# Patient Record
Sex: FEMALE | Race: WHITE | NOT HISPANIC OR LATINO | Employment: UNEMPLOYED | URBAN - NONMETROPOLITAN AREA
[De-identification: names, ages, dates, MRNs, and addresses within clinical notes are randomized per-mention and may not be internally consistent; named-entity substitution may affect disease eponyms.]

---

## 2023-04-21 ENCOUNTER — HOSPITAL ENCOUNTER (EMERGENCY)
Facility: HOSPITAL | Age: 53
Discharge: HOME | End: 2023-04-21
Attending: EMERGENCY MEDICINE
Payer: MEDICAID

## 2023-04-21 VITALS
RESPIRATION RATE: 18 BRPM | WEIGHT: 281.6 LBS | BODY MASS INDEX: 42.68 KG/M2 | OXYGEN SATURATION: 97 % | SYSTOLIC BLOOD PRESSURE: 145 MMHG | TEMPERATURE: 97.5 F | HEART RATE: 79 BPM | HEIGHT: 68 IN | DIASTOLIC BLOOD PRESSURE: 96 MMHG

## 2023-04-21 DIAGNOSIS — L02.91 ABSCESS: ICD-10-CM

## 2023-04-21 DIAGNOSIS — L03.811 ABSCESS OR CELLULITIS OF SCALP: Primary | ICD-10-CM

## 2023-04-21 LAB
ALBUMIN SERPL-MCNC: 4.2 G/DL (ref 3.5–5.2)
ALBUMIN/GLOB SERPL: 1.4 G/DL
ALP SERPL-CCNC: 145 U/L (ref 39–117)
ALT SERPL W P-5'-P-CCNC: 27 U/L (ref 1–33)
ANION GAP SERPL CALCULATED.3IONS-SCNC: 7.9 MMOL/L (ref 5–15)
AST SERPL-CCNC: 25 U/L (ref 1–32)
BASOPHILS # BLD AUTO: 0.07 10*3/MM3 (ref 0–0.2)
BASOPHILS NFR BLD AUTO: 0.8 % (ref 0–1.5)
BILIRUB SERPL-MCNC: 0.3 MG/DL (ref 0–1.2)
BUN SERPL-MCNC: 18 MG/DL (ref 6–20)
BUN/CREAT SERPL: 28.6 (ref 7–25)
CALCIUM SPEC-SCNC: 9.7 MG/DL (ref 8.6–10.5)
CHLORIDE SERPL-SCNC: 106 MMOL/L (ref 98–107)
CO2 SERPL-SCNC: 29.1 MMOL/L (ref 22–29)
CREAT SERPL-MCNC: 0.63 MG/DL (ref 0.57–1)
CRP SERPL-MCNC: 0.56 MG/DL (ref 0–0.5)
D-LACTATE SERPL-SCNC: 1 MMOL/L (ref 0.5–2)
DEPRECATED RDW RBC AUTO: 42.5 FL (ref 37–54)
EGFRCR SERPLBLD CKD-EPI 2021: 106.9 ML/MIN/1.73
EOSINOPHIL # BLD AUTO: 0.19 10*3/MM3 (ref 0–0.4)
EOSINOPHIL NFR BLD AUTO: 2.1 % (ref 0.3–6.2)
ERYTHROCYTE [DISTWIDTH] IN BLOOD BY AUTOMATED COUNT: 12.3 % (ref 12.3–15.4)
GLOBULIN UR ELPH-MCNC: 3.1 GM/DL
GLUCOSE SERPL-MCNC: 122 MG/DL (ref 65–99)
HCT VFR BLD AUTO: 46.5 % (ref 34–46.6)
HGB BLD-MCNC: 15 G/DL (ref 12–15.9)
IMM GRANULOCYTES # BLD AUTO: 0.02 10*3/MM3 (ref 0–0.05)
IMM GRANULOCYTES NFR BLD AUTO: 0.2 % (ref 0–0.5)
LYMPHOCYTES # BLD AUTO: 2.53 10*3/MM3 (ref 0.7–3.1)
LYMPHOCYTES NFR BLD AUTO: 27.5 % (ref 19.6–45.3)
MCH RBC QN AUTO: 30.2 PG (ref 26.6–33)
MCHC RBC AUTO-ENTMCNC: 32.3 G/DL (ref 31.5–35.7)
MCV RBC AUTO: 93.6 FL (ref 79–97)
MONOCYTES # BLD AUTO: 0.55 10*3/MM3 (ref 0.1–0.9)
MONOCYTES NFR BLD AUTO: 6 % (ref 5–12)
NEUTROPHILS NFR BLD AUTO: 5.85 10*3/MM3 (ref 1.7–7)
NEUTROPHILS NFR BLD AUTO: 63.4 % (ref 42.7–76)
NRBC BLD AUTO-RTO: 0 /100 WBC (ref 0–0.2)
PLATELET # BLD AUTO: 254 10*3/MM3 (ref 140–450)
PMV BLD AUTO: 10 FL (ref 6–12)
POTASSIUM SERPL-SCNC: 4.1 MMOL/L (ref 3.5–5.2)
PROT SERPL-MCNC: 7.3 G/DL (ref 6–8.5)
RBC # BLD AUTO: 4.97 10*6/MM3 (ref 3.77–5.28)
SODIUM SERPL-SCNC: 143 MMOL/L (ref 136–145)
WBC NRBC COR # BLD: 9.21 10*3/MM3 (ref 3.4–10.8)

## 2023-04-21 PROCEDURE — 83605 ASSAY OF LACTIC ACID: CPT | Performed by: PHYSICIAN ASSISTANT

## 2023-04-21 PROCEDURE — 86140 C-REACTIVE PROTEIN: CPT | Performed by: PHYSICIAN ASSISTANT

## 2023-04-21 PROCEDURE — 63710000001 ONDANSETRON ODT 4 MG TABLET DISPERSIBLE: Performed by: EMERGENCY MEDICINE

## 2023-04-21 PROCEDURE — 85025 COMPLETE CBC W/AUTO DIFF WBC: CPT | Performed by: PHYSICIAN ASSISTANT

## 2023-04-21 PROCEDURE — 99283 EMERGENCY DEPT VISIT LOW MDM: CPT

## 2023-04-21 PROCEDURE — 80053 COMPREHEN METABOLIC PANEL: CPT | Performed by: PHYSICIAN ASSISTANT

## 2023-04-21 RX ORDER — HYDROCODONE BITARTRATE AND ACETAMINOPHEN 5; 325 MG/1; MG/1
1 TABLET ORAL EVERY 6 HOURS PRN
Qty: 10 TABLET | Refills: 0 | Status: SHIPPED | OUTPATIENT
Start: 2023-04-21

## 2023-04-21 RX ORDER — SULFAMETHOXAZOLE AND TRIMETHOPRIM 800; 160 MG/1; MG/1
2 TABLET ORAL ONCE
Status: COMPLETED | OUTPATIENT
Start: 2023-04-21 | End: 2023-04-21

## 2023-04-21 RX ORDER — SULFAMETHOXAZOLE AND TRIMETHOPRIM 800; 160 MG/1; MG/1
1 TABLET ORAL 2 TIMES DAILY
Qty: 28 TABLET | Refills: 0 | Status: SHIPPED | OUTPATIENT
Start: 2023-04-21 | End: 2023-05-05

## 2023-04-21 RX ORDER — HYDROCODONE BITARTRATE AND ACETAMINOPHEN 5; 325 MG/1; MG/1
1 TABLET ORAL ONCE
Status: COMPLETED | OUTPATIENT
Start: 2023-04-21 | End: 2023-04-21

## 2023-04-21 RX ORDER — ONDANSETRON 4 MG/1
4 TABLET, ORALLY DISINTEGRATING ORAL ONCE
Status: COMPLETED | OUTPATIENT
Start: 2023-04-21 | End: 2023-04-21

## 2023-04-21 RX ADMIN — HYDROCODONE BITARTRATE AND ACETAMINOPHEN 1 TABLET: 5; 325 TABLET ORAL at 21:05

## 2023-04-21 RX ADMIN — SULFAMETHOXAZOLE AND TRIMETHOPRIM 2 TABLET: 800; 160 TABLET ORAL at 21:05

## 2023-04-21 RX ADMIN — ONDANSETRON 4 MG: 4 TABLET, ORALLY DISINTEGRATING ORAL at 21:05

## 2023-04-22 NOTE — ED NOTES
MEDICAL SCREENING:    Reason for Visit: Pt has painful lesions in her scalp and one on her back. States she has had fever and chills.     Patient initially seen in triage.  The patient was advised further evaluation and diagnostic testing will be needed, some of the treatment and testing will be initiated in the lobby in order to begin the process.  The patient will be returned to the waiting area for the time being and possibly be re-assessed by a subsequent ED provider.  The patient will be brought back to the treatment area in as timely manner as possible.       Sanam Jones PA  04/21/23 2018

## 2023-04-26 NOTE — ED PROVIDER NOTES
Subjective     History provided by:  Patient   used: No    Illness  Location:  Patient in the emergency department reporting that she has both small abscesses on her scalp, face, back, and abdomen.  Quality:  Reports that they cause her pain 2/10 on the pain severity scale that is a dull ache in nature and constant at all times.  Severity:  Mild  Onset quality:  Gradual  Timing:  Constant  Progression:  Worsening  Chronicity:  Chronic  Context:  In the emergency department for multiple abscesses.  Relieved by:  Nothing.  Worsened by:  Scratching them.  Ineffective treatments:  None tried at this time.  Associated symptoms: no abdominal pain, no chest pain, no congestion, no cough, no diarrhea, no ear pain, no fatigue, no fever, no headaches, no loss of consciousness, no myalgias, no nausea, no rash, no rhinorrhea, no shortness of breath, no sore throat, no vomiting and no wheezing        Review of Systems   Constitutional: Negative for activity change, appetite change, chills, diaphoresis, fatigue and fever.   HENT: Negative for congestion, ear pain, rhinorrhea and sore throat.    Eyes: Negative for redness.   Respiratory: Negative for cough, chest tightness, shortness of breath and wheezing.    Cardiovascular: Negative for chest pain, palpitations and leg swelling.   Gastrointestinal: Negative for abdominal pain, diarrhea, nausea and vomiting.   Genitourinary: Negative for dysuria and urgency.   Musculoskeletal: Negative for arthralgias, back pain, myalgias and neck pain.   Skin: Negative for pallor, rash and wound.   Neurological: Negative for dizziness, loss of consciousness, speech difficulty, weakness and headaches.   Psychiatric/Behavioral: Negative for agitation, behavioral problems, confusion and decreased concentration.   All other systems reviewed and are negative.      No past medical history on file.    Allergies   Allergen Reactions   • Erythromycin Anaphylaxis   • Toradol [Ketorolac  Tromethamine] Rash       No past surgical history on file.    No family history on file.    Social History     Socioeconomic History   • Marital status: Single           Objective   Physical Exam  Vitals and nursing note reviewed.   Constitutional:       General: She is not in acute distress.     Appearance: Normal appearance. She is well-developed. She is not toxic-appearing or diaphoretic.   HENT:      Head: Normocephalic and atraumatic.      Right Ear: External ear normal.      Left Ear: External ear normal.      Nose: Nose normal.      Mouth/Throat:      Pharynx: No oropharyngeal exudate.      Tonsils: No tonsillar exudate.   Eyes:      General: Lids are normal.      Conjunctiva/sclera: Conjunctivae normal.      Pupils: Pupils are equal, round, and reactive to light.   Neck:      Thyroid: No thyromegaly.   Cardiovascular:      Rate and Rhythm: Normal rate and regular rhythm.      Pulses: Normal pulses.      Heart sounds: Normal heart sounds, S1 normal and S2 normal.   Pulmonary:      Effort: Pulmonary effort is normal. No tachypnea or respiratory distress.      Breath sounds: Normal breath sounds. No decreased breath sounds, wheezing or rales.   Chest:      Chest wall: No tenderness.   Abdominal:      General: Bowel sounds are normal. There is no distension.      Palpations: Abdomen is soft.      Tenderness: There is no abdominal tenderness. There is no guarding or rebound.   Musculoskeletal:         General: No tenderness or deformity. Normal range of motion.      Cervical back: Full passive range of motion without pain, normal range of motion and neck supple.   Lymphadenopathy:      Cervical: No cervical adenopathy.   Skin:     General: Skin is warm and dry.      Coloration: Skin is not pale.      Findings: Lesion present. No erythema or rash.      Comments: Patient has multiple less than half centimeter lesions noted to the scalp, bilateral cheeks, on the upper back, and lower right abdominal wall.    Neurological:      Mental Status: She is alert and oriented to person, place, and time.      GCS: GCS eye subscore is 4. GCS verbal subscore is 5. GCS motor subscore is 6.      Cranial Nerves: No cranial nerve deficit.      Sensory: No sensory deficit.   Psychiatric:         Speech: Speech normal.         Behavior: Behavior normal.         Thought Content: Thought content normal.         Judgment: Judgment normal.         Procedures           ED Course                                           Medical Decision Making    History provided by:  Patient   used: No    Illness  Location:  Patient in the emergency department reporting that she has both small abscesses on her scalp, face, back, and abdomen.  Quality:  Reports that they cause her pain 2/10 on the pain severity scale that is a dull ache in nature and constant at all times.  Severity:  Mild  Onset quality:  Gradual  Timing:  Constant  Progression:  Worsening  Chronicity:  Chronic  Context:  In the emergency department for multiple abscesses.  Relieved by:  Nothing.  Worsened by:  Scratching them.  Ineffective treatments:  None tried at this time.  Associated symptoms: no abdominal pain, no chest pain, no congestion, no cough, no diarrhea, no ear pain, no fatigue, no fever, no headaches, no loss of consciousness, no myalgias, no nausea, no rash, no rhinorrhea, no shortness of breath, no sore throat, no vomiting and no wheezing        Abscess or cellulitis of scalp: acute illness or injury  Abscess: acute illness or injury  Amount and/or Complexity of Data Reviewed  External Data Reviewed: labs and notes.  Labs: ordered. Decision-making details documented in ED Course.      Risk  Prescription drug management.          Final diagnoses:   Abscess   Abscess or cellulitis of scalp       ED Disposition  ED Disposition     ED Disposition   Discharge    Condition   Stable    Comment   --             PATIENT CONNECTION - [facility]  See Provider  List  [place] [address]  [phone]  Schedule an appointment as soon as possible for a visit in 1 day  REEVALUATE         Medication List      New Prescriptions    HYDROcodone-acetaminophen 5-325 MG per tablet  Commonly known as: NORCO  Take 1 tablet by mouth Every 6 (Six) Hours As Needed for Severe Pain.     mupirocin 2 % ointment  Commonly known as: BACTROBAN  Apply 1 application topically to the appropriate area as directed 3 (Three) Times a Day for 10 days.     sulfamethoxazole-trimethoprim 800-160 MG per tablet  Commonly known as: BACTRIM DS,SEPTRA DS  Take 1 tablet by mouth 2 (Two) Times a Day for 14 days.           Where to Get Your Medications      These medications were sent to [id number] - [facility] [address] [address] AT SEC OF [facility] [address] - [phone]  - [phone]   [address] [address]    Phone: [phone]   · HYDROcodone-acetaminophen 5-325 MG per tablet  · mupirocin 2 % ointment  · sulfamethoxazole-trimethoprim 800-160 MG per tablet          Doroteo Pollock MD  04/26/23 5040

## 2024-04-08 ENCOUNTER — APPOINTMENT (OUTPATIENT)
Dept: CT IMAGING | Facility: HOSPITAL | Age: 54
End: 2024-04-08
Payer: MEDICAID

## 2024-04-08 ENCOUNTER — HOSPITAL ENCOUNTER (EMERGENCY)
Facility: HOSPITAL | Age: 54
Discharge: HOME | End: 2024-04-08
Attending: STUDENT IN AN ORGANIZED HEALTH CARE EDUCATION/TRAINING PROGRAM | Admitting: STUDENT IN AN ORGANIZED HEALTH CARE EDUCATION/TRAINING PROGRAM
Payer: MEDICAID

## 2024-04-08 VITALS
HEIGHT: 68 IN | BODY MASS INDEX: 42.77 KG/M2 | SYSTOLIC BLOOD PRESSURE: 199 MMHG | WEIGHT: 282.19 LBS | TEMPERATURE: 98.6 F | OXYGEN SATURATION: 99 % | RESPIRATION RATE: 18 BRPM | DIASTOLIC BLOOD PRESSURE: 132 MMHG | HEART RATE: 53 BPM

## 2024-04-08 DIAGNOSIS — S30.0XXA LUMBAR CONTUSION, INITIAL ENCOUNTER: Primary | ICD-10-CM

## 2024-04-08 DIAGNOSIS — L03.811 ABSCESS OR CELLULITIS OF SCALP: ICD-10-CM

## 2024-04-08 PROCEDURE — 72131 CT LUMBAR SPINE W/O DYE: CPT | Performed by: RADIOLOGY

## 2024-04-08 PROCEDURE — 99284 EMERGENCY DEPT VISIT MOD MDM: CPT

## 2024-04-08 PROCEDURE — 72131 CT LUMBAR SPINE W/O DYE: CPT

## 2024-04-08 RX ORDER — HYDROCODONE BITARTRATE AND ACETAMINOPHEN 5; 325 MG/1; MG/1
1 TABLET ORAL ONCE
Status: COMPLETED | OUTPATIENT
Start: 2024-04-08 | End: 2024-04-08

## 2024-04-08 RX ORDER — LISINOPRIL 20 MG/1
20 TABLET ORAL DAILY
COMMUNITY

## 2024-04-08 RX ORDER — HYDROCODONE BITARTRATE AND ACETAMINOPHEN 5; 325 MG/1; MG/1
1 TABLET ORAL EVERY 6 HOURS PRN
Qty: 10 TABLET | Refills: 0 | Status: SHIPPED | OUTPATIENT
Start: 2024-04-08

## 2024-04-08 RX ORDER — HYDROCODONE BITARTRATE AND ACETAMINOPHEN 5; 325 MG/1; MG/1
1 TABLET ORAL EVERY 6 HOURS PRN
Qty: 10 TABLET | Refills: 0 | Status: CANCELLED | OUTPATIENT
Start: 2024-04-08

## 2024-04-08 RX ADMIN — HYDROCODONE BITARTRATE AND ACETAMINOPHEN 1 TABLET: 5; 325 TABLET ORAL at 13:08

## 2024-04-08 NOTE — ED PROVIDER NOTES
Subjective   History of Present Illness  53-year-old female presents secondary to low back pain.  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  She states that she was assisting  A near 400 pound patient to stand.  Patient subsequently started pushing backwards.  Patient reports that she and the patient did not fall to the ground however she had hard contact with a table across her lower back.  Patient did not take her antihypertensive medications today.  She declined having them here.  She states that she would take these at home.  She was a past medical history of hypertension.  She presents by private vehicle.  No numbness tingling pain into her legs.      Review of Systems   Constitutional: Negative.  Negative for fever.   HENT: Negative.     Respiratory: Negative.     Cardiovascular: Negative.  Negative for chest pain.   Gastrointestinal: Negative.  Negative for abdominal pain.   Endocrine: Negative.    Genitourinary: Negative.  Negative for dysuria.   Musculoskeletal:  Positive for back pain.   Skin: Negative.    Neurological: Negative.    Psychiatric/Behavioral: Negative.     All other systems reviewed and are negative.      No past medical history on file.    Allergies   Allergen Reactions    Erythromycin Anaphylaxis    Toradol [Ketorolac Tromethamine] Rash       No past surgical history on file.    No family history on file.    Social History     Socioeconomic History    Marital status: Single           Objective   Physical Exam  Vitals and nursing note reviewed.   Constitutional:       General: She is not in acute distress.     Appearance: She is well-developed. She is not diaphoretic.   HENT:      Head: Normocephalic and atraumatic.      Right Ear: External ear normal.      Left Ear: External ear normal.      Nose: Nose normal.   Eyes:      Conjunctiva/sclera: Conjunctivae normal.      Pupils: Pupils are equal, round, and reactive to light.   Neck:      Vascular: No JVD.      Trachea: No tracheal deviation.    Cardiovascular:      Rate and Rhythm: Normal rate and regular rhythm.      Heart sounds: Normal heart sounds. No murmur heard.  Pulmonary:      Effort: Pulmonary effort is normal. No respiratory distress.      Breath sounds: Normal breath sounds. No wheezing.   Abdominal:      General: Bowel sounds are normal.      Palpations: Abdomen is soft.      Tenderness: There is no abdominal tenderness.   Musculoskeletal:         General: No deformity. Normal range of motion.      Cervical back: Normal range of motion and neck supple.   Skin:     General: Skin is warm and dry.      Coloration: Skin is not pale.      Findings: No erythema or rash.   Neurological:      Mental Status: She is alert and oriented to person, place, and time.      Cranial Nerves: No cranial nerve deficit.   Psychiatric:         Behavior: Behavior normal.         Thought Content: Thought content normal.         Procedures           ED Course                                             Medical Decision Making  53-year-old female presents secondary to low back pain.  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  She states that she was assisting  A near 400 pound patient to stand.  Patient subsequently started pushing backwards.  Patient reports that she and the patient did not fall to the ground however she had hard contact with a table across her lower back.  Patient did not take her antihypertensive medications today.  She declined having them here.  She states that she would take these at home.  She was a past medical history of hypertension.  She presents by private vehicle.  No numbness tingling pain into her legs.    Problems Addressed:  Lumbar contusion, initial encounter: complicated acute illness or injury    Amount and/or Complexity of Data Reviewed  Radiology: ordered. Decision-making details documented in ED Course.    Risk  Prescription drug management.        Final diagnoses:   Lumbar contusion, initial encounter       ED Disposition  ED  Disposition       ED Disposition   Discharge    Condition   Stable    Comment   --               [facility]  [address]  [facility] [address] [phone]  Schedule an appointment as soon as possible for a visit            Where to Get Your Medications        These medications were sent to [facility] #[id number] - [place] [address] [address] - [phone]  - [phone]   [address] [address]      Phone: [phone]   HYDROcodone-acetaminophen 5-325 MG per tablet          Medication List      No changes were made to your prescriptions during this visit.            Juan Lima, PA  04/08/24 9374

## 2024-04-08 NOTE — ED NOTES
Pt states that she takes HCTZ and Lisinopril and has them with her at this time. Provider aware and is ok with pt going ahead and taking medications. Provider taking medication at this time.

## 2024-04-08 NOTE — ED NOTES
Pt states that she will sign a wavier and does not want to be stuck for hCG. Provider made aware. CT made aware

## 2024-08-21 ENCOUNTER — HOSPITAL ENCOUNTER (INPATIENT)
Facility: HOSPITAL | Age: 54
LOS: 7 days | Discharge: NURSING, REHAB OR LONG-TERM CARE | DRG: 023 | End: 2024-08-28
Attending: EMERGENCY MEDICINE | Admitting: INTERNAL MEDICINE
Payer: MEDICAID

## 2024-08-21 ENCOUNTER — ANESTHESIA EVENT (OUTPATIENT)
Dept: CARDIOLOGY | Facility: HOSPITAL | Age: 54
End: 2024-08-21
Payer: MEDICAID

## 2024-08-21 ENCOUNTER — ANESTHESIA (OUTPATIENT)
Dept: CARDIOLOGY | Facility: HOSPITAL | Age: 54
End: 2024-08-21
Payer: MEDICAID

## 2024-08-21 ENCOUNTER — APPOINTMENT (OUTPATIENT)
Dept: GENERAL RADIOLOGY | Facility: HOSPITAL | Age: 54
DRG: 023 | End: 2024-08-21
Payer: MEDICAID

## 2024-08-21 ENCOUNTER — APPOINTMENT (OUTPATIENT)
Dept: CT IMAGING | Facility: HOSPITAL | Age: 54
DRG: 023 | End: 2024-08-21
Payer: MEDICAID

## 2024-08-21 DIAGNOSIS — I63.9 ACUTE CVA (CEREBROVASCULAR ACCIDENT): Primary | ICD-10-CM

## 2024-08-21 DIAGNOSIS — R47.1 DYSARTHRIA: ICD-10-CM

## 2024-08-21 DIAGNOSIS — R13.12 OROPHARYNGEAL DYSPHAGIA: ICD-10-CM

## 2024-08-21 DIAGNOSIS — R41.4 LEFT-SIDED NEGLECT: ICD-10-CM

## 2024-08-21 PROBLEM — E78.5 DYSLIPIDEMIA: Chronic | Status: ACTIVE | Noted: 2024-08-21

## 2024-08-21 PROBLEM — Z78.9 ALCOHOL USE: Chronic | Status: ACTIVE | Noted: 2024-08-21

## 2024-08-21 PROBLEM — E66.9 OBESITY (BMI 30-39.9): Chronic | Status: ACTIVE | Noted: 2024-08-21

## 2024-08-21 PROBLEM — I73.9 PAD (PERIPHERAL ARTERY DISEASE): Chronic | Status: ACTIVE | Noted: 2024-08-21

## 2024-08-21 PROBLEM — I10 HTN (HYPERTENSION): Status: ACTIVE | Noted: 2024-08-21

## 2024-08-21 PROBLEM — F10.90 ALCOHOL USE: Chronic | Status: ACTIVE | Noted: 2024-08-21

## 2024-08-21 PROBLEM — Z72.0 TOBACCO USE: Chronic | Status: ACTIVE | Noted: 2024-08-21

## 2024-08-21 PROBLEM — Z87.898 HISTORY OF SEIZURES: Status: ACTIVE | Noted: 2024-08-21

## 2024-08-21 LAB
ALBUMIN SERPL-MCNC: 4 G/DL (ref 3.5–5.2)
ALBUMIN/GLOB SERPL: 1.5 G/DL
ALP SERPL-CCNC: 137 U/L (ref 39–117)
ALT SERPL W P-5'-P-CCNC: 19 U/L (ref 1–33)
ALT SERPL W P-5'-P-CCNC: 20 U/L (ref 1–33)
AMPHET+METHAMPHET UR QL: NEGATIVE
AMPHETAMINES UR QL: POSITIVE
ANION GAP SERPL CALCULATED.3IONS-SCNC: 13 MMOL/L (ref 5–15)
APTT PPP: 25.1 SECONDS (ref 22–39)
AST SERPL-CCNC: 21 U/L (ref 1–32)
AST SERPL-CCNC: 22 U/L (ref 1–32)
BARBITURATES UR QL SCN: NEGATIVE
BASOPHILS # BLD AUTO: 0.04 10*3/MM3 (ref 0–0.2)
BASOPHILS NFR BLD AUTO: 0.5 % (ref 0–1.5)
BENZODIAZ UR QL SCN: NEGATIVE
BILIRUB SERPL-MCNC: 0.3 MG/DL (ref 0–1.2)
BUN BLDA-MCNC: 17 MG/DL (ref 8–26)
BUN SERPL-MCNC: 16 MG/DL (ref 6–20)
BUN/CREAT SERPL: 25 (ref 7–25)
BUPRENORPHINE SERPL-MCNC: NEGATIVE NG/ML
CA-I BLDA-SCNC: 1.23 MMOL/L (ref 1.2–1.32)
CALCIUM SPEC-SCNC: 9.2 MG/DL (ref 8.6–10.5)
CANNABINOIDS SERPL QL: NEGATIVE
CHLORIDE BLDA-SCNC: 104 MMOL/L (ref 98–109)
CHLORIDE SERPL-SCNC: 104 MMOL/L (ref 98–107)
CO2 BLDA-SCNC: 26 MMOL/L (ref 24–29)
CO2 SERPL-SCNC: 23 MMOL/L (ref 22–29)
COCAINE UR QL: NEGATIVE
CREAT BLDA-MCNC: 0.6 MG/DL (ref 0.6–1.3)
CREAT SERPL-MCNC: 0.64 MG/DL (ref 0.57–1)
DEPRECATED RDW RBC AUTO: 42.4 FL (ref 37–54)
EGFRCR SERPLBLD CKD-EPI 2021: 105.8 ML/MIN/1.73
EGFRCR SERPLBLD CKD-EPI 2021: 107.5 ML/MIN/1.73
EOSINOPHIL # BLD AUTO: 0.09 10*3/MM3 (ref 0–0.4)
EOSINOPHIL NFR BLD AUTO: 1.2 % (ref 0.3–6.2)
ERYTHROCYTE [DISTWIDTH] IN BLOOD BY AUTOMATED COUNT: 12.2 % (ref 12.3–15.4)
GLOBULIN UR ELPH-MCNC: 2.6 GM/DL
GLUCOSE BLDC GLUCOMTR-MCNC: 133 MG/DL (ref 70–130)
GLUCOSE BLDC GLUCOMTR-MCNC: 160 MG/DL (ref 70–130)
GLUCOSE SERPL-MCNC: 138 MG/DL (ref 65–99)
HCT VFR BLD AUTO: 45.2 % (ref 34–46.6)
HCT VFR BLDA CALC: 45 % (ref 38–51)
HGB BLD-MCNC: 15.2 G/DL (ref 12–15.9)
HGB BLDA-MCNC: 15.3 G/DL (ref 12–17)
HOLD SPECIMEN: NORMAL
IMM GRANULOCYTES # BLD AUTO: 0.02 10*3/MM3 (ref 0–0.05)
IMM GRANULOCYTES NFR BLD AUTO: 0.3 % (ref 0–0.5)
LYMPHOCYTES # BLD AUTO: 1.83 10*3/MM3 (ref 0.7–3.1)
LYMPHOCYTES NFR BLD AUTO: 24.4 % (ref 19.6–45.3)
MCH RBC QN AUTO: 32 PG (ref 26.6–33)
MCHC RBC AUTO-ENTMCNC: 33.6 G/DL (ref 31.5–35.7)
MCV RBC AUTO: 95.2 FL (ref 79–97)
METHADONE UR QL SCN: NEGATIVE
MONOCYTES # BLD AUTO: 0.37 10*3/MM3 (ref 0.1–0.9)
MONOCYTES NFR BLD AUTO: 4.9 % (ref 5–12)
NEUTROPHILS NFR BLD AUTO: 5.14 10*3/MM3 (ref 1.7–7)
NEUTROPHILS NFR BLD AUTO: 68.7 % (ref 42.7–76)
NRBC BLD AUTO-RTO: 0 /100 WBC (ref 0–0.2)
OPIATES UR QL: POSITIVE
OXYCODONE UR QL SCN: NEGATIVE
PCP UR QL SCN: NEGATIVE
PLATELET # BLD AUTO: 215 10*3/MM3 (ref 140–450)
PMV BLD AUTO: 10 FL (ref 6–12)
POTASSIUM BLDA-SCNC: 4 MMOL/L (ref 3.5–4.9)
POTASSIUM SERPL-SCNC: 4.1 MMOL/L (ref 3.5–5.2)
PROT SERPL-MCNC: 6.6 G/DL (ref 6–8.5)
RBC # BLD AUTO: 4.75 10*6/MM3 (ref 3.77–5.28)
SODIUM BLD-SCNC: 140 MMOL/L (ref 138–146)
SODIUM SERPL-SCNC: 140 MMOL/L (ref 136–145)
TRICYCLICS UR QL SCN: NEGATIVE
TROPONIN T SERPL HS-MCNC: <6 NG/L
WBC NRBC COR # BLD AUTO: 7.49 10*3/MM3 (ref 3.4–10.8)
WHOLE BLOOD HOLD COAG: NORMAL
WHOLE BLOOD HOLD SPECIMEN: NORMAL

## 2024-08-21 PROCEDURE — 70450 CT HEAD/BRAIN W/O DYE: CPT

## 2024-08-21 PROCEDURE — 80053 COMPREHEN METABOLIC PANEL: CPT | Performed by: EMERGENCY MEDICINE

## 2024-08-21 PROCEDURE — 0042T HC CT CEREBRAL PERFUSION W/WO CONTRAST: CPT

## 2024-08-21 PROCEDURE — C1769 GUIDE WIRE: HCPCS | Performed by: STUDENT IN AN ORGANIZED HEALTH CARE EDUCATION/TRAINING PROGRAM

## 2024-08-21 PROCEDURE — 25010000002 DIPHENHYDRAMINE PER 50 MG: Performed by: EMERGENCY MEDICINE

## 2024-08-21 PROCEDURE — 25010000002 ONDANSETRON PER 1 MG

## 2024-08-21 PROCEDURE — C1894 INTRO/SHEATH, NON-LASER: HCPCS | Performed by: STUDENT IN AN ORGANIZED HEALTH CARE EDUCATION/TRAINING PROGRAM

## 2024-08-21 PROCEDURE — 25810000003 SODIUM CHLORIDE 0.9 % SOLUTION 250 ML FLEX CONT

## 2024-08-21 PROCEDURE — 99223 1ST HOSP IP/OBS HIGH 75: CPT | Performed by: INTERNAL MEDICINE

## 2024-08-21 PROCEDURE — 73610 X-RAY EXAM OF ANKLE: CPT

## 2024-08-21 PROCEDURE — 85730 THROMBOPLASTIN TIME PARTIAL: CPT | Performed by: EMERGENCY MEDICINE

## 2024-08-21 PROCEDURE — 61645 PERQ ART M-THROMBECT &/NFS: CPT | Performed by: STUDENT IN AN ORGANIZED HEALTH CARE EDUCATION/TRAINING PROGRAM

## 2024-08-21 PROCEDURE — 0 IODIXANOL PER 1 ML: Performed by: STUDENT IN AN ORGANIZED HEALTH CARE EDUCATION/TRAINING PROGRAM

## 2024-08-21 PROCEDURE — 80307 DRUG TEST PRSMV CHEM ANLYZR: CPT | Performed by: NURSE PRACTITIONER

## 2024-08-21 PROCEDURE — 25010000002 NICARDIPINE 2.5 MG/ML SOLUTION 10 ML VIAL

## 2024-08-21 PROCEDURE — 25010000002 HYDRALAZINE PER 20 MG

## 2024-08-21 PROCEDURE — 80047 BASIC METABLC PNL IONIZED CA: CPT | Performed by: EMERGENCY MEDICINE

## 2024-08-21 PROCEDURE — 70496 CT ANGIOGRAPHY HEAD: CPT

## 2024-08-21 PROCEDURE — C1760 CLOSURE DEV, VASC: HCPCS | Performed by: STUDENT IN AN ORGANIZED HEALTH CARE EDUCATION/TRAINING PROGRAM

## 2024-08-21 PROCEDURE — 0 LABETALOL 5 MG/ML SOLUTION

## 2024-08-21 PROCEDURE — 92610 EVALUATE SWALLOWING FUNCTION: CPT | Performed by: SPEECH-LANGUAGE PATHOLOGIST

## 2024-08-21 PROCEDURE — 93005 ELECTROCARDIOGRAM TRACING: CPT | Performed by: EMERGENCY MEDICINE

## 2024-08-21 PROCEDURE — 85025 COMPLETE CBC W/AUTO DIFF WBC: CPT | Performed by: EMERGENCY MEDICINE

## 2024-08-21 PROCEDURE — 99291 CRITICAL CARE FIRST HOUR: CPT

## 2024-08-21 PROCEDURE — B3131ZZ FLUOROSCOPY OF RIGHT COMMON CAROTID ARTERY USING LOW OSMOLAR CONTRAST: ICD-10-PCS | Performed by: STUDENT IN AN ORGANIZED HEALTH CARE EDUCATION/TRAINING PROGRAM

## 2024-08-21 PROCEDURE — B3161ZZ FLUOROSCOPY OF RIGHT INTERNAL CAROTID ARTERY USING LOW OSMOLAR CONTRAST: ICD-10-PCS | Performed by: STUDENT IN AN ORGANIZED HEALTH CARE EDUCATION/TRAINING PROGRAM

## 2024-08-21 PROCEDURE — 25010000002 PHENYLEPHRINE 10 MG/ML SOLUTION 1 ML VIAL

## 2024-08-21 PROCEDURE — C1757 CATH, THROMBECTOMY/EMBOLECT: HCPCS | Performed by: STUDENT IN AN ORGANIZED HEALTH CARE EDUCATION/TRAINING PROGRAM

## 2024-08-21 PROCEDURE — C1876 STENT, NON-COA/NON-COV W/DEL: HCPCS | Performed by: STUDENT IN AN ORGANIZED HEALTH CARE EDUCATION/TRAINING PROGRAM

## 2024-08-21 PROCEDURE — 92523 SPEECH SOUND LANG COMPREHEN: CPT | Performed by: SPEECH-LANGUAGE PATHOLOGIST

## 2024-08-21 PROCEDURE — 03CG3ZZ EXTIRPATION OF MATTER FROM INTRACRANIAL ARTERY, PERCUTANEOUS APPROACH: ICD-10-PCS | Performed by: STUDENT IN AN ORGANIZED HEALTH CARE EDUCATION/TRAINING PROGRAM

## 2024-08-21 PROCEDURE — 25010000002 SUGAMMADEX 500 MG/5ML SOLUTION

## 2024-08-21 PROCEDURE — 82948 REAGENT STRIP/BLOOD GLUCOSE: CPT

## 2024-08-21 PROCEDURE — C1887 CATHETER, GUIDING: HCPCS | Performed by: STUDENT IN AN ORGANIZED HEALTH CARE EDUCATION/TRAINING PROGRAM

## 2024-08-21 PROCEDURE — 25010000002 NICARDIPINE 2.5 MG/ML SOLUTION 10 ML VIAL: Performed by: STUDENT IN AN ORGANIZED HEALTH CARE EDUCATION/TRAINING PROGRAM

## 2024-08-21 PROCEDURE — 84484 ASSAY OF TROPONIN QUANT: CPT | Performed by: EMERGENCY MEDICINE

## 2024-08-21 PROCEDURE — 25810000003 SODIUM CHLORIDE 0.9 % SOLUTION

## 2024-08-21 PROCEDURE — 25010000002 METHYLPREDNISOLONE PER 125 MG: Performed by: EMERGENCY MEDICINE

## 2024-08-21 PROCEDURE — 71045 X-RAY EXAM CHEST 1 VIEW: CPT

## 2024-08-21 PROCEDURE — 037H3DZ DILATION OF RIGHT COMMON CAROTID ARTERY WITH INTRALUMINAL DEVICE, PERCUTANEOUS APPROACH: ICD-10-PCS | Performed by: STUDENT IN AN ORGANIZED HEALTH CARE EDUCATION/TRAINING PROGRAM

## 2024-08-21 PROCEDURE — 25510000001 IOPAMIDOL PER 1 ML: Performed by: EMERGENCY MEDICINE

## 2024-08-21 PROCEDURE — 85014 HEMATOCRIT: CPT | Performed by: EMERGENCY MEDICINE

## 2024-08-21 PROCEDURE — C1884 EMBOLIZATION PROTECT SYST: HCPCS | Performed by: STUDENT IN AN ORGANIZED HEALTH CARE EDUCATION/TRAINING PROGRAM

## 2024-08-21 PROCEDURE — 99233 SBSQ HOSP IP/OBS HIGH 50: CPT

## 2024-08-21 PROCEDURE — 37215 TRANSCATH STENT CCA W/EPS: CPT | Performed by: STUDENT IN AN ORGANIZED HEALTH CARE EDUCATION/TRAINING PROGRAM

## 2024-08-21 PROCEDURE — 25010000002 PROPOFOL 10 MG/ML EMULSION

## 2024-08-21 PROCEDURE — 25010000002 DEXAMETHASONE PER 1 MG

## 2024-08-21 PROCEDURE — 70498 CT ANGIOGRAPHY NECK: CPT

## 2024-08-21 PROCEDURE — 25010000002 ESMOLOL 100 MG/10ML SOLUTION

## 2024-08-21 PROCEDURE — 25010000002 HEPARIN (PORCINE) PER 1000 UNITS

## 2024-08-21 PROCEDURE — 25810000003 SODIUM CHLORIDE 0.9 % SOLUTION 250 ML FLEX CONT: Performed by: STUDENT IN AN ORGANIZED HEALTH CARE EDUCATION/TRAINING PROGRAM

## 2024-08-21 DEVICE — XACT CAROTID STENT SYSTEM 10.0 MM X 40 MM TAPERED
Type: IMPLANTABLE DEVICE | Site: CAROTID | Status: FUNCTIONAL
Brand: XACT

## 2024-08-21 RX ORDER — DEXAMETHASONE SODIUM PHOSPHATE 4 MG/ML
INJECTION, SOLUTION INTRA-ARTICULAR; INTRALESIONAL; INTRAMUSCULAR; INTRAVENOUS; SOFT TISSUE AS NEEDED
Status: DISCONTINUED | OUTPATIENT
Start: 2024-08-21 | End: 2024-08-21 | Stop reason: SURG

## 2024-08-21 RX ORDER — ASPIRIN 300 MG/1
300 SUPPOSITORY RECTAL DAILY
Status: DISCONTINUED | OUTPATIENT
Start: 2024-08-22 | End: 2024-08-22

## 2024-08-21 RX ORDER — FAMOTIDINE 10 MG/ML
INJECTION, SOLUTION INTRAVENOUS
Status: DISCONTINUED | OUTPATIENT
Start: 2024-08-21 | End: 2024-08-21 | Stop reason: HOSPADM

## 2024-08-21 RX ORDER — ASPIRIN 325 MG
TABLET ORAL
Status: DISCONTINUED | OUTPATIENT
Start: 2024-08-21 | End: 2024-08-21 | Stop reason: HOSPADM

## 2024-08-21 RX ORDER — SODIUM CHLORIDE 9 MG/ML
40 INJECTION, SOLUTION INTRAVENOUS AS NEEDED
Status: DISCONTINUED | OUTPATIENT
Start: 2024-08-21 | End: 2024-08-25

## 2024-08-21 RX ORDER — SODIUM CHLORIDE 0.9 % (FLUSH) 0.9 %
10 SYRINGE (ML) INJECTION AS NEEDED
Status: DISCONTINUED | OUTPATIENT
Start: 2024-08-21 | End: 2024-08-22

## 2024-08-21 RX ORDER — EPHEDRINE SULFATE 50 MG/ML
INJECTION, SOLUTION INTRAVENOUS AS NEEDED
Status: DISCONTINUED | OUTPATIENT
Start: 2024-08-21 | End: 2024-08-21 | Stop reason: SURG

## 2024-08-21 RX ORDER — NITROGLYCERIN 0.4 MG/1
0.4 TABLET SUBLINGUAL
Status: DISCONTINUED | OUTPATIENT
Start: 2024-08-21 | End: 2024-08-28 | Stop reason: HOSPADM

## 2024-08-21 RX ORDER — HEPARIN SODIUM 1000 [USP'U]/ML
INJECTION, SOLUTION INTRAVENOUS; SUBCUTANEOUS AS NEEDED
Status: DISCONTINUED | OUTPATIENT
Start: 2024-08-21 | End: 2024-08-21 | Stop reason: SURG

## 2024-08-21 RX ORDER — IODIXANOL 320 MG/ML
INJECTION, SOLUTION INTRAVASCULAR
Status: DISCONTINUED | OUTPATIENT
Start: 2024-08-21 | End: 2024-08-21 | Stop reason: HOSPADM

## 2024-08-21 RX ORDER — BUPIVACAINE HCL/0.9 % NACL/PF 0.125 %
PLASTIC BAG, INJECTION (ML) EPIDURAL AS NEEDED
Status: DISCONTINUED | OUTPATIENT
Start: 2024-08-21 | End: 2024-08-21 | Stop reason: SURG

## 2024-08-21 RX ORDER — METHYLPREDNISOLONE SODIUM SUCCINATE 125 MG/2ML
125 INJECTION, POWDER, LYOPHILIZED, FOR SOLUTION INTRAMUSCULAR; INTRAVENOUS ONCE
Status: COMPLETED | OUTPATIENT
Start: 2024-08-21 | End: 2024-08-21

## 2024-08-21 RX ORDER — SODIUM CHLORIDE 9 MG/ML
INJECTION, SOLUTION INTRAVENOUS CONTINUOUS PRN
Status: DISCONTINUED | OUTPATIENT
Start: 2024-08-21 | End: 2024-08-21 | Stop reason: SURG

## 2024-08-21 RX ORDER — ROCURONIUM BROMIDE 10 MG/ML
INJECTION, SOLUTION INTRAVENOUS AS NEEDED
Status: DISCONTINUED | OUTPATIENT
Start: 2024-08-21 | End: 2024-08-21 | Stop reason: SURG

## 2024-08-21 RX ORDER — ONDANSETRON 2 MG/ML
INJECTION INTRAMUSCULAR; INTRAVENOUS AS NEEDED
Status: DISCONTINUED | OUTPATIENT
Start: 2024-08-21 | End: 2024-08-21 | Stop reason: SURG

## 2024-08-21 RX ORDER — PROPOFOL 10 MG/ML
VIAL (ML) INTRAVENOUS AS NEEDED
Status: DISCONTINUED | OUTPATIENT
Start: 2024-08-21 | End: 2024-08-21 | Stop reason: SURG

## 2024-08-21 RX ORDER — LABETALOL HYDROCHLORIDE 5 MG/ML
INJECTION, SOLUTION INTRAVENOUS AS NEEDED
Status: DISCONTINUED | OUTPATIENT
Start: 2024-08-21 | End: 2024-08-21 | Stop reason: SURG

## 2024-08-21 RX ORDER — ASPIRIN 300 MG/1
300 SUPPOSITORY RECTAL DAILY
Status: DISCONTINUED | OUTPATIENT
Start: 2024-08-21 | End: 2024-08-21

## 2024-08-21 RX ORDER — HYDRALAZINE HYDROCHLORIDE 20 MG/ML
INJECTION INTRAMUSCULAR; INTRAVENOUS AS NEEDED
Status: DISCONTINUED | OUTPATIENT
Start: 2024-08-21 | End: 2024-08-21 | Stop reason: SURG

## 2024-08-21 RX ORDER — SODIUM CHLORIDE 0.9 % (FLUSH) 0.9 %
10 SYRINGE (ML) INJECTION EVERY 12 HOURS SCHEDULED
Status: DISCONTINUED | OUTPATIENT
Start: 2024-08-21 | End: 2024-08-22

## 2024-08-21 RX ORDER — ESMOLOL HYDROCHLORIDE 10 MG/ML
INJECTION INTRAVENOUS AS NEEDED
Status: DISCONTINUED | OUTPATIENT
Start: 2024-08-21 | End: 2024-08-21 | Stop reason: SURG

## 2024-08-21 RX ORDER — ACETAMINOPHEN 325 MG/1
650 TABLET ORAL EVERY 6 HOURS PRN
Status: DISCONTINUED | OUTPATIENT
Start: 2024-08-21 | End: 2024-08-22

## 2024-08-21 RX ORDER — IOPAMIDOL 755 MG/ML
120 INJECTION, SOLUTION INTRAVASCULAR
Status: COMPLETED | OUTPATIENT
Start: 2024-08-21 | End: 2024-08-21

## 2024-08-21 RX ORDER — SODIUM CHLORIDE 0.9 % (FLUSH) 0.9 %
10 SYRINGE (ML) INJECTION AS NEEDED
Status: DISCONTINUED | OUTPATIENT
Start: 2024-08-21 | End: 2024-08-21

## 2024-08-21 RX ORDER — NICOTINE 21 MG/24HR
1 PATCH, TRANSDERMAL 24 HOURS TRANSDERMAL
Status: DISCONTINUED | OUTPATIENT
Start: 2024-08-21 | End: 2024-08-28 | Stop reason: HOSPADM

## 2024-08-21 RX ORDER — ATORVASTATIN CALCIUM 40 MG/1
80 TABLET, FILM COATED ORAL NIGHTLY
Status: DISCONTINUED | OUTPATIENT
Start: 2024-08-21 | End: 2024-08-22

## 2024-08-21 RX ORDER — DIPHENHYDRAMINE HYDROCHLORIDE 50 MG/ML
50 INJECTION INTRAMUSCULAR; INTRAVENOUS ONCE
Status: COMPLETED | OUTPATIENT
Start: 2024-08-21 | End: 2024-08-21

## 2024-08-21 RX ORDER — ASPIRIN 81 MG/1
81 TABLET, CHEWABLE ORAL DAILY
Status: DISCONTINUED | OUTPATIENT
Start: 2024-08-21 | End: 2024-08-21

## 2024-08-21 RX ORDER — LIDOCAINE HYDROCHLORIDE 10 MG/ML
INJECTION, SOLUTION EPIDURAL; INFILTRATION; INTRACAUDAL; PERINEURAL
Status: DISCONTINUED | OUTPATIENT
Start: 2024-08-21 | End: 2024-08-21 | Stop reason: HOSPADM

## 2024-08-21 RX ORDER — ASPIRIN 81 MG/1
81 TABLET, CHEWABLE ORAL DAILY
Status: DISCONTINUED | OUTPATIENT
Start: 2024-08-22 | End: 2024-08-22

## 2024-08-21 RX ORDER — HYDROCHLOROTHIAZIDE 25 MG/1
25 TABLET ORAL DAILY
COMMUNITY
End: 2024-08-28 | Stop reason: HOSPADM

## 2024-08-21 RX ADMIN — DEXAMETHASONE SODIUM PHOSPHATE 4 MG: 4 INJECTION, SOLUTION INTRAMUSCULAR; INTRAVENOUS at 12:50

## 2024-08-21 RX ADMIN — Medication 100 MCG: at 12:53

## 2024-08-21 RX ADMIN — Medication 10 ML: at 21:24

## 2024-08-21 RX ADMIN — SUGAMMADEX 400 MG: 100 INJECTION, SOLUTION INTRAVENOUS at 13:52

## 2024-08-21 RX ADMIN — Medication 200 MCG: at 12:58

## 2024-08-21 RX ADMIN — HYDRALAZINE HYDROCHLORIDE 10 MG: 20 INJECTION INTRAMUSCULAR; INTRAVENOUS at 12:48

## 2024-08-21 RX ADMIN — NICARDIPINE HYDROCHLORIDE 12.5 MG/HR: 25 INJECTION, SOLUTION INTRAVENOUS at 15:28

## 2024-08-21 RX ADMIN — ROCURONIUM BROMIDE 100 MG: 10 SOLUTION INTRAVENOUS at 12:45

## 2024-08-21 RX ADMIN — IOPAMIDOL 120 ML: 755 INJECTION, SOLUTION INTRAVENOUS at 12:22

## 2024-08-21 RX ADMIN — PHENYLEPHRINE HYDROCHLORIDE 0.5 MCG/KG/MIN: 10 INJECTION INTRAVENOUS at 13:08

## 2024-08-21 RX ADMIN — ESMOLOL HYDROCHLORIDE 50 MG: 10 INJECTION, SOLUTION INTRAVENOUS at 13:57

## 2024-08-21 RX ADMIN — EPHEDRINE SULFATE 10 MG: 50 INJECTION INTRAVENOUS at 12:58

## 2024-08-21 RX ADMIN — HEPARIN SODIUM 8000 UNITS: 1000 INJECTION INTRAVENOUS; SUBCUTANEOUS at 13:11

## 2024-08-21 RX ADMIN — EPHEDRINE SULFATE 10 MG: 50 INJECTION INTRAVENOUS at 12:53

## 2024-08-21 RX ADMIN — Medication 10 MG: at 12:49

## 2024-08-21 RX ADMIN — METHYLPREDNISOLONE SODIUM SUCCINATE 125 MG: 125 INJECTION, POWDER, FOR SOLUTION INTRAMUSCULAR; INTRAVENOUS at 12:04

## 2024-08-21 RX ADMIN — PROPOFOL 200 MG: 10 INJECTION, EMULSION INTRAVENOUS at 12:45

## 2024-08-21 RX ADMIN — ONDANSETRON 4 MG: 2 INJECTION INTRAMUSCULAR; INTRAVENOUS at 13:52

## 2024-08-21 RX ADMIN — TICAGRELOR 60 MG: 60 TABLET ORAL at 21:34

## 2024-08-21 RX ADMIN — NICOTINE 1 PATCH: 21 PATCH TRANSDERMAL at 17:47

## 2024-08-21 RX ADMIN — DIPHENHYDRAMINE HYDROCHLORIDE 50 MG: 50 INJECTION INTRAMUSCULAR; INTRAVENOUS at 12:04

## 2024-08-21 RX ADMIN — SODIUM CHLORIDE: 9 INJECTION, SOLUTION INTRAVENOUS at 12:40

## 2024-08-21 NOTE — PLAN OF CARE
Goal Outcome Evaluation:  Plan of Care Reviewed With: patient, family        Progress:  (initial eval)       FEES tomorrow am  Anticipated Discharge Disposition (SLP): inpatient rehabilitation facility    SLP Diagnosis: moderate, dysarthria, cognitive-linguistic disorder (08/21/24 1525)     SLP Swallowing Diagnosis: oral dysphagia, suspected pharyngeal dysphagia (08/21/24 1525)

## 2024-08-21 NOTE — CONSULTS
Stroke Consult Note    Patient Name: Dalila OCASIO   MRN: [id number]  Age: 53 y.o.  Sex: female  : 1970    Primary Care Physician: Provider, No Known  Referring Physician: Dr. Fuad Narvaez    TIME STROKE TEAM CALLED: 1140 EST     TIME PATIENT SEEN: 1153 EST on arrival    Handedness: Right  Race:     Chief Complaint/Reason for Consultation: Left-sided weakness    Subjective .  HPI:   Dalila OCASIO is a 53-year-old right-handed  female with history significant for EtOH, tobacco abuse, hypertension, hyperlipidemia, type 2 diabetes, history of substance abuse, obesity, seizures, PAD, and angina.  She presented [facility] emergency department via scene flight with complaints of left-sided weakness and severe headache.  She reports that she was unable to get off of the toilet and 911 was initiated.  On arrival to our facility blood pressure was noted to be 198/115.  NIH on arrival 17.  CT head was completed and noted a right hyperdense MCA.  She is provided with premedication secondary to documented contrast allergy.  CT perfusion reveals a large area of reversible ischemia with significant core.  CTA head and neck reveals acute right ICA and MCA occlusion.  She was taken emergently to the Cath Lab for carotid/cerebral angiogram with right ICA stent and mechanical thrombectomy.    The patient tells me that she takes  mg daily however does not take any other antiplatelet or anticoagulation medications.  She is a daily smoker, consumes EtOH (tequila), and denies current drug use (previous history of narcotic abuse but no IVDU).    Last Known Normal Date/Time: Approximately 0300 EST     Review of Systems   Constitutional:  Negative for chills and fever.   Eyes:  Positive for visual disturbance.   Respiratory: Negative.  Negative for cough and shortness of breath.    Cardiovascular: Negative.  Negative for chest pain.   Gastrointestinal: Negative.  Negative for nausea and  vomiting.   Genitourinary: Negative.    Musculoskeletal:  Positive for gait problem.   Neurological:  Positive for seizures (Last seizure when she was 12 years old), facial asymmetry, speech difficulty, weakness, numbness and headaches.   Psychiatric/Behavioral: Negative.          No past medical history on file.  No past surgical history on file.  No family history on file.  Social History     Socioeconomic History    Marital status: Single     Allergies   Allergen Reactions    Erythromycin Anaphylaxis    Toradol [Ketorolac Tromethamine] Rash    Contrast Dye (Echo Or Unknown Ct/Mr) Unknown (See Comments)     Hives on chest     Prior to Admission medications    Medication Sig Start Date End Date Taking? Authorizing Provider   HYDROcodone-acetaminophen (NORCO) 5-325 MG per tablet Take 1 tablet by mouth Every 6 (Six) Hours As Needed for Severe Pain. 4/8/24   Malcolm Mckeon DO   lisinopril (PRINIVIL,ZESTRIL) 20 MG tablet Take 1 tablet by mouth Daily.    Provider, MD Andrea     Objective     Temp:  [97.8 °F (36.6 °C)] 97.8 °F (36.6 °C)  Heart Rate:  [98] 98  Resp:  [14] 14  BP: (198)/(115) 198/115    Neurological Exam  Mental Status  Alert. Oriented to person, place, time and situation. Oriented to person, place, and time. Memory is normal. Moderate dysarthria present. Language is fluent with no aphasia.    Cranial Nerves  CN II: Vision test: Forced right gaze palsy. Right normal visual field. Left homonymous hemianopsia.  CN III, IV, VI: Pupils equal round and reactive to light bilaterally. Forced right gaze deviation.  CN V:  Left: Diminished sensation of the entire left side of the face.  CN VII:  Left: There is central facial weakness.  CN VIII: Hearing appears to be intact bilaterally.  CN XII: Tongue midline without atrophy or fasciculations.    Motor  Normal muscle bulk throughout. Decreased muscle tone.  Right upper extremity with no drift, 5/5 strength  Right lower extremity with no drift, 5/5  strength  Left upper extremity with 1/5 strength  Left lower extremity with 3/5 strength.    Sensory  Left hemisensory loss.  Left-sided hemispatial neglect: Extinction to double simultaneous stimulation of the left face, arm and leg.    Coordination    No obvious dysmetria noted.    Gait    Not observed.      Physical Exam  Vitals and nursing note reviewed.   Constitutional:       General: She is not in acute distress.     Appearance: She is obese. She is ill-appearing.   HENT:      Head: Normocephalic and atraumatic.      Mouth/Throat:      Mouth: Mucous membranes are dry.   Eyes:      Pupils: Pupils are equal, round, and reactive to light.      Comments: Forced right gaze palsy   Cardiovascular:      Rate and Rhythm: Normal rate.   Pulmonary:      Effort: Pulmonary effort is normal. No respiratory distress.      Comments: On room air  Musculoskeletal:      Right lower leg: No edema.      Left lower leg: No edema.   Skin:     General: Skin is warm and dry.   Neurological:      Mental Status: She is alert and oriented to person, place, and time.      Cranial Nerves: Cranial nerve deficit and dysarthria present.      Sensory: Sensory deficit present.      Motor: Weakness present.   Psychiatric:         Attention and Perception: She is inattentive.         Mood and Affect: Mood normal.         Speech: Speech is slurred.         Behavior: Behavior normal. Behavior is cooperative.         Cognition and Memory: Cognition and memory normal.         Acute Stroke Data    IV Thrombolytic (TPA/Tenecteplase) Inclusion / Exclusion Criteria    Time: 12:03 EDT  Person Administering Scale: ROXANE Castro    Inclusion Criteria  [x]   18 years of age or greater   []   Onset of symptoms < 4.5 hours before beginning treatment (stroke onset = time patient was last seen well or without symptoms).   []   Diagnosis of acute ischemic stroke causing measurable disabling deficit (Complete Hemianopia, Any Aphasia, Visual or Sensory  Extinction, Any weakness limiting sustained effort against gravity)   []   Any remaining deficit considered potentially disabling in view of patient and practitioner   Exclusion criteria (Do not proceed with Alteplase if any are checked under exclusion criteria)  [x]   Onset unknown or GREATER than 4.5 hours   []   ICH on CT/MRI   []   CT demonstrates hypodensity representing acute or subacute infarct   []   Significant head trauma or prior stroke in the previous 3 months   []   Symptoms suggestive of subarachnoid hemorrhage   []   History of un-ruptured intracranial aneurysm GREATER than 10 mm   []   Recent intracranial or intraspinal surgery within the last 3 months   []   Arterial puncture at a non-compressible site in the previous 7 days   []   Active internal bleeding   []   Acute bleeding tendency   []   Platelet count LESS than 100,000 for known hematological diseases such as leukemia, thrombocytopenia or chronic cirrhosis   []   Current use of anticoagulant with INR GREATER than 1.7 or PT GREATER than 15 seconds, aPTT GREATER than 40 seconds   []   Heparin received within 48 hours, resulting in abnormally elevated aPTT GREATER than upper limit of normal   []   Current use of direct thrombin inhibitors or direct factor Xa inhibitors in the past 48 hours   []   Elevated blood pressure refractory to treatment (systolic GREATER than 185 mm/Hg or diastolic  GREATER than 110 mm/Hg   []   Suspected infective endocarditis and aortic arch dissection   []   Current use of therapeutic treatment dose of low-molecular-weight heparin (LMWH) within the previous 24 hours   []   Structural GI malignancy or bleed   Relative exclusion for all patients  []   Only minor nondisabling symptoms   []   Pregnancy   []   Seizure at onset with postictal residual neurological impairments   []   Major surgery or previous trauma within past 14 days   []   History of previous spontaneous ICH, intracranial neoplasm, or AV malformation   []    Postpartum (within previous 14 days)   []   Recent GI or urinary tract hemorrhage (within previous 21 days)   []   Recent acute MI (within previous 3 months)   []   History of unruptured intracranial aneurysm LESS than 10 mm   []   History of ruptured intracranial aneurysm   []   Blood glucose LESS than 50 mg/dL (2.7 mmol/L)   []   Dural puncture within the last 7 days   []   Known GREATER than 10 cerebral microbleeds   Additional exclusions for patients with symptoms onset between 3 and 4.5 hours.  []   Age > 80.   []   On any anticoagulants regardless of INR  >>> Warfarin (Coumadin), Heparin, Enoxaparin (Lovenox), fondaparinux (Arixtra), bivalirudin (Angiomax), Argatroban, dabigatran (Pradaxa), rivaroxaban (Xarelto), or apixaban (Eliquis)   []   Severe stroke (NIHSS > 25).   []   History of BOTH diabetes and previous ischemic stroke.   []   The risks and benefits have been discussed with the patient or family related to the administration of IV alteplase for stroke symptoms.   []   I have discussed and reviewed the patient's case and imaging with the attending prior to IV Thrombolytic (TPA/Tenecteplase).   N/A Time Thrombolytic administered       Hospital Meds:  Scheduled- diphenhydrAMINE, 50 mg, Intravenous, Once  methylPREDNISolone sodium succinate, 125 mg, Intravenous, Once      Infusions-     PRNs-   sodium chloride    Functional Status Prior to Current Stroke/Rabun Score: 0    NIH Stroke Scale  Time: 12:03 EDT  Person Administering Scale: ROXANE Castro    1a  Level of consciousness: 0=alert; keenly responsive   1b. LOC questions:  0=Performs both tasks correctly   1c. LOC commands: 0=Performs both tasks correctly   2.  Best Gaze: 2=forced deviation, or total gaze paresis not overcome by oculocephalic maneuver   3.  Visual: 2=Complete hemianopia   4. Facial Palsy: 2=Partial paralysis (total or near total paralysis of the lower face)   5a.  Motor left arm: 4=No movement   5b.  Motor right arm: 0=No  drift, limb holds 90 (or 45) degrees for full 10 seconds   6a. motor left le=Some effort against gravity, limb cannot get to or maintain (if cured) 90 (or 45) degrees, drifts down to bed, but has some effort against gravity   6b  Motor right le=No drift, limb holds 90 (or 45) degrees for full 10 seconds   7. Limb Ataxia: 0=Absent   8.  Sensory: 2=Severe to total sensory loss; patient is not aware of being touched in face, arm, leg   9. Best Language:  0=No aphasia, normal   10. Dysarthria: 1=Mild to moderate, patient slurs at least some words and at worst, can be understood with some difficulty   11. Extinction and Inattention: 2=Profound inez-inattention or inez-inattention to more than one modality. Does not recognize own hand or orients only to one side of space    Total:   17       Results Reviewed:  I have personally reviewed current lab, radiology, and data and agree with results.    CT Head Without Contrast Stroke Protocol    Result Date: 2024  CT HEAD WO CONTRAST STROKE PROTOCOL Date of Exam: 2024 11:58 AM EDT Indication: Neuro deficit, acute, stroke suspected Neuro Deficit, acute, Stroke suspected. Comparison: None available. Technique: Axial CT images were obtained of the head without contrast administration.  Reconstructed coronal images were also obtained. Automated exposure control and iterative construction methods were used. Scan Time: 11:58 a.m. Results discussed with emergency team at 12:02 p.m. Findings: Parenchyma:No acute intraparenchymal hemorrhage. No loss of gray-white differentiation to suggest large territory infarct. Normal parenchymal volume. No substantial white matter disease. No midline shift or herniation. Ventricles and extra axial spaces:Normal caliber of ventricles and sulci. No extra axial fluid collection seen. Other:Orbits are grossly intact. Scattered paranasal sinus mucosal thickening. Mastoid air cells are clear. Calvarium is intact. Intracranial  atherosclerotic calcification is present. Hyperdense right middle cerebral artery sign.     Impression: Impression: Hyperdense appearance of the right middle cerebral artery concerning for acute thrombus. Recommend further evaluation with CTA. Electronically Signed: Javier Javed MD  8/21/2024 12:08 PM EDT  Workstation ID: [id number]     CTA head/neck reveals right ICA occlusion with intracranial right M1 occlusion.    CT perfusion reveals hypoperfusion within the right MCA territory 2/2 right ICA occlusion and intracranial right M1 occlusion.  Estimated amount of core infarct 98 mL, estimated amount of ischemic penumbra 51 mL.    WBC 7.49  H/H 15.2/45.2  Platelets 215  AST 21  ALT 19  Glucose 133  Creatinine 0.60, BUN 17  Sodium 140    Assessment/Plan:  53-year-old  female with multiple vascular risk factors who presented to [facility] emergency department as a scene flight with complaints of left-sided weakness and neglect.  She also has complaints of severe headache.  CT head shows hyperdense right MCA.  Secondary to extended last known well she is not deemed to be an appropriate IV thrombolytic therapy candidate.  CT perfusion is indicative of an area of reversible ischemia therefore she was taken to the Cath Lab for planned mechanical thrombectomy.  She will be admitted to the neurological ICU s/p procedure for further monitoring and stroke workup.    Antiplatelet PTA: ASA 81 mg  Anticoagulant PTA: None      Acute right MCA stroke s/p mechanical thrombectomy        Acute right ICA occlusion s/p carotid stent        History of seizures  -TIA/CVA order set without thrombolytic therapy is currently in place  -Failed bedside dysphagia screening will need to be seen and assessed by SLP when appropriate  -Dual-energy CT in a.m.  -MRI brain without contrast tomorrow  -A1c and lipid panel  -ASA 81 mg and Brilinta 60 mg twice daily beginning at 2000  -TTE and CUS in a.m.  -Urine drug  screen  -Bedrest x 2 hours s/p procedure then patient can be up as tolerated, fall risk precautions  -PT/OT/SLP evaluation    2.   Essential hypertension  -Strict blood pressure monitoring please keep SBP   -Nicardipine for SBP >140    3.  Hyperlipidemia  -Lipid panel in a.m.  -Atorvastatin 80 mg nightly    4.  DM 2  -A1c in a.m.  -Maintain euglycemia, goal blood glucose 140-180  -Management per primary team    5.  Tobacco abuse  -Patient may require nicotine replacement therapy during admission  -Tobacco cessation education to be provided when appropriate    6.  EtOH use  -Patient reports that she drinks tequila, last drink was approximately 1 week ago  -CIWA protocol per primary care    Plan of care was discussed with the patient, Dr. Saini, Dr. Narvaez, and patient's son (via telephone).  Stroke neurology will continue to follow.  Please call with any questions or concerns.  Thank you for this consult.    Stroke neurology to continue to follow.  Plan of care discussed with Dr. Chew as well as Dr. Finnegan and Dr. Narvaez.  Thanks for the consult and care of this patient.  Please call with any further questions or concerns.    Ghada Cross, APRN  August 21, 2024  12:03 EST    Ilana Poole MSN, APRN, AGACNP-BC, AN-BC  Stroke Neurology  1431 EST

## 2024-08-21 NOTE — ED PROVIDER NOTES
"Subjective   History of Present Illness  Patient is a 53-year-old [place] for concern for acute stroke.  Patient evidently woke up at 3 AM with a headache.  She was feeling fine at that time.  At 9:30 AM Eastern time today, patient was found to have left-sided paralysis, neglect, slurred speech.  She takes aspirin but without any anticoagulation.  She has a history of hypertension.  Blood pressure was elevated upon EMS arrival and upon arrival here.  Patient is alert, oriented x 3.  She tells me she is at the hospital and is able to give a good history.  She tells me she has a history of IV contrast allergy and gets \"hives on the chest.\"  She tells me she was given Benadryl and steroids before and did quite well and did not require intubation or have any respiratory issues.  Patient is unaccompanied.  Story is given by the patient and air crew.  Patient denies any chest pain or shortness of breath.  She reports only the headache.  She has no history of stroke in the past.    Patient reports a history of tobacco and alcohol use.  Positive history of hypertension.  Patient takes hydrocodone and lisinopril.    Past medical history  Hypertension, tobacco and alcohol use    Family history  Negative for CVA per patient      Review of Systems   Constitutional:  Negative for chills, fatigue, fever and unexpected weight change.   HENT:  Negative for dental problem, ear pain, hearing loss and sinus pressure.    Eyes:  Negative for pain and visual disturbance.   Respiratory:  Negative for chest tightness and shortness of breath.    Cardiovascular:  Negative for chest pain, palpitations and leg swelling.   Gastrointestinal:  Negative for blood in stool, diarrhea, nausea and vomiting.   Genitourinary:  Negative for difficulty urinating, dysuria, frequency, hematuria and urgency.   Musculoskeletal:  Negative for myalgias, neck pain and neck stiffness.   Neurological:  Positive " for speech difficulty, weakness and numbness. Negative for seizures, syncope, light-headedness and headaches.        Left-sided   Psychiatric/Behavioral:  Negative for confusion.    All other systems reviewed and are negative.      Past Medical History:   Diagnosis Date    Acute CVA (cerebrovascular accident) 08/21/2024    Alcohol use 08/21/2024    Dyslipidemia 08/21/2024    Obesity (BMI 30-39.9) 08/21/2024    PAD (peripheral artery disease) 08/21/2024    Tobacco use 08/21/2024       Allergies   Allergen Reactions    Erythromycin Anaphylaxis    Toradol [Ketorolac Tromethamine] Rash    Contrast Dye (Echo Or Unknown Ct/Mr) Unknown (See Comments)     Hives on chest       No past surgical history on file.    History reviewed. No pertinent family history.    Social History     Socioeconomic History    Marital status: Single           Objective   Physical Exam  Vitals and nursing note reviewed.   Constitutional:       General: She is in acute distress.      Appearance: She is ill-appearing.   HENT:      Head: Normocephalic and atraumatic.      Right Ear: External ear normal.      Left Ear: External ear normal.      Nose: Nose normal.      Mouth/Throat:      Mouth: Mucous membranes are moist.      Pharynx: Oropharynx is clear.   Eyes:      General:         Right eye: No discharge.         Left eye: No discharge.      Extraocular Movements: Extraocular movements intact.      Conjunctiva/sclera: Conjunctivae normal.      Pupils: Pupils are equal, round, and reactive to light.   Cardiovascular:      Rate and Rhythm: Normal rate and regular rhythm.      Pulses: Normal pulses.      Heart sounds: Normal heart sounds. No murmur heard.     No gallop.   Pulmonary:      Effort: Pulmonary effort is normal. No respiratory distress.      Breath sounds: Normal breath sounds. No wheezing.   Abdominal:      General: Abdomen is flat. Bowel sounds are normal. There is no distension.      Palpations: Abdomen is soft.      Tenderness: There  is no abdominal tenderness.   Musculoskeletal:         General: No swelling or tenderness.   Skin:     General: Skin is warm and dry.      Capillary Refill: Capillary refill takes less than 2 seconds.   Neurological:      Mental Status: She is alert.      Comments: Patient with hemiparesis on the left side in the upper and lower extremities.  Complete left-sided neglect.  Obvious dysarthria.  Patient is right-handed.  Strength normal on the right side at 5/5 with flexion and extension of fingers wrist and elbows as well as plantar/dorsiflexion of feet.         Critical Care    Performed by: Fuad Narvaez MD  Authorized by: Sacha Zhang MD    Critical care provider statement:     Critical care time (minutes):  60    Critical care start time:  8/21/2024 12:00 PM    Critical care end time:  8/21/2024 1:00 PM    Critical care was necessary to treat or prevent imminent or life-threatening deterioration of the following conditions:  CNS failure or compromise    Critical care was time spent personally by me on the following activities:  Blood draw for specimens, development of treatment plan with patient or surrogate, discussions with consultants, evaluation of patient's response to treatment, examination of patient, ordering and performing treatments and interventions, ordering and review of laboratory studies, ordering and review of radiographic studies, pulse oximetry, re-evaluation of patient's condition and review of old charts    I assumed direction of critical care for this patient from another provider in my specialty: no      Care discussed with: admitting provider               ED Course  ED Course as of 08/21/24 1551   Wed Aug 21, 2024   1204 Last known well approximate 9:30 AM Eastern time.  CT of the head completed and reviewed independent by me.  I also discussed the case personally with radiologist.  Right MCA sign noted. [JACQUES]   1204 Blood pressure is elevated and discussed with stroke  coordinator, Ilana.  No treatment at this time. [JACQUES]   1205 We reviewed the patient's past medical documentation including visits to [facility].  Patient tells that she has an allergy to contrast but has done quite well with premedication.  It looks like the patient received Benadryl and steroids and did not require intubation or further care.  We will premedicate her here for CTA images.  Patient will go to the Cath Lab after this with Dr. Chew.  Patient will be admitted to the ICU, Dr. Zhang.  I discussed the case with him at 12:05 PM Eastern time. [JACQUES]   1209 I discussed the case with the ICU.  Patient will be taken to the ICU following going to the Cath Lab.  NIH stroke scale 17.  Critical care time will be 1 hour on this patient. [JACQUES]   1221 Patient did not receive TN K here secondary to concern about the patient's time of onset.  She is very clear about the fact that she was normal at 3 AM Eastern time.  There is some discussion about whether or not the patient went back to sleep and woke up with symptoms at 9 or 9:30 AM Eastern time.  Patient is on her way up to the Cath Lab currently for intervention. [JACQUES]   1343 Patient was taken emergently from the CT suite after the Cath Lab.  CTA of the head and neck has been officially reviewed.  Official read has been cut/pasted below for completeness.    FINDINGS:      CT Perfusion:  CBF (<30%) volume: 98 mL  Tmax (>6.0s) volume: 149 mL  Mismatch volume: 51 mL  Mismatch ratio: 1.5     IMPRESSION:  1.Occlusion of the right internal carotid artery from its origin with reconstitution of the right cavernous/supraclinoid ICA. There is also occlusion of the M1 segment of the right middle cerebral artery.  2.CT perfusion study demonstrates a large perfusion abnormality involving essentially all of the right MCA territory with core infarct volume of 98 mL, a mismatch volume of 51 mL, and a mismatch ratio of 1.5.  3.Atherosclerotic plaque within the left  proximal ICA with estimated 40 to 50% luminal narrowing.     The above findings were discussed with Ilana Poole, stroke care coordinator, via telephone on 8/21/2024 12:33 PM EDT.   [JACQUES]   1343 POC 8 negative.  CBC unremarkable.  Specifically, white count of 7.49.  Hemoglobin and hematocrit 15.2 and 45.2 with a platelet count of 215. [JACQUES]   1344 Troponin normal at 6 and PTT 25.1. [JACQUES]      ED Course User Index  [JACQUES] Fuad Narvaez MD                          Total (NIH Stroke Scale): 17        Recent Results (from the past 24 hour(s))   Single High Sensitivity Troponin T    Collection Time: 08/21/24 12:06 PM    Specimen: Blood   Result Value Ref Range    HS Troponin T <6 <14 ng/L   aPTT    Collection Time: 08/21/24 12:06 PM    Specimen: Blood   Result Value Ref Range    PTT 25.1 22.0 - 39.0 seconds   AST    Collection Time: 08/21/24 12:06 PM    Specimen: Blood   Result Value Ref Range    AST (SGOT) 21 1 - 32 U/L   ALT    Collection Time: 08/21/24 12:06 PM    Specimen: Blood   Result Value Ref Range    ALT (SGPT) 19 1 - 33 U/L   Green Top (Gel)    Collection Time: 08/21/24 12:06 PM   Result Value Ref Range    Extra Tube Hold for add-ons.    Lavender Top    Collection Time: 08/21/24 12:06 PM   Result Value Ref Range    Extra Tube hold for add-on    Gold Top - SST    Collection Time: 08/21/24 12:06 PM   Result Value Ref Range    Extra Tube Hold for add-ons.    Gray Top    Collection Time: 08/21/24 12:06 PM   Result Value Ref Range    Extra Tube Hold for add-ons.    Light Blue Top    Collection Time: 08/21/24 12:06 PM   Result Value Ref Range    Extra Tube Hold for add-ons.    CBC Auto Differential    Collection Time: 08/21/24 12:06 PM    Specimen: Blood   Result Value Ref Range    WBC 7.49 3.40 - 10.80 10*3/mm3    RBC 4.75 3.77 - 5.28 10*6/mm3    Hemoglobin 15.2 12.0 - 15.9 g/dL    Hematocrit 45.2 34.0 - 46.6 %    MCV 95.2 79.0 - 97.0 fL    MCH 32.0 26.6 - 33.0 pg    MCHC 33.6 31.5 - 35.7 g/dL    RDW 12.2 (L)  12.3 - 15.4 %    RDW-SD 42.4 37.0 - 54.0 fl    MPV 10.0 6.0 - 12.0 fL    Platelets 215 140 - 450 10*3/mm3    Neutrophil % 68.7 42.7 - 76.0 %    Lymphocyte % 24.4 19.6 - 45.3 %    Monocyte % 4.9 (L) 5.0 - 12.0 %    Eosinophil % 1.2 0.3 - 6.2 %    Basophil % 0.5 0.0 - 1.5 %    Immature Grans % 0.3 0.0 - 0.5 %    Neutrophils, Absolute 5.14 1.70 - 7.00 10*3/mm3    Lymphocytes, Absolute 1.83 0.70 - 3.10 10*3/mm3    Monocytes, Absolute 0.37 0.10 - 0.90 10*3/mm3    Eosinophils, Absolute 0.09 0.00 - 0.40 10*3/mm3    Basophils, Absolute 0.04 0.00 - 0.20 10*3/mm3    Immature Grans, Absolute 0.02 0.00 - 0.05 10*3/mm3    nRBC 0.0 0.0 - 0.2 /100 WBC   Comprehensive Metabolic Panel    Collection Time: 08/21/24 12:06 PM    Specimen: Blood   Result Value Ref Range    Glucose 138 (H) 65 - 99 mg/dL    BUN 16 6 - 20 mg/dL    Creatinine 0.64 0.57 - 1.00 mg/dL    Sodium 140 136 - 145 mmol/L    Potassium 4.1 3.5 - 5.2 mmol/L    Chloride 104 98 - 107 mmol/L    CO2 23.0 22.0 - 29.0 mmol/L    Calcium 9.2 8.6 - 10.5 mg/dL    Total Protein 6.6 6.0 - 8.5 g/dL    Albumin 4.0 3.5 - 5.2 g/dL    ALT (SGPT) 20 1 - 33 U/L    AST (SGOT) 22 1 - 32 U/L    Alkaline Phosphatase 137 (H) 39 - 117 U/L    Total Bilirubin 0.3 0.0 - 1.2 mg/dL    Globulin 2.6 gm/dL    A/G Ratio 1.5 g/dL    BUN/Creatinine Ratio 25.0 7.0 - 25.0    Anion Gap 13.0 5.0 - 15.0 mmol/L    eGFR 105.8 >60.0 mL/min/1.73   POC CHEM 8    Collection Time: 08/21/24 12:08 PM    Specimen: Blood   Result Value Ref Range    Glucose 133 (H) 70 - 130 mg/dL    BUN 17 8 - 26 mg/dL    Creatinine 0.60 0.60 - 1.30 mg/dL    Sodium 140 138 - 146 mmol/L    POC Potassium 4.0 3.5 - 4.9 mmol/L    Chloride 104 98 - 109 mmol/L    Total CO2 26 24 - 29 mmol/L    Hemoglobin 15.3 12.0 - 17.0 g/dL    Hematocrit 45 38 - 51 %    Ionized Calcium 1.23 1.20 - 1.32 mmol/L    eGFR 107.5 >60.0 mL/min/1.73     Note: In addition to lab results from this visit, the labs listed above may include labs taken at another facility  or during a different encounter within the last 24 hours. Please correlate lab times with ED admission and discharge times for further clarification of the services performed during this visit.    CT Angiogram Head w AI Analysis of LVO   Final Result   1.Occlusion of the right internal carotid artery from its origin with reconstitution of the right cavernous/supraclinoid ICA. There is also occlusion of the M1 segment of the right middle cerebral artery.   2.CT perfusion study demonstrates a large perfusion abnormality involving essentially all of the right MCA territory with core infarct volume of 98 mL, a mismatch volume of 51 mL, and a mismatch ratio of 1.5.   3.Atherosclerotic plaque within the left proximal ICA with estimated 40 to 50% luminal narrowing.      The above findings were discussed with Ilana Poole, stroke care coordinator, via telephone on 8/21/2024 12:33 PM EDT.                  Electronically Signed: nAder Rashid MD     8/21/2024 12:52 PM EDT     Workstation ID: [id number]      CT Angiogram Neck   Final Result   1.Occlusion of the right internal carotid artery from its origin with reconstitution of the right cavernous/supraclinoid ICA. There is also occlusion of the M1 segment of the right middle cerebral artery.   2.CT perfusion study demonstrates a large perfusion abnormality involving essentially all of the right MCA territory with core infarct volume of 98 mL, a mismatch volume of 51 mL, and a mismatch ratio of 1.5.   3.Atherosclerotic plaque within the left proximal ICA with estimated 40 to 50% luminal narrowing.      The above findings were discussed with Ilana Poole, stroke care coordinator, via telephone on 8/21/2024 12:33 PM EDT.                  Electronically Signed: Ander Rashid MD     8/21/2024 12:52 PM EDT     Workstation ID: [id number]      CT CEREBRAL PERFUSION WITH & WITHOUT CONTRAST   Final Result   1.Occlusion of the right internal carotid artery from its origin with  "reconstitution of the right cavernous/supraclinoid ICA. There is also occlusion of the M1 segment of the right middle cerebral artery.   2.CT perfusion study demonstrates a large perfusion abnormality involving essentially all of the right MCA territory with core infarct volume of 98 mL, a mismatch volume of 51 mL, and a mismatch ratio of 1.5.   3.Atherosclerotic plaque within the left proximal ICA with estimated 40 to 50% luminal narrowing.      The above findings were discussed with Ilana Poole, stroke care coordinator, via telephone on 8/21/2024 12:33 PM EDT.                  Electronically Signed: Ander Rashid MD     8/21/2024 12:52 PM EDT     Workstation ID: [id number]      CT Head Without Contrast Stroke Protocol   Final Result   Impression:   Hyperdense appearance of the right middle cerebral artery concerning for acute thrombus. Recommend further evaluation with CTA.            Electronically Signed: Javier Javed MD     8/21/2024 12:08 PM EDT     Workstation ID: [id number]      XR Chest 1 View    (Results Pending)   MRI Brain Without Contrast    (Results Pending)   CT Head Without Contrast    (Results Pending)   SLP FEES - Fiberoptic Endo Eval Swallow    (Results Pending)     Vitals:    08/21/24 1201 08/21/24 1234 08/21/24 1445 08/21/24 1528   BP: (!) 198/115 (!) 196/143 124/71 115/65   BP Location: Left arm  Left arm    Patient Position: Lying  Lying    Pulse: 98 66 95 75   Resp: 14 15 16    Temp: 97.8 °F (36.6 °C)  96.4 °F (35.8 °C)    TempSrc: Oral  Axillary    SpO2: 99% 98% 99%    Weight: 108 kg (237 lb)      Height: 172.7 cm (68\")        Medications   nitroglycerin (NITROSTAT) SL tablet 0.4 mg (has no administration in time range)   sodium chloride 0.9 % flush 10 mL (has no administration in time range)   sodium chloride 0.9 % flush 10 mL (has no administration in time range)   sodium chloride 0.9 % infusion 40 mL (has no administration in time range)   atorvastatin (LIPITOR) tablet 80 mg (has " no administration in time range)   niCARdipine (CARDENE) 25mg in 250mL NS infusion (12.5 mg/hr Intravenous New Bag 8/21/24 1528)   aspirin chewable tablet 81 mg (has no administration in time range)     Or   aspirin suppository 300 mg (has no administration in time range)   ticagrelor (BRILINTA) tablet 60 mg (has no administration in time range)   methylPREDNISolone sodium succinate (SOLU-Medrol) injection 125 mg (125 mg Intravenous Given 8/21/24 1204)   diphenhydrAMINE (BENADRYL) injection 50 mg (50 mg Intravenous Given 8/21/24 1204)   iopamidol (ISOVUE-370) 76 % injection 120 mL (120 mL Intravenous Given 8/21/24 1222)   niCARdipine (CARDENE) 50 mg in sodium chloride 0.9 % 250 mL IVPB (10 mg/hr Intravenous Rate/Dose Change 8/21/24 1403)     ECG/EMG Results (last 24 hours)       ** No results found for the last 24 hours. **          ECG 12 Lead ED Triage Standing Order; Acute Stroke (Onset <24 hrs)    (Results Pending)                 Medical Decision Making  Given the patient's elevated blood pressure and symptoms, certainly hemorrhagic versus ischemic stroke needs to be ruled out.  Seizure and atypical migraine headache certainly needs to be within consideration as well.  Will get emergent CT imaging and work collaboratively with stroke team.    Problems Addressed:  Acute CVA (cerebrovascular accident): complicated acute illness or injury  Left-sided neglect: complicated acute illness or injury    Amount and/or Complexity of Data Reviewed  Independent Historian: EMS  Labs: ordered. Decision-making details documented in ED Course.  Radiology: ordered. Decision-making details documented in ED Course.  ECG/medicine tests: ordered. Decision-making details documented in ED Course.    Risk  Prescription drug management.  Decision regarding hospitalization.    Critical Care  Total time providing critical care: 60 minutes        Final diagnoses:   Acute CVA (cerebrovascular accident)   Left-sided neglect       ED  Disposition  ED Disposition       ED Disposition   Decision to Admit    Condition   --    Comment   Level of Care: Critical Care [6]   Admitting Physician: DRE PORTER [[id number]]   Attending Physician: DRE PORTER [[id number]]   Patient Class: Inpatient [101]                 No follow-up provider specified.       Medication List      No changes were made to your prescriptions during this visit.            Fuad Narvaez MD  08/21/24 0113

## 2024-08-21 NOTE — Clinical Note
Hemostasis started on the right femoral artery. Angio-Seal was used in achieving hemostasis. Closure device deployed in the vessel. Hemostasis achieved successfully. Closure device additional comment: 8 FR

## 2024-08-21 NOTE — H&P
Intensive Care Admission Note     Chief Complaint:  Acute CVA (cerebrovascular accident)    History of Present Illness     Dalila OCASIO is a 53 y.o. female who presents to [facility] ED 08/21/24 due to stroke concern.     Patient has a PMH of tobacco and ETOH use, H/O narcotic abuse, HTN, T2DM, dyslipidemia, obesity, seizures, and PAD.     Per report, patient awoke at ~0930 with slurred speech, neglect, left-sided hemiparesis, and severe headache, ultimately prompting call to EMS. She was brought to our ED via air evac where upon arrival was noted to be significantly hypertensive (/115 mmHg). Initial NIHSS 17 and CTH showed hyperdense right MCA sign. She was premedicated (documented contrast allergy) prior to subsequent CTA H/N and CTP which further revealed occlusion of the right internal carotid artery from its origin, occlusion of the right M1 MCA segment, with large area of reversible ischemia with core infarct present. She was evaluated by Neurology was deemed not a candidate for thrombolytics (2* outside timing window) but was felt to benefit from endovascular intervention. She was taken to Cath Lab emergently where she underwent right ICA stent placement and mechanical thrombectomy per Dr. Saini. Postprocedure she was admitted to ICU for further management.     Time spent: 6 minutes  Electronically signed by Kareen Chamberlain, BRAVO, APRN, 08/21/24, 12:57 PM EDT.     On arrival to ICU patient is on Cardene at 12 mg/h.  In addition to that patient is awake asking questions appropriately, not moving the left upper extremity but is moving the left lower extremity in the right side.    Problem List, Surgical History, Family, Social History, and ROS     Past Medical History:   Diagnosis Date    Acute CVA (cerebrovascular accident) 08/21/2024    Alcohol use 08/21/2024    Dyslipidemia 08/21/2024    Obesity (BMI 30-39.9) 08/21/2024    PAD (peripheral artery disease) 08/21/2024    Tobacco use 08/21/2024       No past  "surgical history on file.    Allergies   Allergen Reactions    Erythromycin Anaphylaxis    Toradol [Ketorolac Tromethamine] Rash    Contrast Dye (Echo Or Unknown Ct/Mr) Unknown (See Comments)     Hives on chest     No current facility-administered medications on file prior to encounter.     Current Outpatient Medications on File Prior to Encounter   Medication Sig    HYDROcodone-acetaminophen (NORCO) 5-325 MG per tablet Take 1 tablet by mouth Every 6 (Six) Hours As Needed for Severe Pain.    lisinopril (PRINIVIL,ZESTRIL) 20 MG tablet Take 1 tablet by mouth Daily.     MEDICATION LIST AND ALLERGIES REVIEWED.    History reviewed. No pertinent family history.     Social History     Social History Narrative    Not on file     FAMILY AND SOCIAL HISTORY REVIEWED.    Review of Systems   Constitutional:  Negative for activity change, appetite change, chills and fever.   HENT:  Negative for congestion, sore throat and voice change.    Eyes:  Negative for photophobia and visual disturbance.   Respiratory:  Negative for cough, shortness of breath and wheezing.    Cardiovascular:  Negative for chest pain, palpitations and leg swelling.   Gastrointestinal:  Negative for abdominal distention and abdominal pain.   Genitourinary:  Negative for difficulty urinating and flank pain.   Musculoskeletal:  Negative for myalgias and neck stiffness.   Skin:  Negative for color change and rash.   Neurological:  Positive for weakness. Negative for dizziness, seizures and headaches.   Hematological:  Negative for adenopathy.   Psychiatric/Behavioral:  Negative for agitation, hallucinations and sleep disturbance.      ALL OTHER SYSTEMS REVIEWED AND ARE NEGATIVE.    Physical Exam and Clinical Information   /71 (BP Location: Left arm, Patient Position: Lying)   Pulse 95   Temp 96.4 °F (35.8 °C) (Axillary)   Resp 16   Ht 172.7 cm (68\")   Wt 108 kg (237 lb)   SpO2 99%   BMI 36.04 kg/m²   Physical Exam  Vitals and nursing note reviewed. "   Constitutional:       General: She is not in acute distress.     Appearance: She is well-developed and normal weight. She is ill-appearing. She is not toxic-appearing.   HENT:      Head: Normocephalic and atraumatic.   Cardiovascular:      Rate and Rhythm: Normal rate and regular rhythm.      Pulses: Normal pulses.      Heart sounds: Normal heart sounds. No murmur heard.     No friction rub. No gallop.   Pulmonary:      Effort: Pulmonary effort is normal. No respiratory distress.      Breath sounds: Normal breath sounds. No wheezing, rhonchi or rales.   Musculoskeletal:      Right lower leg: No edema.      Left lower leg: No edema.   Skin:     General: Skin is warm and dry.   Neurological:      Mental Status: She is alert and oriented to person, place, and time.      Motor: Weakness present.         Results from last 7 days   Lab Units 08/21/24  1208 08/21/24  1206   WBC 10*3/mm3  --  7.49   HEMOGLOBIN g/dL  --  15.2   HEMOGLOBIN, POC g/dL 15.3  --    PLATELETS 10*3/mm3  --  215     Results from last 7 days   Lab Units 08/21/24  1208   CREATININE mg/dL 0.60     Estimated Creatinine Clearance: 139.5 mL/min (by C-G formula based on SCr of 0.6 mg/dL).          Lab Results   Component Value Date    LACTATE 1.0 04/21/2023          I reviewed the patient's results/ images and I agree with the reports.     Impression     Acute CVA (cerebrovascular accident)    Alcohol use    Tobacco use    Obesity (BMI 30-39.9)    HTN (hypertension)    Dyslipidemia    History of seizures    PAD (peripheral artery disease)      Plan/Recommendations     53-year-old female with a past medical history significant for tobacco abuse, alcohol abuse, history of narcotic abuse, hypertension, type 2 diabetes mellitus, dyslipidemia, obesity, seizures, and peripheral arterial disease.  Who presented to the [facility] ICU on 8/21/2024 with a right MCA CVA.  Patient underwent thrombectomy with stent placement, patient was not a candidate  for TNK.    -Admit patient to the ICU  -Stroke management per neurology  -Nicardipine for blood pressure control  -A1c pending, sliding scale insulin as needed  -Aspirin/statin/Brilinta  -PT/OT/ST  -Pepcid for GI prophylaxis  -SCDs for DVT prophylaxis  -A.m. labs    High level of risk due to:  Large vessel occlusion s/p thrombectomy.  With waxing and waning mental status and severe deficits poststroke.      SUGAR Trejo DO  Pulmonary and Critical Care Medicine  08/21/24 14:58 EDT     CC: Provider, No Known

## 2024-08-21 NOTE — Clinical Note
A 8 fr sheath was successfully inserted using micropuncture technique with ultrasound guidance into the right femoral artery. Sheath insertion not delayed.

## 2024-08-21 NOTE — ANESTHESIA POSTPROCEDURE EVALUATION
Patient: Dalila OCASIO    Procedure Summary       Date: 08/21/24 Room / Location: [facility] /  [facility]    Anesthesia Start: 1240 Anesthesia Stop: 1426    Procedure: Carotid Cervical Angiogram (Bilateral) Diagnosis:     Providers: Jamaal Saini MD Provider: Bharathi Betts MD    Anesthesia Type: general ASA Status: 4            Anesthesia Type: general    Vitals  Vitals Value Taken Time   /72 08/21/24 1430   Temp     Pulse 89 08/21/24 1434   Resp     SpO2 99 % 08/21/24 1434   Vitals shown include unfiled device data.        Post Anesthesia Care and Evaluation    Patient location during evaluation: ICU  Patient participation: complete - patient participated  Level of consciousness: awake  Pain management: adequate    Airway patency: patent  Anesthetic complications: No anesthetic complications  PONV Status: none  Cardiovascular status: hemodynamically stable and acceptable  Respiratory status: nonlabored ventilation, acceptable and room air  Hydration status: acceptable    Comments: /79  Sats 99  Temp 97  HR 92  Report given to ICU nurse, ICU nurse accepted care of patient.

## 2024-08-21 NOTE — THERAPY EVALUATION
Acute Care - Speech Language Pathology   Swallow Initial Evaluation [facility]  Clinical Swallow Evaluation  +Cognitive-Communication Evaluation       Patient Name: Dalila OCASIO  : 1970  MRN: [id number]  Today's Date: 2024               Admit Date: 2024    Visit Dx:     ICD-10-CM ICD-9-CM   1. Acute CVA (cerebrovascular accident)  I63.9 434.91   2. Left-sided neglect  R41.4 781.8   3. Dysphagia, unspecified type  R13.10 787.20   4. Dysarthria  R47.1 784.51     Patient Active Problem List   Diagnosis    Acute CVA (cerebrovascular accident)    Alcohol use    Tobacco use    Obesity (BMI 30-39.9)    HTN (hypertension)    Dyslipidemia    History of seizures    PAD (peripheral artery disease)     Past Medical History:   Diagnosis Date    Acute CVA (cerebrovascular accident) 2024    Alcohol use 2024    Dyslipidemia 2024    Obesity (BMI 30-39.9) 2024    PAD (peripheral artery disease) 2024    Tobacco use 2024     No past surgical history on file.    SLP Recommendation and Plan  SLP Swallowing Diagnosis: oral dysphagia, suspected pharyngeal dysphagia (24)  SLP Diet Recommendation: NPO, other (see comments) (2-3 ice chips per hour; essential meds crushed in puree until FEES tomorrow am) (24 152)  Recommended Precautions and Strategies: general aspiration precautions (24 152)  SLP Rec. for Method of Medication Administration: meds crushed, with puree, as tolerated (24)     Monitor for Signs of Aspiration: yes, notify SLP if any concerns (24)  Recommended Diagnostics: reassess via FEES () (24 152)  Swallow Criteria for Skilled Therapeutic Interventions Met: demonstrates skilled criteria (24)  Anticipated Discharge Disposition (SLP): inpatient rehabilitation facility (24)  Rehab Potential/Prognosis, Swallowing: adequate, monitor progress closely (24)     Predicted Duration Therapy  Intervention (Days): 2 weeks (08/21/24 1525)  Oral Care Recommendations: Oral Care BID/PRN, Suction toothbrush (08/21/24 1525)                                        Plan of Care Reviewed With: patient, family  Progress:  (initial eval)      SWALLOW EVALUATION (Last 72 Hours)       SLP Adult Swallow Evaluation       Row Name 08/21/24 1524       Rehab Evaluation    Document Type evaluation  -CJ    Subjective Information no complaints  -CJ    Patient Observations alert;cooperative  -CJ    Patient/Family/Caregiver Comments/Observations family present  -CJ    Patient Effort adequate  -CJ    Symptoms Noted During/After Treatment none  -CJ       General Information    Patient Profile Reviewed yes  -CJ    Pertinent History Of Current Problem Pt adm w/ R MCA CVA s/p angiogram; sig h/o alcohol use, tobacco use, obesity, HTN, dyslipidemia, seizures, PAD. NIH-18  -CJ    Current Method of Nutrition NPO  -CJ    Precautions/Limitations, Vision neglect, left  -CJ    Precautions/Limitations, Hearing WFL;for purposes of eval  -CJ    Prior Level of Function-Communication unknown  -CJ    Prior Level of Function-Swallowing no diet consistency restrictions  -CJ    Plans/Goals Discussed with patient;family  -CJ    Barriers to Rehab medically complex  -CJ    Patient's Goals for Discharge return to PO diet  -CJ       Pain    Additional Documentation Pain Scale: FACES Pre/Post-Treatment (Group)  -CJ       Pain Scale: FACES Pre/Post-Treatment    Pain: FACES Scale, Pretreatment 0-->no hurt  -CJ    Posttreatment Pain Rating 0-->no hurt  -CJ       Oral Motor Structure and Function    Dentition Assessment edentulous, dentures not available  -CJ    Secretion Management WNL/WFL  -CJ    Mucosal Quality dry  -CJ       Oral Musculature and Cranial Nerve Assessment    Oral Motor General Assessment oral labial or buccal impairment;lingual impairment  -CJ    Oral Labial or Buccal Impairment, Detail, Cranial Nerve VII (Facial): left labial droop  -CJ     Lingual Impairment, Detail. Cranial Nerves IX, XII (Glossopharyngeal and Hypoglossal) reduced strength left  -CJ       General Eating/Swallowing Observations    Respiratory Support Currently in Use room air  -CJ    Eating/Swallowing Skills fed by SLP  -    Positioning During Eating reverse trendelenburg position  -    Utensils Used spoon;cup;straw  -CJ    Consistencies Trialed pureed;ice chips;thin liquids;nectar/syrup-thick liquids  -       Clinical Swallow Eval    Oral Prep Phase impaired  -CJ    Oral Transit impaired  -CJ    Oral Residue impaired  -    Pharyngeal Phase suspected pharyngeal impairment  -    Clinical Swallow Evaluation Summary Multiple swallows w/ thin/nectar. Throat clear w/ trials of thins. No glaring overt s/s of aspiration w/ puree. Recommend safest would be NPO w/ essential meds crushed in puree/pudding and okay for 2-3 ice chips per hour after oral care. Will plan for FEES tomorrow am.  -       Oral Transit Concerns    Oral Transit Concerns delayed initiation of bolus transit  -       Oral Residue Concerns    Oral Residue Concerns lateral sulcus residue, left  -       Pharyngeal Phase Concerns    Pharyngeal Phase Concerns multiple swallows  -    Multiple Swallows thin;nectar  -       SLP Evaluation Clinical Impression    SLP Swallowing Diagnosis oral dysphagia;suspected pharyngeal dysphagia  -    Functional Impact risk of aspiration/pneumonia  -    Rehab Potential/Prognosis, Swallowing adequate, monitor progress closely  -    Swallow Criteria for Skilled Therapeutic Interventions Met demonstrates skilled criteria  -       Recommendations    SLP Diet Recommendation NPO;other (see comments)  2-3 ice chips per hour; essential meds crushed in puree until FEES tomorrow am  -    Recommended Diagnostics reassess via FEES  8/22  -    Recommended Precautions and Strategies general aspiration precautions  -    Oral Care Recommendations Oral Care BID/PRN;Suction  toothbrush  -    SLP Rec. for Method of Medication Administration meds crushed;with puree;as tolerated  -    Monitor for Signs of Aspiration yes;notify SLP if any concerns  -    Anticipated Discharge Disposition (SLP) inpatient rehabilitation facility  -              User Key  (r) = Recorded By, (t) = Taken By, (c) = Cosigned By      Initials Name Effective Dates     Alva Baron, MS CCC-SLP 06/03/24 -                     EDUCATION  The patient has been educated in the following areas:   Cognitive Impairment Communication Impairment Dysphagia (Swallowing Impairment) Oral Care/Hydration NPO rationale.        SLP GOALS       Row Name 08/21/24 1525             Patient will demonstrate functional speech skills for return to discharge environment    [place] with moderate cues  -CJ      Time frame by discharge  -CJ      Progress/Outcomes new goal  -         Patient will demonstrate functional cognitive-linguistic skills for return to discharge environment    [place] with moderate cues  -CJ      Time frame by discharge  -CJ      Progress/Outcomes new goal  -CJ         SLP Diagnostic Treatment     Patient will participate in further assessment in the following areas reading comprehension;graphic expression  -      Time Frame (Diagnostic) short term goal (STG)  -CJ      Progress/Outcomes (Additional Goal 1, SLP) new goal  -CJ         Comprehend Questions Goal 1 (SLP)    Improve Ability to Comprehend Questions Goal 1 (SLP) complex yes/no questions;80%;with moderate cues (50-74%)  -CJ      Time Frame (Comprehend Questions Goal 1, SLP) 1 week  -CJ      Progress/Outcomes (Comprehend Questions Goal 1, SLP) new goal  -CJ         Follow Directions Goal 2 (SLP)    Improve Ability to Follow Directions Goal 1 (SLP) 2 step commands;3 step commands;80%;with moderate cues (50-74%)  -CJ      Time Frame (Follow Directions Goal 1, SLP) 1 week  -CJ      Progress/Outcomes (Follow Directions Goal 1, SLP) new goal   -CJ         Phonation Goal 1 (SLP)    Improve Phonation By Goal 1 (SLP) using loud speech;90%;with moderate cues (50-74%)  -CJ      Time Frame (Phonation Goal 1, SLP) 1 week  -CJ      Progress/Outcomes (Phonation Goal 1, SLP) new goal  -CJ         Articulation Goal 1 (SLP)    Improve Articulation Goal 1 (SLP) by over-articulating at word level;by over-articulating at phrase level;90%;with minimal cues (75-90%)  -CJ      Time Frame (Articulation Goal 1, SLP) 1 week  -CJ      Progress/Outcomes (Articulation Goal 1, SLP) new goal  -CJ         Attention Goal 1 (SLP)    Improve Attention by Goal 1 (SLP) looking at speaker;attending to task;90%;with minimal cues (75-90%)  -CJ      Time Frame (Attention Goal 1, SLP) 1 week  -CJ      Progress/Outcomes (Attention Goal 1, SLP) new goal  -CJ         Orientation Goal 1 (SLP)    Improve Orientation Through Goal 1 (SLP) demonstrating orientation to day;demonstrating orientation to month;demonstrating orientation to year;demonstrating orientation to disease/impairment;90%;with minimal cues (75-90%)  -CJ      Time Frame (Orientation Goal 1, SLP) 1 week  -CJ      Progress/Outcomes (Orientation Goal 1, SLP) new goal  -CJ         Memory Skills Goal 1 (SLP)    Improve Memory Skills Through Goal 1 (SLP) recalling related word lists immediately;recalling unrelated word lists immediately;80%;with minimal cues (75-90%)  -CJ      Time Frame (Memory Skills Goal 1, SLP) 1 week  -CJ      Progress/Outcomes (Memory Skills Goal 1, SLP) new goal  -CJ         Pragmatic Skills Goal 1 (SLP)    Improve Pragmatic Skills Goal 1 (SLP) maintain eye contact;90%;with minimal cues (75-90%)  -CJ      Time Frame (Pragmatic Skills Goal 1, SLP) 1 week  -CJ      Progress/Outcomes (Pragmatic Skills Goal 1, SLP) new goal  -CJ                User Key  (r) = Recorded By, (t) = Taken By, (c) = Cosigned By      Initials Name Provider Type    Alva Betancourt MS CCC-SLP Speech and Language Pathologist                          Time Calculation:    Time Calculation- SLP       Row Name 24 1609             Time Calculation- SLP    SLP Start Time 1525  -CJ      SLP Received On 24  -         Untimed Charges    78817-GY Eval Speech and Production w/ Language Minutes 40  -CJ      47476-AG Eval Oral Pharyng Swallow Minutes 24  -CJ         Total Minutes    Untimed Charges Total Minutes 64  -CJ       Total Minutes 64  -CJ                User Key  (r) = Recorded By, (t) = Taken By, (c) = Cosigned By      Initials Name Provider Type     Alva Baron, MS CCC-SLP Speech and Language Pathologist                    Therapy Charges for Today       Code Description Service Date Service Provider Modifiers Qty    94528403381 HC ST EVAL ORAL PHARYNG SWALLOW 2 2024 Alva Baron, MS CCC-SLP GN 1    01283339873 HC ST EVAL SPEECH AND PROD W LANG  3 2024 Alva Baron, MS CCC-SLP GN 1                 Alva Baron MS CCC-SLP  2024   and [facility] - Speech Language Pathology Initial Evaluation  [facility]     Patient Name: Dalila OCASIO  : 1970  MRN: [id number]  Today's Date: 2024               Admit Date: 2024     Visit Dx:    ICD-10-CM ICD-9-CM   1. Acute CVA (cerebrovascular accident)  I63.9 434.91   2. Left-sided neglect  R41.4 781.8   3. Dysphagia, unspecified type  R13.10 787.20   4. Dysarthria  R47.1 784.51     Patient Active Problem List   Diagnosis    Acute CVA (cerebrovascular accident)    Alcohol use    Tobacco use    Obesity (BMI 30-39.9)    HTN (hypertension)    Dyslipidemia    History of seizures    PAD (peripheral artery disease)     Past Medical History:   Diagnosis Date    Acute CVA (cerebrovascular accident) 2024    Alcohol use 2024    Dyslipidemia 2024    Obesity (BMI 30-39.9) 2024    PAD (peripheral artery disease) 2024    Tobacco use 2024     No past surgical history on file.    SLP Recommendation and Plan  SLP Diagnosis: moderate,  dysarthria, cognitive-linguistic disorder (08/21/24 1525)        Monitor for Signs of Aspiration: yes, notify SLP if any concerns (08/21/24 1525)  Swallow Criteria for Skilled Therapeutic Interventions Met: demonstrates skilled criteria (08/21/24 1525)  SLC Criteria for Skilled Therapy Interventions Met: yes (08/21/24 1525)  Anticipated Discharge Disposition (SLP): inpatient rehabilitation facility (08/21/24 1525)        Therapy Frequency (SLP SLC): 5 days per week (08/21/24 1525)  Predicted Duration Therapy Intervention (Days): 2 weeks (08/21/24 1525)  Oral Care Recommendations: Oral Care BID/PRN, Suction toothbrush (08/21/24 1525)                          Plan of Care Reviewed With: patient, family (08/21/24 1608)  Progress:  (initial eval) (08/21/24 1608)      SLP EVALUATION (Last 72 Hours)       SLP SLC Evaluation       Row Name 08/21/24 1525       General Information    Prior Level of Function-Communication WFL  -CJ    Patient's Goals for Discharge patient did not state  -CJ    Family Goals for Discharge family did not state  -CJ       Comprehension Assessment/Intervention    Comprehension Assessment/Intervention Auditory Comprehension;Reading Comprehension  -CJ       Auditory Comprehension Assessment/Intervention    Auditory Comprehension (Communication) moderate impairment  -CJ    Able to Identify Objects/Pictures (Communication) WFL;familiar objects;body part  -CJ    Answers Questions (Communication) mild impairment;complex;yes/no  -CJ    Able to Follow Commands (Communication) moderate impairment;2-step;3-step  -CJ    Narrative Discourse mild impairment;moderate impairment;conversational level  -CJ       Reading Comprehension Assessment/Intervention    Reading Comprehension (Communication) unable/difficult to assess;other (see comments)  pt having double vision  -CJ       Expression Assessment/Intervention    Expression Assessment/Intervention verbal expression;graphic expression  -CJ       Verbal  Expression Assessment/Intervention    Verbal Expression WFL  -CJ       Graphic Expression Assessment/Intervention    Graphic Expression unable/difficult to assess;other (see comments)  in restraints  -CJ       Motor Speech Assessment/Intervention    Motor Speech Function moderate impairment  -CJ    Characteristics Consistent with Dysarthria decreased articulation;decreased intensity  -CJ    Speech intelligibility 60%;70%;in quiet environment;in connected speech;with unfamiliar listener  -CJ       Cognitive Assessment Intervention- SLP    Cognitive Function (Cognition) moderate impairment  -CJ    Orientation Status (Cognition) moderate impairment;time;situation  -CJ    Memory (Cognitive) moderate impairment;functional;short-term  -CJ    Attention (Cognitive) moderate impairment;sustained  -CJ    Thought Organization (Cognitive) moderate impairment;concrete divergent;concrete convergent  -CJ    Reasoning (Cognitive) mild impairment;moderate impairment;mod-complex  -CJ    Problem Solving (Cognitive) moderate impairment;temporal;simple  -CJ    Functional Math (Cognitive) unable/difficult to assess  -CJ    Executive Function (Cognition) moderate impairment;deficit awareness;judgement  -CJ    Pragmatics (Communication) moderate impairment;eye contact  -CJ    Right Hemisphere Function moderate impairment;deficit awareness;left neglect  -CJ       SLP Evaluation Clinical Impressions    SLP Diagnosis moderate;dysarthria;cognitive-linguistic disorder  -CJ    Rehab Potential/Prognosis good  -CJ    SLC Criteria for Skilled Therapy Interventions Met yes  -CJ    Functional Impact functional impact in ADLs;difficulty in expressing complex messages;decreased ability to respond to situations safely  -CJ       Recommendations    Therapy Frequency (SLP SLC) 5 days per week  -CJ    Predicted Duration Therapy Intervention (Days) 2 weeks  -CJ              User Key  (r) = Recorded By, (t) = Taken By, (c) = Cosigned By      Initials Name  Effective Dates    CJ Alva Baron, MS CCC-SLP 06/03/24 -                        EDUCATION  The patient has been educated in the following areas:     Dysphagia (Swallowing Impairment) Oral Care/Hydration NPO rationale.           SLP GOALS       Row Name 08/21/24 1525             Patient will demonstrate functional speech skills for return to discharge environment    [place] with moderate cues  -CJ      Time frame by discharge  -CJ      Progress/Outcomes new goal  -CJ         Patient will demonstrate functional cognitive-linguistic skills for return to discharge environment    [place] with moderate cues  -CJ      Time frame by discharge  -CJ      Progress/Outcomes new goal  -CJ         SLP Diagnostic Treatment     Patient will participate in further assessment in the following areas reading comprehension;graphic expression  -CJ      Time Frame (Diagnostic) short term goal (STG)  -CJ      Progress/Outcomes (Additional Goal 1, SLP) new goal  -CJ         Comprehend Questions Goal 1 (SLP)    Improve Ability to Comprehend Questions Goal 1 (SLP) complex yes/no questions;80%;with moderate cues (50-74%)  -CJ      Time Frame (Comprehend Questions Goal 1, SLP) 1 week  -CJ      Progress/Outcomes (Comprehend Questions Goal 1, SLP) new goal  -CJ         Follow Directions Goal 2 (SLP)    Improve Ability to Follow Directions Goal 1 (SLP) 2 step commands;3 step commands;80%;with moderate cues (50-74%)  -CJ      Time Frame (Follow Directions Goal 1, SLP) 1 week  -CJ      Progress/Outcomes (Follow Directions Goal 1, SLP) new goal  -CJ         Phonation Goal 1 (SLP)    Improve Phonation By Goal 1 (SLP) using loud speech;90%;with moderate cues (50-74%)  -CJ      Time Frame (Phonation Goal 1, SLP) 1 week  -CJ      Progress/Outcomes (Phonation Goal 1, SLP) new goal  -CJ         Articulation Goal 1 (SLP)    Improve Articulation Goal 1 (SLP) by over-articulating at word level;by over-articulating at phrase level;90%;with  minimal cues (75-90%)  -CJ      Time Frame (Articulation Goal 1, SLP) 1 week  -CJ      Progress/Outcomes (Articulation Goal 1, SLP) new goal  -CJ         Attention Goal 1 (SLP)    Improve Attention by Goal 1 (SLP) looking at speaker;attending to task;90%;with minimal cues (75-90%)  -CJ      Time Frame (Attention Goal 1, SLP) 1 week  -CJ      Progress/Outcomes (Attention Goal 1, SLP) new goal  -CJ         Orientation Goal 1 (SLP)    Improve Orientation Through Goal 1 (SLP) demonstrating orientation to day;demonstrating orientation to month;demonstrating orientation to year;demonstrating orientation to disease/impairment;90%;with minimal cues (75-90%)  -CJ      Time Frame (Orientation Goal 1, SLP) 1 week  -CJ      Progress/Outcomes (Orientation Goal 1, SLP) new goal  -CJ         Memory Skills Goal 1 (SLP)    Improve Memory Skills Through Goal 1 (SLP) recalling related word lists immediately;recalling unrelated word lists immediately;80%;with minimal cues (75-90%)  -CJ      Time Frame (Memory Skills Goal 1, SLP) 1 week  -CJ      Progress/Outcomes (Memory Skills Goal 1, SLP) new goal  -CJ         Pragmatic Skills Goal 1 (SLP)    Improve Pragmatic Skills Goal 1 (SLP) maintain eye contact;90%;with minimal cues (75-90%)  -CJ      Time Frame (Pragmatic Skills Goal 1, SLP) 1 week  -CJ      Progress/Outcomes (Pragmatic Skills Goal 1, SLP) new goal  -CJ                User Key  (r) = Recorded By, (t) = Taken By, (c) = Cosigned By      Initials Name Provider Type    Alva Betancourt MS CCC-SLP Speech and Language Pathologist                              Time Calculation:      Time Calculation- SLP       Row Name 08/21/24 1609             Time Calculation- SLP    SLP Start Time 1525  -CJ      SLP Received On 08/21/24  -CJ         Untimed Charges    56910-YQ Eval Speech and Production w/ Language Minutes 40  -CJ      05629-SE Eval Oral Pharyng Swallow Minutes 24  -CJ         Total Minutes    Untimed Charges Total Minutes 64   -       Total Minutes 64  -                User Key  (r) = Recorded By, (t) = Taken By, (c) = Cosigned By      Initials Name Provider Type    Alva Betancourt, MS CCC-SLP Speech and Language Pathologist                    Therapy Charges for Today       Code Description Service Date Service Provider Modifiers Qty    83768358247  ST EVAL ORAL PHARYNG SWALLOW 2 8/21/2024 Alva Baron MS CCC-SLP GN 1    [id number]  ST EVAL SPEECH AND PROD W LANG  3 8/21/2024 Alva Baron, MS CCC-SLP GN 1                       MS SHANTELLE Campa  8/21/2024

## 2024-08-21 NOTE — ANESTHESIA PREPROCEDURE EVALUATION
Anesthesia Evaluation     Patient summary reviewed and Nursing notes reviewed   NPO Solid Status: > 8 hours  NPO Liquid Status: > 2 hours           Airway   Mallampati: II  TM distance: >3 FB  Neck ROM: full  No difficulty expected  Dental    (+) upper dentures, edentulous and lower dentures    Pulmonary    (+) a smoker Current, cigarettes,  (-) shortness of breath, recent URI, sleep apnea  Cardiovascular     ECG reviewed    (+) hypertension  (-) past MI, dysrhythmias, angina, cardiac stents    ROS comment: [facility] 11/22   Combined ECG/SPECT: This is a normal nuclear stress test. There is no   previous examination/report available for comparison or correlation.   ·  Stress ECG: ST segment changes occurred with stress that did not meet   ischemic criteria.   ·  Perfusion: SPECT images demonstrate normal myocardial perfusion.   ·  Function: Normal left ventricular cavity size. Gated SPECT images   demonstrate normal systolic function. Regional wall motion is normal. LV   wall thickening appears concordantly normal.   ·  LVEF: >=70%.       Neuro/Psych  (+) CVA  (-) seizures  GI/Hepatic/Renal/Endo    (+) obesity    Musculoskeletal     Abdominal    Substance History      OB/GYN          Other        ROS/Med Hx Other: MCA stroke for emergent thrombectomy                 Anesthesia Plan    ASA 4     general   Rapid sequence  (RSI CP   HTN rx  nifedipine started )  intravenous induction     Anesthetic plan, risks, benefits, and alternatives have been provided, discussed and informed consent has been obtained with: patient.    Plan discussed with CRNA.      CODE STATUS:

## 2024-08-21 NOTE — LETTER
EMS Transport Request  For use at [facility], [place], [place], [place], and [place] only   Patient Name: Dalila OCASIO : 1970   Weight:102 kg (224 lb) Pick-up Location: [facility] ([facility]) BLS/ALS: BLS/ALS: BLS   Insurance: [employer] Auth End Date:    Pre-Cert #: D/C Summary complete:    Destination: Other [place]   Contact Precautions: None   Equipment (O2, Fluids, etc.): None   Arrive By Date/Time: 24 1500 please Stretcher/WC: Stretcher   CM Requesting: Marie Ramirez RN Ext: [phone]   Notes/Medical Necessity: Impaired functional mobility, balance, gait and endurance     ______________________________________________________________________    *Only 2 patient bags OR 1 carry-on size bag are permitted.  Wheelchairs and walkers CANNOT transported with the patient. Acknowledge: Yes

## 2024-08-21 NOTE — BRIEF OP NOTE
CV CAROTID CERVICAL ANGIOGRAM  Progress Note    Dalila OCASIO  8/21/2024    Pre-op Diagnosis:   Tandem right ICA and M1 occlusions       Post-Op Diagnosis Codes:  Same as preoperative    Procedure/CPT® Codes:        Procedure(s):  Carotid Cervical Angiogram              Surgeon(s):  Jamaal Saini MD    Anesthesia: General    Staff:   Scrub Person: Kristopher Parekh  Documenter: Sanam Hernandez  Invasive Nurse: Kathryn Preston RN; Pao Acevedo RN         Estimated Blood Loss: minimal    Urine Voided: * No values recorded between 8/21/2024 12:25 PM and 8/21/2024  1:49 PM *    Specimens:                None          Drains: * No LDAs found *    Findings: TICI 3 reperfusion after carotid artery stent placement and thrombectomy for tandem right ICA and M1 occlusions.        Complications: None apparent          Jamaal Saini MD     Date: 8/21/2024  Time: 13:56 EDT

## 2024-08-22 ENCOUNTER — APPOINTMENT (OUTPATIENT)
Dept: CT IMAGING | Facility: HOSPITAL | Age: 54
DRG: 023 | End: 2024-08-22
Payer: MEDICAID

## 2024-08-22 ENCOUNTER — APPOINTMENT (OUTPATIENT)
Dept: GENERAL RADIOLOGY | Facility: HOSPITAL | Age: 54
DRG: 023 | End: 2024-08-22
Payer: MEDICAID

## 2024-08-22 ENCOUNTER — ANCILLARY PROCEDURE (OUTPATIENT)
Dept: SPEECH THERAPY | Facility: HOSPITAL | Age: 54
DRG: 023 | End: 2024-08-22
Payer: MEDICAID

## 2024-08-22 ENCOUNTER — APPOINTMENT (OUTPATIENT)
Dept: MRI IMAGING | Facility: HOSPITAL | Age: 54
DRG: 023 | End: 2024-08-22
Payer: MEDICAID

## 2024-08-22 ENCOUNTER — APPOINTMENT (OUTPATIENT)
Dept: CARDIOLOGY | Facility: HOSPITAL | Age: 54
DRG: 023 | End: 2024-08-22
Payer: MEDICAID

## 2024-08-22 LAB
ANION GAP SERPL CALCULATED.3IONS-SCNC: 10 MMOL/L (ref 5–15)
ANION GAP SERPL CALCULATED.3IONS-SCNC: 11 MMOL/L (ref 5–15)
ANION GAP SERPL CALCULATED.3IONS-SCNC: 12 MMOL/L (ref 5–15)
ANION GAP SERPL CALCULATED.3IONS-SCNC: 12 MMOL/L (ref 5–15)
ASCENDING AORTA: 3.7 CM
BASOPHILS # BLD AUTO: 0.01 10*3/MM3 (ref 0–0.2)
BASOPHILS NFR BLD AUTO: 0.1 % (ref 0–1.5)
BH CV ECHO MEAS - AO MAX PG: 10.5 MMHG
BH CV ECHO MEAS - AO ROOT DIAM: 3.5 CM
BH CV ECHO MEAS - AO V2 MAX: 162.1 CM/SEC
BH CV ECHO MEAS - EF(MOD-BP): 68 %
BH CV ECHO MEAS - IVS/LVPW: 0.93 CM
BH CV ECHO MEAS - IVSD: 1.4 CM
BH CV ECHO MEAS - LA DIMENSION: 4.1 CM
BH CV ECHO MEAS - LAT PEAK E' VEL: 11 CM/SEC
BH CV ECHO MEAS - LV MAX PG: 4.9 MMHG
BH CV ECHO MEAS - LV MEAN PG: 2.8 MMHG
BH CV ECHO MEAS - LV V1 MAX: 110.1 CM/SEC
BH CV ECHO MEAS - LV V1 VTI: 27.6 CM
BH CV ECHO MEAS - LVIDD: 4.1 CM
BH CV ECHO MEAS - LVIDS: 2.4 CM
BH CV ECHO MEAS - LVOT DIAM: 2 CM
BH CV ECHO MEAS - LVPWD: 1.5 CM
BH CV ECHO MEAS - MED PEAK E' VEL: 10 CM/SEC
BH CV ECHO MEAS - MV A MAX VEL: 75.2 CM/SEC
BH CV ECHO MEAS - MV DEC SLOPE: 477.8 CM/SEC2
BH CV ECHO MEAS - MV E MAX VEL: 89.5 CM/SEC
BH CV ECHO MEAS - MV E/A: 1.19
BH CV ECHO MEAS - MV P1/2T: 59 MSEC
BH CV ECHO MEAS - MVA(P1/2T): 3.8 CM2
BH CV ECHO MEAS - PA ACC TIME: 0.08 SEC
BH CV ECHO MEAS - PA V2 MAX: 111.5 CM/SEC
BH CV ECHO MEAS - RAP SYSTOLE: 8 MMHG
BH CV ECHO MEAS - RV MAX PG: 1.98 MMHG
BH CV ECHO MEAS - RV V1 MAX: 70.3 CM/SEC
BH CV ECHO MEAS - RV V1 VTI: 14.8 CM
BH CV ECHO MEAS - RVSP: 36 MMHG
BH CV ECHO MEAS - TAPSE (>1.6): 3 CM
BH CV ECHO MEAS - TR MAX PG: 28.6 MMHG
BH CV ECHO MEAS - TR MAX VEL: 260.6 CM/SEC
BH CV ECHO MEASUREMENTS AVERAGE E/E' RATIO: 8.52
BH CV ECHO SHUNT ASSESSMENT PERFORMED (HIDDEN SCRIPTING): 1
BH CV MID RIGHT ICA HIDDEN LRR: 1 CM
BH CV RIGHT CCA HIDDEN LRR: 1 CM/S
BH CV VAS BP LEFT ARM: NORMAL MMHG
BH CV VAS CAROTID RIGHT DISTAL STENT EDV: 59.6 CM/S
BH CV VAS CAROTID RIGHT DISTAL STENT PSV: 122 CM/S
BH CV VAS CAROTID RIGHT DISTAL TO STENT NATIVE VESSEL E: 44.7 CM/S
BH CV VAS CAROTID RIGHT DISTAL TO STENT PSV: 126 CM/S
BH CV VAS CAROTID RIGHT MID STENT EDV: 52.5 CM/S
BH CV VAS CAROTID RIGHT MID STENT PSV: 126 CM/S
BH CV VAS CAROTID RIGHT PROXIMAL STENT EDV: 20.5 CM/S
BH CV VAS CAROTID RIGHT PROXIMAL STENT PSV: 62.1 CM/S
BH CV VAS CAROTID RIGHT STENT NATIVE VESSEL PROXIMAL EDV: 30.8 CM/S
BH CV VAS CAROTID RIGHT STENT NATIVE VESSEL PROXIMAL PS: 89.7 CM/S
BH CV XLRA - RV BASE: 4.9 CM
BH CV XLRA - RV LENGTH: 8.5 CM
BH CV XLRA - RV MID: 4 CM
BH CV XLRA - TDI S': 17.7 CM/SEC
BH CV XLRA MEAS LEFT DIST CCA EDV: 19.9 CM/SEC
BH CV XLRA MEAS LEFT DIST CCA PSV: 80.6 CM/SEC
BH CV XLRA MEAS LEFT DIST ICA EDV: 20.3 CM/SEC
BH CV XLRA MEAS LEFT DIST ICA PSV: 81.8 CM/SEC
BH CV XLRA MEAS LEFT ICA/CCA RATIO: 1.09
BH CV XLRA MEAS LEFT MID CCA EDV: 30.3 CM/SEC
BH CV XLRA MEAS LEFT MID CCA PSV: 117 CM/SEC
BH CV XLRA MEAS LEFT MID ICA EDV: 25.8 CM/SEC
BH CV XLRA MEAS LEFT MID ICA PSV: 96.2 CM/SEC
BH CV XLRA MEAS LEFT PROX CCA EDV: 14.7 CM/SEC
BH CV XLRA MEAS LEFT PROX CCA PSV: 98.8 CM/SEC
BH CV XLRA MEAS LEFT PROX ECA PSV: 144 CM/SEC
BH CV XLRA MEAS LEFT PROX ICA EDV: 39.7 CM/SEC
BH CV XLRA MEAS LEFT PROX ICA PSV: 128 CM/SEC
BH CV XLRA MEAS LEFT PROX SCLA PSV: 130 CM/SEC
BH CV XLRA MEAS LEFT VERTEBRAL A EDV: 12.1 CM/SEC
BH CV XLRA MEAS LEFT VERTEBRAL A PSV: 50.3 CM/SEC
BH CV XLRA MEAS RIGHT DIST CCA EDV: 30.8 CM/SEC
BH CV XLRA MEAS RIGHT DIST CCA PSV: 89.7 CM/SEC
BH CV XLRA MEAS RIGHT DIST ICA EDV: 52 CM/SEC
BH CV XLRA MEAS RIGHT DIST ICA PSV: 144 CM/SEC
BH CV XLRA MEAS RIGHT ICA/CCA RATIO: 1.35
BH CV XLRA MEAS RIGHT MID CCA EDV: 26.9 CM/SEC
BH CV XLRA MEAS RIGHT MID CCA PSV: 107 CM/SEC
BH CV XLRA MEAS RIGHT MID ICA EDV: 59.6 CM/SEC
BH CV XLRA MEAS RIGHT MID ICA PSV: 122 CM/SEC
BH CV XLRA MEAS RIGHT PROX CCA EDV: 33.8 CM/SEC
BH CV XLRA MEAS RIGHT PROX CCA PSV: 136 CM/SEC
BH CV XLRA MEAS RIGHT PROX ECA PSV: 120 CM/SEC
BH CV XLRA MEAS RIGHT PROX ICA EDV: 52.5 CM/SEC
BH CV XLRA MEAS RIGHT PROX ICA PSV: 126 CM/SEC
BH CV XLRA MEAS RIGHT PROX SCLA PSV: 181 CM/SEC
BH CV XLRA MEAS RIGHT VERTEBRAL A EDV: 11 CM/SEC
BH CV XLRA MEAS RIGHT VERTEBRAL A PSV: 47.2 CM/SEC
BH CVPROX RIGHT ICA HIDDEN LRR: 1 CM
BUN SERPL-MCNC: 17 MG/DL (ref 6–20)
BUN SERPL-MCNC: 18 MG/DL (ref 6–20)
BUN SERPL-MCNC: 19 MG/DL (ref 6–20)
BUN SERPL-MCNC: 19 MG/DL (ref 6–20)
BUN/CREAT SERPL: 29.2 (ref 7–25)
BUN/CREAT SERPL: 29.3 (ref 7–25)
BUN/CREAT SERPL: 30.6 (ref 7–25)
BUN/CREAT SERPL: 32.1 (ref 7–25)
CALCIUM SPEC-SCNC: 8.7 MG/DL (ref 8.6–10.5)
CALCIUM SPEC-SCNC: 9 MG/DL (ref 8.6–10.5)
CALCIUM SPEC-SCNC: 9 MG/DL (ref 8.6–10.5)
CALCIUM SPEC-SCNC: 9.1 MG/DL (ref 8.6–10.5)
CHLORIDE SERPL-SCNC: 107 MMOL/L (ref 98–107)
CHLORIDE SERPL-SCNC: 107 MMOL/L (ref 98–107)
CHLORIDE SERPL-SCNC: 108 MMOL/L (ref 98–107)
CHLORIDE SERPL-SCNC: 108 MMOL/L (ref 98–107)
CHOLEST SERPL-MCNC: 187 MG/DL (ref 0–200)
CO2 SERPL-SCNC: 21 MMOL/L (ref 22–29)
CO2 SERPL-SCNC: 22 MMOL/L (ref 22–29)
CO2 SERPL-SCNC: 24 MMOL/L (ref 22–29)
CO2 SERPL-SCNC: 24 MMOL/L (ref 22–29)
CREAT SERPL-MCNC: 0.56 MG/DL (ref 0.57–1)
CREAT SERPL-MCNC: 0.58 MG/DL (ref 0.57–1)
CREAT SERPL-MCNC: 0.62 MG/DL (ref 0.57–1)
CREAT SERPL-MCNC: 0.65 MG/DL (ref 0.57–1)
DEPRECATED RDW RBC AUTO: 42.5 FL (ref 37–54)
EGFRCR SERPLBLD CKD-EPI 2021: 105.4 ML/MIN/1.73
EGFRCR SERPLBLD CKD-EPI 2021: 106.6 ML/MIN/1.73
EGFRCR SERPLBLD CKD-EPI 2021: 108.4 ML/MIN/1.73
EGFRCR SERPLBLD CKD-EPI 2021: 109.3 ML/MIN/1.73
EOSINOPHIL # BLD AUTO: 0 10*3/MM3 (ref 0–0.4)
EOSINOPHIL NFR BLD AUTO: 0 % (ref 0.3–6.2)
ERYTHROCYTE [DISTWIDTH] IN BLOOD BY AUTOMATED COUNT: 12.3 % (ref 12.3–15.4)
FENTANYL UR-MCNC: NEGATIVE NG/ML
GLUCOSE BLDC GLUCOMTR-MCNC: 123 MG/DL (ref 70–130)
GLUCOSE BLDC GLUCOMTR-MCNC: 124 MG/DL (ref 70–130)
GLUCOSE BLDC GLUCOMTR-MCNC: 127 MG/DL (ref 70–130)
GLUCOSE BLDC GLUCOMTR-MCNC: 178 MG/DL (ref 70–130)
GLUCOSE SERPL-MCNC: 116 MG/DL (ref 65–99)
GLUCOSE SERPL-MCNC: 119 MG/DL (ref 65–99)
GLUCOSE SERPL-MCNC: 134 MG/DL (ref 65–99)
GLUCOSE SERPL-MCNC: 137 MG/DL (ref 65–99)
HBA1C MFR BLD: 5.5 % (ref 4.8–5.6)
HCT VFR BLD AUTO: 39.3 % (ref 34–46.6)
HDLC SERPL-MCNC: 65 MG/DL (ref 40–60)
HGB BLD-MCNC: 13.2 G/DL (ref 12–15.9)
IMM GRANULOCYTES # BLD AUTO: 0.07 10*3/MM3 (ref 0–0.05)
IMM GRANULOCYTES NFR BLD AUTO: 0.5 % (ref 0–0.5)
IVRT: 75 MS
LDLC SERPL CALC-MCNC: 107 MG/DL (ref 0–100)
LDLC/HDLC SERPL: 1.62 {RATIO}
LEFT ARM BP: NORMAL MMHG
LEFT ATRIUM VOLUME INDEX: 46.8 ML/M2
LV EF 2D ECHO EST: 68 %
LYMPHOCYTES # BLD AUTO: 0.73 10*3/MM3 (ref 0.7–3.1)
LYMPHOCYTES NFR BLD AUTO: 5.1 % (ref 19.6–45.3)
MAGNESIUM SERPL-MCNC: 2 MG/DL (ref 1.6–2.6)
MCH RBC QN AUTO: 31.6 PG (ref 26.6–33)
MCHC RBC AUTO-ENTMCNC: 33.6 G/DL (ref 31.5–35.7)
MCV RBC AUTO: 94 FL (ref 79–97)
MONOCYTES # BLD AUTO: 0.28 10*3/MM3 (ref 0.1–0.9)
MONOCYTES NFR BLD AUTO: 2 % (ref 5–12)
NEUTROPHILS NFR BLD AUTO: 13.14 10*3/MM3 (ref 1.7–7)
NEUTROPHILS NFR BLD AUTO: 92.3 % (ref 42.7–76)
NRBC BLD AUTO-RTO: 0 /100 WBC (ref 0–0.2)
NT-PROBNP SERPL-MCNC: 269.4 PG/ML (ref 0–900)
OSMOLALITY SERPL: 298 MOSM/KG (ref 275–295)
OSMOLALITY SERPL: 300 MOSM/KG (ref 275–295)
OSMOLALITY UR: 581 MOSM/KG (ref 300–1100)
PHOSPHATE SERPL-MCNC: 3 MG/DL (ref 2.5–4.5)
PLATELET # BLD AUTO: 226 10*3/MM3 (ref 140–450)
PMV BLD AUTO: 10.2 FL (ref 6–12)
POTASSIUM SERPL-SCNC: 3.9 MMOL/L (ref 3.5–5.2)
POTASSIUM SERPL-SCNC: 4.1 MMOL/L (ref 3.5–5.2)
POTASSIUM SERPL-SCNC: 4.1 MMOL/L (ref 3.5–5.2)
POTASSIUM SERPL-SCNC: 4.2 MMOL/L (ref 3.5–5.2)
RBC # BLD AUTO: 4.18 10*6/MM3 (ref 3.77–5.28)
SODIUM SERPL-SCNC: 139 MMOL/L (ref 136–145)
SODIUM SERPL-SCNC: 140 MMOL/L (ref 136–145)
SODIUM SERPL-SCNC: 143 MMOL/L (ref 136–145)
SODIUM SERPL-SCNC: 144 MMOL/L (ref 136–145)
TRIGL SERPL-MCNC: 83 MG/DL (ref 0–150)
VLDLC SERPL-MCNC: 15 MG/DL (ref 5–40)
WBC NRBC COR # BLD AUTO: 14.23 10*3/MM3 (ref 3.4–10.8)

## 2024-08-22 PROCEDURE — 83935 ASSAY OF URINE OSMOLALITY: CPT | Performed by: STUDENT IN AN ORGANIZED HEALTH CARE EDUCATION/TRAINING PROGRAM

## 2024-08-22 PROCEDURE — 97166 OT EVAL MOD COMPLEX 45 MIN: CPT

## 2024-08-22 PROCEDURE — 25810000003 SODIUM CHLORIDE 0.9 % SOLUTION 500 ML FLEX CONT: Performed by: STUDENT IN AN ORGANIZED HEALTH CARE EDUCATION/TRAINING PROGRAM

## 2024-08-22 PROCEDURE — C1894 INTRO/SHEATH, NON-LASER: HCPCS

## 2024-08-22 PROCEDURE — 83880 ASSAY OF NATRIURETIC PEPTIDE: CPT | Performed by: STUDENT IN AN ORGANIZED HEALTH CARE EDUCATION/TRAINING PROGRAM

## 2024-08-22 PROCEDURE — 25010000002 NICARDIPINE 2.5 MG/ML SOLUTION 10 ML VIAL

## 2024-08-22 PROCEDURE — 82948 REAGENT STRIP/BLOOD GLUCOSE: CPT

## 2024-08-22 PROCEDURE — 80048 BASIC METABOLIC PNL TOTAL CA: CPT | Performed by: STUDENT IN AN ORGANIZED HEALTH CARE EDUCATION/TRAINING PROGRAM

## 2024-08-22 PROCEDURE — 80048 BASIC METABOLIC PNL TOTAL CA: CPT | Performed by: INTERNAL MEDICINE

## 2024-08-22 PROCEDURE — 25010000002 HALOPERIDOL LACTATE PER 5 MG: Performed by: NURSE PRACTITIONER

## 2024-08-22 PROCEDURE — 93880 EXTRACRANIAL BILAT STUDY: CPT | Performed by: INTERNAL MEDICINE

## 2024-08-22 PROCEDURE — 84100 ASSAY OF PHOSPHORUS: CPT | Performed by: INTERNAL MEDICINE

## 2024-08-22 PROCEDURE — 70450 CT HEAD/BRAIN W/O DYE: CPT

## 2024-08-22 PROCEDURE — 25010000002 THIAMINE PER 100 MG: Performed by: INTERNAL MEDICINE

## 2024-08-22 PROCEDURE — 92612 ENDOSCOPY SWALLOW (FEES) VID: CPT

## 2024-08-22 PROCEDURE — 85025 COMPLETE CBC W/AUTO DIFF WBC: CPT | Performed by: INTERNAL MEDICINE

## 2024-08-22 PROCEDURE — 83036 HEMOGLOBIN GLYCOSYLATED A1C: CPT

## 2024-08-22 PROCEDURE — 93306 TTE W/DOPPLER COMPLETE: CPT

## 2024-08-22 PROCEDURE — 02HV33Z INSERTION OF INFUSION DEVICE INTO SUPERIOR VENA CAVA, PERCUTANEOUS APPROACH: ICD-10-PCS | Performed by: INTERNAL MEDICINE

## 2024-08-22 PROCEDURE — 99233 SBSQ HOSP IP/OBS HIGH 50: CPT | Performed by: STUDENT IN AN ORGANIZED HEALTH CARE EDUCATION/TRAINING PROGRAM

## 2024-08-22 PROCEDURE — 80061 LIPID PANEL: CPT

## 2024-08-22 PROCEDURE — 74018 RADEX ABDOMEN 1 VIEW: CPT

## 2024-08-22 PROCEDURE — 93880 EXTRACRANIAL BILAT STUDY: CPT

## 2024-08-22 PROCEDURE — 70551 MRI BRAIN STEM W/O DYE: CPT

## 2024-08-22 PROCEDURE — C1751 CATH, INF, PER/CENT/MIDLINE: HCPCS

## 2024-08-22 PROCEDURE — 97530 THERAPEUTIC ACTIVITIES: CPT

## 2024-08-22 PROCEDURE — 83930 ASSAY OF BLOOD OSMOLALITY: CPT | Performed by: STUDENT IN AN ORGANIZED HEALTH CARE EDUCATION/TRAINING PROGRAM

## 2024-08-22 PROCEDURE — 25010000002 DIPHENHYDRAMINE PER 50 MG: Performed by: NURSE PRACTITIONER

## 2024-08-22 PROCEDURE — 4A02X4A MEASUREMENT OF CARDIAC ELECTRICAL ACTIVITY, GUIDANCE, EXTERNAL APPROACH: ICD-10-PCS | Performed by: INTERNAL MEDICINE

## 2024-08-22 PROCEDURE — 83735 ASSAY OF MAGNESIUM: CPT | Performed by: INTERNAL MEDICINE

## 2024-08-22 PROCEDURE — 99291 CRITICAL CARE FIRST HOUR: CPT | Performed by: INTERNAL MEDICINE

## 2024-08-22 PROCEDURE — 93306 TTE W/DOPPLER COMPLETE: CPT | Performed by: INTERNAL MEDICINE

## 2024-08-22 PROCEDURE — 25810000003 SODIUM CHLORIDE 0.9 % SOLUTION 250 ML FLEX CONT

## 2024-08-22 PROCEDURE — 97162 PT EVAL MOD COMPLEX 30 MIN: CPT

## 2024-08-22 RX ORDER — MIDAZOLAM HYDROCHLORIDE 1 MG/ML
2 INJECTION INTRAMUSCULAR; INTRAVENOUS ONCE AS NEEDED
Status: DISCONTINUED | OUTPATIENT
Start: 2024-08-22 | End: 2024-08-25

## 2024-08-22 RX ORDER — FOLIC ACID 1 MG/1
1 TABLET ORAL DAILY
Status: DISCONTINUED | OUTPATIENT
Start: 2024-08-23 | End: 2024-08-28 | Stop reason: HOSPADM

## 2024-08-22 RX ORDER — IBUPROFEN 600 MG/1
1 TABLET ORAL
Status: DISCONTINUED | OUTPATIENT
Start: 2024-08-22 | End: 2024-08-28 | Stop reason: HOSPADM

## 2024-08-22 RX ORDER — ASPIRIN 81 MG/1
81 TABLET, CHEWABLE ORAL DAILY
Status: DISCONTINUED | OUTPATIENT
Start: 2024-08-23 | End: 2024-08-28 | Stop reason: HOSPADM

## 2024-08-22 RX ORDER — ACETAMINOPHEN 650 MG/1
650 SUPPOSITORY RECTAL EVERY 6 HOURS PRN
Status: DISCONTINUED | OUTPATIENT
Start: 2024-08-22 | End: 2024-08-26

## 2024-08-22 RX ORDER — ACETAMINOPHEN 325 MG/1
650 TABLET ORAL EVERY 6 HOURS PRN
Status: DISCONTINUED | OUTPATIENT
Start: 2024-08-22 | End: 2024-08-23

## 2024-08-22 RX ORDER — SODIUM CHLORIDE 0.9 % (FLUSH) 0.9 %
10 SYRINGE (ML) INJECTION AS NEEDED
Status: DISCONTINUED | OUTPATIENT
Start: 2024-08-22 | End: 2024-08-25

## 2024-08-22 RX ORDER — 3% SODIUM CHLORIDE 3 G/100ML
25 INJECTION, SOLUTION INTRAVENOUS CONTINUOUS
Status: DISCONTINUED | OUTPATIENT
Start: 2024-08-22 | End: 2024-08-22

## 2024-08-22 RX ORDER — DIPHENHYDRAMINE HYDROCHLORIDE 50 MG/ML
50 INJECTION INTRAMUSCULAR; INTRAVENOUS ONCE
Status: COMPLETED | OUTPATIENT
Start: 2024-08-22 | End: 2024-08-22

## 2024-08-22 RX ORDER — ATORVASTATIN CALCIUM 40 MG/1
80 TABLET, FILM COATED ORAL NIGHTLY
Status: DISCONTINUED | OUTPATIENT
Start: 2024-08-22 | End: 2024-08-28 | Stop reason: HOSPADM

## 2024-08-22 RX ORDER — SODIUM CHLORIDE 0.9 % (FLUSH) 0.9 %
10 SYRINGE (ML) INJECTION EVERY 12 HOURS SCHEDULED
Status: DISCONTINUED | OUTPATIENT
Start: 2024-08-22 | End: 2024-08-25

## 2024-08-22 RX ORDER — HALOPERIDOL 5 MG/ML
2 INJECTION INTRAMUSCULAR ONCE
Status: COMPLETED | OUTPATIENT
Start: 2024-08-22 | End: 2024-08-22

## 2024-08-22 RX ORDER — ASPIRIN 300 MG/1
300 SUPPOSITORY RECTAL DAILY
Status: DISCONTINUED | OUTPATIENT
Start: 2024-08-23 | End: 2024-08-28 | Stop reason: HOSPADM

## 2024-08-22 RX ORDER — AMINO ACIDS/PROTEIN HYDROLYS 11G-40/45
30 LIQUID IN PACKET (ML) ORAL DAILY
Status: DISCONTINUED | OUTPATIENT
Start: 2024-08-23 | End: 2024-08-28

## 2024-08-22 RX ORDER — FOLIC ACID 1 MG/1
1 TABLET ORAL DAILY
Status: DISCONTINUED | OUTPATIENT
Start: 2024-08-22 | End: 2024-08-22

## 2024-08-22 RX ORDER — ACETAMINOPHEN 160 MG/5ML
650 SUSPENSION ORAL EVERY 6 HOURS PRN
Status: DISCONTINUED | OUTPATIENT
Start: 2024-08-22 | End: 2024-08-22

## 2024-08-22 RX ORDER — NICOTINE POLACRILEX 4 MG
15 LOZENGE BUCCAL
Status: DISCONTINUED | OUTPATIENT
Start: 2024-08-22 | End: 2024-08-23

## 2024-08-22 RX ORDER — DEXTROSE MONOHYDRATE 25 G/50ML
25 INJECTION, SOLUTION INTRAVENOUS
Status: DISCONTINUED | OUTPATIENT
Start: 2024-08-22 | End: 2024-08-28 | Stop reason: HOSPADM

## 2024-08-22 RX ADMIN — Medication 10 ML: at 10:55

## 2024-08-22 RX ADMIN — TICAGRELOR 60 MG: 60 TABLET ORAL at 20:32

## 2024-08-22 RX ADMIN — DIPHENHYDRAMINE HYDROCHLORIDE 50 MG: 50 INJECTION INTRAMUSCULAR; INTRAVENOUS at 03:36

## 2024-08-22 RX ADMIN — NICARDIPINE HYDROCHLORIDE 5 MG/HR: 25 INJECTION, SOLUTION INTRAVENOUS at 20:33

## 2024-08-22 RX ADMIN — Medication 10 ML: at 20:33

## 2024-08-22 RX ADMIN — TICAGRELOR 60 MG: 60 TABLET ORAL at 10:55

## 2024-08-22 RX ADMIN — FOLIC ACID 1 MG: 1 TABLET ORAL at 10:55

## 2024-08-22 RX ADMIN — NICARDIPINE HYDROCHLORIDE 5 MG/HR: 25 INJECTION, SOLUTION INTRAVENOUS at 16:16

## 2024-08-22 RX ADMIN — ACETAMINOPHEN 650 MG: 650 SUPPOSITORY RECTAL at 00:32

## 2024-08-22 RX ADMIN — ATORVASTATIN CALCIUM 80 MG: 40 TABLET, FILM COATED ORAL at 20:32

## 2024-08-22 RX ADMIN — ASPIRIN 81 MG 81 MG: 81 TABLET ORAL at 09:37

## 2024-08-22 RX ADMIN — ACETAMINOPHEN 650 MG: 160 SUSPENSION ORAL at 14:07

## 2024-08-22 RX ADMIN — VASOPRESSIN 25 ML/HR: 20 INJECTION, SOLUTION INTRAVENOUS at 12:06

## 2024-08-22 RX ADMIN — HALOPERIDOL LACTATE 2 MG: 5 INJECTION, SOLUTION INTRAMUSCULAR at 03:36

## 2024-08-22 RX ADMIN — NICOTINE 1 PATCH: 21 PATCH TRANSDERMAL at 16:16

## 2024-08-22 RX ADMIN — THIAMINE HYDROCHLORIDE 500 MG: 100 INJECTION, SOLUTION INTRAMUSCULAR; INTRAVENOUS at 14:07

## 2024-08-22 NOTE — PLAN OF CARE
Goal Outcome Evaluation:  Plan of Care Reviewed With: patient, son        Progress: no change  Outcome Evaluation: PT eval complete. Pt presents with generalized weakness, decreased safety awareness, and decreased standing balance this date. Pt was instructed to avoid WB through LLE during transfer however demonstrated difficulty doing so requiring modAx2 to transition to chair.      Anticipated Discharge Disposition (PT): inpatient rehabilitation facility

## 2024-08-22 NOTE — PLAN OF CARE
Goal Outcome Evaluation:  Plan of Care Reviewed With: patient, spouse, son        Progress: no change         Anticipated Discharge Disposition (SLP): inpatient rehabilitation facility          SLP Swallowing Diagnosis: mild, oral dysphagia, severe, pharyngeal dysphagia (08/22/24 3197)

## 2024-08-22 NOTE — MBS/VFSS/FEES
Acute Care - Speech Language Pathology   Swallow Initial Evaluation [facility]  Fiberoptic Endoscopic Evaluation of Swallowing (FEES)       Patient Name: Dalila OCASIO  : 1970  MRN: [id number]  Today's Date: 2024               Admit Date: 2024    Visit Dx:     ICD-10-CM ICD-9-CM   1. Acute CVA (cerebrovascular accident)  I63.9 434.91   2. Left-sided neglect  R41.4 781.8   3. Oropharyngeal dysphagia  R13.12 787.22   4. Dysarthria  R47.1 784.51     Patient Active Problem List   Diagnosis    Acute CVA (cerebrovascular accident)    Alcohol use    Tobacco use    Obesity (BMI 30-39.9)    HTN (hypertension)    Dyslipidemia    History of seizures    PAD (peripheral artery disease)     Past Medical History:   Diagnosis Date    Acute CVA (cerebrovascular accident) 2024    Alcohol use 2024    Dyslipidemia 2024    Obesity (BMI 30-39.9) 2024    PAD (peripheral artery disease) 2024    Tobacco use 2024     History reviewed. No pertinent surgical history.    SLP Recommendation and Plan  SLP Swallowing Diagnosis: mild, oral dysphagia, severe, pharyngeal dysphagia (24)  SLP Diet Recommendation: NPO, temporary alternate methods of nutrition/hydration (24)  Recommended Precautions and Strategies: general aspiration precautions (24)  SLP Rec. for Method of Medication Administration: meds via alternate route (24)     Monitor for Signs of Aspiration: yes, notify SLP if any concerns (24)     Swallow Criteria for Skilled Therapeutic Interventions Met: demonstrates skilled criteria (24)  Anticipated Discharge Disposition (SLP): inpatient rehabilitation facility (24)  Rehab Potential/Prognosis, Swallowing: good, to achieve stated therapy goals (24)  Therapy Frequency (Swallow): 5 days per week (24)  Predicted Duration Therapy Intervention (Days): 2 weeks (24)  Oral Care  Recommendations: Oral Care BID/PRN, Suction toothbrush (08/22/24 0930)                                 Cracker visualized in the airway                SWALLOW EVALUATION (Last 72 Hours)       SLP Adult Swallow Evaluation       Row Name 08/22/24 0930 08/21/24 1525                Rehab Evaluation    Document Type evaluation  -RS evaluation  -CJ       Subjective Information no complaints  -RS no complaints  -CJ       Patient Observations agree to therapy  -RS alert;cooperative  -CJ       Patient/Family/Caregiver Comments/Observations  present  -RS family present  -CJ       Patient Effort adequate  -RS adequate  -CJ       Symptoms Noted During/After Treatment none  -RS none  -CJ       Oral Care suction provided  -RS --          General Information    Patient Profile Reviewed -- yes  -CJ       Pertinent History Of Current Problem -- Pt adm w/ R MCA CVA s/p angiogram; sig h/o alcohol use, tobacco use, obesity, HTN, dyslipidemia, seizures, PAD. NIH-18  -       Current Method of Nutrition -- NPO  -CJ       Precautions/Limitations, Vision -- neglect, left  -CJ       Precautions/Limitations, Hearing -- WFL;for purposes of eval  -       Prior Level of Function-Communication -- unknown  -       Prior Level of Function-Swallowing -- no diet consistency restrictions  -CJ       Plans/Goals Discussed with -- patient;family  -CJ       Barriers to Rehab -- medically complex  -       Patient's Goals for Discharge -- return to PO diet  -CJ          Pain    Additional Documentation Pain Scale: FACES Pre/Post-Treatment (Group)  -RS Pain Scale: FACES Pre/Post-Treatment (Group)  -CJ          Pain Scale: FACES Pre/Post-Treatment    Pain: FACES Scale, Pretreatment 0-->no hurt  -RS 0-->no hurt  -CJ       Posttreatment Pain Rating 0-->no hurt  -RS 0-->no hurt  -CJ          Oral Motor Structure and Function    Dentition Assessment -- edentulous, dentures not available  -CJ       Secretion Management -- WNL/WFL  -CJ       Mucosal  Quality -- dry  -          Oral Musculature and Cranial Nerve Assessment    Oral Motor General Assessment -- oral labial or buccal impairment;lingual impairment  -       Oral Labial or Buccal Impairment, Detail, Cranial Nerve VII (Facial): -- left labial droop  -       Lingual Impairment, Detail. Cranial Nerves IX, XII (Glossopharyngeal and Hypoglossal) -- reduced strength left  -          General Eating/Swallowing Observations    Respiratory Support Currently in Use -- room air  -       Eating/Swallowing Skills -- fed by SLP  -       Positioning During Eating -- reverse trendelenburg position  -       Utensils Used -- spoon;cup;straw  -       Consistencies Trialed -- pureed;ice chips;thin liquids;nectar/syrup-thick liquids  -          Clinical Swallow Eval    Oral Prep Phase -- impaired  -       Oral Transit -- impaired  -       Oral Residue -- impaired  -       Pharyngeal Phase -- suspected pharyngeal impairment  -       Clinical Swallow Evaluation Summary -- Multiple swallows w/ thin/nectar. Throat clear w/ trials of thins. No glaring overt s/s of aspiration w/ puree. Recommend safest would be NPO w/ essential meds crushed in puree/pudding and okay for 2-3 ice chips per hour after oral care. Will plan for FEES tomorrow am.  -          Oral Transit Concerns    Oral Transit Concerns -- delayed initiation of bolus transit  -          Oral Residue Concerns    Oral Residue Concerns -- lateral sulcus residue, left  -          Pharyngeal Phase Concerns    Pharyngeal Phase Concerns -- multiple swallows  -       Multiple Swallows -- thin;nectar  -          Fiberoptic Endoscopic Evaluation of Swallowing (FEES)    Risks/Benefits Reviewed risks/benefits explained;patient;family;agreed to eval  -RS --       Nasal Entry right:  -RS --       Scope serial number/identification 338  -RS --          Anatomy and Physiology    Anatomic Considerations edema;false vocal folds  -RS --       Base of  Tongue range reduced  -RS --       Epiglottis WFL  -RS --       Laryngeal Function Breathing CNA  -RS --       Laryngeal Function Phonation CNA  pt declines to produce /i/ at length  -RS --       Laryngeal Function to Breath Hold CNA  pt declines to attempt  -RS --       Secretion Rating Scale (Ander et al. 1996) 2- secretions initially outside the vestibule but later entered the vestibule  -RS --       Secretion Description thin  -RS --       Spontaneous Swallow frequency reduced  -RS --       Sensory sensed scope  -RS --       Utensils Used Spoon;Cup;Straw  -RS --       Consistencies Trialed thin liquids;nectar-thick liquids;pudding/puree;regular textures  -RS --          FEES Interpretation    Oral Phase reduced lingual control;prespill of liquids into pharynx  -RS --          Initiation of Pharyngeal Swallow    Initiation of Pharyngeal Swallow bolus in valleculae  -RS --       Pharyngeal Phase impaired pharyngeal phase of swallowing  -RS --       Penetration During the Swallow nectar-thick liquids;regular textures;secondary to delayed swallow initiation or mistiming;secondary to reduced laryngeal elevation;secondary to reduced vestibular closure  -RS --       Aspiration During the Swallow thin liquids;secondary to delayed swallow initiation or mistiming;secondary to reduced laryngeal elevation;secondary to reduced vestibular closure  -RS --       Depth of Penetration deep  -RS --       Response to Penetration No  -RS --       No spontaneous response to penetration and effective laryngeal clearance with cue (see comments)  -RS --       Response to Aspiration No  -RS --       No spontaneous response to aspiration with non-effective subglottic clearance with cue (see comments)  -RS --       Rosenbek's Scale thin:;8-->Level 8;nectar:;3-->Level 3;regular textures:;5-->Level 5;pudding/puree:;1-->Level 1  -RS --       Residue laryngeal vestibule;secondary to reduced laryngeal elevation;secondary to reduced  hyolaryngeal excursion  -RS --       Response to Residue with compensatory maneuver (see comments)  -RS --       FEES Summary Silent aspiration of thins during the swallow, deep pen of nectar and solids during the swallow, solidss visualized on the cords, please see images. Cued coughs are effective at clearing solids but not liquid aspirate. Although there was no pen/asp observed w pudding trials, do given amount of solid material penetrated and lack of sensation, do not feel pt is safe for any type of PO diet at this time. Images reviewed w pt and .  -RS --          Swallowing Quality of Life Assessment    Education and counseling provided Signs of aspiration;Silent aspiration;Risks of aspiration;Alternate nutrition/hydration options, risks, and benefits;Oral care recommendations and rationale  -RS --          SLP Evaluation Clinical Impression    SLP Swallowing Diagnosis mild;oral dysphagia;severe;pharyngeal dysphagia  -RS oral dysphagia;suspected pharyngeal dysphagia  -       Functional Impact risk of aspiration/pneumonia  -RS risk of aspiration/pneumonia  -       Rehab Potential/Prognosis, Swallowing good, to achieve stated therapy goals  -RS adequate, monitor progress closely  -       Swallow Criteria for Skilled Therapeutic Interventions Met demonstrates skilled criteria  -RS demonstrates skilled criteria  -          Recommendations    Therapy Frequency (Swallow) 5 days per week  -RS --       Predicted Duration Therapy Intervention (Days) 2 weeks  -RS --       SLP Diet Recommendation NPO;temporary alternate methods of nutrition/hydration  -RS NPO;other (see comments)  2-3 ice chips per hour; essential meds crushed in puree until FEES tomorrow am  -       Recommended Diagnostics -- reassess via FEES  8/22  -       Recommended Precautions and Strategies general aspiration precautions  -RS general aspiration precautions  -       Oral Care Recommendations Oral Care BID/PRN;Suction toothbrush   -RS Oral Care BID/PRN;Suction toothbrush  -       SLP Rec. for Method of Medication Administration meds via alternate route  -RS meds crushed;with puree;as tolerated  -       Monitor for Signs of Aspiration yes;notify SLP if any concerns  -RS yes;notify SLP if any concerns  -       Anticipated Discharge Disposition (SLP) inpatient rehabilitation facility  -RS inpatient rehabilitation facility  -                 User Key  (r) = Recorded By, (t) = Taken By, (c) = Cosigned By      Initials Name Effective Dates     Alva Baron, MS CCC-SLP 06/03/24 -     RS Júnior Bravo, MS CCC-SLP 09/14/23 -                     EDUCATION  The patient has been educated in the following areas:   Home Exercise Program (HEP) Dysphagia (Swallowing Impairment) NPO rationale.        SLP GOALS       Row Name 08/22/24 0930 08/21/24 1525          (LTG) Patient will demonstrate functional swallow for    Diet Texture (Demonstrate functional swallow) soft to chew (chopped) textures  -RS --     Liquid viscosity (Demonstrate functional swallow) nectar/ mildly thick liquids  -RS --     [place] (Demonstrate functional swallow) with minimal cues (75-90% accuracy)  -RS --     Time Frame (Demonstrate functional swallow) 1 week  -RS --     Progress/Outcomes (Demonstrate functional swallow) new goal  -RS --        (STG) Lingual Strengthening Goal 1 (SLP)    Activity (Lingual Strengthening Goal 1, SLP) increase tongue back strength  -RS --     Increase Tongue Back Strength lingual movement exercises;lingual resistance exercises  -RS --     [place]/Accuracy (Lingual Strengthening Goal 1, SLP) with minimal cues (75-90% accuracy)  -RS --     Time Frame (Lingual Strengthening Goal 1, SLP) 1 week  -RS --     Progress/Outcomes (Lingual Strengthening Goal 1, SLP) new goal  -RS --        (STG) Pharyngeal Strengthening Exercise Goal 1 (SLP)    Activity (Pharyngeal Strengthening Goal 1, SLP) increase timing;increase superior movement of the  hyolaryngeal complex;increase anterior movement of the hyolaryngeal complex;increase squeeze/positive pressure generation  -RS --     Increase Timing prepping - 3 second prep or suck swallow or 3-step swallow  -RS --     Increase Superior Movement of the Hyolaryngeal Complex Mendelsohn  -RS --     Increase Anterior Movement of the Hyolaryngeal Complex chin tuck against resistance (CTAR)  -RS --     Increase Squeeze/Positive Pressure Generation effortful pitch glide (falsetto + pharyngeal squeeze)  -RS --     [place]/Accuracy (Pharyngeal Strengthening Goal 1, SLP) with minimal cues (75-90% accuracy)  -RS --     Time Frame (Pharyngeal Strengthening Goal 1, SLP) 1 week  -RS --     Progress/Outcomes (Pharyngeal Strengthening Goal 1, SLP) new goal  -RS --        Patient will demonstrate functional speech skills for return to discharge environment    [place] with moderate cues  -RS with moderate cues  -CJ     Time frame by discharge  -RS by discharge  -CJ     Progress/Outcomes goal ongoing  -RS new goal  -CJ        Patient will demonstrate functional cognitive-linguistic skills for return to discharge environment    [place] with moderate cues  -RS with moderate cues  -CJ     Time frame by discharge  -RS by discharge  -CJ     Progress/Outcomes goal ongoing  -RS new goal  -CJ        SLP Diagnostic Treatment     Patient will participate in further assessment in the following areas reading comprehension;graphic expression  -RS reading comprehension;graphic expression  -CJ     Time Frame (Diagnostic) short term goal (STG)  -RS short term goal (STG)  -CJ     Progress/Outcomes (Additional Goal 1, SLP) goal ongoing  -RS new goal  -CJ        Comprehend Questions Goal 1 (SLP)    Improve Ability to Comprehend Questions Goal 1 (SLP) complex yes/no questions;80%;with moderate cues (50-74%)  -RS complex yes/no questions;80%;with moderate cues (50-74%)  -CJ     Time Frame (Comprehend Questions Goal 1, SLP) 1 week  -RS 1  week  -CJ     Progress/Outcomes (Comprehend Questions Goal 1, SLP) goal ongoing  -RS new goal  -CJ        Follow Directions Goal 2 (SLP)    Improve Ability to Follow Directions Goal 1 (SLP) 2 step commands;3 step commands;80%;with moderate cues (50-74%)  -RS 2 step commands;3 step commands;80%;with moderate cues (50-74%)  -CJ     Time Frame (Follow Directions Goal 1, SLP) 1 week  -RS 1 week  -CJ     Progress/Outcomes (Follow Directions Goal 1, SLP) goal ongoing  -RS new goal  -CJ        Phonation Goal 1 (SLP)    Improve Phonation By Goal 1 (SLP) using loud speech;90%;with moderate cues (50-74%)  -RS using loud speech;90%;with moderate cues (50-74%)  -CJ     Time Frame (Phonation Goal 1, SLP) 1 week  -RS 1 week  -CJ     Progress/Outcomes (Phonation Goal 1, SLP) goal ongoing  -RS new goal  -CJ        Articulation Goal 1 (SLP)    Improve Articulation Goal 1 (SLP) by over-articulating at word level;by over-articulating at phrase level;90%;with minimal cues (75-90%)  -RS by over-articulating at word level;by over-articulating at phrase level;90%;with minimal cues (75-90%)  -CJ     Time Frame (Articulation Goal 1, SLP) 1 week  -RS 1 week  -CJ     Progress/Outcomes (Articulation Goal 1, SLP) goal ongoing  -RS new goal  -        Attention Goal 1 (SLP)    Improve Attention by Goal 1 (SLP) looking at speaker;attending to task;90%;with minimal cues (75-90%)  -RS looking at speaker;attending to task;90%;with minimal cues (75-90%)  -CJ     Time Frame (Attention Goal 1, SLP) 1 week  -RS 1 week  -CJ     Progress/Outcomes (Attention Goal 1, SLP) goal ongoing  -RS new goal  -        Orientation Goal 1 (SLP)    Improve Orientation Through Goal 1 (SLP) demonstrating orientation to day;demonstrating orientation to month;demonstrating orientation to year;demonstrating orientation to disease/impairment;90%;with minimal cues (75-90%)  -RS demonstrating orientation to day;demonstrating orientation to month;demonstrating orientation to  year;demonstrating orientation to disease/impairment;90%;with minimal cues (75-90%)  -CJ     Time Frame (Orientation Goal 1, SLP) 1 week  -RS 1 week  -CJ     Progress/Outcomes (Orientation Goal 1, SLP) goal ongoing  -RS new goal  -CJ        Memory Skills Goal 1 (SLP)    Improve Memory Skills Through Goal 1 (SLP) recalling related word lists immediately;recalling unrelated word lists immediately;80%;with minimal cues (75-90%)  -RS recalling related word lists immediately;recalling unrelated word lists immediately;80%;with minimal cues (75-90%)  -CJ     Time Frame (Memory Skills Goal 1, SLP) 1 week  -RS 1 week  -CJ     Progress/Outcomes (Memory Skills Goal 1, SLP) goal ongoing  -RS new goal  -CJ        Pragmatic Skills Goal 1 (SLP)    Improve Pragmatic Skills Goal 1 (SLP) maintain eye contact;90%;with minimal cues (75-90%)  -RS maintain eye contact;90%;with minimal cues (75-90%)  -CJ     Time Frame (Pragmatic Skills Goal 1, SLP) 1 week  -RS 1 week  -CJ     Progress/Outcomes (Pragmatic Skills Goal 1, SLP) goal ongoing  -RS new goal  -CJ               User Key  (r) = Recorded By, (t) = Taken By, (c) = Cosigned By      Initials Name Provider Type    Alva Betancourt MS CCC-SLP Speech and Language Pathologist    Júnior Land MS CCC-SLP Speech and Language Pathologist                         Time Calculation:    Time Calculation- SLP       Row Name 08/22/24 1047             Time Calculation- SLP    SLP Start Time 0930  -RS      SLP Received On 08/22/24  -RS         Untimed Charges    62924-NM Fiberoptic Endo Eval Swallow Minutes 85  -RS         Total Minutes    Untimed Charges Total Minutes 85  -RS       Total Minutes 85  -RS                User Key  (r) = Recorded By, (t) = Taken By, (c) = Cosigned By      Initials Name Provider Type    Júnior Land MS CCC-SLP Speech and Language Pathologist                    Therapy Charges for Today       Code Description Service Date Service Provider Modifiers Qty     [id number] [facility] FIBEROPTIC ENDO EVAL SWALL 6 8/22/2024 Júnior Bravo MS CCC-SLP GN 1                 MS SHANTELLE Nesbitt  8/22/2024

## 2024-08-22 NOTE — PLAN OF CARE
Goal Outcome Evaluation:  Plan of Care Reviewed With: patient, son        Progress: no change  Outcome Evaluation: Pt presents to OT evaluation w/ deficits in functional activity tolerance, weakness ( L>R), coordination, and self-care. Pt treated as NWB through LLE d/t fx until further clarity. Pt would benefit from IPOT services to address deficits in order to progress towards PLOF. d/c rec is IPR.      Anticipated Discharge Disposition (OT): inpatient rehabilitation facility

## 2024-08-22 NOTE — CONSULTS
Clinical Nutrition     Patient Name: Dalila OCASIO  YOB: 1970  MRN: [id number]  Date of Encounter: 08/22/24 14:14 EDT  Admission date: 8/21/2024  Reason for Visit: Consult, EN, Difficulty chewing/swallowing    Assessment   Nutrition Assessment   Admission Diagnosis:  Acute CVA (cerebrovascular accident) [I63.9]    Problem List:    Acute CVA (cerebrovascular accident)    Alcohol use    Tobacco use    Obesity (BMI 30-39.9)    HTN (hypertension)    Dyslipidemia    History of seizures    PAD (peripheral artery disease)      PMH:   She  has a past medical history of Acute CVA (cerebrovascular accident) (08/21/2024), Alcohol use (08/21/2024), Dyslipidemia (08/21/2024), Obesity (BMI 30-39.9) (08/21/2024), PAD (peripheral artery disease) (08/21/2024), and Tobacco use (08/21/2024).    PSH:  She  has no past surgical history on file.    Applicable Nutrition History:   (8/22) CVA, thrombectomy with stent placement  (8/22) FEES - NPO - mild, oral dysphagia, severe, pharyngeal dysphagia     Labs    Labs Reviewed: Yes    Results from last 7 days   Lab Units 08/22/24  1055 08/22/24  0341 08/21/24  1208 08/21/24  1206   GLUCOSE mg/dL 134* 137*  --  138*   BUN mg/dL 19 18  --  16   CREATININE mg/dL 0.62 0.56* 0.60 0.64   SODIUM mmol/L 139 140  --  140   CHLORIDE mmol/L 107 108*  --  104   POTASSIUM mmol/L 4.2 4.1  --  4.1   PHOSPHORUS mg/dL  --  3.0  --   --    MAGNESIUM mg/dL  --  2.0  --   --    ALT (SGPT) U/L  --   --   --  20  19       Results from last 7 days   Lab Units 08/22/24  0341 08/21/24  1206   ALBUMIN g/dL  --  4.0   CHOLESTEROL mg/dL 187  --    TRIGLYCERIDES mg/dL 83  --        Results from last 7 days   Lab Units 08/22/24  1207 08/22/24  0534 08/21/24  2337 08/21/24  1208   GLUCOSE mg/dL 124 178* 160* 133*     Lab Results   Lab Value Date/Time    HGBA1C 5.50 08/22/2024 0341         Results from last 7 days   Lab Units 08/22/24  1055   PROBNP pg/mL 269.4       Medications   "  Medications Reviewed: yes    Scheduled Meds:aspirin, 81 mg, Oral, Daily   Or  aspirin, 300 mg, Rectal, Daily  atorvastatin, 80 mg, Oral, Nightly  folic acid, 1 mg, Oral, Daily  insulin regular, 2-9 Units, Subcutaneous, Q6H  nicotine, 1 patch, Transdermal, Q24H  sodium chloride, 10 mL, Intravenous, Q12H  sodium chloride, 10 mL, Intravenous, Q12H  thiamine (B-1) IV, 500 mg, Intravenous, Daily  ticagrelor, 60 mg, Oral, BID      Continuous Infusions:3% sodium chloride infusion, 25 mL/hr, Last Rate: 25 mL/hr (08/22/24 1206)  niCARdipine, 5-15 mg/hr, Last Rate: Stopped (08/21/24 1802)      PRN Meds:.  acetaminophen    acetaminophen    acetaminophen    dextrose    dextrose    glucagon (human recombinant)    midazolam    nitroglycerin    sodium chloride    sodium chloride    sodium chloride    Intake/Ouptut 24 hrs (0701 - 0700)   I&O's Reviewed: yes  Intake & Output (last day)         08/21 0701 08/22 0700 08/22 0701 08/23 0700    I.V. (mL/kg) 366.6 (3.4)     Total Intake(mL/kg) 366.6 (3.4)     Urine (mL/kg/hr) 1695 550 (0.7)    Stool  0    Total Output 1695 550    Net -1328.4 -550          Stool Unmeasured Occurrence  1 x            Anthropometrics     Height: Height: 172.7 cm (68\")  Last Filed Weight: Weight: 108 kg (237 lb) (08/22/24 1148)  Method: Weight Method: Stated  BMI: BMI (Calculated): 36    UBW:    Weight      Weight (kg) Weight (lbs) Weight Method   4/21/2023 127.733 kg  281 lb 9.6 oz  Standing scale    4/8/2024 128 kg  282 lb 3 oz  Stated    8/21/2024 107.502 kg  237 lb  Stated    8/22/2024 107.502 kg  237 lb         Weight change:  no verifiable significant changes per limited data in EMR, will verify with pt as able    Nutrition Focused Physical Exam     Date: 8/22    Unable to perform due to Potential for patient discomfort, Pt unable to participate at time of visit     Subjective   Reported/Observed/Food/Nutrition Related History:     Pt presented with CVA and had thrombectomy. Failed FEES today and NG " "placed for EN. High risk for intubation 2/2 AMS. Unable to visit with pt today, will see as feasible 8/23. NKFA listed in EPIC.     Needs Assessment   Date: 8/22    Height used:Height: 172.7 cm (68\")  Weights used: 108 kg (ABW)    Estimated Calorie needs: ~1800 Kcal/day  Method:  Kcals/KG 16-20 = 1388-1122  Method:  MSJ = 1733  Method:   HBD =  1754    Estimated Protein needs: ~130 g PRO/day  Method: 1.2 g/Kg = 130  Method: 2 g/Kg = 129    Estimated Fluid needs: ~ ml/day   To maintain sodium goal 145-155    Current Nutrition Prescription     PO: NPO Diet NPO Type: Strict NPO  Oral Nutrition Supplement:   Intake: N/A    Assessment & Plan   Nutrition Diagnosis   Date: 8/22 Updated:   Problem Inadequate oral intake    Etiology AMS, 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   Signs/Symptoms NPO per SLP/FEES   Status: New    Date: 8/22 Updated:   Problem Increased nutrient needs   Etiology Altered fat soluble vitamin absorption    Signs/Symptoms Med hx alcohol dependence disorder   Status: New    Goal:   Nutrition to support treatment, N/A, and Initiate EN, Establish EN tolerance    Nutrition Intervention      Follow treatment progress, Care plan reviewed, Nutrition support order placed    RD recommends the following for Nutrition Support:  Initiate continuous EN regimen of Peptamen AF at 25 ml/hr and advance by 10 ml/hr Q 12 hr as tolerated to goal rate of 75 ml/hr, water flushes 15 Q 4 hr.     Recommend post pyloric tube as pt with high aspiration risk.    Will adjust water flushes as indicated per sodium goals.     Pt possibly at risk refeeding syndrome 2/2 hx alcohol dependence.     Goal regimen:    EN: Peptamen AF  Goal Rate: 75 ml/hr  Water Flushes: 15 ml Q 6 hr  Modular: Prosource -no carb 1/day  Route: pending  Tube: Pending    At goal over: 20Hrs/day     Rx will supply:   Goal Volume 1500 mL/day     Flush Volume 90 mL/day     Energy 1860 Kcal/day 103 % Est Need   Protein 129 g/day 99 % Est Need   Fiber 6 g/day  (soluble)   Water " in  EN 1215 mL     Total Water 1305 mL     Meet DRI Yes        Monitoring/Evaluation:   Per protocol, I&O, Pertinent labs, EN delivery/tolerance, Weight, GI status, Symptoms, POC/GOC, Swallow function, Hemodynamic stability    Ibis Tariq RD, CNSC  Time Spent: 35m
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
## 2024-08-22 NOTE — THERAPY EVALUATION
Patient Name: Dalila OCASIO  : 1970    MRN: [id number]                              Today's Date: 2024       Admit Date: 2024    Visit Dx:     ICD-10-CM ICD-9-CM   1. Acute CVA (cerebrovascular accident)  I63.9 434.91   2. Left-sided neglect  R41.4 781.8   3. Oropharyngeal dysphagia  R13.12 787.22   4. Dysarthria  R47.1 784.51     Patient Active Problem List   Diagnosis    Acute CVA (cerebrovascular accident)    Alcohol use    Tobacco use    Obesity (BMI 30-39.9)    HTN (hypertension)    Dyslipidemia    History of seizures    PAD (peripheral artery disease)     Past Medical History:   Diagnosis Date    Acute CVA (cerebrovascular accident) 2024    Alcohol use 2024    Dyslipidemia 2024    Obesity (BMI 30-39.9) 2024    PAD (peripheral artery disease) 2024    Tobacco use 2024     Past Surgical History:   Procedure Laterality Date    INTERVENTIONAL RADIOLOGY PROCEDURE Bilateral 2024    Procedure: Carotid Cervical Angiogram;  Surgeon: Jamaal Saini MD;  Location: [facility] INVASIVE LOCATION;  Service: Interventional Radiology;  Laterality: Bilateral;      General Information       Row Name 24 1539          OT Time and Intention    Document Type evaluation  -     Mode of Treatment occupational therapy  -       Row Name 24 1539          General Information    Patient Profile Reviewed yes  -KL     Prior Level of Function independent:;all household mobility;community mobility;gait;transfer;bed mobility;ADL's  -KL     Existing Precautions/Restrictions fall;other (see comments)  LLE NWB until further clarification; NG  -     Barriers to Rehab medically complex  -       Row Name 24 1539          Living Environment    People in Home spouse  -       Row Name 24 1539          Home Main Entrance    Number of Stairs, Main Entrance four  -KL     Stair Railings, Main Entrance railing on right side (ascending)  -       Row Name 24  1539          Stairs Within Home, Primary    Number of Stairs, Within Home, Primary none  -       Row Name 08/22/24 1539          Cognition    Orientation Status (Cognition) oriented x 3  -       Row Name 08/22/24 1539          Safety Issues, Functional Mobility    Safety Issues Affecting Function (Mobility) ability to follow commands;unable to maintain weight-bearing restrictions;awareness of need for assistance;impulsivity;insight into deficits/self-awareness;judgment;safety precaution awareness;problem-solving;safety precautions follow-through/compliance;sequencing abilities  -     Impairments Affecting Function (Mobility) balance;coordination;endurance/activity tolerance;range of motion (ROM);strength;sensation/sensory awareness  -               User Key  (r) = Recorded By, (t) = Taken By, (c) = Cosigned By      Initials Name Provider Type    Lakia Trevizo OT Occupational Therapist                     Mobility/ADL's       Row Name 08/22/24 1540          Bed Mobility    Supine-Sit Cross (Bed Mobility) minimum assist (75% patient effort);2 person assist  -     Assistive Device (Bed Mobility) head of bed elevated;draw sheet  -       Row Name 08/22/24 1540          Transfers    Transfers bed-chair transfer;sit-stand transfer  -[facility] Name 08/22/24 1540          Bed-Chair Transfer    Bed-Chair Cross (Transfers) 2 person assist;moderate assist (50% patient effort)  -     Assistive Device (Bed-Chair Transfers) other (see comments)  BUE support  [facility]     Comment, (Bed-Chair Transfer) VC and demonstration to maintain NWB through LLE  -       Row Name 08/22/24 1540          Sit-Stand Transfer    Sit-Stand Cross (Transfers) minimum assist (75% patient effort);2 person assist  -     Assistive Device (Sit-Stand Transfers) other (see comments)  BUE support  -       Row Name 08/22/24 1540          Activities of Daily Living    BADL Assessment/Intervention lower body dressing  -        [facility] Name 08/22/24 1540          Lower Body Dressing Assessment/Training    [place] Level (Lower Body Dressing) don;socks;dependent (less than 25% patient effort)  -     Position (Lower Body Dressing) sitting up in bed  -               User Key  (r) = Recorded By, (t) = Taken By, (c) = Cosigned By      Initials Name Provider Type    Lakia Trevizo OT Occupational Therapist                   Obj/Interventions       Row Name 08/22/24 1541          Sensory Assessment (Somatosensory)    Sensory Assessment (Somatosensory) left UE  -KL     Left UE Sensory Assessment general sensation;impaired  -KL       Row Name 08/22/24 1541          Vision Assessment/Intervention    Vision Assessment Comment pt able to track to all 4 quadrants  -KL       Row Name 08/22/24 1541          Range of Motion Comprehensive    Comment, General Range of Motion RUE WFL; LUE unable to hold against gravity  -KL       Row Name 08/22/24 1541          Strength Comprehensive (MMT)    Comment, General Manual Muscle Testing (MMT) Assessment RUE MMT grossly 4/5; LUE 2-/5; diminished grasp on L  -KL       Row Name 08/22/24 1541          Balance    Static Sitting Balance supervision  -     Dynamic Sitting Balance contact guard  -     Position, Sitting Balance unsupported;sitting edge of bed  -     Static Standing Balance 2-person assist;verbal cues;non-verbal cues (demo/gesture);moderate assist  -     Dynamic Standing Balance 2-person assist;verbal cues;non-verbal cues (demo/gesture);moderate assist  -     Position/Device Used, Standing Balance supported  -     Balance Interventions sitting;standing;sit to stand;supported;static;dynamic;occupation based/functional task  -               User Key  (r) = Recorded By, (t) = Taken By, (c) = Cosigned By      Initials Name Provider Type    Lakia Trevizo OT Occupational Therapist                   Goals/Plan       Row Name 08/22/24 1548          Transfer Goal 1 (OT)     Activity/Assistive Device (Transfer Goal 1, OT) sit-to-stand/stand-to-sit;toilet;commode, bedside without drop arms  -     [place] Level/Cues Needed (Transfer Goal 1, OT) moderate assist (50-74% patient effort)  -     Time Frame (Transfer Goal 1, OT) short term goal (STG);5 days  -KL     Progress/Outcome (Transfer Goal 1, OT) new goal  -       Row Name 08/22/24 1548          Toileting Goal 1 (OT)    Activity/Device (Toileting Goal 1, OT) adjust/manage clothing;perform perineal hygiene  -     [place] Level/Cues Needed (Toileting Goal 1, OT) minimum assist (75% or more patient effort)  -     Time Frame (Toileting Goal 1, OT) long term goal (LTG);10 days  -     Progress/Outcome (Toileting Goal 1, OT) new goal  -       Row Name 08/22/24 1548          ROM Goal 1 (OT)    ROM Goal 1 (OT) Pt will demo understanding of AAROM HEP to assist in functional mobility of LUE.  -     Time Frame (ROM Goal 1, OT) long term goal (LTG);10 days  -KL     Progress/Outcome (ROM Goal 1, OT) new goal  -       Row Name 08/22/24 1545          Therapy Assessment/Plan (OT)    Planned Therapy Interventions (OT) activity tolerance training;adaptive equipment training;functional balance retraining;occupation/activity based interventions;neuromuscular control/coordination retraining;patient/caregiver education/training;ROM/therapeutic exercise;strengthening exercise;transfer/mobility retraining  -               User Key  (r) = Recorded By, (t) = Taken By, (c) = Cosigned By      Initials Name Provider Type    Lakia Trevizo OT Occupational Therapist                   Clinical Impression       Row Name 08/22/24 154          Pain Assessment    Pain Intervention(s) Repositioned;Ambulation/increased activity  -     Additional Documentation Pain Scale: FACES Pre/Post-Treatment (Group)  -       Row Name 08/22/24 6232          Pain Scale: FACES Pre/Post-Treatment    Pain: FACES Scale, Pretreatment 2-->hurts little bit  -      Posttreatment Pain Rating 2-->hurts little bit  -     Pain Location generalized  -     Pain Location - head  -       Row Name 08/22/24 1544          Plan of Care Review    Plan of Care Reviewed With patient;son  -     Progress no change  -     Outcome Evaluation Pt presents to OT evaluation w/ deficits in functional activity tolerance, weakness ( L>R), coordination, and self-care. Pt treated as NWB through LLE d/t fx until further clarity. Pt would benefit from IPOT services to address deficits in order to progress towards PLOF. d/c rec is IPR.  -       Row Name 08/22/24 1543          Therapy Assessment/Plan (OT)    Patient/Family Therapy Goal Statement (OT) Return to PLOF  -     Rehab Potential (OT) good, to achieve stated therapy goals  -     Criteria for Skilled Therapeutic Interventions Met (OT) yes  -     Therapy Frequency (OT) daily  -     Predicted Duration of Therapy Intervention (OT) 10 days  -       Row Name 08/22/24 154          Therapy Plan Review/Discharge Plan (OT)    Anticipated Discharge Disposition (OT) inpatient rehabilitation facility  -[facility] Name 08/22/24 1545          Vital Signs    Post Systolic BP Rehab 142  -     Post Treatment Diastolic BP 93  -     Pretreatment Heart Rate (beats/min) 79  -     Posttreatment Heart Rate (beats/min) 98  -     Pre SpO2 (%) 99  -     O2 Delivery Pre Treatment room air  -     Post SpO2 (%) 95  -     O2 Delivery Post Treatment room air  -     Pre Patient Position Supine  -     Intra Patient Position Standing  -     Post Patient Position Sitting  -[facility] Name 08/22/24 1542          Positioning and Restraints    Pre-Treatment Position in bed  -     Post Treatment Position chair  -     In Chair notified nsg;reclined;sitting;call light within reach;encouraged to call for assist;exit alarm on;with family/caregiver;waffle cushion;LUE elevated;RUE elevated;legs elevated;on mechanical lift sling  -                User Key  (r) = Recorded By, (t) = Taken By, (c) = Cosigned By      Initials Name Provider Type    Lakia Trevizo, MICHELLE Occupational Therapist                   Outcome Measures       Row Name 08/22/24 1549          How much help from another is currently needed...    Putting on and taking off regular lower body clothing? 1  -KL     Bathing (including washing, rinsing, and drying) 2  -KL     Toileting (which includes using toilet bed pan or urinal) 1  -KL     Putting on and taking off regular upper body clothing 2  -KL     Taking care of personal grooming (such as brushing teeth) 2  -KL     Eating meals 2  -KL     AM-PAC 6 Clicks Score (OT) 10  -KL       Row Name 08/22/24 1539 08/22/24 0800       How much help from another person do you currently need...    Turning from your back to your side while in flat bed without using bedrails? 3  -AC 4  -MP    Moving from lying on back to sitting on the side of a flat bed without bedrails? 3  -AC 3  -MP    Moving to and from a bed to a chair (including a wheelchair)? 2  -AC 3  -MP    Standing up from a chair using your arms (e.g., wheelchair, bedside chair)? 2  -AC 2  -MP    Climbing 3-5 steps with a railing? 1  -AC 2  -MP    To walk in hospital room? 2  -AC 2  -MP    AM-PAC 6 Clicks Score (PT) 13  -AC 16  -MP    Highest Level of Mobility Goal 4 --> Transfer to chair/commode  -AC 5 --> Static standing  -MP      Row Name 08/22/24 1549 08/22/24 1539       Modified [place] Scale    Pre-Stroke Modified [place] Scale 6 - Unable to determine (UTD) from the medical record documentation  -KL 6 - Unable to determine (UTD) from the medical record documentation  -AC    Modified [place] Scale 4 - Moderately severe disability.  Unable to walk without assistance, and unable to attend to own bodily needs without assistance.  -KL 4 - Moderately severe disability.  Unable to walk without assistance, and unable to attend to own bodily needs without assistance.  -      Row Name 08/22/24 1542  08/22/24 1539       Functional Assessment    Outcome Measure Options AM-PAC 6 Clicks Daily Activity (OT)  -HOLLY AM-PAC 6 Clicks Basic Mobility (PT);Modified [place]  -AC              User Key  (r) = Recorded By, (t) = Taken By, (c) = Cosigned By      Initials Name Provider Type    Eboni Elkins, RN Registered Nurse    Lakia Trevizo OT Occupational Therapist    Shala Yusuf, PT Physical Therapist                    Occupational Therapy Education       Title: PT OT SLP Therapies (In Progress)       Topic: Occupational Therapy (In Progress)       Point: ADL training (In Progress)       Description:   Instruct learner(s) on proper safety adaptation and remediation techniques during self care or transfers.   Instruct in proper use of assistive devices.                  Learning Progress Summary             Patient Acceptance, E, NR by HOLLY at 8/22/2024 1549   Family Acceptance, E, NR by HOLLY at 8/22/2024 1549                         Point: Home exercise program (Not Started)       Description:   Instruct learner(s) on appropriate technique for monitoring, assisting and/or progressing therapeutic exercises/activities.                  Learner Progress:  Not documented in this visit.              Point: Precautions (In Progress)       Description:   Instruct learner(s) on prescribed precautions during self-care and functional transfers.                  Learning Progress Summary             Patient Acceptance, E, NR by HOLLY at 8/22/2024 1549   Family Acceptance, E, NR by HOLLY at 8/22/2024 1549                         Point: Body mechanics (In Progress)       Description:   Instruct learner(s) on proper positioning and spine alignment during self-care, functional mobility activities and/or exercises.                  Learning Progress Summary             Patient Acceptance, E, NR by HOLLY at 8/22/2024 1549   Family Acceptance, E, NR by HOLLY at 8/22/2024 1549                                         User Key       Initials  Effective Dates Name Provider Type Discipline     02/05/24 -  Lakia Womack OT Occupational Therapist OT                  OT Recommendation and Plan  Planned Therapy Interventions (OT): activity tolerance training, adaptive equipment training, functional balance retraining, occupation/activity based interventions, neuromuscular control/coordination retraining, patient/caregiver education/training, ROM/therapeutic exercise, strengthening exercise, transfer/mobility retraining  Therapy Frequency (OT): daily  Plan of Care Review  Plan of Care Reviewed With: patient, son  Progress: no change  Outcome Evaluation: Pt presents to OT evaluation w/ deficits in functional activity tolerance, weakness ( L>R), coordination, and self-care. Pt treated as NWB through LLE d/t fx until further clarity. Pt would benefit from IPOT services to address deficits in order to progress towards PLOF. d/c rec is IPR.     Time Calculation:   Evaluation Complexity (OT)  Review Occupational Profile/Medical/Therapy History Complexity: expanded/moderate complexity  Assessment, Occupational Performance/Identification of Deficit Complexity: 3-5 performance deficits  Clinical Decision Making Complexity (OT): detailed assessment/moderate complexity  Overall Complexity of Evaluation (OT): moderate complexity     Time Calculation- OT       Row Name 08/22/24 1550             Time Calculation- OT    OT Start Time 1355  -KL      OT Received On 08/22/24  -KL      OT Goal Re-Cert Due Date 09/01/24  -KL         Timed Charges    94141 - OT Therapeutic Activity Minutes 10  -KL         Untimed Charges    OT Eval/Re-eval Minutes 40  -KL         Total Minutes    Timed Charges Total Minutes 10  -KL      Untimed Charges Total Minutes 40  -KL       Total Minutes 50  -KL                User Key  (r) = Recorded By, (t) = Taken By, (c) = Cosigned By      Initials Name Provider Type     Lakia Womack OT Occupational Therapist                  Therapy Charges for Today        Code Description Service Date Service Provider Modifiers Qty    [id number]  OT THERAPEUTIC ACT EA 15 MIN 8/22/2024 Lakia Womack OT GO 1    [id number]  OT EVAL MOD COMPLEXITY 3 8/22/2024 Lakia Womack OT GO 1                 Lakia Womack OT  8/22/2024

## 2024-08-22 NOTE — THERAPY EVALUATION
Patient Name: Dalila OCASIO  : 1970    MRN: [id number]                              Today's Date: 2024       Admit Date: 2024    Visit Dx:     ICD-10-CM ICD-9-CM   1. Acute CVA (cerebrovascular accident)  I63.9 434.91   2. Left-sided neglect  R41.4 781.8   3. Oropharyngeal dysphagia  R13.12 787.22   4. Dysarthria  R47.1 784.51     Patient Active Problem List   Diagnosis    Acute CVA (cerebrovascular accident)    Alcohol use    Tobacco use    Obesity (BMI 30-39.9)    HTN (hypertension)    Dyslipidemia    History of seizures    PAD (peripheral artery disease)     Past Medical History:   Diagnosis Date    Acute CVA (cerebrovascular accident) 2024    Alcohol use 2024    Dyslipidemia 2024    Obesity (BMI 30-39.9) 2024    PAD (peripheral artery disease) 2024    Tobacco use 2024     Past Surgical History:   Procedure Laterality Date    INTERVENTIONAL RADIOLOGY PROCEDURE Bilateral 2024    Procedure: Carotid Cervical Angiogram;  Surgeon: Jamaal Saini MD;  Location:  [facility];  Service: Interventional Radiology;  Laterality: Bilateral;      General Information       Row Name 24 1523          Physical Therapy Time and Intention    Document Type evaluation  -     Mode of Treatment physical therapy  -       Row Name 24 1523          General Information    Patient Profile Reviewed yes  -AC     Prior Level of Function independent:;all household mobility;community mobility;gait;bed mobility;ADL's  -     Existing Precautions/Restrictions fall;other (see comments)  L ankle fracture  -     Barriers to Rehab medically complex  -       Row Name 24 1523          Living Environment    People in Home spouse  -       Row Name 24 1523          Home Main Entrance    Number of Stairs, Main Entrance four  -AC     Stair Railings, Main Entrance railing on left side (ascending)  -       Row Name 24 1523           Cognition    Orientation Status (Cognition) oriented x 3  -AC       Row Name 08/22/24 1523          Safety Issues, Functional Mobility    Safety Issues Affecting Function (Mobility) ability to follow commands;awareness of need for assistance;insight into deficits/self-awareness;judgment;problem-solving;safety precaution awareness;safety precautions follow-through/compliance  -     Impairments Affecting Function (Mobility) balance;coordination;endurance/activity tolerance;range of motion (ROM);strength  -AC               User Key  (r) = Recorded By, (t) = Taken By, (c) = Cosigned By      Initials Name Provider Type    AC Shala Morrison, PT Physical Therapist                   Mobility       Row Name 08/22/24 1527          Bed Mobility    Bed Mobility supine-sit  -AC     Supine-Sit [place] (Bed Mobility) minimum assist (75% patient effort);2 person assist  -     Assistive Device (Bed Mobility) head of bed elevated  -       Row Name 08/22/24 1527          Bed-Chair Transfer    Bed-Chair [place] (Transfers) 2 person assist;moderate assist (50% patient effort)  -AC     Comment, (Bed-Chair Transfer) Pt required max cues to avoid WB through LLE due to ankle fracture  -       Row Name 08/22/24 1527          Sit-Stand Transfer    Sit-Stand [place] (Transfers) minimum assist (75% patient effort);2 person assist  -       Row Name 08/22/24 1527          Gait/Stairs (Locomotion)    Patient was able to Ambulate no, other medical factors prevent ambulation  -     Reason Patient was unable to Ambulate Excessive Weakness  -AC               User Key  (r) = Recorded By, (t) = Taken By, (c) = Cosigned By      Initials Name Provider Type    AC Shala Morrison PT Physical Therapist                   Obj/Interventions       Row Name 08/22/24 1530          Range of Motion Comprehensive    General Range of Motion bilateral lower extremity ROM WNL  -       Row Name 08/22/24 1530          Strength Comprehensive (MMT)     General Manual Muscle Testing (MMT) Assessment lower extremity strength deficits identified  -     Comment, General Manual Muscle Testing (MMT) Assessment BLE's grossly 3+/5  -       Row Name 08/22/24 1530          Balance    Balance Assessment sitting static balance;sitting dynamic balance;sit to stand dynamic balance;standing static balance;standing dynamic balance  -AC     Static Sitting Balance standby assist  -     Dynamic Sitting Balance contact guard  -AC     Position, Sitting Balance supported;sitting edge of bed  -     Sit to Stand Dynamic Balance 2-person assist;minimal assist  -AC     Static Standing Balance minimal assist;2-person assist  -AC     Dynamic Standing Balance moderate assist;2-person assist  -AC     Position/Device Used, Standing Balance supported  -AC     Balance Interventions sitting;standing;sit to stand;static;supported;dynamic  -       Row Name 08/22/24 1530          Sensory Assessment (Somatosensory)    Sensory Assessment (Somatosensory) LE sensation intact  -               User Key  (r) = Recorded By, (t) = Taken By, (c) = Cosigned By      Initials Name Provider Type    AC Shala Morrison, PT Physical Therapist                   Goals/Plan       Row Name 08/22/24 1538          Bed Mobility Goal 1 (PT)    Activity/Assistive Device (Bed Mobility Goal 1, PT) sit to supine/supine to sit  -AC     [place] Level/Cues Needed (Bed Mobility Goal 1, PT) contact guard required  -AC     Time Frame (Bed Mobility Goal 1, PT) short term goal (STG);5 days  -       Row Name 08/22/24 1538          Transfer Goal 1 (PT)    Activity/Assistive Device (Transfer Goal 1, PT) sit-to-stand/stand-to-sit  -AC     [place] Level/Cues Needed (Transfer Goal 1, PT) contact guard required  -AC     Time Frame (Transfer Goal 1, PT) long term goal (LTG);10 days  -       Row Name 08/22/24 1538          Gait Training Goal 1 (PT)    Activity/Assistive Device (Gait Training Goal 1, PT) gait (walking  locomotion);improve balance and speed;decrease fall risk;increase energy conservation;increase endurance/gait distance  -AC     [place] Level (Gait Training Goal 1, PT) minimum assist (75% or more patient effort)  -AC     Distance (Gait Training Goal 1, PT) 25  -AC     Time Frame (Gait Training Goal 1, PT) long term goal (LTG);10 days  -       Row Name 08/22/24 1538          Therapy Assessment/Plan (PT)    Planned Therapy Interventions (PT) balance training;bed mobility training;gait training;neuromuscular re-education;transfer training;ROM (range of motion);postural re-education;patient/family education;strengthening  -AC               User Key  (r) = Recorded By, (t) = Taken By, (c) = Cosigned By      Initials Name Provider Type    AC Shala Morrison, PT Physical Therapist                   Clinical Impression       Row Name 08/22/24 1531          Pain    Pretreatment Pain Rating 0/10 - no pain  -AC     Posttreatment Pain Rating 0/10 - no pain  -AC       Row Name 08/22/24 1531          Plan of Care Review    Plan of Care Reviewed With patient;son  -     Progress no change  -     Outcome Evaluation PT eval complete. Pt presents with generalized weakness, decreased safety awareness, and decreased standing balance this date. Pt was instructed to avoid WB through LLE during transfer however demonstrated difficulty doing so requiring modAx2 to transition to chair.  -       Row Name 08/22/24 1531          Therapy Assessment/Plan (PT)    Rehab Potential (PT) good, to achieve stated therapy goals  -     Criteria for Skilled Interventions Met (PT) yes  -AC     Therapy Frequency (PT) daily  -     Predicted Duration of Therapy Intervention (PT) 10 days  -AC       Row Name 08/22/24 1531          Vital Signs    Post Systolic BP Rehab 142  -AC     Post Treatment Diastolic BP 86  -AC     Pretreatment Heart Rate (beats/min) 79  -AC     Posttreatment Heart Rate (beats/min) 76  -AC     Pre SpO2 (%) 99  -AC     O2  Delivery Pre Treatment room air  -AC     Post SpO2 (%) 100  -AC     O2 Delivery Post Treatment room air  -AC     Pre Patient Position Supine  -AC     Intra Patient Position Standing  -AC     Post Patient Position Sitting  -AC       Row Name 08/22/24 1531          Positioning and Restraints    Pre-Treatment Position in bed  -AC     Post Treatment Position chair  -AC     In Chair notified nsg;reclined;sitting;call light within reach;encouraged to call for assist;exit alarm on;with family/caregiver;waffle cushion;legs elevated;on mechanical lift sling  -AC               User Key  (r) = Recorded By, (t) = Taken By, (c) = Cosigned By      Initials Name Provider Type    AC Shala Morrison, PT Physical Therapist                   Outcome Measures       Row Name 08/22/24 1539 08/22/24 0800       How much help from another person do you currently need...    Turning from your back to your side while in flat bed without using bedrails? 3  -AC 4  -MP    Moving from lying on back to sitting on the side of a flat bed without bedrails? 3  -AC 3  -MP    Moving to and from a bed to a chair (including a wheelchair)? 2  -AC 3  -MP    Standing up from a chair using your arms (e.g., wheelchair, bedside chair)? 2  -AC 2  -MP    Climbing 3-5 steps with a railing? 1  -AC 2  -MP    To walk in hospital room? 2  -AC 2  -MP    AM-PAC 6 Clicks Score (PT) 13  -AC 16  -MP    Highest Level of Mobility Goal 4 --> Transfer to chair/commode  -AC 5 --> Static standing  -MP      Row Name 08/22/24 1539          Modified [place] Scale    Pre-Stroke Modified [place] Scale 6 - Unable to determine (UTD) from the medical record documentation  -AC     Modified [place] Scale 4 - Moderately severe disability.  Unable to walk without assistance, and unable to attend to own bodily needs without assistance.  -AC       Row Name 08/22/24 1539          Functional Assessment    Outcome Measure Options AM-PAC 6 Clicks Basic Mobility (PT);Modified [place]  -AC                User Key  (r) = Recorded By, (t) = Taken By, (c) = Cosigned By      Initials Name Provider Type    MP Eboni Stephens, RN Registered Nurse     Shala Morrison PT Physical Therapist                                 Physical Therapy Education       Title: PT OT SLP Therapies (In Progress)       Topic: Physical Therapy (In Progress)       Point: Mobility training (In Progress)       Learning Progress Summary             Patient Acceptance, E, NR by  at 8/22/2024 1540                         Point: Home exercise program (In Progress)       Learning Progress Summary             Patient Acceptance, E, NR by  at 8/22/2024 1540                         Point: Body mechanics (In Progress)       Learning Progress Summary             Patient Acceptance, E, NR by  at 8/22/2024 1540                         Point: Precautions (In Progress)       Learning Progress Summary             Patient Acceptance, E, NR by  at 8/22/2024 1540                                         User Key       Initials Effective Dates Name Provider Type Discipline     07/11/24 -  Shala Morrison PT Physical Therapist PT                  PT Recommendation and Plan  Planned Therapy Interventions (PT): balance training, bed mobility training, gait training, neuromuscular re-education, transfer training, ROM (range of motion), postural re-education, patient/family education, strengthening  Plan of Care Reviewed With: patient, son  Progress: no change  Outcome Evaluation: PT eval complete. Pt presents with generalized weakness, decreased safety awareness, and decreased standing balance this date. Pt was instructed to avoid WB through LLE during transfer however demonstrated difficulty doing so requiring modAx2 to transition to chair.     Time Calculation:   PT Evaluation Complexity  History, PT Evaluation Complexity: 1-2 personal factors and/or comorbidities  Examination of Body Systems (PT Eval Complexity): total of 3 or more elements  Clinical  Presentation (PT Evaluation Complexity): evolving  Clinical Decision Making (PT Evaluation Complexity): moderate complexity  Overall Complexity (PT Evaluation Complexity): moderate complexity     PT Charges       Row Name 08/22/24 1541             Time Calculation    Start Time 1400  -AC      PT Received On 08/22/24  -AC      PT Goal Re-Cert Due Date 09/01/24  -AC         Time Calculation- PT    Total Timed Code Minutes- PT 20 minute(s)  -AC         Timed Charges    64497 - PT Therapeutic Activity Minutes 20  -AC         Untimed Charges    PT Eval/Re-eval Minutes 34  -AC         Total Minutes    Timed Charges Total Minutes 20  -AC      Untimed Charges Total Minutes 34  -AC       Total Minutes 54  -AC                User Key  (r) = Recorded By, (t) = Taken By, (c) = Cosigned By      Initials Name Provider Type    AC Shala Morrison, PT Physical Therapist                  Therapy Charges for Today       Code Description Service Date Service Provider Modifiers Qty    88826503035 HC PT THERAPEUTIC ACT EA 15 MIN 8/22/2024 Shala Morrison, PT GP 1    33669468589 HC PT EVAL MOD COMPLEXITY 3 8/22/2024 Shala Morrison, PT GP 1            PT G-Codes  Outcome Measure Options: AM-PAC 6 Clicks Basic Mobility (PT), Modified [place]  AM-PAC 6 Clicks Score (PT): 13  Modified [place] Scale: 4 - Moderately severe disability.  Unable to walk without assistance, and unable to attend to own bodily needs without assistance.  PT Discharge Summary  Anticipated Discharge Disposition (PT): inpatient rehabilitation facility    Shala Morrison PT  8/22/2024

## 2024-08-22 NOTE — PLAN OF CARE
Neurointerventional Metrics    Last Known Well:  Unknown, likely 0300    [facility] Arrival (ED/transfer): 1153    Code Stroke Initiated (Inpatient):  n/a    Initial NIHSS:  17    Baseline MRS:  0    IV thrombolytic:  No    CT Head: 1157    CT Perfusion: 1212    Arrival to Cath Lab:  1225    Groin Access: 1255    Initial Thrombectomy/Intervention (stent retriever deployment/aspiration/IA tPA):  1332    Reperfusion:  1336    Final Angiogram:  1346    End of Procedure:  1349    Pre-Procedure TICI flow:  0    Post-Procedure TICI flow:  3    Emboli to [place]:  No

## 2024-08-22 NOTE — PROGRESS NOTES
Intensive Care Follow-up     Hospital:  LOS: 1 day   Ms. Dalila OCASIO, 53 y.o. female is followed for:   Acute CVA (cerebrovascular accident)            History of present illness:   Dalila OCASIO is a 53 y.o. female who presents to [facility] ED 08/21/24 due to stroke concern.      Patient has a PMH of tobacco and ETOH use, H/O narcotic abuse, HTN, T2DM, dyslipidemia, obesity, seizures, and PAD.      Per report, patient awoke at ~0930 with slurred speech, neglect, left-sided hemiparesis, and severe headache, ultimately prompting call to EMS. She was brought to our ED via air evac where upon arrival was noted to be significantly hypertensive (/115 mmHg). Initial NIHSS 17 and CTH showed hyperdense right MCA sign. She was premedicated (documented contrast allergy) prior to subsequent CTA H/N and CTP which further revealed occlusion of the right internal carotid artery from its origin, occlusion of the right M1 MCA segment, with large area of reversible ischemia with core infarct present. She was evaluated by Neurology was deemed not a candidate for thrombolytics (2* outside timing window) but was felt to benefit from endovascular intervention. She was taken to Cath Lab emergently where she underwent right ICA stent placement and mechanical thrombectomy per Dr. Saini. Postprocedure she was admitted to ICU for further management.       Subjective   Interval History:  Patient doing well this morning.  Still has weakness in the left upper extremity but now has strength back in her legs and her right upper extremity.             The patient's past medical, surgical and social history were reviewed and updated in Epic as appropriate.       Objective     Infusions:  niCARdipine, 5-15 mg/hr, Last Rate: Stopped (08/21/24 1802)  sodium chloride, 25 mL/hr      Medications:  aspirin, 81 mg, Oral, Daily   Or  aspirin, 300 mg, Rectal, Daily  atorvastatin, 80 mg, Oral, Nightly  insulin regular, 2-9 Units, Subcutaneous,  Q6H  nicotine, 1 patch, Transdermal, Q24H  sodium chloride, 10 mL, Intravenous, Q12H  ticagrelor, 60 mg, Oral, BID      I reviewed the patient's medications.    Vital Sign Min/Max for last 24 hours  Temp  Min: 96.4 °F (35.8 °C)  Max: 98.9 °F (37.2 °C)   BP  Min: 110/70  Max: 198/115   Pulse  Min: 66  Max: 98   Resp  Min: 14  Max: 20   SpO2  Min: 92 %  Max: 99 %   No data recorded       Input/Output for last 24 hour shift  08/21 0701 - 08/22 0700  In: 366.6 [I.V.:366.6]  Out: 1695 [Urine:1695]   S RR:  [10-14] 14  GENERAL : NAD, conversant  RESPIRATORY/THORAX : normal respiratory effort and no intercostal retractions, CTAB  CARDIOVASCULAR : Normal S1/S2, RRR. no lower ext edema.  GASTROINTESTINAL : Soft, NT/ND. BS x 4 normoactive. No hepatosplenomegaly.  MUSCULOSKELETAL : No cyanosis, clubbing, or ischemia  NEUROLOGICAL: Weakness in the left upper extremity, waxing and waning mental status    Results from last 7 days   Lab Units 08/22/24  0341 08/21/24  1208 08/21/24  1206   WBC 10*3/mm3 14.23*  --  7.49   HEMOGLOBIN g/dL 13.2  --  15.2   HEMOGLOBIN, POC g/dL  --  15.3  --    PLATELETS 10*3/mm3 226  --  215     Results from last 7 days   Lab Units 08/22/24  0341 08/21/24  1208 08/21/24  1206   SODIUM mmol/L 140  --  140   POTASSIUM mmol/L 4.1  --  4.1   CO2 mmol/L 22.0  --  23.0   BUN mg/dL 18  --  16   CREATININE mg/dL 0.56* 0.60 0.64   MAGNESIUM mg/dL 2.0  --   --    PHOSPHORUS mg/dL 3.0  --   --    GLUCOSE mg/dL 137*  --  138*     Estimated Creatinine Clearance: 149.5 mL/min (A) (by C-G formula based on SCr of 0.56 mg/dL (L)).          I reviewed the patient's new clinical results.  I reviewed the patient's new imaging results/reports including actual images and agree with reports.       Imaging Results (Last 24 Hours)       Procedure Component Value Units Date/Time    SLP FEES - Fiberoptic Endo Eval Swallow [[id number]] Resulted: 08/22/24 0926     Updated: 08/22/24 0926    CT Head Without Contrast [[id number]]  Collected: 08/22/24 0448     Updated: 08/22/24 0456    Narrative:      CT HEAD WO CONTRAST    Date of Exam: 8/22/2024 4:40 AM EDT    Indication: Stroke, follow up  f/up MT and GLENROY 8/20, RMCA stroke.    Comparison: None available.    Technique: Axial CT images were obtained of the head without contrast administration.  Automated exposure control and iterative construction methods were used. Dual energy protocol was utilized and a virtual noncontrast head CT was created      Findings:  There is hypoattenuation throughout the cortex of the right MCA distribution involving areas of the right frontal, right temporal and parietal lobes as well as within the posterior insula. There is a tubular focus of hyperdensity in the subinsular region   anteriorly on the right that does not cancel out with dual-energy technique compatible with a small focus of hemorrhage. This appears to be intraparenchymal. There is mild mass effect with some sulcal effacement in the right cerebral hemisphere and   partial effacement of the right lateral ventricle. There is minimal 2 mm leftward shift of midline structures. No additional hypo or hyperattenuating lesions in the brain. Orbits are unremarkable. There are bilateral maxillary sinus mucus retention   cysts. The calvarium appears intact. Mastoids are clear. Superficial soft tissues are unremarkable.      Impression:      Impression:  1.Large right MCA distribution infarct with a small focus of hemorrhage in the subinsular region.  2.Mild mass effect with 2 mm leftward shift of midline structures.        Electronically Signed: Gian Blevins MD    8/22/2024 4:53 AM EDT    Workstation ID: [id number]    MRI Brain Without Contrast [[id number]] Collected: 08/22/24 0439     Updated: 08/22/24 0445    Narrative:      MRI BRAIN WO CONTRAST    Date of Exam: 8/22/2024 3:28 AM EDT    Indication: Stroke, follow up. FUP mechanical thrombectomy and carotid stent; RMCA stroke.     Comparison: CT  head/angiography/perfusion 8/21/2024.    Technique:  Routine multiplanar/multisequence sequence images of the brain were obtained without contrast administration.      Findings:  There is cortical diffusion restriction throughout the majority of the right MCA territory. There is also diffusion restriction in the right caudate and putamen. There is associated T2/FLAIR hyperintense signal. There are several scattered small round   foci of FLAIR hyperintense signal within the cerebral white matter otherwise. No evidence of acute or chronic intracranial hemorrhage. There is susceptibility artifact in the region of the right M1 segment, which could relate to thrombus. Orbits are   unremarkable. There are mucous retention cysts in the left maxillary sinus and a small retention cyst in the right maxillary sinus. Mastoid air cells appear well aerated. Major arterial flow voids appear intact. No mass effect, midline shift or abnormal   extra-axial collection. The midline structures appear intact. Calvarial and superficial soft tissue signal is within normal limits.      Impression:      Impression:  1.Large acute infarct involving the majority of the right MCA territory.  2.No evidence of intracranial hemorrhage.  3.Mild chronic small vessel ischemic change.        Electronically Signed: Gian Blevins MD    8/22/2024 4:42 AM EDT    Workstation ID: [id number]    XR Ankle 3+ View Left [[id number]] Collected: 08/21/24 2055     Updated: 08/21/24 2102    Narrative:      XR ANKLE 3+ VW LEFT    Date of Exam: 8/21/2024 5:12 PM EDT    Indication: R/O fracture, left ankle bruising    Comparison: None available.    Findings:  There is soft tissue swelling about the ankle. There is a nondisplaced horizontal fracture through the lateral malleolus. There is a 4 mm fracture fragment at the tip of the medial malleolus. There is widening of the anterior tibiotalar joint space.   Medial clear space appears within normal limits. There is  calcaneal enthesopathy.      Impression:      Impression:  1.Nondisplaced fracture of the lateral malleolus. 4 mm fracture fragment at the tip of the medial malleolus.  2.Abnormal widening of the anterior tibiotalar joint space.      Electronically Signed: Sanam Braulio    8/21/2024 8:59 PM EDT    Workstation ID: [id number]    XR Chest 1 View [[id number]] Collected: 08/21/24 1736     Updated: 08/21/24 1740    Narrative:      XR CHEST 1 VW    Date of Exam: 8/21/2024 5:11 PM EDT    Indication: Acute Stroke Protocol (onset < 12 hrs)    Comparison: None available.    Findings:  Heart shadow is in the upper range of normal size. Mildly prominent right mediastinal shadow appears to be due to rotation of the patient to the right on this exam. Pulmonary vasculature appears normal. There is mild coarsening interstitial markings   which is likely chronic. No edema, effusion or pneumothorax is seen.      Impression:      Impression:  Rotated chest radiograph. Allowing for this, no evidence of active chest disease..      Electronically Signed: Ozzy Mckeon MD    8/21/2024 5:37 PM EDT    Workstation ID: [id number]    CT Angiogram Head w AI Analysis of LVO [[id number]] Collected: 08/21/24 1232     Updated: 08/21/24 1255    Narrative:      CT ANGIOGRAM HEAD W AI ANALYSIS OF LVO, CT ANGIOGRAM NECK, CT CEREBRAL PERFUSION W WO CONTRAST    Date of Exam: 8/21/2024 12:01 PM EDT    Indication: Neuro Deficit, acute, Stroke suspected  Neuro deficit, acute stroke suspected.    Comparison: None available.    Technique: CTA of the head was performed after the uneventful intravenous administration of 120 mL Isovue-370. Reconstructed coronal and sagittal images were also obtained. In addition, a 3-D volume rendered image was created for interpretation.   Automated exposure control and iterative reconstruction methods were used.    FINDINGS:    Vascular Findings:    There is occlusion of the right internal carotid artery from its origin with  reconstitution of the cavernous/supraclinoid ICA. Additionally, there is occlusion of the M1 segment of the right middle cerebral artery. The right anterior and posterior   cerebral arteries appear patent. The right common carotid artery and right vertebral artery appear patent without significant stenosis.    There is atherosclerotic plaque within the left proximal ICA with estimated 40 to 50% luminal narrowing. The left common carotid, internal carotid, middle cerebral, anterior cerebral, vertebral, and posterior cerebral arteries are patent without abrupt   cut off or aneurysmal dilation. There is fetal origin of the left posterior cerebral artery. Diminutive intracranial portion of the left vertebral artery.    Basilar artery appears patent and appears unremarkable.    Non-vascular Findings:    For description of nonvascular intracranial findings, please refer to the noncontrast head CT performed the same date.    No acute abnormality is identified within the visualized soft tissue or bony structures of the neck.    The visualized lung apices are clear.    FINDINGS:     CT Perfusion:  CBF (<30%) volume: 98 mL  Tmax (>6.0s) volume: 149 mL  Mismatch volume: 51 mL  Mismatch ratio: 1.5      Impression:      1.Occlusion of the right internal carotid artery from its origin with reconstitution of the right cavernous/supraclinoid ICA. There is also occlusion of the M1 segment of the right middle cerebral artery.  2.CT perfusion study demonstrates a large perfusion abnormality involving essentially all of the right MCA territory with core infarct volume of 98 mL, a mismatch volume of 51 mL, and a mismatch ratio of 1.5.  3.Atherosclerotic plaque within the left proximal ICA with estimated 40 to 50% luminal narrowing.    The above findings were discussed with Ilana Poole, stroke care coordinator, via telephone on 8/21/2024 12:33 PM EDT.            Electronically Signed: Ander Rashid MD    8/21/2024 12:52 PM EDT     Workstation ID: [id number]    CT Angiogram Neck [[id number]] Collected: 08/21/24 1232     Updated: 08/21/24 1255    Narrative:      CT ANGIOGRAM HEAD W AI ANALYSIS OF LVO, CT ANGIOGRAM NECK, CT CEREBRAL PERFUSION W WO CONTRAST    Date of Exam: 8/21/2024 12:01 PM EDT    Indication: Neuro Deficit, acute, Stroke suspected  Neuro deficit, acute stroke suspected.    Comparison: None available.    Technique: CTA of the head was performed after the uneventful intravenous administration of 120 mL Isovue-370. Reconstructed coronal and sagittal images were also obtained. In addition, a 3-D volume rendered image was created for interpretation.   Automated exposure control and iterative reconstruction methods were used.    FINDINGS:    Vascular Findings:    There is occlusion of the right internal carotid artery from its origin with reconstitution of the cavernous/supraclinoid ICA. Additionally, there is occlusion of the M1 segment of the right middle cerebral artery. The right anterior and posterior   cerebral arteries appear patent. The right common carotid artery and right vertebral artery appear patent without significant stenosis.    There is atherosclerotic plaque within the left proximal ICA with estimated 40 to 50% luminal narrowing. The left common carotid, internal carotid, middle cerebral, anterior cerebral, vertebral, and posterior cerebral arteries are patent without abrupt   cut off or aneurysmal dilation. There is fetal origin of the left posterior cerebral artery. Diminutive intracranial portion of the left vertebral artery.    Basilar artery appears patent and appears unremarkable.    Non-vascular Findings:    For description of nonvascular intracranial findings, please refer to the noncontrast head CT performed the same date.    No acute abnormality is identified within the visualized soft tissue or bony structures of the neck.    The visualized lung apices are clear.    FINDINGS:     CT Perfusion:  CBF (<30%)  volume: 98 mL  Tmax (>6.0s) volume: 149 mL  Mismatch volume: 51 mL  Mismatch ratio: 1.5      Impression:      1.Occlusion of the right internal carotid artery from its origin with reconstitution of the right cavernous/supraclinoid ICA. There is also occlusion of the M1 segment of the right middle cerebral artery.  2.CT perfusion study demonstrates a large perfusion abnormality involving essentially all of the right MCA territory with core infarct volume of 98 mL, a mismatch volume of 51 mL, and a mismatch ratio of 1.5.  3.Atherosclerotic plaque within the left proximal ICA with estimated 40 to 50% luminal narrowing.    The above findings were discussed with Ilana Poole, stroke care coordinator, via telephone on 8/21/2024 12:33 PM EDT.            Electronically Signed: Ander Rashid MD    8/21/2024 12:52 PM EDT    Workstation ID: [id number]    CT CEREBRAL PERFUSION WITH & WITHOUT CONTRAST [[id number]] Collected: 08/21/24 1232     Updated: 08/21/24 1255    Narrative:      CT ANGIOGRAM HEAD W AI ANALYSIS OF LVO, CT ANGIOGRAM NECK, CT CEREBRAL PERFUSION W WO CONTRAST    Date of Exam: 8/21/2024 12:01 PM EDT    Indication: Neuro Deficit, acute, Stroke suspected  Neuro deficit, acute stroke suspected.    Comparison: None available.    Technique: CTA of the head was performed after the uneventful intravenous administration of 120 mL Isovue-370. Reconstructed coronal and sagittal images were also obtained. In addition, a 3-D volume rendered image was created for interpretation.   Automated exposure control and iterative reconstruction methods were used.    FINDINGS:    Vascular Findings:    There is occlusion of the right internal carotid artery from its origin with reconstitution of the cavernous/supraclinoid ICA. Additionally, there is occlusion of the M1 segment of the right middle cerebral artery. The right anterior and posterior   cerebral arteries appear patent. The right common carotid artery and right vertebral  artery appear patent without significant stenosis.    There is atherosclerotic plaque within the left proximal ICA with estimated 40 to 50% luminal narrowing. The left common carotid, internal carotid, middle cerebral, anterior cerebral, vertebral, and posterior cerebral arteries are patent without abrupt   cut off or aneurysmal dilation. There is fetal origin of the left posterior cerebral artery. Diminutive intracranial portion of the left vertebral artery.    Basilar artery appears patent and appears unremarkable.    Non-vascular Findings:    For description of nonvascular intracranial findings, please refer to the noncontrast head CT performed the same date.    No acute abnormality is identified within the visualized soft tissue or bony structures of the neck.    The visualized lung apices are clear.    FINDINGS:     CT Perfusion:  CBF (<30%) volume: 98 mL  Tmax (>6.0s) volume: 149 mL  Mismatch volume: 51 mL  Mismatch ratio: 1.5      Impression:      1.Occlusion of the right internal carotid artery from its origin with reconstitution of the right cavernous/supraclinoid ICA. There is also occlusion of the M1 segment of the right middle cerebral artery.  2.CT perfusion study demonstrates a large perfusion abnormality involving essentially all of the right MCA territory with core infarct volume of 98 mL, a mismatch volume of 51 mL, and a mismatch ratio of 1.5.  3.Atherosclerotic plaque within the left proximal ICA with estimated 40 to 50% luminal narrowing.    The above findings were discussed with Ilana Poole, stroke care coordinator, via telephone on 8/21/2024 12:33 PM EDT.            Electronically Signed: Ander Rashid MD    8/21/2024 12:52 PM EDT    Workstation ID: [id number]    CT Head Without Contrast Stroke Protocol [[id number]] Collected: 08/21/24 1200     Updated: 08/21/24 1211    Narrative:      CT HEAD WO CONTRAST STROKE PROTOCOL    Date of Exam: 8/21/2024 11:58 AM EDT    Indication: Neuro deficit,  acute, stroke suspected  Neuro Deficit, acute, Stroke suspected.    Comparison: None available.    Technique: Axial CT images were obtained of the head without contrast administration.  Reconstructed coronal images were also obtained. Automated exposure control and iterative construction methods were used.    Scan Time: 11:58 a.m.  Results discussed with emergency team at 12:02 p.m.      Findings:  Parenchyma:No acute intraparenchymal hemorrhage. No loss of gray-white differentiation to suggest large territory infarct. Normal parenchymal volume. No substantial white matter disease. No midline shift or herniation.    Ventricles and extra axial spaces:Normal caliber of ventricles and sulci. No extra axial fluid collection seen.    Other:Orbits are grossly intact. Scattered paranasal sinus mucosal thickening. Mastoid air cells are clear. Calvarium is intact. Intracranial atherosclerotic calcification is present. Hyperdense right middle cerebral artery sign.      Impression:      Impression:  Hyperdense appearance of the right middle cerebral artery concerning for acute thrombus. Recommend further evaluation with CTA.        Electronically Signed: Javier Javed MD    8/21/2024 12:08 PM EDT    Workstation ID: [id number]            Assessment & Plan   Impression        Acute CVA (cerebrovascular accident)    Alcohol use    Tobacco use    Obesity (BMI 30-39.9)    HTN (hypertension)    Dyslipidemia    History of seizures    PAD (peripheral artery disease)       Plan        53-year-old female with a past medical history significant for tobacco abuse, alcohol abuse, history of narcotic abuse, hypertension, type 2 diabetes mellitus, dyslipidemia, obesity, seizures, and peripheral arterial disease.  Who presented [facility] the [facility] ICU on 8/21/2024 with a right MCA CVA.  Patient underwent thrombectomy with stent placement, patient was not a candidate for TNK.     -Stroke management per neurology  -Continue  hypertonic saline per neurology  -Nicardipine for blood pressure control  -Insulin as needed for blood sugar control  -Aspirin/statin/Brilinta  -DuoNebs as needed  -Thiamine and folic acid history of alcohol abuse  -PT/OT/ST  -SCDs for DVT prophylaxis  -A.m. labs  -Remains high risk for intubation due to waxing and waning mental status.  Patient is a high aspiration risk right now.    I conducted multidisciplinary rounds and the plan of care was discussed with the multidisciplinary team at that time. In attendance at multidisciplinary rounds was clinical pharmacist, dietitian, nursing staff, and case management.    I discussed the patient's findings and my recommendations with patient, family, and nursing staff     Critical Care time spent in direct patient care: 35 minutes (excluding procedure time, if applicable) including high complexity decision making to assess, manipulate, and support vital organ system failure in this individual who has impairment of one or more vital organ systems such that there is a high probability of imminent or life threatening deterioration in the patient's condition.      Nilam Trejo, DO  Pulmonary, Critical care and Sleep Medicine

## 2024-08-22 NOTE — CASE MANAGEMENT/SOCIAL WORK
Continued Stay Note  [facility]     Patient Name: Dalila OCASIO  MRN: [id number]  Today's Date: 8/22/2024    Admit Date: 8/21/2024    Plan: TBD   Discharge Plan       Row Name 08/22/24 1431       Plan    Plan TBD    Patient/Family in Agreement with Plan other (see comments)    Plan Comments  attempted to see pt and family 4 times today. Dr. Basurto in room on first attempt. Nurse and staff in room on second attempt. In room procedure on 3rd attempt. 4th attempt pt was off unit and family on the telephone. CM will follow up with pt and family on Friday, 8/22/24.    Final Discharge Disposition Code 30 - still a patient                   Discharge Codes    No documentation.                       Christina Whitt RN

## 2024-08-23 ENCOUNTER — APPOINTMENT (OUTPATIENT)
Dept: CT IMAGING | Facility: HOSPITAL | Age: 54
DRG: 023 | End: 2024-08-23
Payer: MEDICAID

## 2024-08-23 LAB
ANION GAP SERPL CALCULATED.3IONS-SCNC: 10 MMOL/L (ref 5–15)
ANION GAP SERPL CALCULATED.3IONS-SCNC: 9 MMOL/L (ref 5–15)
BASOPHILS # BLD AUTO: 0.02 10*3/MM3 (ref 0–0.2)
BASOPHILS NFR BLD AUTO: 0.2 % (ref 0–1.5)
BUN SERPL-MCNC: 21 MG/DL (ref 6–20)
BUN SERPL-MCNC: 23 MG/DL (ref 6–20)
BUN/CREAT SERPL: 33.9 (ref 7–25)
BUN/CREAT SERPL: 39.7 (ref 7–25)
CALCIUM SPEC-SCNC: 9 MG/DL (ref 8.6–10.5)
CALCIUM SPEC-SCNC: 9.1 MG/DL (ref 8.6–10.5)
CHLORIDE SERPL-SCNC: 110 MMOL/L (ref 98–107)
CHLORIDE SERPL-SCNC: 111 MMOL/L (ref 98–107)
CO2 SERPL-SCNC: 22 MMOL/L (ref 22–29)
CO2 SERPL-SCNC: 23 MMOL/L (ref 22–29)
CREAT SERPL-MCNC: 0.58 MG/DL (ref 0.57–1)
CREAT SERPL-MCNC: 0.62 MG/DL (ref 0.57–1)
CRP SERPL-MCNC: 0.43 MG/DL (ref 0–0.5)
DEPRECATED RDW RBC AUTO: 44.4 FL (ref 37–54)
EGFRCR SERPLBLD CKD-EPI 2021: 106.6 ML/MIN/1.73
EGFRCR SERPLBLD CKD-EPI 2021: 108.4 ML/MIN/1.73
EOSINOPHIL # BLD AUTO: 0.01 10*3/MM3 (ref 0–0.4)
EOSINOPHIL NFR BLD AUTO: 0.1 % (ref 0.3–6.2)
ERYTHROCYTE [DISTWIDTH] IN BLOOD BY AUTOMATED COUNT: 12.9 % (ref 12.3–15.4)
GLUCOSE BLDC GLUCOMTR-MCNC: 125 MG/DL (ref 70–130)
GLUCOSE BLDC GLUCOMTR-MCNC: 127 MG/DL (ref 70–130)
GLUCOSE BLDC GLUCOMTR-MCNC: 129 MG/DL (ref 70–130)
GLUCOSE BLDC GLUCOMTR-MCNC: 133 MG/DL (ref 70–130)
GLUCOSE SERPL-MCNC: 123 MG/DL (ref 65–99)
GLUCOSE SERPL-MCNC: 126 MG/DL (ref 65–99)
HCT VFR BLD AUTO: 40.6 % (ref 34–46.6)
HGB BLD-MCNC: 13.6 G/DL (ref 12–15.9)
IMM GRANULOCYTES # BLD AUTO: 0.02 10*3/MM3 (ref 0–0.05)
IMM GRANULOCYTES NFR BLD AUTO: 0.2 % (ref 0–0.5)
LYMPHOCYTES # BLD AUTO: 1.81 10*3/MM3 (ref 0.7–3.1)
LYMPHOCYTES NFR BLD AUTO: 15.6 % (ref 19.6–45.3)
MAGNESIUM SERPL-MCNC: 2.3 MG/DL (ref 1.6–2.6)
MCH RBC QN AUTO: 31.6 PG (ref 26.6–33)
MCHC RBC AUTO-ENTMCNC: 33.5 G/DL (ref 31.5–35.7)
MCV RBC AUTO: 94.4 FL (ref 79–97)
MONOCYTES # BLD AUTO: 0.55 10*3/MM3 (ref 0.1–0.9)
MONOCYTES NFR BLD AUTO: 4.7 % (ref 5–12)
NEUTROPHILS NFR BLD AUTO: 79.2 % (ref 42.7–76)
NEUTROPHILS NFR BLD AUTO: 9.2 10*3/MM3 (ref 1.7–7)
NRBC BLD AUTO-RTO: 0 /100 WBC (ref 0–0.2)
OSMOLALITY SERPL: 298 MOSM/KG (ref 275–295)
OSMOLALITY SERPL: 303 MOSM/KG (ref 275–295)
OSMOLALITY SERPL: 304 MOSM/KG (ref 275–295)
OSMOLALITY SERPL: 304 MOSM/KG (ref 275–295)
PHOSPHATE SERPL-MCNC: 2.7 MG/DL (ref 2.5–4.5)
PLATELET # BLD AUTO: 219 10*3/MM3 (ref 140–450)
PMV BLD AUTO: 10 FL (ref 6–12)
POTASSIUM SERPL-SCNC: 4 MMOL/L (ref 3.5–5.2)
POTASSIUM SERPL-SCNC: 4.1 MMOL/L (ref 3.5–5.2)
PREALB SERPL-MCNC: 23.7 MG/DL (ref 20–40)
RBC # BLD AUTO: 4.3 10*6/MM3 (ref 3.77–5.28)
SODIUM SERPL-SCNC: 142 MMOL/L (ref 136–145)
SODIUM SERPL-SCNC: 142 MMOL/L (ref 136–145)
SODIUM SERPL-SCNC: 143 MMOL/L (ref 136–145)
SODIUM SERPL-SCNC: 145 MMOL/L (ref 136–145)
SODIUM SERPL-SCNC: 146 MMOL/L (ref 136–145)
WBC NRBC COR # BLD AUTO: 11.61 10*3/MM3 (ref 3.4–10.8)

## 2024-08-23 PROCEDURE — 25010000002 THIAMINE PER 100 MG: Performed by: INTERNAL MEDICINE

## 2024-08-23 PROCEDURE — 73700 CT LOWER EXTREMITY W/O DYE: CPT

## 2024-08-23 PROCEDURE — 80048 BASIC METABOLIC PNL TOTAL CA: CPT | Performed by: STUDENT IN AN ORGANIZED HEALTH CARE EDUCATION/TRAINING PROGRAM

## 2024-08-23 PROCEDURE — 97530 THERAPEUTIC ACTIVITIES: CPT

## 2024-08-23 PROCEDURE — 84295 ASSAY OF SERUM SODIUM: CPT | Performed by: STUDENT IN AN ORGANIZED HEALTH CARE EDUCATION/TRAINING PROGRAM

## 2024-08-23 PROCEDURE — 84134 ASSAY OF PREALBUMIN: CPT

## 2024-08-23 PROCEDURE — 25810000003 SODIUM CHLORIDE 0.9 % SOLUTION 250 ML FLEX CONT

## 2024-08-23 PROCEDURE — 83930 ASSAY OF BLOOD OSMOLALITY: CPT | Performed by: STUDENT IN AN ORGANIZED HEALTH CARE EDUCATION/TRAINING PROGRAM

## 2024-08-23 PROCEDURE — 83735 ASSAY OF MAGNESIUM: CPT | Performed by: INTERNAL MEDICINE

## 2024-08-23 PROCEDURE — 25810000003 SODIUM CHLORIDE 0.9 % SOLUTION 500 ML FLEX CONT

## 2024-08-23 PROCEDURE — 86140 C-REACTIVE PROTEIN: CPT

## 2024-08-23 PROCEDURE — 85025 COMPLETE CBC W/AUTO DIFF WBC: CPT | Performed by: INTERNAL MEDICINE

## 2024-08-23 PROCEDURE — 82948 REAGENT STRIP/BLOOD GLUCOSE: CPT

## 2024-08-23 PROCEDURE — 99233 SBSQ HOSP IP/OBS HIGH 50: CPT | Performed by: INTERNAL MEDICINE

## 2024-08-23 PROCEDURE — 99024 POSTOP FOLLOW-UP VISIT: CPT | Performed by: STUDENT IN AN ORGANIZED HEALTH CARE EDUCATION/TRAINING PROGRAM

## 2024-08-23 PROCEDURE — 25010000002 NICARDIPINE 2.5 MG/ML SOLUTION 10 ML VIAL

## 2024-08-23 PROCEDURE — 70450 CT HEAD/BRAIN W/O DYE: CPT

## 2024-08-23 PROCEDURE — 97110 THERAPEUTIC EXERCISES: CPT

## 2024-08-23 PROCEDURE — 84100 ASSAY OF PHOSPHORUS: CPT | Performed by: INTERNAL MEDICINE

## 2024-08-23 PROCEDURE — 99233 SBSQ HOSP IP/OBS HIGH 50: CPT | Performed by: STUDENT IN AN ORGANIZED HEALTH CARE EDUCATION/TRAINING PROGRAM

## 2024-08-23 RX ORDER — HYDROCODONE BITARTRATE AND ACETAMINOPHEN 5; 325 MG/1; MG/1
1 TABLET ORAL EVERY 6 HOURS PRN
Status: DISCONTINUED | OUTPATIENT
Start: 2024-08-23 | End: 2024-08-23

## 2024-08-23 RX ORDER — ACETAMINOPHEN 160 MG/5ML
650 SOLUTION ORAL EVERY 6 HOURS PRN
Status: DISCONTINUED | OUTPATIENT
Start: 2024-08-23 | End: 2024-08-26

## 2024-08-23 RX ORDER — HYDROCODONE BITARTRATE AND ACETAMINOPHEN 5; 325 MG/1; MG/1
1 TABLET ORAL EVERY 6 HOURS PRN
Status: DISPENSED | OUTPATIENT
Start: 2024-08-23 | End: 2024-08-28

## 2024-08-23 RX ORDER — 3% SODIUM CHLORIDE 3 G/100ML
35 INJECTION, SOLUTION INTRAVENOUS CONTINUOUS
Status: DISPENSED | OUTPATIENT
Start: 2024-08-23 | End: 2024-08-24

## 2024-08-23 RX ADMIN — HYDROCODONE BITARTRATE AND ACETAMINOPHEN 1 TABLET: 5; 325 TABLET ORAL at 17:41

## 2024-08-23 RX ADMIN — HYDROCODONE BITARTRATE AND ACETAMINOPHEN 1 TABLET: 5; 325 TABLET ORAL at 11:17

## 2024-08-23 RX ADMIN — FOLIC ACID 1 MG: 1 TABLET ORAL at 09:01

## 2024-08-23 RX ADMIN — ATORVASTATIN CALCIUM 80 MG: 40 TABLET, FILM COATED ORAL at 20:26

## 2024-08-23 RX ADMIN — ASPIRIN 81 MG 81 MG: 81 TABLET ORAL at 09:01

## 2024-08-23 RX ADMIN — ACETAMINOPHEN 650 MG: 650 SOLUTION ORAL at 03:53

## 2024-08-23 RX ADMIN — NICARDIPINE HYDROCHLORIDE 5 MG/HR: 25 INJECTION, SOLUTION INTRAVENOUS at 16:31

## 2024-08-23 RX ADMIN — Medication 10 ML: at 20:26

## 2024-08-23 RX ADMIN — TICAGRELOR 60 MG: 60 TABLET ORAL at 09:01

## 2024-08-23 RX ADMIN — TICAGRELOR 60 MG: 60 TABLET ORAL at 20:26

## 2024-08-23 RX ADMIN — THIAMINE HYDROCHLORIDE 500 MG: 100 INJECTION, SOLUTION INTRAMUSCULAR; INTRAVENOUS at 09:01

## 2024-08-23 RX ADMIN — NICARDIPINE HYDROCHLORIDE 5 MG/HR: 25 INJECTION, SOLUTION INTRAVENOUS at 03:53

## 2024-08-23 RX ADMIN — VASOPRESSIN 30 ML/HR: 20 INJECTION, SOLUTION INTRAVENOUS at 08:13

## 2024-08-23 RX ADMIN — NICOTINE 1 PATCH: 21 PATCH TRANSDERMAL at 16:32

## 2024-08-23 RX ADMIN — NICARDIPINE HYDROCHLORIDE 5 MG/HR: 25 INJECTION, SOLUTION INTRAVENOUS at 11:17

## 2024-08-23 NOTE — PLAN OF CARE
Goal Outcome Evaluation:  Plan of Care Reviewed With: patient, family        Progress: improving  Outcome Evaluation: Pt motivated to return to PLOF and overall demonstrated good effort with therapy. Pt limited by LLE weakness and inability to adhere to LLE NWB precautions, despite cueing and education. Will continue to progress as able. PT rec IRF at d/c.      Anticipated Discharge Disposition (PT): inpatient rehabilitation facility

## 2024-08-23 NOTE — PROGRESS NOTES
"NEUROSURGERY PROGRESS NOTE    Chief Complaint: Stroke    Subjective: Patient doing well.  She is sitting up in bed in good spirits.    Objective    Vital Signs: Blood pressure 144/88, pulse 85, temperature 99 °F (37.2 °C), temperature source Oral, resp. rate 20, height 172.7 cm (68\"), weight 107 kg (235 lb 0.2 oz), SpO2 99%.    Physical Exam  Awake, alert and oriented x 3  Opens eyes spont  Pupils 3 mm reactive bilaterally  Extraocular muscles intact bilaterally  Face symmetric bilaterally  Tongue midline  5/5 in the right upper and right lower extremity.  In the left lower extremity she is diffusely 4-4+.  No significant movement noted in the left upper extremity    Intake/Output:   Intake/Output Summary (Last 24 hours) at 8/23/2024 1155  Last data filed at 8/23/2024 1000  Gross per 24 hour   Intake 2286 ml   Output 3575 ml   Net -1289 ml       Current Medications:   Current Facility-Administered Medications:     3% sodium chloride infusion, 30 mL/hr, Intravenous, Continuous, Malik Nelson APRN, Last Rate: 30 mL/hr at 08/23/24 0813, 30 mL/hr at 08/23/24 0813    acetaminophen (TYLENOL) 160 MG/5ML oral solution 650 mg, 650 mg, Nasogastric, Q6H PRN, Delmar Collins, PharmD, 650 mg at 08/23/24 0353    acetaminophen (TYLENOL) suppository 650 mg, 650 mg, Rectal, Q6H PRN, Sacha Zhang MD, 650 mg at 08/22/24 0032    aspirin chewable tablet 81 mg, 81 mg, Nasogastric, Daily, 81 mg at 08/23/24 0901 **OR** aspirin suppository 300 mg, 300 mg, Rectal, Daily, Ilana Poole APRN    atorvastatin (LIPITOR) tablet 80 mg, 80 mg, Nasogastric, Nightly, Endy Trejo DO, 80 mg at 08/22/24 2032    dextrose (D50W) (25 g/50 mL) IV injection 25 g, 25 g, Intravenous, Q15 Min PRN, Kareen Chamberlain DNP, APRN    folic acid (FOLVITE) tablet 1 mg, 1 mg, Nasogastric, Daily, Endy Trejo DO, 1 mg at 08/23/24 0901    glucagon (GLUCAGEN) injection 1 mg, 1 mg, Intramuscular, Q15 Min PRN, Cintia, " Kareen TAYLOR DNP, APRN    HYDROcodone-acetaminophen (NORCO) 5-325 MG per tablet 1 tablet, 1 tablet, Nasogastric, Q6H PRN, Endy Trejo DO    insulin regular (humuLIN R,novoLIN R) injection 2-9 Units, 2-9 Units, Subcutaneous, Q6H, Kareen Chamberlain DNP, APRN    midazolam (VERSED) injection 2 mg, 2 mg, Intravenous, Once PRN, Neil Allen, APRCLAUDIA    niCARdipine (CARDENE) 25mg in 250mL NS infusion, 5-15 mg/hr, Intravenous, Titrated, Ilana Poole APRN, Last Rate: 50 mL/hr at 08/23/24 1117, 5 mg/hr at 08/23/24 1117    nicotine (NICODERM CQ) 21 MG/24HR patch 1 patch, 1 patch, Transdermal, Q24H, Kareen Chamberlain DNP, APRCLAUDIA, 1 patch at 08/22/24 1616    nitroglycerin (NITROSTAT) SL tablet 0.4 mg, 0.4 mg, Sublingual, Q5 Min PRN, Ilana Poole, APRN    ProSource No Carb oral solution 30 mL, 30 mL, Nasogastric, Daily, Ibis Tariq, TITI    sodium chloride 0.9 % flush 10 mL, 10 mL, Intravenous, Q12H, Endy Trejo DO, 10 mL at 08/22/24 2033    sodium chloride 0.9 % flush 10 mL, 10 mL, Intravenous, PRN, Endy Trejo,     sodium chloride 0.9 % infusion 40 mL, 40 mL, Intravenous, PRN, Ilana Poole, APRN    thiamine (B-1) 500 mg in sodium chloride 0.9 % 100 mL IVPB, 500 mg, Intravenous, Daily, Endy Trejo DO, Last Rate: 200 mL/hr at 08/23/24 0901, 500 mg at 08/23/24 0901    ticagrelor (BRILINTA) tablet 60 mg, 60 mg, Nasogastric, BID, Endy Trejo DO, 60 mg at 08/23/24 0901     Laboratory Results:       Lab 08/23/24  0148 08/22/24  0341 08/21/24  1208 08/21/24  1206   WBC 11.61* 14.23*  --  7.49   HEMOGLOBIN 13.6 13.2  --  15.2   HEMOGLOBIN, POC  --   --  15.3  --    HEMATOCRIT 40.6 39.3  --  45.2   HEMATOCRIT POC  --   --  45  --    PLATELETS 219 226  --  215   NEUTROS ABS 9.20* 13.14*  --  5.14   IMMATURE GRANS (ABS) 0.02 0.07*  --  0.02   LYMPHS ABS 1.81 0.73  --  1.83   MONOS ABS 0.55 0.28  --  0.37   EOS ABS 0.01  0.00  --  0.09   MCV 94.4 94.0  --  95.2   CRP 0.43  --   --   --    APTT  --   --   --  25.1         Lab 08/23/24  0708 08/23/24  0148 08/22/24  2023 08/22/24  1714 08/22/24  1055 08/22/24  0341   SODIUM 142 142 143 144 139 140   POTASSIUM 4.0  --  3.9 4.1 4.2 4.1   CHLORIDE 110*  --  107 108* 107 108*   CO2 23.0  --  24.0 24.0 21.0* 22.0   ANION GAP 9.0  --  12.0 12.0 11.0 10.0   BUN 21*  --  17 19 19 18   CREATININE 0.62  --  0.58 0.65 0.62 0.56*   EGFR 106.6  --  108.4 105.4 106.6 109.3   GLUCOSE 123*  --  119* 116* 134* 137*   CALCIUM 9.0  --  9.0 9.1 9.0 8.7   MAGNESIUM  --  2.3  --   --   --  2.0   PHOSPHORUS  --  2.7  --   --   --  3.0   HEMOGLOBIN A1C  --   --   --   --   --  5.50         Lab 08/21/24  1206   TOTAL PROTEIN 6.6   ALBUMIN 4.0   GLOBULIN 2.6   ALT (SGPT) 20  19   AST (SGOT) 22  21   BILIRUBIN 0.3   ALK PHOS 137*         Lab 08/22/24  1055 08/21/24  1206   PROBNP 269.4  --    HSTROP T  --  <6         Lab 08/22/24  0341   CHOLESTEROL 187   LDL CHOL 107*   HDL CHOL 65*   TRIGLYCERIDES 83             Brief Urine Lab Results       None          Microbiology Results (last 10 days)       ** No results found for the last 240 hours. **             Diagnostic Imaging: I reviewed and independently interpreted the new imaging.     Assessment/Plan:    1. Acute CVA (cerebrovascular accident)    2. Left-sided neglect    3. Oropharyngeal dysphagia    4. Dysarthria         This is a 53-year-old female status post carotid artery stent placement and mechanical thrombectomy for tandem right ICA and M1 occlusions on 8/21.  Postoperative imaging shows sizable right MCA infarct.  Neurologically, the patient is improved with better movement of the left lower extremity.  Her carotid Doppler shows patency of the stent.  She should remain on aspirin and Brilinta.  Her most recent head CT shows evolving infarct but there is no significant swelling, mass effect or shift.  I had a long discussion with the patient and her  son.  I discussed that there is a rare instance in which she could develop malignant cerebral edema which could be life-threatening condition.  The patient states she would like to proceed with a decompressive craniectomy in the event that this were to occur.  Again, I think that this is very unlikely, but I would recommend continued close monitoring in the ICU through to the weekend.  I would also continue to keep her sodium elevated to 145-150 during this time.  Appreciate stroke and ICU assistance.      Any copied data from previous notes included in the (1) History of Present Illness, (2) Physical Examination and (3) Medical Decision Making and/or Assessment and Plan has been reviewed and is accurate as of 08/23/24      Jamaal Saini MD  08/23/24  11:55 EDT

## 2024-08-23 NOTE — CONSULTS
Orthopedic Consult      Patient: Dalila OCASIO    Date of Admission: 8/21/2024 11:53 AM    YOB: 1970    Medical Record Number: [id number]    Attending Physician: Sacha Zhang MD    Consulting Physician: Mai Kimbrough MD      Chief Complaints: Acute CVA (cerebrovascular accident) [I63.9]      History of Present Illness: 53 y.o. female admitted to [facility] with Acute CVA (cerebrovascular accident) [I63.9]. I was consulted for further evaluation and treatment of left ankle pain and swelling. Workup thus far has revealed a minimally displaced bimalleolar fracture and widening of the anterior talotibial joint.    She reports pain on the left ankle that is worse with attempted weight bearing. Denies numbness or tingling. Reports some weakness on the left ankle, but says this feels as though it is related to her stroke.       Allergies   Allergen Reactions    Erythromycin Anaphylaxis    Toradol [Ketorolac Tromethamine] Rash    Contrast Dye (Echo Or Unknown Ct/Mr) Unknown (See Comments)     Hives on chest        Home Medications:  Medications Prior to Admission   Medication Sig Dispense Refill Last Dose    hydroCHLOROthiazide 25 MG tablet Take 1 tablet by mouth Daily.   8/20/2024 at 0900    HYDROcodone-acetaminophen (NORCO) 5-325 MG per tablet Take 1 tablet by mouth Every 6 (Six) Hours As Needed for Severe Pain. 10 tablet 0 8/20/2024 at 2200    lisinopril (PRINIVIL,ZESTRIL) 20 MG tablet Take 1 tablet by mouth Daily.   8/20/2024 at 0800         Past Medical History:   Diagnosis Date    Acute CVA (cerebrovascular accident) 08/21/2024    Alcohol use 08/21/2024    Dyslipidemia 08/21/2024    Obesity (BMI 30-39.9) 08/21/2024    PAD (peripheral artery disease) 08/21/2024    Tobacco use 08/21/2024        Past Surgical History:   Procedure Laterality Date    INTERVENTIONAL RADIOLOGY PROCEDURE Bilateral 8/21/2024    Procedure: Carotid Cervical Angiogram;  Surgeon: Yeny  MD Jamaal;  Location: [facility] INVASIVE LOC[facility];  Service: Interventional Radiology;  Laterality: Bilateral;        Social History     Occupational History    Not on file   Tobacco Use    Smoking status: Every Day     Current packs/day: 2.00     Average packs/day: 2.0 packs/day for 38.0 years (76.0 ttl pk-yrs)     Types: Cigarettes    Smokeless tobacco: Never   Vaping Use    Vaping status: Never Used   Substance and Sexual Activity    Alcohol use: Not Currently    Drug use: Not Currently    Sexual activity: Not Currently      Social History     Social History Narrative    Not on file      History reviewed. No pertinent family history.      Review of Systems:   HEENT: Patient denies any headaches, vision changes, change in hearing, or tinnitus, Patient denies any rhinorrhea,epistaxis, sinus pain, mouth or dental problems, sore throat or hoarseness, or dysphagia  Pulmonary: Patient denies any cough, congestion, SOA, or wheezing  Cardiovascular: Patient denies any chest pain, dyspnea, palpitations, weakness, intolerance of exercise, varicosities, swelling of extremities, known murmur  Gastrointestinal:  Patient denies nausea, vomiting, diarrhea, constipation, loss  of appetite, change in appetite, dysphagia, gas, heartburn, melena, change in bowel habits, use of laxatives or other drugs to alter the function of the gastrointestinal tract.  Genital/Urinary: Patient denies dysuria, change in color of urine, change in frequency of urination, pain with urgency, incontinence, retention, or nocturia.  Musculoskeletal: Patient denies increased warmth; redness; or swelling of joints; limitation of function; deformity; crepitation: pain in a joint or an extremity, the neck, or the back, especially with movement.  Neurological: Patient denies dizziness, tremor, ataxia, difficulty in speaking, change in speech, paresthesia, loss of sensation, seizures, syncope, changes in memory.  Endocrine system: Patient denies tremors,  palpitations, intolerance of heat or cold, polyuria, polydipsia, polyphagia, diaphoresis, exophthalmos, or goiter.  Psychological: Patient denies thoughts/plans of harming self or others; depression,  insomnia, night terrors, shahbaz, memory loss, disorientation.  Skin: Patient denies any bruising, rashes, discoloration, pruritus, wounds, ulcers, decubiti, changes in the hair or nails  Hematopoietic: Patient denies history of spontaneous or excessive bleeding, epistaxis, hematuria, melena, fatigue, enlarged or tender lymph nodes, pallor, history of anemia.    Physical Exam: 53 y.o. female  General Appearance:    Alert, cooperative, in no acute distress                   Vitals:    08/23/24 1300 08/23/24 1400 08/23/24 1500 08/23/24 1600   BP: 125/74 115/75 127/81 133/80   BP Location:  Left arm  Left arm   Patient Position:  Lying  Lying   Pulse: 74 75 72 69   Resp:  16  16   Temp:  99.5 °F (37.5 °C)  99.1 °F (37.3 °C)   TempSrc:    Oral   SpO2: 94% 94% 96% 97%   Weight:       Height:            Head:    Normocephalic, without obvious abnormality, atraumatic      Right Upper Extremity:  No obvious deformity, painless ROM shoulder, elbow, wrist, no joint instability, normal distal strength and sensation to light touch, skin intact without cyanosis, clubbing, edema; +2 radial pulse  Left Upper Extremity:  No obvious deformity, painless ROM shoulder, elbow, wrist, no joint instability, normal distal strength and sensation to light touch, skin intact without cyanosis, clubbing, edema; +2 radial pulse  Right Lower Extremity:  No obvious deformity, painless ROM hip, knee, ankle, compartments soft, normal distal strength and sensation to light touch, skin intact without cyanosis, clubbing, edema; +2 dorsalis pedis pulse  Left Lower Extremity:  No obvious deformity, painless ROM hip, knee, painful with active and passive ROM of ankle, but joint is not irritable. Able to actively dorsiflex and plantarflex ankle. compartments  soft, normal distal strength and sensation to light touch, skin intact without cyanosis, clubbing, edema; +2 dorsalis pedis pulse         Diagnostic Tests:    I have reviewed the labs, radiology results and diagnostic studies:    Minimally displaced left lateral malleolar fracture and small avulsion fracture of medial malleolus, anterior tibiotalar joint gapping    Results from last 7 days   Lab Units 08/23/24  0148   WBC 10*3/mm3 11.61*   HEMOGLOBIN g/dL 13.6   PLATELETS 10*3/mm3 219     Results from last 7 days   Lab Units 08/23/24  1219   SODIUM mmol/L 143  145   POTASSIUM mmol/L 4.1   CO2 mmol/L 22.0   CREATININE mg/dL 0.58   GLUCOSE mg/dL 126*           Assessment:    Acute CVA (cerebrovascular accident)    Alcohol use    Tobacco use    Obesity (BMI 30-39.9)    HTN (hypertension)    Dyslipidemia    History of seizures    PAD (peripheral artery disease)           Plan:  53 F with left ankle fracture and anterior tibiotalar joint swelling    -no immediate operative intervention anticipated  -CRP normal today  -left ankle should be immobilized in a CAM boot  -NWB LLE for the time being  -will order CT of the ankle for further evaluation  -further recommendations based upon CT review            Mai Kimbrough MD  08/23/24  17:26 EDT

## 2024-08-23 NOTE — THERAPY TREATMENT NOTE
Patient Name: Dalila OCASIO  : 1970    MRN: [id number]                              Today's Date: 2024       Admit Date: 2024    Visit Dx:     ICD-10-CM ICD-9-CM   1. Acute CVA (cerebrovascular accident)  I63.9 434.91   2. Left-sided neglect  R41.4 781.8   3. Oropharyngeal dysphagia  R13.12 787.22   4. Dysarthria  R47.1 784.51     Patient Active Problem List   Diagnosis    Acute CVA (cerebrovascular accident)    Alcohol use    Tobacco use    Obesity (BMI 30-39.9)    HTN (hypertension)    Dyslipidemia    History of seizures    PAD (peripheral artery disease)     Past Medical History:   Diagnosis Date    Acute CVA (cerebrovascular accident) 2024    Alcohol use 2024    Dyslipidemia 2024    Obesity (BMI 30-39.9) 2024    PAD (peripheral artery disease) 2024    Tobacco use 2024     Past Surgical History:   Procedure Laterality Date    INTERVENTIONAL RADIOLOGY PROCEDURE Bilateral 2024    Procedure: Carotid Cervical Angiogram;  Surgeon: Jamaal aSini MD;  Location:  [facility] INVASIVE LOCATION;  Service: Interventional Radiology;  Laterality: Bilateral;      General Information       Row Name 24          Physical Therapy Time and Intention    Document Type therapy note (daily note)  -ES     Mode of Treatment physical therapy  -ES       Row Name 24          General Information    Patient Profile Reviewed yes  -ES     Existing Precautions/Restrictions fall;other (see comments)  LLE NWB until further clarification; NG  -ES     Barriers to Rehab medically complex  -ES       Row Name 2414          Cognition    Orientation Status (Cognition) oriented x 3  -ES       Row Name 24          Safety Issues, Functional Mobility    Safety Issues Affecting Function (Mobility) awareness of need for assistance;insight into deficits/self-awareness;safety precautions follow-through/compliance;safety precaution awareness;unable to maintain  weight-bearing restrictions;sequencing abilities;judgment  -ES     Impairments Affecting Function (Mobility) balance;coordination;endurance/activity tolerance;range of motion (ROM);strength;sensation/sensory awareness  -ES               User Key  (r) = Recorded By, (t) = Taken By, (c) = Cosigned By      Initials Name Provider Type    Karie Savage PT Physical Therapist                   Mobility       Row Name 08/23/24 0915          Bed Mobility    Comment, (Bed Mobility) Received EOB  -ES       Row Name 08/23/24 0915          Bed-Chair Transfer    Bed-Chair [place] (Transfers) 2 person assist;moderate assist (50% patient effort)  -ES     Assistive Device (Bed-Chair Transfers) other (see comments)  BUE support  -ES     Comment, (Bed-Chair Transfer) v/c for adherence to NWB precautions. Pt able to maintain during ~25% of mobility.  -ES       Row Name 08/23/24 0915          Sit-Stand Transfer    Sit-Stand [place] (Transfers) minimum assist (75% patient effort);2 person assist  -ES     Assistive Device (Sit-Stand Transfers) other (see comments)  BUE support  -ES     Comment, (Sit-Stand Transfer) STS x3. Pt impulsively stood x2 without adhering to NWB precautions, pt educated on importance of maintaining NWB on LLE. Performed 10x mini squats on RLE with min-modA x2.  -ES       Row Name 08/23/24 0915          Gait/Stairs (Locomotion)    [place] Level (Gait) unable to assess  -ES     Comment, (Gait/Stairs) Unable to safely assess further mob due to inability to adhere to NWB precautions  -ES               User Key  (r) = Recorded By, (t) = Taken By, (c) = Cosigned By      Initials Name Provider Type    Karie Savage PT Physical Therapist                   Obj/Interventions       Row Name 08/23/24 0917          Motor Skills    Therapeutic Exercise hip  -ES       Row Name 08/23/24 0917          Hip (Therapeutic Exercise)    Hip (Therapeutic Exercise) strengthening exercise  -ES     Hip  Strengthening (Therapeutic Exercise) right;mini squats;10 repetitions  -ES       Row Name 08/23/24 0917          Balance    Balance Assessment sitting static balance;sitting dynamic balance;sit to stand dynamic balance;standing static balance;standing dynamic balance  -ES     Static Sitting Balance standby assist  -ES     Dynamic Sitting Balance contact guard  -ES     Position, Sitting Balance unsupported;sitting in chair;sitting edge of bed  -ES     Sit to Stand Dynamic Balance minimal assist;2-person assist;verbal cues  -ES     Static Standing Balance minimal assist;2-person assist;verbal cues  -ES     Dynamic Standing Balance moderate assist;2-person assist;verbal cues  -ES     Position/Device Used, Standing Balance supported  -ES     Balance Interventions sitting;standing;sit to stand;supported;static;dynamic;occupation based/functional task  -ES               User Key  (r) = Recorded By, (t) = Taken By, (c) = Cosigned By      Initials Name Provider Type    ES Karie Vaca, PT Physical Therapist                   Goals/Plan    No documentation.                  Clinical Impression       Row Name 08/23/24 0919          Pain    Pain Intervention(s) Repositioned;Ambulation/increased activity  -ES     Additional Documentation Pain Scale: FACES Pre/Post-Treatment (Group)  -ES       Row Name 08/23/24 0919          Pain Scale: FACES Pre/Post-Treatment    Pain: FACES Scale, Pretreatment 2-->hurts little bit  -ES     Posttreatment Pain Rating 2-->hurts little bit  -ES     Pain Location - Side/Orientation Left  -ES     Pain Location generalized  -ES     Pain Location - ankle  -ES     Pre/Posttreatment Pain Comment RN aware and managing  -ES       Row Name 08/23/24 0919          Plan of Care Review    Plan of Care Reviewed With patient;family  -ES     Progress improving  -ES     Outcome Evaluation Pt motivated to return to PLOF and overall demonstrated good effort with therapy. Pt limited by LLE weakness and inability  to adhere to LLE NWB precautions, despite cueing and education. Will continue to progress as able. PT rec IRF at d/c.  -ES       Row Name 08/23/24 0919          Therapy Assessment/Plan (PT)    Rehab Potential (PT) good, to achieve stated therapy goals  -ES     Criteria for Skilled Interventions Met (PT) yes;meets criteria;skilled treatment is necessary  -ES     Therapy Frequency (PT) daily  -ES     Predicted Duration of Therapy Intervention (PT) 10 days  -ES       Row Name 08/23/24 0919          Vital Signs    Pre Systolic BP Rehab 136  -ES     Pre Treatment Diastolic BP 84  -ES     Post Systolic BP Rehab 133  -ES     Post Treatment Diastolic BP 88  -ES     Pretreatment Heart Rate (beats/min) 76  -ES     Posttreatment Heart Rate (beats/min) 85  -ES     Pre SpO2 (%) 96  -ES     O2 Delivery Pre Treatment room air  -ES     O2 Delivery Intra Treatment room air  -ES     Post SpO2 (%) 95  -ES     O2 Delivery Post Treatment room air  -ES     Pre Patient Position Sitting  -ES     Intra Patient Position Standing  -ES     Post Patient Position Sitting  -ES       Row Name 08/23/24 0919          Positioning and Restraints    Pre-Treatment Position in bed  -ES     Post Treatment Position chair  -ES     In Chair notified nsg;reclined;sitting;call light within reach;encouraged to call for assist;exit alarm on;waffle cushion;on mechanical lift sling;legs elevated;heels elevated;with family/caregiver  -ES               User Key  (r) = Recorded By, (t) = Taken By, (c) = Cosigned By      Initials Name Provider Type    Karie Savage, PT Physical Therapist                   Outcome Measures       Row Name 08/23/24 0921          How much help from another person do you currently need...    Turning from your back to your side while in flat bed without using bedrails? 3  -ES     Moving from lying on back to sitting on the side of a flat bed without bedrails? 3  -ES     Moving to and from a bed to a chair (including a wheelchair)? 2   -ES     Standing up from a chair using your arms (e.g., wheelchair, bedside chair)? 2  -ES     Climbing 3-5 steps with a railing? 1  -ES     To walk in hospital room? 1  -ES     AM-PAC 6 Clicks Score (PT) 12  -ES     Highest Level of Mobility Goal 4 --> Transfer to chair/commode  -ES       Row Name 08/23/24 0921          Functional Assessment    Outcome Measure Options AM-PAC 6 Clicks Basic Mobility (PT)  -ES               User Key  (r) = Recorded By, (t) = Taken By, (c) = Cosigned By      Initials Name Provider Type     Karie Vaca PT Physical Therapist                                 Physical Therapy Education       Title: PT OT SLP Therapies (In Progress)       Topic: Physical Therapy (In Progress)       Point: Mobility training (In Progress)       Learning Progress Summary             Patient Acceptance, E, NR by  at 8/22/2024 1540                         Point: Home exercise program (In Progress)       Learning Progress Summary             Patient Acceptance, E, NR by  at 8/22/2024 1540                         Point: Body mechanics (In Progress)       Learning Progress Summary             Patient Acceptance, E, NR by  at 8/22/2024 1540                         Point: Precautions (In Progress)       Learning Progress Summary             Patient Acceptance, E, NR by  at 8/22/2024 1540                                         User Key       Initials Effective Dates Name Provider Type Discipline     07/11/24 -  Shala Morrison PT Physical Therapist PT                  PT Recommendation and Plan     Plan of Care Reviewed With: patient, family  Progress: improving  Outcome Evaluation: Pt motivated to return to PLOF and overall demonstrated good effort with therapy. Pt limited by LLE weakness and inability to adhere to LLE NWB precautions, despite cueing and education. Will continue to progress as able. PT rec IRF at d/c.     Time Calculation:         PT Charges       Row Name 08/23/24 0922              Time Calculation    Start Time 0825  -ES      PT Received On 08/23/24  -ES      PT Goal Re-Cert Due Date 09/01/24  -ES         Time Calculation- PT    Total Timed Code Minutes- PT 19 minute(s)  -ES         Timed Charges    12586 - PT Therapeutic Activity Minutes 19  -ES         Total Minutes    Timed Charges Total Minutes 19  -ES       Total Minutes 19  -ES                User Key  (r) = Recorded By, (t) = Taken By, (c) = Cosigned By      Initials Name Provider Type     aKrie Vaca PT Physical Therapist                  Therapy Charges for Today       Code Description Service Date Service Provider Modifiers Qty    [id number]  PT THERAPEUTIC ACT EA 15 MIN 8/23/2024 Karie Vaca, PT GP 1            PT G-Codes  Outcome Measure Options: AM-PAC 6 Clicks Basic Mobility (PT)  AM-PAC 6 Clicks Score (PT): 12  AM-PAC 6 Clicks Score (OT): 10  Modified [place] Scale: 4 - Moderately severe disability.  Unable to walk without assistance, and unable to attend to own bodily needs without assistance.  PT Discharge Summary  Anticipated Discharge Disposition (PT): inpatient rehabilitation facility    Karie Vaca PT  8/23/2024

## 2024-08-23 NOTE — CONSULTS
Clinical Nutrition     Patient Name: Dalila OCASIO  YOB: 1970  MRN: [id number]  Date of Encounter: 08/23/24 14:58 EDT  Admission date: 8/21/2024  Reason for Visit: Follow-up protocol, EN    Assessment   Nutrition Assessment   Admission Diagnosis:  Acute CVA (cerebrovascular accident) [I63.9]    Problem List:    Acute CVA (cerebrovascular accident)    Alcohol use    Tobacco use    Obesity (BMI 30-39.9)    HTN (hypertension)    Dyslipidemia    History of seizures    PAD (peripheral artery disease)      PMH:   She  has a past medical history of Acute CVA (cerebrovascular accident) (08/21/2024), Alcohol use (08/21/2024), Dyslipidemia (08/21/2024), Obesity (BMI 30-39.9) (08/21/2024), PAD (peripheral artery disease) (08/21/2024), and Tobacco use (08/21/2024).    PSH:  She  has a past surgical history that includes Interventional radiology procedure (Bilateral, 8/21/2024).    Applicable Nutrition History:   (8/22) CVA, thrombectomy with stent placement  (8/22) FEES - NPO - mild, oral dysphagia, severe, pharyngeal dysphagia   (8/22) EN started    Labs    Labs Reviewed: Yes    Results from last 7 days   Lab Units 08/23/24  1219 08/23/24  0708 08/23/24  0148 08/22/24  2023 08/22/24  1055 08/22/24  0341 08/21/24  1208 08/21/24  1206   GLUCOSE mg/dL 126* 123*  --  119*   < > 137*  --  138*   BUN mg/dL 23* 21*  --  17   < > 18  --  16   CREATININE mg/dL 0.58 0.62  --  0.58   < > 0.56*   < > 0.64   SODIUM mmol/L 143  145 142 142 143   < > 140  --  140   CHLORIDE mmol/L 111* 110*  --  107   < > 108*  --  104   POTASSIUM mmol/L 4.1 4.0  --  3.9   < > 4.1  --  4.1   PHOSPHORUS mg/dL  --   --  2.7  --   --  3.0  --   --    MAGNESIUM mg/dL  --   --  2.3  --   --  2.0  --   --    ALT (SGPT) U/L  --   --   --   --   --   --   --  20  19    < > = values in this interval not displayed.       Results from last 7 days   Lab Units 08/23/24  0708 08/23/24  0148 08/22/24  0341 08/21/24  1206   ALBUMIN g/dL   "--   --   --  4.0   PREALBUMIN mg/dL 23.7  --   --   --    CRP mg/dL  --  0.43  --   --    CHOLESTEROL mg/dL  --   --  187  --    TRIGLYCERIDES mg/dL  --   --  83  --        Results from last 7 days   Lab Units 08/23/24  1116 08/23/24  0530 08/22/24  2320 08/22/24  1800 08/22/24  1207 08/22/24  0534   GLUCOSE mg/dL 127 125 123 127 124 178*     Lab Results   Lab Value Date/Time    HGBA1C 5.50 08/22/2024 0341         Results from last 7 days   Lab Units 08/22/24  1055   PROBNP pg/mL 269.4       Medications    Medications Reviewed: yes    Scheduled Meds:aspirin, 81 mg, Nasogastric, Daily   Or  aspirin, 300 mg, Rectal, Daily  atorvastatin, 80 mg, Nasogastric, Nightly  folic acid, 1 mg, Nasogastric, Daily  insulin regular, 2-9 Units, Subcutaneous, Q6H  nicotine, 1 patch, Transdermal, Q24H  ProSource No Carb, 30 mL, Nasogastric, Daily  sodium chloride, 10 mL, Intravenous, Q12H  thiamine (B-1) IV, 500 mg, Intravenous, Daily  ticagrelor, 60 mg, Nasogastric, BID      Continuous Infusions:niCARdipine, 5-15 mg/hr, Last Rate: 5 mg/hr (08/23/24 1117)  sodium chloride, 30 mL/hr, Last Rate: 30 mL/hr (08/23/24 1248)      PRN Meds:.  acetaminophen    acetaminophen    dextrose    glucagon (human recombinant)    HYDROcodone-acetaminophen    midazolam    nitroglycerin    sodium chloride    sodium chloride    Intake/Ouptut 24 hrs (0701 - 0700)   I&O's Reviewed: yes  Intake & Output (last day)         08/22 0701 08/23 0700 08/23 0701 08/24 0700    I.V. (mL/kg) 1379 (12.9) 385.5 (3.6)    Other 30 75    NG/ 334    IV Piggyback 100 100    Total Intake(mL/kg) 1797 (16.8) 894.5 (8.4)    Urine (mL/kg/hr) 3425 (1.3) 975 (1.1)    Stool 0     Total Output 3425 975    Net -1628 -80.5          Stool Unmeasured Occurrence 1 x             Anthropometrics     Height: Height: 172.7 cm (68\")  Last Filed Weight: Weight: 107 kg (235 lb 0.2 oz) (08/23/24 0615)  Method: Weight Method: Bed scale  BMI: BMI (Calculated): 35.7    UBW:    Weight      " "Weight (kg) Weight (lbs) Weight Method   4/21/2023 127.733 kg  281 lb 9.6 oz  Standing scale    4/8/2024 128 kg  282 lb 3 oz  Stated    8/21/2024 107.502 kg  237 lb  Stated    8/22/2024 107.502 kg  237 lb         Weight change:  no verifiable significant changes per limited data in EMR, will verify with pt as able    Nutrition Focused Physical Exam     Date: 8/22    Unable to perform due to Potential for patient discomfort, Pt unable to participate at time of visit     Subjective   Reported/Observed/Food/Nutrition Related History:     8/23) Tolerating EN. Visited with pt and spouse at bedside, pt very lethargic but states she is feeling okay denies altered GI fx. Both report pt eating adequately with no significant changes recently but has had intentional weight loss over past year with diet/lifestyle changes. RD explained nutritional care plan for EN until able to improve swallowing and eat adequately, understanding voiced and no further questions at this time.     8/22) Pt presented with CVA and had thrombectomy. Failed FEES today and NG placed for EN. High risk for intubation 2/2 AMS. Unable to visit with pt today, will see as feasible 8/23. NKFA listed in EPIC.     Needs Assessment   Date: 8/22    Height used:Height: 172.7 cm (68\")  Weights used: 108 kg (ABW)    Estimated Calorie needs: ~1800 Kcal/day  Method:  Kcals/KG 16-20 = 4848-5943  Method:  MSJ = 1733  Method:   HBD =  1754    Estimated Protein needs: ~130 g PRO/day  Method: 1.2 g/Kg = 130  Method: 2 g/Kg = 129    Estimated Fluid needs: ~ ml/day   To maintain sodium goal 145-155    Current Nutrition Prescription     PO: NPO Diet NPO Type: Tube Feeding  Oral Nutrition Supplement:   Intake: N/A    Assessment & Plan   Nutrition Diagnosis   Date: 8/22 Updated:   Problem Inadequate oral intake    Etiology AMS, 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   Signs/Symptoms NPO per SLP/FEES   Status: New    Date: 8/22 Updated:   Problem Increased nutrient needs   Etiology Altered fat " soluble vitamin absorption    Signs/Symptoms Med hx alcohol dependence disorder   Status: New    Goal:   Nutrition to support treatment, N/A, and Initiate EN, Establish EN tolerance    Nutrition Intervention      Follow treatment progress, Care plan reviewed, Nutrition support order placed    RD recommends the following for Nutrition Support:  Initiate continuous EN regimen of Peptamen AF at 25 ml/hr and advance by 10 ml/hr Q 12 hr as tolerated to goal rate of 75 ml/hr, water flushes 15 Q 4 hr.     Recommend post pyloric tube as pt with high aspiration risk.    Will adjust water flushes as indicated per sodium goals.     Pt possibly at risk refeeding syndrome 2/2 hx alcohol dependence.     Goal regimen:    EN: Peptamen AF  Goal Rate: 75 ml/hr  Water Flushes: 15 ml Q 6 hr  Modular: Prosource -no carb 1/day  Route: pending  Tube: Pending    At goal over: 20Hrs/day     Rx will supply:   Goal Volume 1500 mL/day     Flush Volume 90 mL/day     Energy 1860 Kcal/day 103 % Est Need   Protein 129 g/day 99 % Est Need   Fiber 6 g/day  (soluble)   Water in  EN 1215 mL     Total Water 1305 mL     Meet DRI Yes        Monitoring/Evaluation:   Per protocol, I&O, Pertinent labs, EN delivery/tolerance, Weight, GI status, Symptoms, POC/GOC, Swallow function, Hemodynamic stability    Ibis Tariq RD, CNSC  Time Spent: 35m

## 2024-08-23 NOTE — THERAPY TREATMENT NOTE
Patient Name: Dalila OCASIO  : 1970    MRN: [id number]                              Today's Date: 2024       Admit Date: 2024    Visit Dx:     ICD-10-CM ICD-9-CM   1. Acute CVA (cerebrovascular accident)  I63.9 434.91   2. Left-sided neglect  R41.4 781.8   3. Oropharyngeal dysphagia  R13.12 787.22   4. Dysarthria  R47.1 784.51     Patient Active Problem List   Diagnosis    Acute CVA (cerebrovascular accident)    Alcohol use    Tobacco use    Obesity (BMI 30-39.9)    HTN (hypertension)    Dyslipidemia    History of seizures    PAD (peripheral artery disease)     Past Medical History:   Diagnosis Date    Acute CVA (cerebrovascular accident) 2024    Alcohol use 2024    Dyslipidemia 2024    Obesity (BMI 30-39.9) 2024    PAD (peripheral artery disease) 2024    Tobacco use 2024     Past Surgical History:   Procedure Laterality Date    INTERVENTIONAL RADIOLOGY PROCEDURE Bilateral 2024    Procedure: Carotid Cervical Angiogram;  Surgeon: Jamaal Saini MD;  Location:  [facility]IVE [facility];  Service: Interventional Radiology;  Laterality: Bilateral;      General Information       Row Name 24 1134          OT Time and Intention    Document Type therapy note (daily note)  -     Mode of Treatment occupational therapy  -       Row Name 24 1134          General Information    Patient Profile Reviewed yes  -KL     Existing Precautions/Restrictions fall;other (see comments)  LLE NWB until further clarification; NG  -     Barriers to Rehab medically complex  -       Row Name 24 1134          Cognition    Orientation Status (Cognition) oriented x 3  -       Row Name 24 1134          Safety Issues, Functional Mobility    Safety Issues Affecting Function (Mobility) awareness of need for assistance;impulsivity;insight into deficits/self-awareness;judgment;safety precaution awareness;safety precautions  follow-through/compliance;sequencing abilities;unable to maintain weight-bearing restrictions  -     Impairments Affecting Function (Mobility) balance;coordination;endurance/activity tolerance;range of motion (ROM);strength;sensation/sensory awareness  -               User Key  (r) = Recorded By, (t) = Taken By, (c) = Cosigned By      Initials Name Provider Type    Lakia Trevizo OT Occupational Therapist                     Mobility/ADL's       Row Name 08/23/24 1134          Bed Mobility    Supine-Sit Chambers (Bed Mobility) minimum assist (75% patient effort);1 person assist;verbal cues;nonverbal cues (demo/gesture)  -     Bed Mobility, Safety Issues decreased use of legs for bridging/pushing  -     Assistive Device (Bed Mobility) head of bed elevated;draw sheet  -       Row Name 08/23/24 1134          Bed-Chair Transfer    Bed-Chair Chambers (Transfers) 2 person assist;moderate assist (50% patient effort)  -     Assistive Device (Bed-Chair Transfers) other (see comments)  BUE support  -       Row Name 08/23/24 1134          Sit-Stand Transfer    Sit-Stand Chambers (Transfers) minimum assist (75% patient effort);2 person assist  -     Assistive Device (Sit-Stand Transfers) other (see comments)  BUE support  -               User Key  (r) = Recorded By, (t) = Taken By, (c) = Cosigned By      Initials Name Provider Type    Lakia Trevizo OT Occupational Therapist                   Obj/Interventions       Row Name 08/23/24 1135          Shoulder (Therapeutic Exercise)    Shoulder (Therapeutic Exercise) AAROM (active assistive range of motion)  -     Shoulder AAROM (Therapeutic Exercise) right assist left;table slides, flexion;sitting;table slides, aBduction  -       Row Name 08/23/24 1135          Elbow/Forearm (Therapeutic Exercise)    Elbow/Forearm (Therapeutic Exercise) AAROM (active assistive range of motion)  -     Elbow/Forearm AAROM (Therapeutic Exercise) right assist  left;flexion;extension;10 repetitions  -       Row Name 08/23/24 1135          Motor Skills    Therapeutic Exercise shoulder;elbow/forearm  -[facility] Name 08/23/24 1135          Balance    Static Sitting Balance standby assist  -     Dynamic Sitting Balance contact guard  -     Position, Sitting Balance unsupported;sitting in chair  -     Static Standing Balance minimal assist;2-person assist;verbal cues;non-verbal cues (demo/gesture)  -     Dynamic Standing Balance moderate assist;2-person assist;verbal cues;non-verbal cues (demo/gesture)  -     Position/Device Used, Standing Balance supported  -     Balance Interventions sitting;standing;sit to stand;supported;static;dynamic;occupation based/functional task  -               User Key  (r) = Recorded By, (t) = Taken By, (c) = Cosigned By      Initials Name Provider Type    Lakia Trevizo OT Occupational Therapist                   Goals/Plan    No documentation.                  Clinical Impression       [facility] Name 08/23/24 1137          Pain Assessment    Pain Intervention(s) Repositioned;Ambulation/increased activity;Cold applied  -[facility] Name 08/23/24 1137          Pain Scale: FACES Pre/Post-Treatment    Pain: FACES Scale, Pretreatment 2-->hurts little bit  -     Posttreatment Pain Rating 2-->hurts little bit  -KL     Pain Location - Side/Orientation Left  -     Pain Location - ankle  -[facility] Name 08/23/24 1137          Plan of Care Review    Plan of Care Reviewed With patient;spouse  -     Progress no change  -     Outcome Evaluation Pt continues to benefit from IPOT services to address deficits in order to progress toward goals. Will continue POC. d/c rec is IPR.  -       Row Name 08/23/24 1137          Therapy Plan Review/Discharge Plan (OT)    Anticipated Discharge Disposition (OT) inpatient rehabilitation facility  -       Row Name 08/23/24 1137          Vital Signs    Pre Systolic BP Rehab 142  -     Pre  Treatment Diastolic BP 89  -KL     Post Systolic BP Rehab 141  -KL     Post Treatment Diastolic BP 81  -KL     Pretreatment Heart Rate (beats/min) 75  -KL     Posttreatment Heart Rate (beats/min) 96  -KL     Pre SpO2 (%) 96  -KL     O2 Delivery Pre Treatment room air  -KL     Post SpO2 (%) 97  -KL     O2 Delivery Post Treatment room air  -KL     Pre Patient Position Supine  -KL     Intra Patient Position Standing  -KL     Post Patient Position Sitting  -KL       Row Name 08/23/24 1137          Positioning and Restraints    Pre-Treatment Position in bed  -KL     Post Treatment Position chair  -KL     In Chair notified nsg;reclined;sitting;call light within reach;encouraged to call for assist;exit alarm on;with family/caregiver;waffle cushion;LUE elevated;RUE elevated;legs elevated;on mechanical lift sling  -KL               User Key  (r) = Recorded By, (t) = Taken By, (c) = Cosigned By      Initials Name Provider Type    Lakia Trevizo OT Occupational Therapist                   Outcome Measures       Row Name 08/23/24 1140          How much help from another is currently needed...    Putting on and taking off regular lower body clothing? 1  -KL     Bathing (including washing, rinsing, and drying) 2  -KL     Toileting (which includes using toilet bed pan or urinal) 1  -KL     Putting on and taking off regular upper body clothing 2  -KL     Taking care of personal grooming (such as brushing teeth) 2  -KL     Eating meals 2  -KL     AM-PAC 6 Clicks Score (OT) 10  -KL       Row Name 08/23/24 0921          How much help from another person do you currently need...    Turning from your back to your side while in flat bed without using bedrails? 3  -ES     Moving from lying on back to sitting on the side of a flat bed without bedrails? 3  -ES     Moving to and from a bed to a chair (including a wheelchair)? 2  -ES     Standing up from a chair using your arms (e.g., wheelchair, bedside chair)? 2  -ES     Climbing 3-5 steps  with a railing? 1  -ES     To walk in hospital room? 1  -ES     AM-PAC 6 Clicks Score (PT) 12  -ES     Highest Level of Mobility Goal 4 --> Transfer to chair/commode  -ES       Row Name 08/23/24 1140 08/23/24 0921       Functional Assessment    Outcome Measure Options AM-PAC 6 Clicks Daily Activity (OT)  -KL AM-PAC 6 Clicks Basic Mobility (PT)  -ES              User Key  (r) = Recorded By, (t) = Taken By, (c) = Cosigned By      Initials Name Provider Type    ES Karie Vaca, PT Physical Therapist    Lakia Trevizo OT Occupational Therapist                    Occupational Therapy Education       Title: PT OT SLP Therapies (In Progress)       Topic: Occupational Therapy (In Progress)       Point: ADL training (In Progress)       Description:   Instruct learner(s) on proper safety adaptation and remediation techniques during self care or transfers.   Instruct in proper use of assistive devices.                  Learning Progress Summary             Patient Acceptance, E, NR by  at 8/22/2024 1549   Family Acceptance, E, NR by  at 8/22/2024 1549                         Point: Home exercise program (In Progress)       Description:   Instruct learner(s) on appropriate technique for monitoring, assisting and/or progressing therapeutic exercises/activities.                  Learning Progress Summary             Patient Acceptance, E, NR by  at 8/23/2024 1140   Significant Other Acceptance, E, NR by  at 8/23/2024 1140                         Point: Precautions (In Progress)       Description:   Instruct learner(s) on prescribed precautions during self-care and functional transfers.                  Learning Progress Summary             Patient Acceptance, E, NR by  at 8/23/2024 1140    Acceptance, E, NR by  at 8/22/2024 1549   Family Acceptance, E, NR by  at 8/22/2024 1549   Significant Other Acceptance, E, NR by  at 8/23/2024 1140                         Point: Body mechanics (In Progress)        Description:   Instruct learner(s) on proper positioning and spine alignment during self-care, functional mobility activities and/or exercises.                  Learning Progress Summary             Patient Acceptance, E, NR by  at 8/23/2024 1140    Acceptance, E, NR by  at 8/22/2024 1549   Family Acceptance, E, NR by  at 8/22/2024 1549   Significant Other Acceptance, E, NR by  at 8/23/2024 1140                                         User Key       Initials Effective Dates Name Provider Type Discipline     02/05/24 -  Lakia Womack OT Occupational Therapist OT                  OT Recommendation and Plan  Planned Therapy Interventions (OT): activity tolerance training, adaptive equipment training, functional balance retraining, occupation/activity based interventions, neuromuscular control/coordination retraining, patient/caregiver education/training, ROM/therapeutic exercise, strengthening exercise, transfer/mobility retraining  Therapy Frequency (OT): daily  Plan of Care Review  Plan of Care Reviewed With: patient, spouse  Progress: no change  Outcome Evaluation: Pt continues to benefit from IPOT services to address deficits in order to progress toward goals. Will continue POC. d/c rec is IPR.     Time Calculation:   Evaluation Complexity (OT)  Review Occupational Profile/Medical/Therapy History Complexity: expanded/moderate complexity  Assessment, Occupational Performance/Identification of Deficit Complexity: 3-5 performance deficits  Clinical Decision Making Complexity (OT): detailed assessment/moderate complexity  Overall Complexity of Evaluation (OT): moderate complexity     Time Calculation- OT       Row Name 08/23/24 1141             Time Calculation- OT    OT Start Time 0815  -KL      OT Received On 08/23/24  -         Timed Charges    05475 - OT Therapeutic Exercise Minutes 15  -KL      59129 - OT Therapeutic Activity Minutes 10  -KL         Total Minutes    Timed Charges Total Minutes 25  -KL        Total Minutes 25  -KL                User Key  (r) = Recorded By, (t) = Taken By, (c) = Cosigned By      Initials Name Provider Type    Lakia Trevizo OT Occupational Therapist                  Therapy Charges for Today       Code Description Service Date Service Provider Modifiers Qty    65612974787 HC OT THERAPEUTIC ACT EA 15 MIN 8/22/2024 Lakia Womack OT GO 1    84525782839 HC OT EVAL MOD COMPLEXITY 3 8/22/2024 Lakia Womack OT GO 1    95709120649 HC OT THER PROC EA 15 MIN 8/23/2024 Lakia Womack OT GO 1    14152741255 HC OT THERAPEUTIC ACT EA 15 MIN 8/23/2024 Lakia Womack OT GO 1                 Lakia Womack OT  8/23/2024

## 2024-08-23 NOTE — PLAN OF CARE
Goal Outcome Evaluation:  Plan of Care Reviewed With: patient, spouse        Progress: no change  Outcome Evaluation: Pt continues to benefit from IPOT services to address deficits in order to progress toward goals. Will continue POC. d/c rec is IPR.      Anticipated Discharge Disposition (OT): inpatient rehabilitation facility

## 2024-08-23 NOTE — PROGRESS NOTES
Intensive Care Follow-up     Hospital:  LOS: 2 days   Ms. Dalila OCASIO, 53 y.o. female is followed for:   Acute CVA (cerebrovascular accident)            History of present illness:   Dalila OCASIO is a 53 y.o. female who presents to [facility] ED 08/21/24 due to stroke concern.      Patient has a PMH of tobacco and ETOH use, H/O narcotic abuse, HTN, T2DM, dyslipidemia, obesity, seizures, and PAD.      Per report, patient awoke at ~0930 with slurred speech, neglect, left-sided hemiparesis, and severe headache, ultimately prompting call to EMS. She was brought to our ED via air evac where upon arrival was noted to be significantly hypertensive (/115 mmHg). Initial NIHSS 17 and CTH showed hyperdense right MCA sign. She was premedicated (documented contrast allergy) prior to subsequent CTA H/N and CTP which further revealed occlusion of the right internal carotid artery from its origin, occlusion of the right M1 MCA segment, with large area of reversible ischemia with core infarct present. She was evaluated by Neurology was deemed not a candidate for thrombolytics (2* outside timing window) but was felt to benefit from endovascular intervention. She was taken to Cath Lab emergently where she underwent right ICA stent placement and mechanical thrombectomy per Dr. Saini. Postprocedure she was admitted to ICU for further management.       Subjective   Interval History:  Patient doing well this morning.  Worked with physical therapy.  Unable to pad speech evaluation as of yet.             The patient's past medical, surgical and social history were reviewed and updated in Epic as appropriate.       Objective     Infusions:  3% sodium chloride infusion, 30 mL/hr, Last Rate: 30 mL/hr (08/23/24 0813)  niCARdipine, 5-15 mg/hr, Last Rate: 2.5 mg/hr (08/23/24 0623)      Medications:  aspirin, 81 mg, Nasogastric, Daily   Or  aspirin, 300 mg, Rectal, Daily  atorvastatin, 80 mg, Nasogastric, Nightly  folic acid, 1 mg, Nasogastric,  Daily  insulin regular, 2-9 Units, Subcutaneous, Q6H  nicotine, 1 patch, Transdermal, Q24H  ProSource No Carb, 30 mL, Nasogastric, Daily  sodium chloride, 10 mL, Intravenous, Q12H  thiamine (B-1) IV, 500 mg, Intravenous, Daily  ticagrelor, 60 mg, Nasogastric, BID      I reviewed the patient's medications.    Vital Sign Min/Max for last 24 hours  Temp  Min: 97.8 °F (36.6 °C)  Max: 100 °F (37.8 °C)   BP  Min: 114/80  Max: 152/85   Pulse  Min: 64  Max: 105   Resp  Min: 16  Max: 18   SpO2  Min: 94 %  Max: 100 %   No data recorded       Input/Output for last 24 hour shift  08/22 0701 - 08/23 0700  In: 1797 [I.V.:1379]  Out: 3425 [Urine:3425]      GENERAL : NAD, conversant  RESPIRATORY/THORAX : normal respiratory effort and no intercostal retractions, CTAB  CARDIOVASCULAR : Normal S1/S2, RRR. no lower ext edema.  GASTROINTESTINAL : Soft, NT/ND. BS x 4 normoactive. No hepatosplenomegaly.  MUSCULOSKELETAL : No cyanosis, clubbing, or ischemia  NEUROLOGICAL: Weakness in the left upper extremity    Results from last 7 days   Lab Units 08/23/24  0148 08/22/24  0341 08/21/24  1208 08/21/24  1206   WBC 10*3/mm3 11.61* 14.23*  --  7.49   HEMOGLOBIN g/dL 13.6 13.2  --  15.2   HEMOGLOBIN, POC g/dL  --   --  15.3  --    PLATELETS 10*3/mm3 219 226  --  215     Results from last 7 days   Lab Units 08/23/24  0708 08/23/24  0148 08/22/24  2023 08/22/24  1714 08/22/24  1055 08/22/24  0341   SODIUM mmol/L 142 142 143 144   < > 140   POTASSIUM mmol/L 4.0  --  3.9 4.1   < > 4.1   CO2 mmol/L 23.0  --  24.0 24.0   < > 22.0   BUN mg/dL 21*  --  17 19   < > 18   CREATININE mg/dL 0.62  --  0.58 0.65   < > 0.56*   MAGNESIUM mg/dL  --  2.3  --   --   --  2.0   PHOSPHORUS mg/dL  --  2.7  --   --   --  3.0   GLUCOSE mg/dL 123*  --  119* 116*   < > 137*    < > = values in this interval not displayed.     Estimated Creatinine Clearance: 134.3 mL/min (by C-G formula based on SCr of 0.62 mg/dL).          I reviewed the patient's new clinical results.  I  reviewed the patient's new imaging results/reports including actual images and agree with reports.       Imaging Results (Last 24 Hours)       Procedure Component Value Units Date/Time    CT Head Without Contrast [[id number]] Collected: 08/23/24 0325     Updated: 08/23/24 0330    Narrative:      CT HEAD WO CONTRAST    Date of Exam: 8/23/2024 2:55 AM EDT    Indication: stroke.    Comparison: 8/22/2024.    Technique: Axial CT images were obtained of the head without contrast administration.  Automated exposure control and iterative construction methods were used.      Findings:  There is progressive hypoattenuation and loss of gray-white differentiation within the right MCA territory involving the right frontal, temporal and parietal cortices. There is a stable tubular focus of hemorrhage in the right anterior subinsular region.   No new hypoattenuating lesions in the brain. No new hemorrhage. There is diminished mass effect with no significant midline shift. Cerebellum is unremarkable. The brainstem and basal ganglia appear normal. Orbits are within normal limits. A left nasal   tube is in place. There are bilateral maxillary sinus mucus retention cysts. Calvarium is intact. Mastoids are clear.      Impression:      Impression:  1.Evolving right MCA territory infarct with progressive loss of gray-white differentiation and diminished mass effect.  2.Stable small focus of hemorrhage in the right anterior subinsular region. No new hemorrhage.        Electronically Signed: Gian Blevins MD    8/23/2024 3:27 AM EDT    Workstation ID: [id number]    CT Head Without Contrast [[id number]] Collected: 08/22/24 1455     Updated: 08/22/24 1506    Narrative:      CT HEAD WO CONTRAST    Date of Exam: 8/22/2024 2:37 PM EDT    Indication: change in neurologic exam post thrombectomy and stent placement on DAPT.    Comparison: Head CT 8/22/2024    Technique: Axial CT images were obtained of the head without contrast administration.   Automated exposure control and iterative construction methods were used.      Findings:    Continued evolution of large right MCA territory infarct. Similar degree of hemorrhage in the right subinsular region with slightly increased hemorrhage tracking along the right MCA fissure, likely redistributed blood products. No convincing new   intracranial hemorrhage. Similar associated mass effect with narrowing of the right lateral ventricle and 2 mm leftward midline shift. No evidence of hydrocephalus.    Scattered areas of periventricular and subcortical white matter hypoattenuation, nonspecific, perhaps from small vessel ischemic/hypertensive changes in a patient of this age.There are intracranial atherosclerotic calcifications.    Mild scattered mucosal thickening in the paranasal sinuses.. Bilateral maxillary sinus mucous retention cyst. Mastoid air cells are essentially clear..Included globes and orbits appear unremarkable by CT.    No acute or aggressive appearing bony or extracranial soft tissue process.      Impression:      Impression:  Evolving large right MCA distribution infarct with similar hemorrhage in the right subinsular region. No convincing new intracranial hemorrhage.    Similar associated mass effect with narrowing of right lateral ventricle and 2 mm leftward midline shift.        Electronically Signed: Jose J Sher    8/22/2024 3:02 PM EDT    Workstation ID: [id number]    XR Abdomen KUB [[id number]] Collected: 08/22/24 1111     Updated: 08/22/24 1115    Narrative:      XR ABDOMEN KUB    Date of Exam: 8/22/2024 10:45 AM EDT    Indication: tube feeding placement    Comparison: None available.    Findings:  Single view. Keofeed catheter is in the stomach with the tip curled in the gastric antrum.      Impression:      Impression:  Keofeed tube tip in the distal stomach.      Electronically Signed: Olga Lidia Garcia MD    8/22/2024 11:12 AM EDT    Workstation ID: [id number]    SLP FEES - Fiberoptic Endo  Eval Swallow [[id number]] Resulted: 08/22/24 1020     Updated: 08/22/24 1020    Narrative:      This procedure was auto-finalized with no dictation required.            Assessment & Plan   Impression        Acute CVA (cerebrovascular accident)    Alcohol use    Tobacco use    Obesity (BMI 30-39.9)    HTN (hypertension)    Dyslipidemia    History of seizures    PAD (peripheral artery disease)       Plan        53-year-old female with a past medical history significant for tobacco abuse, alcohol abuse, history of narcotic abuse, hypertension, type 2 diabetes mellitus, dyslipidemia, obesity, seizures, and peripheral arterial disease.  Who [facility] ICU on 8/21/2024 with a right MCA CVA.  Patient underwent thrombectomy with stent placement, patient was not a candidate for TNK.     -Stroke management per neurology  -Continue hypertonic saline per neurology  -Nicardipine for blood pressure control  -Continue hypertonic saline goal sodium of 145-150  -Insulin as needed for blood sugar control  -Aspirin/statin/Brilinta  -DuoNebs as needed  -Thiamine and folic acid history of alcohol abuse  -PT/OT/ST  -SCDs for DVT prophylaxis  -A.m. labs    I conducted multidisciplinary rounds and the plan of care was discussed with the multidisciplinary team at that time. In attendance at multidisciplinary rounds was clinical pharmacist, dietitian, nursing staff, and case management.    I discussed the patient's findings and my recommendations with patient and nursing staff     High level of risk due to:  drug(s) requiring intensive monitoring for toxicity.      Nilam Trejo, DO  Pulmonary, Critical care and Sleep Medicine

## 2024-08-23 NOTE — CASE MANAGEMENT/SOCIAL WORK
Discharge Planning Assessment  [facility]     Patient Name: Dalila PIKE  MRN: [id number]  Today's Date: 8/23/2024    Admit Date: 8/21/2024    Plan: IDP   Discharge Needs Assessment       Row Name 08/23/24 1034       Living Environment    People in Home spouse    Name(s) of People in Home Abiel Mijares/    Current Living Arrangements home    Primary Care Provided by self    Provides Primary Care For no one    Family Caregiver if Needed spouse    Quality of Family Relationships helpful;involved;supportive    Able to Return to Prior Arrangements no       Transition Planning    Patient/Family Anticipates Transition to inpatient rehabilitation facility    Transportation Anticipated family or friend will provide                   Discharge Plan       Row Name 08/23/24 1035       Plan    Plan IDP    Patient/Family in Agreement with Plan yes    Plan Comments  spoke with Ms. Pike and her spouse, Abiel Mijares, at the bedside. Pt lives with spouse in [facility]. They recently moved to [place], [place]. CM updated address in [facility]. Pt is independent with ADL's, still drives. No DME/HH/OPPT. PCP-Agnes Ewing/[facility]. Confirmed [place] [employer] and prescriptions filled at [facility]. PT/OT recommend inpatient rehab. Pt is agreeable and would like a referral to [facility]. Pt currently has a keofeed and tube feeds. CM spoke with April/[facility], she is following the pt. Pt has orders to telemetry.  will continue to follow.    Final Discharge Disposition Code 62 - inpatient rehab facility                  Continued Care and Services - Admitted Since 8/21/2024       Destination       Service Provider Request Status Selected Services Address Phone Fax Patient Preferred    [facility] Pending - No Request Sent N/A [address] [facility] [id number] [phone] --                     Demographic Summary       Row Name 08/23/24 1034       General Information     Admission Type inpatient    Reason for Consult discharge planning    Preferred Language English       Contact Information    Permission Granted to Share Info With                    Functional Status       Row Name 08/23/24 1034       Functional Status    Usual Activity Tolerance good       Functional Status, IADL    Medications independent    Meal Preparation independent    Housekeeping independent    Laundry independent    Shopping independent                   Psychosocial    No documentation.                  Abuse/Neglect    No documentation.                  Legal    No documentation.                  Substance Abuse    No documentation.                  Patient Forms    No documentation.                     Christina Whitt RN

## 2024-08-23 NOTE — CONSULTS
Diabetes Education    Patient Name:  Dalila OCASIO  YOB: 1970  MRN: [id number]  Admit Date:  8/21/2024        Consult received for diabetes education. Chart reviewed. A1c is 5.5%. Met with patient and family at bedside. Spoke mainly with  and son, patient was sleeping.  shared that she has worked hard to manage her blood sugar by making lifestyle changes and weight loss. She is not taking any medications for diabetes at this time. She is not monitoring blood sugar.  thought she may have low blood sugar because she is shaky at times. He is interested in having a prescription at discharge. We reviewed target blood glucose goals. All questions answered.   Patient does not qualify for the follow up stroke class based on the exclusion criteria of planning discharge to inpatient rehab.       Electronically signed by:  Olivia Ha RN  08/23/24 14:00 EDT

## 2024-08-23 NOTE — PROGRESS NOTES
Stroke Progress Note       Chief Complaint: Left-sided weakness    Subjective    Subjective     Subjective:  The patient is lying down in the bed in NAD.  Son is at the bedside.  The patient stated that she is doing better she has a Dobbhoff tube placed yesterday started feeding through the tube.  Patient stated that she walked with physical therapy and Occupational Therapy earlier this morning.  Denies having any new stroke or strokelike symptoms.  Ask if the CT head from yesterday was stable and I updated her in this regard.  All questions concerns were answered.    No other acute complains at this time    Review of Systems   Constitutional: No fatigue  Eyes: Negative for redness.   Respiratory: Negative for cough.      Musculoskeletal: Negative for joint swelling.   Neurological: Negative for tremors.   Psychiatric/Behavioral: Negative for hallucinations.   Objective      Temp:  [97.8 °F (36.6 °C)-100 °F (37.8 °C)] 99.7 °F (37.6 °C)  Heart Rate:  [] 104  Resp:  [16-18] 16  BP: (114-152)/(70-96) 121/75        Objective    GEN: lying in bed; in NAD, extubated  HENT: normocephalic, non-erythematous oropharynx  CV: no LE edema  PULM: non-labored breathing  EXT: no clubbing, cyanosis, or erythema  SKIN: no rashes or lesions    NEURO:  Mental Status: A&O x 3, interactive, able to follow commands  Speech: Intact Articulation  CN 2-12:  II - PERRLA  III, IV, VI - EOMI  V - Facial sensation intact  VII - Brow raise and smile symmetrical  VIII - Auditory acuity intact  IX, X - Palate elevation symmetric, uvula midline  XI - SCM and Trapezius strength intact  XII - Tongue protrudes midline    Motor:  RUE: 5/5  LUE: Can shrug shoulder little bit and her flickering movement on her fingers  RLE: 5/5  LLE: 5/5    Sensory: intact light touch throughout  Reflexes: 2+ throughout, negative Romero's sign on the RUE  Coordination: no ataxia with finger-to-nose testing on the right upper extremity  Gait/Station: deferred    Cortical: No Extinction    Results Review:    I reviewed the patient's new clinical results.    WBC   Date Value Ref Range Status   08/23/2024 11.61 (H) 3.40 - 10.80 10*3/mm3 Final     RBC   Date Value Ref Range Status   08/23/2024 4.30 3.77 - 5.28 10*6/mm3 Final     Hemoglobin   Date Value Ref Range Status   08/23/2024 13.6 12.0 - 15.9 g/dL Final     Hematocrit   Date Value Ref Range Status   08/23/2024 40.6 34.0 - 46.6 % Final     MCV   Date Value Ref Range Status   08/23/2024 94.4 79.0 - 97.0 fL Final     MCH   Date Value Ref Range Status   08/23/2024 31.6 26.6 - 33.0 pg Final     MCHC   Date Value Ref Range Status   08/23/2024 33.5 31.5 - 35.7 g/dL Final     RDW   Date Value Ref Range Status   08/23/2024 12.9 12.3 - 15.4 % Final     RDW-SD   Date Value Ref Range Status   08/23/2024 44.4 37.0 - 54.0 fl Final     MPV   Date Value Ref Range Status   08/23/2024 10.0 6.0 - 12.0 fL Final     Platelets   Date Value Ref Range Status   08/23/2024 219 140 - 450 10*3/mm3 Final     Neutrophil %   Date Value Ref Range Status   08/23/2024 79.2 (H) 42.7 - 76.0 % Final     Lymphocyte %   Date Value Ref Range Status   08/23/2024 15.6 (L) 19.6 - 45.3 % Final     Monocyte %   Date Value Ref Range Status   08/23/2024 4.7 (L) 5.0 - 12.0 % Final     Eosinophil %   Date Value Ref Range Status   08/23/2024 0.1 (L) 0.3 - 6.2 % Final     Basophil %   Date Value Ref Range Status   08/23/2024 0.2 0.0 - 1.5 % Final     Immature Grans %   Date Value Ref Range Status   08/23/2024 0.2 0.0 - 0.5 % Final     Neutrophils, Absolute   Date Value Ref Range Status   08/23/2024 9.20 (H) 1.70 - 7.00 10*3/mm3 Final     Lymphocytes, Absolute   Date Value Ref Range Status   08/23/2024 1.81 0.70 - 3.10 10*3/mm3 Final     Monocytes, Absolute   Date Value Ref Range Status   08/23/2024 0.55 0.10 - 0.90 10*3/mm3 Final     Eosinophils, Absolute   Date Value Ref Range Status   08/23/2024 0.01 0.00 - 0.40 10*3/mm3 Final     Basophils, Absolute   Date Value Ref  Range Status   08/23/2024 0.02 0.00 - 0.20 10*3/mm3 Final     Immature Grans, Absolute   Date Value Ref Range Status   08/23/2024 0.02 0.00 - 0.05 10*3/mm3 Final     nRBC   Date Value Ref Range Status   08/23/2024 0.0 0.0 - 0.2 /100 WBC Final       Lab Results   Component Value Date    GLUCOSE 119 (H) 08/22/2024    BUN 17 08/22/2024    CREATININE 0.58 08/22/2024     08/23/2024    K 3.9 08/22/2024     08/22/2024    CALCIUM 9.0 08/22/2024    PROTEINTOT 6.6 08/21/2024    ALBUMIN 4.0 08/21/2024    ALT 19 08/21/2024    ALT 20 08/21/2024    AST 21 08/21/2024    AST 22 08/21/2024    ALKPHOS 137 (H) 08/21/2024    BILITOT 0.3 08/21/2024    GLOB 2.6 08/21/2024    AGRATIO 1.5 08/21/2024    BCR 29.3 (H) 08/22/2024    ANIONGAP 12.0 08/22/2024    EGFR 108.4 08/22/2024     CT Head Without Contrast    Result Date: 8/23/2024  Impression: 1.Evolving right MCA territory infarct with progressive loss of gray-white differentiation and diminished mass effect. 2.Stable small focus of hemorrhage in the right anterior subinsular region. No new hemorrhage. Electronically Signed: Gian Blevins MD  8/23/2024 3:27 AM EDT  Workstation ID: [id number]    CT Head Without Contrast    Result Date: 8/22/2024  Impression: Evolving large right MCA distribution infarct with similar hemorrhage in the right subinsular region. No convincing new intracranial hemorrhage. Similar associated mass effect with narrowing of right lateral ventricle and 2 mm leftward midline shift. Electronically Signed: Jose J Sher  8/22/2024 3:02 PM EDT  Workstation ID: [id number]    Invasive peripheral vascular study    Result Date: 8/22/2024   TICI 3 reperfusion of tandem right internal carotid artery and M1 occlusions after carotid artery stent placement and thrombectomy.  No complications noted.    XR Abdomen KUB    Result Date: 8/22/2024  Impression: Keofeed tube tip in the distal stomach. Electronically Signed: Olga Lidia Garcia MD  8/22/2024 11:12 AM EDT   Workstation ID: [id number]    CT Head Without Contrast    Result Date: 8/22/2024  Impression: 1.Large right MCA distribution infarct with a small focus of hemorrhage in the subinsular region. 2.Mild mass effect with 2 mm leftward shift of midline structures. Electronically Signed: Gian Blevins MD  8/22/2024 4:53 AM EDT  Workstation ID: [id number]    MRI Brain Without Contrast    Result Date: 8/22/2024  Impression: 1.Large acute infarct involving the majority of the right MCA territory. 2.No evidence of intracranial hemorrhage. 3.Mild chronic small vessel ischemic change. Electronically Signed: Gian Blevins MD  8/22/2024 4:42 AM EDT  Workstation ID: [id number]    XR Ankle 3+ View Left    Result Date: 8/21/2024  Impression: 1.Nondisplaced fracture of the lateral malleolus. 4 mm fracture fragment at the tip of the medial malleolus. 2.Abnormal widening of the anterior tibiotalar joint space. Electronically Signed: Sanam Ramsey  8/21/2024 8:59 PM EDT  Workstation ID: [id number]    XR Chest 1 View    Result Date: 8/21/2024  Impression: Rotated chest radiograph. Allowing for this, no evidence of active chest disease.. Electronically Signed: Ozzy Mckeon MD  8/21/2024 5:37 PM EDT  Workstation ID: [id number]    CT Angiogram Head w AI Analysis of LVO    Result Date: 8/21/2024  1.Occlusion of the right internal carotid artery from its origin with reconstitution of the right cavernous/supraclinoid ICA. There is also occlusion of the M1 segment of the right middle cerebral artery. 2.CT perfusion study demonstrates a large perfusion abnormality involving essentially all of the right MCA territory with core infarct volume of 98 mL, a mismatch volume of 51 mL, and a mismatch ratio of 1.5. 3.Atherosclerotic plaque within the left proximal ICA with estimated 40 to 50% luminal narrowing. The above findings were discussed with Ilana Poole, stroke care coordinator, via telephone on 8/21/2024 12:33 PM EDT. Electronically Signed: Ander  MD Gay  8/21/2024 12:52 PM EDT  Workstation ID: [id number]    CT Angiogram Neck    Result Date: 8/21/2024  1.Occlusion of the right internal carotid artery from its origin with reconstitution of the right cavernous/supraclinoid ICA. There is also occlusion of the M1 segment of the right middle cerebral artery. 2.CT perfusion study demonstrates a large perfusion abnormality involving essentially all of the right MCA territory with core infarct volume of 98 mL, a mismatch volume of 51 mL, and a mismatch ratio of 1.5. 3.Atherosclerotic plaque within the left proximal ICA with estimated 40 to 50% luminal narrowing. The above findings were discussed with Ilana Poole, stroke care coordinator, via telephone on 8/21/2024 12:33 PM EDT. Electronically Signed: Ander Rashid MD  8/21/2024 12:52 PM EDT  Workstation ID: [id number]    CT CEREBRAL PERFUSION WITH & WITHOUT CONTRAST    Result Date: 8/21/2024  1.Occlusion of the right internal carotid artery from its origin with reconstitution of the right cavernous/supraclinoid ICA. There is also occlusion of the M1 segment of the right middle cerebral artery. 2.CT perfusion study demonstrates a large perfusion abnormality involving essentially all of the right MCA territory with core infarct volume of 98 mL, a mismatch volume of 51 mL, and a mismatch ratio of 1.5. 3.Atherosclerotic plaque within the left proximal ICA with estimated 40 to 50% luminal narrowing. The above findings were discussed with Ilana Poole stroke care coordinator, via telephone on 8/21/2024 12:33 PM EDT. Electronically Signed: Ander Rashid MD  8/21/2024 12:52 PM EDT  Workstation ID: [id number]    CT Head Without Contrast Stroke Protocol    Result Date: 8/21/2024  Impression: Hyperdense appearance of the right middle cerebral artery concerning for acute thrombus. Recommend further evaluation with CTA. Electronically Signed: Javier Javed MD  8/21/2024 12:08 PM EDT  Workstation ID: [id number]    Results for orders placed during the hospital encounter of 08/21/24    Adult Transthoracic Echo Complete W/ Cont if Necessary Per Protocol (With Agitated Saline)    Interpretation Summary    Left ventricular systolic function is normal. Calculated left ventricular EF = 68% Normal left ventricular cavity size noted. Left ventricular wall thickness is consistent with moderate concentric hypertrophy. All left ventricular wall segments contract normally. Left ventricular diastolic function was normal. Normal left atrial pressure.    The right ventricular cavity is mildly dilated. Normal right ventricular systolic function noted.    Left atrial volume is moderately increased. Saline test results are negative.    The aortic valve is structurally normal with no stenosis present. The aortic valve appears trileaflet. No significant aortic valve regurgitation is present.    The mitral valve is structurally normal with no significant stenosis present. Trace to mild mitral valve regurgitation is present.    Mild tricuspid valve regurgitation is present. Estimated right ventricular systolic pressure from tricuspid regurgitation is mildly elevated (35-45 mmHg)    Mild dilation of the ascending aorta is present. Ascending aorta = 3.7 cm    -MRI of the brain from August 22, 2024 images were personally reviewed and showed evaluation of the right MCA acute ischemic stroke with a small area of hemorrhagic conversion on the insular region.  -A1c on August 22, 2024 was 5.5  -LDL on August 22, 2024 was 107  -Echocardiogram from August 21, 2024 for report showed left ventricular ejection fraction of about 68%,left atrial cavity is mildly dilated and no PFO or shunt.  -Repeat CT head from August 22, 2024 images were personally reviewed and showed stable hemorrhagic conversion in the right insular region in addition to involving right MCA acute ischemic stroke.      Assessment/Plan   53-year-old  female with multiple vascular  risk factors who presented to [facility] emergency department as a scene flight with complaints of left-sided weakness and neglect.  She also has complaints of severe headache.  CT head shows hyperdense right MCA.  Secondary to extended last known well she is not deemed to be an appropriate IV thrombolytic therapy candidate.  CT perfusion is indicative of an area of reversible ischemia therefore she was taken to the Cath Lab for planned mechanical thrombectomy.  She will be admitted to the neurological ICU s/p procedure for further monitoring and stroke workup.     Antiplatelet PTA: ASA 81 mg  Anticoagulant PTA: None        #Acute right MCA stroke s/p mechanical thrombectomy  #Acute right ICA occlusion s/p carotid stent  #History of seizures  -Mechanism of stroke is likely atheroembolic in the setting of right internal carotid artery atherosclerosis.  -MRI of the brain from August 22, 2024 images were personally reviewed and showed evaluation of the right MCA acute ischemic stroke with a small area of hemorrhagic conversion on the insular region.  -A1c on August 22, 2024 was 5.5  -LDL on August 22, 2024 was 107  -Echocardiogram from August 21, 2024 for report showed left ventricular ejection fraction of about 68%,left atrial cavity is mildly dilated and no PFO or shunt.  -Repeat CT head from August 22, 2024 images were personally reviewed and showed stable hemorrhagic conversion in the right insular region in addition to involving right MCA acute ischemic stroke.    Recommendations  -Continue DAPT with aspirin 81 mg daily and Brilinta 60 mg twice daily for secondary stroke prevention in the setting of recent right ICA stent placement.  Please note that the patient most recent MRI showed small hemorrhagic conversion on the right insular region, however, the benefit of giving dual antiplatelet outweighed the risk of worsening hemorrhagic conversion at this time.  Risk and benefit was discussed with the  patient and her  who both agreed with the above-stated plan.  -Note that most recent sodium level was 142 however patient neurological exam stable can consider targeting sodium level of 145-150.  -Target systolic blood pressure less than 140  -Bedrest x 2 hours s/p procedure then patient can be up as tolerated, fall risk precautions  -PT/OT/SLP evaluation     #Essential hypertension  -Target normotension  -Nicardipine for SBP >140     #Hyperlipidemia  -Atorvastatin 80 mg nightly     #DM 2  -Maintain euglycemia, goal blood glucose 140-180  -Management per primary team     #Tobacco abuse  -Patient may require nicotine replacement therapy during admission  -Tobacco cessation education to be provided when appropriate     #EtOH use  -Patient reports that she drinks tequila, last drink was approximately 1 week ago  -CIWA protocol per primary care      Stroke will continue to follow. Please call for any further questions or concerns  =================================  Juan Finnegan MD, Msc, PhD  Vascular Neurologist  [facility]

## 2024-08-24 ENCOUNTER — APPOINTMENT (OUTPATIENT)
Dept: CT IMAGING | Facility: HOSPITAL | Age: 54
DRG: 023 | End: 2024-08-24
Payer: MEDICAID

## 2024-08-24 LAB
ANION GAP SERPL CALCULATED.3IONS-SCNC: 10 MMOL/L (ref 5–15)
ANION GAP SERPL CALCULATED.3IONS-SCNC: 10 MMOL/L (ref 5–15)
BUN SERPL-MCNC: 25 MG/DL (ref 6–20)
BUN SERPL-MCNC: 30 MG/DL (ref 6–20)
BUN/CREAT SERPL: 43.9 (ref 7–25)
BUN/CREAT SERPL: 52.6 (ref 7–25)
CALCIUM SPEC-SCNC: 9 MG/DL (ref 8.6–10.5)
CALCIUM SPEC-SCNC: 9.2 MG/DL (ref 8.6–10.5)
CHLORIDE SERPL-SCNC: 109 MMOL/L (ref 98–107)
CHLORIDE SERPL-SCNC: 111 MMOL/L (ref 98–107)
CO2 SERPL-SCNC: 23 MMOL/L (ref 22–29)
CO2 SERPL-SCNC: 23 MMOL/L (ref 22–29)
CREAT SERPL-MCNC: 0.57 MG/DL (ref 0.57–1)
CREAT SERPL-MCNC: 0.57 MG/DL (ref 0.57–1)
EGFRCR SERPLBLD CKD-EPI 2021: 108.8 ML/MIN/1.73
EGFRCR SERPLBLD CKD-EPI 2021: 108.8 ML/MIN/1.73
GLUCOSE BLDC GLUCOMTR-MCNC: 120 MG/DL (ref 70–130)
GLUCOSE BLDC GLUCOMTR-MCNC: 126 MG/DL (ref 70–130)
GLUCOSE BLDC GLUCOMTR-MCNC: 130 MG/DL (ref 70–130)
GLUCOSE BLDC GLUCOMTR-MCNC: 147 MG/DL (ref 70–130)
GLUCOSE SERPL-MCNC: 126 MG/DL (ref 65–99)
GLUCOSE SERPL-MCNC: 127 MG/DL (ref 65–99)
OSMOLALITY SERPL: 301 MOSM/KG (ref 275–295)
OSMOLALITY SERPL: 303 MOSM/KG (ref 275–295)
OSMOLALITY SERPL: 307 MOSM/KG (ref 275–295)
OSMOLALITY SERPL: 308 MOSM/KG (ref 275–295)
POTASSIUM SERPL-SCNC: 3.8 MMOL/L (ref 3.5–5.2)
POTASSIUM SERPL-SCNC: 4.2 MMOL/L (ref 3.5–5.2)
SODIUM SERPL-SCNC: 141 MMOL/L (ref 136–145)
SODIUM SERPL-SCNC: 142 MMOL/L (ref 136–145)
SODIUM SERPL-SCNC: 143 MMOL/L (ref 136–145)
SODIUM SERPL-SCNC: 144 MMOL/L (ref 136–145)
SODIUM SERPL-SCNC: 144 MMOL/L (ref 136–145)

## 2024-08-24 PROCEDURE — 82948 REAGENT STRIP/BLOOD GLUCOSE: CPT

## 2024-08-24 PROCEDURE — 25810000003 SODIUM CHLORIDE 3 % SOLUTION: Performed by: STUDENT IN AN ORGANIZED HEALTH CARE EDUCATION/TRAINING PROGRAM

## 2024-08-24 PROCEDURE — 70450 CT HEAD/BRAIN W/O DYE: CPT

## 2024-08-24 PROCEDURE — 99233 SBSQ HOSP IP/OBS HIGH 50: CPT | Performed by: INTERNAL MEDICINE

## 2024-08-24 PROCEDURE — 97116 GAIT TRAINING THERAPY: CPT

## 2024-08-24 PROCEDURE — 97530 THERAPEUTIC ACTIVITIES: CPT

## 2024-08-24 PROCEDURE — 84295 ASSAY OF SERUM SODIUM: CPT | Performed by: STUDENT IN AN ORGANIZED HEALTH CARE EDUCATION/TRAINING PROGRAM

## 2024-08-24 PROCEDURE — 83930 ASSAY OF BLOOD OSMOLALITY: CPT | Performed by: STUDENT IN AN ORGANIZED HEALTH CARE EDUCATION/TRAINING PROGRAM

## 2024-08-24 PROCEDURE — 25010000002 PROCHLORPERAZINE 10 MG/2ML SOLUTION: Performed by: NURSE PRACTITIONER

## 2024-08-24 PROCEDURE — 25810000003 SODIUM CHLORIDE 0.9 % SOLUTION 250 ML FLEX CONT

## 2024-08-24 PROCEDURE — 25010000002 THIAMINE PER 100 MG: Performed by: INTERNAL MEDICINE

## 2024-08-24 PROCEDURE — 25010000002 MAGNESIUM SULFATE 2 GM/50ML SOLUTION: Performed by: NURSE PRACTITIONER

## 2024-08-24 PROCEDURE — 97110 THERAPEUTIC EXERCISES: CPT

## 2024-08-24 PROCEDURE — 80048 BASIC METABOLIC PNL TOTAL CA: CPT | Performed by: STUDENT IN AN ORGANIZED HEALTH CARE EDUCATION/TRAINING PROGRAM

## 2024-08-24 PROCEDURE — 25010000002 DIPHENHYDRAMINE PER 50 MG: Performed by: NURSE PRACTITIONER

## 2024-08-24 PROCEDURE — 25010000002 NICARDIPINE 2.5 MG/ML SOLUTION 10 ML VIAL

## 2024-08-24 PROCEDURE — 99233 SBSQ HOSP IP/OBS HIGH 50: CPT | Performed by: STUDENT IN AN ORGANIZED HEALTH CARE EDUCATION/TRAINING PROGRAM

## 2024-08-24 RX ORDER — PROCHLORPERAZINE EDISYLATE 5 MG/ML
5 INJECTION INTRAMUSCULAR; INTRAVENOUS ONCE
Status: COMPLETED | OUTPATIENT
Start: 2024-08-24 | End: 2024-08-24

## 2024-08-24 RX ORDER — DIPHENHYDRAMINE HYDROCHLORIDE 50 MG/ML
12.5 INJECTION INTRAMUSCULAR; INTRAVENOUS ONCE
Status: COMPLETED | OUTPATIENT
Start: 2024-08-24 | End: 2024-08-24

## 2024-08-24 RX ORDER — MAGNESIUM SULFATE HEPTAHYDRATE 40 MG/ML
2 INJECTION, SOLUTION INTRAVENOUS ONCE
Status: COMPLETED | OUTPATIENT
Start: 2024-08-24 | End: 2024-08-24

## 2024-08-24 RX ORDER — 3% SODIUM CHLORIDE 3 G/100ML
35 INJECTION, SOLUTION INTRAVENOUS CONTINUOUS
Status: DISCONTINUED | OUTPATIENT
Start: 2024-08-24 | End: 2024-08-25

## 2024-08-24 RX ORDER — HYDROCODONE BITARTRATE AND ACETAMINOPHEN 7.5; 325 MG/1; MG/1
1 TABLET ORAL EVERY 6 HOURS PRN
Status: DISCONTINUED | OUTPATIENT
Start: 2024-08-24 | End: 2024-08-25

## 2024-08-24 RX ADMIN — TICAGRELOR 60 MG: 60 TABLET ORAL at 09:39

## 2024-08-24 RX ADMIN — FOLIC ACID 1 MG: 1 TABLET ORAL at 09:39

## 2024-08-24 RX ADMIN — Medication 10 ML: at 09:53

## 2024-08-24 RX ADMIN — SODIUM CHLORIDE 35 ML/HR: 3 INJECTION, SOLUTION INTRAVENOUS at 15:33

## 2024-08-24 RX ADMIN — SODIUM CHLORIDE 30 ML/HR: 3 INJECTION, SOLUTION INTRAVENOUS at 00:06

## 2024-08-24 RX ADMIN — HYDROCODONE BITARTRATE AND ACETAMINOPHEN 1 TABLET: 5; 325 TABLET ORAL at 12:10

## 2024-08-24 RX ADMIN — NICARDIPINE HYDROCHLORIDE 5 MG/HR: 25 INJECTION, SOLUTION INTRAVENOUS at 21:21

## 2024-08-24 RX ADMIN — ACETAMINOPHEN 650 MG: 650 SOLUTION ORAL at 09:39

## 2024-08-24 RX ADMIN — ASPIRIN 81 MG 81 MG: 81 TABLET ORAL at 09:39

## 2024-08-24 RX ADMIN — PROCHLORPERAZINE EDISYLATE 5 MG: 5 INJECTION INTRAMUSCULAR; INTRAVENOUS at 18:19

## 2024-08-24 RX ADMIN — MAGNESIUM SULFATE HEPTAHYDRATE 2 G: 40 INJECTION, SOLUTION INTRAVENOUS at 18:19

## 2024-08-24 RX ADMIN — Medication 30 ML: at 09:39

## 2024-08-24 RX ADMIN — DIPHENHYDRAMINE HYDROCHLORIDE 12.5 MG: 50 INJECTION INTRAMUSCULAR; INTRAVENOUS at 18:19

## 2024-08-24 RX ADMIN — THIAMINE HYDROCHLORIDE 500 MG: 100 INJECTION, SOLUTION INTRAMUSCULAR; INTRAVENOUS at 09:39

## 2024-08-24 RX ADMIN — NICARDIPINE HYDROCHLORIDE 5 MG/HR: 25 INJECTION, SOLUTION INTRAVENOUS at 18:01

## 2024-08-24 RX ADMIN — HYDROCODONE BITARTRATE AND ACETAMINOPHEN 1 TABLET: 7.5; 325 TABLET ORAL at 18:02

## 2024-08-24 RX ADMIN — HYDROCODONE BITARTRATE AND ACETAMINOPHEN 1 TABLET: 5; 325 TABLET ORAL at 05:47

## 2024-08-24 RX ADMIN — HYDROCODONE BITARTRATE AND ACETAMINOPHEN 1 TABLET: 5; 325 TABLET ORAL at 00:09

## 2024-08-24 RX ADMIN — ATORVASTATIN CALCIUM 80 MG: 40 TABLET, FILM COATED ORAL at 21:20

## 2024-08-24 RX ADMIN — NICARDIPINE HYDROCHLORIDE 2.5 MG/HR: 25 INJECTION, SOLUTION INTRAVENOUS at 00:05

## 2024-08-24 RX ADMIN — Medication 10 ML: at 21:20

## 2024-08-24 RX ADMIN — TICAGRELOR 60 MG: 60 TABLET ORAL at 21:20

## 2024-08-24 RX ADMIN — ACETAMINOPHEN 650 MG: 650 SOLUTION ORAL at 22:57

## 2024-08-24 NOTE — PROGRESS NOTES
Intensive Care Follow-up     Hospital:  LOS: 3 days   Ms. Dalila OCASIO, 53 y.o. female is followed for:   Acute CVA (cerebrovascular accident)        Subjective     Dalila OCASIO is a 53 y.o. female who presents to [facility] ED 08/21/24 due to stroke concern.      Patient has a PMH of tobacco and ETOH use, H/O narcotic abuse, HTN, T2DM, dyslipidemia, obesity, seizures, and PAD.      Per report, patient awoke at ~0930 with slurred speech, neglect, left-sided hemiparesis, and severe headache, ultimately prompting call to EMS. She was brought to our ED via air evac where upon arrival was noted to be significantly hypertensive (/115 mmHg). Initial NIHSS 17 and CTH showed hyperdense right MCA sign. She was premedicated (documented contrast allergy) prior to subsequent CTA H/N and CTP which further revealed occlusion of the right internal carotid artery from its origin, occlusion of the right M1 MCA segment, with large area of reversible ischemia with core infarct present. She was evaluated by Neurology was deemed not a candidate for thrombolytics (2* outside timing window) but was felt to benefit from endovascular intervention. She was taken to Cath Lab emergently where she underwent right ICA stent placement and mechanical thrombectomy per Dr. Saini. Postprocedure she was admitted to ICU for further management.   Interval History:  The chart has been reviewed.  The patient has remained afebrile overnight.  Still remains with significant plegia left upper extremity.  Complains of chronic low back pain which is not being taken care of by the current medications.    The patient's past medical, surgical and social history were reviewed and updated in Epic as appropriate.        Objective     Infusions:  niCARdipine, 5-15 mg/hr, Last Rate: 2.5 mg/hr (08/24/24 0005)      Medications:  aspirin, 81 mg, Nasogastric, Daily   Or  aspirin, 300 mg, Rectal, Daily  atorvastatin, 80 mg, Nasogastric, Nightly  folic acid, 1 mg,  "Nasogastric, Daily  insulin regular, 2-9 Units, Subcutaneous, Q6H  nicotine, 1 patch, Transdermal, Q24H  ProSource No Carb, 30 mL, Nasogastric, Daily  sodium chloride, 10 mL, Intravenous, Q12H  ticagrelor, 60 mg, Nasogastric, BID        Vital Sign Min/Max for last 24 hours  Temp  Min: 99.1 °F (37.3 °C)  Max: 100.4 °F (38 °C)   BP  Min: 108/74  Max: 142/77   Pulse  Min: 64  Max: 88   Resp  Min: 16  Max: 18   SpO2  Min: 92 %  Max: 99 %   No data recorded       Input/Output for last 24 hour shift  08/23 0701 - 08/24 0700  In: 2779.2 [I.V.:1549.2]  Out: 3175 [Urine:3175]      Objective:  General Appearance:  Uncomfortable and in no acute distress.    Vital signs: (most recent): Blood pressure 136/78, pulse 67, temperature 99.7 °F (37.6 °C), temperature source Bladder, resp. rate 18, height 172.7 cm (68\"), weight 111 kg (244 lb 14.9 oz), SpO2 98%.    HEENT: Normal HEENT exam.    Lungs:  Normal effort and normal respiratory rate.  Breath sounds clear to auscultation.    Heart: Normal rate.  Regular rhythm.  S1 normal and S2 normal.  No murmur.   Abdomen: Abdomen is soft and non-distended.    Extremities: (Flaccid left upper extremity.)  Neurological: Patient is alert and oriented to person, place and time.    Pupils:  Pupils are equal, round, and reactive to light.  Pupils are equal.   Skin:  Warm.                Results from last 7 days   Lab Units 08/23/24  0148 08/22/24  0341 08/21/24  1208 08/21/24  1206   WBC 10*3/mm3 11.61* 14.23*  --  7.49   HEMOGLOBIN g/dL 13.6 13.2  --  15.2   HEMOGLOBIN, POC g/dL  --   --  15.3  --    PLATELETS 10*3/mm3 219 226  --  215     Results from last 7 days   Lab Units 08/24/24  1230 08/24/24  0713 08/23/24  2358 08/23/24  1833 08/23/24  1219 08/23/24  0708 08/23/24  0148 08/22/24  1055 08/22/24  0341   SODIUM mmol/L 144 142  143 141   < > 143  145 142 142   < > 140   POTASSIUM mmol/L  --  3.8  --   --  4.1 4.0  --    < > 4.1   CO2 mmol/L  --  23.0  --   --  22.0 23.0  --    < > 22.0 "   BUN mg/dL  --  25*  --   --  23* 21*  --    < > 18   CREATININE mg/dL  --  0.57  --   --  0.58 0.62  --    < > 0.56*   MAGNESIUM mg/dL  --   --   --   --   --   --  2.3  --  2.0   PHOSPHORUS mg/dL  --   --   --   --   --   --  2.7  --  3.0   GLUCOSE mg/dL  --  126*  --   --  126* 123*  --    < > 137*    < > = values in this interval not displayed.     Estimated Creatinine Clearance: 149 mL/min (by C-G formula based on SCr of 0.57 mg/dL).            I reviewed the patient's results and images.     Assessment & Plan   Impression        Acute CVA (cerebrovascular accident)    Alcohol use    Tobacco use    Obesity (BMI 30-39.9)    HTN (hypertension)    Dyslipidemia    History of seizures    PAD (peripheral artery disease)       Plan        Continue with hypertonic saline and close metabolic checks.  Will go ahead and liberalize her pain control as needed.  Imaging ordered per orthopedic surgery for ankle fracture.  Plan is for mood stabilization.  She will remain in the intensive care unit today under close observation.    Plan of care and goals reviewed with mulitdisciplinary/antibiotic stewardship team during rounds.   I discussed the patient's findings and my recommendations with patient and nursing staff     High level of risk due to:  drug(s) requiring intensive monitoring for toxicity and parenteral controlled substances.        Mehdi Holcomb MD, [facility]  Pulmonology and Critical Care Medicine

## 2024-08-24 NOTE — NURSING NOTE
Pt refusing to travel to CT twice overnight. Pt has requested to complete ankle CT at same time as morning head CT due to chronic back pain. Education completed.

## 2024-08-24 NOTE — PROGRESS NOTES
Stroke Progress Note       Chief Complaint: Left-sided weakness    Subjective    Subjective     Subjective:  The patient is lying down in the bed in NAD.   was at the bedside.  The patient stated that she is doing better.  She still unable to to move her left upper extremity.  Denies having any new stroke or strokelike symptoms.  Stated that she is having mild headache.  Tylenol is helping with the headache.  All patient's and  questions were answered.    Review of Systems   Constitutional: No fatigue  Eyes: Negative for redness.   Respiratory: Negative for cough.      Musculoskeletal: Negative for joint swelling.   Neurological: Negative for tremors.   Psychiatric/Behavioral: Negative for hallucinations.   Objective      Temp:  [99 °F (37.2 °C)-100.4 °F (38 °C)] 99.7 °F (37.6 °C)  Heart Rate:  [64-94] 68  Resp:  [16-20] 18  BP: (112-144)/() 118/67        Objective    GEN: lying in bed; in NAD, extubated  HENT: normocephalic, non-erythematous oropharynx  CV: no LE edema  PULM: non-labored breathing  EXT: no clubbing, cyanosis, or erythema  SKIN: no rashes or lesions    NEURO:  Mental Status: A&O x 3, interactive, able to follow commands  Speech: Intact Articulation  CN 2-12:  II - PERRLA  III, IV, VI - EOMI  V - Facial sensation intact  VII - Brow raise and smile symmetrical  VIII - Auditory acuity intact  IX, X - Palate elevation symmetric, uvula midline  XI - SCM and Trapezius strength intact  XII - Tongue protrudes midline    Motor:  RUE: 5/5  LUE: Can shrug shoulder little bit and her flickering movement on her fingers  RLE: 5/5  LLE: 5/5    Sensory: intact light touch throughout  Reflexes: 2+ throughout, negative Romero's sign on the RUE  Coordination: no ataxia with finger-to-nose testing on the right upper extremity  Gait/Station: deferred   Cortical: No Extinction    Results Review:    I reviewed the patient's new clinical results.    WBC   Date Value Ref Range Status   08/23/2024 11.61  (H) 3.40 - 10.80 10*3/mm3 Final     RBC   Date Value Ref Range Status   08/23/2024 4.30 3.77 - 5.28 10*6/mm3 Final     Hemoglobin   Date Value Ref Range Status   08/23/2024 13.6 12.0 - 15.9 g/dL Final     Hematocrit   Date Value Ref Range Status   08/23/2024 40.6 34.0 - 46.6 % Final     MCV   Date Value Ref Range Status   08/23/2024 94.4 79.0 - 97.0 fL Final     MCH   Date Value Ref Range Status   08/23/2024 31.6 26.6 - 33.0 pg Final     MCHC   Date Value Ref Range Status   08/23/2024 33.5 31.5 - 35.7 g/dL Final     RDW   Date Value Ref Range Status   08/23/2024 12.9 12.3 - 15.4 % Final     RDW-SD   Date Value Ref Range Status   08/23/2024 44.4 37.0 - 54.0 fl Final     MPV   Date Value Ref Range Status   08/23/2024 10.0 6.0 - 12.0 fL Final     Platelets   Date Value Ref Range Status   08/23/2024 219 140 - 450 10*3/mm3 Final     Neutrophil %   Date Value Ref Range Status   08/23/2024 79.2 (H) 42.7 - 76.0 % Final     Lymphocyte %   Date Value Ref Range Status   08/23/2024 15.6 (L) 19.6 - 45.3 % Final     Monocyte %   Date Value Ref Range Status   08/23/2024 4.7 (L) 5.0 - 12.0 % Final     Eosinophil %   Date Value Ref Range Status   08/23/2024 0.1 (L) 0.3 - 6.2 % Final     Basophil %   Date Value Ref Range Status   08/23/2024 0.2 0.0 - 1.5 % Final     Immature Grans %   Date Value Ref Range Status   08/23/2024 0.2 0.0 - 0.5 % Final     Neutrophils, Absolute   Date Value Ref Range Status   08/23/2024 9.20 (H) 1.70 - 7.00 10*3/mm3 Final     Lymphocytes, Absolute   Date Value Ref Range Status   08/23/2024 1.81 0.70 - 3.10 10*3/mm3 Final     Monocytes, Absolute   Date Value Ref Range Status   08/23/2024 0.55 0.10 - 0.90 10*3/mm3 Final     Eosinophils, Absolute   Date Value Ref Range Status   08/23/2024 0.01 0.00 - 0.40 10*3/mm3 Final     Basophils, Absolute   Date Value Ref Range Status   08/23/2024 0.02 0.00 - 0.20 10*3/mm3 Final     Immature Grans, Absolute   Date Value Ref Range Status   08/23/2024 0.02 0.00 - 0.05  10*3/mm3 Final     nRBC   Date Value Ref Range Status   08/23/2024 0.0 0.0 - 0.2 /100 WBC Final       Lab Results   Component Value Date    GLUCOSE 126 (H) 08/23/2024    BUN 23 (H) 08/23/2024    CREATININE 0.58 08/23/2024     08/23/2024    K 4.1 08/23/2024     (H) 08/23/2024    CALCIUM 9.1 08/23/2024    PROTEINTOT 6.6 08/21/2024    ALBUMIN 4.0 08/21/2024    ALT 19 08/21/2024    ALT 20 08/21/2024    AST 21 08/21/2024    AST 22 08/21/2024    ALKPHOS 137 (H) 08/21/2024    BILITOT 0.3 08/21/2024    GLOB 2.6 08/21/2024    AGRATIO 1.5 08/21/2024    BCR 39.7 (H) 08/23/2024    ANIONGAP 10.0 08/23/2024    EGFR 108.4 08/23/2024     CT Lower Extremity Left Without Contrast    Result Date: 8/23/2024  Impression: Nondisplaced fracture of the distal fibular metaphysis. No additional fracture identified. No evidence of widening of the medial clear space. There is evidence of previous ligamentous injury. Electronically Signed: Aruna Del Valle MD  8/23/2024 11:03 PM EDT  Workstation ID: [id number]    CT Head Without Contrast    Result Date: 8/23/2024  Impression: 1.Evolving right MCA territory infarct with progressive loss of gray-white differentiation and diminished mass effect. 2.Stable small focus of hemorrhage in the right anterior subinsular region. No new hemorrhage. Electronically Signed: Gian Blevins MD  8/23/2024 3:27 AM EDT  Workstation ID: [id number]    CT Head Without Contrast    Result Date: 8/22/2024  Impression: Evolving large right MCA distribution infarct with similar hemorrhage in the right subinsular region. No convincing new intracranial hemorrhage. Similar associated mass effect with narrowing of right lateral ventricle and 2 mm leftward midline shift. Electronically Signed: Jose J Sher  8/22/2024 3:02 PM EDT  Workstation ID: [id number]    XR Abdomen KUB    Result Date: 8/22/2024  Impression: Keofeed tube tip in the distal stomach. Electronically Signed: Olga Lidia Garcia MD  8/22/2024 11:12 AM EDT   Workstation ID: [id number]   Results for orders placed during the hospital encounter of 08/21/24    Adult Transthoracic Echo Complete W/ Cont if Necessary Per Protocol (With Agitated Saline)    Interpretation Summary    Left ventricular systolic function is normal. Calculated left ventricular EF = 68% Normal left ventricular cavity size noted. Left ventricular wall thickness is consistent with moderate concentric hypertrophy. All left ventricular wall segments contract normally. Left ventricular diastolic function was normal. Normal left atrial pressure.    The right ventricular cavity is mildly dilated. Normal right ventricular systolic function noted.    Left atrial volume is moderately increased. Saline test results are negative.    The aortic valve is structurally normal with no stenosis present. The aortic valve appears trileaflet. No significant aortic valve regurgitation is present.    The mitral valve is structurally normal with no significant stenosis present. Trace to mild mitral valve regurgitation is present.    Mild tricuspid valve regurgitation is present. Estimated right ventricular systolic pressure from tricuspid regurgitation is mildly elevated (35-45 mmHg)    Mild dilation of the ascending aorta is present. Ascending aorta = 3.7 cm    -MRI of the brain from August 22, 2024 images were personally reviewed and showed evaluation of the right MCA acute ischemic stroke with a small area of hemorrhagic conversion on the insular region.  -A1c on August 22, 2024 was 5.5  -LDL on August 22, 2024 was 107  -Echocardiogram from August 21, 2024 for report showed left ventricular ejection fraction of about 68%,left atrial cavity is mildly dilated and no PFO or shunt.  -Repeat CT head from August 22, 2024 images were personally reviewed and showed stable hemorrhagic conversion in the right insular region in addition to involving right MCA acute ischemic stroke.      Assessment/Plan   53-year-old  female  with multiple vascular risk factors who presented to [facility] emergency department as a scene flight with complaints of left-sided weakness and neglect.  She also has complaints of severe headache.  CT head shows hyperdense right MCA.  Secondary to extended last known well she is not deemed to be an appropriate IV thrombolytic therapy candidate.  CT perfusion is indicative of an area of reversible ischemia therefore she was taken to the Cath Lab for planned mechanical thrombectomy.  She will be admitted to the neurological ICU s/p procedure for further monitoring and stroke workup.     Antiplatelet PTA: ASA 81 mg  Anticoagulant PTA: None        #Acute right MCA stroke s/p mechanical thrombectomy  #Acute right ICA occlusion s/p carotid stent  #History of seizures  -Mechanism of stroke is likely atheroembolic in the setting of right internal carotid artery atherosclerosis.  -MRI of the brain from August 22, 2024 images were personally reviewed and showed evaluation of the right MCA acute ischemic stroke with a small area of hemorrhagic conversion on the insular region.  -A1c on August 22, 2024 was 5.5  -LDL on August 22, 2024 was 107  -Echocardiogram from August 21, 2024 for report showed left ventricular ejection fraction of about 68%,left atrial cavity is mildly dilated and no PFO or shunt.  -Repeat CT head from August 22, 2024 images were personally reviewed and showed stable hemorrhagic conversion in the right insular region in addition to involving right MCA acute ischemic stroke.    Recommendations  -Continue DAPT with aspirin 81 mg daily and Brilinta 60 mg twice daily for secondary stroke prevention in the setting of recent right ICA stent placement.  Please note that the patient most recent MRI showed small hemorrhagic conversion on the right insular region, however, the benefit of giving dual antiplatelet outweighed the risk of worsening hemorrhagic conversion at this time.  Risk and benefit  was discussed with the patient and her  who both agreed with the above-stated plan.  -Note that most recent sodium level was 142 however patient neurological exam stable can consider targeting sodium level of 145-150 if any neurological decline.  -Consider stat CT head for any change in neurological exam to evaluate for expansion of hematoma on the left insula.  -Target systolic blood pressure less than 140  -Bedrest x 2 hours s/p procedure then patient can be up as tolerated, fall risk precautions  -PT/OT/SLP evaluation     #Essential hypertension  -Target normotension  -Nicardipine for SBP >140     #Hyperlipidemia  -Atorvastatin 80 mg nightly     #DM 2  -Maintain euglycemia, goal blood glucose 140-180  -Management per primary team     #Tobacco abuse  -Patient may require nicotine replacement therapy during admission  -Tobacco cessation education to be provided when appropriate     #EtOH use  -Patient reports that she drinks tequila, last drink was approximately 1 week ago  -CIWA protocol per primary care      Stroke will continue to follow. Please call for any further questions or concerns  =================================  Juan Finnegan MD, Msc, PhD  Vascular Neurologist  [facility]

## 2024-08-24 NOTE — PLAN OF CARE
Goal Outcome Evaluation:  Plan of Care Reviewed With: patient, family        Progress: improving  Outcome Evaluation: Issued and donned CAM boot to pt's LLE with further education re: maintaining NWB status. Pt progressed in safety re: maintaining WB precaution during transfers (minx2 A), but continued to demonstrate difficulty in maintaining precautions during small steps EOB to recliner (modx2 A). Educated pt/ spouse re: LE HEP and benefit of promoting attention to pt's L side. Will cont PT POC.

## 2024-08-24 NOTE — THERAPY TREATMENT NOTE
Patient Name: Dalila OCASIO  : 1970    MRN: [id number]                              Today's Date: 2024       Admit Date: 2024    Visit Dx:     ICD-10-CM ICD-9-CM   1. Acute CVA (cerebrovascular accident)  I63.9 434.91   2. Left-sided neglect  R41.4 781.8   3. Oropharyngeal dysphagia  R13.12 787.22   4. Dysarthria  R47.1 784.51     Patient Active Problem List   Diagnosis    Acute CVA (cerebrovascular accident)    Alcohol use    Tobacco use    Obesity (BMI 30-39.9)    HTN (hypertension)    Dyslipidemia    History of seizures    PAD (peripheral artery disease)     Past Medical History:   Diagnosis Date    Acute CVA (cerebrovascular accident) 2024    Alcohol use 2024    Dyslipidemia 2024    Obesity (BMI 30-39.9) 2024    PAD (peripheral artery disease) 2024    Tobacco use 2024     Past Surgical History:   Procedure Laterality Date    INTERVENTIONAL RADIOLOGY PROCEDURE Bilateral 2024    Procedure: Carotid Cervical Angiogram;  Surgeon: Jamaal Saini MD;  Location:  [facility] INVASIVE LOCATION;  Service: Interventional Radiology;  Laterality: Bilateral;      General Information       Row Name 24 1510          Physical Therapy Time and Intention    Document Type therapy note (daily note)  -     Mode of Treatment physical therapy  -       Row Name 24 151          General Information    Existing Precautions/Restrictions fall;other (see comments)   CAM boot for immobilization and NWB LLE per ortho; L weakness and inattention  -       Row Name 24 1510          Cognition    Orientation Status (Cognition) oriented x 3  -       Row Name 24 151          Safety Issues, Functional Mobility    Safety Issues Affecting Function (Mobility) insight into deficits/self-awareness;safety precautions follow-through/compliance;impulsivity  -LS     Impairments Affecting Function (Mobility) balance;coordination;endurance/activity tolerance;range of  motion (ROM);strength;sensation/sensory awareness  -LS               User Key  (r) = Recorded By, (t) = Taken By, (c) = Cosigned By      Initials Name Provider Type    Sanam Bautista, PT Physical Therapist                   Mobility       Row Name 08/24/24 1512          Bed Mobility    Supine-Sit [place] (Bed Mobility) minimum assist (75% patient effort);1 person assist;verbal cues;nonverbal cues (demo/gesture)  -LS     Assistive Device (Bed Mobility) head of bed elevated;draw sheet  -LS     Comment, (Bed Mobility) CAM boot donned prior to sitting EOB.  -LS       Row Name 08/24/24 1512          Bed-Chair Transfer    Bed-Chair [place] (Transfers) moderate assist (50% patient effort);2 person assist;verbal cues  -LS     Assistive Device (Bed-Chair Transfers) walker, front-wheeled  -LS       Row Name 08/24/24 1512          Sit-Stand Transfer    Sit-Stand [place] (Transfers) minimum assist (75% patient effort);2 person assist;verbal cues  -LS     Assistive Device (Sit-Stand Transfers) walker, front-wheeled  -LS     Comment, (Sit-Stand Transfer) STS x3 throughout session; vc's for sliding LLE forward to maintain NWB precaution during transfer. STS x2 with RW and x1 with BUE support.  -LS       Row Name 08/24/24 1512          Gait/Stairs (Locomotion)    [place] Level (Gait) moderate assist (50% patient effort);2 person assist;verbal cues  -LS     Assistive Device (Gait) walker, front-wheeled  -LS     Patient was able to Ambulate yes  -LS     Distance in Feet (Gait) 3  -LS     Left Sided Gait Deviations heel strike decreased;weight shift ability decreased  -LS     Comment, (Gait/Stairs) EOB to recliner only as pt with decreased safety awareness and difficulty maintaining NWB LLE precautions (CAM boot intact). Required assist for maintaining LUE  on RW.  -LS               User Key  (r) = Recorded By, (t) = Taken By, (c) = Cosigned By      Initials Name Provider Type    Sanam Bautista, PT  Physical Therapist                   Obj/Interventions       Row Name 08/24/24 1515          Motor Skills    Therapeutic Exercise knee;ankle  -LS       Row Name 08/24/24 1515          Knee (Therapeutic Exercise)    Knee (Therapeutic Exercise) strengthening exercise  -LS     Knee Strengthening (Therapeutic Exercise) bilateral;marching while seated;LAQ (long arc quad);sitting;10 repetitions  -LS       Row Name 08/24/24 1515          Ankle (Therapeutic Exercise)    Ankle (Therapeutic Exercise) AROM (active range of motion)  -LS     Ankle AROM (Therapeutic Exercise) right;dorsiflexion;plantarflexion;10 repetitions;sitting  -       Row Name 08/24/24 1515          Balance    Static Sitting Balance contact guard  -LS     Position, Sitting Balance sitting edge of bed  -LS     Static Standing Balance minimal assist  -LS     Position/Device Used, Standing Balance supported;walker, front-wheeled  -LS               User Key  (r) = Recorded By, (t) = Taken By, (c) = Cosigned By      Initials Name Provider Type    LS Sanam Menard, PT Physical Therapist                   Goals/Plan    No documentation.                  Clinical Impression       Row Name 08/24/24 1517          Pain    Pretreatment Pain Rating 9/10  -LS     Posttreatment Pain Rating 8/10  -LS     Pain Location - Side/Orientation Left  -LS     Pain Location - ankle  -LS     Pre/Posttreatment Pain Comment notified RN; tolerated  -LS     Pain Intervention(s) Repositioned;Ambulation/increased activity  -       Row Name 08/24/24 1517          Plan of Care Review    Plan of Care Reviewed With patient;family  -LS     Progress improving  -LS     Outcome Evaluation Issued and donned CAM boot to pt's LLE with further education re: maintaining NWB status. Pt progressed in safety re: maintaining WB precaution during transfers (minx2 A), but continued to demonstrate difficulty in maintaining precautions during small steps EOB to recliner (modx2 A). Educated pt/ spouse re:  LE HEP and benefit of promoting attention to pt's L side. Will cont PT POC.  -LS       Row Name 08/24/24 1517          Vital Signs    Pre Systolic BP Rehab 135  -LS     Pre Treatment Diastolic BP 52  -LS     Post Systolic BP Rehab 140  -LS     Post Treatment Diastolic BP 88  -LS     Pretreatment Heart Rate (beats/min) 74  -LS     Posttreatment Heart Rate (beats/min) 84  -LS     Pre SpO2 (%) 98  -LS     O2 Delivery Pre Treatment room air  -LS     O2 Delivery Intra Treatment room air  -LS     Post SpO2 (%) 93  -LS     O2 Delivery Post Treatment room air  -LS     Pre Patient Position Supine  -LS     Intra Patient Position Standing  -LS     Post Patient Position Sitting  -LS       Row Name 08/24/24 1517          Positioning and Restraints    Pre-Treatment Position in bed  -LS     Post Treatment Position chair  -LS     In Chair notified nsg;reclined;call light within reach;encouraged to call for assist;RUE elevated;LUE elevated;on mechanical lift sling;legs elevated;waffle cushion;exit alarm on;with family/caregiver  CAM boot LLE  -LS               User Key  (r) = Recorded By, (t) = Taken By, (c) = Cosigned By      Initials Name Provider Type    LS Sanam Menard, PT Physical Therapist                   Outcome Measures       Row Name 08/24/24 1522          How much help from another person do you currently need...    Turning from your back to your side while in flat bed without using bedrails? 3  -LS     Moving from lying on back to sitting on the side of a flat bed without bedrails? 3  -LS     Moving to and from a bed to a chair (including a wheelchair)? 3  -LS     Standing up from a chair using your arms (e.g., wheelchair, bedside chair)? 3  -LS     Climbing 3-5 steps with a railing? 1  -LS     To walk in hospital room? 2  -LS     AM-PAC 6 Clicks Score (PT) 15  -LS     Highest Level of Mobility Goal 4 --> Transfer to chair/commode  -LS               User Key  (r) = Recorded By, (t) = Taken By, (c) = Cosigned By       Initials Name Provider Type     Sanam Menard, PT Physical Therapist                                 Physical Therapy Education       Title: PT OT SLP Therapies (In Progress)       Topic: Physical Therapy (Done)       Point: Mobility training (Done)       Learning Progress Summary             Patient Acceptance, E,D, VU,NR by  at 8/24/2024 1523    Acceptance, E, NR by AC at 8/22/2024 1540   Family Acceptance, E,D, VU,NR by  at 8/24/2024 1523                         Point: Home exercise program (Done)       Learning Progress Summary             Patient Acceptance, E,D, VU,NR by  at 8/24/2024 1523    Acceptance, E, NR by AC at 8/22/2024 1540   Family Acceptance, E,D, VU,NR by  at 8/24/2024 1523                         Point: Body mechanics (Done)       Learning Progress Summary             Patient Acceptance, E,D, VU,NR by  at 8/24/2024 1523    Acceptance, E, NR by AC at 8/22/2024 1540   Family Acceptance, E,D, VU,NR by  at 8/24/2024 1523                         Point: Precautions (Done)       Learning Progress Summary             Patient Acceptance, E,D, VU,NR by  at 8/24/2024 1523    Acceptance, E, NR by AC at 8/22/2024 1540   Family Acceptance, E,D, VU,NR by  at 8/24/2024 1523                                         User Key       Initials Effective Dates Name Provider Type Discipline     02/03/23 -  Sanam Menard, PT Physical Therapist PT     07/11/24 -  Shala Morrison, PT Physical Therapist PT                  PT Recommendation and Plan     Plan of Care Reviewed With: patient, family  Progress: improving  Outcome Evaluation: Issued and donned CAM boot to pt's LLE with further education re: maintaining NWB status. Pt progressed in safety re: maintaining WB precaution during transfers (minx2 A), but continued to demonstrate difficulty in maintaining precautions during small steps EOB to recliner (modx2 A). Educated pt/ spouse re: LE HEP and benefit of promoting attention to pt's L side. Will  cont PT POC.     Time Calculation:         PT Charges       Row Name 08/24/24 1524             Time Calculation    Start Time 1120  -LS      PT Received On 08/24/24  -LS         Timed Charges    37630 - PT Therapeutic Exercise Minutes 4  -LS      42048 - Gait Training Minutes  11  -LS      28300 - PT Therapeutic Activity Minutes 23  -LS         Total Minutes    Timed Charges Total Minutes 38  -LS       Total Minutes 38  -LS                User Key  (r) = Recorded By, (t) = Taken By, (c) = Cosigned By      Initials Name Provider Type     aSnam Menard, PT Physical Therapist                  Therapy Charges for Today       Code Description Service Date Service Provider Modifiers Qty    56048317922 HC PT THER PROC EA 15 MIN 8/24/2024 Sanam Menard, PT GP 1    43079878743 HC GAIT TRAINING EA 15 MIN 8/24/2024 Sanam Menard, PT GP 1    35123456923 HC PT THER SUPP EA 15 MIN 8/24/2024 Sanam Menard, PT GP 2    84555594755 HC PT THERAPEUTIC ACT EA 15 MIN 8/24/2024 Sanam Menard, PT GP 1            PT G-Codes  Outcome Measure Options: AM-PAC 6 Clicks Daily Activity (OT)  AM-PAC 6 Clicks Score (PT): 15  AM-PAC 6 Clicks Score (OT): 10  Modified [place] Scale: 4 - Moderately severe disability.  Unable to walk without assistance, and unable to attend to own bodily needs without assistance.       Sanam Menard, PT  8/24/2024

## 2024-08-25 LAB
ANION GAP SERPL CALCULATED.3IONS-SCNC: 12 MMOL/L (ref 5–15)
BUN SERPL-MCNC: 30 MG/DL (ref 6–20)
BUN/CREAT SERPL: 53.6 (ref 7–25)
CALCIUM SPEC-SCNC: 8.8 MG/DL (ref 8.6–10.5)
CHLORIDE SERPL-SCNC: 109 MMOL/L (ref 98–107)
CO2 SERPL-SCNC: 21 MMOL/L (ref 22–29)
CREAT SERPL-MCNC: 0.56 MG/DL (ref 0.57–1)
EGFRCR SERPLBLD CKD-EPI 2021: 109.3 ML/MIN/1.73
GLUCOSE BLDC GLUCOMTR-MCNC: 113 MG/DL (ref 70–130)
GLUCOSE BLDC GLUCOMTR-MCNC: 136 MG/DL (ref 70–130)
GLUCOSE BLDC GLUCOMTR-MCNC: 137 MG/DL (ref 70–130)
GLUCOSE SERPL-MCNC: 152 MG/DL (ref 65–99)
OSMOLALITY SERPL: 307 MOSM/KG (ref 275–295)
OSMOLALITY SERPL: 308 MOSM/KG (ref 275–295)
POTASSIUM SERPL-SCNC: 4 MMOL/L (ref 3.5–5.2)
SODIUM SERPL-SCNC: 142 MMOL/L (ref 136–145)
SODIUM SERPL-SCNC: 143 MMOL/L (ref 136–145)
SODIUM SERPL-SCNC: 144 MMOL/L (ref 136–145)
SODIUM SERPL-SCNC: 145 MMOL/L (ref 136–145)

## 2024-08-25 PROCEDURE — 99232 SBSQ HOSP IP/OBS MODERATE 35: CPT | Performed by: INTERNAL MEDICINE

## 2024-08-25 PROCEDURE — 97530 THERAPEUTIC ACTIVITIES: CPT

## 2024-08-25 PROCEDURE — 80048 BASIC METABOLIC PNL TOTAL CA: CPT | Performed by: STUDENT IN AN ORGANIZED HEALTH CARE EDUCATION/TRAINING PROGRAM

## 2024-08-25 PROCEDURE — 82948 REAGENT STRIP/BLOOD GLUCOSE: CPT

## 2024-08-25 PROCEDURE — 84295 ASSAY OF SERUM SODIUM: CPT | Performed by: STUDENT IN AN ORGANIZED HEALTH CARE EDUCATION/TRAINING PROGRAM

## 2024-08-25 PROCEDURE — 97110 THERAPEUTIC EXERCISES: CPT

## 2024-08-25 PROCEDURE — 25810000003 SODIUM CHLORIDE 3 % SOLUTION: Performed by: STUDENT IN AN ORGANIZED HEALTH CARE EDUCATION/TRAINING PROGRAM

## 2024-08-25 PROCEDURE — 83930 ASSAY OF BLOOD OSMOLALITY: CPT | Performed by: STUDENT IN AN ORGANIZED HEALTH CARE EDUCATION/TRAINING PROGRAM

## 2024-08-25 PROCEDURE — 99233 SBSQ HOSP IP/OBS HIGH 50: CPT | Performed by: STUDENT IN AN ORGANIZED HEALTH CARE EDUCATION/TRAINING PROGRAM

## 2024-08-25 RX ORDER — HYDROCODONE BITARTRATE AND ACETAMINOPHEN 7.5; 325 MG/1; MG/1
1 TABLET ORAL EVERY 6 HOURS PRN
Status: DISCONTINUED | OUTPATIENT
Start: 2024-08-25 | End: 2024-08-28 | Stop reason: HOSPADM

## 2024-08-25 RX ORDER — SODIUM CHLORIDE 1 G/1
1 TABLET ORAL 3 TIMES DAILY PRN
Status: DISCONTINUED | OUTPATIENT
Start: 2024-08-25 | End: 2024-08-28 | Stop reason: HOSPADM

## 2024-08-25 RX ORDER — SODIUM CHLORIDE 1 G/1
1 TABLET ORAL 3 TIMES DAILY PRN
Status: DISCONTINUED | OUTPATIENT
Start: 2024-08-25 | End: 2024-08-25

## 2024-08-25 RX ADMIN — FOLIC ACID 1 MG: 1 TABLET ORAL at 08:20

## 2024-08-25 RX ADMIN — ACETAMINOPHEN 650 MG: 650 SOLUTION ORAL at 12:45

## 2024-08-25 RX ADMIN — ASPIRIN 81 MG 81 MG: 81 TABLET ORAL at 08:20

## 2024-08-25 RX ADMIN — HYDROCODONE BITARTRATE AND ACETAMINOPHEN 1 TABLET: 7.5; 325 TABLET ORAL at 18:51

## 2024-08-25 RX ADMIN — Medication 30 ML: at 08:20

## 2024-08-25 RX ADMIN — TICAGRELOR 60 MG: 60 TABLET ORAL at 21:20

## 2024-08-25 RX ADMIN — TICAGRELOR 60 MG: 60 TABLET ORAL at 08:20

## 2024-08-25 RX ADMIN — HYDROCODONE BITARTRATE AND ACETAMINOPHEN 1 TABLET: 7.5; 325 TABLET ORAL at 14:52

## 2024-08-25 RX ADMIN — HYDROCODONE BITARTRATE AND ACETAMINOPHEN 1 TABLET: 7.5; 325 TABLET ORAL at 08:20

## 2024-08-25 RX ADMIN — HYDROCODONE BITARTRATE AND ACETAMINOPHEN 1 TABLET: 7.5; 325 TABLET ORAL at 00:27

## 2024-08-25 RX ADMIN — SODIUM CHLORIDE 35 ML/HR: 3 INJECTION, SOLUTION INTRAVENOUS at 08:19

## 2024-08-25 RX ADMIN — Medication 10 ML: at 08:20

## 2024-08-25 RX ADMIN — ACETAMINOPHEN 650 MG: 650 SOLUTION ORAL at 05:20

## 2024-08-25 RX ADMIN — ACETAMINOPHEN 650 MG: 650 SOLUTION ORAL at 21:28

## 2024-08-25 RX ADMIN — ATORVASTATIN CALCIUM 80 MG: 40 TABLET, FILM COATED ORAL at 21:20

## 2024-08-25 NOTE — PLAN OF CARE
Goal Outcome Evaluation:  Plan of Care Reviewed With: patient, spouse        Progress: improving  Outcome Evaluation: Pt progressed to STS with Veda of 1 person and ability to ambulate 4 ft (mod x2 A, noted decreased ability to maintain NWB status with fatigue). Cont to be limited by L weakness/ inattention and impulsivity, requiring constant vc's for safety and WB precautions. Educated pt/ spouse re: benefit of additional ther ex throughout day and benefit of keeping LUE propped on pillows for shoulder joint protection. Will cont PT POC.

## 2024-08-25 NOTE — PROGRESS NOTES
Stroke Progress Note       Chief Complaint: Left-sided weakness    Subjective    Subjective     Subjective:  The patient is lying down in the bed in NAD.   was at the bedside.  The patient stated that she continues to do better.  She still unable to to move her left upper extremity.  Denies having any new stroke or strokelike symptoms.  Stated that she continues to have mild headache.  Tylenol is helping with the headache.  All patient's and  questions were answered.    Review of Systems   Constitutional: No fatigue  Eyes: Negative for redness.   Respiratory: Negative for cough.      Musculoskeletal: Negative for joint swelling.   Neurological: Mild headaches  Psychiatric/Behavioral: Negative for hallucinations.   Objective      Temp:  [99 °F (37.2 °C)-100 °F (37.8 °C)] 99 °F (37.2 °C)  Heart Rate:  [58-86] 64  Resp:  [16-20] 18  BP: (108-158)/(65-94) 114/73        Objective    GEN: lying in bed; in NAD, extubated  HENT: normocephalic, non-erythematous oropharynx  CV: no LE edema  PULM: non-labored breathing  EXT: no clubbing, cyanosis, or erythema  SKIN: no rashes or lesions    NEURO:  Mental Status: A&O x 3, interactive, able to follow commands  Speech: Intact Articulation  CN 2-12:  II - PERRLA  III, IV, VI - EOMI  V - Facial sensation intact  VII - Brow raise and smile symmetrical  VIII - Auditory acuity intact  IX, X - Palate elevation symmetric, uvula midline  XI - SCM and Trapezius strength intact  XII - Tongue protrudes midline    Motor:  RUE: 5/5  LUE: Can shrug shoulder little bit and her flickering movement on her fingers sometimes.  RLE: 5/5  LLE: 5/5    Sensory: intact light touch throughout  Reflexes: 2+ throughout, negative Romero's sign on the RUE  Coordination: no ataxia with finger-to-nose testing on the right upper extremity  Gait/Station: deferred   Cortical: No Extinction    Results Review:    I reviewed the patient's new clinical results.    WBC   Date Value Ref Range Status    08/23/2024 11.61 (H) 3.40 - 10.80 10*3/mm3 Final     RBC   Date Value Ref Range Status   08/23/2024 4.30 3.77 - 5.28 10*6/mm3 Final     Hemoglobin   Date Value Ref Range Status   08/23/2024 13.6 12.0 - 15.9 g/dL Final     Hematocrit   Date Value Ref Range Status   08/23/2024 40.6 34.0 - 46.6 % Final     MCV   Date Value Ref Range Status   08/23/2024 94.4 79.0 - 97.0 fL Final     MCH   Date Value Ref Range Status   08/23/2024 31.6 26.6 - 33.0 pg Final     MCHC   Date Value Ref Range Status   08/23/2024 33.5 31.5 - 35.7 g/dL Final     RDW   Date Value Ref Range Status   08/23/2024 12.9 12.3 - 15.4 % Final     RDW-SD   Date Value Ref Range Status   08/23/2024 44.4 37.0 - 54.0 fl Final     MPV   Date Value Ref Range Status   08/23/2024 10.0 6.0 - 12.0 fL Final     Platelets   Date Value Ref Range Status   08/23/2024 219 140 - 450 10*3/mm3 Final     Neutrophil %   Date Value Ref Range Status   08/23/2024 79.2 (H) 42.7 - 76.0 % Final     Lymphocyte %   Date Value Ref Range Status   08/23/2024 15.6 (L) 19.6 - 45.3 % Final     Monocyte %   Date Value Ref Range Status   08/23/2024 4.7 (L) 5.0 - 12.0 % Final     Eosinophil %   Date Value Ref Range Status   08/23/2024 0.1 (L) 0.3 - 6.2 % Final     Basophil %   Date Value Ref Range Status   08/23/2024 0.2 0.0 - 1.5 % Final     Immature Grans %   Date Value Ref Range Status   08/23/2024 0.2 0.0 - 0.5 % Final     Neutrophils, Absolute   Date Value Ref Range Status   08/23/2024 9.20 (H) 1.70 - 7.00 10*3/mm3 Final     Lymphocytes, Absolute   Date Value Ref Range Status   08/23/2024 1.81 0.70 - 3.10 10*3/mm3 Final     Monocytes, Absolute   Date Value Ref Range Status   08/23/2024 0.55 0.10 - 0.90 10*3/mm3 Final     Eosinophils, Absolute   Date Value Ref Range Status   08/23/2024 0.01 0.00 - 0.40 10*3/mm3 Final     Basophils, Absolute   Date Value Ref Range Status   08/23/2024 0.02 0.00 - 0.20 10*3/mm3 Final     Immature Grans, Absolute   Date Value Ref Range Status   08/23/2024  0.02 0.00 - 0.05 10*3/mm3 Final     nRBC   Date Value Ref Range Status   08/23/2024 0.0 0.0 - 0.2 /100 WBC Final       Lab Results   Component Value Date    GLUCOSE 127 (H) 08/24/2024    BUN 30 (H) 08/24/2024    CREATININE 0.57 08/24/2024     08/25/2024    K 4.2 08/24/2024     (H) 08/24/2024    CALCIUM 9.2 08/24/2024    PROTEINTOT 6.6 08/21/2024    ALBUMIN 4.0 08/21/2024    ALT 19 08/21/2024    ALT 20 08/21/2024    AST 21 08/21/2024    AST 22 08/21/2024    ALKPHOS 137 (H) 08/21/2024    BILITOT 0.3 08/21/2024    GLOB 2.6 08/21/2024    AGRATIO 1.5 08/21/2024    BCR 52.6 (H) 08/24/2024    ANIONGAP 10.0 08/24/2024    EGFR 108.8 08/24/2024     CT Head Without Contrast    Result Date: 8/24/2024  Evolving large right MCA territory infarct with loss of gray-white matter differentiation within much of the right MCA territory. No significant change in 1.5 cm oblong hyperdensity in the right frontotemporal region, compatible with small hemorrhage. Questionable additional tiny focus of petechial hemorrhage within the right frontoparietal region on series 2, image 33. There is effacement of right frontoparietal cortical sulci without significant midline shift. The above findings were discussed with Elizabeth Suh via telephone on 8/24/2024 8:24 AM EDT. Electronically Signed: Ander Rashid MD  8/24/2024 8:25 AM EDT  Workstation ID: [id number]    CT Lower Extremity Left Without Contrast    Result Date: 8/23/2024  Impression: Nondisplaced fracture of the distal fibular metaphysis. No additional fracture identified. No evidence of widening of the medial clear space. There is evidence of previous ligamentous injury. Electronically Signed: Aruna Del Valle MD  8/23/2024 11:03 PM EDT  Workstation ID: [id number]   Results for orders placed during the hospital encounter of 08/21/24    Adult Transthoracic Echo Complete W/ Cont if Necessary Per Protocol (With Agitated Saline)    Interpretation Summary    Left ventricular systolic  function is normal. Calculated left ventricular EF = 68% Normal left ventricular cavity size noted. Left ventricular wall thickness is consistent with moderate concentric hypertrophy. All left ventricular wall segments contract normally. Left ventricular diastolic function was normal. Normal left atrial pressure.    The right ventricular cavity is mildly dilated. Normal right ventricular systolic function noted.    Left atrial volume is moderately increased. Saline test results are negative.    The aortic valve is structurally normal with no stenosis present. The aortic valve appears trileaflet. No significant aortic valve regurgitation is present.    The mitral valve is structurally normal with no significant stenosis present. Trace to mild mitral valve regurgitation is present.    Mild tricuspid valve regurgitation is present. Estimated right ventricular systolic pressure from tricuspid regurgitation is mildly elevated (35-45 mmHg)    Mild dilation of the ascending aorta is present. Ascending aorta = 3.7 cm    -MRI of the brain from August 22, 2024 images were personally reviewed and showed evaluation of the right MCA acute ischemic stroke with a small area of hemorrhagic conversion on the insular region.  -A1c on August 22, 2024 was 5.5  -LDL on August 22, 2024 was 107  -Echocardiogram from August 21, 2024 for report showed left ventricular ejection fraction of about 68%,left atrial cavity is mildly dilated and no PFO or shunt.  -Repeat CT head from August 22, 2024 images were personally reviewed and showed stable hemorrhagic conversion in the right insular region in addition to involving right MCA acute ischemic stroke.      Assessment/Plan   53-year-old  female with multiple vascular risk factors who presented to [facility] emergency department as a scene flight with complaints of left-sided weakness and neglect.  She also has complaints of severe headache.  CT head shows hyperdense right  MCA.  Secondary to extended last known well she is not deemed to be an appropriate IV thrombolytic therapy candidate.  CT perfusion is indicative of an area of reversible ischemia therefore she was taken to the Cath Lab for planned mechanical thrombectomy.  She will be admitted to the neurological ICU s/p procedure for further monitoring and stroke workup.     Antiplatelet PTA: ASA 81 mg  Anticoagulant PTA: None        #Acute right MCA stroke s/p mechanical thrombectomy  #Acute right ICA occlusion s/p carotid stent  #History of seizures  -Mechanism of stroke is likely atheroembolic in the setting of right internal carotid artery atherosclerosis.  -MRI of the brain from August 22, 2024 images were personally reviewed and showed evaluation of the right MCA acute ischemic stroke with a small area of hemorrhagic conversion on the insular region.  -A1c on August 22, 2024 was 5.5  -LDL on August 22, 2024 was 107  -Echocardiogram from August 21, 2024 for report showed left ventricular ejection fraction of about 68%,left atrial cavity is mildly dilated and no PFO or shunt.  -Repeat CT head from August 22, 2024 images were personally reviewed and showed stable hemorrhagic conversion in the right insular region in addition to involving right MCA acute ischemic stroke.    Recommendations  -Continue DAPT with aspirin 81 mg daily and Brilinta 60 mg twice daily for secondary stroke prevention in the setting of recent right ICA stent placement.  Please note that the patient most recent MRI showed small hemorrhagic conversion on the right insular region, however, the benefit of giving dual antiplatelet outweighed the risk of worsening hemorrhagic conversion at this time.  Risk and benefit was discussed with the patient and her  who both agreed with the above-stated plan.  -Will relax sodium goals to normonatremia in the range of 145.  Consider salt tabs if patient sodium dropped to the high 130s.  -Consider stat CT head for  any change in neurological exam to evaluate for expansion of hematoma on the left insula.  -Target systolic blood pressure less than 140  -Bedrest x 2 hours s/p procedure then patient can be up as tolerated, fall risk precautions  -PT/OT/SLP evaluation     #Essential hypertension  -Target normotension  -Nicardipine for SBP >140     #Hyperlipidemia  -Atorvastatin 80 mg nightly     #DM 2  -Maintain euglycemia, goal blood glucose 140-180  -Management per primary team     #Tobacco abuse  -Patient may require nicotine replacement therapy during admission  -Tobacco cessation education to be provided when appropriate     #EtOH use  -Patient reports that she drinks tequila, last drink was approximately 1 week ago  -CIWA protocol per primary care      Stroke will continue to follow. Please call for any further questions or concerns  =================================  Juan Finnegan MD, Msc, PhD  Vascular Neurologist  [facility]

## 2024-08-25 NOTE — PROGRESS NOTES
Intensive Care Follow-up     Hospital:  LOS: 4 days   Ms. Dalila OCASIO, 53 y.o. female is followed for:   Acute CVA (cerebrovascular accident)        Subjective     Dalila OCASIO is a 53 y.o. female who presents to [facility] ED 08/21/24 due to stroke concern.      Patient has a PMH of tobacco and ETOH use, H/O narcotic abuse, HTN, T2DM, dyslipidemia, obesity, seizures, and PAD.      Per report, patient awoke at ~0930 with slurred speech, neglect, left-sided hemiparesis, and severe headache, ultimately prompting call to EMS. She was brought to our ED via air evac where upon arrival was noted to be significantly hypertensive (/115 mmHg). Initial NIHSS 17 and CTH showed hyperdense right MCA sign. She was premedicated (documented contrast allergy) prior to subsequent CTA H/N and CTP which further revealed occlusion of the right internal carotid artery from its origin, occlusion of the right M1 MCA segment, with large area of reversible ischemia with core infarct present. She was evaluated by Neurology was deemed not a candidate for thrombolytics (2* outside timing window) but was felt to benefit from endovascular intervention. She was taken to Cath Lab emergently where she underwent right ICA stent placement and mechanical thrombectomy per Dr. Saini. Postprocedure she was admitted to ICU for further management.   Interval History:  The chart has been reviewed.  Maximum temperature has been 100 areas Fahrenheit.  Left upper extremity remains flaccid.  Still with some garbled speech.    The patient's past medical, surgical and social history were reviewed and updated in Epic as appropriate.        Objective     Infusions:  niCARdipine, 5-15 mg/hr, Last Rate: Stopped (08/25/24 0015)      Medications:  aspirin, 81 mg, Nasogastric, Daily   Or  aspirin, 300 mg, Rectal, Daily  atorvastatin, 80 mg, Nasogastric, Nightly  folic acid, 1 mg, Nasogastric, Daily  insulin regular, 2-9 Units, Subcutaneous, Q6H  nicotine, 1 patch,  "Transdermal, Q24H  ProSource No Carb, 30 mL, Nasogastric, Daily  ticagrelor, 60 mg, Nasogastric, BID        Vital Sign Min/Max for last 24 hours  Temp  Min: 98.4 °F (36.9 °C)  Max: 100 °F (37.8 °C)   BP  Min: 109/81  Max: 158/91   Pulse  Min: 62  Max: 86   Resp  Min: 16  Max: 20   SpO2  Min: 89 %  Max: 100 %   No data recorded       Input/Output for last 24 hour shift  08/24 0701 - 08/25 0700  In: 3069.5 [I.V.:1524.5]  Out: 2600 [Urine:2600]      Objective:  General Appearance:  In no acute distress and comfortable.    Vital signs: (most recent): Blood pressure 133/76, pulse 68, temperature 98.4 °F (36.9 °C), temperature source Bladder, resp. rate 18, height 172.7 cm (68\"), weight 111 kg (244 lb 14.9 oz), SpO2 97%.    HEENT: Normal HEENT exam.    Lungs:  Normal effort and normal respiratory rate.  Breath sounds clear to auscultation.    Heart: Normal rate.  Regular rhythm.  S1 normal and S2 normal.  No murmur.   Abdomen: Abdomen is soft and non-distended.    Extremities: (Flaccid left upper extremity.)  Neurological: Patient is alert and oriented to person, place and time.    Pupils:  Pupils are equal, round, and reactive to light.  Pupils are equal.   Skin:  Warm.                Results from last 7 days   Lab Units 08/23/24  0148 08/22/24  0341 08/21/24  1208 08/21/24  1206   WBC 10*3/mm3 11.61* 14.23*  --  7.49   HEMOGLOBIN g/dL 13.6 13.2  --  15.2   HEMOGLOBIN, POC g/dL  --   --  15.3  --    PLATELETS 10*3/mm3 219 226  --  215     Results from last 7 days   Lab Units 08/25/24  0548 08/25/24  0105 08/24/24  1756 08/24/24  1230 08/24/24  0713 08/23/24  0708 08/23/24  0148 08/22/24  1055 08/22/24  0341   SODIUM mmol/L 142  145 144 144   < > 142  143   < > 142   < > 140   POTASSIUM mmol/L 4.0  --  4.2  --  3.8   < >  --    < > 4.1   CO2 mmol/L 21.0*  --  23.0  --  23.0   < >  --    < > 22.0   BUN mg/dL 30*  --  30*  --  25*   < >  --    < > 18   CREATININE mg/dL 0.56*  --  0.57  --  0.57   < >  --    < > 0.56* "   MAGNESIUM mg/dL  --   --   --   --   --   --  2.3  --  2.0   PHOSPHORUS mg/dL  --   --   --   --   --   --  2.7  --  3.0   GLUCOSE mg/dL 152*  --  127*  --  126*   < >  --    < > 137*    < > = values in this interval not displayed.     Estimated Creatinine Clearance: 151.7 mL/min (A) (by C-G formula based on SCr of 0.56 mg/dL (L)).            I reviewed the patient's results and images.     Assessment & Plan   Impression        Acute CVA (cerebrovascular accident)    Alcohol use    Tobacco use    Obesity (BMI 30-39.9)    HTN (hypertension)    Dyslipidemia    History of seizures    PAD (peripheral artery disease)       Plan        Continue with neurological checks.  Plan is to stop hypertonic saline today.  Continue with speech and physical therapy.  When hypertonic saline has been discontinued we can potentially transition to telemetry.      Plan of care and goals reviewed with mulitdisciplinary/antibiotic stewardship team during rounds.   I discussed the patient's findings and my recommendations with patient, nursing staff, and consulting provider       Mehdi Holcomb MD, [facility]  Pulmonology and Critical Care Medicine

## 2024-08-25 NOTE — THERAPY TREATMENT NOTE
Patient Name: Dalila OCASIO  : 1970    MRN: [id number]                              Today's Date: 2024       Admit Date: 2024    Visit Dx:     ICD-10-CM ICD-9-CM   1. Acute CVA (cerebrovascular accident)  I63.9 434.91   2. Left-sided neglect  R41.4 781.8   3. Oropharyngeal dysphagia  R13.12 787.22   4. Dysarthria  R47.1 784.51     Patient Active Problem List   Diagnosis    Acute CVA (cerebrovascular accident)    Alcohol use    Tobacco use    Obesity (BMI 30-39.9)    HTN (hypertension)    Dyslipidemia    History of seizures    PAD (peripheral artery disease)     Past Medical History:   Diagnosis Date    Acute CVA (cerebrovascular accident) 2024    Alcohol use 2024    Dyslipidemia 2024    Obesity (BMI 30-39.9) 2024    PAD (peripheral artery disease) 2024    Tobacco use 2024     Past Surgical History:   Procedure Laterality Date    INTERVENTIONAL RADIOLOGY PROCEDURE Bilateral 2024    Procedure: Carotid Cervical Angiogram;  Surgeon: Jamaal Saini MD;  Location:  [facility] INVASIVE LOCATION;  Service: Interventional Radiology;  Laterality: Bilateral;      General Information       Row Name 24 1358          Physical Therapy Time and Intention    Document Type therapy note (daily note)  -     Mode of Treatment physical therapy  -       Row Name 24 1358          General Information    Existing Precautions/Restrictions fall;other (see comments)   CAM boot for L ankle immobilization and NWB LLE per ortho; L weakness/ inattention  -       Row Name 24 1353          Cognition    Orientation Status (Cognition) oriented x 3  -       Row Name 24 1359          Safety Issues, Functional Mobility    Safety Issues Affecting Function (Mobility) impulsivity;insight into deficits/self-awareness;safety precautions follow-through/compliance  -     Impairments Affecting Function (Mobility) balance;coordination;endurance/activity tolerance;range  of motion (ROM);strength;sensation/sensory awareness  -               User Key  (r) = Recorded By, (t) = Taken By, (c) = Cosigned By      Initials Name Provider Type    Sanam Bautista, PT Physical Therapist                   Mobility       Row Name 08/25/24 1355          Bed Mobility    Supine-Sit [place] (Bed Mobility) minimum assist (75% patient effort);1 person assist;verbal cues;nonverbal cues (demo/gesture)  -LS     Assistive Device (Bed Mobility) head of bed elevated;draw sheet  -       Row Name 08/25/24 1359          Sit-Stand Transfer    Sit-Stand [place] (Transfers) minimum assist (75% patient effort);verbal cues;2 person assist  -LS     Comment, (Sit-Stand Transfer) STS x2: from EOB and from recliner. Progressed to Veda x1 person on second attempt.  -       Row Name 08/25/24 1355          Gait/Stairs (Locomotion)    [place] Level (Gait) moderate assist (50% patient effort);2 person assist;verbal cues  -LS     Assistive Device (Gait) walker, front-wheeled  -LS     Patient was able to Ambulate yes  -LS     Distance in Feet (Gait) 4  -LS     Left Sided Gait Deviations weight shift ability decreased  -LS     Comment, (Gait/Stairs) EOB to recliner with constant vc's for safety awareness and appropriate technique to promote NWB LLE (CAM boot intact). Pt unable to maintain precautions as she fatigues.  -               User Key  (r) = Recorded By, (t) = Taken By, (c) = Cosigned By      Initials Name Provider Type    Sanam Bautista, PT Physical Therapist                   Obj/Interventions       Row Name 08/25/24 1402          Motor Skills    Therapeutic Exercise knee;ankle  -       Row Name 08/25/24 1402          Knee (Therapeutic Exercise)    Knee Strengthening (Therapeutic Exercise) bilateral;LAQ (long arc quad);sitting;10 repetitions  -       Row Name 08/25/24 1402          Ankle (Therapeutic Exercise)    Ankle AROM (Therapeutic Exercise) right;dorsiflexion;plantarflexion;10  repetitions;supine  -LS       Row Name 08/25/24 1402          Balance    Static Sitting Balance contact guard  -LS     Position, Sitting Balance sitting edge of bed  -LS     Static Standing Balance minimal assist;verbal cues  -LS     Position/Device Used, Standing Balance supported  -LS     Balance Interventions standing;dynamic;weight shifting activity;single limb stance  -LS     Comment, Balance performed L hip flexion for foot clearance while standing on RLE x3 reps  -LS               User Key  (r) = Recorded By, (t) = Taken By, (c) = Cosigned By      Initials Name Provider Type    Sanam Bautista, PT Physical Therapist                   Goals/Plan    No documentation.                  Clinical Impression       Row Name 08/25/24 1405          Pain    Pretreatment Pain Rating 8/10  -LS     Posttreatment Pain Rating 8/10  -LS     Pain Location - head  -LS     Pre/Posttreatment Pain Comment notified RN; tolerated  -LS     Pain Intervention(s) Repositioned;Ambulation/increased activity  -       Row Name 08/25/24 1405          Plan of Care Review    Plan of Care Reviewed With patient;spouse  -     Progress improving  -     Outcome Evaluation Pt progressed to STS with Veda of 1 person and ability to ambulate 4 ft (mod x2 A, noted decreased ability to maintain NWB status with fatigue). Cont to be limited by L weakness/ inattention and impulsivity, requiring constant vc's for safety and WB precautions. Educated pt/ spouse re: benefit of additional ther ex throughout day and benefit of keeping LUE propped on pillows for shoulder joint protection. Will cont PT POC.  -LS       Row Name 08/25/24 1405          Vital Signs    Pre Systolic BP Rehab 124  -LS     Pre Treatment Diastolic BP 82  -LS     Pretreatment Heart Rate (beats/min) 72  -LS     Posttreatment Heart Rate (beats/min) 76  -LS     Pre SpO2 (%) 99  -LS     O2 Delivery Pre Treatment room air  -LS     O2 Delivery Intra Treatment room air  -LS     Post SpO2  (%) 97  -LS     O2 Delivery Post Treatment room air  -LS     Pre Patient Position Supine  -LS     Intra Patient Position Standing  -LS     Post Patient Position Sitting  -LS       Row Name 08/25/24 1405          Positioning and Restraints    Pre-Treatment Position in bed  -LS     Post Treatment Position chair  -LS     In Chair notified nsg;reclined;call light within reach;encouraged to call for assist;exit alarm on;with nsg;waffle cushion;legs elevated;RUE elevated;LUE elevated;with family/caregiver;on mechanical lift sling;heels elevated  L CAM boot  -LS               User Key  (r) = Recorded By, (t) = Taken By, (c) = Cosigned By      Initials Name Provider Type    Sanam Bautista, PT Physical Therapist                   Outcome Measures       Row Name 08/25/24 1408          How much help from another person do you currently need...    Turning from your back to your side while in flat bed without using bedrails? 3  -LS     Moving from lying on back to sitting on the side of a flat bed without bedrails? 3  -LS     Moving to and from a bed to a chair (including a wheelchair)? 2  -LS     Standing up from a chair using your arms (e.g., wheelchair, bedside chair)? 3  -LS     Climbing 3-5 steps with a railing? 2  -LS     To walk in hospital room? 2  -LS     AM-PAC 6 Clicks Score (PT) 15  -LS     Highest Level of Mobility Goal 4 --> Transfer to chair/commode  -LS               User Key  (r) = Recorded By, (t) = Taken By, (c) = Cosigned By      Initials Name Provider Type    Sanam Bautista, PT Physical Therapist                                 Physical Therapy Education       Title: PT OT SLP Therapies (In Progress)       Topic: Physical Therapy (Done)       Point: Mobility training (Done)       Learning Progress Summary             Patient Acceptance, E,D, VU,NR by LS at 8/25/2024 1410    Acceptance, E,D, VU,NR by LS at 8/24/2024 1523    Acceptance, E, NR by  at 8/22/2024 1540   Family Acceptance, E,D, VU,NR by PAIGE  at 8/25/2024 1410    Acceptance, E,D, VU,NR by LS at 8/24/2024 1523   Significant Other Acceptance, E,D, VU,NR by LS at 8/25/2024 1410                         Point: Home exercise program (Done)       Learning Progress Summary             Patient Acceptance, E,D, VU,NR by LS at 8/25/2024 1410    Acceptance, E,D, VU,NR by LS at 8/24/2024 1523    Acceptance, E, NR by AC at 8/22/2024 1540   Family Acceptance, E,D, VU,NR by LS at 8/25/2024 1410    Acceptance, E,D, VU,NR by LS at 8/24/2024 1523   Significant Other Acceptance, E,D, VU,NR by LS at 8/25/2024 1410                         Point: Body mechanics (Done)       Learning Progress Summary             Patient Acceptance, E,D, VU,NR by LS at 8/25/2024 1410    Acceptance, E,D, VU,NR by LS at 8/24/2024 1523    Acceptance, E, NR by AC at 8/22/2024 1540   Family Acceptance, E,D, VU,NR by LS at 8/25/2024 1410    Acceptance, E,D, VU,NR by LS at 8/24/2024 1523   Significant Other Acceptance, E,D, VU,NR by LS at 8/25/2024 1410                         Point: Precautions (Done)       Learning Progress Summary             Patient Acceptance, E,D, VU,NR by LS at 8/25/2024 1410    Acceptance, E,D, VU,NR by LS at 8/24/2024 1523    Acceptance, E, NR by AC at 8/22/2024 1540   Family Acceptance, E,D, VU,NR by LS at 8/25/2024 1410    Acceptance, E,D, VU,NR by LS at 8/24/2024 1523   Significant Other Acceptance, E,D, VU,NR by LS at 8/25/2024 1410                                         User Key       Initials Effective Dates Name Provider Type Discipline     02/03/23 -  Sanam Menard, PT Physical Therapist PT    AC 07/11/24 -  Shala Morrison, PT Physical Therapist PT                  PT Recommendation and Plan     Plan of Care Reviewed With: patient, spouse  Progress: improving  Outcome Evaluation: Pt progressed to STS with Veda of 1 person and ability to ambulate 4 ft (mod x2 A, noted decreased ability to maintain NWB status with fatigue). Cont to be limited by L weakness/ inattention  and impulsivity, requiring constant vc's for safety and WB precautions. Educated pt/ spouse re: benefit of additional ther ex throughout day and benefit of keeping LUE propped on pillows for shoulder joint protection. Will cont PT POC.     Time Calculation:         PT Charges       Row Name 08/25/24 1411             Time Calculation    Start Time 1155  -LS      PT Received On 08/25/24  -LS         Timed Charges    34459 - PT Therapeutic Exercise Minutes 9  -LS      59191 - PT Therapeutic Activity Minutes 16  -LS         Total Minutes    Timed Charges Total Minutes 25  -LS       Total Minutes 25  -LS                User Key  (r) = Recorded By, (t) = Taken By, (c) = Cosigned By      Initials Name Provider Type    LS Sanam Menard, PT Physical Therapist                  Therapy Charges for Today       Code Description Service Date Service Provider Modifiers Qty    06196516073 HC PT THER PROC EA 15 MIN 8/24/2024 Sanam Menard, PT GP 1    20573209820 HC GAIT TRAINING EA 15 MIN 8/24/2024 Sanam Menard, PT GP 1    60821405049 HC PT THER SUPP EA 15 MIN 8/24/2024 Sanam Menard, PT GP 2    10776730231 HC PT THERAPEUTIC ACT EA 15 MIN 8/24/2024 Sanam Menard, PT GP 1    71858703189 HC PT THER PROC EA 15 MIN 8/25/2024 Sanam Menard, PT GP 1    04526874235 HC PT THERAPEUTIC ACT EA 15 MIN 8/25/2024 Sanam Menard, PT GP 1            PT G-Codes  Outcome Measure Options: AM-PAC 6 Clicks Daily Activity (OT)  AM-PAC 6 Clicks Score (PT): 15  AM-PAC 6 Clicks Score (OT): 10  Modified Clarence Scale: 4 - Moderately severe disability.  Unable to walk without assistance, and unable to attend to own bodily needs without assistance.       Sanam Menard, PT  8/25/2024

## 2024-08-26 PROBLEM — R13.12 OROPHARYNGEAL DYSPHAGIA: Status: ACTIVE | Noted: 2024-08-26

## 2024-08-26 LAB
GLUCOSE BLDC GLUCOMTR-MCNC: 122 MG/DL (ref 70–130)
GLUCOSE BLDC GLUCOMTR-MCNC: 124 MG/DL (ref 70–130)
GLUCOSE BLDC GLUCOMTR-MCNC: 126 MG/DL (ref 70–130)
GLUCOSE BLDC GLUCOMTR-MCNC: 128 MG/DL (ref 70–130)
GLUCOSE BLDC GLUCOMTR-MCNC: 130 MG/DL (ref 70–130)
SODIUM SERPL-SCNC: 140 MMOL/L (ref 136–145)
SODIUM SERPL-SCNC: 141 MMOL/L (ref 136–145)

## 2024-08-26 PROCEDURE — 25010000002 PROCHLORPERAZINE 10 MG/2ML SOLUTION: Performed by: NURSE PRACTITIONER

## 2024-08-26 PROCEDURE — 99024 POSTOP FOLLOW-UP VISIT: CPT | Performed by: STUDENT IN AN ORGANIZED HEALTH CARE EDUCATION/TRAINING PROGRAM

## 2024-08-26 PROCEDURE — 92526 ORAL FUNCTION THERAPY: CPT

## 2024-08-26 PROCEDURE — 25010000002 DIPHENHYDRAMINE PER 50 MG: Performed by: NURSE PRACTITIONER

## 2024-08-26 PROCEDURE — 97530 THERAPEUTIC ACTIVITIES: CPT

## 2024-08-26 PROCEDURE — 97110 THERAPEUTIC EXERCISES: CPT

## 2024-08-26 PROCEDURE — 82948 REAGENT STRIP/BLOOD GLUCOSE: CPT

## 2024-08-26 PROCEDURE — 99233 SBSQ HOSP IP/OBS HIGH 50: CPT | Performed by: NURSE PRACTITIONER

## 2024-08-26 PROCEDURE — 92507 TX SP LANG VOICE COMM INDIV: CPT

## 2024-08-26 PROCEDURE — 25010000002 PROCHLORPERAZINE 10 MG/2ML SOLUTION

## 2024-08-26 PROCEDURE — 84295 ASSAY OF SERUM SODIUM: CPT

## 2024-08-26 PROCEDURE — 25010000002 DIPHENHYDRAMINE PER 50 MG

## 2024-08-26 PROCEDURE — 99232 SBSQ HOSP IP/OBS MODERATE 35: CPT | Performed by: INTERNAL MEDICINE

## 2024-08-26 PROCEDURE — 25810000003 SODIUM CHLORIDE 0.9 % SOLUTION

## 2024-08-26 RX ORDER — ACETAMINOPHEN 160 MG/5ML
1000 SOLUTION ORAL ONCE
Status: COMPLETED | OUTPATIENT
Start: 2024-08-26 | End: 2024-08-26

## 2024-08-26 RX ORDER — PROCHLORPERAZINE EDISYLATE 5 MG/ML
10 INJECTION INTRAMUSCULAR; INTRAVENOUS ONCE
Status: COMPLETED | OUTPATIENT
Start: 2024-08-26 | End: 2024-08-26

## 2024-08-26 RX ORDER — DIPHENHYDRAMINE HYDROCHLORIDE 50 MG/ML
25 INJECTION INTRAMUSCULAR; INTRAVENOUS ONCE
Status: COMPLETED | OUTPATIENT
Start: 2024-08-26 | End: 2024-08-26

## 2024-08-26 RX ORDER — HYDRALAZINE HYDROCHLORIDE 20 MG/ML
10 INJECTION INTRAMUSCULAR; INTRAVENOUS EVERY 6 HOURS PRN
Status: DISCONTINUED | OUTPATIENT
Start: 2024-08-26 | End: 2024-08-28 | Stop reason: HOSPADM

## 2024-08-26 RX ORDER — ACETAMINOPHEN 160 MG/5ML
650 SOLUTION ORAL EVERY 6 HOURS
Status: DISCONTINUED | OUTPATIENT
Start: 2024-08-26 | End: 2024-08-27

## 2024-08-26 RX ORDER — IBUPROFEN 400 MG/1
400 TABLET, FILM COATED ORAL EVERY 6 HOURS PRN
Status: DISCONTINUED | OUTPATIENT
Start: 2024-08-26 | End: 2024-08-26

## 2024-08-26 RX ADMIN — HYDROCODONE BITARTRATE AND ACETAMINOPHEN 1 TABLET: 7.5; 325 TABLET ORAL at 01:06

## 2024-08-26 RX ADMIN — ACETAMINOPHEN 1000 MG: 650 SOLUTION ORAL at 11:58

## 2024-08-26 RX ADMIN — HYDROCODONE BITARTRATE AND ACETAMINOPHEN 1 TABLET: 7.5; 325 TABLET ORAL at 18:16

## 2024-08-26 RX ADMIN — DIPHENHYDRAMINE HYDROCHLORIDE 25 MG: 50 INJECTION INTRAMUSCULAR; INTRAVENOUS at 11:59

## 2024-08-26 RX ADMIN — DIPHENHYDRAMINE HYDROCHLORIDE 25 MG: 50 INJECTION INTRAMUSCULAR; INTRAVENOUS at 03:48

## 2024-08-26 RX ADMIN — TICAGRELOR 60 MG: 60 TABLET ORAL at 08:49

## 2024-08-26 RX ADMIN — ATORVASTATIN CALCIUM 80 MG: 40 TABLET, FILM COATED ORAL at 21:51

## 2024-08-26 RX ADMIN — ASPIRIN 81 MG 81 MG: 81 TABLET ORAL at 08:49

## 2024-08-26 RX ADMIN — FOLIC ACID 1 MG: 1 TABLET ORAL at 08:49

## 2024-08-26 RX ADMIN — PROCHLORPERAZINE EDISYLATE 10 MG: 5 INJECTION INTRAMUSCULAR; INTRAVENOUS at 03:48

## 2024-08-26 RX ADMIN — HYDROCODONE BITARTRATE AND ACETAMINOPHEN 1 TABLET: 7.5; 325 TABLET ORAL at 08:49

## 2024-08-26 RX ADMIN — TICAGRELOR 60 MG: 60 TABLET ORAL at 21:57

## 2024-08-26 RX ADMIN — SODIUM CHLORIDE 500 ML: 9 INJECTION, SOLUTION INTRAVENOUS at 03:49

## 2024-08-26 RX ADMIN — PROCHLORPERAZINE EDISYLATE 10 MG: 5 INJECTION INTRAMUSCULAR; INTRAVENOUS at 11:59

## 2024-08-26 NOTE — PROGRESS NOTES
"NEUROSURGERY PROGRESS NOTE    Chief Complaint: Stroke    Subjective: Stable overnight.  Patient continues to have mild headache but denies acute worsening.  Shows no new focal neurological deficits.    Objective    Vital Signs: Blood pressure 132/77, pulse 66, temperature 98.6 °F (37 °C), temperature source Oral, resp. rate 18, height 172.7 cm (68\"), weight 111 kg (244 lb 14.9 oz), SpO2 98%.    Physical Exam  Awake, alert and oriented x 3  Opens eyes spont  Pupils 3 mm reactive bilaterally  Extraocular muscles intact bilaterally  Face symmetric bilaterally  Tongue midline  5/5 in the right upper and right lower extremity.  In the left lower extremity she is diffusely 4+ to 5.  No significant movement noted in the left upper extremity    Intake/Output:   Intake/Output Summary (Last 24 hours) at 8/26/2024 0853  Last data filed at 8/26/2024 0600  Gross per 24 hour   Intake 2143.4 ml   Output 1820 ml   Net 323.4 ml       Current Medications:   Current Facility-Administered Medications:     acetaminophen (TYLENOL) 160 MG/5ML oral solution 650 mg, 650 mg, Nasogastric, Q6H PRN, Delmar Collins, PharmD, 650 mg at 08/25/24 2128    acetaminophen (TYLENOL) suppository 650 mg, 650 mg, Rectal, Q6H PRN, Sacha Zhang MD, 650 mg at 08/22/24 0032    aspirin chewable tablet 81 mg, 81 mg, Nasogastric, Daily, 81 mg at 08/26/24 0849 **OR** aspirin suppository 300 mg, 300 mg, Rectal, Daily, Ilana Poole APRN    atorvastatin (LIPITOR) tablet 80 mg, 80 mg, Nasogastric, Nightly, Endy Trejo, DO, 80 mg at 08/25/24 2120    dextrose (D50W) (25 g/50 mL) IV injection 25 g, 25 g, Intravenous, Q15 Min PRN, Kareen Chamberlain DNP, APRN    folic acid (FOLVITE) tablet 1 mg, 1 mg, Nasogastric, Daily, Endy Trejo, DO, 1 mg at 08/26/24 0849    glucagon (GLUCAGEN) injection 1 mg, 1 mg, Intramuscular, Q15 Min PRN, Chamberlain, Kareen N, DNP, APRN    HYDROcodone-acetaminophen (NORCO) 5-325 MG per tablet 1 " tablet, 1 tablet, Nasogastric, Q6H PRN, Endy Trejo, , 1 tablet at 08/24/24 1210    HYDROcodone-acetaminophen (NORCO) 7.5-325 MG per tablet 1 tablet, 1 tablet, Nasogastric, Q6H PRN, Mehdi Holcomb MD, 1 tablet at 08/26/24 0849    insulin regular (humuLIN R,novoLIN R) injection 2-9 Units, 2-9 Units, Subcutaneous, Q6H, Kareen Chamberlain DNP, APRN    niCARdipine (CARDENE) 25mg in 250mL NS infusion, 5-15 mg/hr, Intravenous, Titrated, Ilana Poole APRN, Stopped at 08/25/24 0015    nicotine (NICODERM CQ) 21 MG/24HR patch 1 patch, 1 patch, Transdermal, Q24H, Kareen Chamberlain DNP, APRN, 1 patch at 08/23/24 1632    nitroglycerin (NITROSTAT) SL tablet 0.4 mg, 0.4 mg, Sublingual, Q5 Min PRN, Ilana Poole, APRN    ProSource No Carb oral solution 30 mL, 30 mL, Nasogastric, Daily, Ibis Tariq, TITI, 30 mL at 08/25/24 0820    sodium chloride tablet 1 g, 1 g, Nasogastric, TID PRN, Mehdi Holcomb MD    ticagrelor (BRILINTA) tablet 60 mg, 60 mg, Nasogastric, BID, Endy Trejo, DO, 60 mg at 08/26/24 0849     Laboratory Results:       Lab 08/23/24  0148 08/22/24  0341 08/21/24  1208 08/21/24  1206   WBC 11.61* 14.23*  --  7.49   HEMOGLOBIN 13.6 13.2  --  15.2   HEMOGLOBIN, POC  --   --  15.3  --    HEMATOCRIT 40.6 39.3  --  45.2   HEMATOCRIT POC  --   --  45  --    PLATELETS 219 226  --  215   NEUTROS ABS 9.20* 13.14*  --  5.14   IMMATURE GRANS (ABS) 0.02 0.07*  --  0.02   LYMPHS ABS 1.81 0.73  --  1.83   MONOS ABS 0.55 0.28  --  0.37   EOS ABS 0.01 0.00  --  0.09   MCV 94.4 94.0  --  95.2   CRP 0.43  --   --   --    APTT  --   --   --  25.1         Lab 08/26/24  0807 08/26/24  0019 08/25/24  1810 08/25/24  0548 08/25/24  0105 08/24/24  1756 08/24/24  1230 08/24/24  0713 08/23/24  1833 08/23/24  1219 08/23/24  0708 08/23/24  0148 08/22/24  1055 08/22/24  0341   SODIUM 140 141 143 142  145 144 144   < > 142  143   < > 143  145 142 142   < > 140   POTASSIUM  --    --   --  4.0  --  4.2  --  3.8  --  4.1 4.0  --    < > 4.1   CHLORIDE  --   --   --  109*  --  111*  --  109*  --  111* 110*  --    < > 108*   CO2  --   --   --  21.0*  --  23.0  --  23.0  --  22.0 23.0  --    < > 22.0   ANION GAP  --   --   --  12.0  --  10.0  --  10.0  --  10.0 9.0  --    < > 10.0   BUN  --   --   --  30*  --  30*  --  25*  --  23* 21*  --    < > 18   CREATININE  --   --   --  0.56*  --  0.57  --  0.57  --  0.58 0.62  --    < > 0.56*   EGFR  --   --   --  109.3  --  108.8  --  108.8  --  108.4 106.6  --    < > 109.3   GLUCOSE  --   --   --  152*  --  127*  --  126*  --  126* 123*  --    < > 137*   CALCIUM  --   --   --  8.8  --  9.2  --  9.0  --  9.1 9.0  --    < > 8.7   MAGNESIUM  --   --   --   --   --   --   --   --   --   --   --  2.3  --  2.0   PHOSPHORUS  --   --   --   --   --   --   --   --   --   --   --  2.7  --  3.0   HEMOGLOBIN A1C  --   --   --   --   --   --   --   --   --   --   --   --   --  5.50    < > = values in this interval not displayed.         Lab 08/21/24  1206   TOTAL PROTEIN 6.6   ALBUMIN 4.0   GLOBULIN 2.6   ALT (SGPT) 20  19   AST (SGOT) 22  21   BILIRUBIN 0.3   ALK PHOS 137*         Lab 08/22/24  1055 08/21/24  1206   PROBNP 269.4  --    HSTROP T  --  <6         Lab 08/22/24  0341   CHOLESTEROL 187   LDL CHOL 107*   HDL CHOL 65*   TRIGLYCERIDES 83             Brief Urine Lab Results       None          Microbiology Results (last 10 days)       ** No results found for the last 240 hours. **             Diagnostic Imaging: I reviewed and independently interpreted the new imaging.     Assessment/Plan:    1. Acute CVA (cerebrovascular accident)    2. Left-sided neglect    3. Oropharyngeal dysphagia    4. Dysarthria         This is a 53-year-old female status post right carotid artery stent placement and thrombectomy for tandem right ICA and M1 occlusions.  Neurologically, the patient has remained stable and is showing significant improvement in her left lower extremity  strength.  Carotid Doppler showed patency of the stent.  At this point, I think the patient is outside the window of malignant cerebral edema.  3% can be stopped and she is cleared to be transferred to the floor from my perspective.  She should remain on aspirin and Brilinta.  We will arrange a 4-week follow-up with us in the office.  At this point, we will sign off.  Please call with questions.      Any copied data from previous notes included in the (1) History of Present Illness, (2) Physical Examination and (3) Medical Decision Making and/or Assessment and Plan has been reviewed and is accurate as of 08/26/24      Jamaal Saini MD  08/26/24  08:53 EDT

## 2024-08-26 NOTE — PROGRESS NOTES
Stroke Progress Note     Chief Complaint: Left-sided weakness    Subjective    Subjective   Subjective:  Sitting up in the chair.   Family is present at the bedside.   Speech is intact, fluent.  LUE flaccid, LLE doing well.   Continued headache complaints 10/10.     Review of Systems   Constitutional: No fatigue  Eyes: Negative for redness.   Respiratory: Negative for cough.      Musculoskeletal: Negative for joint swelling.   Neurological: Mild headaches  Psychiatric/Behavioral: Negative for hallucinations.   Objective      Temp:  [98.6 °F (37 °C)-100.1 °F (37.8 °C)] 98.6 °F (37 °C)  Heart Rate:  [58-84] 80  Resp:  [16-18] 16  BP: (114-146)/(66-87) 137/77        Objective    GEN: lying in bed; in NAD, extubated  HENT: normocephalic, non-erythematous oropharynx  CV: no LE edema  PULM: non-labored breathing  EXT: no clubbing, cyanosis, or erythema  SKIN: no rashes or lesions    NEURO:  Mental Status: A&O x 3, interactive, able to follow commands  Speech: Intact Articulation  CN 2-12:  II - PERRLA  III, IV, VI - EOMI  V - Facial sensation intact  VII - Brow raise and smile symmetrical  VIII - Auditory acuity intact  IX, X - Palate elevation symmetric, uvula midline  XI - SCM and Trapezius strength intact  XII - Tongue protrudes midline    Motor:  RUE: 5/5  LUE: Can shrug shoulder little bit and her flickering movement on her fingers sometimes.  RLE: 5/5  LLE: 5/5    Sensory: intact light touch throughout  Reflexes: 2+ throughout, negative Romero's sign on the RUE  Coordination: no ataxia with finger-to-nose testing on the right upper extremity  Gait/Station: deferred   Cortical: No Extinction    Results Review:    I reviewed the patient's new clinical results.    Lab Results   Component Value Date    GLUCOSE 152 (H) 08/25/2024    BUN 30 (H) 08/25/2024    CREATININE 0.56 (L) 08/25/2024     08/26/2024    K 4.0 08/25/2024     (H) 08/25/2024    CALCIUM 8.8 08/25/2024    PROTEINTOT 6.6 08/21/2024    ALBUMIN  4.0 08/21/2024    ALT 19 08/21/2024    ALT 20 08/21/2024    AST 21 08/21/2024    AST 22 08/21/2024    ALKPHOS 137 (H) 08/21/2024    BILITOT 0.3 08/21/2024    GLOB 2.6 08/21/2024    AGRATIO 1.5 08/21/2024    BCR 53.6 (H) 08/25/2024    ANIONGAP 12.0 08/25/2024    EGFR 109.3 08/25/2024     Lipid Panel          8/22/2024    03:41   Lipid Panel   Total Cholesterol 187    Triglycerides 83    HDL Cholesterol 65    VLDL Cholesterol 15    LDL Cholesterol  107    LDL/HDL Ratio 1.62          Lab 08/22/24  0341   HEMOGLOBIN A1C 5.50       No radiology results for the last day  Results for orders placed during the hospital encounter of 08/21/24    Adult Transthoracic Echo Complete W/ Cont if Necessary Per Protocol (With Agitated Saline)    Interpretation Summary    Left ventricular systolic function is normal. Calculated left ventricular EF = 68% Normal left ventricular cavity size noted. Left ventricular wall thickness is consistent with moderate concentric hypertrophy. All left ventricular wall segments contract normally. Left ventricular diastolic function was normal. Normal left atrial pressure.    The right ventricular cavity is mildly dilated. Normal right ventricular systolic function noted.    Left atrial volume is moderately increased. Saline test results are negative.    The aortic valve is structurally normal with no stenosis present. The aortic valve appears trileaflet. No significant aortic valve regurgitation is present.    The mitral valve is structurally normal with no significant stenosis present. Trace to mild mitral valve regurgitation is present.    Mild tricuspid valve regurgitation is present. Estimated right ventricular systolic pressure from tricuspid regurgitation is mildly elevated (35-45 mmHg)    Mild dilation of the ascending aorta is present. Ascending aorta = 3.7 cm    MRI Brain Without Contrast    Result Date: 8/22/2024  MRI BRAIN WO CONTRAST Date of Exam: 8/22/2024 3:28 AM EDT Indication: Stroke,  follow up. FUP mechanical thrombectomy and carotid stent; RMCA stroke.  Comparison: CT head/angiography/perfusion 8/21/2024. Technique:  Routine multiplanar/multisequence sequence images of the brain were obtained without contrast administration. Findings: There is cortical diffusion restriction throughout the majority of the right MCA territory. There is also diffusion restriction in the right caudate and putamen. There is associated T2/FLAIR hyperintense signal. There are several scattered small round foci of FLAIR hyperintense signal within the cerebral white matter otherwise. No evidence of acute or chronic intracranial hemorrhage. There is susceptibility artifact in the region of the right M1 segment, which could relate to thrombus. Orbits are unremarkable. There are mucous retention cysts in the left maxillary sinus and a small retention cyst in the right maxillary sinus. Mastoid air cells appear well aerated. Major arterial flow voids appear intact. No mass effect, midline shift or abnormal extra-axial collection. The midline structures appear intact. Calvarial and superficial soft tissue signal is within normal limits.     Impression: Impression: 1.Large acute infarct involving the majority of the right MCA territory. 2.No evidence of intracranial hemorrhage. 3.Mild chronic small vessel ischemic change. Electronically Signed: Gian Blevins MD  8/22/2024 4:42 AM EDT  Workstation ID: [id number]     CT Head Without Contrast    Result Date: 8/24/2024  CT HEAD WO CONTRAST Date of Exam: 8/24/2024 5:00 AM EDT Indication: stroke. Comparison: Noncontrast head CT dated 8/23/2024 Technique: Axial CT images were obtained of the head without contrast administration.  Automated exposure control and iterative construction methods were used. FINDINGS:  Evolving large right MCA territory infarct with loss of gray-white matter differentiation within much of the right MCA territory. No significant change in 1.5 cm oblong  hyperdensity in the right frontotemporal region, compatible with small hemorrhage. There may be an additional tiny focus of petechial hemorrhage within the right frontoparietal region on series 2, image 33. There is effacement of right frontoparietal cortical sulci without significant midline shift. No hydrocephalus is seen. Calvarium appears intact. Orbital structures, paranasal sinuses, and mastoid air cells appear unremarkable. Superficial soft tissues are unremarkable.     Impression: Evolving large right MCA territory infarct with loss of gray-white matter differentiation within much of the right MCA territory. No significant change in 1.5 cm oblong hyperdensity in the right frontotemporal region, compatible with small hemorrhage. Questionable additional tiny focus of petechial hemorrhage within the right frontoparietal region on series 2, image 33. There is effacement of right frontoparietal cortical sulci without significant midline shift. The above findings were discussed with Elizabeth Suh via telephone on 8/24/2024 8:24 AM EDT. Electronically Signed: Ander Rashid MD  8/24/2024 8:25 AM EDT  Workstation ID: [id number]    CT Head Without Contrast    Result Date: 8/23/2024  CT HEAD WO CONTRAST Date of Exam: 8/23/2024 2:55 AM EDT Indication: stroke. Comparison: 8/22/2024. Technique: Axial CT images were obtained of the head without contrast administration.  Automated exposure control and iterative construction methods were used. Findings: There is progressive hypoattenuation and loss of gray-white differentiation within the right MCA territory involving the right frontal, temporal and parietal cortices. There is a stable tubular focus of hemorrhage in the right anterior subinsular region.  No new hypoattenuating lesions in the brain. No new hemorrhage. There is diminished mass effect with no significant midline shift. Cerebellum is unremarkable. The brainstem and basal ganglia appear normal. Orbits are within  normal limits. A left nasal tube is in place. There are bilateral maxillary sinus mucus retention cysts. Calvarium is intact. Mastoids are clear.     Impression: Impression: 1.Evolving right MCA territory infarct with progressive loss of gray-white differentiation and diminished mass effect. 2.Stable small focus of hemorrhage in the right anterior subinsular region. No new hemorrhage. Electronically Signed: Gian Blevins MD  8/23/2024 3:27 AM EDT  Workstation ID: [id number]    CT Head Without Contrast    Result Date: 8/22/2024  CT HEAD WO CONTRAST Date of Exam: 8/22/2024 2:37 PM EDT Indication: change in neurologic exam post thrombectomy and stent placement on DAPT. Comparison: Head CT 8/22/2024 Technique: Axial CT images were obtained of the head without contrast administration.  Automated exposure control and iterative construction methods were used. Findings: Continued evolution of large right MCA territory infarct. Similar degree of hemorrhage in the right subinsular region with slightly increased hemorrhage tracking along the right MCA fissure, likely redistributed blood products. No convincing new intracranial hemorrhage. Similar associated mass effect with narrowing of the right lateral ventricle and 2 mm leftward midline shift. No evidence of hydrocephalus. Scattered areas of periventricular and subcortical white matter hypoattenuation, nonspecific, perhaps from small vessel ischemic/hypertensive changes in a patient of this age.There are intracranial atherosclerotic calcifications. Mild scattered mucosal thickening in the paranasal sinuses.. Bilateral maxillary sinus mucous retention cyst. Mastoid air cells are essentially clear..Included globes and orbits appear unremarkable by CT. No acute or aggressive appearing bony or extracranial soft tissue process.     Impression: Impression: Evolving large right MCA distribution infarct with similar hemorrhage in the right subinsular region. No convincing new  intracranial hemorrhage. Similar associated mass effect with narrowing of right lateral ventricle and 2 mm leftward midline shift. Electronically Signed: Jose J Sher  8/22/2024 3:02 PM EDT  Workstation ID: [id number]    CT Head Without Contrast    Result Date: 8/22/2024  CT HEAD WO CONTRAST Date of Exam: 8/22/2024 4:40 AM EDT Indication: Stroke, follow up f/up MT and GLERNOY 8/20, RMCA stroke. Comparison: None available. Technique: Axial CT images were obtained of the head without contrast administration.  Automated exposure control and iterative construction methods were used. Dual energy protocol was utilized and a virtual noncontrast head CT was created Findings: There is hypoattenuation throughout the cortex of the right MCA distribution involving areas of the right frontal, right temporal and parietal lobes as well as within the posterior insula. There is a tubular focus of hyperdensity in the subinsular region  anteriorly on the right that does not cancel out with dual-energy technique compatible with a small focus of hemorrhage. This appears to be intraparenchymal. There is mild mass effect with some sulcal effacement in the right cerebral hemisphere and partial effacement of the right lateral ventricle. There is minimal 2 mm leftward shift of midline structures. No additional hypo or hyperattenuating lesions in the brain. Orbits are unremarkable. There are bilateral maxillary sinus mucus retention cysts. The calvarium appears intact. Mastoids are clear. Superficial soft tissues are unremarkable.     Impression: Impression: 1.Large right MCA distribution infarct with a small focus of hemorrhage in the subinsular region. 2.Mild mass effect with 2 mm leftward shift of midline structures. Electronically Signed: Gian Blevins MD  8/22/2024 4:53 AM EDT  Workstation ID: [id number]    CT Head Without Contrast Stroke Protocol    Result Date: 8/21/2024  CT HEAD WO CONTRAST STROKE PROTOCOL Date of Exam: 8/21/2024 11:58 AM  EDT Indication: Neuro deficit, acute, stroke suspected Neuro Deficit, acute, Stroke suspected. Comparison: None available. Technique: Axial CT images were obtained of the head without contrast administration.  Reconstructed coronal images were also obtained. Automated exposure control and iterative construction methods were used. Scan Time: 11:58 a.m. Results discussed with emergency team at 12:02 p.m. Findings: Parenchyma:No acute intraparenchymal hemorrhage. No loss of gray-white differentiation to suggest large territory infarct. Normal parenchymal volume. No substantial white matter disease. No midline shift or herniation. Ventricles and extra axial spaces:Normal caliber of ventricles and sulci. No extra axial fluid collection seen. Other:Orbits are grossly intact. Scattered paranasal sinus mucosal thickening. Mastoid air cells are clear. Calvarium is intact. Intracranial atherosclerotic calcification is present. Hyperdense right middle cerebral artery sign.     Impression: Impression: Hyperdense appearance of the right middle cerebral artery concerning for acute thrombus. Recommend further evaluation with CTA. Electronically Signed: Javier Javed MD  8/21/2024 12:08 PM EDT  Workstation ID: [id number]     Assessment/Plan   53-year-old  female with multiple vascular risk factors who presented to [facility] emergency department as a scene flight with complaints of left-sided weakness and neglect.  She also has complaints of severe headache.  CT head shows hyperdense right MCA.  Secondary to extended last known well she is not deemed to be an appropriate IV thrombolytic therapy candidate.  CT perfusion is indicative of an area of reversible ischemia therefore she was taken to the Cath Lab for planned mechanical thrombectomy.  She will be admitted to the neurological ICU s/p procedure for further monitoring and stroke workup.     Antiplatelet PTA: ASA 81 mg  Anticoagulant PTA: None         #Acute right MCA stroke s/p mechanical thrombectomy  #Acute right ICA occlusion s/p carotid stent  #History of seizures  -Mechanism of stroke is likely atheroembolic in the setting of right internal carotid artery atherosclerosis.    Recommendations:  -Continue DAPT with aspirin 81 mg daily and Brilinta 60 mg twice daily for secondary stroke prevention in the setting of recent right ICA stent placement.  Please note that the patient most recent MRI showed small hemorrhagic conversion on the right insular region, however, the benefit of giving dual antiplatelet outweighed the risk of worsening hemorrhagic conversion at this time.  Risk and benefit was discussed with the patient and her  who both agreed with the above-stated plan.  -Lipitor 80mg nightly,   -Normalize sodium goals, can consider salt tablets if dip is noted in sodium  -Goal SBP <140, overall management per ICU medicine team   -Scheduled tylenol Q6 hours for continuous headache complaints   -Migraine cocktail x1 now  -Consider stat CT head for any change in neurological exam to evaluate for expansion of hematoma on the left insula  -Target systolic blood pressure less than 140  -Consider cardiac monitoring at discharge  -Fall precautions   -PT/OT/SLP per their recommendations   -Smoking cessation   -Stroke education   -CIWA per ICU medicine team   -Continue folic acid and thiamine     Stroke will continue to follow. Plan discussed with ICU medicine team, Dr Kevin, as well as the patient and her family who is present at the bedside. Please call for any further questions or concerns  =================================  08/26/24  Ghada Cross, APRN  10:46 EDT

## 2024-08-26 NOTE — PLAN OF CARE
Problem: Adult Inpatient Plan of Care  Goal: Plan of Care Review  Outcome: Ongoing, Progressing  Flowsheets (Taken 8/26/2024 1227)  Progress: improving  Plan of Care Reviewed With:   patient   family   Goal Outcome Evaluation:  Plan of Care Reviewed With: patient, family        Progress: improving                       Treatment Assessment (SLP): continued, clinical signs of, aspiration, improved, cognitive-linguistic disorder, communication disorder (08/26/24 1200)  Treatment Assessment Comments (SLP): Pt showing significant improvements in S/L/Cog, very appopriate in conversation today and speech clearing. Comprehension signicantly improved and no notable word finding difficulty today. Family in agreement. Still showing s/s dysphagia with trials, but tolerated ice chips and encouraged pt to work on effortful swallows to manage secretions vs suctioning with yankauer. (08/26/24 1200)  Plan for Continued Treatment (SLP): continue treatment per plan of care (08/26/24 1200)  SLP treatment completed. Will continue to address dysphagia and communication. Please see note for further details and recommendations.

## 2024-08-26 NOTE — PLAN OF CARE
Goal Outcome Evaluation:  Plan of Care Reviewed With: patient        Progress: improving  Outcome Evaluation: Pt continues to benefit from IPOT services to address deficits in order to progress towards PLOF. Pt requires frequent redirection and VC to tend to all therapy tasks. Will continue POC to progress toward goals. d/c rec is IPR.      Anticipated Discharge Disposition (OT): inpatient rehabilitation facility

## 2024-08-26 NOTE — THERAPY TREATMENT NOTE
Patient Name: Dalila OCASIO  : 1970    MRN: [id number]                              Today's Date: 2024       Admit Date: 2024    Visit Dx:     ICD-10-CM ICD-9-CM   1. Acute CVA (cerebrovascular accident)  I63.9 434.91   2. Left-sided neglect  R41.4 781.8   3. Oropharyngeal dysphagia  R13.12 787.22   4. Dysarthria  R47.1 784.51     Patient Active Problem List   Diagnosis    Acute CVA (cerebrovascular accident)    Alcohol use    Tobacco use    Obesity (BMI 30-39.9)    HTN (hypertension)    Dyslipidemia    History of seizures    PAD (peripheral artery disease)     Past Medical History:   Diagnosis Date    Acute CVA (cerebrovascular accident) 2024    Alcohol use 2024    Dyslipidemia 2024    Obesity (BMI 30-39.9) 2024    PAD (peripheral artery disease) 2024    Tobacco use 2024     Past Surgical History:   Procedure Laterality Date    INTERVENTIONAL RADIOLOGY PROCEDURE Bilateral 2024    Procedure: Carotid Cervical Angiogram;  Surgeon: Jamaal Saini MD;  Location:  [facility] INVASIVE LOCATION;  Service: Interventional Radiology;  Laterality: Bilateral;      General Information       Row Name 24          Physical Therapy Time and Intention    Document Type therapy note (daily note)  -ES     Mode of Treatment physical therapy  -ES       Row Name 24 133          General Information    Patient Profile Reviewed yes  -ES     Existing Precautions/Restrictions fall;other (see comments)  NG; AM boot for L ankle immobilization and NWB LLE per ortho; L weakness/ inattention  -ES     Barriers to Rehab medically complex  -ES       Row Name 24 1331          Cognition    Orientation Status (Cognition) oriented x 3  -ES       Row Name 24 1331          Safety Issues, Functional Mobility    Safety Issues Affecting Function (Mobility) awareness of need for assistance;insight into deficits/self-awareness;safety precaution awareness;safety  precautions follow-through/compliance;sequencing abilities  -ES     Impairments Affecting Function (Mobility) balance;coordination;endurance/activity tolerance;range of motion (ROM);strength;sensation/sensory awareness  -ES               User Key  (r) = Recorded By, (t) = Taken By, (c) = Cosigned By      Initials Name Provider Type    ES Karie Vaca PT Physical Therapist                   Mobility       Row Name 08/26/24 1332          Bed Mobility    Bed Mobility supine-sit  -ES     Supine-Sit [place] (Bed Mobility) minimum assist (75% patient effort);2 person assist;verbal cues  -ES     Assistive Device (Bed Mobility) head of bed elevated;draw sheet  -ES     Comment, (Bed Mobility) CAM boot donned prior to mobility. v/c for sequencing  -ES       Row Name 08/26/24 1332          Bed-Chair Transfer    Bed-Chair [place] (Transfers) moderate assist (50% patient effort);2 person assist;verbal cues  -ES     Assistive Device (Bed-Chair Transfers) other (see comments)  BUE support  -ES     Comment, (Bed-Chair Transfer) v/c for adherence to NWB precautions. Adherent for ~25% of mob  -ES       Row Name 08/26/24 1332          Sit-Stand Transfer    Sit-Stand [place] (Transfers) minimum assist (75% patient effort);verbal cues;2 person assist  -ES     Assistive Device (Sit-Stand Transfers) other (see comments)  BUE support  -ES     Comment, (Sit-Stand Transfer) STS x2: from EOB, from chair. v/c for adherence to NWB precautions. Performed 10x mini squats on RLE.  -ES       Row Name 08/26/24 1332          Gait/Stairs (Locomotion)    [place] Level (Gait) unable to assess  -ES               User Key  (r) = Recorded By, (t) = Taken By, (c) = Cosigned By      Initials Name Provider Type    Karie Savage PT Physical Therapist                   Obj/Interventions       Row Name 08/26/24 1334          Motor Skills    Therapeutic Exercise hip;knee;ankle  -ES       Row Name 08/26/24 1334          Hip  (Therapeutic Exercise)    Hip (Therapeutic Exercise) strengthening exercise  -ES     Hip Strengthening (Therapeutic Exercise) right;mini squats;10 repetitions;left;aBduction;heel slides;marching while seated  -ES       Row Name 08/26/24 1334          Knee (Therapeutic Exercise)    Knee (Therapeutic Exercise) strengthening exercise  -ES     Knee Strengthening (Therapeutic Exercise) bilateral;heel slides;SLR (straight leg raise);LAQ (long arc quad);10 repetitions  -ES       Row Name 08/26/24 1334          Ankle (Therapeutic Exercise)    Ankle (Therapeutic Exercise) AROM (active range of motion)  -ES     Ankle AROM (Therapeutic Exercise) bilateral;dorsiflexion;plantarflexion;10 repetitions  -ES       Row Name 08/26/24 1334          Balance    Balance Assessment sitting static balance;sitting dynamic balance;sit to stand dynamic balance;standing static balance;standing dynamic balance  -ES     Static Sitting Balance contact guard  -ES     Dynamic Sitting Balance contact guard;verbal cues  -ES     Position, Sitting Balance supported;sitting in chair  -ES     Sit to Stand Dynamic Balance minimal assist;2-person assist;verbal cues  -ES     Static Standing Balance minimal assist;2-person assist  -ES     Dynamic Standing Balance moderate assist;2-person assist  -ES     Position/Device Used, Standing Balance supported  -ES     Balance Interventions sitting;standing;sit to stand;supported;dynamic;static;occupation based/functional task  -ES               User Key  (r) = Recorded By, (t) = Taken By, (c) = Cosigned By      Initials Name Provider Type    ES Karie Vaca PT Physical Therapist                   Goals/Plan    No documentation.                  Clinical Impression       Row Name 08/26/24 9380          Pain    Pain Intervention(s) Repositioned;Ambulation/increased activity  -ES     Additional Documentation Pain Scale: FACES Pre/Post-Treatment (Group)  -ES       Row Name 08/26/24 8549          Pain Scale: FACES  Pre/Post-Treatment    Pain: FACES Scale, Pretreatment 2-->hurts little bit  -ES     Pain Location - Side/Orientation Left  -ES     Pain Location generalized  -ES     Pain Location - head  -ES     Pre/Posttreatment Pain Comment RN aware and managing  -ES       Row Name 08/26/24 4530          Plan of Care Review    Plan of Care Reviewed With patient;family  -ES     Progress improving  -ES     Outcome Evaluation Pt able to complete STS t/f with decreased assistance this date, but continues to required frequent reminders for adherence to RLE NWB precautions. Will continue to progress as able. PT rec IRF at d/c.  -ES       Row Name 08/26/24 4338          Therapy Assessment/Plan (PT)    Rehab Potential (PT) good, to achieve stated therapy goals  -ES     Criteria for Skilled Interventions Met (PT) yes;meets criteria;skilled treatment is necessary  -ES     Therapy Frequency (PT) daily  -ES     Predicted Duration of Therapy Intervention (PT) 10 days  -ES       Row Name 08/26/24 2751          Vital Signs    Pre Systolic BP Rehab 112  -ES     Pre Treatment Diastolic BP 65  -ES     Post Systolic BP Rehab --  w/ OT  -ES     Pretreatment Heart Rate (beats/min) 78  -ES     Pre SpO2 (%) 96  -ES     O2 Delivery Pre Treatment room air  -ES     O2 Delivery Intra Treatment room air  -ES     O2 Delivery Post Treatment room air  -ES     Pre Patient Position Supine  -ES     Intra Patient Position Standing  -ES     Post Patient Position Sitting  -ES       Row Name 08/26/24 7122          Positioning and Restraints    Pre-Treatment Position in bed  -ES     Post Treatment Position chair  -ES     In Chair sitting;with OT  -ES               User Key  (r) = Recorded By, (t) = Taken By, (c) = Cosigned By      Initials Name Provider Type    ES Karie Vaca, PT Physical Therapist                   Outcome Measures       Row Name 08/26/24 7193          How much help from another person do you currently need...    Turning from your back to your  side while in flat bed without using bedrails? 3  -ES     Moving from lying on back to sitting on the side of a flat bed without bedrails? 3  -ES     Moving to and from a bed to a chair (including a wheelchair)? 2  -ES     Standing up from a chair using your arms (e.g., wheelchair, bedside chair)? 2  -ES     Climbing 3-5 steps with a railing? 1  -ES     To walk in hospital room? 1  -ES     AM-PAC 6 Clicks Score (PT) 12  -ES     Highest Level of Mobility Goal 4 --> Transfer to chair/commode  -ES       Row Name 08/26/24 1337 08/26/24 1322       Functional Assessment    Outcome Measure Options AM-PAC 6 Clicks Basic Mobility (PT)  -ES AM-PAC 6 Clicks Daily Activity (OT)  -              User Key  (r) = Recorded By, (t) = Taken By, (c) = Cosigned By      Initials Name Provider Type    ES Karie Vaca, PT Physical Therapist    Lakia Trevizo, OT Occupational Therapist                                 Physical Therapy Education       Title: PT OT SLP Therapies (In Progress)       Topic: Physical Therapy (Done)       Point: Mobility training (Done)       Learning Progress Summary             Patient Acceptance, E,D, VU,NR by LS at 8/25/2024 1410    Acceptance, E,D, VU,NR by LS at 8/24/2024 1523    Acceptance, E, NR by AC at 8/22/2024 1540   Family Acceptance, E,D, VU,NR by LS at 8/25/2024 1410    Acceptance, E,D, VU,NR by LS at 8/24/2024 1523   Significant Other Acceptance, E,D, VU,NR by LS at 8/25/2024 1410                         Point: Home exercise program (Done)       Learning Progress Summary             Patient Acceptance, E,D, VU,NR by LS at 8/25/2024 1410    Acceptance, E,D, VU,NR by LS at 8/24/2024 1523    Acceptance, E, NR by AC at 8/22/2024 1540   Family Acceptance, E,D, VU,NR by LS at 8/25/2024 1410    Acceptance, E,D, VU,NR by LS at 8/24/2024 1523   Significant Other Acceptance, E,D, VU,NR by LS at 8/25/2024 1410                         Point: Body mechanics (Done)       Learning Progress Summary              Patient Acceptance, E,D, VU,NR by LS at 8/25/2024 1410    Acceptance, E,D, VU,NR by LS at 8/24/2024 1523    Acceptance, E, NR by AC at 8/22/2024 1540   Family Acceptance, E,D, VU,NR by LS at 8/25/2024 1410    Acceptance, E,D, VU,NR by LS at 8/24/2024 1523   Significant Other Acceptance, E,D, VU,NR by LS at 8/25/2024 1410                         Point: Precautions (Done)       Learning Progress Summary             Patient Acceptance, E,D, VU,NR by LS at 8/25/2024 1410    Acceptance, E,D, VU,NR by LS at 8/24/2024 1523    Acceptance, E, NR by AC at 8/22/2024 1540   Family Acceptance, E,D, VU,NR by LS at 8/25/2024 1410    Acceptance, E,D, VU,NR by LS at 8/24/2024 1523   Significant Other Acceptance, E,D, VU,NR by LS at 8/25/2024 1410                                         User Key       Initials Effective Dates Name Provider Type Discipline    LS 02/03/23 -  Sanam Menard, PT Physical Therapist PT    AC 07/11/24 -  Shala Morrison, PT Physical Therapist PT                  PT Recommendation and Plan     Plan of Care Reviewed With: patient, family  Progress: improving  Outcome Evaluation: Pt able to complete STS t/f with decreased assistance this date, but continues to required frequent reminders for adherence to RLE NWB precautions. Will continue to progress as able. PT rec IRF at d/c.     Time Calculation:         PT Charges       Row Name 08/26/24 1337             Time Calculation    Start Time 0955  -ES      PT Received On 08/26/24  -ES      PT Goal Re-Cert Due Date 09/01/24  -ES         Time Calculation- PT    Total Timed Code Minutes- PT 23 minute(s)  -ES         Timed Charges    65387 - PT Therapeutic Exercise Minutes 10  -ES      81792 - PT Therapeutic Activity Minutes 13  -ES         Total Minutes    Timed Charges Total Minutes 23  -ES       Total Minutes 23  -ES                User Key  (r) = Recorded By, (t) = Taken By, (c) = Cosigned By      Initials Name Provider Type    ES Karie Vaca, PT Physical  Therapist                  Therapy Charges for Today       Code Description Service Date Service Provider Modifiers Qty    [id number]  PT THER PROC EA 15 MIN 8/26/2024 Karie Vaca, PT GP 1    [id number]  PT THERAPEUTIC ACT EA 15 MIN 8/26/2024 Karie Vaca PT GP 1            PT G-Codes  Outcome Measure Options: AM-PAC 6 Clicks Basic Mobility (PT)  AM-PAC 6 Clicks Score (PT): 12  AM-PAC 6 Clicks Score (OT): 11  Modified [place]water Scale: 4 - Moderately severe disability.  Unable to walk without assistance, and unable to attend to own bodily needs without assistance.  PT Discharge Summary  Anticipated Discharge Disposition (PT): inpatient rehabilitation facility    Karie Vaca PT  8/26/2024

## 2024-08-26 NOTE — CASE MANAGEMENT/SOCIAL WORK
Continued Stay Note  [facility]     Patient Name: Dalila OCASIO  MRN: [id number]  Today's Date: 8/26/2024    Admit Date: 8/21/2024    Plan: [facility] acute   Discharge Plan       Row Name 08/26/24 1423       Plan    Plan [facility] acute    Patient/Family in Agreement with Plan yes    Plan Comments Spoke with patient and her daughter-in-law at bedside regarding disposition to [facility] for acute rehab.  Dayanara costello [facility] is following in Epic and will start insurance precert tomorrow.  CM will continue to follow and assist with discharge.                   Discharge Codes    No documentation.                 Expected Discharge Date and Time       Expected Discharge Date Expected Discharge Time    Aug 28, 2024               Tata Norton RN

## 2024-08-26 NOTE — PROGRESS NOTES
"Critical Care Note     LOS: 5 days   Patient Care Team:  Agnes Ewing APRN as PCP - General (Family Medicine)    Chief Complaint/Reason for visit:    Chief Complaint   Patient presents with    Stroke       Subjective     Interval History:     Planing of headache.  Remains in sinus rhythm with a rate of 73.  Excellent urine output, greater than 2 L.    Review of Systems:    All systems were reviewed and negative except as noted in subjective.    Medical history, surgical history, social history, family history reviewed    Objective     Intake/Output:    Intake/Output Summary (Last 24 hours) at 8/26/2024 1456  Last data filed at 8/26/2024 1200  Gross per 24 hour   Intake 1898.4 ml   Output 1325 ml   Net 573.4 ml       Nutrition:  NPO Diet NPO Type: Tube Feeding    Infusions:  niCARdipine, 5-15 mg/hr, Last Rate: Stopped (08/25/24 0015)        Mechanical Ventilator Settings:                                                Telemetry: Sinus rhythm             Vital Signs  Blood pressure 112/78, pulse 74, temperature 99 °F (37.2 °C), temperature source Oral, resp. rate 18, height 172.7 cm (68\"), weight 111 kg (244 lb 14.9 oz), SpO2 92%.    Physical Exam:  General Appearance:  Woman supine in bed in no distress   Head:  No visible trauma conjunctiva pink   Eyes:             Ears:     Throat: Oral mucosa moist, Corpak in place   Neck: Trachea midline, no adenopathy   Back:      Lungs:   Symmetric chest expansion with rare expiratory rhonchi    Heart:  Regular rhythm, S1, S2 auscultated   Abdomen:   Bowel sounds present, soft   Rectal:   Deferred   Extremities: Left below the knee splint   Pulses:    Skin: Warm and dry   Lymph nodes:    Neurologic: Awake, oriented, left upper extremity weakness, left facial droop  Interval:  (shift change)  1a. Level of Consciousness: 0-->Alert, keenly responsive  1b. LOC Questions: 0-->Answers both questions correctly  1c. LOC Commands: 0-->Performs both tasks correctly  2. Best Gaze: " 1-->Partial gaze palsy, gaze is abnormal in one or both eyes, but forced deviation or total gaze paresis is not present  3. Visual: 1-->Partial hemianopia  4. Facial Palsy: 2-->Partial paralysis (total or near-total paralysis of lower face)  5a. Motor Arm, Left: 3-->No effort against gravity, limb falls  5b. Motor Arm, Right: 0-->No drift, limb holds 90 (or 45) degrees for full 10 secs  6a. Motor Leg, Left: 0-->No drift, leg holds 30 degree position for full 5 secs  6b. Motor Leg, Right: 0-->No drift, leg holds 30 degree position for full 5 secs  7. Limb Ataxia: 0-->Absent  8. Sensory: 1-->Mild-to-moderate sensory loss, patient feels pinprick is less sharp or is dull on the affected side, or there is a loss of superficial pain with pinprick, but patient is aware of being touched  9. Best Language: 0-->No aphasia, normal  10. Dysarthria: 1-->Mild-to-moderate dysarthria, patient slurs at least some words and, at worst, can be understood with some difficulty  11. Extinction and Inattention (formerly Neglect): 1-->Visual, tactile, auditory, spatial, or personal inattention or extinction to bilateral simultaneous stimulation in one of the sensory modalities    Total (NIH Stroke Scale): 10       Results Review:     I reviewed the patient's new clinical results.   Results from last 7 days   Lab Units 08/26/24  0807 08/26/24  0019 08/25/24  1810 08/25/24  0548 08/25/24  0105 08/24/24  1756 08/24/24  1230 08/24/24  0713 08/21/24  1208 08/21/24  1206   SODIUM mmol/L 140 141 143 142  145   < > 144   < > 142  143   < > 140   POTASSIUM mmol/L  --   --   --  4.0  --  4.2  --  3.8   < > 4.1   CHLORIDE mmol/L  --   --   --  109*  --  111*  --  109*   < > 104   CO2 mmol/L  --   --   --  21.0*  --  23.0  --  23.0   < > 23.0   BUN mg/dL  --   --   --  30*  --  30*  --  25*   < > 16   CREATININE mg/dL  --   --   --  0.56*  --  0.57  --  0.57   < > 0.64   CALCIUM mg/dL  --   --   --  8.8  --  9.2  --  9.0   < > 9.2   BILIRUBIN mg/dL   "--   --   --   --   --   --   --   --   --  0.3   ALK PHOS U/L  --   --   --   --   --   --   --   --   --  137*   ALT (SGPT) U/L  --   --   --   --   --   --   --   --   --  20  19   AST (SGOT) U/L  --   --   --   --   --   --   --   --   --  22  21   GLUCOSE mg/dL  --   --   --  152*  --  127*  --  126*   < > 138*    < > = values in this interval not displayed.     Results from last 7 days   Lab Units 08/23/24  0148 08/22/24  0341 08/21/24  1208 08/21/24  1206   WBC 10*3/mm3 11.61* 14.23*  --  7.49   HEMOGLOBIN g/dL 13.6 13.2  --  15.2   HEMOGLOBIN, POC g/dL  --   --  15.3  --    HEMATOCRIT % 40.6 39.3  --  45.2   HEMATOCRIT POC %  --   --  45  --    PLATELETS 10*3/mm3 219 226  --  215         No results found for: \"BLOODCX\"  No results found for: \"URINECX\"    I reviewed the patient's new imaging including images and reports.    CT HEAD WO CONTRAST    Date of Exam: 8/24/2024 5:00 AM EDT    Indication: stroke.    Comparison: Noncontrast head CT dated 8/23/2024    Technique: Axial CT images were obtained of the head without contrast administration.  Automated exposure control and iterative construction methods were used.      FINDINGS:    Evolving large right MCA territory infarct with loss of gray-white matter differentiation within much of the right MCA territory. No significant change in 1.5 cm oblong hyperdensity in the right frontotemporal region, compatible with small hemorrhage.  There may be an additional tiny focus of petechial hemorrhage within the right frontoparietal region on series 2, image 33. There is effacement of right frontoparietal cortical sulci without significant midline shift.    No hydrocephalus is seen.    Calvarium appears intact. Orbital structures, paranasal sinuses, and mastoid air cells appear unremarkable. Superficial soft tissues are unremarkable.     Impression:     Evolving large right MCA territory infarct with loss of gray-white matter differentiation within much of the right " MCA territory. No significant change in 1.5 cm oblong hyperdensity in the right frontotemporal region, compatible with small hemorrhage.  Questionable additional tiny focus of petechial hemorrhage within the right frontoparietal region on series 2, image 33. There is effacement of right frontoparietal cortical sulci without significant midline shift.    The above findings were discussed with Elizabeth Suh via telephone on 8/24/2024 8:24 AM EDT.          Electronically Signed: Ander Rashid MD   8/24/2024 8:25 AM EDT     All medications reviewed.   acetaminophen, 650 mg, Nasogastric, Q6H  aspirin, 81 mg, Nasogastric, Daily   Or  aspirin, 300 mg, Rectal, Daily  atorvastatin, 80 mg, Nasogastric, Nightly  folic acid, 1 mg, Nasogastric, Daily  insulin regular, 2-9 Units, Subcutaneous, Q6H  nicotine, 1 patch, Transdermal, Q24H  ProSource No Carb, 30 mL, Nasogastric, Daily  [START ON 8/27/2024] thiamine, 100 mg, Nasogastric, Daily  ticagrelor, 60 mg, Nasogastric, BID          Assessment & Plan       Acute CVA (cerebrovascular accident)    Alcohol use    Tobacco use    Obesity (BMI 30-39.9)    HTN (hypertension)    Dyslipidemia    History of seizures    PAD (peripheral artery disease)    Oropharyngeal dysphagia    53-year-old woman with hypertension, diabetes, dyslipidemia, seizures, peripheral artery disease, narcotic use, who awoke with slurred speech and left-sided weakness.  She was significantly hypertensive with a systolic blood pressure 198 and an NIH stroke scale of 17.  Imaging revealed right middle cerebral artery ischemia and occlusion of the right internal carotid artery from its origin, occlusion of the right M1 MCA segment with a large reversible ischemic penumbra.  She was outside of the window for thrombolysis.  She was taken to the Cath Lab emergently and underwent right internal carotid artery stent and mechanical thrombectomy.  Follow-up imaging did reveal a small hemorrhagic conversion in the right  insular region.  Neurostroke felt it was important to continue dual antiplatelet therapy because of the stent despite the small bleed.  Today NIH stroke scale is a 10.  Hypertonic saline has been stopped.  Sodium is running 140-145.  Goal is 140-145.  She carries a diagnosis of seizure disorder but takes no medications for seizures.  She failed her swallow assessment and is currently on tube feeding.     She was initially on Cardene for blood pressure management.  Currently her blood pressure is controlled off all medication.  Echo revealed an EF of 68% with no PFO.    Her initial urine drug screen was positive for methamphetamine and opiates.  She did not have intact detectable alcohol level.  He also smokes cigarettes.  She remains on a nicotine patch.    Her A1c is 5.5, and she takes no medications for diabetes.      PLAN:    Aspirin, statin, Brilinta    Nicotine patch    Statin    Stop IV morphine, oral oxycodone short-term for headache.  Neurosurgery anticipates that she will have a headache for several weeks.  Start scheduled Tylenol    PT, OT, speech therapy    Support with tube feeding, Peptamen AF, 75 mL/h    Sliding scale insulin    Reimage head for decline in stroke scale or mentation.    Goal sodium 140-145  No hypotonic fluids     Telemetry    Neurosurgery would like office follow-up in 4 weeks      VTE Prophylaxis:SCDS    Stress Ulcer Prophylaxis:none    Lavinia Oscar MD  08/26/24  14:56 EDT      Time: 25min

## 2024-08-26 NOTE — PLAN OF CARE
Goal Outcome Evaluation:  Plan of Care Reviewed With: patient, family        Progress: improving  Outcome Evaluation: Pt able to complete STS t/f with decreased assistance this date, but continues to required frequent reminders for adherence to RLE NWB precautions. Will continue to progress as able. PT rec IRF at d/c.      Anticipated Discharge Disposition (PT): inpatient rehabilitation facility

## 2024-08-26 NOTE — THERAPY TREATMENT NOTE
Patient Name: Dalila OCASIO  : 1970    MRN: [id number]                              Today's Date: 2024       Admit Date: 2024    Visit Dx:     ICD-10-CM ICD-9-CM   1. Acute CVA (cerebrovascular accident)  I63.9 434.91   2. Left-sided neglect  R41.4 781.8   3. Oropharyngeal dysphagia  R13.12 787.22   4. Dysarthria  R47.1 784.51     Patient Active Problem List   Diagnosis    Acute CVA (cerebrovascular accident)    Alcohol use    Tobacco use    Obesity (BMI 30-39.9)    HTN (hypertension)    Dyslipidemia    History of seizures    PAD (peripheral artery disease)     Past Medical History:   Diagnosis Date    Acute CVA (cerebrovascular accident) 2024    Alcohol use 2024    Dyslipidemia 2024    Obesity (BMI 30-39.9) 2024    PAD (peripheral artery disease) 2024    Tobacco use 2024     Past Surgical History:   Procedure Laterality Date    INTERVENTIONAL RADIOLOGY PROCEDURE Bilateral 2024    Procedure: Carotid Cervical Angiogram;  Surgeon: Jamaal Saini MD;  Location:  [facility];  Service: Interventional Radiology;  Laterality: Bilateral;      General Information       Row Name 24 1119          OT Time and Intention    Document Type therapy note (daily note)  -     Mode of Treatment occupational therapy  -       Row Name 24 1119          General Information    Patient Profile Reviewed yes  -     Existing Precautions/Restrictions fall;other (see comments)  NG; AM boot for L ankle immobilization and NWB LLE per ortho; L weakness/ inattention  -     Barriers to Rehab medically complex  -       Row Name 24 1119          Cognition    Orientation Status (Cognition) oriented x 3  -       Row Name 24 1119          Safety Issues, Functional Mobility    Safety Issues Affecting Function (Mobility) ability to follow commands;awareness of need for assistance;impulsivity;insight into deficits/self-awareness;judgment;safety  precaution awareness;safety precautions follow-through/compliance;sequencing abilities  -     Impairments Affecting Function (Mobility) balance;coordination;endurance/activity tolerance;range of motion (ROM);strength;sensation/sensory awareness  -               User Key  (r) = Recorded By, (t) = Taken By, (c) = Cosigned By      Initials Name Provider Type    Lakia Trevizo OT Occupational Therapist                     Mobility/ADL's       Row Name 08/26/24 1121          Transfers    Transfers bed-chair transfer;sit-stand transfer  -KL       Row Name 08/26/24 [facility]          Bed-Chair Transfer    Bed-Chair [place] (Transfers) moderate assist (50% patient effort);2 person assist;verbal cues  -     Assistive Device (Bed-Chair Transfers) other (see comments)  BUE support  -KL       Row Name 08/26/24 1121          Sit-Stand Transfer    Sit-Stand [place] (Transfers) minimum assist (75% patient effort);verbal cues;2 person assist  -     Assistive Device (Sit-Stand Transfers) other (see comments)  BUE support  -KL       Row Name 08/26/24 1121          Activities of Daily Living    BADL Assessment/Intervention lower body dressing;grooming  -KL       Row Name 08/26/24 [facility]          Lower Body Dressing Assessment/Training    [place] Level (Lower Body Dressing) don;socks;dependent (less than 25% patient effort)  -     Position (Lower Body Dressing) sitting up in bed  -KL       Row Name 08/26/24 1121          Grooming Assessment/Training    [place] Level (Grooming) wash face, hands;set up  -KL     Position (Grooming) supported sitting  -               User Key  (r) = Recorded By, (t) = Taken By, (c) = Cosigned By      Initials Name Provider Type    Lakia Trevizo OT Occupational Therapist                   Obj/Interventions       [facility] Name 08/26/24 1123          Shoulder (Therapeutic Exercise)    Shoulder AAROM (Therapeutic Exercise) right assist left;table slides, flexion;table slides,  aBduction;sitting;10 repetitions  -       Row Name 08/26/24 1123          Elbow/Forearm (Therapeutic Exercise)    Elbow/Forearm (Therapeutic Exercise) AAROM (active assistive range of motion)  -     Elbow/Forearm AAROM (Therapeutic Exercise) right assist left;flexion;extension;10 second hold;other (see comments)  table slides  -       Row Name 08/26/24 1123          Balance    Static Sitting Balance contact guard  -     Dynamic Sitting Balance contact guard  -     Position, Sitting Balance unsupported;sitting edge of bed  -     Static Standing Balance minimal assist;verbal cues  -     Dynamic Standing Balance moderate assist;2-person assist;verbal cues  -     Position/Device Used, Standing Balance supported  -     Balance Interventions sitting;standing;sit to stand;supported;static;dynamic;occupation based/functional task  -               User Key  (r) = Recorded By, (t) = Taken By, (c) = Cosigned By      Initials Name Provider Type    Lakia Trevizo OT Occupational Therapist                   Goals/Plan    No documentation.                  Clinical Impression       Row Name 08/26/24 1126          Pain Assessment    Pain Intervention(s) Repositioned;Ambulation/increased activity;Nursing Notified  -       Row Name 08/26/24 1126          Pain Scale: FACES Pre/Post-Treatment    Pain: FACES Scale, Pretreatment 2-->hurts little bit  -     Posttreatment Pain Rating 2-->hurts little bit  -     Pain Location generalized  -     Pain Location - head  -       Row Name 08/26/24 1126          Plan of Care Review    Plan of Care Reviewed With patient  -     Progress improving  -     Outcome Evaluation Pt continues to benefit from IPOT services to address deficits in order to progress towards PLOF. Pt requires frequent redirection and VC to tend to all therapy tasks. Will continue POC to progress toward goals. d/c rec is IPR.  -       Row Name 08/26/24 1126          Therapy Plan  Review/Discharge Plan (OT)    Anticipated Discharge Disposition (OT) inpatient rehabilitation facility  -       Row Name 08/26/24 1126          Vital Signs    Pre Systolic BP Rehab 137  -KL     Pre Treatment Diastolic BP 77  -KL     Post Systolic BP Rehab 126  -KL     Post Treatment Diastolic BP 87  -KL     Pretreatment Heart Rate (beats/min) 72  -KL     Posttreatment Heart Rate (beats/min) 77  -KL     Pre SpO2 (%) 95  -KL     O2 Delivery Pre Treatment room air  -KL     Post SpO2 (%) 96  -KL     O2 Delivery Post Treatment room air  -KL     Pre Patient Position Sitting  -KL     Intra Patient Position Standing  -KL     Post Patient Position Sitting  -       Row Name 08/26/24 1126          Positioning and Restraints    Pre-Treatment Position in bed  -KL     Post Treatment Position chair  -KL     In Chair notified nsg;reclined;sitting;call light within reach;exit alarm on;encouraged to call for assist;with family/caregiver;waffle cushion;legs elevated;on mechanical lift sling;LUE elevated;RUE elevated  -               User Key  (r) = Recorded By, (t) = Taken By, (c) = Cosigned By      Initials Name Provider Type    Lakia Trevizo OT Occupational Therapist                   Outcome Measures       Row Name 08/26/24 1322          How much help from another is currently needed...    Putting on and taking off regular lower body clothing? 1  -KL     Bathing (including washing, rinsing, and drying) 2  -KL     Toileting (which includes using toilet bed pan or urinal) 2  -KL     Putting on and taking off regular upper body clothing 2  -KL     Taking care of personal grooming (such as brushing teeth) 2  -KL     Eating meals 2  -KL     AM-PAC 6 Clicks Score (OT) 11  -KL       Row Name 08/26/24 1322          Functional Assessment    Outcome Measure Options AM-PAC 6 Clicks Daily Activity (OT)  -               User Key  (r) = Recorded By, (t) = Taken By, (c) = Cosigned By      Initials Name Provider Type    Lakia Trevizo  OT Occupational Therapist                    Occupational Therapy Education       Title: PT OT SLP Therapies (In Progress)       Topic: Occupational Therapy (In Progress)       Point: ADL training (In Progress)       Description:   Instruct learner(s) on proper safety adaptation and remediation techniques during self care or transfers.   Instruct in proper use of assistive devices.                  Learning Progress Summary             Patient Acceptance, E, NR by  at 8/22/2024 1549   Family Acceptance, E, NR by KL at 8/22/2024 1549                         Point: Home exercise program (In Progress)       Description:   Instruct learner(s) on appropriate technique for monitoring, assisting and/or progressing therapeutic exercises/activities.                  Learning Progress Summary             Patient Acceptance, E,D, NR by  at 8/26/2024 1322    Acceptance, E, NR by  at 8/23/2024 1140   Family Acceptance, E,D, NR by  at 8/26/2024 1322   Significant Other Acceptance, E, NR by  at 8/23/2024 1140                         Point: Precautions (In Progress)       Description:   Instruct learner(s) on prescribed precautions during self-care and functional transfers.                  Learning Progress Summary             Patient Acceptance, E,D, NR by  at 8/26/2024 1322    Acceptance, E, NR by  at 8/23/2024 1140    Acceptance, E, NR by  at 8/22/2024 1549   Family Acceptance, E,D, NR by  at 8/26/2024 1322    Acceptance, E, NR by  at 8/22/2024 1549   Significant Other Acceptance, E, NR by  at 8/23/2024 1140                         Point: Body mechanics (In Progress)       Description:   Instruct learner(s) on proper positioning and spine alignment during self-care, functional mobility activities and/or exercises.                  Learning Progress Summary             Patient Acceptance, E,D, NR by  at 8/26/2024 1322    Acceptance, E, NR by  at 8/23/2024 1140    Acceptance, E, NR by  at 8/22/2024  1549   Family Acceptance, E,D, NR by  at 8/26/2024 1322    Acceptance, E, NR by  at 8/22/2024 1549   Significant Other Acceptance, E, NR by  at 8/23/2024 1140                                         User Key       Initials Effective Dates Name Provider Type [facility] 02/05/24 -  Lakia Womack OT Occupational Therapist OT                  OT Recommendation and Plan  Planned Therapy Interventions (OT): activity tolerance training, adaptive equipment training, functional balance retraining, occupation/activity based interventions, neuromuscular control/coordination retraining, patient/caregiver education/training, ROM/therapeutic exercise, strengthening exercise, transfer/mobility retraining  Therapy Frequency (OT): daily  Plan of Care Review  Plan of Care Reviewed With: patient  Progress: improving  Outcome Evaluation: Pt continues to benefit from IPOT services to address deficits in order to progress towards PLOF. Pt requires frequent redirection and VC to tend to all therapy tasks. Will continue POC to progress toward goals. d/c rec is IPR.     Time Calculation:   Evaluation Complexity (OT)  Review Occupational Profile/Medical/Therapy History Complexity: expanded/moderate complexity  Assessment, Occupational Performance/Identification of Deficit Complexity: 3-5 performance deficits  Clinical Decision Making Complexity (OT): detailed assessment/moderate complexity  Overall Complexity of Evaluation (OT): moderate complexity     Time Calculation- OT       Row Name 08/26/24 1323             Time Calculation- OT    OT Start Time 1010  -KL      OT Received On 08/26/24  -KL         Timed Charges    67762 - OT Therapeutic Exercise Minutes 10  -KL      36380 - OT Therapeutic Activity Minutes 5  -KL         Total Minutes    Timed Charges Total Minutes 15  -KL       Total Minutes 15  -KL                User Key  (r) = Recorded By, (t) = Taken By, (c) = Cosigned By      Initials Name Provider Type    Lakia Trevizo  OT Occupational Therapist                  Therapy Charges for Today       Code Description Service Date Service Provider Modifiers Qty    38662893779 HC OT THER PROC EA 15 MIN 8/26/2024 Lakia Womack OT GO 1                 Lakia Womack OT  8/26/2024

## 2024-08-26 NOTE — CONSULTS
Clinical Nutrition     Patient Name: Dalila OCASIO  YOB: 1970  MRN: [id number]  Date of Encounter: 08/26/24 15:46 EDT  Admission date: 8/21/2024  Reason for Visit: Follow-up protocol, EN    Assessment   Nutrition Assessment   Admission Diagnosis:  Acute CVA (cerebrovascular accident) [I63.9]    Problem List:    Acute CVA (cerebrovascular accident)    Alcohol use    Tobacco use    Obesity (BMI 30-39.9)    HTN (hypertension)    Dyslipidemia    History of seizures    PAD (peripheral artery disease)    Oropharyngeal dysphagia      PMH:   She  has a past medical history of Acute CVA (cerebrovascular accident) (08/21/2024), Alcohol use (08/21/2024), Dyslipidemia (08/21/2024), Obesity (BMI 30-39.9) (08/21/2024), PAD (peripheral artery disease) (08/21/2024), and Tobacco use (08/21/2024).    PSH:  She  has a past surgical history that includes Interventional radiology procedure (Bilateral, 8/21/2024).    Applicable Nutrition History:   (8/22) CVA, thrombectomy with stent placement  (8/22) FEES - NPO - mild, oral dysphagia, severe, pharyngeal dysphagia   (8/22) EN started    Labs    Labs Reviewed: Yes    Results from last 7 days   Lab Units 08/26/24  0807 08/26/24  0019 08/25/24  1810 08/25/24  0548 08/25/24  0105 08/24/24  1756 08/24/24  1230 08/24/24  0713 08/23/24  0708 08/23/24  0148 08/22/24  1055 08/22/24  0341 08/21/24  1208 08/21/24  1206   GLUCOSE mg/dL  --   --   --  152*  --  127*  --  126*   < >  --    < > 137*  --  138*   BUN mg/dL  --   --   --  30*  --  30*  --  25*   < >  --    < > 18  --  16   CREATININE mg/dL  --   --   --  0.56*  --  0.57  --  0.57   < >  --    < > 0.56*   < > 0.64   SODIUM mmol/L 140 141 143 142  145   < > 144   < > 142  143   < > 142   < > 140  --  140   CHLORIDE mmol/L  --   --   --  109*  --  111*  --  109*   < >  --    < > 108*  --  104   POTASSIUM mmol/L  --   --   --  4.0  --  4.2  --  3.8   < >  --    < > 4.1  --  4.1   PHOSPHORUS mg/dL  --   --    --   --   --   --   --   --   --  2.7  --  3.0  --   --    MAGNESIUM mg/dL  --   --   --   --   --   --   --   --   --  2.3  --  2.0  --   --    ALT (SGPT) U/L  --   --   --   --   --   --   --   --   --   --   --   --   --  20  19    < > = values in this interval not displayed.       Results from last 7 days   Lab Units 08/23/24  0708 08/23/24  0148 08/22/24  0341 08/21/24  1206   ALBUMIN g/dL  --   --   --  4.0   PREALBUMIN mg/dL 23.7  --   --   --    CRP mg/dL  --  0.43  --   --    CHOLESTEROL mg/dL  --   --  187  --    TRIGLYCERIDES mg/dL  --   --  83  --        Results from last 7 days   Lab Units 08/26/24  1139 08/26/24  0504 08/25/24  2307 08/25/24  1751 08/25/24  1143 08/25/24  0520   GLUCOSE mg/dL 130 122 136* 128 137* 113     Lab Results   Lab Value Date/Time    HGBA1C 5.50 08/22/2024 0341         Results from last 7 days   Lab Units 08/22/24  1055   PROBNP pg/mL 269.4       Medications    Medications Reviewed: yes    Scheduled Meds:acetaminophen, 650 mg, Nasogastric, Q6H  aspirin, 81 mg, Nasogastric, Daily   Or  aspirin, 300 mg, Rectal, Daily  atorvastatin, 80 mg, Nasogastric, Nightly  folic acid, 1 mg, Nasogastric, Daily  insulin regular, 2-9 Units, Subcutaneous, Q6H  nicotine, 1 patch, Transdermal, Q24H  ProSource No Carb, 30 mL, Nasogastric, Daily  [START ON 8/27/2024] thiamine, 100 mg, Nasogastric, Daily  ticagrelor, 60 mg, Nasogastric, BID      Continuous Infusions:     PRN Meds:.  dextrose    glucagon (human recombinant)    hydrALAZINE    HYDROcodone-acetaminophen    HYDROcodone-acetaminophen    ibuprofen    nitroglycerin    sodium chloride    Intake/Ouptut 24 hrs (0701 - 0700)   I&O's Reviewed: yes  Intake & Output (last day)         08/25 0701 08/26 0700 08/26 0701 08/27 0700    I.V. (mL/kg) 629.4 (5.7)     Other 190     NG/GT 1806 105    Total Intake(mL/kg) 2625.4 (23.7) 105 (0.9)    Urine (mL/kg/hr) 2060 (0.8)     Total Output 2060     Net +565.4 +105          Urine Unmeasured Occurrence 1 x   "       LBM 8/23    Anthropometrics     Height: Height: 172.7 cm (68\")  Last Filed Weight: Weight: 111 kg (244 lb 14.9 oz) (08/24/24 0545)  Method: Weight Method: Bed scale  BMI: BMI (Calculated): 37.3    UBW:    Weight      Weight (kg) Weight (lbs) Weight Method   4/21/2023 127.733 kg  281 lb 9.6 oz  Standing scale    4/8/2024 128 kg  282 lb 3 oz  Stated    8/21/2024 107.502 kg  237 lb  Stated    8/22/2024 107.502 kg  237 lb         Weight change:  no verifiable significant changes per limited data in EMR, will verify with pt as able    Nutrition Focused Physical Exam     Date: 8/26    Pt does not meet criteria for malnutrition diagnosis, at this time.      Subjective   Reported/Observed/Food/Nutrition Related History:     8/26) Pt falls asleep during visit, unable to give hx. Tolerating EN, anxious for diet per nsg. Son at bedside tells me pt seems to be doing okay with TF, no questions/concerns at this time. No BM in 3 days. Na 140, goal 140-145, hypertonic stopped yesterday. BG WNL.     8/23) Tolerating EN. Visited with pt and spouse at bedside, pt very lethargic but states she is feeling okay denies altered GI fx. Both report pt eating adequately with no significant changes recently but has had intentional weight loss over past year with diet/lifestyle changes. RD explained nutritional care plan for EN until able to improve swallowing and eat adequately, understanding voiced and no further questions at this time.     8/22) Pt presented with CVA and had thrombectomy. Failed FEES today and NG placed for EN. High risk for intubation 2/2 AMS. Unable to visit with pt today, will see as feasible 8/23. NKFA listed in EPIC.     Needs Assessment   Date: 8/22, 8/26    Height used:Height: 172.7 cm (68\")  Weights used: 108 kg (ABW)    Estimated Calorie needs: ~1800 Kcal/day  Method:  Kcals/KG 16-20 = 3646-8020  Method:  MSJ = 1733  Method:   HBD =  1754    Estimated Protein needs: ~130 g PRO/day  Method: 1.2 g/Kg = " 130  Method: 2 g/Kg = 129    Estimated Fluid needs: ~ ml/day   To maintain sodium goal 140-145    Current Nutrition Prescription     PO: NPO Diet NPO Type: Tube Feeding  Oral Nutrition Supplement:   Intake: N/A    EN: Peptamen AF  Goal Rate: 75 ml/hr  Water Flushes: 15 ml Q 6 hr  Modular: Prosource -no carb 1/day  Route: small bore  Tube: NG    At goal over: 20Hrs/day     Rx will supply:   Goal Volume 1500 mL/day     Flush Volume 90 mL/day     Energy 1860 Kcal/day 103 % Est Need   Protein 129 g/day 99 % Est Need   Fiber 6 g/day  (soluble)   Water in  EN 1215 mL     Total Water 1305 mL     Meet DRI Yes        --------------------------------------------------------------------------  Product/Rate verified at bedside: Yes  Infusing Rate at time of visit: 75 ml/hr    Average Delivery from [facility] Days:   Volume 1391 mL/day 93  % Goal Vol.   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  -Peptamen AF at goal 75 ml/hr, water flushes 30 ml Q 4 hr.    Water flushes slightly increased (from 90 to 180 ml/day) with Na stable at goal. Continuing to closely monitor and adjust as indicated for sodium goal 145-150.     No BM X 3 days- monitor for need bowel regimen    Monitoring/Evaluation:   Per protocol,  I&O, Pertinent labs, EN delivery/tolerance, Weight, GI status, Symptoms, POC/GOC, Swallow function, Hemodynamic stability    Ibis Tariq RD, CNSC  Time Spent: 25m

## 2024-08-26 NOTE — THERAPY TREATMENT NOTE
Acute Care - Speech Language Pathology   Swallow Treatment Note [facility]     Patient Name: Dalila OCASIO  : 1970  MRN: [id number]  Today's Date: 2024               Admit Date: 2024    Visit Dx:     ICD-10-CM ICD-9-CM   1. Acute CVA (cerebrovascular accident)  I63.9 434.91   2. Left-sided neglect  R41.4 781.8   3. Oropharyngeal dysphagia  R13.12 787.22   4. Dysarthria  R47.1 784.51     Patient Active Problem List   Diagnosis    Acute CVA (cerebrovascular accident)    Alcohol use    Tobacco use    Obesity (BMI 30-39.9)    HTN (hypertension)    Dyslipidemia    History of seizures    PAD (peripheral artery disease)     Past Medical History:   Diagnosis Date    Acute CVA (cerebrovascular accident) 2024    Alcohol use 2024    Dyslipidemia 2024    Obesity (BMI 30-39.9) 2024    PAD (peripheral artery disease) 2024    Tobacco use 2024     Past Surgical History:   Procedure Laterality Date    INTERVENTIONAL RADIOLOGY PROCEDURE Bilateral 2024    Procedure: Carotid Cervical Angiogram;  Surgeon: Jamaal Saini MD;  Location: [facility] INVASIVE LOCATION;  Service: Interventional Radiology;  Laterality: Bilateral;       SLP Recommendation and Plan                                               Daily Summary of Progress (SLP): progress toward functional goals is good (24 1200)               Treatment Assessment (SLP): continued, clinical signs of, aspiration, improved, cognitive-linguistic disorder, communication disorder (24 1200)  Treatment Assessment Comments (SLP): Pt showing significant improvements in S/L/Cog, very appopriate in conversation today and speech clearing. Comprehension signicantly improved and no notable word finding difficulty today. Family in agreement. Still showing s/s dysphagia with trials, but tolerated ice chips and encouraged pt to work on effortful swallows to manage secretions vs suctioning with yankauer. (24  1200)  Plan for Continued Treatment (SLP): continue treatment per plan of care (24 1200)         Plan of Care Reviewed With: patient, family  Progress: improving      SWALLOW EVALUATION (Last 72 Hours)       SLP Adult Swallow Evaluation       Row Name 24 1200                   Rehab Evaluation    Oral Care teeth brushed - suction toothbrush  done prior to SLP arrival, pt acknowledges she is keeping mouth clean and undrstands importance of oral care  -AW                  User Key  (r) = Recorded By, (t) = Taken By, (c) = Cosigned By      Initials Name Effective Dates    Ana Tristan MS CCC-SLP 23 -                     EDUCATION  The patient has been educated in the following areas:   Communication Impairment.                Time Calculation:    Time Calculation- SLP       Row Name 24 1228             Time Calculation- SLP    SLP Start Time 1000  -AW      SLP Stop Time 1100  -AW      SLP Time Calculation (min) 60 min  -AW      SLP Received On 24  -AW                User Key  (r) = Recorded By, (t) = Taken By, (c) = Cosigned By      Initials Name Provider Type    Ana Tristan MS CCC-SLP Speech and Language Pathologist                             Ana Barrera MS CCC-SLP  2024   and Acute Care - Speech Language Pathology Treatment Note  [facility]     Patient Name: Dalila OCASIO  : 1970  MRN: [id number]  Today's Date: 2024               Admit Date: 2024     Visit Dx:    ICD-10-CM ICD-9-CM   1. Acute CVA (cerebrovascular accident)  I63.9 434.91   2. Left-sided neglect  R41.4 781.8   3. Oropharyngeal dysphagia  R13.12 787.22   4. Dysarthria  R47.1 784.51     Patient Active Problem List   Diagnosis    Acute CVA (cerebrovascular accident)    Alcohol use    Tobacco use    Obesity (BMI 30-39.9)    HTN (hypertension)    Dyslipidemia    History of seizures    PAD (peripheral artery disease)     Past Medical History:   Diagnosis Date    Acute CVA  (cerebrovascular accident) 08/21/2024    Alcohol use 08/21/2024    Dyslipidemia 08/21/2024    Obesity (BMI 30-39.9) 08/21/2024    PAD (peripheral artery disease) 08/21/2024    Tobacco use 08/21/2024     Past Surgical History:   Procedure Laterality Date    INTERVENTIONAL RADIOLOGY PROCEDURE Bilateral 8/21/2024    Procedure: Carotid Cervical Angiogram;  Surgeon: Jamaal Saini MD;  Location: [facility] CATH INVASIVE LOCATION;  Service: Interventional Radiology;  Laterality: Bilateral;       SLP Recommendation and Plan                                         Daily Summary of Progress (SLP): progress toward functional goals is good (08/26/24 1200)           Treatment Assessment (SLP): continued, clinical signs of, aspiration, improved, cognitive-linguistic disorder, communication disorder (08/26/24 1200)  Treatment Assessment Comments (SLP): Pt showing significant improvements in S/L/Cog, very appopriate in conversation today and speech clearing. Comprehension signicantly improved and no notable word finding difficulty today. Family in agreement. Still showing s/s dysphagia with trials, but tolerated ice chips and encouraged pt to work on effortful swallows to manage secretions vs suctioning with yankauer. (08/26/24 1200)  Plan for Continued Treatment (SLP): continue treatment per plan of care (08/26/24 1200)  Plan of Care Reviewed With: patient, family (08/26/24 1227)  Progress: improving (08/26/24 1227)      SLP EVALUATION (Last 72 Hours)       SLP SLC Evaluation       Row Name 08/26/24 1200                   Communication Assessment/Intervention    Document Type therapy note (daily note)  -AW        Subjective Information complains of  headache  -AW        Patient Observations alert;cooperative  -AW        Patient/Family/Caregiver Comments/Observations dtr in law at bedside  -AW        Patient Effort good  -AW        Symptoms Noted During/After Treatment none  -AW           Pain Scale: FACES Pre/Post-Treatment     Pain: FACES Scale, Pretreatment 2-->hurts little bit  -AW        Posttreatment Pain Rating 2-->hurts little bit  -AW        Pain Location - head  -AW           SLP Treatment Clinical Impressions    Treatment Assessment (SLP) continued;clinical signs of;aspiration;improved;cognitive-linguistic disorder;communication disorder  -AW        Treatment Assessment Comments (SLP) Pt showing significant improvements in S/L/Cog, very appopriate in conversation today and speech clearing. Comprehension signicantly improved and no notable word finding difficulty today. Family in agreement. Still showing s/s dysphagia with trials, but tolerated ice chips and encouraged pt to work on effortful swallows to manage secretions vs suctioning with yankauer.  -AW        Daily Summary of Progress (SLP) progress toward functional goals is good  -AW        Plan for Continued Treatment (SLP) continue treatment per plan of care  -AW        Care Plan Review care plan/treatment goals reviewed  -AW                  User Key  (r) = Recorded By, (t) = Taken By, (c) = Cosigned By      Initials Name Effective Dates    Ana Tristan MS CCC-SLP 02/03/23 -                        EDUCATION  The patient has been educated in the following areas:     Dysphagia (Swallowing Impairment).                        Time Calculation:      Time Calculation- SLP       Row Name 08/26/24 1228             Time Calculation- SLP    SLP Start Time 1000  -AW      SLP Stop Time 1100  -AW      SLP Time Calculation (min) 60 min  -AW      SLP Received On 08/26/24  -AW                User Key  (r) = Recorded By, (t) = Taken By, (c) = Cosigned By      Initials Name Provider Type    Ana Tristan MS CCC-SLP Speech and Language Pathologist                                   Ana Barrera MS CCC-SLP  8/26/2024

## 2024-08-27 LAB
ANION GAP SERPL CALCULATED.3IONS-SCNC: 11 MMOL/L (ref 5–15)
BUN SERPL-MCNC: 32 MG/DL (ref 6–20)
BUN/CREAT SERPL: 55.2 (ref 7–25)
CALCIUM SPEC-SCNC: 9.3 MG/DL (ref 8.6–10.5)
CHLORIDE SERPL-SCNC: 101 MMOL/L (ref 98–107)
CO2 SERPL-SCNC: 24 MMOL/L (ref 22–29)
CREAT SERPL-MCNC: 0.58 MG/DL (ref 0.57–1)
DEPRECATED RDW RBC AUTO: 40.9 FL (ref 37–54)
EGFRCR SERPLBLD CKD-EPI 2021: 108.4 ML/MIN/1.73
ERYTHROCYTE [DISTWIDTH] IN BLOOD BY AUTOMATED COUNT: 11.9 % (ref 12.3–15.4)
GLUCOSE BLDC GLUCOMTR-MCNC: 133 MG/DL (ref 70–130)
GLUCOSE BLDC GLUCOMTR-MCNC: 141 MG/DL (ref 70–130)
GLUCOSE BLDC GLUCOMTR-MCNC: 146 MG/DL (ref 70–130)
GLUCOSE SERPL-MCNC: 137 MG/DL (ref 65–99)
HCT VFR BLD AUTO: 41.2 % (ref 34–46.6)
HGB BLD-MCNC: 14.1 G/DL (ref 12–15.9)
MAGNESIUM SERPL-MCNC: 2.3 MG/DL (ref 1.6–2.6)
MCH RBC QN AUTO: 31.8 PG (ref 26.6–33)
MCHC RBC AUTO-ENTMCNC: 34.2 G/DL (ref 31.5–35.7)
MCV RBC AUTO: 93 FL (ref 79–97)
PLATELET # BLD AUTO: 261 10*3/MM3 (ref 140–450)
PMV BLD AUTO: 10.3 FL (ref 6–12)
POTASSIUM SERPL-SCNC: 4.1 MMOL/L (ref 3.5–5.2)
RBC # BLD AUTO: 4.43 10*6/MM3 (ref 3.77–5.28)
SODIUM SERPL-SCNC: 136 MMOL/L (ref 136–145)
WBC NRBC COR # BLD AUTO: 10.02 10*3/MM3 (ref 3.4–10.8)

## 2024-08-27 PROCEDURE — 82948 REAGENT STRIP/BLOOD GLUCOSE: CPT

## 2024-08-27 PROCEDURE — 25010000002 MAGNESIUM SULFATE 2 GM/50ML SOLUTION

## 2024-08-27 PROCEDURE — 85027 COMPLETE CBC AUTOMATED: CPT

## 2024-08-27 PROCEDURE — 99232 SBSQ HOSP IP/OBS MODERATE 35: CPT | Performed by: NURSE PRACTITIONER

## 2024-08-27 PROCEDURE — 80048 BASIC METABOLIC PNL TOTAL CA: CPT | Performed by: INTERNAL MEDICINE

## 2024-08-27 PROCEDURE — 97530 THERAPEUTIC ACTIVITIES: CPT

## 2024-08-27 PROCEDURE — 99232 SBSQ HOSP IP/OBS MODERATE 35: CPT

## 2024-08-27 PROCEDURE — 97110 THERAPEUTIC EXERCISES: CPT

## 2024-08-27 PROCEDURE — 97535 SELF CARE MNGMENT TRAINING: CPT

## 2024-08-27 PROCEDURE — 83735 ASSAY OF MAGNESIUM: CPT

## 2024-08-27 RX ORDER — BISACODYL 5 MG/1
5 TABLET, DELAYED RELEASE ORAL DAILY PRN
Status: DISCONTINUED | OUTPATIENT
Start: 2024-08-27 | End: 2024-08-28 | Stop reason: HOSPADM

## 2024-08-27 RX ORDER — MAGNESIUM SULFATE HEPTAHYDRATE 40 MG/ML
2 INJECTION, SOLUTION INTRAVENOUS ONCE
Status: COMPLETED | OUTPATIENT
Start: 2024-08-27 | End: 2024-08-27

## 2024-08-27 RX ORDER — AMOXICILLIN 250 MG
2 CAPSULE ORAL 2 TIMES DAILY
Status: DISCONTINUED | OUTPATIENT
Start: 2024-08-27 | End: 2024-08-28 | Stop reason: HOSPADM

## 2024-08-27 RX ORDER — POLYETHYLENE GLYCOL 3350 17 G/17G
17 POWDER, FOR SOLUTION ORAL DAILY PRN
Status: DISCONTINUED | OUTPATIENT
Start: 2024-08-27 | End: 2024-08-28 | Stop reason: HOSPADM

## 2024-08-27 RX ORDER — BISACODYL 10 MG
10 SUPPOSITORY, RECTAL RECTAL DAILY PRN
Status: DISCONTINUED | OUTPATIENT
Start: 2024-08-27 | End: 2024-08-28 | Stop reason: HOSPADM

## 2024-08-27 RX ADMIN — SODIUM CHLORIDE 1 G: 1 TABLET ORAL at 20:28

## 2024-08-27 RX ADMIN — TICAGRELOR 60 MG: 60 TABLET ORAL at 09:54

## 2024-08-27 RX ADMIN — SODIUM CHLORIDE 1 G: 1 TABLET ORAL at 09:54

## 2024-08-27 RX ADMIN — TICAGRELOR 60 MG: 60 TABLET ORAL at 20:28

## 2024-08-27 RX ADMIN — FOLIC ACID 1 MG: 1 TABLET ORAL at 09:54

## 2024-08-27 RX ADMIN — HYDROCODONE BITARTRATE AND ACETAMINOPHEN 1 TABLET: 7.5; 325 TABLET ORAL at 04:18

## 2024-08-27 RX ADMIN — ATORVASTATIN CALCIUM 80 MG: 40 TABLET, FILM COATED ORAL at 20:28

## 2024-08-27 RX ADMIN — Medication 30 ML: at 09:54

## 2024-08-27 RX ADMIN — ACETAMINOPHEN 650 MG: 650 SOLUTION ORAL at 00:07

## 2024-08-27 RX ADMIN — THIAMINE HCL TAB 100 MG 100 MG: 100 TAB at 09:54

## 2024-08-27 RX ADMIN — ACETAMINOPHEN 650 MG: 650 SOLUTION ORAL at 06:17

## 2024-08-27 RX ADMIN — HYDROCODONE BITARTRATE AND ACETAMINOPHEN 1 TABLET: 7.5; 325 TABLET ORAL at 11:12

## 2024-08-27 RX ADMIN — HYDROCODONE BITARTRATE AND ACETAMINOPHEN 1 TABLET: 7.5; 325 TABLET ORAL at 23:59

## 2024-08-27 RX ADMIN — HYDROCODONE BITARTRATE AND ACETAMINOPHEN 1 TABLET: 7.5; 325 TABLET ORAL at 17:57

## 2024-08-27 RX ADMIN — MAGNESIUM SULFATE HEPTAHYDRATE 2 G: 40 INJECTION, SOLUTION INTRAVENOUS at 11:12

## 2024-08-27 RX ADMIN — SENNOSIDES AND DOCUSATE SODIUM 2 TABLET: 50; 8.6 TABLET ORAL at 20:28

## 2024-08-27 RX ADMIN — ASPIRIN 81 MG 81 MG: 81 TABLET ORAL at 09:54

## 2024-08-27 NOTE — PLAN OF CARE
Goal Outcome Evaluation:  Plan of Care Reviewed With: patient, spouse        Progress: improving  Outcome Evaluation: Pt continues to require frequent cueing for adherence to NWB precautions and safety awareness throughout session, although demonstrated good effort. Pt required Veda to complete dynamic sitting activities to improve trunk control and crossing midline. PT rec IRF at d/c.      Anticipated Discharge Disposition (PT): inpatient rehabilitation facility

## 2024-08-27 NOTE — THERAPY TREATMENT NOTE
Patient Name: Dalila OCASIO  : 1970    MRN: [id number]                              Today's Date: 2024       Admit Date: 2024    Visit Dx:     ICD-10-CM ICD-9-CM   1. Acute CVA (cerebrovascular accident)  I63.9 434.91   2. Left-sided neglect  R41.4 781.8   3. Oropharyngeal dysphagia  R13.12 787.22   4. Dysarthria  R47.1 784.51     Patient Active Problem List   Diagnosis    Acute CVA (cerebrovascular accident)    Alcohol use    Tobacco use    Obesity (BMI 30-39.9)    HTN (hypertension)    Dyslipidemia    History of seizures    PAD (peripheral artery disease)    Oropharyngeal dysphagia     Past Medical History:   Diagnosis Date    Acute CVA (cerebrovascular accident) 2024    Alcohol use 2024    Dyslipidemia 2024    Obesity (BMI 30-39.9) 2024    PAD (peripheral artery disease) 2024    Tobacco use 2024     Past Surgical History:   Procedure Laterality Date    INTERVENTIONAL RADIOLOGY PROCEDURE Bilateral 2024    Procedure: Carotid Cervical Angiogram;  Surgeon: Jamaal Saini MD;  Location:  [facility] INVASIVE LOCATION;  Service: Interventional Radiology;  Laterality: Bilateral;      General Information       Row Name 24 1519          OT Time and Intention    Document Type therapy note (daily note)  -     Mode of Treatment occupational therapy  -       Row Name 24 1519          General Information    Patient Profile Reviewed yes  -     Existing Precautions/Restrictions fall;other (see comments)  NG; AM boot for L ankle immobilization and NWB LLE per ortho; L weakness/ inattention  -     Barriers to Rehab medically complex  -       Row Name 24 1519          Cognition    Orientation Status (Cognition) oriented x 3  -       Row Name 24 1519          Safety Issues, Functional Mobility    Safety Issues Affecting Function (Mobility) ability to follow commands;awareness of need for assistance;insight into  deficits/self-awareness;impulsivity;judgment;unable to maintain weight-bearing restrictions;safety precaution awareness;safety precautions follow-through/compliance  -     Impairments Affecting Function (Mobility) balance;coordination;endurance/activity tolerance;range of motion (ROM);strength;sensation/sensory awareness  -               User Key  (r) = Recorded By, (t) = Taken By, (c) = Cosigned By      Initials Name Provider Type    Lakia Trevizo OT Occupational Therapist                     Mobility/ADL's       Row Name 08/27/24 1520          Bed Mobility    Bed Mobility sit-supine  -     Sit-Supine [place] (Bed Mobility) minimum assist (75% patient effort);1 person assist;nonverbal cues (demo/gesture);verbal cues  -     Bed Mobility, Safety Issues decreased use of legs for bridging/pushing  -     Assistive Device (Bed Mobility) head of bed elevated;draw sheet  -     Comment, (Bed Mobility) LUE in sling  -[facility] Name 08/27/24 1520          Transfers    Transfers sit-stand transfer  -KL       Row Name 08/27/24 1520          Sit-Stand Transfer    Sit-Stand [place] (Transfers) minimum assist (75% patient effort);verbal cues  -     Assistive Device (Sit-Stand Transfers) other (see comments)  BUE support  -[facility] Name 08/27/24 1520          Activities of Daily Living    BADL Assessment/Intervention lower body dressing  -KL       Row Name 08/27/24 1520          Lower Body Dressing Assessment/Training    [place] Level (Lower Body Dressing) don;socks;supervision;verbal cues  -     Position (Lower Body Dressing) supported sitting  -     Comment, (Lower Body Dressing) one handed technique in figure 4  -[facility] Name 08/27/24 1520          Grooming Assessment/Training    [place] Level (Grooming) wash face, hands;set up  -     Position (Grooming) supported sitting  -               User Key  (r) = Recorded By, (t) = Taken By, (c) = Cosigned By      Initials Name  Provider Type    Lakia Trevizo OT Occupational Therapist                   Obj/Interventions       Row Name 08/27/24 1521          Shoulder (Therapeutic Exercise)    Shoulder AAROM (Therapeutic Exercise) right assist left;table slides, flexion;table slides, aBduction;sitting;10 repetitions  -       Row Name 08/27/24 1521          Elbow/Forearm (Therapeutic Exercise)    Elbow/Forearm AAROM (Therapeutic Exercise) right assist left;flexion;extension;10 repetitions  -       Row Name 08/27/24 1521          Balance    Static Sitting Balance contact guard;verbal cues  -     Dynamic Sitting Balance contact guard;verbal cues  -     Position, Sitting Balance supported;sitting in chair  -     Static Standing Balance minimal assist;verbal cues  -     Dynamic Standing Balance moderate assist;verbal cues;1-person assist  -     Position/Device Used, Standing Balance supported  -     Balance Interventions sitting;standing;sit to stand;supported;static;dynamic;occupation based/functional task  -               User Key  (r) = Recorded By, (t) = Taken By, (c) = Cosigned By      Initials Name Provider Type    Lakia Trevizo OT Occupational Therapist                   Goals/Plan    No documentation.                  Clinical Impression       Row Name 08/27/24 1522          Pain Assessment    Pain Intervention(s) Repositioned;Ambulation/increased activity  -       Row Name 08/27/24 1522          Pain Scale: FACES Pre/Post-Treatment    Pain: FACES Scale, Pretreatment 2-->hurts little bit  -     Posttreatment Pain Rating 0-->no hurt  -     Pain Location generalized  -     Pain Location - back  -       Row Name 08/27/24 1522          Plan of Care Review    Plan of Care Reviewed With patient;spouse  -     Progress improving  -     Outcome Evaluation Pt continues to benefit from IPOT services to address deficits in order to progress towards PLOF. Pt continues to require constant VC d/t decreased awareness of  assist and impulsivity. d/c rec is IPR.  -       Row Name 08/27/24 1522          Therapy Plan Review/Discharge Plan (OT)    Anticipated Discharge Disposition (OT) inpatient rehabilitation facility  -       Row Name 08/27/24 1522          Vital Signs    Pre Systolic BP Rehab 127  -KL     Pre Treatment Diastolic BP 80  -KL     Post Systolic BP Rehab 131  -KL     Post Treatment Diastolic BP 94  -KL     Pretreatment Heart Rate (beats/min) 78  -KL     Posttreatment Heart Rate (beats/min) 87  -KL     Pre SpO2 (%) 96  -KL     O2 Delivery Pre Treatment room air  -KL     Post SpO2 (%) 97  -KL     O2 Delivery Post Treatment room air  -KL     Pre Patient Position Sitting  -     Intra Patient Position Standing  -KL     Post Patient Position Supine  -       Row Name 08/27/24 1522          Positioning and Restraints    Pre-Treatment Position sitting in chair/recliner  -KL     Post Treatment Position bed  -KL     In Bed exit alarm on;notified nsg;supine;call light within reach;encouraged to call for assist;with family/caregiver;side rails up x3;LUE elevated  -KL               User Key  (r) = Recorded By, (t) = Taken By, (c) = Cosigned By      Initials Name Provider Type    Lakia Trevizo, MICHELLE Occupational Therapist                   Outcome Measures       Row Name 08/27/24 1527          How much help from another is currently needed...    Putting on and taking off regular lower body clothing? 2  -KL     Bathing (including washing, rinsing, and drying) 2  -KL     Toileting (which includes using toilet bed pan or urinal) 2  -KL     Putting on and taking off regular upper body clothing 2  -KL     Taking care of personal grooming (such as brushing teeth) 3  -KL     Eating meals 2  -KL     AM-PAC 6 Clicks Score (OT) 13  -KL       Row Name 08/27/24 1128          How much help from another person do you currently need...    Turning from your back to your side while in flat bed without using bedrails? 3  -ES     Moving from lying  on back to sitting on the side of a flat bed without bedrails? 3  -ES     Moving to and from a bed to a chair (including a wheelchair)? 2  -ES     Standing up from a chair using your arms (e.g., wheelchair, bedside chair)? 3  -ES     Climbing 3-5 steps with a railing? 1  -ES     To walk in hospital room? 1  -ES     AM-PAC 6 Clicks Score (PT) 13  -ES     Highest Level of Mobility Goal 4 --> Transfer to chair/commode  -ES       Row Name 08/27/24 1128          Functional Assessment    Outcome Measure Options AM-PAC 6 Clicks Basic Mobility (PT)  -ES               User Key  (r) = Recorded By, (t) = Taken By, (c) = Cosigned By      Initials Name Provider Type    Karie Savage, PT Physical Therapist    Lakia Trevizo OT Occupational Therapist                    Occupational Therapy Education       Title: PT OT SLP Therapies (In Progress)       Topic: Occupational Therapy (In Progress)       Point: ADL training (In Progress)       Description:   Instruct learner(s) on proper safety adaptation and remediation techniques during self care or transfers.   Instruct in proper use of assistive devices.                  Learning Progress Summary             Patient Acceptance, E,D, NR by  at 8/27/2024 1528    Acceptance, E, NR by HOLLY at 8/22/2024 1549   Family Acceptance, E, NR by  at 8/22/2024 1549   Significant Other Acceptance, E,D, NR by  at 8/27/2024 1528                         Point: Home exercise program (In Progress)       Description:   Instruct learner(s) on appropriate technique for monitoring, assisting and/or progressing therapeutic exercises/activities.                  Learning Progress Summary             Patient Acceptance, E,D, NR by  at 8/27/2024 1528    Acceptance, E,D, NR by HOLLY at 8/26/2024 1322    Acceptance, E, NR by  at 8/23/2024 1140   Family Acceptance, E,D, NR by  at 8/26/2024 1322   Significant Other Acceptance, E,D, NR by  at 8/27/2024 1528    Acceptance, E, NR by  at 8/23/2024  1140                         Point: Precautions (In Progress)       Description:   Instruct learner(s) on prescribed precautions during self-care and functional transfers.                  Learning Progress Summary             Patient Acceptance, E,D, NR by  at 8/27/2024 1528    Acceptance, E,D, NR by  at 8/26/2024 1322    Acceptance, E, NR by  at 8/23/2024 1140    Acceptance, E, NR by  at 8/22/2024 1549   Family Acceptance, E,D, NR by  at 8/26/2024 1322    Acceptance, E, NR by  at 8/22/2024 1549   Significant Other Acceptance, E,D, NR by  at 8/27/2024 1528    Acceptance, E, NR by  at 8/23/2024 1140                         Point: Body mechanics (In Progress)       Description:   Instruct learner(s) on proper positioning and spine alignment during self-care, functional mobility activities and/or exercises.                  Learning Progress Summary             Patient Acceptance, E,D, NR by  at 8/27/2024 1528    Acceptance, E,D, NR by  at 8/26/2024 1322    Acceptance, E, NR by  at 8/23/2024 1140    Acceptance, E, NR by  at 8/22/2024 1549   Family Acceptance, E,D, NR by  at 8/26/2024 1322    Acceptance, E, NR by  at 8/22/2024 1549   Significant Other Acceptance, E,D, NR by  at 8/27/2024 1528    Acceptance, E, NR by  at 8/23/2024 1140                                         User Key       Initials Effective Dates Name Provider Type Discipline     02/05/24 -  Lakia Womack OT Occupational Therapist OT                  OT Recommendation and Plan  Planned Therapy Interventions (OT): activity tolerance training, adaptive equipment training, functional balance retraining, occupation/activity based interventions, neuromuscular control/coordination retraining, patient/caregiver education/training, ROM/therapeutic exercise, strengthening exercise, transfer/mobility retraining  Therapy Frequency (OT): daily  Plan of Care Review  Plan of Care Reviewed With: patient, spouse  Progress:  improving  Outcome Evaluation: Pt continues to benefit from IPOT services to address deficits in order to progress towards PLOF. Pt continues to require constant VC d/t decreased awareness of assist and impulsivity. d/c rec is IPR.     Time Calculation:   Evaluation Complexity (OT)  Review Occupational Profile/Medical/Therapy History Complexity: expanded/moderate complexity  Assessment, Occupational Performance/Identification of Deficit Complexity: 3-5 performance deficits  Clinical Decision Making Complexity (OT): detailed assessment/moderate complexity  Overall Complexity of Evaluation (OT): moderate complexity     Time Calculation- OT       Row Name 08/27/24 1528             Time Calculation- OT    OT Start Time 1335  -KL      OT Received On 08/27/24  -KL         Timed Charges    34287 - OT Therapeutic Exercise Minutes 15  -KL      22881 - OT Therapeutic Activity Minutes 10  -KL      09698 - OT Self Care/Mgmt Minutes 13  -KL         Total Minutes    Timed Charges Total Minutes 38  -KL       Total Minutes 38  -KL                User Key  (r) = Recorded By, (t) = Taken By, (c) = Cosigned By      Initials Name Provider Type    Lakia Trevizo OT Occupational Therapist                  Therapy Charges for Today       Code Description Service Date Service Provider Modifiers Qty    95295821260 HC OT THER PROC EA 15 MIN 8/26/2024 Lakia Womack OT GO 1    89760030444 HC OT THER PROC EA 15 MIN 8/27/2024 Lakia Womack OT GO 1    65629185843 HC OT THERAPEUTIC ACT EA 15 MIN 8/27/2024 Lakia Womack OT GO 1    26178877058 HC OT SELF CARE/MGMT/TRAIN EA 15 MIN 8/27/2024 Lakia Womack OT GO 1                 Lakia Womack OT  8/27/2024

## 2024-08-27 NOTE — PLAN OF CARE
Goal Outcome Evaluation:            Pt able to sleep some overnight. Still has H/A 9-10/10. VSS on RA. Awaiting transfer to the floor.

## 2024-08-27 NOTE — PLAN OF CARE
Goal Outcome Evaluation:  Plan of Care Reviewed With: patient, spouse        Progress: improving  Outcome Evaluation: Pt continues to benefit from IPOT services to address deficits in order to progress towards PLOF. Pt continues to require constant VC d/t decreased awareness of assist and impulsivity. d/c rec is IPR.      Anticipated Discharge Disposition (OT): inpatient rehabilitation facility

## 2024-08-27 NOTE — PROGRESS NOTES
"Critical Care Note     LOS: 6 days   Patient Care Team:  Agnes Ewing APRN as PCP - General (Family Medicine)    Chief Complaint/Reason for visit:    Chief Complaint   Patient presents with    Stroke       Subjective     Interval History: Patient is resting in bed. Continues to complain of headaches. No other complaints. VS stable. Na dropped to 136 today.     History taken from: patient    Review of Systems:    All systems were reviewed and negative except as noted in subjective.    Medical history, surgical history, social history, family history reviewed    Objective     Intake/Output:    Intake/Output Summary (Last 24 hours) at 8/27/2024 0811  Last data filed at 8/27/2024 0600  Gross per 24 hour   Intake 1854 ml   Output 1600 ml   Net 254 ml       Nutrition:  NPO Diet NPO Type: Tube Feeding      Telemetry: NSR    Vital Signs  Blood pressure 114/73, pulse 73, temperature 99 °F (37.2 °C), temperature source Oral, resp. rate 20, height 172.7 cm (68\"), weight 111 kg (244 lb 14.9 oz), SpO2 93%.    Physical Exam:  General Appearance:  WD/WN. NAD.    Head:  Normocephalic, atraumatic.    Eyes:          Pupils equal round and react to lifght.    Back:      Lungs:    Non labored. Clear to auscultation, no rales/wheezes or rhonchi    Heart:   RRR. No rubs/murmurs or gallops   Abdomen:   S/NT/ND. + BS.    Pulses: DP 2+   Skin: Warm and dry.    Neurologic: AAOx3. Left facial droop. Left upper extremity weakness. FROM of other extremities.       Results Review:     I reviewed the patient's new clinical results.   Results from last 7 days   Lab Units 08/27/24  0350 08/26/24  0807 08/26/24  0019 08/25/24  1810 08/25/24  0548 08/25/24  0105 08/24/24  1756 08/21/24  1208 08/21/24  1206   SODIUM mmol/L 136 140 141   < > 142  145   < > 144   < > 140   POTASSIUM mmol/L 4.1  --   --   --  4.0  --  4.2   < > 4.1   CHLORIDE mmol/L 101  --   --   --  109*  --  111*   < > 104   CO2 mmol/L 24.0  --   --   --  21.0*  --  23.0   < > " "23.0   BUN mg/dL 32*  --   --   --  30*  --  30*   < > 16   CREATININE mg/dL 0.58  --   --   --  0.56*  --  0.57   < > 0.64   CALCIUM mg/dL 9.3  --   --   --  8.8  --  9.2   < > 9.2   BILIRUBIN mg/dL  --   --   --   --   --   --   --   --  0.3   ALK PHOS U/L  --   --   --   --   --   --   --   --  137*   ALT (SGPT) U/L  --   --   --   --   --   --   --   --  20  19   AST (SGOT) U/L  --   --   --   --   --   --   --   --  22  21   GLUCOSE mg/dL 137*  --   --   --  152*  --  127*   < > 138*    < > = values in this interval not displayed.     Results from last 7 days   Lab Units 08/23/24  0148 08/22/24  0341 08/21/24  1208 08/21/24  1206   WBC 10*3/mm3 11.61* 14.23*  --  7.49   HEMOGLOBIN g/dL 13.6 13.2  --  15.2   HEMOGLOBIN, POC g/dL  --   --  15.3  --    HEMATOCRIT % 40.6 39.3  --  45.2   HEMATOCRIT POC %  --   --  45  --    PLATELETS 10*3/mm3 219 226  --  215         No results found for: \"BLOODCX\"  No results found for: \"URINECX\"    I reviewed the patient's new imaging including images and reports.      All medications reviewed.   acetaminophen, 650 mg, Nasogastric, Q6H  aspirin, 81 mg, Nasogastric, Daily   Or  aspirin, 300 mg, Rectal, Daily  atorvastatin, 80 mg, Nasogastric, Nightly  folic acid, 1 mg, Nasogastric, Daily  insulin regular, 2-9 Units, Subcutaneous, Q6H  nicotine, 1 patch, Transdermal, Q24H  ProSource No Carb, 30 mL, Nasogastric, Daily  thiamine, 100 mg, Nasogastric, Daily  ticagrelor, 60 mg, Nasogastric, BID          Assessment & Plan       Acute CVA (cerebrovascular accident)    Alcohol use    Tobacco use    Obesity (BMI 30-39.9)    HTN (hypertension)    Dyslipidemia    History of seizures    PAD (peripheral artery disease)    Oropharyngeal dysphagia    53-year-old woman with hypertension, diabetes, dyslipidemia, seizures, peripheral artery disease, narcotic use, who awoke with slurred speech and left-sided weakness.  She was significantly hypertensive with a systolic blood pressure 198 and an " NIH stroke scale of 17.  Imaging revealed right middle cerebral artery ischemia and occlusion of the right internal carotid artery from its origin, occlusion of the right M1 MCA segment with a large reversible ischemic penumbra.  She was outside of the window for thrombolysis.  She was taken to the Cath Lab emergently and underwent right internal carotid artery stent and mechanical thrombectomy.  Follow-up imaging did reveal a small hemorrhagic conversion in the right insular region.  Neurostroke felt it was important to continue dual antiplatelet therapy because of the stent despite the small bleed.  Hypertonic saline has been stopped.  She carries a diagnosis of seizure disorder but takes no medications for seizures.  She failed her swallow assessment and is currently on tube feeding.   She was initially on Cardene for blood pressure management. Echo revealed an EF of 68% with no PFO. Her initial urine drug screen was positive for methamphetamine and opiates.  She did not have intact detectable alcohol level.  He also smokes cigarettes.  She remains on a nicotine patch.   Her A1c is 5.5, and she takes no medications for diabetes.    PLAN:    Aspirin, statin, Brilinta for CVA   Nicotine patch  oral oxycodone short-term for headache.  Neurosurgery anticipates that she will have a headache for several weeks.  PT, OT, speech therapy  Support with tube feeding, Peptamen AF, 75 mL/h  Sliding scale insulin  Sodium 136 today, down from 140. Will receive salt tabs today.   Transfer to telemetry  No HCTZ diuretics      Neurosurgery would like office follow-up in 4 weeks    VTE Prophylaxis:SCDS    Stress Ulcer Prophylaxis:None    ROXANE Rios  08/27/24  08:11 EDT      Time:  10 minutes  I personally provided care to this critically ill patient as documented above.  Critical care time does not include time spent on separately billed procedures.  None of my critical care time was concurrent with other critical  care providers.

## 2024-08-27 NOTE — PROGRESS NOTES
Stroke Progress Note     Chief Complaint: Left-sided weakness    Subjective    Subjective   Subjective:  No acute events overnight.  Patient continues to complain of a 9/10 right sided headache with associated nausea but no vomiting.  She denies history of headaches/migraines in the past.  She is able to lift her left lower extremity against gravity however remains flaccid in her left upper extremity.  ND tube is in place for dysphagia, SLP continues to follow.    Review of Systems   Constitutional:  Positive for activity change. Negative for fatigue and fever.   HENT:  Positive for trouble swallowing.    Eyes:  Negative for photophobia and visual disturbance.   Respiratory: Negative.     Cardiovascular:  Positive for palpitations. Negative for chest pain.   Gastrointestinal:  Positive for nausea. Negative for vomiting.   Genitourinary: Negative.    Musculoskeletal:  Positive for back pain, gait problem and neck pain.   Skin: Negative.    Neurological:  Positive for facial asymmetry, speech difficulty, weakness, numbness and headaches. Negative for dizziness.   Psychiatric/Behavioral: Negative.          Objective      Temp:  [98.6 °F (37 °C)-99.8 °F (37.7 °C)] 99 °F (37.2 °C)  Heart Rate:  [69-81] 73  Resp:  [16-20] 20  BP: (111-150)/(65-96) 114/73    Neurological Exam  Mental Status  Alert. Oriented to person, place, time and situation. Oriented to person, place, and time. Mild dysarthria present. Language is fluent with no aphasia. Attention and concentration are normal.    Cranial Nerves  CN II: Visual fields full to confrontation.  CN III, IV, VI: Extraocular movements intact bilaterally. Pupils equal round and reactive to light bilaterally.  CN V:  Left: Diminished sensation of the entire left side of the face.  CN VII:  Left: There is central facial weakness.  CN VIII: Hearing appears to be intact bilaterally.  CN XI:  Right: Sternocleidomastoid strength is normal.  Left: No movement.  CN XII: Tongue midline  without atrophy or fasciculations.    Motor  Normal muscle bulk throughout. Decreased muscle tone.  LUE with 0/5 strength  LLE with 4/5 strength  RUE and RLE with 5/5 strength.    Sensory  Light touch abnormality: Left side.  No left-sided hemispatial neglect. Left extinction absent:    Coordination  Left: Unable to assess secondary to weakness.  No obvious dysmetria noted in right side.    Gait    Not observed.      Physical Exam  Vitals and nursing note reviewed.   Constitutional:       General: She is not in acute distress.     Appearance: She is obese. She is not ill-appearing.   HENT:      Head: Normocephalic and atraumatic.      Nose:      Comments: Left ND in place     Mouth/Throat:      Mouth: Mucous membranes are moist.   Eyes:      Extraocular Movements: Extraocular movements intact.      Pupils: Pupils are equal, round, and reactive to light.   Cardiovascular:      Rate and Rhythm: Normal rate and regular rhythm.   Pulmonary:      Effort: Pulmonary effort is normal. No respiratory distress.      Comments: On room air  Musculoskeletal:      Right lower leg: No edema.   Skin:     General: Skin is warm and dry.   Neurological:      Mental Status: She is alert and oriented to person, place, and time.      Cranial Nerves: Cranial nerve deficit and dysarthria present.      Sensory: Sensory deficit present.      Motor: Weakness present.      Coordination: Coordination normal.   Psychiatric:         Mood and Affect: Mood normal.         Behavior: Behavior normal.         Results Review:    I reviewed the patient's new clinical results.      MRI Brain Without Contrast    Result Date: 8/22/2024  MRI BRAIN WO CONTRAST Date of Exam: 8/22/2024 3:28 AM EDT Indication: Stroke, follow up. FUP mechanical thrombectomy and carotid stent; RMCA stroke.  Comparison: CT head/angiography/perfusion 8/21/2024. Technique:  Routine multiplanar/multisequence sequence images of the brain were obtained without contrast administration.  Findings: There is cortical diffusion restriction throughout the majority of the right MCA territory. There is also diffusion restriction in the right caudate and putamen. There is associated T2/FLAIR hyperintense signal. There are several scattered small round foci of FLAIR hyperintense signal within the cerebral white matter otherwise. No evidence of acute or chronic intracranial hemorrhage. There is susceptibility artifact in the region of the right M1 segment, which could relate to thrombus. Orbits are unremarkable. There are mucous retention cysts in the left maxillary sinus and a small retention cyst in the right maxillary sinus. Mastoid air cells appear well aerated. Major arterial flow voids appear intact. No mass effect, midline shift or abnormal extra-axial collection. The midline structures appear intact. Calvarial and superficial soft tissue signal is within normal limits.     Impression: Impression: 1.Large acute infarct involving the majority of the right MCA territory. 2.No evidence of intracranial hemorrhage. 3.Mild chronic small vessel ischemic change. Electronically Signed: Gian Blevins MD  8/22/2024 4:42 AM EDT  Workstation ID: [id number]     CT head without contrast reveals hyperdense RMCA sign, no evidence of hemorrhage.    CTA head/neck reveals right ICA occlusion with intracranial right M1 occlusion.     CT perfusion reveals hypoperfusion within the right MCA territory 2/2 right ICA occlusion and intracranial right M1 occlusion.  Estimated amount of core infarct 98 mL, estimated amount of ischemic penumbra 51 mL.       Results for orders placed during the hospital encounter of 08/21/24    Adult Transthoracic Echo Complete W/ Cont if Necessary Per Protocol (With Agitated Saline)    Interpretation Summary    Left ventricular systolic function is normal. Calculated left ventricular EF = 68% Normal left ventricular cavity size noted. Left ventricular wall thickness is consistent with moderate  concentric hypertrophy. All left ventricular wall segments contract normally. Left ventricular diastolic function was normal. Normal left atrial pressure.    The right ventricular cavity is mildly dilated. Normal right ventricular systolic function noted.    Left atrial volume is moderately increased. Saline test results are negative.    The aortic valve is structurally normal with no stenosis present. The aortic valve appears trileaflet. No significant aortic valve regurgitation is present.    The mitral valve is structurally normal with no significant stenosis present. Trace to mild mitral valve regurgitation is present.    Mild tricuspid valve regurgitation is present. Estimated right ventricular systolic pressure from tricuspid regurgitation is mildly elevated (35-45 mmHg)    Mild dilation of the ascending aorta is present. Ascending aorta = 3.7 cm    Results for orders placed during the hospital encounter of 08/21/24    Bilateral Carotid Duplex    Interpretation Summary    Right carotid stent is patent without evidence of in-stent restenosis.    Left internal carotid artery demonstrates normal flow without evidence of hemodynamically significant stenosis.    Antegrade right vertebral flow.    Antegrade left vertebral flow.    Glucose 137  Creatinine 0.58, BUN 32  Sodium 136  AST 21  ALT 19  UDS positive for methamphetamine and opiates    A1c 5.50        Assessment/Plan     This is a 53-year-old  female with known medical diagnoses of essential hypertension, hyperlipidemia, PAD, obesity, history of seizures (EtOH use, and substance abuse (UDS positive for methamphetamine, patient denies) who presented to [facility] emergency department on 8/21 as a scene flight with complaints of left-sided weakness and neglect.  She also had complaints of severe headache.  CT head shows hyperdense right MCA.  Secondary to extended last known well she is not deemed to be an appropriate IV  thrombolytic therapy candidate.  CT perfusion is indicative of an area of reversible ischemia therefore she was taken to the Cath Lab for planned mechanical thrombectomy.  She will be admitted to the neurological ICU s/p procedure for further monitoring and stroke workup.     Antiplatelet PTA: ASA 81 mg  Anticoagulant PTA: None        #Acute right MCA stroke s/p mechanical thrombectomy   #Acute right ICA occlusion s/p carotid stent  #HTN  #HLD  #Tobacco abuse  #Methamphetamine use  #History of seizures  #Dysphagia  -Mechanism of stroke is likely atheroembolic in the setting of right internal carotid artery atherosclerosis    -Continue DAPT with aspirin 81 mg daily and Brilinta 60 mg twice daily for secondary stroke prevention in the setting of recent right ICA stent placement.  Please note that the patient most recent MRI showed small hemorrhagic conversion on the right insular region, however, the benefit of giving dual antiplatelet outweighed the risk of worsening hemorrhagic conversion at this time.  Risk and benefit has been previously discussed with the patient and her  who both agreed with the above-stated plan.  -Lipitor 80mg nightly, , goal <70 for secondary stroke prevention  -Normalize sodium goals, can consider salt tablets if needed  -Goal SBP <140, overall management per ICU medicine team   -Scheduled tylenol Q6 hours for continuous headache complaints   -Target systolic blood pressure less than 140  -Cardiac monitoring at discharge  -Activity as tolerated, fall risk precautions  -PT/OT continue to follow, they recommend IPR at discharge.  Case management following for discharge placement needs.  -SLP continues to follow for dysphagia; patient currently receiving enteral feeding through ND tube; plan to reassess in 1-2 days  -Smoking cessation education to be provided daily  -Encourage patient to stop using methamphetamine; she denies ever using this  -CIWA per ICU medicine team, continue  folic acid and thiamine  -Stroke clinic follow-up in 6 to 8 weeks (added to ADT)    Stroke will continue to follow. Plan discussed with ICU medicine team, Dr. Morgan, as well as the patient and her family who is present at the bedside. Please call for any further questions or concerns.    08/27/24  Ilana Poole, APRN  08:53 EDT

## 2024-08-27 NOTE — CASE MANAGEMENT/SOCIAL WORK
Continued Stay Note  [facility]     Patient Name: Dalila OCASIO  MRN: [id number]  Today's Date: 8/27/2024    Admit Date: 8/21/2024    Plan: [facility] acute   Discharge Plan       Row Name 08/27/24 1043       Plan    Plan [facility] acute    Plan Comments Dayanara Heller is starting insurance pre-cert today.  CM will continue to follow.                   Discharge Codes    No documentation.                   Tata Norton RN

## 2024-08-27 NOTE — THERAPY TREATMENT NOTE
Patient Name: Dalila OCASIO  : 1970    MRN: [id number]                              Today's Date: 2024       Admit Date: 2024    Visit Dx:     ICD-10-CM ICD-9-CM   1. Acute CVA (cerebrovascular accident)  I63.9 434.91   2. Left-sided neglect  R41.4 781.8   3. Oropharyngeal dysphagia  R13.12 787.22   4. Dysarthria  R47.1 784.51     Patient Active Problem List   Diagnosis    Acute CVA (cerebrovascular accident)    Alcohol use    Tobacco use    Obesity (BMI 30-39.9)    HTN (hypertension)    Dyslipidemia    History of seizures    PAD (peripheral artery disease)    Oropharyngeal dysphagia     Past Medical History:   Diagnosis Date    Acute CVA (cerebrovascular accident) 2024    Alcohol use 2024    Dyslipidemia 2024    Obesity (BMI 30-39.9) 2024    PAD (peripheral artery disease) 2024    Tobacco use 2024     Past Surgical History:   Procedure Laterality Date    INTERVENTIONAL RADIOLOGY PROCEDURE Bilateral 2024    Procedure: Carotid Cervical Angiogram;  Surgeon: Jamaal Saini MD;  Location: [facility] INVASIVE LOCATION;  Service: Interventional Radiology;  Laterality: Bilateral;      General Information       Row Name 24 1118          Physical Therapy Time and Intention    Document Type therapy note (daily note)  -ES     Mode of Treatment physical therapy  -ES       Row Name 24 1118          General Information    Patient Profile Reviewed yes  -ES     Existing Precautions/Restrictions fall;other (see comments)  NG; AM boot for L ankle immobilization and NWB LLE per ortho; L weakness/ inattention  -ES     Barriers to Rehab medically complex  -ES       Row Name 24 1118          Cognition    Orientation Status (Cognition) oriented x 3  -ES       Row Name 24 1118          Safety Issues, Functional Mobility    Safety Issues Affecting Function (Mobility) awareness of need for assistance;insight into deficits/self-awareness;safety precaution  awareness;safety precautions follow-through/compliance;judgment;unable to maintain weight-bearing restrictions  -ES     Impairments Affecting Function (Mobility) balance;coordination;endurance/activity tolerance;range of motion (ROM);strength;sensation/sensory awareness  -ES               User Key  (r) = Recorded By, (t) = Taken By, (c) = Cosigned By      Initials Name Provider Type    ES Karie Vaca PT Physical Therapist                   Mobility       Row Name 08/27/24 1118          Bed Mobility    Bed Mobility supine-sit  -ES     Supine-Sit [place] (Bed Mobility) verbal cues;moderate assist (50% patient effort)  -ES     Assistive Device (Bed Mobility) head of bed elevated;draw sheet  -ES     Comment, (Bed Mobility) v/c for safety awareness/attention to L side  -ES       Row Name 08/27/24 1118          Bed-Chair Transfer    Bed-Chair [place] (Transfers) moderate assist (50% patient effort);verbal cues  -ES     Assistive Device (Bed-Chair Transfers) other (see comments)  UE support  -ES     Comment, (Bed-Chair Transfer) v/c for adherence to NWB precautions. Adherent ~25% of t/f. Performed 10x RUE lateral reaching/across midline, 10x A/P leans  -ES       Row Name 08/27/24 1118          Sit-Stand Transfer    Sit-Stand [place] (Transfers) minimum assist (75% patient effort);verbal cues  -ES     Assistive Device (Sit-Stand Transfers) other (see comments)  BUE support  -ES       Row Name 08/27/24 1118          Gait/Stairs (Locomotion)    [place] Level (Gait) unable to assess  -ES               User Key  (r) = Recorded By, (t) = Taken By, (c) = Cosigned By      Initials Name Provider Type    ES Karie Vaca PT Physical Therapist                   Obj/Interventions       Row Name 08/27/24 1120          Balance    Balance Assessment sitting static balance;sitting dynamic balance;sit to stand dynamic balance;standing dynamic balance;standing static balance  -ES     Static Sitting Balance  contact guard  -ES     Dynamic Sitting Balance minimal assist;verbal cues  -ES     Position, Sitting Balance supported;sitting in chair  -ES     Sit to Stand Dynamic Balance minimal assist;verbal cues  -ES     Static Standing Balance minimal assist;verbal cues  -ES     Dynamic Standing Balance moderate assist;2-person assist;verbal cues  -ES     Position/Device Used, Standing Balance supported  -ES     Balance Interventions sitting;standing;sit to stand;supported;static;dynamic;occupation based/functional task;moderate challenge;dynamic reaching;core stability exercise  -ES               User Key  (r) = Recorded By, (t) = Taken By, (c) = Cosigned By      Initials Name Provider Type    ES Karie Vaca, PT Physical Therapist                   Goals/Plan    No documentation.                  Clinical Impression       Row Name 08/27/24 1121          Pain    Pretreatment Pain Rating 8/10  -ES     Posttreatment Pain Rating 8/10  -ES     Pain Location generalized  -ES     Pain Location - head  -ES     Pre/Posttreatment Pain Comment RN notified  -ES     Pain Intervention(s) Repositioned;Ambulation/increased activity  -ES       Row Name 08/27/24 1121          Plan of Care Review    Plan of Care Reviewed With patient;spouse  -ES     Progress improving  -ES     Outcome Evaluation Pt continues to require frequent cueing for adherence to NWB precautions and safety awareness throughout session, although demonstrated good effort. Pt required Veda to complete dynamic sitting activities to improve trunk control and crossing midline. PT rec IRF at d/c.  -ES       Row Name 08/27/24 1121          Therapy Assessment/Plan (PT)    Rehab Potential (PT) good, to achieve stated therapy goals  -ES     Criteria for Skilled Interventions Met (PT) yes;meets criteria;skilled treatment is necessary  -ES     Therapy Frequency (PT) daily  -ES     Predicted Duration of Therapy Intervention (PT) 10 days  -ES       Row Name 08/27/24 1121           Vital Signs    Pre Systolic BP Rehab 148  -ES     Pre Treatment Diastolic BP 85  -ES     Post Systolic BP Rehab 143  -ES     Post Treatment Diastolic BP 91  -ES     Pretreatment Heart Rate (beats/min) 78  -ES     Posttreatment Heart Rate (beats/min) 89  -ES     Pre SpO2 (%) 95  -ES     O2 Delivery Pre Treatment room air  -ES     O2 Delivery Intra Treatment room air  -ES     Post SpO2 (%) 97  -ES     O2 Delivery Post Treatment room air  -ES     Pre Patient Position Supine  -ES     Intra Patient Position Standing  -ES     Post Patient Position Sitting  -ES       Row Name 08/27/24 1121          Positioning and Restraints    Pre-Treatment Position in bed  -ES     Post Treatment Position chair  -ES     In Chair notified nsg;reclined;sitting;call light within reach;encouraged to call for assist;exit alarm on;waffle cushion;on mechanical lift sling;legs elevated;heels elevated;with family/caregiver  -ES               User Key  (r) = Recorded By, (t) = Taken By, (c) = Cosigned By      Initials Name Provider Type    ES Karie Vaca, PT Physical Therapist                   Outcome Measures       Row Name 08/27/24 1128          How much help from another person do you currently need...    Turning from your back to your side while in flat bed without using bedrails? 3  -ES     Moving from lying on back to sitting on the side of a flat bed without bedrails? 3  -ES     Moving to and from a bed to a chair (including a wheelchair)? 2  -ES     Standing up from a chair using your arms (e.g., wheelchair, bedside chair)? 3  -ES     Climbing 3-5 steps with a railing? 1  -ES     To walk in hospital room? 1  -ES     AM-PAC 6 Clicks Score (PT) 13  -ES     Highest Level of Mobility Goal 4 --> Transfer to chair/commode  -ES       Row Name 08/27/24 1128          Functional Assessment    Outcome Measure Options AM-PAC 6 Clicks Basic Mobility (PT)  -ES               User Key  (r) = Recorded By, (t) = Taken By, (c) = Cosigned By       Initials Name Provider Type    Karie Savage PT Physical Therapist                                 Physical Therapy Education       Title: PT OT SLP Therapies (In Progress)       Topic: Physical Therapy (Done)       Point: Mobility training (Done)       Learning Progress Summary             Patient Acceptance, E,D, VU,NR by LS at 8/25/2024 1410    Acceptance, E,D, VU,NR by LS at 8/24/2024 1523    Acceptance, E, NR by AC at 8/22/2024 1540   Family Acceptance, E,D, VU,NR by LS at 8/25/2024 1410    Acceptance, E,D, VU,NR by LS at 8/24/2024 1523   Significant Other Acceptance, E,D, VU,NR by LS at 8/25/2024 1410                         Point: Home exercise program (Done)       Learning Progress Summary             Patient Acceptance, E,D, VU,NR by LS at 8/25/2024 1410    Acceptance, E,D, VU,NR by LS at 8/24/2024 1523    Acceptance, E, NR by AC at 8/22/2024 1540   Family Acceptance, E,D, VU,NR by LS at 8/25/2024 1410    Acceptance, E,D, VU,NR by LS at 8/24/2024 1523   Significant Other Acceptance, E,D, VU,NR by LS at 8/25/2024 1410                         Point: Body mechanics (Done)       Learning Progress Summary             Patient Acceptance, E,D, VU,NR by LS at 8/25/2024 1410    Acceptance, E,D, VU,NR by LS at 8/24/2024 1523    Acceptance, E, NR by AC at 8/22/2024 1540   Family Acceptance, E,D, VU,NR by LS at 8/25/2024 1410    Acceptance, E,D, VU,NR by LS at 8/24/2024 1523   Significant Other Acceptance, E,D, VU,NR by LS at 8/25/2024 1410                         Point: Precautions (Done)       Learning Progress Summary             Patient Acceptance, E,D, VU,NR by LS at 8/25/2024 1410    Acceptance, E,D, VU,NR by LS at 8/24/2024 1523    Acceptance, E, NR by AC at 8/22/2024 1540   Family Acceptance, E,D, VU,NR by LS at 8/25/2024 1410    Acceptance, E,D, VU,NR by LS at 8/24/2024 1523   Significant Other Acceptance, E,D, VU,NR by LS at 8/25/2024 1410                                         User Key       Initials  Effective Dates Name Provider Type Discipline    LS 02/03/23 -  Sanam Menard PT Physical Therapist PT    AC 07/11/24 -  Shala Morrison PT Physical Therapist PT                  PT Recommendation and Plan     Plan of Care Reviewed With: patient, spouse  Progress: improving  Outcome Evaluation: Pt continues to require frequent cueing for adherence to NWB precautions and safety awareness throughout session, although demonstrated good effort. Pt required Veda to complete dynamic sitting activities to improve trunk control and crossing midline. PT rec IRF at d/c.     Time Calculation:         PT Charges       Row Name 08/27/24 1128             Time Calculation    Start Time 1020  -ES      PT Received On 08/27/24  -ES      PT Goal Re-Cert Due Date 09/01/24  -ES         Time Calculation- PT    Total Timed Code Minutes- PT 23 minute(s)  -ES         Timed Charges    28808 - PT Therapeutic Exercise Minutes 13  -ES      76009 - PT Therapeutic Activity Minutes 10  -ES         Total Minutes    Timed Charges Total Minutes 23  -ES       Total Minutes 23  -ES                User Key  (r) = Recorded By, (t) = Taken By, (c) = Cosigned By      Initials Name Provider Type    ES Karie Vaca, PT Physical Therapist                  Therapy Charges for Today       Code Description Service Date Service Provider Modifiers Qty    83547947602 HC PT THER PROC EA 15 MIN 8/26/2024 Karie Vaca, PT GP 1    14743744443 HC PT THERAPEUTIC ACT EA 15 MIN 8/26/2024 Karie Vaca, PT GP 1    86738284165 HC PT THER PROC EA 15 MIN 8/27/2024 Karie Vaca, PT GP 1    59302304812 HC PT THERAPEUTIC ACT EA 15 MIN 8/27/2024 Karie Vaca, PT GP 1            PT G-Codes  Outcome Measure Options: AM-PAC 6 Clicks Basic Mobility (PT)  AM-PAC 6 Clicks Score (PT): 13  AM-PAC 6 Clicks Score (OT): 11  Modified [place] Scale: 4 - Moderately severe disability.  Unable to walk without assistance, and unable to attend to own bodily needs without  assistance.  PT Discharge Summary  Anticipated Discharge Disposition (PT): inpatient rehabilitation facility    Karie Vaca, PT  8/27/2024

## 2024-08-27 NOTE — PROGRESS NOTES
Enter Query Response Below      Query Response: Unable to determine              If applicable, please update the problem list.     Patient: Dalila Pike        : 1970  Account: [id number]           Admit Date:         How to Respond to this query:       a. Click New Note     b. Answer query within the yellow box.                c. Update the Problem List, if applicable.      If you have any questions about this query contact me at: [email or web address]     :     53 year old female admitted with Acute CVA.  Clinical indicators: H&P history of present illness- Per report, patient awoke at ~0930 with slurred speech, neglect, left-sided hemiparesis, and severe headache, ultimately prompting call to Emergency Medical Services.    CT of the head: Mild mass effect with 2 mm leftward shift of midline structures.   H&P assessment: Acute CVA.  Treatments: iv sodium chloride (hypertonic) 3%, iv cardene, iv solu-medrol, iv apresoline,   She was taken to Cath Lab emergently where she underwent right ICA stent placement and mechanical thrombectomy, ICU monitoring.    Please clarify if the patient being treated/monitored for one or more of the following:    Brain compression  Other- specify ___________  Unable to determine     By submitting this query, we are merely seeking further clarification of documentation to accurately reflect all conditions that you are monitoring, evaluating, treating or that extend the hospitalization or utilize additional resources of care. Please utilize your independent clinical judgment when addressing the question(s) above.     This query and your response, once completed, will be entered into the legal medical record.    Sincerely,  Kaylie Barrera RN  Clinical Documentation Integrity [facility]

## 2024-08-28 ENCOUNTER — ANCILLARY PROCEDURE (OUTPATIENT)
Dept: SPEECH THERAPY | Facility: HOSPITAL | Age: 54
DRG: 023 | End: 2024-08-28
Payer: MEDICAID

## 2024-08-28 ENCOUNTER — APPOINTMENT (OUTPATIENT)
Dept: GENERAL RADIOLOGY | Facility: HOSPITAL | Age: 54
DRG: 023 | End: 2024-08-28
Payer: MEDICAID

## 2024-08-28 VITALS
RESPIRATION RATE: 18 BRPM | HEIGHT: 68 IN | HEART RATE: 101 BPM | WEIGHT: 224 LBS | BODY MASS INDEX: 33.95 KG/M2 | OXYGEN SATURATION: 97 % | SYSTOLIC BLOOD PRESSURE: 129 MMHG | TEMPERATURE: 98.4 F | DIASTOLIC BLOOD PRESSURE: 98 MMHG

## 2024-08-28 DIAGNOSIS — I63.429 CEREBROVASCULAR ACCIDENT (CVA) DUE TO EMBOLISM OF ANTERIOR CEREBRAL ARTERY, UNSPECIFIED BLOOD VESSEL LATERALITY: Primary | ICD-10-CM

## 2024-08-28 PROBLEM — N39.0 ACUTE UTI (URINARY TRACT INFECTION): Status: ACTIVE | Noted: 2024-08-28

## 2024-08-28 LAB
ANION GAP SERPL CALCULATED.3IONS-SCNC: 11 MMOL/L (ref 5–15)
BACTERIA UR QL AUTO: ABNORMAL /HPF
BILIRUB UR QL STRIP: NEGATIVE
BUN SERPL-MCNC: 34 MG/DL (ref 6–20)
BUN/CREAT SERPL: 54 (ref 7–25)
CALCIUM SPEC-SCNC: 9.5 MG/DL (ref 8.6–10.5)
CHLORIDE SERPL-SCNC: 102 MMOL/L (ref 98–107)
CLARITY UR: CLEAR
CO2 SERPL-SCNC: 25 MMOL/L (ref 22–29)
COD CRY URNS QL: ABNORMAL /HPF
COLOR UR: YELLOW
CREAT SERPL-MCNC: 0.63 MG/DL (ref 0.57–1)
EGFRCR SERPLBLD CKD-EPI 2021: 106.2 ML/MIN/1.73
GLUCOSE BLDC GLUCOMTR-MCNC: 140 MG/DL (ref 70–130)
GLUCOSE BLDC GLUCOMTR-MCNC: 142 MG/DL (ref 70–130)
GLUCOSE BLDC GLUCOMTR-MCNC: 185 MG/DL (ref 70–130)
GLUCOSE SERPL-MCNC: 134 MG/DL (ref 65–99)
GLUCOSE UR STRIP-MCNC: NEGATIVE MG/DL
HGB UR QL STRIP.AUTO: NEGATIVE
HYALINE CASTS UR QL AUTO: ABNORMAL /LPF
KETONES UR QL STRIP: ABNORMAL
LEUKOCYTE ESTERASE UR QL STRIP.AUTO: ABNORMAL
NITRITE UR QL STRIP: POSITIVE
PH UR STRIP.AUTO: 5.5 [PH] (ref 5–8)
POTASSIUM SERPL-SCNC: 4.8 MMOL/L (ref 3.5–5.2)
PROT UR QL STRIP: ABNORMAL
RBC # UR STRIP: ABNORMAL /HPF
REF LAB TEST METHOD: ABNORMAL
SODIUM SERPL-SCNC: 138 MMOL/L (ref 136–145)
SP GR UR STRIP: 1.03 (ref 1–1.03)
SQUAMOUS #/AREA URNS HPF: ABNORMAL /HPF
UROBILINOGEN UR QL STRIP: ABNORMAL
WBC # UR STRIP: ABNORMAL /HPF

## 2024-08-28 PROCEDURE — 92526 ORAL FUNCTION THERAPY: CPT

## 2024-08-28 PROCEDURE — 92612 ENDOSCOPY SWALLOW (FEES) VID: CPT

## 2024-08-28 PROCEDURE — 81001 URINALYSIS AUTO W/SCOPE: CPT | Performed by: INTERNAL MEDICINE

## 2024-08-28 PROCEDURE — 97530 THERAPEUTIC ACTIVITIES: CPT

## 2024-08-28 PROCEDURE — 92507 TX SP LANG VOICE COMM INDIV: CPT

## 2024-08-28 PROCEDURE — 25010000002 CEFTRIAXONE PER 250 MG: Performed by: INTERNAL MEDICINE

## 2024-08-28 PROCEDURE — 80048 BASIC METABOLIC PNL TOTAL CA: CPT | Performed by: INTERNAL MEDICINE

## 2024-08-28 PROCEDURE — 87186 SC STD MICRODIL/AGAR DIL: CPT | Performed by: INTERNAL MEDICINE

## 2024-08-28 PROCEDURE — 99239 HOSP IP/OBS DSCHRG MGMT >30: CPT | Performed by: INTERNAL MEDICINE

## 2024-08-28 PROCEDURE — 99232 SBSQ HOSP IP/OBS MODERATE 35: CPT

## 2024-08-28 PROCEDURE — 82948 REAGENT STRIP/BLOOD GLUCOSE: CPT

## 2024-08-28 PROCEDURE — 71045 X-RAY EXAM CHEST 1 VIEW: CPT

## 2024-08-28 PROCEDURE — 87086 URINE CULTURE/COLONY COUNT: CPT | Performed by: INTERNAL MEDICINE

## 2024-08-28 PROCEDURE — 87077 CULTURE AEROBIC IDENTIFY: CPT | Performed by: INTERNAL MEDICINE

## 2024-08-28 RX ORDER — ATORVASTATIN CALCIUM 80 MG/1
80 TABLET, FILM COATED ORAL NIGHTLY
Start: 2024-08-28

## 2024-08-28 RX ORDER — ASPIRIN 81 MG/1
81 TABLET, CHEWABLE ORAL DAILY
Start: 2024-08-29

## 2024-08-28 RX ORDER — CEFUROXIME AXETIL 500 MG/1
250 TABLET ORAL 2 TIMES DAILY
Start: 2024-08-29 | End: 2024-09-02

## 2024-08-28 RX ORDER — LANOLIN ALCOHOL/MO/W.PET/CERES
100 CREAM (GRAM) TOPICAL DAILY
Start: 2024-08-29

## 2024-08-28 RX ORDER — SODIUM CHLORIDE 1 G/1
1 TABLET ORAL 3 TIMES DAILY PRN
Start: 2024-08-28

## 2024-08-28 RX ORDER — FOLIC ACID 1 MG/1
1 TABLET ORAL DAILY
Start: 2024-08-29

## 2024-08-28 RX ORDER — NICOTINE 21 MG/24HR
1 PATCH, TRANSDERMAL 24 HOURS TRANSDERMAL
Start: 2024-08-28

## 2024-08-28 RX ADMIN — TICAGRELOR 60 MG: 60 TABLET ORAL at 08:35

## 2024-08-28 RX ADMIN — BISACODYL 5 MG: 5 TABLET, COATED ORAL at 08:35

## 2024-08-28 RX ADMIN — THIAMINE HCL TAB 100 MG 100 MG: 100 TAB at 08:35

## 2024-08-28 RX ADMIN — Medication 30 ML: at 08:37

## 2024-08-28 RX ADMIN — ASPIRIN 81 MG 81 MG: 81 TABLET ORAL at 08:35

## 2024-08-28 RX ADMIN — HYDROCODONE BITARTRATE AND ACETAMINOPHEN 1 TABLET: 7.5; 325 TABLET ORAL at 06:46

## 2024-08-28 RX ADMIN — FOLIC ACID 1 MG: 1 TABLET ORAL at 08:35

## 2024-08-28 RX ADMIN — SODIUM CHLORIDE 1000 MG: 900 INJECTION INTRAVENOUS at 12:43

## 2024-08-28 RX ADMIN — HYDROCODONE BITARTRATE AND ACETAMINOPHEN 1 TABLET: 7.5; 325 TABLET ORAL at 12:16

## 2024-08-28 RX ADMIN — SENNOSIDES AND DOCUSATE SODIUM 2 TABLET: 50; 8.6 TABLET ORAL at 08:35

## 2024-08-28 NOTE — PLAN OF CARE
Goal Outcome Evaluation:              Outcome Evaluation: Completed FEES. Safet to return to a modified diet. Increased aspiration risk due to impaired cognition, impaired oral sensation- pocketing without awareness, and pharyngeal impairment.  SLP to follow.      Anticipated Discharge Disposition (SLP): inpatient rehabilitation facility, anticipate therapy at next level of care          SLP Swallowing Diagnosis: mild-moderate, oral dysphagia, pharyngeal dysphagia (08/28/24 1000)  Treatment Assessment (SLP): continued, dysarthria, cognitive-linguistic disorder, clinical signs of, oral dysphagia, pharyngeal dysphagia (08/28/24 0900)  Treatment Assessment Comments (SLP): Pt exhibits decreased oropharyngeal signs with PO. Pt endorses she was previously coughing violently with all ice/water trials. Today, intermittent cough with water and none with ice.  Decreased awareness/sensation on left side resulting in oral dysphagia.  Impaired speech- decreased articulatory precision but speech is intelligible. Pt is aware of decrease speech. Continued cognitive linguistic impairment with impulsivity, impaired judgment, and decreased awareness. (08/28/24 0900)  Plan for Continued Treatment (SLP): continue treatment per plan of care (08/28/24 0900)

## 2024-08-28 NOTE — PAYOR COMM NOTE
"Dalila OCASIO (53 y.o. Female)       Date of Birth   1970    Social Security Number       Address   [address] [id number]    Home Phone   [phone]    MRN   [id number]       Cheondoism   None    Marital Status                               Admission Date   24    Admission Type   Emergency    Admitting Provider   Kelsey Sanders MD    Attending Provider   Kelsey Sanders MD    Department, Room/Bed   [facility] 3E, S338/1       Discharge Date       Discharge Disposition   Rehab Facility or Unit (DC - External)    Discharge Destination                                 Attending Provider: Kelsey Sanders MD    Allergies: Erythromycin, Toradol [Ketorolac Tromethamine], Contrast Dye (Echo Or Unknown Ct/mr)    Isolation: None   Infection: None   Code Status: CPR    Ht: 172.7 cm (68\")   Wt: 102 kg (224 lb)    Admission Cmt: None   Principal Problem: Acute CVA (cerebrovascular accident) [I63.9]                   Active Insurance as of 2024       Primary Coverage       Payor Plan Insurance Group Employer/Plan Group    [employer] MEDICAID [facility] MEDICAID [id number]       Payor Plan Address Payor Plan Phone Number Payor Plan Fax Number Effective Dates    [address] [address] [phone]  2019 - None Entered    [facility] [id number]         Subscriber Name Subscriber Birth Date Member ID       DALILA OCASIO 1970 [id number]                     Emergency Contacts        (Rel.) Home Phone Work Phone Mobile Phone    ARISTEO HERRERA (Son) [phone] -- --    Abiel Mijares (Spouse) -- -- [phone]                 Discharge Summary        Kelsey Sanders MD at 24 1301              [facility] Medicine Services  DISCHARGE SUMMARY    Patient Name: Dalila OCASIO  : 1970  MRN: [id number]    Date of Admission: 2024 11:53 AM  Date of Discharge:  24  Primary Care Physician: Agnes Ewing APRN    Consults       Date " and Time Order Name Status Description    8/21/2024 11:40 PM Inpatient Orthopedic Surgery Consult      8/21/2024 11:55 AM Inpatient Neurology Consult Stroke Completed             Hospital Course     Presenting Problem: Left sided weakness    Active Hospital Problems    Diagnosis  POA    **Acute CVA (cerebrovascular accident) [I63.9]  Yes    Acute UTI (urinary tract infection) [N39.0]  Yes    Oropharyngeal dysphagia [R13.12]  Yes    Alcohol use [Z78.9]  Yes    Tobacco use [Z72.0]  Yes    Obesity (BMI 30-39.9) [E66.9]  Yes    HTN (hypertension) [I10]  Yes    Dyslipidemia [E78.5]  Yes    History of seizures [Z87.898]  Yes    PAD (peripheral artery disease) [I73.9]  Yes      Resolved Hospital Problems   No resolved problems to display.          Hospital Course:  Dalila OCASIO is a 53 y.o. female with HTN, HLD, PAD, obesity, history of seizures, who presented to [facility] ED 8/21 with left sided weakness and neglect along with severe headache.  Imaging showed reversible right MCA territory ischemia.  She was not a candidate for IV thrombolytic but was taken to cath lab for mechanical thrombectomy and right ICA stent.  She had a small amount of hemorrhagic conversion but it was felt that the benefit of dual antiplatelet therapy outweighed risk and she was started on aspirin and brilinta.  She will continue atorvastatin 80mg with goal LDL <70.  She was treated with hypertonic saline and now is on salt tablets to maintain Na >140.  Recommend checking BMP every other day at least until sodium stable >140.  She also was found to have left ankle fracture and was evaluated by orthopedic surgery.  Recommendation for non-weight bearing of LLE and continue immobilization in CAM boot.  She can follow up with orthopedic surgery in about 1 week.    Patient progressed with speech therapy and 8/28 was upgraded to Soft to chew diet, chopped meat, thin liquids, no mixed consistencies, no straw, feeding assistance from staff due to  impulsivity.  Medications crushed in applesauce.  Feeding tube will be discontinued prior to transfer to rehab.    Patient had a low grade fever to 100.6 8/28 but otherwise appeared non-toxic.  She reported dysuria and states that she was diagnosed with a UTI prior to admission but had not had a chance to fill antibiotics.  UA was suggestive of UTI.  CXR showed no acute process.  She was given a dose of IV ceftriaxone prior to transfer and should continue PO ceftin x 4 more days.  I will notify the facility if culture results warrant any change in therapy.    A cardiac monitor will be placed at the time of discharge.      Discharge Follow Up Recommendations for outpatient labs/diagnostics:  Non-weightbearing LLE in [facility] boot and F/U with [facility]-Dr. Kimbrough in about 1 week  F/U with [facility] Stroke clinic 6-8 weeks  F/U [facility] neurosurgery clinic 4 weeks  F/U PCP within 1 week of rehab discharge    Day of Discharge     HPI:   She is feeling much better today.  Happy to have a diet now.      Vital Signs:   Temp:  [98.4 °F (36.9 °C)-100.6 °F (38.1 °C)] 98.4 °F (36.9 °C)  Heart Rate:  [81-99] 96  Resp:  [16-18] 18  BP: (125-144)/(79-98) 129/98      Physical Exam:  Constitutional: No acute distress, awake, alert, sitting up in chair  HENT: NCAT, keofeed in place  Respiratory: Clear to auscultation bilaterally, respiratory effort normal   Cardiovascular: RRR  Musculoskeletal: LLE in boot  Psychiatric: Appropriate affect, cooperative  Neurologic: Speech clear, left sided weakness  Skin: No rashes on exposed surfaces    Pertinent  and/or Most Recent Results     LAB RESULTS:      Lab 08/27/24  1012 08/23/24  0148 08/22/24  0341   WBC 10.02 11.61* 14.23*   HEMOGLOBIN 14.1 13.6 13.2   HEMATOCRIT 41.2 40.6 39.3   PLATELETS 261 219 226   NEUTROS ABS  --  9.20* 13.14*   IMMATURE GRANS (ABS)  --  0.02 0.07*   LYMPHS ABS  --  1.81 0.73   MONOS ABS  --  0.55 0.28   EOS ABS  --  0.01 0.00   MCV 93.0 94.4 94.0   CRP  --  0.43  --           Lab 08/28/24  0824 08/27/24  0350 08/26/24  0807 08/26/24  0019 08/25/24  1810 08/25/24  0548 08/25/24  0105 08/24/24  1756 08/24/24  1230 08/24/24  0713 08/23/24  0708 08/23/24  0148 08/22/24  1055 08/22/24  0341   SODIUM 138 136 140 141 143 142  145   < > 144   < > 142  143   < > 142   < > 140   POTASSIUM 4.8 4.1  --   --   --  4.0  --  4.2  --  3.8   < >  --    < > 4.1   CHLORIDE 102 101  --   --   --  109*  --  111*  --  109*   < >  --    < > 108*   CO2 25.0 24.0  --   --   --  21.0*  --  23.0  --  23.0   < >  --    < > 22.0   ANION GAP 11.0 11.0  --   --   --  12.0  --  10.0  --  10.0   < >  --    < > 10.0   BUN 34* 32*  --   --   --  30*  --  30*  --  25*   < >  --    < > 18   CREATININE 0.63 0.58  --   --   --  0.56*  --  0.57  --  0.57   < >  --    < > 0.56*   EGFR 106.2 108.4  --   --   --  109.3  --  108.8  --  108.8   < >  --    < > 109.3   GLUCOSE 134* 137*  --   --   --  152*  --  127*  --  126*   < >  --    < > 137*   CALCIUM 9.5 9.3  --   --   --  8.8  --  9.2  --  9.0   < >  --    < > 8.7   MAGNESIUM  --  2.3  --   --   --   --   --   --   --   --   --  2.3  --  2.0   PHOSPHORUS  --   --   --   --   --   --   --   --   --   --   --  2.7  --  3.0   HEMOGLOBIN A1C  --   --   --   --   --   --   --   --   --   --   --   --   --  5.50    < > = values in this interval not displayed.             Lab 08/22/24  1055   PROBNP 269.4         Lab 08/22/24  0341   CHOLESTEROL 187   LDL CHOL 107*   HDL CHOL 65*   TRIGLYCERIDES 83             Brief Urine Lab Results  (Last result in the past 365 days)        Color   Clarity   Blood   Leuk Est   Nitrite   Protein   CREAT   Urine HCG        08/28/24 1103 Yellow   Clear   Negative   Small (1+)   Positive   Trace                 Microbiology Results (last 10 days)       ** No results found for the last 240 hours. **            XR Chest 1 View    Result Date: 8/28/2024  XR CHEST 1 VW Date of Exam: 8/28/2024 11:08 AM EDT Indication: Fever, cough Comparison:  8/21/2024. Findings: Right PICC line has been placed with its tip in the right atrium. Enteric tube is directed into the left upper quadrant while tip is poorly penetrated. Heart and pulmonary vessels appear within normal limits for technique. There is unchanged mild elevation of the right diaphragm. There are no acute infiltrates or effusions.     Impression: Interval placement of right PICC line with tip in the right atrium. Interval enteric tube placement. No acute process in the lung fields. Electronically Signed: Olga Lidia Garcia MD  8/28/2024 11:43 AM EDT  Workstation ID: [id number]    SLP FEES - Fiberoptic Endo Eval Swallow    Result Date: 8/28/2024  This procedure was auto-finalized with no dictation required.    CT Head Without Contrast    Result Date: 8/24/2024  CT HEAD WO CONTRAST Date of Exam: 8/24/2024 5:00 AM EDT Indication: stroke. Comparison: Noncontrast head CT dated 8/23/2024 Technique: Axial CT images were obtained of the head without contrast administration.  Automated exposure control and iterative construction methods were used. FINDINGS:  Evolving large right MCA territory infarct with loss of gray-white matter differentiation within much of the right MCA territory. No significant change in 1.5 cm oblong hyperdensity in the right frontotemporal region, compatible with small hemorrhage. There may be an additional tiny focus of petechial hemorrhage within the right frontoparietal region on series 2, image 33. There is effacement of right frontoparietal cortical sulci without significant midline shift. No hydrocephalus is seen. Calvarium appears intact. Orbital structures, paranasal sinuses, and mastoid air cells appear unremarkable. Superficial soft tissues are unremarkable.     Evolving large right MCA territory infarct with loss of gray-white matter differentiation within much of the right MCA territory. No significant change in 1.5 cm oblong hyperdensity in the right frontotemporal region,  compatible with small hemorrhage. Questionable additional tiny focus of petechial hemorrhage within the right frontoparietal region on series 2, image 33. There is effacement of right frontoparietal cortical sulci without significant midline shift. The above findings were discussed with Elizabeth Suh via telephone on 8/24/2024 8:24 AM EDT. Electronically Signed: Ander Rashid MD  8/24/2024 8:25 AM EDT  Workstation ID: [id number]    CT Lower Extremity Left Without Contrast    Result Date: 8/23/2024  CT LOWER EXTREMITY LEFT WO CONTRAST Date of Exam: 8/23/2024 9:51 PM EDT Indication: investigate left ankle fracture, swelling. Comparison: 8/21/2024. Technique: Axial CT images were obtained of the left lower extremity without contrast administration.  Reconstructed coronal and sagittal images were also obtained. Automated exposure control and iterative construction methods were used. Findings: There is a nondisplaced fracture of the distal fibular metaphysis. This appears predominantly mildly obliquely oriented best appreciated on the sagittal imaging with no significant comminution. No additional fracture identified. Mild posterior osteophytosis present within the tibiotalar joint. No significant effusion identified. No osteochondral defect identified. No evidence of widening of the medial clear space. Mild diffuse soft tissue swelling is present, most pronounced laterally. Mild ossicle seen along the medial malleolus likely related to previous ligamentous injury (series 4 image 50).     Impression: Nondisplaced fracture of the distal fibular metaphysis. No additional fracture identified. No evidence of widening of the medial clear space. There is evidence of previous ligamentous injury. Electronically Signed: Aruna Del Valle MD  8/23/2024 11:03 PM EDT  Workstation ID: [id number]    CT Head Without Contrast    Result Date: 8/23/2024  CT HEAD WO CONTRAST Date of Exam: 8/23/2024 2:55 AM EDT Indication: stroke. Comparison:  8/22/2024. Technique: Axial CT images were obtained of the head without contrast administration.  Automated exposure control and iterative construction methods were used. Findings: There is progressive hypoattenuation and loss of gray-white differentiation within the right MCA territory involving the right frontal, temporal and parietal cortices. There is a stable tubular focus of hemorrhage in the right anterior subinsular region.  No new hypoattenuating lesions in the brain. No new hemorrhage. There is diminished mass effect with no significant midline shift. Cerebellum is unremarkable. The brainstem and basal ganglia appear normal. Orbits are within normal limits. A left nasal tube is in place. There are bilateral maxillary sinus mucus retention cysts. Calvarium is intact. Mastoids are clear.     Impression: 1.Evolving right MCA territory infarct with progressive loss of gray-white differentiation and diminished mass effect. 2.Stable small focus of hemorrhage in the right anterior subinsular region. No new hemorrhage. Electronically Signed: Gian Blevins MD  8/23/2024 3:27 AM EDT  Workstation ID: [id number]    CT Head Without Contrast    Result Date: 8/22/2024  CT HEAD WO CONTRAST Date of Exam: 8/22/2024 2:37 PM EDT Indication: change in neurologic exam post thrombectomy and stent placement on DAPT. Comparison: Head CT 8/22/2024 Technique: Axial CT images were obtained of the head without contrast administration.  Automated exposure control and iterative construction methods were used. Findings: Continued evolution of large right MCA territory infarct. Similar degree of hemorrhage in the right subinsular region with slightly increased hemorrhage tracking along the right MCA fissure, likely redistributed blood products. No convincing new intracranial hemorrhage. Similar associated mass effect with narrowing of the right lateral ventricle and 2 mm leftward midline shift. No evidence of hydrocephalus. Scattered areas  of periventricular and subcortical white matter hypoattenuation, nonspecific, perhaps from small vessel ischemic/hypertensive changes in a patient of this age.There are intracranial atherosclerotic calcifications. Mild scattered mucosal thickening in the paranasal sinuses.. Bilateral maxillary sinus mucous retention cyst. Mastoid air cells are essentially clear..Included globes and orbits appear unremarkable by CT. No acute or aggressive appearing bony or extracranial soft tissue process.     Impression: Evolving large right MCA distribution infarct with similar hemorrhage in the right subinsular region. No convincing new intracranial hemorrhage. Similar associated mass effect with narrowing of right lateral ventricle and 2 mm leftward midline shift. Electronically Signed: Jose J Sher  8/22/2024 3:02 PM EDT  Workstation ID: [id number]    Invasive peripheral vascular study    Result Date: 8/22/2024  PREOPERATIVE DIAGNOSIS: Tandem right ICA and M1 occlusions.  POSTOPERATIVE DIAGNOSIS: Same  INDICATION: This is a 53-year-old female who presented with acute onset of altered mental status and left-sided weakness.  Imaging revealed tandem occlusions of the right internal carotid artery and M1.  She was subsequently taken for stent placement and thrombectomy. PROCEDURES PERFORMED:  1. Moderate Sedation 2.  2 vessel diagnostic cerebral arteriogram. 2. Carotid artery stent placement with embolic protection device for severe stenosis 4. Follow-up angiogram through existing catheter with supervision and interpretation 5. Transcatheter endovascular thrombectomy for treatment of acute thromboembolic stroke.  Physician: Dr. Jamaal Saini MD  SEDATION: General anesthesia provided by an anesthesiologist  Vessels selected:  1.  Right common carotid artery 2.  Right internal carotid artery  PROCEDURE:  The patient was taken to the bi-plane neuroangiography suite and placed supine on the procedure table. The patient was prepped  and draped in the usual sterile fashion. A procedural timeout was performed prior to beginning the procedure.  Following the usual sterile prep and drape, a micropuncture kit and use of Seldinger technique was performed to gain access within the right common femoral artery. An 8 Emir[place] sheath was then placed. A shutle guide catheter was advanced over a 0.035 inch Terumo guidewire through the abdominal thoracic aorta under direct fluoroscopic guidance. This system was utilized to select the right common carotid artery. The dilator and guidewire were removed. The catheter was aspirated and flushed with heparinized saline. The tip position was fluoroscopically confirmed. The catheter was maintained on heparinized saline perfusion. At this time, a roadmap was created.  A IMANINom 21 with a transcend microwire inside of it was then positioned to the base catheter.  Using this microsystem, access distal to the proximal right ICA occlusion was obtained.  The microwire microcatheter were advanced into the petrous segment of the internal carotid artery.  At this time, the microwire was removed.  We then placed a workhorse exchange length wire through the microcatheter.  We then positioned this wire into the distal petrous segment of the right ICA.  Under direct fluoroscopic vision, the microcatheter was slowly removed while keeping the wire in the petrous segment of the ICA.  Once the catheter was removed, we then passed a 4 to 7 mm EmboShield over the workhorse wire and through the base catheter. This was positioned in the distal cervical portion of the internal carotid artery and then unsheathed to deploy the embolic protection device. We then prepared a 8-10 mm x 40 mm Xact carotid stent on the back table. This stent was then passed over the EmboShield wire until it was centered on the area of known stenosis. Under direct fluoroscopic vision, the stent was fully deployed. At this time we then passed a 5 mm x 30 mm balloon  dilator catheter over the EmboShield wire until it was centered on the area of known stenosis. Under direct fluoroscopic vision, this dilator was slowly inflated and deflated. At this time, the balloon dilator was then removed over the microwire.  Once it was fully opened, we then performed another angiogram which showed good apposition of the stent as well as good flow through it.  There was noted to be some nonocclusive thrombus more distal in the ICA.  Additionally, there was visualization of the known M1 occlusion. At this time, we turned our attention to the thrombectomy portion of the procedure. Under direct fluoroscopic guidance and using digital roadmap technique, a Vecta 71 aspiration catheter was passed over a 0.021 inch Wondershakeom microcatheter which was passed over a 0.014 inch transcend guidewire through the guide sheath and into the right internal carotid artery and the wire and microcatheter were passed across the site of occlusion.  The microwire was removed and a 6 mm Trevo stent retriever was loaded into the microcatheter.  Under direct fluoroscopic vision, the stent retriever was deployed in the M1 segment. The Vecta 71 was advanced to the level of the site of occlusion and connected to aspiration for approximately 5 minutes. Under aspiration, the Vecta 71 catheter and stent retriever were slowly removed and the guide catheter was vigorously aspirated and flushed with heparinized saline. Follow up angiogram showed complete patency of the right MCA territory, consistent with a TICI 3 thrombectomy.  At this time, we connected our base catheter to aspiration and slowly pulled it down out of the right internal carotid artery into the right common carotid artery.  Follow-up angiogram now showed complete patency of the right ICA and MCA territory.  There was no longer thrombus noted in the internal carotid artery. The base catheter was then removed from the arterial system, a closure device was deployed.  Manual compression over the puncture site was performed until hemostasis was attained.  FINDINGS:  Initial angiography of the right common carotid artery shows normal opacification of the distal common carotid artery. There is normal opacification of the distal external carotid artery branches.  There was complete occlusion at the origin of the right internal carotid artery.  Follow-up angiogram after initial placement of the carotid artery stent, now shows complete patency of the right internal carotid artery with no evidence of residual stenosis.  There is nonocclusive thrombus noted distal to the stent and there is now visualization of the M1 occlusion.  Follow-up angiogram after the above-mentioned thrombectomy now shows complete patency of the right ICA and MCA territory, consistent with a TICI 3 thrombectomy.  The previously noted nonocclusive thrombus in the cervical segment of the ICA is no longer present. Initial angiography of the right internal carotid artery shows normal distal cervical, petrous, cavernous and clinoid segments. The ophthalmic artery fills normally. The posterior communicating artery and anterior choroidal artery are easily visualized and of normal caliber. The anterior cerebral artery fills normally in all segments.  There is an occlusion of the M1 segment.  Follow-up angiogram after the above-mentioned thrombectomy now shows complete patency of the MCA territory, consistent with a TICI 3 thrombectomy.   Flouro Time (min): 23.5  Air Kerma (mGy): 804  Contrast: 60 cc of 320 mg/ml Visipaque       TICI 3 reperfusion of tandem right internal carotid artery and M1 occlusions after carotid artery stent placement and thrombectomy.  No complications noted.    Bilateral Carotid Duplex    Result Date: 8/22/2024    Right carotid stent is patent without evidence of in-stent restenosis.   Left internal carotid artery demonstrates normal flow without evidence of hemodynamically significant stenosis.    Antegrade right vertebral flow.   Antegrade left vertebral flow.     Adult Transthoracic Echo Complete W/ Cont if Necessary Per Protocol (With Agitated Saline)    Result Date: 8/22/2024    Left ventricular systolic function is normal. Calculated left ventricular EF = 68% Normal left ventricular cavity size noted. Left ventricular wall thickness is consistent with moderate concentric hypertrophy. All left ventricular wall segments contract normally. Left ventricular diastolic function was normal. Normal left atrial pressure.   The right ventricular cavity is mildly dilated. Normal right ventricular systolic function noted.   Left atrial volume is moderately increased. Saline test results are negative.   The aortic valve is structurally normal with no stenosis present. The aortic valve appears trileaflet. No significant aortic valve regurgitation is present.   The mitral valve is structurally normal with no significant stenosis present. Trace to mild mitral valve regurgitation is present.   Mild tricuspid valve regurgitation is present. Estimated right ventricular systolic pressure from tricuspid regurgitation is mildly elevated (35-45 mmHg)   Mild dilation of the ascending aorta is present. Ascending aorta = 3.7 cm     XR Abdomen KUB    Result Date: 8/22/2024  XR ABDOMEN KUB Date of Exam: 8/22/2024 10:45 AM EDT Indication: tube feeding placement Comparison: None available. Findings: Single view. Keofeed catheter is in the stomach with the tip curled in the gastric antrum.     Impression: Keofeed tube tip in the distal stomach. Electronically Signed: Olga Lidia Garcia MD  8/22/2024 11:12 AM EDT  Workstation ID: [id number]    SLP FEES - Fiberoptic Endo Eval Swallow    Result Date: 8/22/2024  This procedure was auto-finalized with no dictation required.    CT Head Without Contrast    Result Date: 8/22/2024  CT HEAD WO CONTRAST Date of Exam: 8/22/2024 4:40 AM EDT Indication: Stroke, follow up f/up MT and GLENROY 8/20, [place]  stroke. Comparison: None available. Technique: Axial CT images were obtained of the head without contrast administration.  Automated exposure control and iterative construction methods were used. Dual energy protocol was utilized and a virtual noncontrast head CT was created Findings: There is hypoattenuation throughout the cortex of the right MCA distribution involving areas of the right frontal, right temporal and parietal lobes as well as within the posterior insula. There is a tubular focus of hyperdensity in the subinsular region  anteriorly on the right that does not cancel out with dual-energy technique compatible with a small focus of hemorrhage. This appears to be intraparenchymal. There is mild mass effect with some sulcal effacement in the right cerebral hemisphere and partial effacement of the right lateral ventricle. There is minimal 2 mm leftward shift of midline structures. No additional hypo or hyperattenuating lesions in the brain. Orbits are unremarkable. There are bilateral maxillary sinus mucus retention cysts. The calvarium appears intact. Mastoids are clear. Superficial soft tissues are unremarkable.     Impression: 1.Large right MCA distribution infarct with a small focus of hemorrhage in the subinsular region. 2.Mild mass effect with 2 mm leftward shift of midline structures. Electronically Signed: Gian Blevins MD  8/22/2024 4:53 AM EDT  Workstation ID: [id number]    MRI Brain Without Contrast    Result Date: 8/22/2024  MRI BRAIN WO CONTRAST Date of Exam: 8/22/2024 3:28 AM EDT Indication: Stroke, follow up. FUP mechanical thrombectomy and carotid stent; RMCA stroke.  Comparison: CT head/angiography/perfusion 8/21/2024. Technique:  Routine multiplanar/multisequence sequence images of the brain were obtained without contrast administration. Findings: There is cortical diffusion restriction throughout the majority of the right MCA territory. There is also diffusion restriction in the right  caudate and putamen. There is associated T2/FLAIR hyperintense signal. There are several scattered small round foci of FLAIR hyperintense signal within the cerebral white matter otherwise. No evidence of acute or chronic intracranial hemorrhage. There is susceptibility artifact in the region of the right M1 segment, which could relate to thrombus. Orbits are unremarkable. There are mucous retention cysts in the left maxillary sinus and a small retention cyst in the right maxillary sinus. Mastoid air cells appear well aerated. Major arterial flow voids appear intact. No mass effect, midline shift or abnormal extra-axial collection. The midline structures appear intact. Calvarial and superficial soft tissue signal is within normal limits.     Impression: 1.Large acute infarct involving the majority of the right MCA territory. 2.No evidence of intracranial hemorrhage. 3.Mild chronic small vessel ischemic change. Electronically Signed: Gian Blevins MD  8/22/2024 4:42 AM EDT  Workstation ID: [id number]    XR Ankle 3+ View Left    Result Date: 8/21/2024  XR ANKLE 3+ VW LEFT Date of Exam: 8/21/2024 5:12 PM EDT Indication: R/O fracture, left ankle bruising Comparison: None available. Findings: There is soft tissue swelling about the ankle. There is a nondisplaced horizontal fracture through the lateral malleolus. There is a 4 mm fracture fragment at the tip of the medial malleolus. There is widening of the anterior tibiotalar joint space. Medial clear space appears within normal limits. There is calcaneal enthesopathy.     Impression: 1.Nondisplaced fracture of the lateral malleolus. 4 mm fracture fragment at the tip of the medial malleolus. 2.Abnormal widening of the anterior tibiotalar joint space. Electronically Signed: Sanam Ramsey  8/21/2024 8:59 PM EDT  Workstation ID: [id number]    XR Chest 1 View    Result Date: 8/21/2024  XR CHEST 1 VW Date of Exam: 8/21/2024 5:11 PM EDT Indication: Acute Stroke Protocol (onset < 12  hrs) Comparison: None available. Findings: Heart shadow is in the upper range of normal size. Mildly prominent right mediastinal shadow appears to be due to rotation of the patient to the right on this exam. Pulmonary vasculature appears normal. There is mild coarsening interstitial markings which is likely chronic. No edema, effusion or pneumothorax is seen.     Impression: Rotated chest radiograph. Allowing for this, no evidence of active chest disease.. Electronically Signed: Ozzy Mckeon MD  8/21/2024 5:37 PM EDT  Workstation ID: [id number]    CT Angiogram Head w AI Analysis of LVO    Result Date: 8/21/2024  CT ANGIOGRAM HEAD W AI ANALYSIS OF LVO, CT ANGIOGRAM NECK, CT CEREBRAL PERFUSION W WO CONTRAST Date of Exam: 8/21/2024 12:01 PM EDT Indication: Neuro Deficit, acute, Stroke suspected Neuro deficit, acute stroke suspected. Comparison: None available. Technique: CTA of the head was performed after the uneventful intravenous administration of 120 mL Isovue-370. Reconstructed coronal and sagittal images were also obtained. In addition, a 3-D volume rendered image was created for interpretation. Automated exposure control and iterative reconstruction methods were used. 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 Additionally, there is occlusion of the M1 segment of the right middle cerebral artery. The right anterior and posterior cerebral arteries appear patent. The right common carotid artery and right vertebral artery appear patent without significant stenosis. There is atherosclerotic plaque within the left proximal ICA with estimated 40 to 50% luminal narrowing. The left common carotid, internal carotid, middle cerebral, anterior cerebral, vertebral, and posterior cerebral arteries are patent without abrupt cut off or aneurysmal dilation. There is fetal origin of the left posterior cerebral artery.  Diminutive intracranial portion of the left vertebral artery. Basilar artery appears patent and appears unremarkable. Non-vascular Findings: For description of nonvascular intracranial findings, please refer to the noncontrast head CT performed the same date. No acute abnormality is identified within the visualized soft tissue or bony structures of the neck. The visualized lung apices are clear. FINDINGS: CT Perfusion: CBF (<30%) volume: 98 mL Tmax (>6.0s) volume: 149 mL Mismatch volume: 51 mL Mismatch ratio: 1.5     1.Occlusion of the right internal carotid artery from its origin with reconstitution of the right cavernous/supraclinoid ICA. There is also occlusion of the M1 segment of the right middle cerebral artery. 2.CT perfusion study demonstrates a large perfusion abnormality involving essentially all of the right MCA territory with core infarct volume of 98 mL, a mismatch volume of 51 mL, and a mismatch ratio of 1.5. 3.Atherosclerotic plaque within the left proximal ICA with estimated 40 to 50% luminal narrowing. The above findings were discussed with Ilana Poole, stroke care coordinator, via telephone on 8/21/2024 12:33 PM EDT. Electronically Signed: Ander Rashid MD  8/21/2024 12:52 PM EDT  Workstation ID: [id number]    CT Angiogram Neck    Result Date: 8/21/2024  CT ANGIOGRAM HEAD W AI ANALYSIS OF LVO, CT ANGIOGRAM NECK, CT CEREBRAL PERFUSION W WO CONTRAST Date of Exam: 8/21/2024 12:01 PM EDT Indication: Neuro Deficit, acute, Stroke suspected Neuro deficit, acute stroke suspected. Comparison: None available. Technique: CTA of the head was performed after the uneventful intravenous administration of 120 mL Isovue-370. Reconstructed coronal and sagittal images were also obtained. In addition, a 3-D volume rendered image was created for interpretation. Automated exposure control and iterative reconstruction methods were used. FINDINGS: Vascular Findings: There is occlusion of the right internal carotid  artery from its origin with reconstitution of the cavernous/supraclinoid ICA. Additionally, there is occlusion of the M1 segment of the right middle cerebral artery. The right anterior and posterior cerebral arteries appear patent. The right common carotid artery and right vertebral artery appear patent without significant stenosis. There is atherosclerotic plaque within the left proximal ICA with estimated 40 to 50% luminal narrowing. The left common carotid, internal carotid, middle cerebral, anterior cerebral, vertebral, and posterior cerebral arteries are patent without abrupt cut off or aneurysmal dilation. There is fetal origin of the left posterior cerebral artery. Diminutive intracranial portion of the left vertebral artery. Basilar artery appears patent and appears unremarkable. Non-vascular Findings: For description of nonvascular intracranial findings, please refer to the noncontrast head CT performed the same date. No acute abnormality is identified within the visualized soft tissue or bony structures of the neck. The visualized lung apices are clear. FINDINGS: CT Perfusion: CBF (<30%) volume: 98 mL Tmax (>6.0s) volume: 149 mL Mismatch volume: 51 mL Mismatch ratio: 1.5     1.Occlusion of the right internal carotid artery from its origin with reconstitution of the right cavernous/supraclinoid ICA. There is also occlusion of the M1 segment of the right middle cerebral artery. 2.CT perfusion study demonstrates a large perfusion abnormality involving essentially all of the right MCA territory with core infarct volume of 98 mL, a mismatch volume of 51 mL, and a mismatch ratio of 1.5. 3.Atherosclerotic plaque within the left proximal ICA with estimated 40 to 50% luminal narrowing. The above findings were discussed with Ilana Poole, stroke care coordinator, via telephone on 8/21/2024 12:33 PM EDT. Electronically Signed: Ander Rashid MD  8/21/2024 12:52 PM EDT  Workstation ID: [id number]    CT CEREBRAL  PERFUSION WITH & WITHOUT CONTRAST    Result Date: 8/21/2024  CT ANGIOGRAM HEAD W AI ANALYSIS OF LVO, CT ANGIOGRAM NECK, CT CEREBRAL PERFUSION W WO CONTRAST Date of Exam: 8/21/2024 12:01 PM EDT Indication: Neuro Deficit, acute, Stroke suspected Neuro deficit, acute stroke suspected. Comparison: None available. Technique: CTA of the head was performed after the uneventful intravenous administration of 120 mL Isovue-370. Reconstructed coronal and sagittal images were also obtained. In addition, a 3-D volume rendered image was created for interpretation. Automated exposure control and iterative reconstruction methods were used. 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 Additionally, there is occlusion of the M1 segment of the right middle cerebral artery. The right anterior and posterior cerebral arteries appear patent. The right common carotid artery and right vertebral artery appear patent without significant stenosis. There is atherosclerotic plaque within the left proximal ICA with estimated 40 to 50% luminal narrowing. The left common carotid, internal carotid, middle cerebral, anterior cerebral, vertebral, and posterior cerebral arteries are patent without abrupt cut off or aneurysmal dilation. There is fetal origin of the left posterior cerebral artery. Diminutive intracranial portion of the left vertebral artery. Basilar artery appears patent and appears unremarkable. Non-vascular Findings: For description of nonvascular intracranial findings, please refer to the noncontrast head CT performed the same date. No acute abnormality is identified within the visualized soft tissue or bony structures of the neck. The visualized lung apices are clear. FINDINGS: CT Perfusion: CBF (<30%) volume: 98 mL Tmax (>6.0s) volume: 149 mL Mismatch volume: 51 mL Mismatch ratio: 1.5     1.Occlusion of the right internal  carotid artery from its origin with reconstitution of the right cavernous/supraclinoid ICA. There is also occlusion of the M1 segment of the right middle cerebral artery. 2.CT perfusion study demonstrates a large perfusion abnormality involving essentially all of the right MCA territory with core infarct volume of 98 mL, a mismatch volume of 51 mL, and a mismatch ratio of 1.5. 3.Atherosclerotic plaque within the left proximal ICA with estimated 40 to 50% luminal narrowing. The above findings were discussed with Ilana Poole, stroke care coordinator, via telephone on 8/21/2024 12:33 PM EDT. Electronically Signed: Ander Rashid MD  8/21/2024 12:52 PM EDT  Workstation ID: [id number]    CT Head Without Contrast Stroke Protocol    Result Date: 8/21/2024  CT HEAD WO CONTRAST STROKE PROTOCOL Date of Exam: 8/21/2024 11:58 AM EDT Indication: Neuro deficit, acute, stroke suspected Neuro Deficit, acute, Stroke suspected. Comparison: None available. Technique: Axial CT images were obtained of the head without contrast administration.  Reconstructed coronal images were also obtained. Automated exposure control and iterative construction methods were used. Scan Time: 11:58 a.m. Results discussed with emergency team at 12:02 p.m. Findings: Parenchyma:No acute intraparenchymal hemorrhage. No loss of gray-white differentiation to suggest large territory infarct. Normal parenchymal volume. No substantial white matter disease. No midline shift or herniation. Ventricles and extra axial spaces:Normal caliber of ventricles and sulci. No extra axial fluid collection seen. Other:Orbits are grossly intact. Scattered paranasal sinus mucosal thickening. Mastoid air cells are clear. Calvarium is intact. Intracranial atherosclerotic calcification is present. Hyperdense right middle cerebral artery sign.     Impression: Hyperdense appearance of the right middle cerebral artery concerning for acute thrombus. Recommend further evaluation with  CTA. Electronically Signed: Javier Javed MD  8/21/2024 12:08 PM EDT  Workstation ID: [id number]     Results for orders placed during the hospital encounter of 08/21/24    Bilateral Carotid Duplex    Interpretation Summary    Right carotid stent is patent without evidence of in-stent restenosis.    Left internal carotid artery demonstrates normal flow without evidence of hemodynamically significant stenosis.    Antegrade right vertebral flow.    Antegrade left vertebral flow.      Results for orders placed during the hospital encounter of 08/21/24    Bilateral Carotid Duplex    Interpretation Summary    Right carotid stent is patent without evidence of in-stent restenosis.    Left internal carotid artery demonstrates normal flow without evidence of hemodynamically significant stenosis.    Antegrade right vertebral flow.    Antegrade left vertebral flow.      Results for orders placed during the hospital encounter of 08/21/24    Adult Transthoracic Echo Complete W/ Cont if Necessary Per Protocol (With Agitated Saline)    Interpretation Summary    Left ventricular systolic function is normal. Calculated left ventricular EF = 68% Normal left ventricular cavity size noted. Left ventricular wall thickness is consistent with moderate concentric hypertrophy. All left ventricular wall segments contract normally. Left ventricular diastolic function was normal. Normal left atrial pressure.    The right ventricular cavity is mildly dilated. Normal right ventricular systolic function noted.    Left atrial volume is moderately increased. Saline test results are negative.    The aortic valve is structurally normal with no stenosis present. The aortic valve appears trileaflet. No significant aortic valve regurgitation is present.    The mitral valve is structurally normal with no significant stenosis present. Trace to mild mitral valve regurgitation is present.    Mild tricuspid valve regurgitation is present. Estimated right  ventricular systolic pressure from tricuspid regurgitation is mildly elevated (35-45 mmHg)    Mild dilation of the ascending aorta is present. Ascending aorta = 3.7 cm      Plan for Follow-up of Pending Labs/Results: Will notify [facility] if further results warrant a change in management  Pending Labs       Order Current Status    Urine Culture - Urine, Urine, Clean Catch In process          Discharge Details        Discharge Medications        New Medications        Instructions Start Date   aspirin 81 MG chewable tablet   81 mg, Oral, Daily   Start Date: August 29, 2024     atorvastatin 80 MG tablet  Commonly known as: LIPITOR   80 mg, Oral, Nightly      cefuroxime 500 MG tablet  Commonly known as: CEFTIN   250 mg, Oral, 2 Times Daily   Start Date: August 29, 2024     folic acid 1 MG tablet  Commonly known as: FOLVITE   1 mg, Oral, Daily   Start Date: August 29, 2024     nicotine 21 MG/24HR patch  Commonly known as: NICODERM CQ   1 patch, Transdermal, Every 24 Hours Scheduled      sodium chloride 1 g tablet   1 g, Oral, 3 Times Daily PRN      thiamine 100 MG tablet  Commonly known as: VITAMIN B1   100 mg, Nasogastric, Daily   Start Date: August 29, 2024     ticagrelor 60 MG tablet tablet  Commonly known as: BRILINTA   60 mg, Oral, 2 Times Daily             Continue These Medications        Instructions Start Date   HYDROcodone-acetaminophen 5-325 MG per tablet  Commonly known as: NORCO   1 tablet, Oral, Every 6 Hours PRN      lisinopril 20 MG tablet  Commonly known as: PRINIVIL,ZESTRIL   20 mg, Oral, Daily             Stop These Medications      hydroCHLOROthiazide 25 MG tablet              Allergies   Allergen Reactions    Erythromycin Anaphylaxis    Toradol [Ketorolac Tromethamine] Rash    Contrast Dye (Echo Or Unknown Ct/Mr) Unknown (See Comments)     Hives on chest         Discharge Disposition:  Rehab Facility or Unit (DC - External)    Diet:  Hospital:  Diet Order   Procedures    Diet: Cardiac; Healthy  Heart (2-3 Na+); No Straw, No Mixed Consistencies, Feeding Assistance - Nursing; Texture: Soft to Chew (NDD 3); Soft to Chew: Chopped Meat; Fluid Consistency: Thin (IDDSI 0)            Activity:  Activity Instructions       Other Activity Instructions      Activity Instructions: Non weight bearing LLE in CAM boot            CODE STATUS:    Code Status and Medical Interventions: CPR (Attempt to Resuscitate); Full Support   Ordered at: 08/21/24 1601     Code Status (Patient has no pulse and is not breathing):    CPR (Attempt to Resuscitate)     Medical Interventions (Patient has pulse or is breathing):    Full Support       Future Appointments   Date Time Provider Department Center   9/17/2024  1:30 PM Alida Shepherd PA-C [facility]       Additional Instructions for the Follow-ups that You Need to Schedule       Discharge Follow-up with PCP   As directed       Currently Documented PCP:    Agnes Ewing APRN    PCP Phone Number:    [phone]     Follow Up Details: Within 1 week of rehab discharge        Discharge Follow-up with Specialty: Neurosurgery; 1 Month   As directed      Specialty: Neurosurgery   Follow Up: 1 Month        Discharge Follow-up with Specified Provider: River Kimbrough; 1 Week   As directed      To: River Kimbrough   Follow Up: 1 Week                      Kelsey Sanders MD  08/28/24      Time Spent on Discharge:  I spent  36  minutes on this discharge activity which included: face-to-face encounter with the patient, reviewing the data in the system, coordination of the care with the nursing staff as well as consultants, documentation, and entering orders.           Electronically signed by Kelsey Sanders MD at 08/28/24 9152

## 2024-08-28 NOTE — PROGRESS NOTES
Clinical Nutrition     Patient Name: Dalila OCASIO  YOB: 1970  MRN: [id number]  Date of Encounter: 08/28/24 12:59 EDT  Admission date: 8/21/2024  Reason for Visit: Follow-up protocol, EN    Assessment   Nutrition Assessment   Admission Diagnosis:  Acute CVA (cerebrovascular accident) [I63.9]    Problem List:    Acute CVA (cerebrovascular accident)    Alcohol use    Tobacco use    Obesity (BMI 30-39.9)    HTN (hypertension)    Dyslipidemia    History of seizures    PAD (peripheral artery disease)    Oropharyngeal dysphagia    Acute UTI (urinary tract infection)      PMH:   She  has a past medical history of Acute CVA (cerebrovascular accident) (08/21/2024), Alcohol use (08/21/2024), Dyslipidemia (08/21/2024), Obesity (BMI 30-39.9) (08/21/2024), PAD (peripheral artery disease) (08/21/2024), and Tobacco use (08/21/2024).    PSH:  She  has a past surgical history that includes Interventional radiology procedure (Bilateral, 8/21/2024).    Applicable Nutrition History:   (8/22) CVA, thrombectomy with stent placement  (8/22) FEES - NPO - mild, oral dysphagia, severe, pharyngeal dysphagia   (8/22) EN started  (8/28) SLP: NPO  (8/28) SLP FEES pending    Labs    Labs Reviewed: Yes    Results from last 7 days   Lab Units 08/28/24  0824 08/27/24  0350 08/26/24  0807 08/25/24  1810 08/25/24  0548 08/23/24  0708 08/23/24  0148 08/22/24  1055 08/22/24  0341   GLUCOSE mg/dL 134* 137*  --   --  152*   < >  --    < > 137*   BUN mg/dL 34* 32*  --   --  30*   < >  --    < > 18   CREATININE mg/dL 0.63 0.58  --   --  0.56*   < >  --    < > 0.56*   SODIUM mmol/L 138 136 140   < > 142  145   < > 142   < > 140   CHLORIDE mmol/L 102 101  --   --  109*   < >  --    < > 108*   POTASSIUM mmol/L 4.8 4.1  --   --  4.0   < >  --    < > 4.1   PHOSPHORUS mg/dL  --   --   --   --   --   --  2.7  --  3.0   MAGNESIUM mg/dL  --  2.3  --   --   --   --  2.3  --  2.0    < > = values in this interval not displayed.  "      Results from last 7 days   Lab Units 08/23/24  0708 08/23/24  0148 08/22/24  0341   PREALBUMIN mg/dL 23.7  --   --    CRP mg/dL  --  0.43  --    CHOLESTEROL mg/dL  --   --  187   TRIGLYCERIDES mg/dL  --   --  83       Results from last 7 days   Lab Units 08/28/24  1120 08/28/24  0614 08/27/24  2359 08/27/24  1811 08/27/24  1147 08/27/24  0603   GLUCOSE mg/dL 185* 140* 142* 146* 133* 141*     Lab Results   Lab Value Date/Time    HGBA1C 5.50 08/22/2024 0341         Results from last 7 days   Lab Units 08/22/24  1055   PROBNP pg/mL 269.4       Medications    Medications Reviewed: yes    Scheduled Meds:aspirin, 81 mg, Nasogastric, Daily   Or  aspirin, 300 mg, Rectal, Daily  atorvastatin, 80 mg, Nasogastric, Nightly  cefTRIAXone, 1,000 mg, Intravenous, Q24H  folic acid, 1 mg, Nasogastric, Daily  insulin regular, 2-9 Units, Subcutaneous, Q6H  nicotine, 1 patch, Transdermal, Q24H  senna-docusate sodium, 2 tablet, Oral, BID  thiamine, 100 mg, Nasogastric, Daily  ticagrelor, 60 mg, Nasogastric, BID      Continuous Infusions:     PRN Meds:.  senna-docusate sodium **AND** polyethylene glycol **AND** bisacodyl **AND** bisacodyl    dextrose    glucagon (human recombinant)    hydrALAZINE    HYDROcodone-acetaminophen    nitroglycerin    sodium chloride    Intake/Ouptut 24 hrs (0701 - 0700)   I&O's Reviewed: yes  Intake & Output (last day)         08/27 0701 08/28 0700 08/28 0701 08/29 0700    P.O. 0     I.V. (mL/kg) 50 (0.5)     Other 220     NG/GT 1305     Total Intake(mL/kg) 1575 (15.4)     Urine (mL/kg/hr) 750 (0.3)     Stool 0     Total Output 750     Net +825           Urine Unmeasured Occurrence 3 x     Stool Unmeasured Occurrence 1 x         LBM 8/23    Anthropometrics     Height: Height: 172.7 cm (68\")  Last Filed Weight: Weight: 102 kg (224 lb) (08/27/24 2203)  Method: Weight Method: Bed scale  BMI: BMI (Calculated): 34.1    UBW:    Weight      Weight (kg) Weight (lbs) Weight Method   4/21/2023 127.733 kg  281 lb " "9.6 oz  Standing scale    4/8/2024 128 kg  282 lb 3 oz  Stated    8/21/2024 107.502 kg  237 lb  Stated    8/22/2024 107.502 kg  237 lb       Weight change:  no verifiable significant changes per limited data in EMR, will verify with pt as able    Nutrition Focused Physical Exam     Date: 8/26    Pt does not meet criteria for malnutrition diagnosis, at this time.      Subjective   Reported/Observed/Food/Nutrition Related History:     08/28/24  Patient reported tolerating TF well. Denied any GI complaints at this time. Reported she is undergoing FEES later today and is hopeful to pass and to be d/c'd today.    8/26) Pt falls asleep during visit, unable to give hx. Tolerating EN, anxious for diet per nsg. Son at bedside tells me pt seems to be doing okay with TF, no questions/concerns at this time. No BM in 3 days. Na 140, goal 140-145, hypertonic stopped yesterday. BG WNL.     8/23) Tolerating EN. Visited with pt and spouse at bedside, pt very lethargic but states she is feeling okay denies altered GI fx. Both report pt eating adequately with no significant changes recently but has had intentional weight loss over past year with diet/lifestyle changes. RD explained nutritional care plan for EN until able to improve swallowing and eat adequately, understanding voiced and no further questions at this time.     8/22) Pt presented with CVA and had thrombectomy. Failed FEES today and NG placed for EN. High risk for intubation 2/2 AMS. Unable to visit with pt today, will see as feasible 8/23. NKFA listed in EPIC.     Needs Assessment   Date: 8/22, 8/26    Height used:Height: 172.7 cm (68\")  Weights used: 108 kg (ABW)    Estimated Calorie needs: ~1800 Kcal/day  Method:  Kcals/KG 16-20 = [id number]  Method:  MSJ = 1733  Method:   HBD =  1754    Estimated Protein needs: ~130 g PRO/day  Method: 1.2 g/Kg = 130  Method: 2 g/Kg = 129    Estimated Fluid needs: ~ ml/day   To maintain sodium goal 140-145    Current Nutrition " Prescription     PO: Diet: Cardiac; Healthy Heart (2-3 Na+); No Straw, No Mixed Consistencies, Feeding Assistance - Nursing; Texture: Soft to Chew (NDD 3); Soft to Chew: Chopped Meat; Fluid Consistency: Thin (IDDSI 0)  Oral Nutrition Supplement:   Intake: N/A    EN: Peptamen AF  Goal Rate: 75 ml/hr  Water Flushes: 15 ml Q 6 hr  Modular: Prosource -no carb 1/day  Route: small bore  Tube: NG    At goal over: 20Hrs/day     Rx will supply:   Goal Volume 1500 mL/day     Flush Volume 90 mL/day     Energy 1860 Kcal/day 103 % Est Need   Protein 129 g/day 99 % Est Need   Fiber 6 g/day  (soluble)   Water in  EN 1215 mL     Total Water 1305 mL     Meet DRI Yes        --------------------------------------------------------------------------  Product/Rate verified at bedside: Yes  Infusing Rate at time of visit: 75/30q4h    Average Delivery from [facility]:   Volume 1295 mL/day   % Goal Vol.   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   Signs/Symptoms NPO per SLP/FEES   Status: New    Date:  Updated:   Problem Increased nutrient needs   Etiology Altered fat soluble vitamin absorption    Signs/Symptoms Med hx alcohol dependence disorder   Status: New    Goal:   Nutrition to support treatment, Advance diet as medically feasible/appropriate, and EN to PO    Nutrition Intervention      Follow treatment progress, Care plan reviewed    Following for results of FEES, will reassess TF regimen/needs PRN    Monitoring/Evaluation:   Per protocol, I&O, Pertinent labs, EN delivery/tolerance, Weight, GI status, Symptoms, POC/GOC, Swallow function    Gail Oakes, MS,RD,LD  Time Spent: 25m

## 2024-08-28 NOTE — THERAPY TREATMENT NOTE
Acute Care - Speech Language Pathology   Swallow Treatment Note [facility]     Patient Name: Dalila OCASIO  : 1970  MRN: [id number]  Today's Date: 2024               Admit Date: 2024    Visit Dx:     ICD-10-CM ICD-9-CM   1. Acute CVA (cerebrovascular accident)  I63.9 434.91   2. Left-sided neglect  R41.4 781.8   3. Oropharyngeal dysphagia  R13.12 787.22   4. Dysarthria  R47.1 784.51     Patient Active Problem List   Diagnosis    Acute CVA (cerebrovascular accident)    Alcohol use    Tobacco use    Obesity (BMI 30-39.9)    HTN (hypertension)    Dyslipidemia    History of seizures    PAD (peripheral artery disease)    Oropharyngeal dysphagia     Past Medical History:   Diagnosis Date    Acute CVA (cerebrovascular accident) 2024    Alcohol use 2024    Dyslipidemia 2024    Obesity (BMI 30-39.9) 2024    PAD (peripheral artery disease) 2024    Tobacco use 2024     Past Surgical History:   Procedure Laterality Date    INTERVENTIONAL RADIOLOGY PROCEDURE Bilateral 2024    Procedure: Carotid Cervical Angiogram;  Surgeon: Jamaal Saini MD;  Location: [facility] INVASIVE LOCATION;  Service: Interventional Radiology;  Laterality: Bilateral;       SLP Recommendation and Plan     SLP Diet Recommendation: NPO, temporary alternate methods of nutrition/hydration (24)     SLP Rec. for Method of Medication Administration: meds via alternate route (24)        Recommended Diagnostics: reassess via FEES (24)     Anticipated Discharge Disposition (SLP): inpatient rehabilitation facility, anticipate therapy at next level of care (24)     Therapy Frequency (Swallow): PRN, other (see comments) (Pending FEES) (24)  Predicted Duration Therapy Intervention (Days): 2 weeks (24)  Oral Care Recommendations: Oral Care BID/PRN, Suction toothbrush (24)        Daily Summary of Progress (SLP): progress towards  functional goals is fair (08/28/24 0900)               Treatment Assessment (SLP): continued, dysarthria, cognitive-linguistic disorder, clinical signs of, oral dysphagia, pharyngeal dysphagia (08/28/24 0900)  Treatment Assessment Comments (SLP): Pt exhibits decreased oropharyngeal signs with PO. Pt endorses she was previously coughing violently with all ice/water trials. Today, intermittent cough with water and none with ice.  Decreased awareness/sensation on left side resulting in oral dysphagia.  Impaired speech- decreased articulatory precision but speech is intelligible. Pt is aware of decrease speech. Continued cognitive linguistic impairment with impulsivity, impaired judgment, and decreased awareness. (08/28/24 0900)  Plan for Continued Treatment (SLP): continue treatment per plan of care (08/28/24 0900)         Outcome Evaluation: SLP following for speech/cognition and swallowing. Pt is appropriate for a repeat FEES.  FEES planned for later today.      SWALLOW EVALUATION (Last 72 Hours)       SLP Adult Swallow Evaluation       Row Name 08/28/24 0900 08/26/24 1200                Rehab Evaluation    Oral Care -- teeth brushed - suction toothbrush  done prior to SLP arrival, pt acknowledges she is keeping mouth clean and undrstands importance of oral care  -AW          General Information    Current Method of Nutrition NPO  -ML --          General Eating/Swallowing Observations    Respiratory Support Currently in Use room air  -ML --       Eating/Swallowing Skills self-fed;fed by SLP  -ML --       Utensils Used spoon;cup  -ML --       Consistencies Trialed pureed;thin liquids;ice chips  -ML --          Recommendations    Therapy Frequency (Swallow) PRN;other (see comments)  Pending FEES  -ML --       SLP Diet Recommendation NPO;temporary alternate methods of nutrition/hydration  -ML --       Recommended Diagnostics reassess via FEES  -ML --       Oral Care Recommendations Oral Care BID/PRN;Suction toothbrush   -ML --       SLP Rec. for Method of Medication Administration meds via alternate route  -ML --                 User Key  (r) = Recorded By, (t) = Taken By, (c) = Cosigned By      Initials Name Effective Dates    AW Ana Barrera, MS CCC-SLP 02/03/23 -     ML Luz Maria Payne, CCC-SLP 06/16/21 -                     EDUCATION  The patient has been educated in the following areas:   Dysphagia (Swallowing Impairment) FEES .        SLP GOALS       Row Name 08/28/24 0900             (LTG) Patient will demonstrate functional swallow for    Diet Texture (Demonstrate functional swallow) soft to chew (chopped) textures  -ML      Liquid viscosity (Demonstrate functional swallow) nectar/ mildly thick liquids  -ML      [place] (Demonstrate functional swallow) with minimal cues (75-90% accuracy)  -ML      Time Frame (Demonstrate functional swallow) 1 week  -ML      Progress/Outcomes (Demonstrate functional swallow) continuing progress toward goal  -ML      Comment (Demonstrate functional swallow) Trials of thin- ice/water with oral dysphagia- anterior loss/decreased sensation and awareness and intermittent pharyngeal signs with thin liquids via straw but not with cup trials.  -ML         (STG) Lingual Strengthening Goal 1 (SLP)    Activity (Lingual Strengthening Goal 1, SLP) increase tongue back strength  -ML      Increase Tongue Back Strength lingual movement exercises;lingual resistance exercises  -ML      [place]/Accuracy (Lingual Strengthening Goal 1, SLP) with minimal cues (75-90% accuracy)  -ML      Time Frame (Lingual Strengthening Goal 1, SLP) short term goal (STG);1 week  -ML      Progress/Outcomes (Lingual Strengthening Goal 1, SLP) goal ongoing  -ML      Comment (Lingual Strengthening Goal 1, SLP) not addressed today  -ML         (STG) Pharyngeal Strengthening Exercise Goal 1 (SLP)    Activity (Pharyngeal Strengthening Goal 1, SLP) increase timing;increase superior movement of the hyolaryngeal  complex;increase anterior movement of the hyolaryngeal complex;increase squeeze/positive pressure generation  -ML      Increase Timing prepping - 3 second prep or suck swallow or 3-step swallow  -ML      Increase Superior Movement of the Hyolaryngeal Complex Mendelsohn  -ML      Increase Anterior Movement of the Hyolaryngeal Complex chin tuck against resistance (CTAR)  -ML      Increase Squeeze/Positive Pressure Generation effortful pitch glide (falsetto + pharyngeal squeeze)  -ML      [place]/Accuracy (Pharyngeal Strengthening Goal 1, SLP) with minimal cues (75-90% accuracy)  -ML      Time Frame (Pharyngeal Strengthening Goal 1, SLP) short term goal (STG);1 week  -ML      Progress/Outcomes (Pharyngeal Strengthening Goal 1, SLP) progress slower than expected  -ML      Comment (Pharyngeal Strengthening Goal 1, SLP) Difficulty convincing pt to comply with directions. Impaired cognition impacts pt's ability to stay on task.  -ML         Patient will demonstrate functional speech skills for return to discharge environment    [place] with moderate cues  -ML      Time frame by discharge  -ML      Progress/Outcomes good progress toward goal  -ML      Comments Speech is intelligible but pt is unable to comply with directions for overarticulation/slow rate/exaggerated speech/loud speech to improve articulation.  -ML         Patient will demonstrate functional cognitive-linguistic skills for return to discharge environment    [place] with moderate cues  -ML      Time frame by discharge  -ML      Progress/Outcomes progress slower than expected  -ML      Comments Impaired attention, judgment, reasoning, and awareness.  -ML         SLP Diagnostic Treatment     Patient will participate in further assessment in the following areas reading comprehension;graphic expression  -ML      Time Frame (Diagnostic) short term goal (STG)  -ML      Progress/Outcomes (Additional Goal 1, SLP) goal ongoing  -ML      Comment  (Diagnostic) Not addressed today  -ML         Comprehend Questions Goal 1 (SLP)    Improve Ability to Comprehend Questions Goal 1 (SLP) complex yes/no questions;80%;with moderate cues (50-74%)  -ML      Time Frame (Comprehend Questions Goal 1, SLP) short term goal (STG);1 week  -ML      Progress (Ability to Comprehend Questions Goal 1, SLP) 90%;independently (over 90% accuracy)  -ML      Progress/Outcomes (Comprehend Questions Goal 1, SLP) good progress toward goal  -ML      Comment (Comprehend Questions Goal 1, SLP) Impulsive with responses- does not wait for the entire question to be given.  -ML         Follow Directions Goal 2 (SLP)    Improve Ability to Follow Directions Goal 1 (SLP) 3 step commands;multistep commands;90%;with minimal cues (75-90%)  -ML      Time Frame (Follow Directions Goal 1, SLP) short term goal (STG);1 week  -ML      Progress (Ability to Follow Directions Goal 1, SLP) 100%;independently (over 90% accuracy);other (comment)  100% with 2 step but difficulty with 3 step and multistep commands in part due to impulsivity.  -ML      Progress/Outcomes (Follow Directions Goal 1, SLP) good progress toward goal;goal revised this date  -ML      Comment (Follow Directions Goal 1, SLP) 100% with 2 step but difficulty with 3 step and multistep commands in part due to impulsivity.  -ML         Phonation Goal 1 (SLP)    Improve Phonation By Goal 1 (SLP) using loud speech;90%;with moderate cues (50-74%)  -ML      Time Frame (Phonation Goal 1, SLP) 1 week  -ML      Progress (Phonation Goal 1, SLP) 20%;with moderate cues (50-74%)  -ML      Progress/Outcomes (Phonation Goal 1, SLP) progress slower than expected  -ML      Comment (Phonation Goal 1, SLP) Unable to comply with directions for loud, exaggerated, or speech with overarticulation.  Pt hyperfocussed on eating and unable to comply with directions for speech.  -ML         Articulation Goal 1 (SLP)    Improve Articulation Goal 1 (SLP) by over-articulating  at word level;by over-articulating at phrase level;90%;with minimal cues (75-90%)  -ML      Time Frame (Articulation Goal 1, SLP) short term goal (STG);1 week  -ML      Progress (Articulation Goal 1, SLP) 10%;with moderate cues (50-74%)  -ML      Progress/Outcomes (Articulation Goal 1, SLP) progress slower than expected  -ML      Comment (Articulation Goal 1, SLP) Unwilling to comply with directions for overarticulation.  -ML         Attention Goal 1 (SLP)    Improve Attention by Goal 1 (SLP) looking at speaker;attending to task;90%;with minimal cues (75-90%)  -ML      Time Frame (Attention Goal 1, SLP) short term goal (STG);1 week  -ML      Progress (Attention Goal 1, SLP) 80%;with moderate cues (50-74%);with maximum cues (25-49%)  -ML      Progress/Outcomes (Attention Goal 1, SLP) good progress toward goal  -ML      Comment (Attention Goal 1, SLP) Requires cues to look at speaker- impaired attention/distractability impacts.  -ML         Orientation Goal 1 (SLP)    Improve Orientation Through Goal 1 (SLP) demonstrating orientation to day;demonstrating orientation to month;demonstrating orientation to year;demonstrating orientation to disease/impairment;90%;with minimal cues (75-90%)  -ML      Time Frame (Orientation Goal 1, SLP) short term goal (STG);1 week  -ML      Progress (Orientation Goal 1, SLP) 90%;independently (over 90% accuracy)  -ML      Progress/Outcomes (Orientation Goal 1, SLP) good progress toward goal  -ML      Comment (Orientation Goal 1, SLP) Difficulty with awareness/acknowledgment of deficits/impairment. Otherwise oriented.  -ML         Memory Skills Goal 1 (SLP)    Improve Memory Skills Through Goal 1 (SLP) recalling related word lists immediately;recalling unrelated word lists immediately;80%;with minimal cues (75-90%)  -ML      Time Frame (Memory Skills Goal 1, SLP) short term goal (STG);1 week  -ML      Progress/Outcomes (Memory Skills Goal 1, SLP) goal ongoing  -ML         Pragmatic Skills  Goal 1 (SLP)    Improve Pragmatic Skills Goal 1 (SLP) maintain eye contact;90%;with minimal cues (75-90%)  -ML      Time Frame (Pragmatic Skills Goal 1, SLP) short term goal (STG);1 week  -ML      Comment (Pragmatic Skills Goal 1, SLP) See aforementioned notes re: eye contact.  -ML                User Key  (r) = Recorded By, (t) = Taken By, (c) = Cosigned By      Initials Name Provider Type    Luz Maria Gaffney CCC-SLP Speech and Language Pathologist                         Time Calculation:    Time Calculation- SLP       Row Name 24 1235             Time Calculation- SLP    SLP Start Time 0900  -ML      SLP Received On 24  -ML                User Key  (r) = Recorded By, (t) = Taken By, (c) = Cosigned By      Initials Name Provider Type    Luz Maria Gaffney CCC-SLP Speech and Language Pathologist                    Therapy Charges for Today       Code Description Service Date Service Provider Modifiers Qty    45611425586 HC ST TREATMENT SWALLOW 2 2024 Luz Maria Payne CCC-SLP GN 1    [id number] HC ST TREATMENT SPEECH 2 2024 Luz Maria Payne CCC-SLP GN 1                 SHANTELLE Laura  2024   and Acute Care - Speech Language Pathology Treatment Note   [facility]     Patient Name: Dalila OCASIO  : 1970  MRN: [id number]  Today's Date: 2024               Admit Date: 2024     Visit Dx:    ICD-10-CM ICD-9-CM   1. Acute CVA (cerebrovascular accident)  I63.9 434.91   2. Left-sided neglect  R41.4 781.8   3. Oropharyngeal dysphagia  R13.12 787.22   4. Dysarthria  R47.1 784.51     Patient Active Problem List   Diagnosis    Acute CVA (cerebrovascular accident)    Alcohol use    Tobacco use    Obesity (BMI 30-39.9)    HTN (hypertension)    Dyslipidemia    History of seizures    PAD (peripheral artery disease)    Oropharyngeal dysphagia     Past Medical History:   Diagnosis Date    Acute CVA (cerebrovascular accident) 2024    Alcohol use  08/21/2024    Dyslipidemia 08/21/2024    Obesity (BMI 30-39.9) 08/21/2024    PAD (peripheral artery disease) 08/21/2024    Tobacco use 08/21/2024     Past Surgical History:   Procedure Laterality Date    INTERVENTIONAL RADIOLOGY PROCEDURE Bilateral 8/21/2024    Procedure: Carotid Cervical Angiogram;  Surgeon: Jamaal Saini MD;  Location: [facility] INVASIVE LOCATION;  Service: Interventional Radiology;  Laterality: Bilateral;       SLP Recommendation and Plan                    Anticipated Discharge Disposition (SLP): inpatient rehabilitation facility, anticipate therapy at next level of care (08/28/24 0900)     Therapy Frequency (Swallow): PRN, other (see comments) (Pending FEES) (08/28/24 0900)  Therapy Frequency (SLP SLC): 5 days per week (08/28/24 0900)  Predicted Duration Therapy Intervention (Days): 2 weeks (08/28/24 0900)  Oral Care Recommendations: Oral Care BID/PRN, Suction toothbrush (08/28/24 0900)     Daily Summary of Progress (SLP): progress towards functional goals is fair (08/28/24 0900)           Treatment Assessment (SLP): continued, dysarthria, cognitive-linguistic disorder, clinical signs of, oral dysphagia, pharyngeal dysphagia (08/28/24 0900)  Treatment Assessment Comments (SLP): Pt exhibits decreased oropharyngeal signs with PO. Pt endorses she was previously coughing violently with all ice/water trials. Today, intermittent cough with water and none with ice.  Decreased awareness/sensation on left side resulting in oral dysphagia.  Impaired speech- decreased articulatory precision but speech is intelligible. Pt is aware of decrease speech. Continued cognitive linguistic impairment with impulsivity, impaired judgment, and decreased awareness. (08/28/24 0900)  Plan for Continued Treatment (SLP): continue treatment per plan of care (08/28/24 0900)  Outcome Evaluation: SLP following for speech/cognition and swallowing. Pt is appropriate for a repeat FEES.  FEES planned for later today.  (08/28/24 1233)      SLP EVALUATION (Last 72 Hours)       SLP SLC Evaluation       Row Name 08/28/24 0900 08/26/24 1200                Communication Assessment/Intervention    Document Type therapy note (daily note)  -ML therapy note (daily note)  -AW       Subjective Information complains of  hunger  -ML complains of  headache  -AW       Patient Observations alert;cooperative;agree to therapy  -ML alert;cooperative  -AW       Patient/Family/Caregiver Comments/Observations No family present  -ML dtr in law at bedside  -AW       Patient Effort adequate  -ML good  -AW       Comment Pt requesting to eat. Reporting she has been eating some food - jello. Endorses choking with water- requested yaunker be close by when SLP offered ice/water.  -ML --       Symptoms Noted During/After Treatment none  -ML none  -AW       Oral Care swabbed with sterile water  -ML --          General Information    Patient Profile Reviewed yes  -ML --       Pertinent History Of Current Problem See ST hx- improving per notes in EMR  -ML --       Patient Level of Education Finished 11th- did not graduate from high school  -ML --          Pain    Additional Documentation Pain Scale: Numbers Pre/Post-Treatment (Group)  -ML --          Pain Scale: Numbers Pre/Post-Treatment    Pretreatment Pain Rating 9/10  -ML --       Posttreatment Pain Rating 9/10  -ML --       Pain Location - head;ankle  leg  -ML --       Pre/Posttreatment Pain Comment PT just finished seeing pt- brought in ice pack  -ML --       Pain Intervention(s) Repositioned;Nursing Notified  -ML --          Pain Scale: FACES Pre/Post-Treatment    Pain: FACES Scale, Pretreatment -- 2-->hurts little bit  -AW       Posttreatment Pain Rating -- 2-->hurts little bit  -AW       Pain Location - -- head  -AW          SLP Treatment Clinical Impressions    Treatment Assessment (SLP) continued;dysarthria;cognitive-linguistic disorder;clinical signs of;oral dysphagia;pharyngeal dysphagia  -ML  continued;clinical signs of;aspiration;improved;cognitive-linguistic disorder;communication disorder  -AW       Treatment Assessment Comments (SLP) Pt exhibits decreased oropharyngeal signs with PO. Pt endorses she was previously coughing violently with all ice/water trials. Today, intermittent cough with water and none with ice.  Decreased awareness/sensation on left side resulting in oral dysphagia.  Impaired speech- decreased articulatory precision but speech is intelligible. Pt is aware of decrease speech. Continued cognitive linguistic impairment with impulsivity, impaired judgment, and decreased awareness.  -ML Pt showing significant improvements in S/L/Cog, very appopriate in conversation today and speech clearing. Comprehension signicantly improved and no notable word finding difficulty today. Family in agreement. Still showing s/s dysphagia with trials, but tolerated ice chips and encouraged pt to work on effortful swallows to manage secretions vs suctioning with yankauer.  -AW       Daily Summary of Progress (SLP) progress towards functional goals is fair  -ML progress toward functional goals is good  -AW       Barriers to Overall Progress (SLP) Cognitive status;Medically complex  -ML --       Plan for Continued Treatment (SLP) continue treatment per plan of care  -ML continue treatment per plan of care  -AW       Care Plan Review evaluation/treatment results reviewed;risks/benefits reviewed;patient/other agree to care plan  -ML care plan/treatment goals reviewed  -AW          Recommendations    Therapy Frequency (SLP SLC) 5 days per week  -ML --       Predicted Duration Therapy Intervention (Days) 2 weeks  -ML --       Anticipated Discharge Disposition (SLP) inpatient rehabilitation facility;anticipate therapy at next level of care  -ML --                 User Key  (r) = Recorded By, (t) = Taken By, (c) = Cosigned By      Initials Name Effective Dates    AW Ana Barrera MS CCC-SLP 02/03/23 -     ML  Luz Maria Payne, CCC-SLP 06/16/21 -                        EDUCATION  The patient has been educated in the following areas:     Cognitive Impairment Communication Impairment.           SLP GOALS       Row Name 08/28/24 0900             (LTG) Patient will demonstrate functional swallow for    Diet Texture (Demonstrate functional swallow) soft to chew (chopped) textures  -ML      Liquid viscosity (Demonstrate functional swallow) nectar/ mildly thick liquids  -ML      [place] (Demonstrate functional swallow) with minimal cues (75-90% accuracy)  -ML      Time Frame (Demonstrate functional swallow) 1 week  -ML      Progress/Outcomes (Demonstrate functional swallow) continuing progress toward goal  -ML      Comment (Demonstrate functional swallow) Trials of thin- ice/water with oral dysphagia- anterior loss/decreased sensation and awareness and intermittent pharyngeal signs with thin liquids via straw but not with cup trials.  -ML         (STG) Lingual Strengthening Goal 1 (SLP)    Activity (Lingual Strengthening Goal 1, SLP) increase tongue back strength  -ML      Increase Tongue Back Strength lingual movement exercises;lingual resistance exercises  -ML      [place]/Accuracy (Lingual Strengthening Goal 1, SLP) with minimal cues (75-90% accuracy)  -ML      Time Frame (Lingual Strengthening Goal 1, SLP) short term goal (STG);1 week  -ML      Progress/Outcomes (Lingual Strengthening Goal 1, SLP) goal ongoing  -ML      Comment (Lingual Strengthening Goal 1, SLP) not addressed today  -ML         (STG) Pharyngeal Strengthening Exercise Goal 1 (SLP)    Activity (Pharyngeal Strengthening Goal 1, SLP) increase timing;increase superior movement of the hyolaryngeal complex;increase anterior movement of the hyolaryngeal complex;increase squeeze/positive pressure generation  -ML      Increase Timing prepping - 3 second prep or suck swallow or 3-step swallow  -ML      Increase Superior Movement of the Hyolaryngeal  Complex Mendelsohn  -ML      Increase Anterior Movement of the Hyolaryngeal Complex chin tuck against resistance (CTAR)  -ML      Increase Squeeze/Positive Pressure Generation effortful pitch glide (falsetto + pharyngeal squeeze)  -ML      [place]/Accuracy (Pharyngeal Strengthening Goal 1, SLP) with minimal cues (75-90% accuracy)  -ML      Time Frame (Pharyngeal Strengthening Goal 1, SLP) short term goal (STG);1 week  -ML      Progress/Outcomes (Pharyngeal Strengthening Goal 1, SLP) progress slower than expected  -ML      Comment (Pharyngeal Strengthening Goal 1, SLP) Difficulty convincing pt to comply with directions. Impaired cognition impacts pt's ability to stay on task.  -ML         Patient will demonstrate functional speech skills for return to discharge environment    [place] with moderate cues  -ML      Time frame by discharge  -ML      Progress/Outcomes good progress toward goal  -ML      Comments Speech is intelligible but pt is unable to comply with directions for overarticulation/slow rate/exaggerated speech/loud speech to improve articulation.  -ML         Patient will demonstrate functional cognitive-linguistic skills for return to discharge environment    [place] with moderate cues  -ML      Time frame by discharge  -ML      Progress/Outcomes progress slower than expected  -ML      Comments Impaired attention, judgment, reasoning, and awareness.  -ML         SLP Diagnostic Treatment     Patient will participate in further assessment in the following areas reading comprehension;graphic expression  -ML      Time Frame (Diagnostic) short term goal (STG)  -ML      Progress/Outcomes (Additional Goal 1, SLP) goal ongoing  -ML      Comment (Diagnostic) Not addressed today  -ML         Comprehend Questions Goal 1 (SLP)    Improve Ability to Comprehend Questions Goal 1 (SLP) complex yes/no questions;80%;with moderate cues (50-74%)  -ML      Time Frame (Comprehend Questions Goal 1, SLP) short  term goal (STG);1 week  -ML      Progress (Ability to Comprehend Questions Goal 1, SLP) 90%;independently (over 90% accuracy)  -ML      Progress/Outcomes (Comprehend Questions Goal 1, SLP) good progress toward goal  -ML      Comment (Comprehend Questions Goal 1, SLP) Impulsive with responses- does not wait for the entire question to be given.  -ML         Follow Directions Goal 2 (SLP)    Improve Ability to Follow Directions Goal 1 (SLP) 3 step commands;multistep commands;90%;with minimal cues (75-90%)  -ML      Time Frame (Follow Directions Goal 1, SLP) short term goal (STG);1 week  -ML      Progress (Ability to Follow Directions Goal 1, SLP) 100%;independently (over 90% accuracy);other (comment)  100% with 2 step but difficulty with 3 step and multistep commands in part due to impulsivity.  -ML      Progress/Outcomes (Follow Directions Goal 1, SLP) good progress toward goal;goal revised this date  -ML      Comment (Follow Directions Goal 1, SLP) 100% with 2 step but difficulty with 3 step and multistep commands in part due to impulsivity.  -ML         Phonation Goal 1 (SLP)    Improve Phonation By Goal 1 (SLP) using loud speech;90%;with moderate cues (50-74%)  -ML      Time Frame (Phonation Goal 1, SLP) 1 week  -ML      Progress (Phonation Goal 1, SLP) 20%;with moderate cues (50-74%)  -ML      Progress/Outcomes (Phonation Goal 1, SLP) progress slower than expected  -ML      Comment (Phonation Goal 1, SLP) Unable to comply with directions for loud, exaggerated, or speech with overarticulation.  Pt hyperfocussed on eating and unable to comply with directions for speech.  -ML         Articulation Goal 1 (SLP)    Improve Articulation Goal 1 (SLP) by over-articulating at word level;by over-articulating at phrase level;90%;with minimal cues (75-90%)  -ML      Time Frame (Articulation Goal 1, SLP) short term goal (STG);1 week  -ML      Progress (Articulation Goal 1, SLP) 10%;with moderate cues (50-74%)  -ML       Progress/Outcomes (Articulation Goal 1, SLP) progress slower than expected  -ML      Comment (Articulation Goal 1, SLP) Unwilling to comply with directions for overarticulation.  -ML         Attention Goal 1 (SLP)    Improve Attention by Goal 1 (SLP) looking at speaker;attending to task;90%;with minimal cues (75-90%)  -ML      Time Frame (Attention Goal 1, SLP) short term goal (STG);1 week  -ML      Progress (Attention Goal 1, SLP) 80%;with moderate cues (50-74%);with maximum cues (25-49%)  -ML      Progress/Outcomes (Attention Goal 1, SLP) good progress toward goal  -ML      Comment (Attention Goal 1, SLP) Requires cues to look at speaker- impaired attention/distractability impacts.  -ML         Orientation Goal 1 (SLP)    Improve Orientation Through Goal 1 (SLP) demonstrating orientation to day;demonstrating orientation to month;demonstrating orientation to year;demonstrating orientation to disease/impairment;90%;with minimal cues (75-90%)  -ML      Time Frame (Orientation Goal 1, SLP) short term goal (STG);1 week  -ML      Progress (Orientation Goal 1, SLP) 90%;independently (over 90% accuracy)  -ML      Progress/Outcomes (Orientation Goal 1, SLP) good progress toward goal  -ML      Comment (Orientation Goal 1, SLP) Difficulty with awareness/acknowledgment of deficits/impairment. Otherwise oriented.  -ML         Memory Skills Goal 1 (SLP)    Improve Memory Skills Through Goal 1 (SLP) recalling related word lists immediately;recalling unrelated word lists immediately;80%;with minimal cues (75-90%)  -ML      Time Frame (Memory Skills Goal 1, SLP) short term goal (STG);1 week  -ML      Progress/Outcomes (Memory Skills Goal 1, SLP) goal ongoing  -ML         Pragmatic Skills Goal 1 (SLP)    Improve Pragmatic Skills Goal 1 (SLP) maintain eye contact;90%;with minimal cues (75-90%)  -ML      Time Frame (Pragmatic Skills Goal 1, SLP) short term goal (STG);1 week  -ML      Comment (Pragmatic Skills Goal 1, SLP) See  aforementioned notes re: eye contact.  -ML                User Key  (r) = Recorded By, (t) = Taken By, (c) = Cosigned By      Initials Name Provider Type    Luz Maria Gaffney CCC-SLP Speech and Language Pathologist                              Time Calculation:      Time Calculation- SLP       Row Name 08/28/24 1235             Time Calculation- SLP    SLP Start Time 0900  -ML      SLP Received On 08/28/24  -ML                User Key  (r) = Recorded By, (t) = Taken By, (c) = Cosigned By      Initials Name Provider Type    Luz Maria Gaffney CCC-SLP Speech and Language Pathologist                    Therapy Charges for Today       Code Description Service Date Service Provider Modifiers Qty    55084769108 HC ST TREATMENT SWALLOW 2 8/28/2024 Luz Maria Payne CCC-SLP GN 1    [id number] HC ST TREATMENT SPEECH 2 8/28/2024 Luz Maria Payne CCC-SLP GN 1                       SHANTELLE Laura  8/28/2024

## 2024-08-28 NOTE — CASE MANAGEMENT/SOCIAL WORK
Case Management Discharge Note    Final Note: Ms. Pike's insurance was approved for her to go to [facility]. She is being discharged today, 8/28/24. She will be going to [facility]'s Stroke Unit. Nurse to call report to [phone]. CM faxed the discharge summary to [phone]. [facility] ambulance will transport her at 1500.  PCS is in EMS dropbox.           Selected Continued Care - Admitted Since 8/21/2024       Destination    No services have been selected for the patient.                Durable Medical Equipment    No services have been selected for the patient.                Dialysis/Infusion    No services have been selected for the patient.                Home Medical Care    No services have been selected for the patient.                Therapy    No services have been selected for the patient.                Community Resources    No services have been selected for the patient.                Community & DME    No services have been selected for the patient.                         Final Discharge Disposition Code: 62 - inpatient rehab facility

## 2024-08-28 NOTE — NURSING NOTE
Prior to central line removal, order for the removal of catheter was verified, patient was assessed, necessary materials were gathered and patient was educated regarding procedure .    Patient was positioned supine to ensure that the insertion site was at or below the level of the heart.    Hands were washed, clean gloves were applied and central line dressing was gently removed. Catheter exit site was not cultured.     A new pair of clean gloves were then applied. Insertion site was cleansed with 2% Chlorhexidine swab using a circular motion beginning at the insertion site and moving outward for 30 seconds and allowed to dry.     Clamp on line was not present.     Patient was instructed to perform Valsalva maneuver as catheter was withdrawn.     The central line was grasped at the insertion site and slowly pulled outward parallel to the skin. Resistance was not met.    After central line was completely removed, a sterile 4x4 gauze pad was used to apply light pressure until bleeding stopped. At that time, petroleum-based gauze and a sterile occlusive dressing was applied to exit site.     Patient was instructed to keep dressing in place for at least 24 hours and to remain in a flat or reclining position for 30 minutes post-removal.     Catheter was inspected after removal and was intact. Tip of central line was not sent for culture. Patient tolerated procedure.

## 2024-08-28 NOTE — DISCHARGE INSTRUCTIONS
-Continue aspirin 81 mg once daily and Brilinta 60 mg twice daily  -Continue Lipitor for cholesterol management  -Stop smoking  -Increase physical activity as tolerated  -Monitor blood pressure, goal 120-140/80-90  -Call 911 with strokelike symptoms (unilateral weakness, numbness, visual disturbances, speech difficulty, gait abnormality, or sudden severe headache)

## 2024-08-28 NOTE — PLAN OF CARE
Goal Outcome Evaluation:              Outcome Evaluation: SLP following for speech/cognition and swallowing. Pt is appropriate for a repeat FEES.  FEES planned for later today.      Anticipated Discharge Disposition (SLP): inpatient rehabilitation facility, anticipate therapy at next level of care             Treatment Assessment (SLP): continued, dysarthria, cognitive-linguistic disorder, clinical signs of, oral dysphagia, pharyngeal dysphagia (08/28/24 0900)  Treatment Assessment Comments (SLP): Pt exhibits decreased oropharyngeal signs with PO. Pt endorses she was previously coughing violently with all ice/water trials. Today, intermittent cough with water and none with ice.  Decreased awareness/sensation on left side resulting in oral dysphagia.  Impaired speech- decreased articulatory precision but speech is intelligible. Pt is aware of decrease speech. Continued cognitive linguistic impairment with impulsivity, impaired judgment, and decreased awareness. (08/28/24 0900)  Plan for Continued Treatment (SLP): continue treatment per plan of care (08/28/24 0900)

## 2024-08-28 NOTE — THERAPY RE-EVALUATION
Acute Care - Speech Language Pathology   Swallow Re-Evaluation [facility]  Fiberoptic Endoscopic Evaluation of Swallowing (FEES)       Patient Name: Dalila OCASIO  : 1970  MRN: [id number]  Today's Date: 2024               Admit Date: 2024    Visit Dx:     ICD-10-CM ICD-9-CM   1. Acute CVA (cerebrovascular accident)  I63.9 434.91   2. Left-sided neglect  R41.4 781.8   3. Oropharyngeal dysphagia  R13.12 787.22   4. Dysarthria  R47.1 784.51     Patient Active Problem List   Diagnosis    Acute CVA (cerebrovascular accident)    Alcohol use    Tobacco use    Obesity (BMI 30-39.9)    HTN (hypertension)    Dyslipidemia    History of seizures    PAD (peripheral artery disease)    Oropharyngeal dysphagia    Acute UTI (urinary tract infection)     Past Medical History:   Diagnosis Date    Acute CVA (cerebrovascular accident) 2024    Alcohol use 2024    Dyslipidemia 2024    Obesity (BMI 30-39.9) 2024    PAD (peripheral artery disease) 2024    Tobacco use 2024     Past Surgical History:   Procedure Laterality Date    INTERVENTIONAL RADIOLOGY PROCEDURE Bilateral 2024    Procedure: Carotid Cervical Angiogram;  Surgeon: Jamaal Saini MD;  Location: formerly [facility] INVASIVE LOCATION;  Service: Interventional Radiology;  Laterality: Bilateral;       SLP Recommendation and Plan  SLP Swallowing Diagnosis: mild-moderate, oral dysphagia, pharyngeal dysphagia (24 1000)  SLP Diet Recommendation: soft to chew textures, chopped, thin liquids, other (see comments) (TF at discretion of MD) (24 1000)  Recommended Precautions and Strategies: upright posture during/after eating, small bites of food and sips of liquid, check mouth frequently for oral residue/pocketing, general aspiration precautions, assist with feeding (Place food on stronger side- right/oral care after all meals) (24 1000)  SLP Rec. for Method of Medication Administration: meds crushed, with puree  (08/28/24 1000)     Monitor for Signs of Aspiration: notify SLP if any concerns (08/28/24 1000)  Recommended Diagnostics: reassess via clinical swallow evaluation (08/28/24 1000)  Swallow Criteria for Skilled Therapeutic Interventions Met: demonstrates skilled criteria (08/28/24 1000)  Anticipated Discharge Disposition (SLP): inpatient rehabilitation facility, anticipate therapy at next level of care (08/28/24 1000)  Rehab Potential/Prognosis, Swallowing: good, to achieve stated therapy goals (08/28/24 1000)  Therapy Frequency (Swallow): 5 days per week (08/28/24 1000)  Predicted Duration Therapy Intervention (Days): 2 weeks (08/28/24 1000)  Oral Care Recommendations: Oral Care before breakfast, after meals and PRN, Toothbrush (08/28/24 1000)        Daily Summary of Progress (SLP): progress towards functional goals is fair (08/28/24 0900)               Treatment Assessment (SLP): continued, dysarthria, cognitive-linguistic disorder, clinical signs of, oral dysphagia, pharyngeal dysphagia (08/28/24 0900)  Treatment Assessment Comments (SLP): Pt exhibits decreased oropharyngeal signs with PO. Pt endorses she was previously coughing violently with all ice/water trials. Today, intermittent cough with water and none with ice.  Decreased awareness/sensation on left side resulting in oral dysphagia.  Impaired speech- decreased articulatory precision but speech is intelligible. Pt is aware of decrease speech. Continued cognitive linguistic impairment with impulsivity, impaired judgment, and decreased awareness. (08/28/24 0900)  Plan for Continued Treatment (SLP): continue treatment per plan of care (08/28/24 0900)         Outcome Evaluation: Completed FEES. [place] to return to a modified diet. Increased aspiration risk due to impaired cognition, impaired oral sensation- pocketing without awareness, and pharyngeal impairment.  SLP to follow.      SWALLOW EVALUATION (Last 72 Hours)       SLP Adult Swallow Evaluation       Row  Name 08/28/24 1000 08/28/24 0900 08/26/24 1200             Rehab Evaluation    Document Type re-evaluation  -ML -- --      Subjective Information no complaints  -ML -- --      Patient Observations alert;cooperative  -ML -- --      Patient/Family/Caregiver Comments/Observations Spouse present  -ML -- --      Patient Effort good  -ML -- --      Comment Impulsive, reaching for items on tray while SLP setting up exam.  -ML -- --      Symptoms Noted During/After Treatment none  -ML -- --      Oral Care -- -- teeth brushed - suction toothbrush  done prior to SLP arrival, pt acknowledges she is keeping mouth clean and undrstands importance of oral care  -AW         General Information    Patient Profile Reviewed yes  -ML -- --      Pertinent History Of Current Problem See ST hx  -ML -- --      Current Method of Nutrition NPO;small-bore;nasogastric feedings  -ML NPO  -ML --         Pain    Additional Documentation Pain Scale: FACES Pre/Post-Treatment (Group)  -ML -- --         Pain Scale: FACES Pre/Post-Treatment    Pain: FACES Scale, Pretreatment 0-->no hurt  -ML -- --      Posttreatment Pain Rating 0-->no hurt  -ML -- --         General Eating/Swallowing Observations    Respiratory Support Currently in Use room air  -ML room air  -ML --      Eating/Swallowing Skills fed by staff/caregiver  -ML self-fed;fed by SLP  -ML --      Positioning During Eating upright in chair  -ML -- --      Utensils Used spoon;cup;straw  -ML spoon;cup  -ML --      Consistencies Trialed -- pureed;thin liquids;ice chips  -ML --         Fiberoptic Endoscopic Evaluation of Swallowing (FEES)    Risks/Benefits Reviewed risks/benefits explained;patient;family;agreed to eval  -ML -- --      Nasal Entry right:;other (see comments)  Attempted to place on left but pt requested removal.  -ML -- --      Scope serial number/identification [id number]  -ML -- --         Anatomy and Physiology    Anatomic Considerations edema;arytenoids;false vocal folds  -ML -- --       Velopharyngeal WFL  -ML -- --      Base of Tongue symmetrical  -ML -- --      Epiglottis edematous  -ML -- --      Laryngeal Function Breathing symmetrical  -ML -- --      Laryngeal Function Phonation symmetrical  -ML -- --      Laryngeal Function to Breath Hold TVF contact  -ML -- --      Secretion Rating Scale (Ander et al. 1996) 0- normal, no visible secretions  -ML -- --      Sensory sensed scope  -ML -- --      Utensils Used Spoon;Cup;Straw  -ML -- --      Consistencies Trialed thin liquids;nectar-thick liquids;pudding/puree;soft to chew  -ML -- --         FEES Interpretation    Oral Phase anterior loss;prolonged manipulation;prespill of liquids into pharynx  -ML -- --         Initiation of Pharyngeal Swallow    Initiation of Pharyngeal Swallow bolus in valleculae  -ML -- --      Pharyngeal Phase impaired pharyngeal phase of swallowing  -ML -- --      Penetration Before the Swallow thin liquids  -ML -- --      Depth of Penetration deep  -ML -- --      Response to Penetration Yes  -ML -- --      Responsed to penetration with throat clear;cough  -ML -- --      Rosenbek's Scale thin:;2-->Level 2;nectar:;pudding/puree:;mixed consistencies:;1-->Level 1  -ML -- --      Residue no significant pharyngeal residue noted  -ML -- --      Attempted Compensatory Maneuvers bolus size;bolus presentation style;multiple swallows;effortful (hard swallow);head turn to right  -ML -- --      FEES Summary FEES completed. Placed the scope in the right naris due to refusal of advancement of scope in the left naris.  Appreciated mild laryngeal edema of the arytenoids, false cords, and epiglottis.  Pt exhibits impaired oral phase due to oral pocketing of solids, mild premature loss of thin, and anterior loss on the left. Mild pharyngeal impairment related to mistiming with intermittent deep laryngeal penetration. There was no aspiration observed. Pt is sensitive to laryngeal penetration exhibiting a strong effective cough/throat  clear. Today, pt with a cough /throat clear response to penetration.  Laryngeal penetration eliminated with cup drinks with single and large drinks. Deep laryngeal penetration with thin via straw but eliminated with cup trials of thin. No laryngeal penetration with nectar.  No pharyngeal residue with any consistency.  Impaired cognition/impulsivity and oral dysphagia increases aspiration risk. Pt is safe with a modified diet and compensatory strategies, however would benefit from supervision during meals to monitor rate, bite size, ensure pt performs a lingual sweep or clears oral cavity intermittently during meal, avoids straws, and completes oral care post meals.  -ML -- --         Swallowing Quality of Life Assessment    Education and counseling provided Signs of aspiration;Risks of aspiration;Safest diet options;Oral care recommendations and rationale;Aspiration precautions;Compensatory strategy recommendations and rationale  -ML -- --         SLP Evaluation Clinical Impression    SLP Swallowing Diagnosis mild-moderate;oral dysphagia;pharyngeal dysphagia  -ML -- --      Functional Impact risk of aspiration/pneumonia  -ML -- --      Rehab Potential/Prognosis, Swallowing good, to achieve stated therapy goals  -ML -- --      Swallow Criteria for Skilled Therapeutic Interventions Met demonstrates skilled criteria  -ML -- --         Recommendations    Therapy Frequency (Swallow) 5 days per week  -ML PRN;other (see comments)  Pending FEES  -ML --      Predicted Duration Therapy Intervention (Days) 2 weeks  -ML -- --      SLP Diet Recommendation soft to chew textures;chopped;thin liquids;other (see comments)  TF at discretion of MD  -ML NPO;temporary alternate methods of nutrition/hydration  -ML --      Recommended Diagnostics reassess via clinical swallow evaluation  - reassess via FEES  -ML --      Recommended Precautions and Strategies upright posture during/after eating;small bites of food and sips of  liquid;check mouth frequently for oral residue/pocketing;general aspiration precautions;assist with feeding  Place food on stronger side- right/oral care after all meals  -ML -- --      Oral Care Recommendations Oral Care before breakfast, after meals and PRN;Toothbrush  -ML Oral Care BID/PRN;Suction toothbrush  -ML --      SLP Rec. for Method of Medication Administration meds crushed;with puree  -ML meds via alternate route  -ML --      Monitor for Signs of Aspiration notify SLP if any concerns  -ML -- --      Anticipated Discharge Disposition (SLP) inpatient rehabilitation facility;anticipate therapy at next level of care  -ML -- --                User Key  (r) = Recorded By, (t) = Taken By, (c) = Cosigned By      Initials Name Effective Dates    AW Ana Barrera, MS CCC-SLP 02/03/23 -     ML Luz Maria Payne CCC-SLP 06/16/21 -                     EDUCATION  The patient has been educated in the following areas:   Dysphagia (Swallowing Impairment) Oral Care/Hydration Modified Diet Instruction compensatory strategies .        SLP GOALS       Row Name 08/28/24 1000 08/28/24 0900          (LTG) Patient will demonstrate functional swallow for    Diet Texture (Demonstrate functional swallow) regular textures  -ML soft to chew (chopped) textures  -ML     Liquid viscosity (Demonstrate functional swallow) thin liquids  -ML nectar/ mildly thick liquids  -ML     [place] (Demonstrate functional swallow) independently (over 90% accuracy)  -ML with minimal cues (75-90% accuracy)  -ML     Time Frame (Demonstrate functional swallow) 1 week  -ML 1 week  -ML     Progress/Outcomes (Demonstrate functional swallow) goal revised this date  -ML continuing progress toward goal  -ML     Comment (Demonstrate functional swallow) Placed on modified diet with supervision- long term goal for regular/thin without cues.  -ML Trials of thin- ice/water with oral dysphagia- anterior loss/decreased sensation and awareness and  intermittent pharyngeal signs with thin liquids via straw but not with cup trials.  -ML        (STG) Patient will tolerate therapeutic trials of    Consistencies Trialed (Tolerate therapeutic trials) regular textures;soft to chew (whole) textures;mixed consistencies;thin liquids  Thin via straw  -ML --     Desired Outcome (Tolerate therapeutic trials) without signs/symptoms of aspiration;without signs of distress;with adequate oral prep/transit/clearance  -ML --     [place] (Tolerate therapeutic trials) with minimal cues (75-90% accuracy)  -ML --     Time Frame (Tolerate therapeutic trials) 1 week  -ML --     Progress/Outcomes (Tolerate therapeutic trials) new goal  -ML --        (STG) Lingual Strengthening Goal 1 (SLP)    Activity (Lingual Strengthening Goal 1, SLP) increase tongue back strength  -ML increase tongue back strength  -ML     Increase Tongue Back Strength lingual movement exercises;lingual resistance exercises  -ML lingual movement exercises;lingual resistance exercises  -ML     [place]/Accuracy (Lingual Strengthening Goal 1, SLP) with minimal cues (75-90% accuracy)  -ML with minimal cues (75-90% accuracy)  -ML     Time Frame (Lingual Strengthening Goal 1, SLP) short term goal (STG);1 week  -ML short term goal (STG);1 week  -ML     Progress/Outcomes (Lingual Strengthening Goal 1, SLP) goal ongoing  -ML goal ongoing  -ML     Comment (Lingual Strengthening Goal 1, SLP) -- not addressed today  -ML        (STG) Pharyngeal Strengthening Exercise Goal 1 (SLP)    Activity (Pharyngeal Strengthening Goal 1, SLP) increase timing  -ML increase timing;increase superior movement of the hyolaryngeal complex;increase anterior movement of the hyolaryngeal complex;increase squeeze/positive pressure generation  -ML     Increase Timing prepping - 3 second prep or suck swallow or 3-step swallow;Mendelsohn  -ML prepping - 3 second prep or suck swallow or 3-step swallow  -ML     Increase Superior Movement of the  Hyolaryngeal Complex -- Mendelsohn  -ML     Increase Anterior Movement of the Hyolaryngeal Complex -- chin tuck against resistance (CTAR)  -ML     Increase Squeeze/Positive Pressure Generation -- effortful pitch glide (falsetto + pharyngeal squeeze)  -ML     [place]/Accuracy (Pharyngeal Strengthening Goal 1, SLP) with minimal cues (75-90% accuracy)  -ML with minimal cues (75-90% accuracy)  -ML     Time Frame (Pharyngeal Strengthening Goal 1, SLP) short term goal (STG);1 week  -ML short term goal (STG);1 week  -ML     Progress/Outcomes (Pharyngeal Strengthening Goal 1, SLP) goal revised this date  -ML progress slower than expected  -ML     Comment (Pharyngeal Strengthening Goal 1, SLP) Goal revised based on FEES  -ML Difficulty convincing pt to comply with directions. Impaired cognition impacts pt's ability to stay on task.  -ML        Patient will demonstrate functional speech skills for return to discharge environment    [place] with moderate cues  -ML with moderate cues  -ML     Time frame by discharge  -ML by discharge  -ML     Progress/Outcomes goal ongoing  -ML good progress toward goal  -ML     Comments -- Speech is intelligible but pt is unable to comply with directions for overarticulation/slow rate/exaggerated speech/loud speech to improve articulation.  -ML        Patient will demonstrate functional cognitive-linguistic skills for return to discharge environment    [place] with moderate cues  -ML with moderate cues  -ML     Time frame by discharge  -ML by discharge  -ML     Progress/Outcomes goal ongoing  -ML progress slower than expected  -ML     Comments -- Impaired attention, judgment, reasoning, and awareness.  -ML        SLP Diagnostic Treatment     Patient will participate in further assessment in the following areas reading comprehension;graphic expression  -ML reading comprehension;graphic expression  -ML     Time Frame (Diagnostic) short term goal (STG)  -ML short term goal (STG)   -ML     Progress/Outcomes (Additional Goal 1, SLP) goal ongoing  -ML goal ongoing  -ML     Comment (Diagnostic) -- Not addressed today  -ML        Comprehend Questions Goal 1 (SLP)    Improve Ability to Comprehend Questions Goal 1 (SLP) complex yes/no questions;80%;with moderate cues (50-74%)  -ML complex yes/no questions;80%;with moderate cues (50-74%)  -ML     Time Frame (Comprehend Questions Goal 1, SLP) short term goal (STG);1 week  -ML short term goal (STG);1 week  -ML     Progress (Ability to Comprehend Questions Goal 1, SLP) -- 90%;independently (over 90% accuracy)  -ML     Progress/Outcomes (Comprehend Questions Goal 1, SLP) goal ongoing  -ML good progress toward goal  -ML     Comment (Comprehend Questions Goal 1, SLP) -- Impulsive with responses- does not wait for the entire question to be given.  -ML        Follow Directions Goal 2 (SLP)    Improve Ability to Follow Directions Goal 1 (SLP) 3 step commands;multistep commands;90%;with minimal cues (75-90%)  -ML 3 step commands;multistep commands;90%;with minimal cues (75-90%)  -ML     Time Frame (Follow Directions Goal 1, SLP) short term goal (STG);1 week  -ML short term goal (STG);1 week  -ML     Progress (Ability to Follow Directions Goal 1, SLP) -- 100%;independently (over 90% accuracy);other (comment)  100% with 2 step but difficulty with 3 step and multistep commands in part due to impulsivity.  -ML     Progress/Outcomes (Follow Directions Goal 1, SLP) goal ongoing  -ML good progress toward goal;goal revised this date  -ML     Comment (Follow Directions Goal 1, SLP) -- 100% with 2 step but difficulty with 3 step and multistep commands in part due to impulsivity.  -ML        Phonation Goal 1 (SLP)    Improve Phonation By Goal 1 (SLP) using loud speech;90%;with moderate cues (50-74%)  -ML using loud speech;90%;with moderate cues (50-74%)  -ML     Time Frame (Phonation Goal 1, SLP) short term goal (STG);1 week  -ML 1 week  -ML     Progress (Phonation Goal  1, SLP) -- 20%;with moderate cues (50-74%)  -ML     Progress/Outcomes (Phonation Goal 1, SLP) goal ongoing  -ML progress slower than expected  -ML     Comment (Phonation Goal 1, SLP) -- Unable to comply with directions for loud, exaggerated, or speech with overarticulation.  Pt hyperfocussed on eating and unable to comply with directions for speech.  -ML        Articulation Goal 1 (SLP)    Improve Articulation Goal 1 (SLP) by over-articulating at word level;by over-articulating at phrase level;90%;with minimal cues (75-90%)  -ML by over-articulating at word level;by over-articulating at phrase level;90%;with minimal cues (75-90%)  -ML     Time Frame (Articulation Goal 1, SLP) short term goal (STG);1 week  -ML short term goal (STG);1 week  -ML     Progress (Articulation Goal 1, SLP) -- 10%;with moderate cues (50-74%)  -ML     Progress/Outcomes (Articulation Goal 1, SLP) goal ongoing  -ML progress slower than expected  -ML     Comment (Articulation Goal 1, SLP) -- Unwilling to comply with directions for overarticulation.  -ML        Attention Goal 1 (SLP)    Improve Attention by Goal 1 (SLP) looking at speaker;attending to task;90%;with minimal cues (75-90%)  -ML looking at speaker;attending to task;90%;with minimal cues (75-90%)  -ML     Time Frame (Attention Goal 1, SLP) short term goal (STG);1 week  -ML short term goal (STG);1 week  -ML     Progress (Attention Goal 1, SLP) -- 80%;with moderate cues (50-74%);with maximum cues (25-49%)  -ML     Progress/Outcomes (Attention Goal 1, SLP) goal ongoing  -ML good progress toward goal  -ML     Comment (Attention Goal 1, SLP) -- Requires cues to look at speaker- impaired attention/distractability impacts.  -ML        Orientation Goal 1 (SLP)    Improve Orientation Through Goal 1 (SLP) demonstrating orientation to day;demonstrating orientation to month;demonstrating orientation to year;demonstrating orientation to disease/impairment;90%;with minimal cues (75-90%)  -ML  demonstrating orientation to day;demonstrating orientation to month;demonstrating orientation to year;demonstrating orientation to disease/impairment;90%;with minimal cues (75-90%)  -ML     Time Frame (Orientation Goal 1, SLP) short term goal (STG);1 week  -ML short term goal (STG);1 week  -ML     Progress (Orientation Goal 1, SLP) -- 90%;independently (over 90% accuracy)  -ML     Progress/Outcomes (Orientation Goal 1, SLP) goal ongoing  -ML good progress toward goal  -ML     Comment (Orientation Goal 1, SLP) -- Difficulty with awareness/acknowledgment of deficits/impairment. Otherwise oriented.  -ML        Memory Skills Goal 1 (SLP)    Improve Memory Skills Through Goal 1 (SLP) recalling related word lists immediately;recalling unrelated word lists immediately;80%;with minimal cues (75-90%)  -ML recalling related word lists immediately;recalling unrelated word lists immediately;80%;with minimal cues (75-90%)  -ML     Time Frame (Memory Skills Goal 1, SLP) short term goal (STG);1 week  -ML short term goal (STG);1 week  -ML     Progress/Outcomes (Memory Skills Goal 1, SLP) goal ongoing  -ML goal ongoing  -ML        Pragmatic Skills Goal 1 (SLP)    Improve Pragmatic Skills Goal 1 (SLP) maintain eye contact;90%;with minimal cues (75-90%)  -ML maintain eye contact;90%;with minimal cues (75-90%)  -ML     Time Frame (Pragmatic Skills Goal 1, SLP) short term goal (STG);1 week  -ML short term goal (STG);1 week  -ML     Progress/Outcomes (Pragmatic Skills Goal 1, SLP) goal ongoing  -ML --     Comment (Pragmatic Skills Goal 1, SLP) -- See aforementioned notes re: eye contact.  -ML               User Key  (r) = Recorded By, (t) = Taken By, (c) = Cosigned By      Initials Name Provider Type    ML Luz Maria Payne CCC-SLP Speech and Language Pathologist                         Time Calculation:    Time Calculation- SLP       Row Name 08/28/24 9546 08/28/24 1235          Time Calculation- SLP    SLP Start Time 1000  -ML 0900   -ML     SLP Received On 08/28/24  -ML 08/28/24  -ML               User Key  (r) = Recorded By, (t) = Taken By, (c) = Cosigned By      Initials Name Provider Type    Luz Maria Gaffney CCC-SLP Speech and Language Pathologist                    Therapy Charges for Today       Code Description Service Date Service Provider Modifiers Qty    06283478291 HC ST TREATMENT SWALLOW 2 8/28/2024 Luz Maria Payne CCC-SLP GN 1    [id number] HC ST TREATMENT SPEECH 2 8/28/2024 Luz Maria Payne CCC-SLP GN 1    [id number] HC ST FIBEROPTIC ENDO EVAL SWALL 5 8/28/2024 Luz Maria Payne CCC-SLP GN 1                 SHANTELLE Laura  8/28/2024

## 2024-08-28 NOTE — PROGRESS NOTES
Stroke Progress Note     Chief Complaint: Left-sided weakness    Subjective    Subjective   Subjective:  No acute events overnight.  Patient is currently sitting in recliner.  Her neurological status remains unchanged.  She still is having a mild to moderate right sided headache.    Objective      Temp:  [98.9 °F (37.2 °C)-100.6 °F (38.1 °C)] 99.7 °F (37.6 °C)  Heart Rate:  [71-99] 99  Resp:  [16-20] 18  BP: ()/(71-92) 144/92    Neurological Exam  Mental Status  Alert. Oriented to person, place, time and situation. Oriented to person, place, and time. Mild dysarthria present. Language is fluent with no aphasia. Attention and concentration are normal.    Cranial Nerves  CN II: Visual fields full to confrontation.  CN III, IV, VI: Extraocular movements intact bilaterally. Pupils equal round and reactive to light bilaterally.  CN V:  Left: Diminished sensation of the entire left side of the face.  CN VII:  Left: There is central facial weakness.  CN VIII: Hearing appears to be intact bilaterally.  CN XI:  Right: Sternocleidomastoid strength is normal.  Left: No movement.  CN XII: Tongue midline without atrophy or fasciculations.    Motor  Normal muscle bulk throughout. Decreased muscle tone.  LUE with 0/5 strength  LLE with 4/5 strength  RUE and RLE with 5/5 strength.    Sensory  Light touch abnormality: Left side.  No left-sided hemispatial neglect. Left extinction absent:    Coordination  Left: Unable to assess secondary to weakness.  No obvious dysmetria noted in right side.    Gait    Not observed.      Physical Exam  Vitals and nursing note reviewed.   Constitutional:       General: She is not in acute distress.     Appearance: She is obese. She is not ill-appearing.   HENT:      Head: Normocephalic and atraumatic.      Nose:      Comments: Left ND in place     Mouth/Throat:      Mouth: Mucous membranes are moist.   Eyes:      Extraocular Movements: Extraocular movements intact.      Pupils: Pupils are  equal, round, and reactive to light.   Cardiovascular:      Rate and Rhythm: Normal rate and regular rhythm.   Pulmonary:      Effort: Pulmonary effort is normal. No respiratory distress.      Comments: On room air  Musculoskeletal:      Right lower leg: No edema.   Skin:     General: Skin is warm and dry.   Neurological:      Mental Status: She is alert and oriented to person, place, and time.      Cranial Nerves: Cranial nerve deficit and dysarthria present.      Sensory: Sensory deficit present.      Motor: Weakness present.      Coordination: Coordination normal.   Psychiatric:         Mood and Affect: Mood normal.         Behavior: Behavior normal.         Results Review:    I reviewed the patient's new clinical results.      MRI Brain Without Contrast    Result Date: 8/22/2024  MRI BRAIN WO CONTRAST Date of Exam: 8/22/2024 3:28 AM EDT Indication: Stroke, follow up. FUP mechanical thrombectomy and carotid stent; RMCA stroke.  Comparison: CT head/angiography/perfusion 8/21/2024. Technique:  Routine multiplanar/multisequence sequence images of the brain were obtained without contrast administration. Findings: There is cortical diffusion restriction throughout the majority of the right MCA territory. There is also diffusion restriction in the right caudate and putamen. There is associated T2/FLAIR hyperintense signal. There are several scattered small round foci of FLAIR hyperintense signal within the cerebral white matter otherwise. No evidence of acute or chronic intracranial hemorrhage. There is susceptibility artifact in the region of the right M1 segment, which could relate to thrombus. Orbits are unremarkable. There are mucous retention cysts in the left maxillary sinus and a small retention cyst in the right maxillary sinus. Mastoid air cells appear well aerated. Major arterial flow voids appear intact. No mass effect, midline shift or abnormal extra-axial collection. The midline structures appear intact.  Calvarial and superficial soft tissue signal is within normal limits.     Impression: Impression: 1.Large acute infarct involving the majority of the right MCA territory. 2.No evidence of intracranial hemorrhage. 3.Mild chronic small vessel ischemic change. Electronically Signed: Gian Blevins MD  8/22/2024 4:42 AM EDT  Workstation ID: [id number]     CT head without contrast reveals hyperdense RMCA sign, no evidence of hemorrhage.    CTA head/neck reveals right ICA occlusion with intracranial right M1 occlusion.     CT perfusion reveals hypoperfusion within the right MCA territory 2/2 right ICA occlusion and intracranial right M1 occlusion.  Estimated amount of core infarct 98 mL, estimated amount of ischemic penumbra 51 mL.       Results for orders placed during the hospital encounter of 08/21/24    Adult Transthoracic Echo Complete W/ Cont if Necessary Per Protocol (With Agitated Saline)    Interpretation Summary    Left ventricular systolic function is normal. Calculated left ventricular EF = 68% Normal left ventricular cavity size noted. Left ventricular wall thickness is consistent with moderate concentric hypertrophy. All left ventricular wall segments contract normally. Left ventricular diastolic function was normal. Normal left atrial pressure.    The right ventricular cavity is mildly dilated. Normal right ventricular systolic function noted.    Left atrial volume is moderately increased. Saline test results are negative.    The aortic valve is structurally normal with no stenosis present. The aortic valve appears trileaflet. No significant aortic valve regurgitation is present.    The mitral valve is structurally normal with no significant stenosis present. Trace to mild mitral valve regurgitation is present.    Mild tricuspid valve regurgitation is present. Estimated right ventricular systolic pressure from tricuspid regurgitation is mildly elevated (35-45 mmHg)    Mild dilation of the ascending aorta is  present. Ascending aorta = 3.7 cm    Results for orders placed during the hospital encounter of 08/21/24    Bilateral Carotid Duplex    Interpretation Summary    Right carotid stent is patent without evidence of in-stent restenosis.    Left internal carotid artery demonstrates normal flow without evidence of hemodynamically significant stenosis.    Antegrade right vertebral flow.    Antegrade left vertebral flow.    Glucose 140  Creatinine 0.58, BUN 32  Sodium 136  WBC 10.02  H/H 14.1/41.2  Platelets 261  AST 21  ALT 19  UDS positive for methamphetamine and opiates    A1c 5.50        Assessment/Plan     This is a 53-year-old  female with known medical diagnoses of essential hypertension, hyperlipidemia, PAD, obesity, history of seizures (EtOH use, and substance abuse (UDS positive for methamphetamine, patient denies) who presented to [facility] emergency department on 8/21 as a scene flight with complaints of left-sided weakness and neglect.  She also had complaints of severe headache.  CT head shows hyperdense right MCA.  Secondary to extended last known well she is not deemed to be an appropriate IV thrombolytic therapy candidate.  CT perfusion is indicative of an area of reversible ischemia therefore she was taken to the Cath Lab for planned mechanical thrombectomy.  She will be admitted to the neurological ICU s/p procedure for further monitoring and stroke workup.     Antiplatelet PTA: ASA 81 mg  Anticoagulant PTA: None        #Acute right MCA stroke s/p mechanical thrombectomy   #Acute right ICA occlusion s/p carotid stent  #HTN  #HLD  #Tobacco abuse  #Methamphetamine use  #History of seizures  #Dysphagia  -Mechanism of stroke is likely atheroembolic in the setting of right internal carotid artery atherosclerosis    -Continue DAPT with aspirin 81 mg daily and Brilinta 60 mg twice daily for secondary stroke prevention in the setting of recent right ICA stent placement.  Please note  that the patient most recent MRI showed small hemorrhagic conversion on the right insular region, however, the benefit of giving dual antiplatelet outweighed the risk of worsening hemorrhagic conversion at this time.  Risk and benefit has been previously discussed with the patient and her  who both agreed with the above-stated plan.  -Lipitor 80mg nightly, , goal <70 for secondary stroke prevention  -Normalize sodium goals, can consider salt tablets if needed  -Goal SBP <140, overall management per ICU medicine team   -Target systolic blood pressure less than 140  -Cardiac monitoring at discharge  -Activity as tolerated, fall risk precautions  -PT/OT continue to follow, they recommend IPR at discharge.  Case management following for discharge placement needs.  -SLP continues to follow for dysphagia; patient currently receiving enteral feeding through ND tube; plan to reassess in 1-2 days  -Smoking cessation education to be provided daily  -Encouraged patient to stop using methamphetamine on 8/27; she denies ever using this  -CIWA per ICU medicine team, continue folic acid and thiamine  -Stroke clinic follow-up in 6 to 8 weeks (added to ADT)    Stroke will continue to follow. Plan discussed with ICU medicine team, Dr. Morgan, as well as the patient and her family who is present at the bedside.  From a neurological standpoint she has been discharged to [facility] when cleared by primary team.  Stroke neurology sign off for now.  Please call with any questions or concerns.    08/28/24  Ilana Poole, ROXANE  07:34 EDT

## 2024-08-28 NOTE — PLAN OF CARE
Goal Outcome Evaluation:  Plan of Care Reviewed With: patient        Progress: no change  Outcome Evaluation: Pt continues to present below baseline with L sided weakness/coordination deficits, impaired balance, and decreased endurance. Pt transferred bed>chair with mod Ax2 and UE support. Assist provided to maintain NWB status of LLE. Further IPPT is warrented. PT will progress as able per POC.      Anticipated Discharge Disposition (PT): inpatient rehabilitation facility

## 2024-08-28 NOTE — DISCHARGE SUMMARY
[facility] Medicine Services  DISCHARGE SUMMARY    Patient Name: Dalila OCASIO  : 1970  MRN: [id number]    Date of Admission: 2024 11:53 AM  Date of Discharge:  24  Primary Care Physician: Agnes Ewing APRN    Consults       Date and Time Order Name Status Description    2024 11:40 PM Inpatient Orthopedic Surgery Consult      2024 11:55 AM Inpatient Neurology Consult Stroke Completed             Hospital Course     Presenting Problem: Left sided weakness    Active Hospital Problems    Diagnosis  POA    **Acute CVA (cerebrovascular accident) [I63.9]  Yes    Acute UTI (urinary tract infection) [N39.0]  Yes    Oropharyngeal dysphagia [R13.12]  Yes    Alcohol use [Z78.9]  Yes    Tobacco use [Z72.0]  Yes    Obesity (BMI 30-39.9) [E66.9]  Yes    HTN (hypertension) [I10]  Yes    Dyslipidemia [E78.5]  Yes    History of seizures [Z87.898]  Yes    PAD (peripheral artery disease) [I73.9]  Yes      Resolved Hospital Problems   No resolved problems to display.          Hospital Course:  Dalila OCASIO is a 53 y.o. female with HTN, HLD, PAD, obesity, history of seizures, who presented to [facility] ED  with left sided weakness and neglect along with severe headache.  Imaging showed reversible right MCA territory ischemia.  She was not a candidate for IV thrombolytic but was taken to cath lab for mechanical thrombectomy and right ICA stent.  She had a small amount of hemorrhagic conversion but it was felt that the benefit of dual antiplatelet therapy outweighed risk and she was started on aspirin and brilinta.  She will continue atorvastatin 80mg with goal LDL <70.  She was treated with hypertonic saline and now is on salt tablets to maintain Na >140.  Recommend checking BMP every other day at least until sodium stable >140.  She also was found to have left ankle fracture and was evaluated by orthopedic surgery.  Recommendation for non-weight bearing of LLE and continue  immobilization in CAM boot.  She can follow up with orthopedic surgery in about 1 week.    Patient progressed with speech therapy and 8/28 was upgraded to Soft to chew diet, chopped meat, thin liquids, no mixed consistencies, no straw, feeding assistance from staff due to impulsivity.  Medications crushed in applesauce.  Feeding tube will be discontinued prior to transfer to rehab.    Patient had a low grade fever to 100.6 8/28 but otherwise appeared non-toxic.  She reported dysuria and states that she was diagnosed with a UTI prior to admission but had not had a chance to fill antibiotics.  UA was suggestive of UTI.  CXR showed no acute process.  She was given a dose of IV ceftriaxone prior to transfer and should continue PO ceftin x 4 more days.  I will notify the facility if culture results warrant any change in therapy.    A cardiac monitor will be placed at the time of discharge.      Discharge Follow Up Recommendations for outpatient labs/diagnostics:  Non-weightbearing LLE in [facility] boot and F/U with [facility] in about 1 week  F[facility] with [facility] 6-8 weeks  F/U [facility] neurosurgery clinic 4 weeks  F/U PCP within 1 week of rehab discharge    Day of Discharge     HPI:   She is feeling much better today.  Happy to have a diet now.      Vital Signs:   Temp:  [98.4 °F (36.9 °C)-100.6 °F (38.1 °C)] 98.4 °F (36.9 °C)  Heart Rate:  [81-99] 96  Resp:  [16-18] 18  BP: (125-144)/(79-98) 129/98      Physical Exam:  Constitutional: No acute distress, awake, alert, sitting up in chair  HENT: NCAT, keofeed in place  Respiratory: Clear to auscultation bilaterally, respiratory effort normal   Cardiovascular: RRR  Musculoskeletal: LLE in boot  Psychiatric: Appropriate affect, cooperative  Neurologic: Speech clear, left sided weakness  Skin: No rashes on exposed surfaces    Pertinent  and/or Most Recent Results     LAB RESULTS:      Lab 08/27/24  1012 08/23/24  0148 08/22/24  0341   WBC 10.02 11.61* 14.23*    HEMOGLOBIN 14.1 13.6 13.2   HEMATOCRIT 41.2 40.6 39.3   PLATELETS 261 219 226   NEUTROS ABS  --  9.20* 13.14*   IMMATURE GRANS (ABS)  --  0.02 0.07*   LYMPHS ABS  --  1.81 0.73   MONOS ABS  --  0.55 0.28   EOS ABS  --  0.01 0.00   MCV 93.0 94.4 94.0   CRP  --  0.43  --          Lab 08/28/24  0824 08/27/24  0350 08/26/24  0807 08/26/24  0019 08/25/24  1810 08/25/24  0548 08/25/24  0105 08/24/24  1756 08/24/24  1230 08/24/24  0713 08/23/24  0708 08/23/24  0148 08/22/24  1055 08/22/24  0341   SODIUM 138 136 140 141 143 142  145   < > 144   < > 142  143   < > 142   < > 140   POTASSIUM 4.8 4.1  --   --   --  4.0  --  4.2  --  3.8   < >  --    < > 4.1   CHLORIDE 102 101  --   --   --  109*  --  111*  --  109*   < >  --    < > 108*   CO2 25.0 24.0  --   --   --  21.0*  --  23.0  --  23.0   < >  --    < > 22.0   ANION GAP 11.0 11.0  --   --   --  12.0  --  10.0  --  10.0   < >  --    < > 10.0   BUN 34* 32*  --   --   --  30*  --  30*  --  25*   < >  --    < > 18   CREATININE 0.63 0.58  --   --   --  0.56*  --  0.57  --  0.57   < >  --    < > 0.56*   EGFR 106.2 108.4  --   --   --  109.3  --  108.8  --  108.8   < >  --    < > 109.3   GLUCOSE 134* 137*  --   --   --  152*  --  127*  --  126*   < >  --    < > 137*   CALCIUM 9.5 9.3  --   --   --  8.8  --  9.2  --  9.0   < >  --    < > 8.7   MAGNESIUM  --  2.3  --   --   --   --   --   --   --   --   --  2.3  --  2.0   PHOSPHORUS  --   --   --   --   --   --   --   --   --   --   --  2.7  --  3.0   HEMOGLOBIN A1C  --   --   --   --   --   --   --   --   --   --   --   --   --  5.50    < > = values in this interval not displayed.             Lab 08/22/24  1055   PROBNP 269.4         Lab 08/22/24  0341   CHOLESTEROL 187   LDL CHOL 107*   HDL CHOL 65*   TRIGLYCERIDES 83             Brief Urine Lab Results  (Last result in the past 365 days)        Color   Clarity   Blood   Leuk Est   Nitrite   Protein   CREAT   Urine HCG        08/28/24 1103 Yellow   Clear   Negative   Small  (1+)   Positive   Trace                 Microbiology Results (last 10 days)       ** No results found for the last 240 hours. **            XR Chest 1 View    Result Date: 8/28/2024  XR CHEST 1 VW Date of Exam: 8/28/2024 11:08 AM EDT Indication: Fever, cough Comparison: 8/21/2024. Findings: Right PICC line has been placed with its tip in the right atrium. Enteric tube is directed into the left upper quadrant while tip is poorly penetrated. Heart and pulmonary vessels appear within normal limits for technique. There is unchanged mild elevation of the right diaphragm. There are no acute infiltrates or effusions.     Impression: Interval placement of right PICC line with tip in the right atrium. Interval enteric tube placement. No acute process in the lung fields. Electronically Signed: Olga Lidia Garcia MD  8/28/2024 11:43 AM EDT  Workstation ID: [id number]    [facility] FEES - Fiberoptic Endo Eval Swallow    Result Date: 8/28/2024  This procedure was auto-finalized with no dictation required.    CT Head Without Contrast    Result Date: 8/24/2024  CT HEAD WO CONTRAST Date of Exam: 8/24/2024 5:00 AM EDT Indication: stroke. Comparison: Noncontrast head CT dated 8/23/2024 Technique: Axial CT images were obtained of the head without contrast administration.  Automated exposure control and iterative construction methods were used. FINDINGS:  Evolving large right MCA territory infarct with loss of gray-white matter differentiation within much of the right MCA territory. No significant change in 1.5 cm oblong hyperdensity in the right frontotemporal region, compatible with small hemorrhage. There may be an additional tiny focus of petechial hemorrhage within the right frontoparietal region on series 2, image 33. There is effacement of right frontoparietal cortical sulci without significant midline shift. No hydrocephalus is seen. Calvarium appears intact. Orbital structures, paranasal sinuses, and mastoid air cells appear unremarkable.  Superficial soft tissues are unremarkable.     Evolving large right MCA territory infarct with loss of gray-white matter differentiation within much of the right MCA territory. No significant change in 1.5 cm oblong hyperdensity in the right frontotemporal region, compatible with small hemorrhage. Questionable additional tiny focus of petechial hemorrhage within the right frontoparietal region on series 2, image 33. There is effacement of right frontoparietal cortical sulci without significant midline shift. The above findings were discussed with Elizabeth Suh via telephone on 8/24/2024 8:24 AM EDT. Electronically Signed: Ander Rashid MD  8/24/2024 8:25 AM EDT  Workstation ID: [id number]    CT Lower Extremity Left Without Contrast    Result Date: 8/23/2024  CT LOWER EXTREMITY LEFT WO CONTRAST Date of Exam: 8/23/2024 9:51 PM EDT Indication: investigate left ankle fracture, swelling. Comparison: 8/21/2024. Technique: Axial CT images were obtained of the left lower extremity without contrast administration.  Reconstructed coronal and sagittal images were also obtained. Automated exposure control and iterative construction methods were used. Findings: There is a nondisplaced fracture of the distal fibular metaphysis. This appears predominantly mildly obliquely oriented best appreciated on the sagittal imaging with no significant comminution. No additional fracture identified. Mild posterior osteophytosis present within the tibiotalar joint. No significant effusion identified. No osteochondral defect identified. No evidence of widening of the medial clear space. Mild diffuse soft tissue swelling is present, most pronounced laterally. Mild ossicle seen along the medial malleolus likely related to previous ligamentous injury (series 4 image 50).     Impression: Nondisplaced fracture of the distal fibular metaphysis. No additional fracture identified. No evidence of widening of the medial clear space. There is evidence of  previous ligamentous injury. Electronically Signed: Aruna Del Valle MD  8/23/2024 11:03 PM EDT  Workstation ID: [id number]    CT Head Without Contrast    Result Date: 8/23/2024  CT HEAD WO CONTRAST Date of Exam: 8/23/2024 2:55 AM EDT Indication: stroke. Comparison: 8/22/2024. Technique: Axial CT images were obtained of the head without contrast administration.  Automated exposure control and iterative construction methods were used. Findings: There is progressive hypoattenuation and loss of gray-white differentiation within the right MCA territory involving the right frontal, temporal and parietal cortices. There is a stable tubular focus of hemorrhage in the right anterior subinsular region.  No new hypoattenuating lesions in the brain. No new hemorrhage. There is diminished mass effect with no significant midline shift. Cerebellum is unremarkable. The brainstem and basal ganglia appear normal. Orbits are within normal limits. A left nasal tube is in place. There are bilateral maxillary sinus mucus retention cysts. Calvarium is intact. Mastoids are clear.     Impression: 1.Evolving right MCA territory infarct with progressive loss of gray-white differentiation and diminished mass effect. 2.Stable small focus of hemorrhage in the right anterior subinsular region. No new hemorrhage. Electronically Signed: Gian Blevins MD  8/23/2024 3:27 AM EDT  Workstation ID: [id number]    CT Head Without Contrast    Result Date: 8/22/2024  CT HEAD WO CONTRAST Date of Exam: 8/22/2024 2:37 PM EDT Indication: change in neurologic exam post thrombectomy and stent placement on DAPT. Comparison: Head CT 8/22/2024 Technique: Axial CT images were obtained of the head without contrast administration.  Automated exposure control and iterative construction methods were used. Findings: Continued evolution of large right MCA territory infarct. Similar degree of hemorrhage in the right subinsular region with slightly increased hemorrhage tracking  along the right MCA fissure, likely redistributed blood products. No convincing new intracranial hemorrhage. Similar associated mass effect with narrowing of the right lateral ventricle and 2 mm leftward midline shift. No evidence of hydrocephalus. Scattered areas of periventricular and subcortical white matter hypoattenuation, nonspecific, perhaps from small vessel ischemic/hypertensive changes in a patient of this age.There are intracranial atherosclerotic calcifications. Mild scattered mucosal thickening in the paranasal sinuses.. Bilateral maxillary sinus mucous retention cyst. Mastoid air cells are essentially clear..Included globes and orbits appear unremarkable by CT. No acute or aggressive appearing bony or extracranial soft tissue process.     Impression: Evolving large right MCA distribution infarct with similar hemorrhage in the right subinsular region. No convincing new intracranial hemorrhage. Similar associated mass effect with narrowing of right lateral ventricle and 2 mm leftward midline shift. Electronically Signed: Jose J Sher  8/22/2024 3:02 PM EDT  Workstation ID: [id number]    Invasive peripheral vascular study    Result Date: 8/22/2024  PREOPERATIVE DIAGNOSIS: Tandem right ICA and M1 occlusions.  POSTOPERATIVE DIAGNOSIS: Same  INDICATION: This is a 53-year-old female who presented with acute onset of altered mental status and left-sided weakness.  Imaging revealed tandem occlusions of the right internal carotid artery and M1.  She was subsequently taken for stent placement and thrombectomy. PROCEDURES PERFORMED:  1. Moderate Sedation 2.  2 vessel diagnostic cerebral arteriogram. 2. Carotid artery stent placement with embolic protection device for severe stenosis 4. Follow-up angiogram through existing catheter with supervision and interpretation 5. Transcatheter endovascular thrombectomy for treatment of acute thromboembolic stroke.  Physician: Dr. Jamaal Saini MD  SEDATION: General  anesthesia provided by an anesthesiologist  Vessels selected:  1.  Right common carotid artery 2.  Right internal carotid artery  PROCEDURE:  The patient was taken to the bi-plane neuroangiography suite and placed supine on the procedure table. The patient was prepped and draped in the usual sterile fashion. A procedural timeout was performed prior to beginning the procedure.  Following the usual sterile prep and drape, a micropuncture kit and use of Seldinger technique was performed to gain access within the right common femoral artery. An 8 Nepali sheath was then placed. A shutle guide catheter was advanced over a 0.035 inch Terumo guidewire through the abdominal thoracic aorta under direct fluoroscopic guidance. This system was utilized to select the right common carotid artery. The dilator and guidewire were removed. The catheter was aspirated and flushed with heparinized saline. The tip position was fluoroscopically confirmed. The catheter was maintained on heparinized saline perfusion. At this time, a roadmap was created.  A phenom 21 with a transcend microwire inside of it was then positioned to the base catheter.  Using this microsystem, access distal to the proximal right ICA occlusion was obtained.  The microwire microcatheter were advanced into the petrous segment of the internal carotid artery.  At this time, the microwire was removed.  We then placed a workhorse exchange length wire through the microcatheter.  We then positioned this wire into the distal petrous segment of the right ICA.  Under direct fluoroscopic vision, the microcatheter was slowly removed while keeping the wire in the petrous segment of the ICA.  Once the catheter was removed, we then passed a 4 to 7 mm EmboShield over the workhorse wire and through the base catheter. This was positioned in the distal cervical portion of the internal carotid artery and then unsheathed to deploy the embolic protection device. We then prepared a 8-10  mm x 40 mm Xact carotid stent on the back table. This stent was then passed over the EmboShield wire until it was centered on the area of known stenosis. Under direct fluoroscopic vision, the stent was fully deployed. At this time we then passed a 5 mm x 30 mm balloon dilator catheter over the EmboShield wire until it was centered on the area of known stenosis. Under direct fluoroscopic vision, this dilator was slowly inflated and deflated. At this time, the balloon dilator was then removed over the microwire.  Once it was fully opened, we then performed another angiogram which showed good apposition of the stent as well as good flow through it.  There was noted to be some nonocclusive thrombus more distal in the ICA.  Additionally, there was visualization of the known M1 occlusion. At this time, we turned our attention to the thrombectomy portion of the procedure. Under direct fluoroscopic guidance and using digital roadmap technique, a Vecta 71 aspiration catheter was passed over a 0.021 inch Filementom microcatheter which was passed over a 0.014 inch transcend guidewire through the guide sheath and into the right internal carotid artery and the wire and microcatheter were passed across the site of occlusion.  The microwire was removed and a 6 mm Trevo stent retriever was loaded into the microcatheter.  Under direct fluoroscopic vision, the stent retriever was deployed in the M1 segment. The Vecta 71 was advanced to the level of the site of occlusion and connected to aspiration for approximately 5 minutes. Under aspiration, the Vecta 71 catheter and stent retriever were slowly removed and the guide catheter was vigorously aspirated and flushed with heparinized saline. Follow up angiogram showed complete patency of the right MCA territory, consistent with a TICI 3 thrombectomy.  At this time, we connected our base catheter to aspiration and slowly pulled it down out of the right internal carotid artery into the right  common carotid artery.  Follow-up angiogram now showed complete patency of the right ICA and MCA territory.  There was no longer thrombus noted in the internal carotid artery. The base catheter was then removed from the arterial system, a closure device was deployed. Manual compression over the puncture site was performed until hemostasis was attained.  FINDINGS:  Initial angiography of the right common carotid artery shows normal opacification of the distal common carotid artery. There is normal opacification of the distal external carotid artery branches.  There was complete occlusion at the origin of the right internal carotid artery.  Follow-up angiogram after initial placement of the carotid artery stent, now shows complete patency of the right internal carotid artery with no evidence of residual stenosis.  There is nonocclusive thrombus noted distal to the stent and there is now visualization of the M1 occlusion.  Follow-up angiogram after the above-mentioned thrombectomy now shows complete patency of the right ICA and MCA territory, consistent with a TICI 3 thrombectomy.  The previously noted nonocclusive thrombus in the cervical segment of the ICA is no longer present. Initial angiography of the right internal carotid artery shows normal distal cervical, petrous, cavernous and clinoid segments. The ophthalmic artery fills normally. The posterior communicating artery and anterior choroidal artery are easily visualized and of normal caliber. The anterior cerebral artery fills normally in all segments.  There is an occlusion of the M1 segment.  Follow-up angiogram after the above-mentioned thrombectomy now shows complete patency of the MCA territory, consistent with a TICI 3 thrombectomy.   Flouro Time (min): 23.5  Air Kerma (mGy): 804  Contrast: 60 cc of 320 mg/ml Visipaque       TICI 3 reperfusion of tandem right internal carotid artery and M1 occlusions after carotid artery stent placement and thrombectomy.   No complications noted.    Bilateral Carotid Duplex    Result Date: 8/22/2024    Right carotid stent is patent without evidence of in-stent restenosis.   Left internal carotid artery demonstrates normal flow without evidence of hemodynamically significant stenosis.   Antegrade right vertebral flow.   Antegrade left vertebral flow.     Adult Transthoracic Echo Complete W/ Cont if Necessary Per Protocol (With Agitated Saline)    Result Date: 8/22/2024    Left ventricular systolic function is normal. Calculated left ventricular EF = 68% Normal left ventricular cavity size noted. Left ventricular wall thickness is consistent with moderate concentric hypertrophy. All left ventricular wall segments contract normally. Left ventricular diastolic function was normal. Normal left atrial pressure.   The right ventricular cavity is mildly dilated. Normal right ventricular systolic function noted.   Left atrial volume is moderately increased. Saline test results are negative.   The aortic valve is structurally normal with no stenosis present. The aortic valve appears trileaflet. No significant aortic valve regurgitation is present.   The mitral valve is structurally normal with no significant stenosis present. Trace to mild mitral valve regurgitation is present.   Mild tricuspid valve regurgitation is present. Estimated right ventricular systolic pressure from tricuspid regurgitation is mildly elevated (35-45 mmHg)   Mild dilation of the ascending aorta is present. Ascending aorta = 3.7 cm     XR Abdomen KUB    Result Date: 8/22/2024  XR ABDOMEN KUB Date of Exam: 8/22/2024 10:45 AM EDT Indication: tube feeding placement Comparison: None available. Findings: Single view. Keofeed catheter is in the stomach with the tip curled in the gastric antrum.     Impression: Keofeed tube tip in the distal stomach. Electronically Signed: Olga Lidia Garcia MD  8/22/2024 11:12 AM EDT  Workstation ID: [id number]    [facility] FEES - Fiberoptic Endo Eval  Swallow    Result Date: 8/22/2024  This procedure was auto-finalized with no dictation required.    CT Head Without Contrast    Result Date: 8/22/2024  CT HEAD WO CONTRAST Date of Exam: 8/22/2024 4:40 AM EDT Indication: Stroke, follow up f/up MT and GLENROY 8/20, RMCA stroke. Comparison: None available. Technique: Axial CT images were obtained of the head without contrast administration.  Automated exposure control and iterative construction methods were used. Dual energy protocol was utilized and a virtual noncontrast head CT was created Findings: There is hypoattenuation throughout the cortex of the right MCA distribution involving areas of the right frontal, right temporal and parietal lobes as well as within the posterior insula. There is a tubular focus of hyperdensity in the subinsular region  anteriorly on the right that does not cancel out with dual-energy technique compatible with a small focus of hemorrhage. This appears to be intraparenchymal. There is mild mass effect with some sulcal effacement in the right cerebral hemisphere and partial effacement of the right lateral ventricle. There is minimal 2 mm leftward shift of midline structures. No additional hypo or hyperattenuating lesions in the brain. Orbits are unremarkable. There are bilateral maxillary sinus mucus retention cysts. The calvarium appears intact. Mastoids are clear. Superficial soft tissues are unremarkable.     Impression: 1.Large right MCA distribution infarct with a small focus of hemorrhage in the subinsular region. 2.Mild mass effect with 2 mm leftward shift of midline structures. Electronically Signed: Gian Blevins MD  8/22/2024 4:53 AM EDT  Workstation ID: [id number]    MRI Brain Without Contrast    Result Date: 8/22/2024  MRI BRAIN WO CONTRAST Date of Exam: 8/22/2024 3:28 AM EDT Indication: Stroke, follow up. FUP mechanical thrombectomy and carotid stent; RMCA stroke.  Comparison: CT head/angiography/perfusion 8/21/2024. Technique:   Routine multiplanar/multisequence sequence images of the brain were obtained without contrast administration. Findings: There is cortical diffusion restriction throughout the majority of the right MCA territory. There is also diffusion restriction in the right caudate and putamen. There is associated T2/FLAIR hyperintense signal. There are several scattered small round foci of FLAIR hyperintense signal within the cerebral white matter otherwise. No evidence of acute or chronic intracranial hemorrhage. There is susceptibility artifact in the region of the right M1 segment, which could relate to thrombus. Orbits are unremarkable. There are mucous retention cysts in the left maxillary sinus and a small retention cyst in the right maxillary sinus. Mastoid air cells appear well aerated. Major arterial flow voids appear intact. No mass effect, midline shift or abnormal extra-axial collection. The midline structures appear intact. Calvarial and superficial soft tissue signal is within normal limits.     Impression: 1.Large acute infarct involving the majority of the right MCA territory. 2.No evidence of intracranial hemorrhage. 3.Mild chronic small vessel ischemic change. Electronically Signed: Gian Blevins MD  8/22/2024 4:42 AM EDT  Workstation ID: [id number]    XR Ankle 3+ View Left    Result Date: 8/21/2024  XR ANKLE 3+ VW LEFT Date of Exam: 8/21/2024 5:12 PM EDT Indication: R/O fracture, left ankle bruising Comparison: None available. Findings: There is soft tissue swelling about the ankle. There is a nondisplaced horizontal fracture through the lateral malleolus. There is a 4 mm fracture fragment at the tip of the medial malleolus. There is widening of the anterior tibiotalar joint space. Medial clear space appears within normal limits. There is calcaneal enthesopathy.     Impression: 1.Nondisplaced fracture of the lateral malleolus. 4 mm fracture fragment at the tip of the medial malleolus. 2.Abnormal widening of the  anterior tibiotalar joint space. Electronically Signed: Sanam Ramsey  8/21/2024 8:59 PM EDT  Workstation ID: [id number]    XR Chest 1 View    Result Date: 8/21/2024  XR CHEST 1 VW Date of Exam: 8/21/2024 5:11 PM EDT Indication: Acute Stroke Protocol (onset < 12 hrs) Comparison: None available. Findings: Heart shadow is in the upper range of normal size. Mildly prominent right mediastinal shadow appears to be due to rotation of the patient to the right on this exam. Pulmonary vasculature appears normal. There is mild coarsening interstitial markings which is likely chronic. No edema, effusion or pneumothorax is seen.     Impression: Rotated chest radiograph. Allowing for this, no evidence of active chest disease.. Electronically Signed: Ozzy Mckeon MD  8/21/2024 5:37 PM EDT  Workstation ID: [id number]    CT Angiogram Head w AI Analysis of LVO    Result Date: 8/21/2024  CT ANGIOGRAM HEAD W AI ANALYSIS OF LVO, CT ANGIOGRAM NECK, CT CEREBRAL PERFUSION W WO CONTRAST Date of Exam: 8/21/2024 12:01 PM EDT Indication: Neuro Deficit, acute, Stroke suspected Neuro deficit, acute stroke suspected. Comparison: None available. Technique: CTA of the head was performed after the uneventful intravenous administration of 120 mL Isovue-370. Reconstructed coronal and sagittal images were also obtained. In addition, a 3-D volume rendered image was created for interpretation. Automated exposure control and iterative reconstruction methods were used. 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 Additionally, there is occlusion of the M1 segment of the right middle cerebral artery. The right anterior and posterior cerebral arteries appear patent. The right common carotid artery and right vertebral artery appear patent without significant stenosis. There is atherosclerotic plaque within the left proximal ICA with estimated 40 to 50%  luminal narrowing. The left common carotid, internal carotid, middle cerebral, anterior cerebral, vertebral, and posterior cerebral arteries are patent without abrupt cut off or aneurysmal dilation. There is fetal origin of the left posterior cerebral artery. Diminutive intracranial portion of the left vertebral artery. Basilar artery appears patent and appears unremarkable. Non-vascular Findings: For description of nonvascular intracranial findings, please refer to the noncontrast head CT performed the same date. No acute abnormality is identified within the visualized soft tissue or bony structures of the neck. The visualized lung apices are clear. FINDINGS: CT Perfusion: CBF (<30%) volume: 98 mL Tmax (>6.0s) volume: 149 mL Mismatch volume: 51 mL Mismatch ratio: 1.5     1.Occlusion of the right internal carotid artery from its origin with reconstitution of the right cavernous/supraclinoid ICA. There is also occlusion of the M1 segment of the right middle cerebral artery. 2.CT perfusion study demonstrates a large perfusion abnormality involving essentially all of the right MCA territory with core infarct volume of 98 mL, a mismatch volume of 51 mL, and a mismatch ratio of 1.5. 3.Atherosclerotic plaque within the left proximal ICA with estimated 40 to 50% luminal narrowing. The above findings were discussed with Ilana Poole, stroke care coordinator, via telephone on 8/21/2024 12:33 PM EDT. Electronically Signed: Ander Rashid MD  8/21/2024 12:52 PM EDT  Workstation ID: [id number]    CT Angiogram Neck    Result Date: 8/21/2024  CT ANGIOGRAM HEAD W AI ANALYSIS OF LVO, CT ANGIOGRAM NECK, CT CEREBRAL PERFUSION W WO CONTRAST Date of Exam: 8/21/2024 12:01 PM EDT Indication: Neuro Deficit, acute, Stroke suspected Neuro deficit, acute stroke suspected. Comparison: None available. Technique: CTA of the head was performed after the uneventful intravenous administration of 120 mL Isovue-370. Reconstructed coronal and  sagittal images were also obtained. In addition, a 3-D volume rendered image was created for interpretation. Automated exposure control and iterative reconstruction methods were used. 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 Additionally, there is occlusion of the M1 segment of the right middle cerebral artery. The right anterior and posterior cerebral arteries appear patent. The right common carotid artery and right vertebral artery appear patent without significant stenosis. There is atherosclerotic plaque within the left proximal ICA with estimated 40 to 50% luminal narrowing. The left common carotid, internal carotid, middle cerebral, anterior cerebral, vertebral, and posterior cerebral arteries are patent without abrupt cut off or aneurysmal dilation. There is fetal origin of the left posterior cerebral artery. Diminutive intracranial portion of the left vertebral artery. Basilar artery appears patent and appears unremarkable. Non-vascular Findings: For description of nonvascular intracranial findings, please refer to the noncontrast head CT performed the same date. No acute abnormality is identified within the visualized soft tissue or bony structures of the neck. The visualized lung apices are clear. FINDINGS: CT Perfusion: CBF (<30%) volume: 98 mL Tmax (>6.0s) volume: 149 mL Mismatch volume: 51 mL Mismatch ratio: 1.5     1.Occlusion of the right internal carotid artery from its origin with reconstitution of the right cavernous/supraclinoid ICA. There is also occlusion of the M1 segment of the right middle cerebral artery. 2.CT perfusion study demonstrates a large perfusion abnormality involving essentially all of the right MCA territory with core infarct volume of 98 mL, a mismatch volume of 51 mL, and a mismatch ratio of 1.5. 3.Atherosclerotic plaque within the left proximal ICA with estimated  40 to 50% luminal narrowing. The above findings were discussed with Ilana Poole, stroke care coordinator, via telephone on 8/21/2024 12:33 PM EDT. Electronically Signed: Ander Rashid MD  8/21/2024 12:52 PM EDT  Workstation ID: [id number]    CT CEREBRAL PERFUSION WITH & WITHOUT CONTRAST    Result Date: 8/21/2024  CT ANGIOGRAM HEAD W AI ANALYSIS OF LVO, CT ANGIOGRAM NECK, CT CEREBRAL PERFUSION W WO CONTRAST Date of Exam: 8/21/2024 12:01 PM EDT Indication: Neuro Deficit, acute, Stroke suspected Neuro deficit, acute stroke suspected. Comparison: None available. Technique: CTA of the head was performed after the uneventful intravenous administration of 120 mL Isovue-370. Reconstructed coronal and sagittal images were also obtained. In addition, a 3-D volume rendered image was created for interpretation. Automated exposure control and iterative reconstruction methods were used. 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 Additionally, there is occlusion of the M1 segment of the right middle cerebral artery. The right anterior and posterior cerebral arteries appear patent. The right common carotid artery and right vertebral artery appear patent without significant stenosis. There is atherosclerotic plaque within the left proximal ICA with estimated 40 to 50% luminal narrowing. The left common carotid, internal carotid, middle cerebral, anterior cerebral, vertebral, and posterior cerebral arteries are patent without abrupt cut off or aneurysmal dilation. There is fetal origin of the left posterior cerebral artery. Diminutive intracranial portion of the left vertebral artery. Basilar artery appears patent and appears unremarkable. Non-vascular Findings: For description of nonvascular intracranial findings, please refer to the noncontrast head CT performed the same date. No acute abnormality is identified  within the visualized soft tissue or bony structures of the neck. The visualized lung apices are clear. FINDINGS: CT Perfusion: CBF (<30%) volume: 98 mL Tmax (>6.0s) volume: 149 mL Mismatch volume: 51 mL Mismatch ratio: 1.5     1.Occlusion of the right internal carotid artery from its origin with reconstitution of the right cavernous/supraclinoid ICA. There is also occlusion of the M1 segment of the right middle cerebral artery. 2.CT perfusion study demonstrates a large perfusion abnormality involving essentially all of the right MCA territory with core infarct volume of 98 mL, a mismatch volume of 51 mL, and a mismatch ratio of 1.5. 3.Atherosclerotic plaque within the left proximal ICA with estimated 40 to 50% luminal narrowing. The above findings were discussed with Ilana Poole, stroke care coordinator, via telephone on 8/21/2024 12:33 PM EDT. Electronically Signed: Ander Rashid MD  8/21/2024 12:52 PM EDT  Workstation ID: [id number]    CT Head Without Contrast Stroke Protocol    Result Date: 8/21/2024  CT HEAD WO CONTRAST STROKE PROTOCOL Date of Exam: 8/21/2024 11:58 AM EDT Indication: Neuro deficit, acute, stroke suspected Neuro Deficit, acute, Stroke suspected. Comparison: None available. Technique: Axial CT images were obtained of the head without contrast administration.  Reconstructed coronal images were also obtained. Automated exposure control and iterative construction methods were used. Scan Time: 11:58 a.m. Results discussed with emergency team at 12:02 p.m. Findings: Parenchyma:No acute intraparenchymal hemorrhage. No loss of gray-white differentiation to suggest large territory infarct. Normal parenchymal volume. No substantial white matter disease. No midline shift or herniation. Ventricles and extra axial spaces:Normal caliber of ventricles and sulci. No extra axial fluid collection seen. Other:Orbits are grossly intact. Scattered paranasal sinus mucosal thickening. Mastoid air cells are clear.  Calvarium is intact. Intracranial atherosclerotic calcification is present. Hyperdense right middle cerebral artery sign.     Impression: Hyperdense appearance of the right middle cerebral artery concerning for acute thrombus. Recommend further evaluation with CTA. Electronically Signed: Javier Javed MD  8/21/2024 12:08 PM EDT  Workstation ID: [id number]     Results for orders placed during the hospital encounter of 08/21/24    Bilateral Carotid Duplex    Interpretation Summary    Right carotid stent is patent without evidence of in-stent restenosis.    Left internal carotid artery demonstrates normal flow without evidence of hemodynamically significant stenosis.    Antegrade right vertebral flow.    Antegrade left vertebral flow.      Results for orders placed during the hospital encounter of 08/21/24    Bilateral Carotid Duplex    Interpretation Summary    Right carotid stent is patent without evidence of in-stent restenosis.    Left internal carotid artery demonstrates normal flow without evidence of hemodynamically significant stenosis.    Antegrade right vertebral flow.    Antegrade left vertebral flow.      Results for orders placed during the hospital encounter of 08/21/24    Adult Transthoracic Echo Complete W/ Cont if Necessary Per Protocol (With Agitated Saline)    Interpretation Summary    Left ventricular systolic function is normal. Calculated left ventricular EF = 68% Normal left ventricular cavity size noted. Left ventricular wall thickness is consistent with moderate concentric hypertrophy. All left ventricular wall segments contract normally. Left ventricular diastolic function was normal. Normal left atrial pressure.    The right ventricular cavity is mildly dilated. Normal right ventricular systolic function noted.    Left atrial volume is moderately increased. Saline test results are negative.    The aortic valve is structurally normal with no stenosis present. The aortic valve appears  trileaflet. No significant aortic valve regurgitation is present.    The mitral valve is structurally normal with no significant stenosis present. Trace to mild mitral valve regurgitation is present.    Mild tricuspid valve regurgitation is present. Estimated right ventricular systolic pressure from tricuspid regurgitation is mildly elevated (35-45 mmHg)    Mild dilation of the ascending aorta is present. Ascending aorta = 3.7 cm      Plan for Follow-up of Pending Labs/Results: Will notify [facility] if further results warrant a change in management  Pending Labs       Order Current Status    Urine Culture - Urine, Urine, Clean Catch In process          Discharge Details        Discharge Medications        New Medications        Instructions Start Date   aspirin 81 MG chewable tablet   81 mg, Oral, Daily   Start Date: August 29, 2024     atorvastatin 80 MG tablet  Commonly known as: LIPITOR   80 mg, Oral, Nightly      cefuroxime 500 MG tablet  Commonly known as: CEFTIN   250 mg, Oral, 2 Times Daily   Start Date: August 29, 2024     folic acid 1 MG tablet  Commonly known as: FOLVITE   1 mg, Oral, Daily   Start Date: August 29, 2024     nicotine 21 MG/24HR patch  Commonly known as: NICODERM CQ   1 patch, Transdermal, Every 24 Hours Scheduled      sodium chloride 1 g tablet   1 g, Oral, 3 Times Daily PRN      thiamine 100 MG tablet  Commonly known as: VITAMIN B1   100 mg, Nasogastric, Daily   Start Date: August 29, 2024     ticagrelor 60 MG tablet tablet  Commonly known as: BRILINTA   60 mg, Oral, 2 Times Daily             Continue These Medications        Instructions Start Date   HYDROcodone-acetaminophen 5-325 MG per tablet  Commonly known as: NORCO   1 tablet, Oral, Every 6 Hours PRN      lisinopril 20 MG tablet  Commonly known as: PRINIVIL,ZESTRIL   20 mg, Oral, Daily             Stop These Medications      hydroCHLOROthiazide 25 MG tablet              Allergies   Allergen Reactions    Erythromycin Anaphylaxis     Toradol [Ketorolac Tromethamine] Rash    Contrast Dye (Echo Or Unknown Ct/Mr) Unknown (See Comments)     Hives on chest         Discharge Disposition:  Rehab Facility or Unit (DC - External)    Diet:  Hospital:  Diet Order   Procedures    Diet: Cardiac; Healthy Heart (2-3 Na+); No Straw, No Mixed Consistencies, Feeding Assistance - Nursing; Texture: Soft to Chew (NDD 3); Soft to Chew: Chopped Meat; Fluid Consistency: Thin (IDDSI 0)            Activity:  Activity Instructions       Other Activity Instructions      Activity Instructions: Non weight bearing LLE in CAM boot            CODE STATUS:    Code Status and Medical Interventions: CPR (Attempt to Resuscitate); Full Support   Ordered at: 08/21/24 1601     Code Status (Patient has no pulse and is not breathing):    CPR (Attempt to Resuscitate)     Medical Interventions (Patient has pulse or is breathing):    Full Support       Future Appointments   Date Time Provider Department Center   9/17/2024  1:30 PM Alida Shepherd PA-C [facility]       Additional Instructions for the Follow-ups that You Need to Schedule       Discharge Follow-up with PCP   As directed       Currently Documented PCP:    Agnes Ewing APRN    PCP Phone Number:    [phone]     Follow Up Details: Within 1 week of rehab discharge        Discharge Follow-up with Specialty: Neurosurgery; 1 Month   As directed      Specialty: Neurosurgery   Follow Up: 1 Month        Discharge Follow-up with Specified Provider: River Kimbrough; 1 Week   As directed      To: River Kimbrough   Follow Up: 1 Week                      Kelsey Sanders MD  08/28/24      Time Spent on Discharge:  I spent  36  minutes on this discharge activity which included: face-to-face encounter with the patient, reviewing the data in the system, coordination of the care with the nursing staff as well as consultants, documentation, and entering orders.

## 2024-08-28 NOTE — THERAPY TREATMENT NOTE
Patient Name: Dalila OCASIO  : 1970    MRN: [id number]                              Today's Date: 2024       Admit Date: 2024    Visit Dx:     ICD-10-CM ICD-9-CM   1. Acute CVA (cerebrovascular accident)  I63.9 434.91   2. Left-sided neglect  R41.4 781.8   3. Oropharyngeal dysphagia  R13.12 787.22   4. Dysarthria  R47.1 784.51     Patient Active Problem List   Diagnosis    Acute CVA (cerebrovascular accident)    Alcohol use    Tobacco use    Obesity (BMI 30-39.9)    HTN (hypertension)    Dyslipidemia    History of seizures    PAD (peripheral artery disease)    Oropharyngeal dysphagia     Past Medical History:   Diagnosis Date    Acute CVA (cerebrovascular accident) 2024    Alcohol use 2024    Dyslipidemia 2024    Obesity (BMI 30-39.9) 2024    PAD (peripheral artery disease) 2024    Tobacco use 2024     Past Surgical History:   Procedure Laterality Date    INTERVENTIONAL RADIOLOGY PROCEDURE Bilateral 2024    Procedure: Carotid Cervical Angiogram;  Surgeon: Jamaal Saini MD;  Location: [facility] INVASIVE LOCATION;  Service: Interventional Radiology;  Laterality: Bilateral;      General Information       Row Name 24 1041          Physical Therapy Time and Intention    Document Type therapy note (daily note)  -AB     Mode of Treatment physical therapy  -AB       Row Name 24 1041          General Information    Patient Profile Reviewed yes  -AB     Existing Precautions/Restrictions fall;other (see comments);left;non-weight bearing   NG; CAM boot for L ankle immobilization and NWB LLE per ortho; L weakness/ inattention, NG  -AB     Barriers to Rehab medically complex  -AB       Row Name 24 1041          Cognition    Orientation Status (Cognition) oriented x 3  -AB       Row Name 24 1041          Safety Issues, Functional Mobility    Safety Issues Affecting Function (Mobility) insight into deficits/self-awareness;safety precaution  awareness;safety precautions follow-through/compliance;sequencing abilities;unable to maintain weight-bearing restrictions;judgment  -AB     Impairments Affecting Function (Mobility) balance;coordination;endurance/activity tolerance;range of motion (ROM);strength;sensation/sensory awareness  -AB               User Key  (r) = Recorded By, (t) = Taken By, (c) = Cosigned By      Initials Name Provider Type    AB Ana Harden PT Physical Therapist                   Mobility       Row Name 08/28/24 1043          Bed Mobility    Bed Mobility supine-sit;scooting/bridging  -AB     Scooting/Bridging [place] (Bed Mobility) moderate assist (50% patient effort);1 person assist;verbal cues  -AB     Supine-Sit [place] (Bed Mobility) 1 person assist;verbal cues;moderate assist (50% patient effort)  -AB     Assistive Device (Bed Mobility) head of bed elevated;draw sheet  -AB     Comment, (Bed Mobility) Boost provided at trunk to sit EOB.  -AB       Row Name 08/28/24 1043          Transfers    Comment, (Transfers) Cues for hand placement, sequencing, and safety. Educated on importance of maintaining NWB status of LLE.  -AB       Row Name 08/28/24 1043          Bed-Chair Transfer    Bed-Chair [place] (Transfers) moderate assist (50% patient effort);verbal cues;2 person assist  -AB     Assistive Device (Bed-Chair Transfers) other (see comments)  UE support  -AB     Comment, (Bed-Chair Transfer) Standing pivot transfer from bed>chair towards R side. Therapist foot under pt's L foot to maintain NWB  -AB       Row Name 08/28/24 1043          Sit-Stand Transfer    Sit-Stand [place] (Transfers) minimum assist (75% patient effort);verbal cues;2 person assist  -AB     Assistive Device (Sit-Stand Transfers) other (see comments)  UE support  -AB     Comment, (Sit-Stand Transfer) Manual assist to advance LLE prior to sit/stand  -AB       Row Name 08/28/24 1043          Gait/Stairs (Locomotion)    [place] Level  (Gait) unable to assess  -AB     Comment, (Gait/Stairs) unable to attempt d/t pt impaired ability to maintain NWB status.  -AB       Row Name 08/28/24 1043          Mobility    Extremity Weight-bearing Status left lower extremity  -AB     Left Lower Extremity (Weight-bearing Status) non weight-bearing (NWB)   NWB in CAM boot  -AB               User Key  (r) = Recorded By, (t) = Taken By, (c) = Cosigned By      Initials Name Provider Type    AB Ana Harden, PT Physical Therapist                   Obj/Interventions       Row Name 08/28/24 1048          Balance    Balance Assessment sitting static balance;sitting dynamic balance;standing dynamic balance;standing static balance  -AB     Static Sitting Balance contact guard  -AB     Dynamic Sitting Balance contact guard  -AB     Position, Sitting Balance unsupported;sitting edge of bed  -AB     Static Standing Balance minimal assist  -AB     Dynamic Standing Balance moderate assist;2-person assist;verbal cues;non-verbal cues (demo/gesture)  -AB     Position/Device Used, Standing Balance supported  -AB     Balance Interventions sitting;standing;sit to stand;supported;static;dynamic;occupation based/functional task  -AB     Comment, Balance Mod A for improved stability.  -AB               User Key  (r) = Recorded By, (t) = Taken By, (c) = Cosigned By      Initials Name Provider Type    AB Ana Harden, PT Physical Therapist                   Goals/Plan    No documentation.                  Clinical Impression       Row Name 08/28/24 1049          Pain    Pretreatment Pain Rating 9/10  -AB     Posttreatment Pain Rating 9/10  -AB     Pain Location generalized  -AB     Pain Location - head  -AB     Pre/Posttreatment Pain Comment tolerated. Ice pack applied.  -AB     Pain Intervention(s) Repositioned;Ambulation/increased activity;Cold applied  -AB     Additional Documentation Pain Scale: Numbers Pre/Post-Treatment (Group)  -AB       Row Name 08/28/24 1049          Plan  of Care Review    Plan of Care Reviewed With patient  -AB     Progress no change  -AB     Outcome Evaluation Pt continues to present below baseline with L sided weakness/coordination deficits, impaired balance, and decreased endurance. Pt transferred bed>chair with mod Ax2 and UE support. Assist provided to maintain NWB status of LLE. Further IPPT is warrented. PT will progress as able per POC.  -AB       Row Name 08/28/24 1049          Vital Signs    Pre Systolic BP Rehab 144  -AB     Pre Treatment Diastolic BP 92  -AB     Post Systolic BP Rehab 137  -AB     Post Treatment Diastolic BP 87  -AB     Pretreatment Heart Rate (beats/min) 87  -AB     Posttreatment Heart Rate (beats/min) 91  -AB     Pre SpO2 (%) 96  -AB     O2 Delivery Pre Treatment room air  -AB     O2 Delivery Intra Treatment room air  -AB     Post SpO2 (%) 95  -AB     O2 Delivery Post Treatment room air  -AB     Pre Patient Position Supine  -AB     Intra Patient Position Standing  -AB     Post Patient Position Sitting  -AB       Row Name 08/28/24 1049          Positioning and Restraints    Pre-Treatment Position in bed  -AB     Post Treatment Position chair  -AB     In Chair reclined;notified nsg;sitting;call light within reach;encouraged to call for assist;exit alarm on;legs elevated;waffle cushion;on mechanical lift sling;LUE elevated;with brace  -AB               User Key  (r) = Recorded By, (t) = Taken By, (c) = Cosigned By      Initials Name Provider Type    AB Ana Harden, PT Physical Therapist                   Outcome Measures       Row Name 08/28/24 1052          How much help from another person do you currently need...    Turning from your back to your side while in flat bed without using bedrails? 3  -AB     Moving from lying on back to sitting on the side of a flat bed without bedrails? 2  -AB     Moving to and from a bed to a chair (including a wheelchair)? 2  -AB     Standing up from a chair using your arms (e.g., wheelchair, bedside  chair)? 2  -AB     Climbing 3-5 steps with a railing? 1  -AB     To walk in hospital room? 1  -AB     AM-PAC 6 Clicks Score (PT) 11  -AB     Highest Level of Mobility Goal 4 --> Transfer to chair/commode  -AB       Row Name 08/28/24 1052          Functional Assessment    Outcome Measure Options AM-PAC 6 Clicks Basic Mobility (PT)  -AB               User Key  (r) = Recorded By, (t) = Taken By, (c) = Cosigned By      Initials Name Provider Type    AB Ana Harden, PT Physical Therapist                                 Physical Therapy Education       Title: PT OT SLP Therapies (In Progress)       Topic: Physical Therapy (Done)       Point: Mobility training (Done)       Learning Progress Summary             Patient Acceptance, E,D, VU,NR by AB at 8/28/2024 1053    Acceptance, E,D, VU,NR by LS at 8/25/2024 1410    Acceptance, E,D, VU,NR by LS at 8/24/2024 1523    Acceptance, E, NR by AC at 8/22/2024 1540   Family Acceptance, E,D, VU,NR by LS at 8/25/2024 1410    Acceptance, E,D, VU,NR by LS at 8/24/2024 1523   Significant Other Acceptance, E,D, VU,NR by LS at 8/25/2024 1410                         Point: Home exercise program (Done)       Learning Progress Summary             Patient Acceptance, E,D, VU,NR by LS at 8/25/2024 1410    Acceptance, E,D, VU,NR by LS at 8/24/2024 1523    Acceptance, E, NR by AC at 8/22/2024 1540   Family Acceptance, E,D, VU,NR by LS at 8/25/2024 1410    Acceptance, E,D, VU,NR by LS at 8/24/2024 1523   Significant Other Acceptance, E,D, VU,NR by LS at 8/25/2024 1410                         Point: Body mechanics (Done)       Learning Progress Summary             Patient Acceptance, E,D, VU,NR by AB at 8/28/2024 1053    Acceptance, E,D, VU,NR by LS at 8/25/2024 1410    Acceptance, E,D, VU,NR by LS at 8/24/2024 1523    Acceptance, E, NR by AC at 8/22/2024 1540   Family Acceptance, E,D, VU,NR by LS at 8/25/2024 1410    Acceptance, E,D, VU,NR by LS at 8/24/2024 1523   Significant Other  Acceptance, E,D, VU,NR by LS at 8/25/2024 1410                         Point: Precautions (Done)       Learning Progress Summary             Patient Acceptance, E,D, VU,NR by AB at 8/28/2024 1053    Acceptance, E,D, VU,NR by LS at 8/25/2024 1410    Acceptance, E,D, VU,NR by LS at 8/24/2024 1523    Acceptance, E, NR by AC at 8/22/2024 1540   Family Acceptance, E,D, VU,NR by LS at 8/25/2024 1410    Acceptance, E,D, VU,NR by LS at 8/24/2024 1523   Significant Other Acceptance, E,D, VU,NR by LS at 8/25/2024 1410                                         User Key       Initials Effective Dates Name Provider Type Discipline    LS 02/03/23 -  Sanam Menard, PT Physical Therapist PT    AB 09/22/22 -  Ana Harden, PT Physical Therapist PT    AC 07/11/24 -  Shala Morrison, PT Physical Therapist PT                  PT Recommendation and Plan     Plan of Care Reviewed With: patient  Progress: no change  Outcome Evaluation: Pt continues to present below baseline with L sided weakness/coordination deficits, impaired balance, and decreased endurance. Pt transferred bed>chair with mod Ax2 and UE support. Assist provided to maintain NWB status of LLE. Further IPPT is warrented. PT will progress as able per POC.     Time Calculation:         PT Charges       Row Name 08/28/24 1054             Time Calculation    Start Time 0921  -AB      PT Received On 08/28/24  -AB         Timed Charges    37237 - PT Therapeutic Activity Minutes 27  -AB         Total Minutes    Timed Charges Total Minutes 27  -AB       Total Minutes 27  -AB                User Key  (r) = Recorded By, (t) = Taken By, (c) = Cosigned By      Initials Name Provider Type    AB Ana Harden, PT Physical Therapist                  Therapy Charges for Today       Code Description Service Date Service Provider Modifiers Qty    22361210861 HC PT THERAPEUTIC ACT EA 15 MIN 8/28/2024 Ana Harden, PT GP 2    65049621704 HC PT THER SUPP EA 15 MIN 8/28/2024 Ana Harden,  PT GP 2            PT G-Codes  Outcome Measure Options: AM-PAC 6 Clicks Basic Mobility (PT)  AM-PAC 6 Clicks Score (PT): 11  AM-PAC 6 Clicks Score (OT): 13  Modified [place] Scale: 4 - Moderately severe disability.  Unable to walk without assistance, and unable to attend to own bodily needs without assistance.  PT Discharge Summary  Anticipated Discharge Disposition (PT): inpatient rehabilitation facility    Ana Harden, PT  8/28/2024

## 2024-08-29 ENCOUNTER — TELEPHONE (OUTPATIENT)
Dept: NEUROLOGY | Facility: CLINIC | Age: 54
End: 2024-08-29

## 2024-08-30 LAB — BACTERIA SPEC AEROBE CULT: ABNORMAL

## 2024-09-03 PROBLEM — I16.1 HYPERTENSIVE EMERGENCY: Status: ACTIVE | Noted: 2024-09-03

## 2024-09-03 NOTE — PROGRESS NOTES
Enter Query Response Below      Query Response: problem list updated.              If applicable, please update the problem list.     Thank you for your response to the CDI query.   When responding to queries sent by CDI, the preferred method is to respond with a New Note. By responding with a new note, the note will include your query response and signature.  How to Respond to this query:  a. Click New Note  b. Answer query within the yellow box.  c. Update the Problem List, if applicable.  ----- Message -----  From: Macrina David APRN  Sent: 2024  10:28 AM EDT  To: Kaylie Barrera  Subject: RE: CDI Query                                        Hypertensive emergency on admission, now resolved. Now managing hypertension.      ----- Message -----  From: Kaylie Barrera  Sent: 2024   9:50 AM EDT  To: ROXANE Rios  Subject: CDI Query                                            Patient: Dalila Pike        : 1970  Account: [id number]           Admit Date:                                                     How to Respond to this query:                   a. Click New Note                b. Answer query within the yellow box.                c. Update the Problem List, if applicable.        If you have any questions about this query contact me at: [email or web address]      Shannon Joann:      53 year old female admitted with Acute CVA.  Clinical indicators: History of hypertension.  ED Provider note: Blood pressure 198/115 ().  H&P and progress note assessments includes hypertension.  Treatments: ICU monitoring, iv cardene , iv apresoline prn for high blood pressure.  Please clarify diagnosis treated/monitored:     Hypertensive emergency  Other- specify _______  Unable to determine         By submitting this query, we are merely seeking further clarification of documentation to accurately reflect all conditions that you are monitoring, evaluating, treating or that  extend the hospitalization or utilize additional resources of care. Please utilize your independent clinical judgment when addressing the question(s) above.      This query and your response, once completed, will be entered into the legal medical record.     Sincerely,  Kaylie Barrera RN  Clinical Documentation Integrity Program

## 2024-09-06 ENCOUNTER — HOSPITAL ENCOUNTER (OUTPATIENT)
Dept: OCCUPATIONAL THERAPY | Facility: HOSPITAL | Age: 54
Setting detail: THERAPIES SERIES
Discharge: HOME | End: 2024-09-06
Payer: MEDICAID

## 2024-09-06 ENCOUNTER — TRANSCRIBE ORDERS (OUTPATIENT)
Dept: PHYSICAL THERAPY | Facility: HOSPITAL | Age: 54
End: 2024-09-06
Payer: MEDICAID

## 2024-09-06 DIAGNOSIS — R53.1 LEFT-SIDED WEAKNESS: ICD-10-CM

## 2024-09-06 DIAGNOSIS — R53.1 WEAKNESS: Primary | ICD-10-CM

## 2024-09-06 DIAGNOSIS — M25.512 ACUTE PAIN OF LEFT SHOULDER: ICD-10-CM

## 2024-09-06 DIAGNOSIS — Z74.09 IMPAIRED MOBILITY AND ADLS: ICD-10-CM

## 2024-09-06 DIAGNOSIS — I63.9 ACUTE CVA (CEREBROVASCULAR ACCIDENT): Primary | ICD-10-CM

## 2024-09-06 DIAGNOSIS — Z78.9 IMPAIRED MOBILITY AND ADLS: ICD-10-CM

## 2024-09-06 PROCEDURE — 97535 SELF CARE MNGMENT TRAINING: CPT | Performed by: OCCUPATIONAL THERAPIST

## 2024-09-06 PROCEDURE — 97167 OT EVAL HIGH COMPLEX 60 MIN: CPT | Performed by: OCCUPATIONAL THERAPIST

## 2024-09-06 NOTE — THERAPY EVALUATION
____________________________________________________________________    [facility]  [address] [place] [address]  Phone: [phone] / Fax: [phone]        Occupational Therapy     Initial Evaluation and Plan of Care        Patient: Dalila Pike   : 1970    Referring practitioner: ROXANE Eid  Date of Initial Visit: 2024  Today's Date: 2024           Visit Diagnoses:    ICD-10-CM ICD-9-CM   1. Acute CVA (cerebrovascular accident)  I63.9 434.91   2. Left-sided weakness  R53.1 728.87   3. Impaired mobility and ADLs  Z74.09 V49.89    Z78.9    4. Acute pain of left shoulder  M25.512 719.41           Subjective Evaluation    History of Present Illness  Date of onset: 2024  Mechanism of injury: Pt presents for OT eval with daughter in law present. Pt reports having a recent stroke in which EMS helped her up out of the floor and states that she thinks that her left foot was broken during the time of transitioning from floor to ambulance. Pt reports sending one day at [facility] due leave due to unhappy with care she was receiving. Pt has now been referred for outpatient OT by PCP to optimize independence and safety.        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   Precautions and Work Restrictions: NWB LLE, fallsPain  Current pain ratin  At best pain ratin  At worst pain ratin  Location: left arm 8/10; left foot 10/10  Quality: needle-like, radiating, throbbing, discomfort and pulling    Social Support  Lives in: trailer and one-story house  Lives with: significant other    Hand dominance: right    Patient Goals  Patient goals for therapy: decreased edema, decreased pain, increased motion, improved balance, increased strength, independence with ADLs/IADLs, return to sport/leisure activities and return to work               Objective          Active Range of Motion     Right Shoulder    Normal active range of motion    Right Elbow   Normal active range of motion    Right Wrist   Normal active range of motion    Passive Range of Motion   Left Shoulder   Flexion: 90 degrees with pain  Extension: WFL  Abduction: WFL  Adduction: WFL    Left Elbow   Normal passive range of motion    Strength/Myotome Testing     Left Shoulder     Planes of Motion   Flexion: 0   Extension: 0   Abduction: 0     Isolated Muscles   Upper trapezius: 1     Right Shoulder   Normal muscle strength    Right Elbow   Flexion: 5  Extension: 3+    Tests     Left Shoulder   Positive sulcus sign.     Functional Assessment     Comments  Barthel Index: 11/20, Moderate Dependency    Modified Nicole Breathitt scale : 1-2 , LUE generally     General Comments     Wrist/Hand Comments  Left finger IPs with increased flexor tone         Assessment & Plan       Assessment  Impairments: abnormal coordination, abnormal muscle tone, abnormal or restricted ROM, activity intolerance, impaired balance, impaired physical strength, lacks appropriate home exercise program, safety issue and weight-bearing intolerance   Assessment details: Pt presents  2+ weeks s/p CVA with left side weakness and generally hemiplegia left UE. Pt NWB on left LE due to fracture. Pt currently wearing shoulder sling for support and demonstrates positive sulcus sign and pain <90 degree passive shoulder ROM. Pt score at moderate dependence on Barthel Index per pt report. Pt reports no deficits at prior level of function. Pt will benefit from OT treatment to improve functional abilities and optimize ADL safety and independence. OT to treat 2x per week for 12 weeks until all goals or maximal benefit from treatment is achieved.   Prognosis: good  Prognosis details: TREATMENT: Pt educated on beginning HEP including self ROM < 90 degrees, tabletop sweeping AA/PROM, and mirror box therapy. Also, pt education provided on availability of w/c arm rests as well as mirror box and  alternative mirror box setups; Pt with good return.     Goals  Plan Goals: STG - 4 weeks    To improve independence and safety in ADLs and iADLs:     1.  Pt will indicates 50% reduction in worst pain.  2.  Pt will be independent with beginning HEP and able to demonstrate in clinic.  3. Pt will improve Barthel Index to score Slight Dependency  4.  Pt. Will improve right elbow extension strength .5 to 1 MMT     LTG - 12 weeks    To improve independence and safety in ADLs and iADLs:    1.  Pt will indicates 90% reduction in worst pain.  2.  Pt will be independent with complete HEP and able to demonstrate in clinic.  3. Pt will improve Barthel Index to score independent  4.  Pt. Will improve right elbow extension strength to 5/5 MMT            Plan  Frequency: 2x week  Duration in weeks: 12  Treatment plan discussed with: patient and family  Plan details: OT to treat 2x per week for 12 weeks until all goals or maximal benefit from treatment is achieved.             Treatment may include: patient education, position needs such as orthotics/braces, Occupational based treatment, including ADLs, IADLs, work, leisure, development and progression of HEP, therapeutic exercise, therapeutic activities, neuromuscular interventions, joint/soft tissue mobilizations, taping, and modalities as indicated to include heat, ice, ultrasound, TENS, and/or electrical stimulation. Patient agrees with/understands the plan of care. Patient advised to call the clinic with any questions or concerns.     If patient does not return for 30 days, or misses 2 appointments, patient will be subject to day to day appointments or discharge, and a new physician order may be required.             History # of Personal Factors and/or Comorbidities: HIGH (3+)  Examination of Body System(s): # of elements: HIGH (4+)  Clinical Presentation: STABLE   Clinical Decision Making: HIGH        Timed:         Manual Therapy:    0     mins  36027;     Therapeutic  Exercise:    0     mins  93621;     Neuromuscular Camille:    0    mins  94817;    Therapeutic Activity:     0     mins  05974;     Ultrasound:     0     mins  92138;    Ionto                               0    mins   70740  Self Care                       24     mins   14415  Electrical Stimulation:    0     mins  13115    Un-Timed:  Electrical Stimulation:    0     mins  91125 ( );    Low Eval     0     Mins  83813  Mod Eval     0     Mins  18441  High Eval                       44     Mins  98920        Timed Treatment:   68   mins   Total Treatment:     68   mins        OT SIGNATURE:       Occupational Therapist, Certified Hand Therapist    [place] License #[id number]  NPI #[id number]  Electronically signed by: Kenny D. Maynes, OTSTEFANIA/L, CHT, CKTP, CEAS 9/6/2024                  Certification Period: 9/6/2024 thru 12/4/2024    I certify that the therapy services are furnished while this patient is under my care.  The services outlined above are required by this patient, and will be reviewed every 90 days.         Physician Signature:__________________________________________________    PHYSICIAN: ROXANE Eid  NPI:   DATE:      Please sign and return via fax to [phone] . Thank you, [facility].

## 2024-09-10 ENCOUNTER — DOCUMENTATION (OUTPATIENT)
Dept: OCCUPATIONAL THERAPY | Facility: HOSPITAL | Age: 54
End: 2024-09-10
Payer: MEDICAID

## 2024-09-10 NOTE — THERAPY TREATMENT NOTE
Pt called and reported she would be about 15-20 minutes late and was informed by office staff that she could see been seen but it would reduce her treatment time to about 20-30 minutes due to other appointments; however patient arrived approx. 1 hour 20 minutes late and therefore, unfortunately, was unable to be seen for treatment this date. Pt was understanding and plans to attend next scheduled session.

## 2024-09-11 ENCOUNTER — TELEPHONE (OUTPATIENT)
Dept: CARDIOLOGY | Facility: CLINIC | Age: 54
End: 2024-09-11
Payer: MEDICAID

## 2024-09-11 NOTE — TELEPHONE ENCOUNTER
HOLTER URGENT EVENT ROGER.  HOLTER ATTACHED TO ORDER.  NO DATA RECORDED WITH SYNCOPE AND COLLAPSE.

## 2024-09-12 NOTE — TELEPHONE ENCOUNTER
Called patient to check on how she was doing. Patient did not fall. Pt doing well. Pt says she has monitor attached.

## 2024-09-13 ENCOUNTER — APPOINTMENT (OUTPATIENT)
Dept: OCCUPATIONAL THERAPY | Facility: HOSPITAL | Age: 54
End: 2024-09-13
Payer: MEDICAID

## 2024-09-17 ENCOUNTER — OFFICE VISIT (OUTPATIENT)
Dept: NEUROSURGERY | Facility: CLINIC | Age: 54
End: 2024-09-17
Payer: MEDICAID

## 2024-09-17 VITALS — BODY MASS INDEX: 37.28 KG/M2 | TEMPERATURE: 97.5 F | WEIGHT: 246 LBS | HEIGHT: 68 IN

## 2024-09-17 DIAGNOSIS — I63.9 ACUTE CVA (CEREBROVASCULAR ACCIDENT): Primary | ICD-10-CM

## 2024-09-17 PROCEDURE — 1159F MED LIST DOCD IN RCRD: CPT

## 2024-09-17 PROCEDURE — 1160F RVW MEDS BY RX/DR IN RCRD: CPT

## 2024-09-17 PROCEDURE — 99024 POSTOP FOLLOW-UP VISIT: CPT

## 2024-09-17 RX ORDER — ASPIRIN 81 MG/1
81 TABLET, CHEWABLE ORAL DAILY
Qty: 30 TABLET | Refills: 2 | Status: SHIPPED | OUTPATIENT
Start: 2024-09-17

## 2024-09-17 RX ORDER — PANTOPRAZOLE SODIUM 40 MG/1
40 TABLET, DELAYED RELEASE ORAL DAILY
COMMUNITY

## 2024-09-18 ENCOUNTER — TELEPHONE (OUTPATIENT)
Dept: NEUROSURGERY | Facility: CLINIC | Age: 54
End: 2024-09-18
Payer: MEDICAID

## 2024-09-18 ENCOUNTER — TELEPHONE (OUTPATIENT)
Dept: PHYSICAL THERAPY | Facility: HOSPITAL | Age: 54
End: 2024-09-18
Payer: MEDICAID

## 2024-09-19 ENCOUNTER — OFFICE VISIT (OUTPATIENT)
Dept: NEUROLOGY | Facility: CLINIC | Age: 54
End: 2024-09-19
Payer: MEDICAID

## 2024-09-19 VITALS
HEART RATE: 71 BPM | TEMPERATURE: 97.6 F | BODY MASS INDEX: 36.83 KG/M2 | HEIGHT: 68 IN | OXYGEN SATURATION: 98 % | WEIGHT: 243 LBS | DIASTOLIC BLOOD PRESSURE: 84 MMHG | SYSTOLIC BLOOD PRESSURE: 140 MMHG

## 2024-09-19 DIAGNOSIS — Z87.898 HISTORY OF SEIZURES: ICD-10-CM

## 2024-09-19 DIAGNOSIS — I63.9 ACUTE CVA (CEREBROVASCULAR ACCIDENT): ICD-10-CM

## 2024-09-19 DIAGNOSIS — E78.5 DYSLIPIDEMIA: Chronic | ICD-10-CM

## 2024-09-19 DIAGNOSIS — I10 PRIMARY HYPERTENSION: ICD-10-CM

## 2024-09-19 DIAGNOSIS — E66.9 OBESITY (BMI 30-39.9): Chronic | ICD-10-CM

## 2024-09-19 DIAGNOSIS — Z72.0 TOBACCO USE: Chronic | ICD-10-CM

## 2024-09-19 DIAGNOSIS — R40.20: Primary | ICD-10-CM

## 2024-09-19 DIAGNOSIS — Z78.9 ALCOHOL USE: Chronic | ICD-10-CM

## 2024-09-19 DIAGNOSIS — G44.59 OTHER COMPLICATED HEADACHE SYNDROME: ICD-10-CM

## 2024-09-19 RX ORDER — TOPIRAMATE 25 MG/1
25 TABLET, FILM COATED ORAL NIGHTLY
Qty: 30 TABLET | Refills: 1 | Status: SHIPPED | OUTPATIENT
Start: 2024-09-19 | End: 2025-09-19

## 2024-09-19 RX ORDER — GUAIFENESIN 600 MG/1
1200 TABLET, EXTENDED RELEASE ORAL 2 TIMES DAILY
COMMUNITY

## 2024-09-20 PROBLEM — R40.20: Status: ACTIVE | Noted: 2024-09-20

## 2024-09-24 ENCOUNTER — APPOINTMENT (OUTPATIENT)
Dept: OCCUPATIONAL THERAPY | Facility: HOSPITAL | Age: 54
End: 2024-09-24
Payer: MEDICAID

## 2024-09-27 ENCOUNTER — APPOINTMENT (OUTPATIENT)
Dept: OCCUPATIONAL THERAPY | Facility: HOSPITAL | Age: 54
End: 2024-09-27
Payer: MEDICAID

## 2024-10-07 ENCOUNTER — APPOINTMENT (OUTPATIENT)
Dept: OTHER | Facility: HOSPITAL | Age: 54
End: 2024-10-07
Payer: MEDICAID

## 2024-10-07 ENCOUNTER — HOSPITAL ENCOUNTER (INPATIENT)
Facility: HOSPITAL | Age: 54
LOS: 9 days | Discharge: NURSING, REHAB OR LONG-TERM CARE | End: 2024-10-16
Attending: STUDENT IN AN ORGANIZED HEALTH CARE EDUCATION/TRAINING PROGRAM | Admitting: STUDENT IN AN ORGANIZED HEALTH CARE EDUCATION/TRAINING PROGRAM
Payer: MEDICAID

## 2024-10-07 DIAGNOSIS — K83.09 CHOLANGITIS: ICD-10-CM

## 2024-10-07 DIAGNOSIS — K29.70 GASTRITIS: ICD-10-CM

## 2024-10-07 DIAGNOSIS — K83.8 DILATED CBD, ACQUIRED: Primary | ICD-10-CM

## 2024-10-07 PROBLEM — R10.11 RUQ PAIN: Status: ACTIVE | Noted: 2024-10-07

## 2024-10-07 LAB
ALBUMIN SERPL-MCNC: 4.3 G/DL (ref 3.5–5.2)
ALBUMIN/GLOB SERPL: 1.6 G/DL
ALP SERPL-CCNC: 199 U/L (ref 39–117)
ALT SERPL W P-5'-P-CCNC: 29 U/L (ref 1–33)
AMPHET+METHAMPHET UR QL: NEGATIVE
AMPHETAMINES UR QL: NEGATIVE
ANION GAP SERPL CALCULATED.3IONS-SCNC: 9 MMOL/L (ref 5–15)
AST SERPL-CCNC: 22 U/L (ref 1–32)
B PARAPERT DNA SPEC QL NAA+PROBE: NOT DETECTED
B PERT DNA SPEC QL NAA+PROBE: NOT DETECTED
BARBITURATES UR QL SCN: NEGATIVE
BASOPHILS # BLD AUTO: 0.03 10*3/MM3 (ref 0–0.2)
BASOPHILS NFR BLD AUTO: 0.4 % (ref 0–1.5)
BENZODIAZ UR QL SCN: NEGATIVE
BILIRUB SERPL-MCNC: 1.3 MG/DL (ref 0–1.2)
BUN SERPL-MCNC: 14 MG/DL (ref 6–20)
BUN/CREAT SERPL: 21.2 (ref 7–25)
BUPRENORPHINE SERPL-MCNC: NEGATIVE NG/ML
C PNEUM DNA NPH QL NAA+NON-PROBE: NOT DETECTED
CALCIUM SPEC-SCNC: 9.4 MG/DL (ref 8.6–10.5)
CANNABINOIDS SERPL QL: NEGATIVE
CHLORIDE SERPL-SCNC: 104 MMOL/L (ref 98–107)
CO2 SERPL-SCNC: 27 MMOL/L (ref 22–29)
COCAINE UR QL: NEGATIVE
CREAT SERPL-MCNC: 0.66 MG/DL (ref 0.57–1)
D-LACTATE SERPL-SCNC: 1.4 MMOL/L (ref 0.5–2)
DEPRECATED RDW RBC AUTO: 39.1 FL (ref 37–54)
EGFRCR SERPLBLD CKD-EPI 2021: 105 ML/MIN/1.73
EOSINOPHIL # BLD AUTO: 0.04 10*3/MM3 (ref 0–0.4)
EOSINOPHIL NFR BLD AUTO: 0.5 % (ref 0.3–6.2)
ERYTHROCYTE [DISTWIDTH] IN BLOOD BY AUTOMATED COUNT: 11.7 % (ref 12.3–15.4)
FENTANYL UR-MCNC: NEGATIVE NG/ML
FLUAV SUBTYP SPEC NAA+PROBE: NOT DETECTED
FLUBV RNA ISLT QL NAA+PROBE: NOT DETECTED
GLOBULIN UR ELPH-MCNC: 2.7 GM/DL
GLUCOSE SERPL-MCNC: 116 MG/DL (ref 65–99)
HADV DNA SPEC NAA+PROBE: NOT DETECTED
HCOV 229E RNA SPEC QL NAA+PROBE: NOT DETECTED
HCOV HKU1 RNA SPEC QL NAA+PROBE: NOT DETECTED
HCOV NL63 RNA SPEC QL NAA+PROBE: NOT DETECTED
HCOV OC43 RNA SPEC QL NAA+PROBE: NOT DETECTED
HCT VFR BLD AUTO: 42.9 % (ref 34–46.6)
HGB BLD-MCNC: 14.3 G/DL (ref 12–15.9)
HMPV RNA NPH QL NAA+NON-PROBE: NOT DETECTED
HPIV1 RNA ISLT QL NAA+PROBE: NOT DETECTED
HPIV2 RNA SPEC QL NAA+PROBE: NOT DETECTED
HPIV3 RNA NPH QL NAA+PROBE: NOT DETECTED
HPIV4 P GENE NPH QL NAA+PROBE: NOT DETECTED
IMM GRANULOCYTES # BLD AUTO: 0.02 10*3/MM3 (ref 0–0.05)
IMM GRANULOCYTES NFR BLD AUTO: 0.2 % (ref 0–0.5)
LYMPHOCYTES # BLD AUTO: 1.53 10*3/MM3 (ref 0.7–3.1)
LYMPHOCYTES NFR BLD AUTO: 19.1 % (ref 19.6–45.3)
M PNEUMO IGG SER IA-ACNC: NOT DETECTED
MCH RBC QN AUTO: 30.9 PG (ref 26.6–33)
MCHC RBC AUTO-ENTMCNC: 33.3 G/DL (ref 31.5–35.7)
MCV RBC AUTO: 92.7 FL (ref 79–97)
METHADONE UR QL SCN: NEGATIVE
MONOCYTES # BLD AUTO: 0.53 10*3/MM3 (ref 0.1–0.9)
MONOCYTES NFR BLD AUTO: 6.6 % (ref 5–12)
NEUTROPHILS NFR BLD AUTO: 5.87 10*3/MM3 (ref 1.7–7)
NEUTROPHILS NFR BLD AUTO: 73.2 % (ref 42.7–76)
NRBC BLD AUTO-RTO: 0 /100 WBC (ref 0–0.2)
OPIATES UR QL: POSITIVE
OXYCODONE UR QL SCN: POSITIVE
PCP UR QL SCN: NEGATIVE
PLATELET # BLD AUTO: 236 10*3/MM3 (ref 140–450)
PMV BLD AUTO: 9.9 FL (ref 6–12)
POTASSIUM SERPL-SCNC: 4.1 MMOL/L (ref 3.5–5.2)
PROCALCITONIN SERPL-MCNC: 0.03 NG/ML (ref 0–0.25)
PROT SERPL-MCNC: 7 G/DL (ref 6–8.5)
RBC # BLD AUTO: 4.63 10*6/MM3 (ref 3.77–5.28)
RHINOVIRUS RNA SPEC NAA+PROBE: NOT DETECTED
RSV RNA NPH QL NAA+NON-PROBE: NOT DETECTED
SARS-COV-2 RNA NPH QL NAA+NON-PROBE: NOT DETECTED
SODIUM SERPL-SCNC: 140 MMOL/L (ref 136–145)
TRICYCLICS UR QL SCN: NEGATIVE
WBC NRBC COR # BLD AUTO: 8.02 10*3/MM3 (ref 3.4–10.8)

## 2024-10-07 PROCEDURE — 25010000002 ONDANSETRON PER 1 MG: Performed by: INTERNAL MEDICINE

## 2024-10-07 PROCEDURE — 87040 BLOOD CULTURE FOR BACTERIA: CPT | Performed by: INTERNAL MEDICINE

## 2024-10-07 PROCEDURE — 85025 COMPLETE CBC W/AUTO DIFF WBC: CPT | Performed by: INTERNAL MEDICINE

## 2024-10-07 PROCEDURE — 83605 ASSAY OF LACTIC ACID: CPT | Performed by: INTERNAL MEDICINE

## 2024-10-07 PROCEDURE — 0202U NFCT DS 22 TRGT SARS-COV-2: CPT | Performed by: INTERNAL MEDICINE

## 2024-10-07 PROCEDURE — 99223 1ST HOSP IP/OBS HIGH 75: CPT | Performed by: INTERNAL MEDICINE

## 2024-10-07 PROCEDURE — 25010000002 HEPARIN (PORCINE) PER 1000 UNITS: Performed by: INTERNAL MEDICINE

## 2024-10-07 PROCEDURE — 25810000003 SODIUM CHLORIDE 0.9 % SOLUTION 1,000 ML FLEX CONT: Performed by: INTERNAL MEDICINE

## 2024-10-07 PROCEDURE — G0378 HOSPITAL OBSERVATION PER HR: HCPCS

## 2024-10-07 PROCEDURE — 25010000002 PIPERACILLIN SOD-TAZOBACTAM PER 1 G: Performed by: INTERNAL MEDICINE

## 2024-10-07 PROCEDURE — 80307 DRUG TEST PRSMV CHEM ANLYZR: CPT | Performed by: INTERNAL MEDICINE

## 2024-10-07 PROCEDURE — 25010000002 THIAMINE HCL 200 MG/2ML SOLUTION: Performed by: INTERNAL MEDICINE

## 2024-10-07 PROCEDURE — 84145 PROCALCITONIN (PCT): CPT | Performed by: INTERNAL MEDICINE

## 2024-10-07 PROCEDURE — 80053 COMPREHEN METABOLIC PANEL: CPT | Performed by: INTERNAL MEDICINE

## 2024-10-07 RX ORDER — PANTOPRAZOLE SODIUM 40 MG/1
40 TABLET, DELAYED RELEASE ORAL DAILY
Status: DISCONTINUED | OUTPATIENT
Start: 2024-10-07 | End: 2024-10-07 | Stop reason: SDUPTHER

## 2024-10-07 RX ORDER — BISACODYL 10 MG
10 SUPPOSITORY, RECTAL RECTAL DAILY PRN
Status: DISCONTINUED | OUTPATIENT
Start: 2024-10-07 | End: 2024-10-16 | Stop reason: HOSPADM

## 2024-10-07 RX ORDER — THIAMINE HYDROCHLORIDE 100 MG/ML
200 INJECTION, SOLUTION INTRAMUSCULAR; INTRAVENOUS EVERY 8 HOURS SCHEDULED
Status: COMPLETED | OUTPATIENT
Start: 2024-10-07 | End: 2024-10-12

## 2024-10-07 RX ORDER — LORAZEPAM 1 MG/1
2 TABLET ORAL
Status: ACTIVE | OUTPATIENT
Start: 2024-10-07 | End: 2024-10-12

## 2024-10-07 RX ORDER — LORAZEPAM 1 MG/1
1 TABLET ORAL DAILY
Status: COMPLETED | OUTPATIENT
Start: 2024-10-11 | End: 2024-10-11

## 2024-10-07 RX ORDER — HYDROMORPHONE HYDROCHLORIDE 1 MG/ML
0.5 INJECTION, SOLUTION INTRAMUSCULAR; INTRAVENOUS; SUBCUTANEOUS
Status: DISPENSED | OUTPATIENT
Start: 2024-10-07 | End: 2024-10-12

## 2024-10-07 RX ORDER — SODIUM CHLORIDE 0.9 % (FLUSH) 0.9 %
1-10 SYRINGE (ML) INJECTION AS NEEDED
Status: DISCONTINUED | OUTPATIENT
Start: 2024-10-07 | End: 2024-10-16 | Stop reason: HOSPADM

## 2024-10-07 RX ORDER — ASPIRIN 81 MG/1
81 TABLET, CHEWABLE ORAL DAILY
Status: DISCONTINUED | OUTPATIENT
Start: 2024-10-07 | End: 2024-10-16 | Stop reason: HOSPADM

## 2024-10-07 RX ORDER — ACETAMINOPHEN 160 MG/5ML
650 SOLUTION ORAL EVERY 4 HOURS PRN
Status: DISCONTINUED | OUTPATIENT
Start: 2024-10-07 | End: 2024-10-16 | Stop reason: HOSPADM

## 2024-10-07 RX ORDER — SODIUM CHLORIDE 0.9 % (FLUSH) 0.9 %
10 SYRINGE (ML) INJECTION EVERY 12 HOURS SCHEDULED
Status: DISCONTINUED | OUTPATIENT
Start: 2024-10-07 | End: 2024-10-16 | Stop reason: HOSPADM

## 2024-10-07 RX ORDER — ACETAMINOPHEN 650 MG/1
650 SUPPOSITORY RECTAL EVERY 4 HOURS PRN
Status: DISCONTINUED | OUTPATIENT
Start: 2024-10-07 | End: 2024-10-16 | Stop reason: HOSPADM

## 2024-10-07 RX ORDER — ONDANSETRON 2 MG/ML
4 INJECTION INTRAMUSCULAR; INTRAVENOUS EVERY 6 HOURS PRN
Status: DISCONTINUED | OUTPATIENT
Start: 2024-10-07 | End: 2024-10-16 | Stop reason: HOSPADM

## 2024-10-07 RX ORDER — MIDAZOLAM HYDROCHLORIDE 1 MG/ML
2 INJECTION INTRAMUSCULAR; INTRAVENOUS
Status: ACTIVE | OUTPATIENT
Start: 2024-10-07 | End: 2024-10-12

## 2024-10-07 RX ORDER — ATORVASTATIN CALCIUM 40 MG/1
80 TABLET, FILM COATED ORAL NIGHTLY
Status: DISCONTINUED | OUTPATIENT
Start: 2024-10-07 | End: 2024-10-16 | Stop reason: HOSPADM

## 2024-10-07 RX ORDER — MIDAZOLAM HYDROCHLORIDE 1 MG/ML
4 INJECTION INTRAMUSCULAR; INTRAVENOUS
Status: ACTIVE | OUTPATIENT
Start: 2024-10-07 | End: 2024-10-12

## 2024-10-07 RX ORDER — BISACODYL 5 MG/1
5 TABLET, DELAYED RELEASE ORAL DAILY PRN
Status: DISCONTINUED | OUTPATIENT
Start: 2024-10-07 | End: 2024-10-16 | Stop reason: HOSPADM

## 2024-10-07 RX ORDER — GUAIFENESIN 600 MG/1
1200 TABLET, EXTENDED RELEASE ORAL 2 TIMES DAILY
Status: DISCONTINUED | OUTPATIENT
Start: 2024-10-07 | End: 2024-10-08

## 2024-10-07 RX ORDER — NICOTINE 21 MG/24HR
1 PATCH, TRANSDERMAL 24 HOURS TRANSDERMAL EVERY 24 HOURS
Status: DISCONTINUED | OUTPATIENT
Start: 2024-10-07 | End: 2024-10-10

## 2024-10-07 RX ORDER — LORAZEPAM 1 MG/1
2 TABLET ORAL EVERY 6 HOURS
Status: DISPENSED | OUTPATIENT
Start: 2024-10-07 | End: 2024-10-08

## 2024-10-07 RX ORDER — ALUMINA, MAGNESIA, AND SIMETHICONE 2400; 2400; 240 MG/30ML; MG/30ML; MG/30ML
15 SUSPENSION ORAL EVERY 6 HOURS PRN
Status: DISCONTINUED | OUTPATIENT
Start: 2024-10-07 | End: 2024-10-16 | Stop reason: HOSPADM

## 2024-10-07 RX ORDER — FOLIC ACID 1 MG/1
1 TABLET ORAL DAILY
Status: DISCONTINUED | OUTPATIENT
Start: 2024-10-07 | End: 2024-10-16 | Stop reason: HOSPADM

## 2024-10-07 RX ORDER — NALOXONE HCL 0.4 MG/ML
0.4 VIAL (ML) INJECTION
Status: DISCONTINUED | OUTPATIENT
Start: 2024-10-07 | End: 2024-10-16 | Stop reason: HOSPADM

## 2024-10-07 RX ORDER — HEPARIN SODIUM 5000 [USP'U]/ML
5000 INJECTION, SOLUTION INTRAVENOUS; SUBCUTANEOUS EVERY 8 HOURS SCHEDULED
Status: DISCONTINUED | OUTPATIENT
Start: 2024-10-07 | End: 2024-10-16 | Stop reason: HOSPADM

## 2024-10-07 RX ORDER — AMOXICILLIN 250 MG
2 CAPSULE ORAL 2 TIMES DAILY PRN
Status: DISCONTINUED | OUTPATIENT
Start: 2024-10-07 | End: 2024-10-16 | Stop reason: HOSPADM

## 2024-10-07 RX ORDER — LORAZEPAM 1 MG/1
1 TABLET ORAL EVERY 6 HOURS
Status: DISPENSED | OUTPATIENT
Start: 2024-10-08 | End: 2024-10-09

## 2024-10-07 RX ORDER — TOPIRAMATE 25 MG/1
25 TABLET, FILM COATED ORAL NIGHTLY
Status: DISCONTINUED | OUTPATIENT
Start: 2024-10-07 | End: 2024-10-16 | Stop reason: HOSPADM

## 2024-10-07 RX ORDER — HYDROCODONE BITARTRATE AND ACETAMINOPHEN 5; 325 MG/1; MG/1
1 TABLET ORAL EVERY 6 HOURS PRN
Status: DISPENSED | OUTPATIENT
Start: 2024-10-07 | End: 2024-10-12

## 2024-10-07 RX ORDER — LISINOPRIL 20 MG/1
20 TABLET ORAL DAILY
Status: DISCONTINUED | OUTPATIENT
Start: 2024-10-07 | End: 2024-10-16 | Stop reason: HOSPADM

## 2024-10-07 RX ORDER — LORAZEPAM 1 MG/1
1 TABLET ORAL EVERY 12 HOURS SCHEDULED
Status: COMPLETED | OUTPATIENT
Start: 2024-10-09 | End: 2024-10-10

## 2024-10-07 RX ORDER — ONDANSETRON 4 MG/1
4 TABLET, ORALLY DISINTEGRATING ORAL EVERY 6 HOURS PRN
Status: DISCONTINUED | OUTPATIENT
Start: 2024-10-07 | End: 2024-10-16 | Stop reason: HOSPADM

## 2024-10-07 RX ORDER — POLYETHYLENE GLYCOL 3350 17 G/17G
17 POWDER, FOR SOLUTION ORAL DAILY PRN
Status: DISCONTINUED | OUTPATIENT
Start: 2024-10-07 | End: 2024-10-16 | Stop reason: HOSPADM

## 2024-10-07 RX ORDER — ACETAMINOPHEN 325 MG/1
650 TABLET ORAL EVERY 4 HOURS PRN
Status: DISCONTINUED | OUTPATIENT
Start: 2024-10-07 | End: 2024-10-16 | Stop reason: HOSPADM

## 2024-10-07 RX ORDER — SODIUM CHLORIDE 9 MG/ML
40 INJECTION, SOLUTION INTRAVENOUS AS NEEDED
Status: ACTIVE | OUTPATIENT
Start: 2024-10-07 | End: 2024-10-09

## 2024-10-07 RX ORDER — LORAZEPAM 1 MG/1
1 TABLET ORAL
Status: ACTIVE | OUTPATIENT
Start: 2024-10-07 | End: 2024-10-12

## 2024-10-07 RX ADMIN — PIPERACILLIN AND TAZOBACTAM 3.38 G: 3; .375 INJECTION, POWDER, LYOPHILIZED, FOR SOLUTION INTRAVENOUS at 17:02

## 2024-10-07 RX ADMIN — ATORVASTATIN CALCIUM 80 MG: 40 TABLET, FILM COATED ORAL at 21:30

## 2024-10-07 RX ADMIN — TICAGRELOR 60 MG: 60 TABLET ORAL at 21:30

## 2024-10-07 RX ADMIN — GUAIFENESIN 1200 MG: 600 TABLET, EXTENDED RELEASE ORAL at 21:30

## 2024-10-07 RX ADMIN — ONDANSETRON 4 MG: 2 INJECTION INTRAMUSCULAR; INTRAVENOUS at 21:49

## 2024-10-07 RX ADMIN — ACETAMINOPHEN 650 MG: 325 TABLET ORAL at 17:06

## 2024-10-07 RX ADMIN — MAGNESIUM OXIDE TAB 400 MG (241.3 MG ELEMENTAL MG) 400 MG: 400 (241.3 MG) TAB at 17:06

## 2024-10-07 RX ADMIN — ASPIRIN 81 MG 81 MG: 81 TABLET ORAL at 17:20

## 2024-10-07 RX ADMIN — Medication 10 ML: at 21:34

## 2024-10-07 RX ADMIN — NICOTINE 1 PATCH: 21 PATCH TRANSDERMAL at 17:05

## 2024-10-07 RX ADMIN — HEPARIN SODIUM 5000 UNITS: 5000 INJECTION INTRAVENOUS; SUBCUTANEOUS at 21:31

## 2024-10-07 RX ADMIN — FOLIC ACID 1 MG: 1 TABLET ORAL at 17:07

## 2024-10-07 RX ADMIN — HEPARIN SODIUM 5000 UNITS: 5000 INJECTION INTRAVENOUS; SUBCUTANEOUS at 17:07

## 2024-10-07 RX ADMIN — HYDROCODONE BITARTRATE AND ACETAMINOPHEN 1 TABLET: 5; 325 TABLET ORAL at 21:30

## 2024-10-07 RX ADMIN — THIAMINE HYDROCHLORIDE 200 MG: 100 INJECTION, SOLUTION INTRAMUSCULAR; INTRAVENOUS at 21:31

## 2024-10-07 RX ADMIN — LISINOPRIL 20 MG: 20 TABLET ORAL at 17:19

## 2024-10-07 RX ADMIN — SODIUM CHLORIDE 40 ML: 9 INJECTION, SOLUTION INTRAVENOUS at 17:02

## 2024-10-07 NOTE — H&P
[facility]  HISTORY AND PHYSICAL    Patient Name: Dalila Pike  : 1970  MRN: [id number]  Primary Care Physician: Agnes Ewing APRN  Date of admission: 10/7/2024      Subjective   Subjective     Chief Complaint:  RUQ pain     HPI:  Dalila Pike is a 53 y.o. female with PMH of with HTN, HLD, PAD, obesity, history of seizures, ongoing tobacco abuse, EtOH use, recent R MCA territory ischemic stroke s/p thrombectomy and R ICA stent placement (24) with residual L sided weakness, also with recent L ankle fracture seen by Ortho during stroke admission who presented from [facility] with complaints of RUQ pain. Pt reports that she has had months of intermittent RUQ pain but episodes seem to come and go- lately she has noticed them more frequently and they seem more severe.  She also notes that she has had intermittent fevers with these episodes and has had fevers of up to 103-104F in the last few days since onset of RUQ pain. She thinks that a spaghetti meal is what recently set her off.  She was seen in the [facility] ED today and had labs as well as RUQ ultrasound and CT A/P.  I was only able to see RUQ prelim read which showed a CBD dilatation of 1 cm.  Her alk phos was also elevated.  All other labs were wnl.  She was transferred here for MRCP and possible GI evaluation.      Personal History     Past Medical History:   Diagnosis Date    Acute CVA (cerebrovascular accident) 2024    Alcohol use 2024    Dyslipidemia 2024    Obesity (BMI 30-39.9) 2024    PAD (peripheral artery disease) 2024    Tobacco use 2024           Past Surgical History:   Procedure Laterality Date    INTERVENTIONAL RADIOLOGY PROCEDURE Bilateral 2024    Procedure: Carotid Cervical Angiogram;  Surgeon: Jamaal Saini MD;  Location: [facility] INVASIVE LOCATION;  Service: Interventional Radiology;  Laterality: Bilateral;       Family  History: family history is not on file.     Social History:  reports that she has been smoking cigarettes. She started smoking about 9 months ago. She has a 76.4 pack-year smoking history. She has been exposed to tobacco smoke. She has never used smokeless tobacco. She reports that she does not currently use alcohol. She reports that she does not currently use drugs.  Social History     Social History Narrative    Not on file       Medications:  Available home medication information reviewed.  HYDROcodone-acetaminophen, aspirin, atorvastatin, folic acid, guaiFENesin, lisinopril, magnesium oxide, pantoprazole, sodium chloride, ticagrelor, and topiramate    Allergies   Allergen Reactions    Erythromycin Anaphylaxis    Latex Rash    Toradol [Ketorolac Tromethamine] Rash    Contrast Dye (Echo Or Unknown Ct/Mr) Unknown (See Comments)     Hives on chest       Objective   Objective     Vital Signs:   Temp:  [100.7 °F (38.2 °C)] 100.7 °F (38.2 °C)  Heart Rate:  [102] 102  Resp:  [18] 18  BP: (100)/(68) 100/68       Physical Exam   Constitutional: Awake, lethargic, falls asleep mid-sentence  Eyes: PERRLA, sclerae anicteric, no conjunctival injection  HENT: NCAT, mucous membranes moist  Neck: Supple, no thyromegaly, no lymphadenopathy, trachea midline  Respiratory: Clear to auscultation bilaterally, nonlabored respirations   Cardiovascular: RRR, no murmurs, rubs, or gallops, palpable pedal pulses bilaterally  Gastrointestinal: Positive bowel sounds, soft, nontender, nondistended  Musculoskeletal: No bilateral ankle edema, no clubbing or cyanosis to extremities  Psychiatric: Appropriate affect, cooperative  Neurologic: lethargic, left sided weakness  Skin: No rashes   Result Review:  I have personally reviewed the results from the time of this admission to 10/7/2024 16:08 EDT and agree with these findings:  [x]  Laboratory list / accordion  [x]  Microbiology  [x]  Radiology  [x]  EKG/Telemetry   []  Cardiology/Vascular   []   Pathology  [x]  Old records  []  Other:  Most notable findings include: see assessment and plan       LAB RESULTS:                              UA          8/28/2024    11:03   Urinalysis   Squamous Epithelial Cells, UA 0-2    Specific Gravity, UA 1.028    Ketones, UA Trace    Blood, UA Negative    Leukocytes, UA Small (1+)    Nitrite, UA Positive    RBC, UA 0-2    WBC, UA 21-50    Bacteria, UA 3+        Microbiology Results (last 10 days)       ** No results found for the last 240 hours. **            No radiology results from the last 24 hrs    Results for orders placed during the hospital encounter of 08/21/24    Adult Transthoracic Echo Complete W/ Cont if Necessary Per Protocol (With Agitated Saline)    Interpretation Summary    Left ventricular systolic function is normal. Calculated left ventricular EF = 68% Normal left ventricular cavity size noted. Left ventricular wall thickness is consistent with moderate concentric hypertrophy. All left ventricular wall segments contract normally. Left ventricular diastolic function was normal. Normal left atrial pressure.    The right ventricular cavity is mildly dilated. Normal right ventricular systolic function noted.    Left atrial volume is moderately increased. Saline test results are negative.    The aortic valve is structurally normal with no stenosis present. The aortic valve appears trileaflet. No significant aortic valve regurgitation is present.    The mitral valve is structurally normal with no significant stenosis present. Trace to mild mitral valve regurgitation is present.    Mild tricuspid valve regurgitation is present. Estimated right ventricular systolic pressure from tricuspid regurgitation is mildly elevated (35-45 mmHg)    Mild dilation of the ascending aorta is present. Ascending aorta = 3.7 cm      Assessment & Plan   Assessment & Plan       RUQ pain    Alcohol use    Tobacco use    Obesity (BMI 30-39.9)    HTN (hypertension)    Dyslipidemia     History of seizures    PAD (peripheral artery disease)    Dilated cbd, acquired    Ms. Pike is a 54 yo WF with PMH of with HTN, HLD, PAD, obesity, history of seizures, ongoing tobacco abuse, EtOH use, recent R MCA territory ischemic stroke s/p thrombectomy and R ICA stent placement (8/21/24) with residual L sided weakness, also with recent L ankle fracture seen by Ortho during stroke admission who presented from [facility] with complaints of RUQ pain.      Plan:    Choledocholithiasis  ? Cholecystitis  -- pt with RUQ pain and reported dilated CBD on OSH ultrasound (data deficit). Also with elevated Alk phos.  Other labs were unremarkable including tbili, WBC and transaminases.    -- repeat labs now  -- get MRCP  -- reported fever of 103-104F at home.  Unsure if she also has cholecystitis as CT A/P from OSH is not currently available. Will get images uploaded to our system  -- start empiric Zosyn  -- send procal, lactate, blood cultures x 2 and will check UA with Ucx as well.  Also send for respiratory viral panel  -- consult GI in am, may need to consider General Surgery as well given concern for acute cholecystitis   -- NPO except sips for now    Recent CVA s/p thrombectomy and R ICA stent  -- with residual L sided weakness  -- pt was sent to [facility] on 8/28,however, she left AMA from there four days later.  Daughter is interested in seeing about other rehab options   -- continue ASA, Brilinta and statin for now. May need to hold DAPT if surgery is anticipated.    HTN  -- not currently on any antihypertensives    HLD  --continue statin per home regimen     Reported EtOH abuse  -- did not require any benzos for EtOH withdrawal during last admission  -- monitor for now on CIWA with PRN benzos  -- continue thiamine     Recent L ankle fracture  -- followed by Dr. Gonzalez, still has CAM boot in place but was supposed to follow up with Ortho within a week of most recent discharge- need to clarify in am    Tobacco  abuse  -- encouraged cessation  -- nicotine patch while inpatient    Recent + methamphetamine  -- check UDS       Total time spent: 75 minutes  Time spent includes time reviewing chart, face-to-face time, counseling patient/family/caregiver, ordering medications/tests/procedures, communicating with other health care professionals, documenting clinical information in the electronic health record, and coordination of care.       VTE Prophylaxis:  Pharmacologic VTE prophylaxis orders are present.          CODE STATUS:    Code Status and Medical Interventions: CPR (Attempt to Resuscitate); Full Support   Ordered at: 10/07/24 1545     Level Of Support Discussed With:    Patient     Code Status (Patient has no pulse and is not breathing):    CPR (Attempt to Resuscitate)     Medical Interventions (Patient has pulse or is breathing):    Full Support       Expected Discharge   Expected Discharge Date: 10/11/2024; Expected Discharge Time:      Becky Pang MD  10/07/24

## 2024-10-07 NOTE — PLAN OF CARE
Goal Outcome Evaluation:      Pt arrived from [facility] around 15:00. Pt's VSS, RA, non tele. Tender to touch abdomen, with no complaints of N/V/D. Pt drowsy, Tylenol given for mild fever and pain. NPO except for medications. Prior Hx of stroke with L arm in sling. L fractured ankle with boot at bedside. CT+U/S disk sent to CT to be copied in EPIC. First Zozyn Abx given. Family at bedside. Continue POC.

## 2024-10-08 ENCOUNTER — APPOINTMENT (OUTPATIENT)
Dept: GENERAL RADIOLOGY | Facility: HOSPITAL | Age: 54
End: 2024-10-08
Payer: MEDICAID

## 2024-10-08 ENCOUNTER — ANESTHESIA EVENT (OUTPATIENT)
Dept: GASTROENTEROLOGY | Facility: HOSPITAL | Age: 54
End: 2024-10-08
Payer: MEDICAID

## 2024-10-08 ENCOUNTER — ANESTHESIA (OUTPATIENT)
Dept: GASTROENTEROLOGY | Facility: HOSPITAL | Age: 54
End: 2024-10-08
Payer: MEDICAID

## 2024-10-08 ENCOUNTER — APPOINTMENT (OUTPATIENT)
Dept: MRI IMAGING | Facility: HOSPITAL | Age: 54
End: 2024-10-08
Payer: MEDICAID

## 2024-10-08 LAB
ALBUMIN SERPL-MCNC: 3.6 G/DL (ref 3.5–5.2)
ALBUMIN/GLOB SERPL: 1.4 G/DL
ALP SERPL-CCNC: 193 U/L (ref 39–117)
ALT SERPL W P-5'-P-CCNC: 46 U/L (ref 1–33)
ANION GAP SERPL CALCULATED.3IONS-SCNC: 8 MMOL/L (ref 5–15)
AST SERPL-CCNC: 46 U/L (ref 1–32)
BILIRUB SERPL-MCNC: 2 MG/DL (ref 0–1.2)
BILIRUB UR QL STRIP: NEGATIVE
BUN SERPL-MCNC: 13 MG/DL (ref 6–20)
BUN/CREAT SERPL: 20.3 (ref 7–25)
CALCIUM SPEC-SCNC: 8.9 MG/DL (ref 8.6–10.5)
CHLORIDE SERPL-SCNC: 106 MMOL/L (ref 98–107)
CHOLEST SERPL-MCNC: 144 MG/DL (ref 0–200)
CLARITY UR: CLEAR
CO2 SERPL-SCNC: 25 MMOL/L (ref 22–29)
COLOR UR: YELLOW
CREAT SERPL-MCNC: 0.64 MG/DL (ref 0.57–1)
DEPRECATED RDW RBC AUTO: 39.8 FL (ref 37–54)
EGFRCR SERPLBLD CKD-EPI 2021: 105.8 ML/MIN/1.73
ERYTHROCYTE [DISTWIDTH] IN BLOOD BY AUTOMATED COUNT: 11.7 % (ref 12.3–15.4)
GLOBULIN UR ELPH-MCNC: 2.5 GM/DL
GLUCOSE SERPL-MCNC: 118 MG/DL (ref 65–99)
GLUCOSE UR STRIP-MCNC: NEGATIVE MG/DL
HBA1C MFR BLD: 5.5 % (ref 4.8–5.6)
HCT VFR BLD AUTO: 39.6 % (ref 34–46.6)
HDLC SERPL-MCNC: 46 MG/DL (ref 40–60)
HGB BLD-MCNC: 13.2 G/DL (ref 12–15.9)
HGB UR QL STRIP.AUTO: NEGATIVE
KETONES UR QL STRIP: NEGATIVE
LDLC SERPL CALC-MCNC: 75 MG/DL (ref 0–100)
LDLC/HDLC SERPL: 1.57 {RATIO}
LEUKOCYTE ESTERASE UR QL STRIP.AUTO: NEGATIVE
MCH RBC QN AUTO: 30.8 PG (ref 26.6–33)
MCHC RBC AUTO-ENTMCNC: 33.3 G/DL (ref 31.5–35.7)
MCV RBC AUTO: 92.3 FL (ref 79–97)
NITRITE UR QL STRIP: NEGATIVE
PH UR STRIP.AUTO: 6 [PH] (ref 5–8)
PLATELET # BLD AUTO: 197 10*3/MM3 (ref 140–450)
PMV BLD AUTO: 10.3 FL (ref 6–12)
POTASSIUM SERPL-SCNC: 3.9 MMOL/L (ref 3.5–5.2)
PROT SERPL-MCNC: 6.1 G/DL (ref 6–8.5)
PROT UR QL STRIP: NEGATIVE
RBC # BLD AUTO: 4.29 10*6/MM3 (ref 3.77–5.28)
SODIUM SERPL-SCNC: 139 MMOL/L (ref 136–145)
SP GR UR STRIP: 1.03 (ref 1–1.03)
TRIGL SERPL-MCNC: 130 MG/DL (ref 0–150)
TSH SERPL DL<=0.05 MIU/L-ACNC: 0.45 UIU/ML (ref 0.27–4.2)
UROBILINOGEN UR QL STRIP: NORMAL
VLDLC SERPL-MCNC: 23 MG/DL (ref 5–40)
WBC NRBC COR # BLD AUTO: 5.53 10*3/MM3 (ref 3.4–10.8)

## 2024-10-08 PROCEDURE — 25010000002 PHENYLEPHRINE 10 MG/ML SOLUTION

## 2024-10-08 PROCEDURE — 25010000002 THIAMINE HCL 200 MG/2ML SOLUTION: Performed by: INTERNAL MEDICINE

## 2024-10-08 PROCEDURE — 25010000002 ESMOLOL 100 MG/10ML SOLUTION

## 2024-10-08 PROCEDURE — 43264 ERCP REMOVE DUCT CALCULI: CPT | Performed by: INTERNAL MEDICINE

## 2024-10-08 PROCEDURE — 25010000002 DEXAMETHASONE PER 1 MG

## 2024-10-08 PROCEDURE — 25010000002 LIDOCAINE PF 1% 1 % SOLUTION

## 2024-10-08 PROCEDURE — G0378 HOSPITAL OBSERVATION PER HR: HCPCS

## 2024-10-08 PROCEDURE — 25010000002 LABETALOL 5 MG/ML SOLUTION

## 2024-10-08 PROCEDURE — 80050 GENERAL HEALTH PANEL: CPT | Performed by: INTERNAL MEDICINE

## 2024-10-08 PROCEDURE — 74181 MRI ABDOMEN W/O CONTRAST: CPT

## 2024-10-08 PROCEDURE — 43239 EGD BIOPSY SINGLE/MULTIPLE: CPT | Performed by: INTERNAL MEDICINE

## 2024-10-08 PROCEDURE — C1769 GUIDE WIRE: HCPCS | Performed by: INTERNAL MEDICINE

## 2024-10-08 PROCEDURE — 0DB38ZX EXCISION OF LOWER ESOPHAGUS, VIA NATURAL OR ARTIFICIAL OPENING ENDOSCOPIC, DIAGNOSTIC: ICD-10-PCS | Performed by: INTERNAL MEDICINE

## 2024-10-08 PROCEDURE — 25810000003 LACTATED RINGERS PER 1000 ML: Performed by: STUDENT IN AN ORGANIZED HEALTH CARE EDUCATION/TRAINING PROGRAM

## 2024-10-08 PROCEDURE — 81003 URINALYSIS AUTO W/O SCOPE: CPT | Performed by: INTERNAL MEDICINE

## 2024-10-08 PROCEDURE — 83036 HEMOGLOBIN GLYCOSYLATED A1C: CPT | Performed by: INTERNAL MEDICINE

## 2024-10-08 PROCEDURE — 74330 X-RAY BILE/PANC ENDOSCOPY: CPT

## 2024-10-08 PROCEDURE — 25010000002 SUGAMMADEX 200 MG/2ML SOLUTION

## 2024-10-08 PROCEDURE — 25010000002 ONDANSETRON PER 1 MG

## 2024-10-08 PROCEDURE — 25010000002 GLYCOPYRROLATE 1 MG/5ML SOLUTION

## 2024-10-08 PROCEDURE — 0F798DZ DILATION OF COMMON BILE DUCT WITH INTRALUMINAL DEVICE, VIA NATURAL OR ARTIFICIAL OPENING ENDOSCOPIC: ICD-10-PCS | Performed by: INTERNAL MEDICINE

## 2024-10-08 PROCEDURE — 43273 ENDOSCOPIC PANCREATOSCOPY: CPT | Performed by: INTERNAL MEDICINE

## 2024-10-08 PROCEDURE — 80061 LIPID PANEL: CPT | Performed by: INTERNAL MEDICINE

## 2024-10-08 PROCEDURE — 25010000002 GLUCAGON (RDNA) PER 1 MG

## 2024-10-08 PROCEDURE — 74328 X-RAY BILE DUCT ENDOSCOPY: CPT | Performed by: INTERNAL MEDICINE

## 2024-10-08 PROCEDURE — C1874 STENT, COATED/COV W/DEL SYS: HCPCS | Performed by: INTERNAL MEDICINE

## 2024-10-08 PROCEDURE — 43274 ERCP DUCT STENT PLACEMENT: CPT | Performed by: INTERNAL MEDICINE

## 2024-10-08 PROCEDURE — 93005 ELECTROCARDIOGRAM TRACING: CPT | Performed by: STUDENT IN AN ORGANIZED HEALTH CARE EDUCATION/TRAINING PROGRAM

## 2024-10-08 PROCEDURE — 25010000002 HEPARIN (PORCINE) PER 1000 UNITS: Performed by: INTERNAL MEDICINE

## 2024-10-08 PROCEDURE — 25010000002 PIPERACILLIN SOD-TAZOBACTAM PER 1 G: Performed by: INTERNAL MEDICINE

## 2024-10-08 PROCEDURE — 99222 1ST HOSP IP/OBS MODERATE 55: CPT | Performed by: PHYSICIAN ASSISTANT

## 2024-10-08 PROCEDURE — 0DB68ZX EXCISION OF STOMACH, VIA NATURAL OR ARTIFICIAL OPENING ENDOSCOPIC, DIAGNOSTIC: ICD-10-PCS | Performed by: INTERNAL MEDICINE

## 2024-10-08 PROCEDURE — 63710000001 ONDANSETRON ODT 4 MG TABLET DISPERSIBLE: Performed by: INTERNAL MEDICINE

## 2024-10-08 PROCEDURE — 99233 SBSQ HOSP IP/OBS HIGH 50: CPT | Performed by: HOSPITALIST

## 2024-10-08 PROCEDURE — 25010000002 PROPOFOL 10 MG/ML EMULSION

## 2024-10-08 PROCEDURE — 88305 TISSUE EXAM BY PATHOLOGIST: CPT | Performed by: INTERNAL MEDICINE

## 2024-10-08 DEVICE — STENT SYSTEM RMV
Type: IMPLANTABLE DEVICE | Site: BILE DUCT | Status: FUNCTIONAL
Brand: WALLFLEX BILIARY

## 2024-10-08 RX ORDER — PROPOFOL 10 MG/ML
VIAL (ML) INTRAVENOUS AS NEEDED
Status: DISCONTINUED | OUTPATIENT
Start: 2024-10-08 | End: 2024-10-08 | Stop reason: SURG

## 2024-10-08 RX ORDER — LIDOCAINE HYDROCHLORIDE 10 MG/ML
INJECTION, SOLUTION EPIDURAL; INFILTRATION; INTRACAUDAL; PERINEURAL AS NEEDED
Status: DISCONTINUED | OUTPATIENT
Start: 2024-10-08 | End: 2024-10-08 | Stop reason: SURG

## 2024-10-08 RX ORDER — IBUPROFEN 600 MG/1
TABLET ORAL AS NEEDED
Status: DISCONTINUED | OUTPATIENT
Start: 2024-10-08 | End: 2024-10-08 | Stop reason: SURG

## 2024-10-08 RX ORDER — IPRATROPIUM BROMIDE AND ALBUTEROL SULFATE 2.5; .5 MG/3ML; MG/3ML
3 SOLUTION RESPIRATORY (INHALATION) ONCE AS NEEDED
Status: DISCONTINUED | OUTPATIENT
Start: 2024-10-08 | End: 2024-10-08 | Stop reason: HOSPADM

## 2024-10-08 RX ORDER — IPRATROPIUM BROMIDE AND ALBUTEROL SULFATE 2.5; .5 MG/3ML; MG/3ML
3 SOLUTION RESPIRATORY (INHALATION) ONCE AS NEEDED
Status: DISCONTINUED | OUTPATIENT
Start: 2024-10-08 | End: 2024-10-08 | Stop reason: SDUPTHER

## 2024-10-08 RX ORDER — LABETALOL HYDROCHLORIDE 5 MG/ML
INJECTION, SOLUTION INTRAVENOUS AS NEEDED
Status: DISCONTINUED | OUTPATIENT
Start: 2024-10-08 | End: 2024-10-08 | Stop reason: SURG

## 2024-10-08 RX ORDER — SODIUM CHLORIDE, SODIUM LACTATE, POTASSIUM CHLORIDE, CALCIUM CHLORIDE 600; 310; 30; 20 MG/100ML; MG/100ML; MG/100ML; MG/100ML
20 INJECTION, SOLUTION INTRAVENOUS CONTINUOUS
Status: DISCONTINUED | OUTPATIENT
Start: 2024-10-08 | End: 2024-10-09

## 2024-10-08 RX ORDER — ESMOLOL HYDROCHLORIDE 10 MG/ML
INJECTION INTRAVENOUS AS NEEDED
Status: DISCONTINUED | OUTPATIENT
Start: 2024-10-08 | End: 2024-10-08 | Stop reason: SURG

## 2024-10-08 RX ORDER — ONDANSETRON 2 MG/ML
INJECTION INTRAMUSCULAR; INTRAVENOUS AS NEEDED
Status: DISCONTINUED | OUTPATIENT
Start: 2024-10-08 | End: 2024-10-08 | Stop reason: SURG

## 2024-10-08 RX ORDER — INDOMETHACIN 100 MG
SUPPOSITORY, RECTAL RECTAL AS NEEDED
Status: DISCONTINUED | OUTPATIENT
Start: 2024-10-08 | End: 2024-10-08 | Stop reason: HOSPADM

## 2024-10-08 RX ORDER — ROCURONIUM BROMIDE 10 MG/ML
INJECTION, SOLUTION INTRAVENOUS AS NEEDED
Status: DISCONTINUED | OUTPATIENT
Start: 2024-10-08 | End: 2024-10-08 | Stop reason: SURG

## 2024-10-08 RX ORDER — GLYCOPYRROLATE 0.2 MG/ML
INJECTION INTRAMUSCULAR; INTRAVENOUS AS NEEDED
Status: DISCONTINUED | OUTPATIENT
Start: 2024-10-08 | End: 2024-10-08 | Stop reason: SURG

## 2024-10-08 RX ORDER — SUCCINYLCHOLINE/SOD CL,ISO/PF 200MG/10ML
SYRINGE (ML) INTRAVENOUS AS NEEDED
Status: DISCONTINUED | OUTPATIENT
Start: 2024-10-08 | End: 2024-10-08 | Stop reason: SURG

## 2024-10-08 RX ORDER — ONDANSETRON 2 MG/ML
4 INJECTION INTRAMUSCULAR; INTRAVENOUS ONCE AS NEEDED
Status: DISCONTINUED | OUTPATIENT
Start: 2024-10-08 | End: 2024-10-08 | Stop reason: HOSPADM

## 2024-10-08 RX ORDER — PHENYLEPHRINE HYDROCHLORIDE 10 MG/ML
INJECTION INTRAVENOUS AS NEEDED
Status: DISCONTINUED | OUTPATIENT
Start: 2024-10-08 | End: 2024-10-08 | Stop reason: SURG

## 2024-10-08 RX ORDER — DEXAMETHASONE SODIUM PHOSPHATE 4 MG/ML
INJECTION, SOLUTION INTRA-ARTICULAR; INTRALESIONAL; INTRAMUSCULAR; INTRAVENOUS; SOFT TISSUE AS NEEDED
Status: DISCONTINUED | OUTPATIENT
Start: 2024-10-08 | End: 2024-10-08 | Stop reason: SURG

## 2024-10-08 RX ORDER — GUAIFENESIN 200 MG/10ML
400 LIQUID ORAL EVERY 8 HOURS SCHEDULED
Status: DISCONTINUED | OUTPATIENT
Start: 2024-10-08 | End: 2024-10-16 | Stop reason: HOSPADM

## 2024-10-08 RX ADMIN — ESMOLOL HYDROCHLORIDE 10 MG: 10 INJECTION, SOLUTION INTRAVENOUS at 17:21

## 2024-10-08 RX ADMIN — ROCURONIUM BROMIDE 10 MG: 10 SOLUTION INTRAVENOUS at 17:16

## 2024-10-08 RX ADMIN — LABETALOL HYDROCHLORIDE 2.5 MG: 5 INJECTION, SOLUTION INTRAVENOUS at 17:18

## 2024-10-08 RX ADMIN — HEPARIN SODIUM 5000 UNITS: 5000 INJECTION INTRAVENOUS; SUBCUTANEOUS at 05:15

## 2024-10-08 RX ADMIN — HEPARIN SODIUM 5000 UNITS: 5000 INJECTION INTRAVENOUS; SUBCUTANEOUS at 21:10

## 2024-10-08 RX ADMIN — DEXAMETHASONE SODIUM PHOSPHATE 4 MG: 4 INJECTION INTRA-ARTICULAR; INTRALESIONAL; INTRAMUSCULAR; INTRAVENOUS; SOFT TISSUE at 17:00

## 2024-10-08 RX ADMIN — GLUCAGON 0.5 MG: KIT at 17:46

## 2024-10-08 RX ADMIN — ESMOLOL HYDROCHLORIDE 50 MG: 10 INJECTION, SOLUTION INTRAVENOUS at 16:57

## 2024-10-08 RX ADMIN — THIAMINE HYDROCHLORIDE 200 MG: 100 INJECTION, SOLUTION INTRAMUSCULAR; INTRAVENOUS at 21:10

## 2024-10-08 RX ADMIN — PIPERACILLIN AND TAZOBACTAM 3.38 G: 3; .375 INJECTION, POWDER, LYOPHILIZED, FOR SOLUTION INTRAVENOUS at 10:47

## 2024-10-08 RX ADMIN — FOLIC ACID 1 MG: 1 TABLET ORAL at 09:46

## 2024-10-08 RX ADMIN — TOPIRAMATE 25 MG: 25 TABLET, FILM COATED ORAL at 21:10

## 2024-10-08 RX ADMIN — PROPOFOL 250 MG: 10 INJECTION, EMULSION INTRAVENOUS at 16:57

## 2024-10-08 RX ADMIN — ESMOLOL HYDROCHLORIDE 20 MG: 10 INJECTION, SOLUTION INTRAVENOUS at 17:44

## 2024-10-08 RX ADMIN — PHENYLEPHRINE HYDROCHLORIDE 100 MCG: 10 INJECTION INTRAVENOUS at 17:22

## 2024-10-08 RX ADMIN — PIPERACILLIN AND TAZOBACTAM 3.38 G: 3; .375 INJECTION, POWDER, LYOPHILIZED, FOR SOLUTION INTRAVENOUS at 19:52

## 2024-10-08 RX ADMIN — NICOTINE 1 PATCH: 21 PATCH TRANSDERMAL at 19:04

## 2024-10-08 RX ADMIN — LIDOCAINE HYDROCHLORIDE 50 MG: 10 INJECTION, SOLUTION EPIDURAL; INFILTRATION; INTRACAUDAL; PERINEURAL at 16:57

## 2024-10-08 RX ADMIN — TICAGRELOR 60 MG: 60 TABLET ORAL at 21:10

## 2024-10-08 RX ADMIN — SUGAMMADEX 200 MG: 100 INJECTION, SOLUTION INTRAVENOUS at 17:57

## 2024-10-08 RX ADMIN — LISINOPRIL 20 MG: 20 TABLET ORAL at 09:46

## 2024-10-08 RX ADMIN — PIPERACILLIN AND TAZOBACTAM 3.38 G: 3; .375 INJECTION, POWDER, LYOPHILIZED, FOR SOLUTION INTRAVENOUS at 00:21

## 2024-10-08 RX ADMIN — THIAMINE HYDROCHLORIDE 200 MG: 100 INJECTION, SOLUTION INTRAMUSCULAR; INTRAVENOUS at 14:26

## 2024-10-08 RX ADMIN — GUAIFENESIN 400 MG: 100 LIQUID ORAL at 21:10

## 2024-10-08 RX ADMIN — GUAIFENESIN 400 MG: 100 LIQUID ORAL at 09:54

## 2024-10-08 RX ADMIN — LABETALOL HYDROCHLORIDE 2.5 MG: 5 INJECTION, SOLUTION INTRAVENOUS at 18:06

## 2024-10-08 RX ADMIN — PHENYLEPHRINE HYDROCHLORIDE 100 MCG: 10 INJECTION INTRAVENOUS at 17:30

## 2024-10-08 RX ADMIN — THIAMINE HYDROCHLORIDE 200 MG: 100 INJECTION, SOLUTION INTRAMUSCULAR; INTRAVENOUS at 05:15

## 2024-10-08 RX ADMIN — GLUCAGON 0.5 MG: KIT at 17:25

## 2024-10-08 RX ADMIN — Medication 10 ML: at 09:47

## 2024-10-08 RX ADMIN — ONDANSETRON 4 MG: 2 INJECTION INTRAMUSCULAR; INTRAVENOUS at 17:49

## 2024-10-08 RX ADMIN — ONDANSETRON 4 MG: 4 TABLET, ORALLY DISINTEGRATING ORAL at 21:34

## 2024-10-08 RX ADMIN — PIPERACILLIN AND TAZOBACTAM 3.38 G: 3; .375 INJECTION, POWDER, LYOPHILIZED, FOR SOLUTION INTRAVENOUS at 05:15

## 2024-10-08 RX ADMIN — GLYCOPYRROLATE 0.1 MCG: 0.2 INJECTION INTRAMUSCULAR; INTRAVENOUS at 17:13

## 2024-10-08 RX ADMIN — PHENYLEPHRINE HYDROCHLORIDE 100 MCG: 10 INJECTION INTRAVENOUS at 17:34

## 2024-10-08 RX ADMIN — ROCURONIUM BROMIDE 10 MG: 10 SOLUTION INTRAVENOUS at 17:32

## 2024-10-08 RX ADMIN — ATORVASTATIN CALCIUM 80 MG: 40 TABLET, FILM COATED ORAL at 21:10

## 2024-10-08 RX ADMIN — LANSOPRAZOLE 15 MG: 15 TABLET, ORALLY DISINTEGRATING ORAL at 05:15

## 2024-10-08 RX ADMIN — SODIUM CHLORIDE, POTASSIUM CHLORIDE, SODIUM LACTATE AND CALCIUM CHLORIDE 20 ML/HR: 600; 310; 30; 20 INJECTION, SOLUTION INTRAVENOUS at 16:09

## 2024-10-08 RX ADMIN — HYDROCODONE BITARTRATE AND ACETAMINOPHEN 1 TABLET: 5; 325 TABLET ORAL at 14:26

## 2024-10-08 RX ADMIN — LORAZEPAM 2 MG: 1 TABLET ORAL at 00:22

## 2024-10-08 RX ADMIN — LORAZEPAM 2 MG: 1 TABLET ORAL at 10:50

## 2024-10-08 RX ADMIN — Medication 10 ML: at 21:11

## 2024-10-08 RX ADMIN — PROPOFOL 25 MCG/KG/MIN: 10 INJECTION, EMULSION INTRAVENOUS at 16:58

## 2024-10-08 RX ADMIN — PHENYLEPHRINE HYDROCHLORIDE 100 MCG: 10 INJECTION INTRAVENOUS at 17:26

## 2024-10-08 RX ADMIN — Medication 120 MG: at 16:57

## 2024-10-08 RX ADMIN — TICAGRELOR 60 MG: 60 TABLET ORAL at 09:46

## 2024-10-08 RX ADMIN — HYDROCODONE BITARTRATE AND ACETAMINOPHEN 1 TABLET: 5; 325 TABLET ORAL at 21:19

## 2024-10-08 RX ADMIN — MAGNESIUM OXIDE TAB 400 MG (241.3 MG ELEMENTAL MG) 400 MG: 400 (241.3 MG) TAB at 09:46

## 2024-10-08 RX ADMIN — ASPIRIN 81 MG 81 MG: 81 TABLET ORAL at 09:46

## 2024-10-08 NOTE — ANESTHESIA POSTPROCEDURE EVALUATION
Patient: Dalila Pike    Procedure Summary       Date: 10/08/24 Room / Location: [facility] ENDOSCOPY 3 /  [facility]    Anesthesia Start: 1652 Anesthesia Stop: 1813    Procedures:       ENDOSCOPIC RETROGRADE CHOLANGIOPANCREATOGRAPHY      ESOPHAGOGASTRODUODENOSCOPY Diagnosis:     Surgeons: Elie Sanders MD Provider: Seferino Ramon MD    Anesthesia Type: general ASA Status: 4            Anesthesia Type: general    Vitals  Vitals Value Taken Time   /87 10/08/24 1812   Temp 97 °F (36.1 °C) 10/08/24 1812   Pulse 86 10/08/24 1812   Resp 18 10/08/24 1812   SpO2 98 % 10/08/24 1812           Post Anesthesia Care and Evaluation    Patient location during evaluation: PACU  Patient participation: complete - patient participated  Level of consciousness: awake and alert  Pain management: adequate    Airway patency: patent  Anesthetic complications: No anesthetic complications  PONV Status: none  Cardiovascular status: hemodynamically stable and acceptable  Respiratory status: nonlabored ventilation, acceptable and nasal cannula  Hydration status: acceptable

## 2024-10-08 NOTE — CONSULTS
[facility] Gastroenterology Consult    Referring Provider: Gian Reed MD    PCP: Agnes Ewing APRN    Reason for Consultation: Choledocholithiasis     Chief complaint: Chest pain     History of present illness:    Dalila Pike is a 53 y.o. female who is admitted with elevated LFTs and dilated bile duct.   She presents as a direct admit from [facility] ER.   She is currently lethargic and does not provide history.   Her  and daughter in law are present at the bedside and provide history.   Her  states she awoke from sleep at 0300 yesterday with severe chest pain.   He thus proceeded to take her to the local ER in [facility].   She was found to have mildly elevated LFTs and a dilated bile duct on ultrasound.  Per report, ACS was ruled out.    Per chart review she has complained of intermittent right upper quadrant pain with chills and fevers for several months.       She had a recent right MCA territory stroke in August and underwent thrombectomy and right ICA stent placement (8/21/2024).   She lives with her  at home.       Allergies:  Erythromycin, Latex, Toradol [ketorolac tromethamine], and Contrast dye (echo or unknown ct/mr)    Scheduled Meds:  aspirin, 81 mg, Oral, Daily  atorvastatin, 80 mg, Oral, Nightly  folic acid, 1 mg, Oral, Daily  guaifenesin, 400 mg, Oral, Q8H  heparin (porcine), 5,000 Units, Subcutaneous, Q8H  lansoprazole, 15 mg, Oral, Q AM  lisinopril, 20 mg, Oral, Daily  LORazepam, 2 mg, Oral, Q6H   Followed by  LORazepam, 1 mg, Oral, Q6H   Followed by  [START ON 10/9/2024] LORazepam, 1 mg, Oral, Q12H   Followed by  [START ON 10/11/2024] LORazepam, 1 mg, Oral, Daily  magnesium oxide, 400 mg, Oral, Daily  nicotine, 1 patch, Transdermal, Q24H  piperacillin-tazobactam, 3.375 g, Intravenous, Q6H  sodium chloride, 10 mL, Intravenous, Q12H  thiamine (B-1) IV, 200 mg, Intravenous, Q8H   Followed by  [START ON 10/13/2024] thiamine, 100 mg, Oral, Daily  ticagrelor, 60 mg,  Oral, BID  topiramate, 25 mg, Oral, Nightly         Infusions:       PRN Meds:    acetaminophen **OR** acetaminophen **OR** acetaminophen    aluminum-magnesium hydroxide-simethicone    senna-docusate sodium **AND** polyethylene glycol **AND** bisacodyl **AND** bisacodyl    Calcium Replacement - Follow Nurse / BPA Driven Protocol    HYDROcodone-acetaminophen    HYDROmorphone **AND** naloxone    LORazepam **OR** midazolam **OR** LORazepam **OR** midazolam **OR** midazolam **OR** midazolam    Magnesium Standard Dose Replacement - Follow Nurse / BPA Driven Protocol    nicotine polacrilex    ondansetron ODT **OR** ondansetron    Phosphorus Replacement - Follow Nurse / BPA Driven Protocol    Potassium Replacement - Follow Nurse / BPA Driven Protocol    sodium chloride    sodium chloride    Home Meds:  Medications Prior to Admission   Medication Sig Dispense Refill Last Dose    aspirin 81 MG chewable tablet Chew 1 tablet Daily. 30 tablet 2 10/6/2024    atorvastatin (LIPITOR) 80 MG tablet Take 1 tablet by mouth Every Night.   10/6/2024    folic acid (FOLVITE) 1 MG tablet Take 1 tablet by mouth Daily.   10/6/2024    guaiFENesin (MUCINEX) 600 MG 12 hr tablet Take 2 tablets by mouth 2 (Two) Times a Day.   10/6/2024    HYDROcodone-acetaminophen (NORCO) 5-325 MG per tablet Take 1 tablet by mouth Every 6 (Six) Hours As Needed for Severe Pain. 10 tablet 0 10/6/2024    lisinopril (PRINIVIL,ZESTRIL) 20 MG tablet Take 1 tablet by mouth Daily.   10/6/2024    magnesium oxide (MAG-OX) 400 tablet tablet Take 1 tablet by mouth Daily. 30 tablet 3 10/6/2024    pantoprazole (PROTONIX) 40 MG EC tablet Take 1 tablet by mouth Daily.   Past Month    ticagrelor (BRILINTA) 60 MG tablet tablet Take 1 tablet by mouth 2 (Two) Times a Day. 60 tablet 1 10/6/2024    topiramate (Topamax) 25 MG tablet Take 1 tablet by mouth Every Night. 30 tablet 1 Past Month    sodium chloride 1 g tablet Take 1 tablet by mouth 3 (Three) Times a Day As Needed (If Na  "<140).   More than a month       ROS: Review of Systems   Unable to perform ROS: Mental status change       PAST MED HX:  Past Medical History:   Diagnosis Date    Acute CVA (cerebrovascular accident) 08/21/2024    Alcohol use 08/21/2024    Dyslipidemia 08/21/2024    Obesity (BMI 30-39.9) 08/21/2024    PAD (peripheral artery disease) 08/21/2024    Tobacco use 08/21/2024       PAST SURG HX:  Past Surgical History:   Procedure Laterality Date    INTERVENTIONAL RADIOLOGY PROCEDURE Bilateral 8/21/2024    Procedure: Carotid Cervical Angiogram;  Surgeon: Jamaal Saini MD;  Location: [facility] CATH INVASIVE LOCATION;  Service: Interventional Radiology;  Laterality: Bilateral;       FAM HX:  History reviewed. No pertinent family history.    SOC HX:  Social History     Socioeconomic History    Marital status:    Tobacco Use    Smoking status: Every Day     Current packs/day: 0.50     Average packs/day: 2.0 packs/day for 38.8 years (76.4 ttl pk-yrs)     Types: Cigarettes     Start date: 2024     Passive exposure: Current    Smokeless tobacco: Never    Tobacco comments:     4-5 a day   Vaping Use    Vaping status: Never Used   Substance and Sexual Activity    Alcohol use: Not Currently    Drug use: Not Currently    Sexual activity: Defer       PHYSICAL EXAM  /65 (BP Location: Left arm, Patient Position: Lying)   Pulse 79   Temp 96.9 °F (36.1 °C) (Temporal)   Resp 18   Ht 172.7 cm (68\")   Wt 107 kg (235 lb 12.8 oz)   SpO2 95%   BMI 35.85 kg/m²   Wt Readings from Last 3 Encounters:   10/08/24 107 kg (235 lb 12.8 oz)   09/19/24 110 kg (243 lb)   09/17/24 112 kg (246 lb)   ,body mass index is 35.85 kg/m².  Physical Exam  Constitutional:       Appearance: She is obese. She is ill-appearing.   Cardiovascular:      Rate and Rhythm: Normal rate and regular rhythm.   Pulmonary:      Effort: Pulmonary effort is normal. No respiratory distress.   Abdominal:      General: Bowel sounds are normal.      Palpations: " Abdomen is soft.      Tenderness: There is abdominal tenderness. There is no guarding.      Comments: Tenderness to palpation of the right upper quadrant    Skin:     General: Skin is warm and dry.   Neurological:      Mental Status: She is lethargic.   Psychiatric:         Cognition and Memory: Cognition is impaired.       Results Review:   I reviewed the patient's new clinical results.    Lab Results   Component Value Date    WBC 5.53 10/08/2024    HGB 13.2 10/08/2024    HGB 14.3 10/07/2024    HGB 14.1 08/27/2024    HCT 39.6 10/08/2024    MCV 92.3 10/08/2024     10/08/2024     Lab Results   Component Value Date    INR 0.9 06/26/2021     Lab Results   Component Value Date    GLUCOSE 118 (H) 10/08/2024    BUN 13 10/08/2024    CREATININE 0.64 10/08/2024    EGFRIFNONA >60 06/26/2021    EGFRIFAFRI >60 06/26/2021    BCR 20.3 10/08/2024     10/08/2024    K 3.9 10/08/2024    CO2 25.0 10/08/2024    CALCIUM 8.9 10/08/2024    ALBUMIN 3.6 10/08/2024    ALKPHOS 193 (H) 10/08/2024    BILITOT 2.0 (H) 10/08/2024    ALT 46 (H) 10/08/2024    AST 46 (H) 10/08/2024     MRI Abdomen without contrast MRCP:  Findings:  Heart size appears borderline/mildly enlarged. Lower lungs grossly clear within limitations of MRI.   Normal size and contour of liver and spleen. No obvious liver lesion. Mild fluid distention of the gallbladder without wall thickening or pericholecystic fluid. No visible stone. Mild dilation of the biliary tree with somewhat blunted appearance of common bile duct at ampulla. Upstream common bile duct measures 1.2 cm, mid common bile duct 1.2 cm and downstream common bile duct 0.6 cm. No obvious filling defect or mass.   Normal size, contour and signal intensity of the pancreas. No main duct dilation or divisum morphology. Spleen is homogeneous. No suspicious adrenal nodule. Symmetric renal size and contour. No hydronephrosis. Expected configuration stomach and duodenum.   No evidence of bowel obstruction or  active inflammation. Appendix normal right lower quadrant. Normal caliber abdominal aorta. Few prominent prashanth hepatic lymph nodes commonly reactive otherwise no suspicious adenopathy. No ascites or drainable collection.   No acute abdominal wall findings within included field-of-view. Multilevel degenerative endplate change without infiltrative marrow process. Slight lower thoracic levocurvature.   IMPRESSION:  Impression:  1. Mildly dilated biliary tree with blunted appearance of the common bile duct at the ampulla. No obvious filling defects or mass. Differential includes ampullary stricture, biliary sphincter of Oddi dysfunction, occult intraductal stone versus occult mass. Consider ERCP for further assessment.  2. Mildly fluid distention of the gallbladder without other findings to suggest acute cholecystitis by MRI.  3. Negative for acute pancreatitis.    ASSESSMENTS/PLANS    Biliary obstruction   Ascending cholangitis   Altered mental status  Recent CVA s/p thrombectomy and right ICA stent, on dual antiplatelet therapy with Brilinta and aspirin     Patient with ongoing RUQ tenderness, increasing LFTs.  Low grade fever of 100.7 yesterday.   MRI shows mildly dilated bile duct with blunted appearance of the common bile duct at the ampulla.   Possible ampullary stenosis, choledocholithiasis or occult mass.    Concerns for cholangitis.   Blood cultures pending.      >> Recommend ERCP today.   Discussed increased risk of bleeding on Brilinta, pancreatitis, perforation, infection, failed cannulation.        >> IV Zosyn.  Follow blood culture results.        I discussed the patient's findings and my recommendations with patient, her  and her daughter in law whom are present at the bedside.       APOLINAR Ferguson  10/08/24  12:35 EDT

## 2024-10-08 NOTE — PLAN OF CARE
Goal Outcome Evaluation:         VSS. A/Ox4. RA. Complaints of RUQ pain, given PRN NORCO with relief. Complaints of mild N/V given PRN Zofran with relief. MRI completed. UA complete. NPO except sips with meds. CIWA: 1-6 due to anxiety, N/V, headache which patient reports all symptoms not related to alcohol withdrawal. IV ABX administered.

## 2024-10-08 NOTE — ANESTHESIA PREPROCEDURE EVALUATION
Anesthesia Evaluation     Patient summary reviewed and Nursing notes reviewed   no history of anesthetic complications:   NPO Solid Status: > 8 hours  NPO Liquid Status: > 2 hours           Airway   Mallampati: III  TM distance: >3 FB  Neck ROM: full  No difficulty expected  Dental    (+) edentulous, upper dentures and lower dentures    Pulmonary - normal exam    breath sounds clear to auscultation  (+) a smoker Current,  Cardiovascular - normal exam    Rhythm: regular  Rate: normal    (+) hypertension, PVD, hyperlipidemia,  carotid artery disease (s/p right ICA stent 08/2024)    ROS comment: ECHO 08/2024: EF 68%, moderate concentric hypertrophy, mild MR, mild TR, RVSP mildly elevated (35-45 mmHg), mildly dilated ascending aortic (3.7 cm)    Neuro/Psych  (+) seizures (last 10 years ago), CVA (recent right MCA territory stroke in August and underwent thrombectomy and right ICA stent placement (8/21/2024); residual left sided weakness)  GI/Hepatic/Renal/Endo    (+) obesity, GERD, liver disease history of elevated LFT    Musculoskeletal     Abdominal    Substance History   (+) alcohol use (Hx of EtOH abuse; none for past year)     OB/GYN          Other        ROS/Med Hx Other: Brilinta - 10/6/24    10/8/24: Hgb 13.2, Plt 197, K 3.9, Cr 0.64                    Anesthesia Plan    ASA 4     general     (ETT; Minimize large swings in BP given recent CVA and ICA stent    Denies N/V/GERD today; only abdominal pain)  intravenous induction     Anesthetic plan, risks, benefits, and alternatives have been provided, discussed and informed consent has been obtained with: patient.    Plan discussed with CRNA.        CODE STATUS:    Level Of Support Discussed With: Patient  Code Status (Patient has no pulse and is not breathing): CPR (Attempt to Resuscitate)  Medical Interventions (Patient has pulse or is breathing): Full Support

## 2024-10-08 NOTE — PROGRESS NOTES
"    [facility] Medicine Services  PROGRESS NOTE    Patient Name: Dalila Pike  : 1970  MRN: [id number]    Date of Admission: 10/7/2024  Primary Care Physician: Agnes Ewing APRN    Subjective   Subjective     CC: RUQ pain    HPI: Continues with abdominal pain. Fevers. Nausea. Feels \"rough\".       Objective   Objective     Vital Signs:   Temp:  [97.6 °F (36.4 °C)-100.7 °F (38.2 °C)] 97.6 °F (36.4 °C)  Heart Rate:  [] 93  Resp:  [18] 18  BP: (100-145)/(67-88) 105/69     Physical Exam:  NAD, alert and oriented  OP clear, dry MM  Neck supple  RRR  CTAB  +BS, soft, RUQ TTP  No c/c/e  BRADY  Normal affect    Results Reviewed:  LAB RESULTS:      Lab 10/08/24  0607 10/07/24  1608   WBC 5.53 8.02   HEMOGLOBIN 13.2 14.3   HEMATOCRIT 39.6 42.9   PLATELETS 197 236   NEUTROS ABS  --  5.87   IMMATURE GRANS (ABS)  --  0.02   LYMPHS ABS  --  1.53   MONOS ABS  --  0.53   EOS ABS  --  0.04   MCV 92.3 92.7   PROCALCITONIN  --  0.03   LACTATE  --  1.4         Lab 10/08/24  0607 10/07/24  1608   SODIUM 139 140   POTASSIUM 3.9 4.1   CHLORIDE 106 104   CO2 25.0 27.0   ANION GAP 8.0 9.0   BUN 13 14   CREATININE 0.64 0.66   EGFR 105.8 105.0   GLUCOSE 118* 116*   CALCIUM 8.9 9.4   HEMOGLOBIN A1C 5.50  --    TSH 0.447  --          Lab 10/08/24  0607 10/07/24  1608   TOTAL PROTEIN 6.1 7.0   ALBUMIN 3.6 4.3   GLOBULIN 2.5 2.7   ALT (SGPT) 46* 29   AST (SGOT) 46* 22   BILIRUBIN 2.0* 1.3*   ALK PHOS 193* 199*             Lab 10/08/24  0607   CHOLESTEROL 144   LDL CHOL 75   HDL CHOL 46   TRIGLYCERIDES 130             Brief Urine Lab Results  (Last result in the past 365 days)        Color   Clarity   Blood   Leuk Est   Nitrite   Protein   CREAT   Urine HCG        10/08/24 0317 Yellow   Clear   Negative   Negative   Negative   Negative                   Microbiology Results Abnormal       Procedure Component Value - Date/Time    COVID PRE-OP / PRE-PROCEDURE SCREENING ORDER (NO ISOLATION) - Swab, " Nasopharynx [[id number]]  (Normal) Collected: 10/07/24 1750    Lab Status: Final result Specimen: Swab from Nasopharynx Updated: 10/07/24 1849    Narrative:      The following orders were created for panel order COVID PRE-OP / PRE-PROCEDURE SCREENING ORDER (NO ISOLATION) - Swab, Nasopharynx.  Procedure                               Abnormality         Status                     ---------                               -----------         ------                     Respiratory Panel PCR w/...[[id number]]  Normal              Final result                 Please view results for these tests on the individual orders.    Respiratory Panel PCR w/COVID-19(SARS-CoV-2) MICKEY/AKIL/MARIO/PAD/COR/SOULEYMANE In-House, NP Swab in UTM/VTM, 2 HR TAT - Swab, Nasopharynx [[id number]]  (Normal) Collected: 10/07/24 1750    Lab Status: Final result Specimen: Swab from Nasopharynx Updated: 10/07/24 1849     ADENOVIRUS, PCR Not Detected     Coronavirus 229E Not Detected     Coronavirus HKU1 Not Detected     Coronavirus NL63 Not Detected     Coronavirus OC43 Not Detected     COVID19 Not Detected     Human Metapneumovirus Not Detected     Human Rhinovirus/Enterovirus Not Detected     Influenza A PCR Not Detected     Influenza B PCR Not Detected     Parainfluenza Virus 1 Not Detected     Parainfluenza Virus 2 Not Detected     Parainfluenza Virus 3 Not Detected     Parainfluenza Virus 4 Not Detected     RSV, PCR Not Detected     Bordetella pertussis pcr Not Detected     Bordetella parapertussis PCR Not Detected     Chlamydophila pneumoniae PCR Not Detected     Mycoplasma pneumo by PCR Not Detected    Narrative:      In the setting of a positive respiratory panel with a viral infection PLUS a negative procalcitonin without other underlying concern for bacterial infection, consider observing off antibiotics or discontinuation of antibiotics and continue supportive care. If the respiratory panel is positive for atypical bacterial infection (Bordetella  pertussis, Chlamydophila pneumoniae, or Mycoplasma pneumoniae), consider antibiotic de-escalation to target atypical bacterial infection.            US Outside Films    Result Date: 10/7/2024  This procedure was auto-finalized with no dictation required.    CT Outside Films    Result Date: 10/7/2024  This procedure was auto-finalized with no dictation required.     Results for orders placed during the hospital encounter of 08/21/24    Adult Transthoracic Echo Complete W/ Cont if Necessary Per Protocol (With Agitated Saline)    Interpretation Summary    Left ventricular systolic function is normal. Calculated left ventricular EF = 68% Normal left ventricular cavity size noted. Left ventricular wall thickness is consistent with moderate concentric hypertrophy. All left ventricular wall segments contract normally. Left ventricular diastolic function was normal. Normal left atrial pressure.    The right ventricular cavity is mildly dilated. Normal right ventricular systolic function noted.    Left atrial volume is moderately increased. Saline test results are negative.    The aortic valve is structurally normal with no stenosis present. The aortic valve appears trileaflet. No significant aortic valve regurgitation is present.    The mitral valve is structurally normal with no significant stenosis present. Trace to mild mitral valve regurgitation is present.    Mild tricuspid valve regurgitation is present. Estimated right ventricular systolic pressure from tricuspid regurgitation is mildly elevated (35-45 mmHg)    Mild dilation of the ascending aorta is present. Ascending aorta = 3.7 cm      Current medications:  Scheduled Meds:aspirin, 81 mg, Oral, Daily  atorvastatin, 80 mg, Oral, Nightly  folic acid, 1 mg, Oral, Daily  guaiFENesin, 1,200 mg, Oral, BID  heparin (porcine), 5,000 Units, Subcutaneous, Q8H  lansoprazole, 15 mg, Oral, Q AM  lisinopril, 20 mg, Oral, Daily  LORazepam, 2 mg, Oral, Q6H   Followed  by  LORazepam, 1 mg, Oral, Q6H   Followed by  [START ON 10/9/2024] LORazepam, 1 mg, Oral, Q12H   Followed by  [START ON 10/11/2024] LORazepam, 1 mg, Oral, Daily  magnesium oxide, 400 mg, Oral, Daily  nicotine, 1 patch, Transdermal, Q24H  piperacillin-tazobactam, 3.375 g, Intravenous, Q6H  sodium chloride, 10 mL, Intravenous, Q12H  thiamine (B-1) IV, 200 mg, Intravenous, Q8H   Followed by  [START ON 10/13/2024] thiamine, 100 mg, Oral, Daily  ticagrelor, 60 mg, Oral, BID  topiramate, 25 mg, Oral, Nightly      Continuous Infusions:   PRN Meds:.  acetaminophen **OR** acetaminophen **OR** acetaminophen    aluminum-magnesium hydroxide-simethicone    senna-docusate sodium **AND** polyethylene glycol **AND** bisacodyl **AND** bisacodyl    Calcium Replacement - Follow Nurse / BPA Driven Protocol    HYDROcodone-acetaminophen    HYDROmorphone **AND** naloxone    LORazepam **OR** midazolam **OR** LORazepam **OR** midazolam **OR** midazolam **OR** midazolam    Magnesium Standard Dose Replacement - Follow Nurse / BPA Driven Protocol    nicotine polacrilex    ondansetron ODT **OR** ondansetron    Phosphorus Replacement - Follow Nurse / BPA Driven Protocol    Potassium Replacement - Follow Nurse / BPA Driven Protocol    sodium chloride    sodium chloride    Assessment & Plan   Assessment & Plan     Active Hospital Problems    Diagnosis  POA    **RUQ pain [R10.11]  Yes    Dilated cbd, acquired [K83.8]  Unknown    HTN (hypertension) [I10]  Yes    History of seizures [Z87.898]  Yes    Alcohol use [Z78.9]  Yes    Obesity (BMI 30-39.9) [E66.9]  Yes    Dyslipidemia [E78.5]  Yes    PAD (peripheral artery disease) [I73.9]  Yes    Tobacco use [Z72.0]  Yes      Resolved Hospital Problems   No resolved problems to display.        Brief Hospital Course to date:  Dalila Pike is a 53 y.o. female with history of HTN, HL, PAD, obesity, seizures, ETOH use, R MCA CVA s/p thrombectomy/R ICA stent, with residual L weakness, recent L ankle fracture  here with RUQ pain.    RUQ pain  Possible Choledocholithiasis  Cannot rule out cholecystitis/cholangitis  -zosyn  -MRPC pending  -GI consulted  -prn nausea meds    Recent CVA s/p thrombectomy/R ICA stent  -on ASA/brilinta/statin    HTN  -monitor    HL  -statin    ETOH use  -monitor for withdrawal    R ankle fracture  -followed by Dr. Wynn-Preet, with recommended CAM boot in place  -had follow up scheduled 10/7, but hospitalized  -will ask if can follow up here    Tobacco abuse      Expected Discharge Location and Transportation: Rehab  Expected Discharge   Expected Discharge Date: 10/11/2024; Expected Discharge Time:      VTE Prophylaxis:  Pharmacologic VTE prophylaxis orders are present.         AM-PAC 6 Clicks Score (PT): 24 (10/07/24 2200)    CODE STATUS:   Code Status and Medical Interventions: CPR (Attempt to Resuscitate); Full Support   Ordered at: 10/07/24 2016     Level Of Support Discussed With:    Patient     Code Status (Patient has no pulse and is not breathing):    CPR (Attempt to Resuscitate)     Medical Interventions (Patient has pulse or is breathing):    Full Support       Ozzy Galindo MD  10/08/24

## 2024-10-08 NOTE — ANESTHESIA PROCEDURE NOTES
Airway  Urgency: elective    Date/Time: 10/8/2024 4:57 PM  Airway not difficult    General Information and Staff    Patient location during procedure: OR  CRNA/CAA: Anna Georges CRNA    Indications and Patient Condition  Indications for airway management: airway protection    Preoxygenated: yes  MILS not maintained throughout  Mask difficulty assessment: 1 - vent by mask    Final Airway Details  Final airway type: endotracheal airway      Successful airway: ETT  Cuffed: yes   Successful intubation technique: video laryngoscopy  Endotracheal tube insertion site: oral  Blade: Miranda  Blade size: 3  ETT size (mm): 7.5  Cormack-Lehane Classification: grade I - full view of glottis  Placement verified by: chest auscultation and capnometry   Cuff volume (mL): 8  Measured from: lips  ETT/EBT  to lips (cm): 22  Number of attempts at approach: 1  Assessment: lips, teeth, and gum same as pre-op and atraumatic intubation    Additional Comments  Negative epigastric sounds, Breath sound equal bilaterally with symmetric chest rise and fall

## 2024-10-08 NOTE — PROGRESS NOTES
"Nutrition Services    Patient Name:  Dalila Pike  YOB: 1970  MRN: [id number]  Admit Date:  10/7/2024    Patient identified by nutrition risk screening process for nursing screen report of \"tube feeding or parenteral nutrition.\"    EMR reviewed, pt with no tube for EN- did have temporary EN via NG at admission in 8/24. RD verified with RN patient eats by mouth, does not have tube for EN.    Patient does not currently meet nutrition risk screen criteria. Will follow per protocol, please consult if needed, thank you.     Electronically signed by:  Ibis Tariq RD  10/08/24 11:00 EDT   "

## 2024-10-08 NOTE — CASE MANAGEMENT/SOCIAL WORK
Discharge Planning Assessment  [facility]     Patient Name: Dalila Pike  MRN: [id number]  Today's Date: 10/8/2024    Admit Date: 10/7/2024    Plan: Ongoing   Discharge Needs Assessment       Row Name 10/08/24 1633       Living Environment    People in Home child(juan carlos), adult;other relative(s)    In the past 12 months has the electric, gas, oil, or water company threatened to shut off services in your home? No    Primary Care Provided by self    Provides Primary Care For no one    Family Caregiver if Needed other relative(s);child(juan carlos), adult;spouse    Family Caregiver Names  Abiel Mijares [phone]    Able to Return to Prior Arrangements no       Transportation Needs    In the past 12 months, has lack of transportation kept you from medical appointments or from getting medications? no    In the past 12 months, has lack of transportation kept you from meetings, work, or from getting things needed for daily living? No       Food Insecurity    Within the past 12 months, you worried that your food would run out before you got the money to buy more. Never true    Within the past 12 months, the food you bought just didn't last and you didn't have money to get more. Never true       Transition Planning    Transportation Anticipated family or friend will provide       Discharge Needs Assessment    Readmission Within the Last 30 Days no previous admission in last 30 days    Equipment Currently Used at Home wheelchair                   Discharge Plan       Row Name 10/08/24 1636       Plan    Plan Ongoing    Plan Comments ’er met with patient and daughter in law at bedside.  Lives in [facility] with  Abiel Mijares [phone]. Patient needs assistance with ADL’s, DME includes Wheelchair and no HH. Patient reports having a LW (to provide a copy for EPIC). Patients PCP is ROXANE Sanchez. Patient has [place] Medicaid insurance and [employer] Sup.  Patient requesting rehab. Plan ongoing                   Continued Care and Services - Admitted Since 10/7/2024    No active coordination exists for this encounter.       Selected Continued Care - Prior Encounters Includes continued care and service providers with selected services from prior encounters from 7/9/2024 to 10/8/2024      Discharged on 8/28/2024 Admission date: 8/21/2024 - Discharge disposition: Rehab Facility or Unit (DC - External)      Destination       Service Provider Selected Services Address Phone Fax Patient Preferred    [facility] [address] [facility] [id number] [phone] --                          Expected Discharge Date and Time       Expected Discharge Date Expected Discharge Time    Oct 11, 2024            Demographic Summary       Row Name 10/08/24 1630       General Information    Admission Type observation    Referral Source admission list    Reason for Consult discharge planning                   Functional Status       Row Name 10/08/24 1630       Functional Status    Usual Activity Tolerance good    Current Activity Tolerance good       Physical Activity    On average, how many days per week do you engage in moderate to strenuous exercise (like a brisk walk)? 7 days    On average, how many minutes do you engage in exercise at this level? 10 min    Number of minutes of exercise per week 70       Assessment of Health Literacy    How often do you have someone help you read hospital materials? Occasionally    How often do you have problems learning about your medical condition because of difficulty understanding written information? Occasionally    How often do you have a problem understanding what is told to you about your medical condition? Occasionally    How confident are you filling out medical forms by yourself? Quite a bit    Health Literacy Fair       Functional Status, IADL    Medications assistive equipment and person    Meal Preparation assistive  equipment and person    Housekeeping assistive equipment and person    Laundry assistive equipment and person    Shopping assistive equipment and person    IADL Comments needs assistance with ADL's, DME includes Wheelchair and no HH       Employment/    Employment/ Comments [place] Medicaid insurance and [facility] Sup                   Psychosocial       Row Name 10/08/24 1633       Values/Beliefs    Spiritual, Cultural Beliefs, Sabianism Practices, Values that Affect Care no       Mental Health    Little Interest or Pleasure in Doing Things 0-->not at all    Feeling Down, Depressed or Hopeless 0-->not at all                   Abuse/Neglect       Row Name 10/08/24 1633       Personal Safety    Feels Unsafe at Home or Work/School no    Feels Threatened by Someone no    Does Anyone Try to Keep You From Having Contact with Others or Doing Things Outside Your Home? no    Physical Signs of Abuse Present no                   Legal       Row Name 10/08/24 1633       Financial Resource Strain    How hard is it for you to pay for the very basics like food, housing, medical care, and heating? Not hard                   Substance Abuse       Row Name 10/08/24 1633       Substance Use    Substance Use Status never used       AUDIT-C (Alcohol Use Disorders ID Test)    Q1: How often do you have a drink containing alcohol? Never    Q2: How many drinks containing alcohol do you have on a typical day when you are drinking? None    Q3: How often do you have six or more drinks on one occasion? Never    Audit-C Score 0                   Patient Forms    No documentation.                     Meaghan Benitez) NEETU Doss

## 2024-10-09 ENCOUNTER — APPOINTMENT (OUTPATIENT)
Dept: GENERAL RADIOLOGY | Facility: HOSPITAL | Age: 54
End: 2024-10-09
Payer: MEDICAID

## 2024-10-09 PROBLEM — K83.09 CHOLANGITIS: Status: ACTIVE | Noted: 2024-10-09

## 2024-10-09 LAB
ALBUMIN SERPL-MCNC: 3.7 G/DL (ref 3.5–5.2)
ALBUMIN/GLOB SERPL: 1.3 G/DL
ALP SERPL-CCNC: 243 U/L (ref 39–117)
ALT SERPL W P-5'-P-CCNC: 60 U/L (ref 1–33)
ANION GAP SERPL CALCULATED.3IONS-SCNC: 11 MMOL/L (ref 5–15)
AST SERPL-CCNC: 54 U/L (ref 1–32)
BILIRUB SERPL-MCNC: 1.3 MG/DL (ref 0–1.2)
BUN SERPL-MCNC: 15 MG/DL (ref 6–20)
BUN/CREAT SERPL: 23.1 (ref 7–25)
CALCIUM SPEC-SCNC: 9.2 MG/DL (ref 8.6–10.5)
CHLORIDE SERPL-SCNC: 103 MMOL/L (ref 98–107)
CO2 SERPL-SCNC: 21 MMOL/L (ref 22–29)
CREAT SERPL-MCNC: 0.65 MG/DL (ref 0.57–1)
DEPRECATED RDW RBC AUTO: 37.3 FL (ref 37–54)
EGFRCR SERPLBLD CKD-EPI 2021: 105.4 ML/MIN/1.73
ERYTHROCYTE [DISTWIDTH] IN BLOOD BY AUTOMATED COUNT: 11.1 % (ref 12.3–15.4)
GLOBULIN UR ELPH-MCNC: 2.8 GM/DL
GLUCOSE SERPL-MCNC: 152 MG/DL (ref 65–99)
HCT VFR BLD AUTO: 41.9 % (ref 34–46.6)
HGB BLD-MCNC: 14.3 G/DL (ref 12–15.9)
MCH RBC QN AUTO: 30.9 PG (ref 26.6–33)
MCHC RBC AUTO-ENTMCNC: 34.1 G/DL (ref 31.5–35.7)
MCV RBC AUTO: 90.5 FL (ref 79–97)
PLATELET # BLD AUTO: 227 10*3/MM3 (ref 140–450)
PMV BLD AUTO: 10 FL (ref 6–12)
POTASSIUM SERPL-SCNC: 4.4 MMOL/L (ref 3.5–5.2)
PROT SERPL-MCNC: 6.5 G/DL (ref 6–8.5)
QT INTERVAL: 390 MS
QTC INTERVAL: 455 MS
RBC # BLD AUTO: 4.63 10*6/MM3 (ref 3.77–5.28)
SODIUM SERPL-SCNC: 135 MMOL/L (ref 136–145)
WBC NRBC COR # BLD AUTO: 6.87 10*3/MM3 (ref 3.4–10.8)

## 2024-10-09 PROCEDURE — 99232 SBSQ HOSP IP/OBS MODERATE 35: CPT | Performed by: INTERNAL MEDICINE

## 2024-10-09 PROCEDURE — 25010000002 THIAMINE HCL 200 MG/2ML SOLUTION: Performed by: INTERNAL MEDICINE

## 2024-10-09 PROCEDURE — 25010000002 ONDANSETRON PER 1 MG: Performed by: INTERNAL MEDICINE

## 2024-10-09 PROCEDURE — 85027 COMPLETE CBC AUTOMATED: CPT | Performed by: HOSPITALIST

## 2024-10-09 PROCEDURE — 80053 COMPREHEN METABOLIC PANEL: CPT | Performed by: HOSPITALIST

## 2024-10-09 PROCEDURE — 25010000002 PIPERACILLIN SOD-TAZOBACTAM PER 1 G: Performed by: INTERNAL MEDICINE

## 2024-10-09 PROCEDURE — 25010000002 HEPARIN (PORCINE) PER 1000 UNITS: Performed by: INTERNAL MEDICINE

## 2024-10-09 PROCEDURE — 25010000002 HYDROMORPHONE PER 4 MG: Performed by: INTERNAL MEDICINE

## 2024-10-09 PROCEDURE — 73610 X-RAY EXAM OF ANKLE: CPT

## 2024-10-09 PROCEDURE — 99232 SBSQ HOSP IP/OBS MODERATE 35: CPT | Performed by: HOSPITALIST

## 2024-10-09 PROCEDURE — 25010000002 HYDRALAZINE PER 20 MG: Performed by: HOSPITALIST

## 2024-10-09 PROCEDURE — 25010000002 CEFTRIAXONE PER 250 MG: Performed by: HOSPITALIST

## 2024-10-09 RX ORDER — URSODIOL 300 MG/1
300 CAPSULE ORAL 2 TIMES DAILY
Status: DISCONTINUED | OUTPATIENT
Start: 2024-10-09 | End: 2024-10-16 | Stop reason: HOSPADM

## 2024-10-09 RX ORDER — METRONIDAZOLE 500 MG/1
500 TABLET ORAL EVERY 8 HOURS SCHEDULED
Status: COMPLETED | OUTPATIENT
Start: 2024-10-09 | End: 2024-10-15

## 2024-10-09 RX ORDER — HYDRALAZINE HYDROCHLORIDE 20 MG/ML
20 INJECTION INTRAMUSCULAR; INTRAVENOUS ONCE
Status: COMPLETED | OUTPATIENT
Start: 2024-10-09 | End: 2024-10-09

## 2024-10-09 RX ADMIN — HEPARIN SODIUM 5000 UNITS: 5000 INJECTION INTRAVENOUS; SUBCUTANEOUS at 05:33

## 2024-10-09 RX ADMIN — POLYETHYLENE GLYCOL 3350 17 G: 17 POWDER, FOR SOLUTION ORAL at 11:34

## 2024-10-09 RX ADMIN — HYDROMORPHONE HYDROCHLORIDE 0.5 MG: 1 INJECTION, SOLUTION INTRAMUSCULAR; INTRAVENOUS; SUBCUTANEOUS at 21:15

## 2024-10-09 RX ADMIN — NICOTINE 1 PATCH: 21 PATCH TRANSDERMAL at 17:04

## 2024-10-09 RX ADMIN — ASPIRIN 81 MG 81 MG: 81 TABLET ORAL at 08:08

## 2024-10-09 RX ADMIN — THIAMINE HYDROCHLORIDE 200 MG: 100 INJECTION, SOLUTION INTRAMUSCULAR; INTRAVENOUS at 05:32

## 2024-10-09 RX ADMIN — HEPARIN SODIUM 5000 UNITS: 5000 INJECTION INTRAVENOUS; SUBCUTANEOUS at 21:16

## 2024-10-09 RX ADMIN — TOPIRAMATE 25 MG: 25 TABLET, FILM COATED ORAL at 21:22

## 2024-10-09 RX ADMIN — THIAMINE HYDROCHLORIDE 200 MG: 100 INJECTION, SOLUTION INTRAMUSCULAR; INTRAVENOUS at 21:16

## 2024-10-09 RX ADMIN — LORAZEPAM 1 MG: 1 TABLET ORAL at 21:22

## 2024-10-09 RX ADMIN — PIPERACILLIN AND TAZOBACTAM 3.38 G: 3; .375 INJECTION, POWDER, LYOPHILIZED, FOR SOLUTION INTRAVENOUS at 01:56

## 2024-10-09 RX ADMIN — ATORVASTATIN CALCIUM 80 MG: 40 TABLET, FILM COATED ORAL at 21:22

## 2024-10-09 RX ADMIN — HYDRALAZINE HYDROCHLORIDE 20 MG: 20 INJECTION INTRAMUSCULAR; INTRAVENOUS at 17:04

## 2024-10-09 RX ADMIN — MAGNESIUM OXIDE TAB 400 MG (241.3 MG ELEMENTAL MG) 400 MG: 400 (241.3 MG) TAB at 08:07

## 2024-10-09 RX ADMIN — FOLIC ACID 1 MG: 1 TABLET ORAL at 08:08

## 2024-10-09 RX ADMIN — TICAGRELOR 60 MG: 60 TABLET ORAL at 21:22

## 2024-10-09 RX ADMIN — HYDROCODONE BITARTRATE AND ACETAMINOPHEN 1 TABLET: 5; 325 TABLET ORAL at 08:08

## 2024-10-09 RX ADMIN — METRONIDAZOLE 500 MG: 500 TABLET ORAL at 13:57

## 2024-10-09 RX ADMIN — HYDROMORPHONE HYDROCHLORIDE 0.5 MG: 1 INJECTION, SOLUTION INTRAMUSCULAR; INTRAVENOUS; SUBCUTANEOUS at 11:05

## 2024-10-09 RX ADMIN — GUAIFENESIN 400 MG: 100 LIQUID ORAL at 21:16

## 2024-10-09 RX ADMIN — HYDROMORPHONE HYDROCHLORIDE 0.5 MG: 1 INJECTION, SOLUTION INTRAMUSCULAR; INTRAVENOUS; SUBCUTANEOUS at 02:51

## 2024-10-09 RX ADMIN — Medication 10 ML: at 21:18

## 2024-10-09 RX ADMIN — URSODIOL 300 MG: 300 CAPSULE ORAL at 21:22

## 2024-10-09 RX ADMIN — TICAGRELOR 60 MG: 60 TABLET ORAL at 08:08

## 2024-10-09 RX ADMIN — HEPARIN SODIUM 5000 UNITS: 5000 INJECTION INTRAVENOUS; SUBCUTANEOUS at 13:56

## 2024-10-09 RX ADMIN — LORAZEPAM 1 MG: 1 TABLET ORAL at 11:38

## 2024-10-09 RX ADMIN — PIPERACILLIN AND TAZOBACTAM 3.38 G: 3; .375 INJECTION, POWDER, LYOPHILIZED, FOR SOLUTION INTRAVENOUS at 08:07

## 2024-10-09 RX ADMIN — HYDROCODONE BITARTRATE AND ACETAMINOPHEN 1 TABLET: 5; 325 TABLET ORAL at 16:16

## 2024-10-09 RX ADMIN — HYDROMORPHONE HYDROCHLORIDE 0.5 MG: 1 INJECTION, SOLUTION INTRAMUSCULAR; INTRAVENOUS; SUBCUTANEOUS at 17:43

## 2024-10-09 RX ADMIN — METRONIDAZOLE 500 MG: 500 TABLET ORAL at 21:22

## 2024-10-09 RX ADMIN — SODIUM CHLORIDE 2000 MG: 900 INJECTION INTRAVENOUS at 11:34

## 2024-10-09 RX ADMIN — LANSOPRAZOLE 15 MG: 15 TABLET, ORALLY DISINTEGRATING ORAL at 05:32

## 2024-10-09 RX ADMIN — THIAMINE HYDROCHLORIDE 200 MG: 100 INJECTION, SOLUTION INTRAMUSCULAR; INTRAVENOUS at 13:56

## 2024-10-09 RX ADMIN — GUAIFENESIN 400 MG: 100 LIQUID ORAL at 13:55

## 2024-10-09 RX ADMIN — LORAZEPAM 1 MG: 1 TABLET ORAL at 05:32

## 2024-10-09 RX ADMIN — Medication 10 ML: at 08:08

## 2024-10-09 RX ADMIN — HYDROMORPHONE HYDROCHLORIDE 0.5 MG: 1 INJECTION, SOLUTION INTRAMUSCULAR; INTRAVENOUS; SUBCUTANEOUS at 00:27

## 2024-10-09 RX ADMIN — ONDANSETRON 4 MG: 2 INJECTION INTRAMUSCULAR; INTRAVENOUS at 21:29

## 2024-10-09 RX ADMIN — LISINOPRIL 20 MG: 20 TABLET ORAL at 08:43

## 2024-10-09 NOTE — PLAN OF CARE
Goal Outcome Evaluation:                                       VSS on room air. UOP adequate. Reports dull abdominal pain-- see PRNs. Tolerating clear liquids. Family at bedside overnight. AOX4. Pills crushed. Follows commands. Reports Nausea, see mar--efffective per pt.     Problem: Asthma Comorbidity  Goal: Maintenance of Asthma Control  Intervention: Maintain Asthma Symptom Control  Recent Flowsheet Documentation  Taken 10/9/2024 0200 by Schilder, Claire, RN  Medication Review/Management: medications reviewed  Taken 10/9/2024 0000 by Schilder, Claire, RN  Medication Review/Management: medications reviewed  Taken 10/8/2024 2200 by Schilder, Claire, RN  Medication Review/Management: medications reviewed  Taken 10/8/2024 2000 by Schilder, Claire, RN  Medication Review/Management: medications reviewed

## 2024-10-09 NOTE — CONSULTS
"General Surgery Consultation Note    Date of Service: 10/9/2024  Dalila Pike  [id number]  1970      Referring Provider: Ozzy Galindo MD    Location of Consult: Inpatient     Reason for Consultation: Biliary obstruction       History of Present Illness:  I am seeing, Dalila Pike, in consultation for Ozzy Galindo MD regarding abdominal pain and biliary obstruction.  53-year-old lady with past medical history significant for hypertension, seizure, alcohol/tobacco use presents with abdominal pain and fever.  She was initially evaluated at an outside institution, [facility], and was subsequently transferred here secondary to a possible biliary obstruction.  She was evaluated by gastroenterology and underwent ERCP with sphincterotomy and stent placement.  No evidence of choledocholithiasis was encountered, \"debris\" was encountered.  I reviewed her ultrasound from the outside institution, no gross evidence of choledocholithiasis, noted polyp.  Her MRCP done here did not demonstrate any evidence of cholelithiasis.      Problems Addressed this Visit          Gastrointestinal Abdominal     Dilated cbd, acquired - Primary    Relevant Orders    Ambulatory referral for Screening EGD     Other Visit Diagnoses       Gastritis        Relevant Medications    aluminum-magnesium hydroxide-simethicone (MAALOX MAX) 400-400-40 MG/5ML suspension 15 mL    lansoprazole (PREVACID SOLUTAB) disintegrating tablet Tablet Delayed Release Dispersible 15 mg    Other Relevant Orders    Tissue Pathology Exam    NON-[facility]N CYTOLOGY, [facility] ([facility])          Diagnoses         Codes Comments    Dilated cbd, acquired    -  Primary ICD-10-CM: K83.8  ICD-9-CM: 576.8     Gastritis     ICD-10-CM: K29.70  ICD-9-CM: 535.50             Past Medical History:   Diagnosis Date    Acute CVA (cerebrovascular accident) 08/21/2024    Alcohol use 08/21/2024    Dyslipidemia 08/21/2024    Obesity (BMI 30-39.9) 08/21/2024    PAD (peripheral " artery disease) 08/21/2024    Tobacco use 08/21/2024       Past Surgical History:    ENDOSCOPY    Procedure: ESOPHAGOGASTRODUODENOSCOPY;  Surgeon: Elie Sanders MD;  Location:  [facility];  Service: Gastroenterology;  Laterality: N/A;    ERCP    Procedure: ENDOSCOPIC RETROGRADE CHOLANGIOPANCREATOGRAPHY WITH STENT PLACEMENT;  Surgeon: Elie Sanders MD;  Location:  [facility];  Service: Gastroenterology;  Laterality: N/A;  SPHINCTEROTOMY @ 1737, 9-12MM AND 12-15MM BALLOON SWEEP TO CBD    INTERVENTIONAL RADIOLOGY PROCEDURE    Procedure: Carotid Cervical Angiogram;  Surgeon: Jamaal Saini MD;  Location:  [facility];  Service: Interventional Radiology;  Laterality: Bilateral;       Allergies   Allergen Reactions    Erythromycin Anaphylaxis    Latex Rash    Toradol [Ketorolac Tromethamine] Rash    Contrast Dye (Echo Or Unknown Ct/Mr) Unknown (See Comments)     Hives on chest       No current facility-administered medications on file prior to encounter.     Current Outpatient Medications on File Prior to Encounter   Medication Sig Dispense Refill    aspirin 81 MG chewable tablet Chew 1 tablet Daily. 30 tablet 2    atorvastatin (LIPITOR) 80 MG tablet Take 1 tablet by mouth Every Night.      folic acid (FOLVITE) 1 MG tablet Take 1 tablet by mouth Daily.      guaiFENesin (MUCINEX) 600 MG 12 hr tablet Take 2 tablets by mouth 2 (Two) Times a Day.      HYDROcodone-acetaminophen (NORCO) 5-325 MG per tablet Take 1 tablet by mouth Every 6 (Six) Hours As Needed for Severe Pain. 10 tablet 0    lisinopril (PRINIVIL,ZESTRIL) 20 MG tablet Take 1 tablet by mouth Daily.      magnesium oxide (MAG-OX) 400 tablet tablet Take 1 tablet by mouth Daily. 30 tablet 3    pantoprazole (PROTONIX) 40 MG EC tablet Take 1 tablet by mouth Daily.      ticagrelor (BRILINTA) 60 MG tablet tablet Take 1 tablet by mouth 2 (Two) Times a Day. 60 tablet 1    topiramate (Topamax) 25 MG tablet Take 1 tablet by mouth Every Night.  30 tablet 1    sodium chloride 1 g tablet Take 1 tablet by mouth 3 (Three) Times a Day As Needed (If Na <140).           Current Facility-Administered Medications:     acetaminophen (TYLENOL) tablet 650 mg, 650 mg, Oral, Q4H PRN, 650 mg at 10/07/24 1706 **OR** acetaminophen (TYLENOL) 160 MG/5ML oral solution 650 mg, 650 mg, Oral, Q4H PRN **OR** acetaminophen (TYLENOL) suppository 650 mg, 650 mg, Rectal, Q4H PRN, Becky Pang MD    aluminum-magnesium hydroxide-simethicone (MAALOX MAX) 400-400-40 MG/5ML suspension 15 mL, 15 mL, Oral, Q6H PRN, Becky Pang MD    aspirin chewable tablet 81 mg, 81 mg, Oral, Daily, Becky Pang MD, 81 mg at 10/09/24 0808    atorvastatin (LIPITOR) tablet 80 mg, 80 mg, Oral, Nightly, Becky Pang MD, 80 mg at 10/08/24 2110    sennosides-docusate (PERICOLACE) 8.6-50 MG per tablet 2 tablet, 2 tablet, Oral, BID PRN **AND** polyethylene glycol (MIRALAX) packet 17 g, 17 g, Oral, Daily PRN, 17 g at 10/09/24 1134 **AND** bisacodyl (DULCOLAX) EC tablet 5 mg, 5 mg, Oral, Daily PRN **AND** bisacodyl (DULCOLAX) suppository 10 mg, 10 mg, Rectal, Daily PRN, Becky Pang MD    Calcium Replacement - Follow Nurse / BPA Driven Protocol, , Does not apply, PRN, Becky Pang MD    cefTRIAXone (ROCEPHIN) 2,000 mg in sodium chloride 0.9 % 100 mL MBP, 2,000 mg, Intravenous, Q24H, Ozzy Galindo MD, Last Rate: 200 mL/hr at 10/09/24 1134, 2,000 mg at 10/09/24 1134    folic acid (FOLVITE) tablet 1 mg, 1 mg, Oral, Daily, Becky Pang MD, 1 mg at 10/09/24 0808    guaifenesin (ROBITUSSIN) 100 MG/5ML liquid 400 mg, 400 mg, Oral, Q8H, Ozzy Galindo MD, 400 mg at 10/09/24 1355    heparin (porcine) 5000 UNIT/ML injection 5,000 Units, 5,000 Units, Subcutaneous, Q8H, Becky Pang MD, 5,000 Units at 10/09/24 1356    HYDROcodone-acetaminophen (NORCO) 5-325 MG per tablet 1 tablet, 1 tablet, Oral, Q6H PRN, Becky Pang MD, 1 tablet at  10/09/24 1616    HYDROmorphone (DILAUDID) injection 0.5 mg, 0.5 mg, Intravenous, Q2H PRN, 0.5 mg at 10/09/24 1105 **AND** naloxone (NARCAN) injection 0.4 mg, 0.4 mg, Intravenous, Q5 Min PRN, Becky Pang MD    lansoprazole (PREVACID SOLUTAB) disintegrating tablet Tablet Delayed Release Dispersible 15 mg, 15 mg, Oral, Q AM, Becky Pang MD, 15 mg at 10/09/24 0532    lisinopril (PRINIVIL,ZESTRIL) tablet 20 mg, 20 mg, Oral, Daily, Becky Pang MD, 20 mg at 10/09/24 0843    [] LORazepam (ATIVAN) tablet 2 mg, 2 mg, Oral, Q6H, 2 mg at 10/08/24 1050 **FOLLOWED BY** [] LORazepam (ATIVAN) tablet 1 mg, 1 mg, Oral, Q6H, 1 mg at 10/09/24 1138 **FOLLOWED BY** LORazepam (ATIVAN) tablet 1 mg, 1 mg, Oral, Q12H **FOLLOWED BY** [START ON 10/11/2024] LORazepam (ATIVAN) tablet 1 mg, 1 mg, Oral, Daily, Becky Pang MD    LORazepam (ATIVAN) tablet 1 mg, 1 mg, Oral, Q1H PRN **OR** midazolam (VERSED) injection 2 mg, 2 mg, Intravenous, Q1H PRN **OR** LORazepam (ATIVAN) tablet 2 mg, 2 mg, Oral, Q1H PRN **OR** midazolam (VERSED) injection 4 mg, 4 mg, Intravenous, Q1H PRN **OR** midazolam (VERSED) injection 4 mg, 4 mg, Intravenous, Q15 Min PRN **OR** midazolam (VERSED) injection 4 mg, 4 mg, Intramuscular, Q15 Min PRN, Becky Pang MD    magnesium oxide (MAG-OX) tablet 400 mg, 400 mg, Oral, Daily, Becky Pang MD, 400 mg at 10/09/24 0807    Magnesium Standard Dose Replacement - Follow Nurse / BPA Driven Protocol, , Does not apply, PRN, Becky Pang MD    metroNIDAZOLE (FLAGYL) tablet 500 mg, 500 mg, Oral, Q8H, Ozzy Galindo MD, 500 mg at 10/09/24 1357    nicotine (NICODERM CQ) 21 MG/24HR patch 1 patch, 1 patch, Transdermal, Q24H, Becky Pang MD, 1 patch at 10/09/24 1704    nicotine polacrilex (NICORETTE) gum 4 mg, 4 mg, Mouth/Throat, Q1H PRN, Becky Pang MD    ondansetron ODT (ZOFRAN-ODT) disintegrating tablet 4 mg, 4 mg, Oral, Q6H PRN,  4 mg at 10/08/24 2134 **OR** ondansetron (ZOFRAN) injection 4 mg, 4 mg, Intravenous, Q6H PRN, Becky Pang MD, 4 mg at 10/07/24 2149    Phosphorus Replacement - Follow Nurse / BPA Driven Protocol, , Does not apply, Bird OLSEN Gabriela Kirk, MD    Potassium Replacement - Follow Nurse / BPA Driven Protocol, , Does not apply, Bird OLSEN Gabriela Kirk, MD    sodium chloride 0.9 % flush 1-10 mL, 1-10 mL, Intravenous, PRN, Becky Pang MD    sodium chloride 0.9 % flush 10 mL, 10 mL, Intravenous, Q12H, Becky Pang MD, 10 mL at 10/09/24 0808    thiamine (B-1) injection 200 mg, 200 mg, Intravenous, Q8H, 200 mg at 10/09/24 1356 **FOLLOWED BY** [START ON 10/13/2024] thiamine (VITAMIN B-1) tablet 100 mg, 100 mg, Oral, Daily, Becky Pang MD    ticagrelor (BRILINTA) tablet 60 mg, 60 mg, Oral, BID, Becky Pang MD, 60 mg at 10/09/24 0808    topiramate (TOPAMAX) tablet 25 mg, 25 mg, Oral, Nightly, Becky Pang MD, 25 mg at 10/08/24 2110    History reviewed. No pertinent family history.  Social History     Socioeconomic History    Marital status:    Tobacco Use    Smoking status: Every Day     Current packs/day: 0.50     Average packs/day: 2.0 packs/day for 38.8 years (76.4 ttl pk-yrs)     Types: Cigarettes     Start date: 2024     Passive exposure: Current    Smokeless tobacco: Never    Tobacco comments:     4-5 a day   Vaping Use    Vaping status: Never Used   Substance and Sexual Activity    Alcohol use: Not Currently    Drug use: Not Currently    Sexual activity: Defer       Review of Systems:  Review of Systems   Constitutional:  Positive for appetite change and fever.   HENT:  Negative for congestion, ear pain and mouth sores.    Eyes:  Negative for photophobia and visual disturbance.   Respiratory:  Negative for apnea and shortness of breath.    Cardiovascular:  Negative for chest pain and leg swelling.   Gastrointestinal:  Positive for abdominal pain  "and nausea. Negative for blood in stool and vomiting.   Endocrine: Negative for polyphagia and polyuria.   Genitourinary:  Negative for difficulty urinating, hematuria and urgency.   Musculoskeletal:  Negative for arthralgias and myalgias.   Skin:  Negative for color change and pallor.   Allergic/Immunologic: Negative for immunocompromised state.   Neurological:  Negative for dizziness and seizures.   Hematological:  Negative for adenopathy.   Psychiatric/Behavioral:  Negative for agitation, dysphoric mood and sleep disturbance.      Otherwise the 12 point review of systems is negative.    BP (!) 171/108 (BP Location: Right arm, Patient Position: Lying)   Pulse 75   Temp 98.9 °F (37.2 °C) (Temporal)   Resp 18   Ht 172.7 cm (68\")   Wt 110 kg (243 lb)   SpO2 96%   BMI 36.95 kg/m²   Body mass index is 36.95 kg/m².    General: Laying in bed, mild distress  HEENT: PER, no icterus, normal sclerae  Cardiac: regular rhythm,  no audible rubs  Pulmonary: bilateral breath sounds, nonlabored  Abdominal: Obese, mild tenderness, no generalized peritonitis  Neurologic: awake, alert, no obvious focal deficits  Extremities: warm, no edema  Skin: no obvious rashes nor worrisome lesions seen     CBC  Results from last 7 days   Lab Units 10/09/24  0454   WBC 10*3/mm3 6.87   HEMOGLOBIN g/dL 14.3   HEMATOCRIT % 41.9   PLATELETS 10*3/mm3 227       CMP  Results from last 7 days   Lab Units 10/09/24  0455   SODIUM mmol/L 135*   POTASSIUM mmol/L 4.4   CHLORIDE mmol/L 103   CO2 mmol/L 21.0*   BUN mg/dL 15   CREATININE mg/dL 0.65   CALCIUM mg/dL 9.2   BILIRUBIN mg/dL 1.3*   ALK PHOS U/L 243*   ALT (SGPT) U/L 60*   AST (SGOT) U/L 54*   GLUCOSE mg/dL 152*       Radiology  Imaging Results (Last 72 Hours)       Procedure Component Value Units Date/Time    FL ERCP pancreatic and biliary ducts [[id number]] Collected: 10/08/24 2237     Updated: 10/08/24 2243    Narrative:      FL ERCP PANCREATIC AND BILIARY DUCTS    Date of Exam: 10/8/2024 4:54 " PM EDT    Indication: ERCP.       Comparison: MRCP dated 10/9/2024    Technique:  A series of radiographic digital spot films were obtained in conjunction with an endoscopic catheterization of the biliary and pancreatic ductal system, performed by the gastroenterologist.    Fluoroscopic Time: 146 seconds    Number of Images: 14    Findings:  Intraoperative fluoroscopic images demonstrate cannulation and opacification of the common bile duct with balloon sweep and placement of a metallic stent. Please see procedure report for full findings and details.      Impression:      Impression:  1. Intraoperative fluoroscopy during ERCP with metallic stent placement. Please see procedure report for full findings and details.      Electronically Signed: Deandre Vanessa    10/8/2024 10:39 PM EDT    Workstation ID: [id number]    MRI abdomen wo contrast mrcp [[id number]] Collected: 10/08/24 0820     Updated: 10/08/24 0830    Narrative:      MRI ABDOMEN WO CONTRAST MRCP    Date of Exam: 10/8/2024 2:47 AM EDT    Indication: dilated CBD on RUQ U/S from OSH.     Comparison: CT abdomen pelvis 10/7/2024    Technique:  Routine multiplanar/multisequence images of the abdomen were obtained with MRCP sequences without contrast administration.      Findings:  Heart size appears borderline/mildly enlarged. Lower lungs grossly clear within limitations of MRI.    Normal size and contour of liver and spleen. No obvious liver lesion. Mild fluid distention of the gallbladder without wall thickening or pericholecystic fluid. No visible stone. Mild dilation of the biliary tree with somewhat blunted appearance of   common bile duct at ampulla. Upstream common bile duct measures 1.2 cm, mid common bile duct 1.2 cm and downstream common bile duct 0.6 cm. No obvious filling defect or mass.    Normal size, contour and signal intensity of the pancreas. No main duct dilation or divisum morphology. Spleen is homogeneous. No suspicious adrenal nodule.  Symmetric renal size and contour. No hydronephrosis. Expected configuration stomach and duodenum.   No evidence of bowel obstruction or active inflammation. Appendix normal right lower quadrant. Normal caliber abdominal aorta. Few prominent prashanth hepatic lymph nodes commonly reactive otherwise no suspicious adenopathy. No ascites or drainable   collection.    No acute abdominal wall findings within included field-of-view. Multilevel degenerative endplate change without infiltrative marrow process. Slight lower thoracic levocurvature.      Impression:      Impression:  1. Mildly dilated biliary tree with blunted appearance of the common bile duct at the ampulla. No obvious filling defects or mass. Differential includes ampullary stricture, biliary sphincter of Oddi dysfunction, occult intraductal stone versus occult   mass. Consider ERCP for further assessment.  2. Mildly fluid distention of the gallbladder without other findings to suggest acute cholecystitis by MRI.  3. Negative for acute pancreatitis.        Electronically Signed: Juan Ball MD    10/8/2024 8:27 AM EDT    Workstation ID: [id number]    US Outside Films [[id number]] Resulted: 10/07/24 1629     Updated: 10/07/24 1629    Narrative:      This procedure was auto-finalized with no dictation required.    CT Outside Films [[id number]] Resulted: 10/07/24 1628     Updated: 10/07/24 1628    Narrative:      This procedure was auto-finalized with no dictation required.          Assessment:  Abdominal pain  Biliary obstruction  Obesity    Plan:  I reviewed her outside ultrasound, blood work, MRCP, and ERCP images.  She does not have gross evidence of gallstones on imaging.  Her ultrasound demonstrated a polyp.  Her ERCP images demonstrated faint filling of the gallbladder indicating a likely patent cystic duct.  At this point I have no plans for acute surgical intervention regarding cholecystectomy.  I am going to put her on a bit ursodiol and she may have a  low-fat diet as tolerated.    Srinivasa Infante MD  10/09/24  17:08 EDT

## 2024-10-09 NOTE — CASE MANAGEMENT/SOCIAL WORK
Continued Stay Note  [facility]     Patient Name: Dalila Pike  MRN: [id number]  Today's Date: 10/9/2024    Admit Date: 10/7/2024    Plan: Seeking rehab   Discharge Plan       Row Name 10/09/24 1656       Plan    Plan Seeking rehab    Plan Comments SW'er met with patient and family at bedside. SW'er spoke with [facility] rep inquiring if patient can return (as patient left AMA last admission); rep explained they would have to look at therapy notes. Patient has Medicaid; no SNF benefits. SW will fax referrals.  Plan is rehab                   Discharge Codes    No documentation.                 Expected Discharge Date and Time       Expected Discharge Date Expected Discharge Time    Oct 11, 2024               NEETU Cortez (Kay)

## 2024-10-09 NOTE — PLAN OF CARE
Problem: Adult Inpatient Plan of Care  Goal: Optimal Comfort and Wellbeing  Outcome: Progressing  Intervention: Monitor Pain and Promote Comfort  Recent Flowsheet Documentation  Taken 10/9/2024 1105 by Brian Malloy, RN  Pain Management Interventions: see MAR  Taken 10/9/2024 0808 by Brian Malloy, RN  Pain Management Interventions: see MAR  Intervention: Provide Person-Centered Care  Recent Flowsheet Documentation  Taken 10/9/2024 0808 by Brian Malloy, RN  Trust Relationship/Rapport:   care explained   choices provided   Goal Outcome Evaluation:  Plan of Care Reviewed With: patient        Progress: improving       Tolerating clear liquid diet. General surgery consulted. PRN pain medication given. Miralax given per patient request. Awaiting dc plans.

## 2024-10-09 NOTE — PROGRESS NOTES
[facility] Medicine Services  PROGRESS NOTE    Patient Name: Dalila Pike  : 1970  MRN: [id number]    Date of Admission: 10/7/2024  Primary Care Physician: Agnes Ewing APRN    Subjective   Subjective     CC: RUQ pain    HPI: Continues with abdominal pain. No f/c. No nausea. Better overall, but still experiencing pain.       Objective   Objective     Vital Signs:   Temp:  [96.9 °F (36.1 °C)-98.2 °F (36.8 °C)] 97.9 °F (36.6 °C)  Heart Rate:  [79-89] 89  Resp:  [18-22] 18  BP: (102-154)/() 140/88  Flow (L/min):  [6] 6     Physical Exam:  NAD, alert and oriented  OP clear, dry MM  Neck supple  RRR  CTAB  +BS, soft, RUQ TTP, but slightly improved  No c/c/e  BRADY  Normal affect  No change from 10/8 otherwise    Results Reviewed:  LAB RESULTS:      Lab 10/09/24  0454 10/08/24  0607 10/07/24  1608   WBC 6.87 5.53 8.02   HEMOGLOBIN 14.3 13.2 14.3   HEMATOCRIT 41.9 39.6 42.9   PLATELETS 227 197 236   NEUTROS ABS  --   --  5.87   IMMATURE GRANS (ABS)  --   --  0.02   LYMPHS ABS  --   --  1.53   MONOS ABS  --   --  0.53   EOS ABS  --   --  0.04   MCV 90.5 92.3 92.7   PROCALCITONIN  --   --  0.03   LACTATE  --   --  1.4         Lab 10/09/24  0455 10/08/24  0607 10/07/24  1608   SODIUM 135* 139 140   POTASSIUM 4.4 3.9 4.1   CHLORIDE 103 106 104   CO2 21.0* 25.0 27.0   ANION GAP 11.0 8.0 9.0   BUN 15 13 14   CREATININE 0.65 0.64 0.66   EGFR 105.4 105.8 105.0   GLUCOSE 152* 118* 116*   CALCIUM 9.2 8.9 9.4   HEMOGLOBIN A1C  --  5.50  --    TSH  --  0.447  --          Lab 10/09/24  0455 10/08/24  0607 10/07/24  1608   TOTAL PROTEIN 6.5 6.1 7.0   ALBUMIN 3.7 3.6 4.3   GLOBULIN 2.8 2.5 2.7   ALT (SGPT) 60* 46* 29   AST (SGOT) 54* 46* 22   BILIRUBIN 1.3* 2.0* 1.3*   ALK PHOS 243* 193* 199*             Lab 10/08/24  0607   CHOLESTEROL 144   LDL CHOL 75   HDL CHOL 46   TRIGLYCERIDES 130             Brief Urine Lab Results  (Last result in the past 365 days)        Color   Clarity   Blood    Leuk Est   Nitrite   Protein   CREAT   Urine HCG        10/08/24 0317 Yellow   Clear   Negative   Negative   Negative   Negative                   Microbiology Results Abnormal       Procedure Component Value - Date/Time    Blood Culture - Blood, Hand, Right [[id number]]  (Normal) Collected: 10/07/24 1618    Lab Status: Preliminary result Specimen: Blood from Hand, Right Updated: 10/08/24 1645     Blood Culture No growth at 24 hours    Narrative:      Less than seven (7) mL's of blood was collected.  Insufficient quantity may yield false negative results.    Blood Culture - Blood, Arm, Right [[id number]]  (Normal) Collected: 10/07/24 1609    Lab Status: Preliminary result Specimen: Blood from Arm, Right Updated: 10/08/24 1645     Blood Culture No growth at 24 hours    COVID PRE-OP / PRE-PROCEDURE SCREENING ORDER (NO ISOLATION) - Swab, Nasopharynx [[id number]]  (Normal) Collected: 10/07/24 1750    Lab Status: Final result Specimen: Swab from Nasopharynx Updated: 10/07/24 1849    Narrative:      The following orders were created for panel order COVID PRE-OP / PRE-PROCEDURE SCREENING ORDER (NO ISOLATION) - Swab, Nasopharynx.  Procedure                               Abnormality         Status                     ---------                               -----------         ------                     Respiratory Panel PCR w/...[[id number]]  Normal              Final result                 Please view results for these tests on the individual orders.    Respiratory Panel PCR w/COVID-19(SARS-CoV-2) MICKEY/AKIL/MARIO/PAD/COR/SOULEYMANE In-House, NP Swab in UTM/VTM, 2 HR TAT - Swab, Nasopharynx [[id number]]  (Normal) Collected: 10/07/24 1750    Lab Status: Final result Specimen: Swab from Nasopharynx Updated: 10/07/24 1849     ADENOVIRUS, PCR Not Detected     Coronavirus 229E Not Detected     Coronavirus HKU1 Not Detected     Coronavirus NL63 Not Detected     Coronavirus OC43 Not Detected     COVID19 Not Detected     Human Metapneumovirus Not  Detected     Human Rhinovirus/Enterovirus Not Detected     Influenza A PCR Not Detected     Influenza B PCR Not Detected     Parainfluenza Virus 1 Not Detected     Parainfluenza Virus 2 Not Detected     Parainfluenza Virus 3 Not Detected     Parainfluenza Virus 4 Not Detected     RSV, PCR Not Detected     Bordetella pertussis pcr Not Detected     Bordetella parapertussis PCR Not Detected     Chlamydophila pneumoniae PCR Not Detected     Mycoplasma pneumo by PCR Not Detected    Narrative:      In the setting of a positive respiratory panel with a viral infection PLUS a negative procalcitonin without other underlying concern for bacterial infection, consider observing off antibiotics or discontinuation of antibiotics and continue supportive care. If the respiratory panel is positive for atypical bacterial infection (Bordetella pertussis, Chlamydophila pneumoniae, or Mycoplasma pneumoniae), consider antibiotic de-escalation to target atypical bacterial infection.            FL ERCP pancreatic and biliary ducts    Result Date: 10/8/2024  FL ERCP PANCREATIC AND BILIARY DUCTS Date of Exam: 10/8/2024 4:54 PM EDT Indication: ERCP.   Comparison: MRCP dated 10/9/2024 Technique:  A series of radiographic digital spot films were obtained in conjunction with an endoscopic catheterization of the biliary and pancreatic ductal system, performed by the gastroenterologist. Fluoroscopic Time: 146 seconds Number of Images: 14 Findings: Intraoperative fluoroscopic images demonstrate cannulation and opacification of the common bile duct with balloon sweep and placement of a metallic stent. Please see procedure report for full findings and details.     Impression: Impression: 1. Intraoperative fluoroscopy during ERCP with metallic stent placement. Please see procedure report for full findings and details. Electronically Signed: Deandre Merrittelor  10/8/2024 10:39 PM EDT  Workstation ID: [id number]    MRI abdomen wo contrast mrcp    Result  Date: 10/8/2024  MRI ABDOMEN WO CONTRAST MRCP Date of Exam: 10/8/2024 2:47 AM EDT Indication: dilated CBD on RUQ U/S from OSH.  Comparison: CT abdomen pelvis 10/7/2024 Technique:  Routine multiplanar/multisequence images of the abdomen were obtained with MRCP sequences without contrast administration. Findings: Heart size appears borderline/mildly enlarged. Lower lungs grossly clear within limitations of MRI. Normal size and contour of liver and spleen. No obvious liver lesion. Mild fluid distention of the gallbladder without wall thickening or pericholecystic fluid. No visible stone. Mild dilation of the biliary tree with somewhat blunted appearance of common bile duct at ampulla. Upstream common bile duct measures 1.2 cm, mid common bile duct 1.2 cm and downstream common bile duct 0.6 cm. No obvious filling defect or mass. Normal size, contour and signal intensity of the pancreas. No main duct dilation or divisum morphology. Spleen is homogeneous. No suspicious adrenal nodule. Symmetric renal size and contour. No hydronephrosis. Expected configuration stomach and duodenum.  No evidence of bowel obstruction or active inflammation. Appendix normal right lower quadrant. Normal caliber abdominal aorta. Few prominent prashanth hepatic lymph nodes commonly reactive otherwise no suspicious adenopathy. No ascites or drainable collection. No acute abdominal wall findings within included field-of-view. Multilevel degenerative endplate change without infiltrative marrow process. Slight lower thoracic levocurvature.     Impression: Impression: 1. Mildly dilated biliary tree with blunted appearance of the common bile duct at the ampulla. No obvious filling defects or mass. Differential includes ampullary stricture, biliary sphincter of Oddi dysfunction, occult intraductal stone versus occult mass. Consider ERCP for further assessment. 2. Mildly fluid distention of the gallbladder without other findings to suggest acute  cholecystitis by MRI. 3. Negative for acute pancreatitis. Electronically Signed: Juan Ball MD  10/8/2024 8:27 AM EDT  Workstation ID: [id number]    US Outside Films    Result Date: 10/7/2024  This procedure was auto-finalized with no dictation required.    CT Outside Films    Result Date: 10/7/2024  This procedure was auto-finalized with no dictation required.     Results for orders placed during the hospital encounter of 08/21/24    Adult Transthoracic Echo Complete W/ Cont if Necessary Per Protocol (With Agitated Saline)    Interpretation Summary    Left ventricular systolic function is normal. Calculated left ventricular EF = 68% Normal left ventricular cavity size noted. Left ventricular wall thickness is consistent with moderate concentric hypertrophy. All left ventricular wall segments contract normally. Left ventricular diastolic function was normal. Normal left atrial pressure.    The right ventricular cavity is mildly dilated. Normal right ventricular systolic function noted.    Left atrial volume is moderately increased. Saline test results are negative.    The aortic valve is structurally normal with no stenosis present. The aortic valve appears trileaflet. No significant aortic valve regurgitation is present.    The mitral valve is structurally normal with no significant stenosis present. Trace to mild mitral valve regurgitation is present.    Mild tricuspid valve regurgitation is present. Estimated right ventricular systolic pressure from tricuspid regurgitation is mildly elevated (35-45 mmHg)    Mild dilation of the ascending aorta is present. Ascending aorta = 3.7 cm      Current medications:  Scheduled Meds:aspirin, 81 mg, Oral, Daily  atorvastatin, 80 mg, Oral, Nightly  folic acid, 1 mg, Oral, Daily  guaifenesin, 400 mg, Oral, Q8H  heparin (porcine), 5,000 Units, Subcutaneous, Q8H  lansoprazole, 15 mg, Oral, Q AM  lisinopril, 20 mg, Oral, Daily  LORazepam, 1 mg, Oral, Q6H   Followed  by  LORazepam, 1 mg, Oral, Q12H   Followed by  [START ON 10/11/2024] LORazepam, 1 mg, Oral, Daily  magnesium oxide, 400 mg, Oral, Daily  nicotine, 1 patch, Transdermal, Q24H  piperacillin-tazobactam, 3.375 g, Intravenous, Q6H  sodium chloride, 10 mL, Intravenous, Q12H  thiamine (B-1) IV, 200 mg, Intravenous, Q8H   Followed by  [START ON 10/13/2024] thiamine, 100 mg, Oral, Daily  ticagrelor, 60 mg, Oral, BID  topiramate, 25 mg, Oral, Nightly      Continuous Infusions:lactated ringers, 20 mL/hr, Last Rate: Stopped (10/09/24 0313)      PRN Meds:.  acetaminophen **OR** acetaminophen **OR** acetaminophen    aluminum-magnesium hydroxide-simethicone    senna-docusate sodium **AND** polyethylene glycol **AND** bisacodyl **AND** bisacodyl    Calcium Replacement - Follow Nurse / BPA Driven Protocol    HYDROcodone-acetaminophen    HYDROmorphone **AND** naloxone    LORazepam **OR** midazolam **OR** LORazepam **OR** midazolam **OR** midazolam **OR** midazolam    Magnesium Standard Dose Replacement - Follow Nurse / BPA Driven Protocol    nicotine polacrilex    ondansetron ODT **OR** ondansetron    Phosphorus Replacement - Follow Nurse / BPA Driven Protocol    Potassium Replacement - Follow Nurse / BPA Driven Protocol    sodium chloride    sodium chloride    Assessment & Plan   Assessment & Plan     Active Hospital Problems    Diagnosis  POA    **RUQ pain [R10.11]  Yes    Dilated cbd, acquired [K83.8]  Unknown    HTN (hypertension) [I10]  Yes    History of seizures [Z87.898]  Yes    Alcohol use [Z78.9]  Yes    Obesity (BMI 30-39.9) [E66.9]  Yes    Dyslipidemia [E78.5]  Yes    PAD (peripheral artery disease) [I73.9]  Yes    Tobacco use [Z72.0]  Yes      Resolved Hospital Problems   No resolved problems to display.        Brief Hospital Course to date:  Dalila Pike is a 53 y.o. female with history of HTN, HL, PAD, obesity, seizures, ETOH use, R MCA CVA s/p thrombectomy/R ICA stent, with residual L weakness, recent L ankle  fracture here with RUQ pain.    RUQ pain  -zosyn  -MRCP reviewed  -s/p ERCP with papillary stenosis, s/p sphincterotomy/stent  -cytology pending  -GI following  -General surgery consulted for CCY    Recent CVA s/p thrombectomy/R ICA stent  -on ASA/brilinta/statin    HTN  -monitor    HL  -statin    ETOH use  -monitor for withdrawal    R ankle fracture  -followed by Dr. Gonzalez, with recommended CAM boot in place  -had follow up scheduled 10/7, but hospitalized  -will ask if can follow up here/awaiting consult    Tobacco abuse      Expected Discharge Location and Transportation: Rehab  Expected Discharge   Expected Discharge Date: 10/11/2024; Expected Discharge Time:      VTE Prophylaxis:  Pharmacologic VTE prophylaxis orders are present.         AM-PAC 6 Clicks Score (PT): 20 (10/08/24 0800)    CODE STATUS:   Code Status and Medical Interventions: CPR (Attempt to Resuscitate); Full Support   Ordered at: 10/07/24 0152     Level Of Support Discussed With:    Patient     Code Status (Patient has no pulse and is not breathing):    CPR (Attempt to Resuscitate)     Medical Interventions (Patient has pulse or is breathing):    Full Support       Ozzy Galindo MD  10/09/24

## 2024-10-09 NOTE — PROGRESS NOTES
"GI Daily Progress Note  Subjective:    Chief Complaint:  abdominal pain    Patient reports having a BM this evening and then noted abdominal cramping pain worsened in her upper abdomen and in her left lower abdomen.  Some nausea, no emesis.     Objective:    /93 (BP Location: Right arm, Patient Position: Lying)   Pulse 98   Temp 98.9 °F (37.2 °C) (Temporal)   Resp 18   Ht 172.7 cm (68\")   Wt 110 kg (243 lb)   SpO2 96%   BMI 36.95 kg/m²     Physical Exam   General: Patient awake, alert and cooperative   Cardiovascular: Regular rate, well-perfused extremities   Pulm: Equal expansion bilaterally, no increased WOB   Abdomen: Soft, mild ttp in upper and left abdomen, nondistended;               Neuro: A&O, No obvious sign of focal deficit   Psychiatric: Normal mood and behavior; memory intact    Lab  Lab Results   Component Value Date    WBC 6.87 10/09/2024    HGB 14.3 10/09/2024    HGB 13.2 10/08/2024    HGB 14.3 10/07/2024    MCV 90.5 10/09/2024     10/09/2024    INR 0.9 06/26/2021       Lab Results   Component Value Date    GLUCOSE 152 (H) 10/09/2024    BUN 15 10/09/2024    CREATININE 0.65 10/09/2024    EGFRIFNONA >60 06/26/2021    EGFRIFAFRI >60 06/26/2021    BCR 23.1 10/09/2024     (L) 10/09/2024    K 4.4 10/09/2024    CO2 21.0 (L) 10/09/2024    CALCIUM 9.2 10/09/2024    ALBUMIN 3.7 10/09/2024    ALKPHOS 243 (H) 10/09/2024    BILITOT 1.3 (H) 10/09/2024    ALT 60 (H) 10/09/2024    AST 54 (H) 10/09/2024       Assessment:    Biliary obstruction   Ascending cholangitis   Altered mental status  Recent CVA s/p thrombectomy and right ICA stent, on dual antiplatelet therapy with Brilinta and aspirin     Plan:  - s/p EGD 10/8 with tongue of salmon colored mucosa; biopsied, gastritis; biopsied.  ERCP with dilated CBD down to the level of the major papilla suggestive of papillary stenosis.  Brushings obtained.  Given anti-platelet use, metal stent placed following sphincterotomy.  - General surgery " has evaluated with no plan for CCY  - Patient had BM today, still reports some upper and left sided abdominal pain.  Continue PPI and GI cocktail PRN.  Okay to advance to low fat diet as tolerated and she will let us know when she would like to advance  - Complete course of antibiotics  - Repeat ERCP in 6 weeks to remove biliary stent, resweep duct and for repeat cholangiogram      Elie Sanders MD  10/09/24  18:51 EDT

## 2024-10-10 LAB
ALBUMIN SERPL-MCNC: 3.8 G/DL (ref 3.5–5.2)
ALBUMIN/GLOB SERPL: 1.7 G/DL
ALP SERPL-CCNC: 249 U/L (ref 39–117)
ALT SERPL W P-5'-P-CCNC: 48 U/L (ref 1–33)
ANION GAP SERPL CALCULATED.3IONS-SCNC: 11 MMOL/L (ref 5–15)
AST SERPL-CCNC: 34 U/L (ref 1–32)
BILIRUB SERPL-MCNC: 0.7 MG/DL (ref 0–1.2)
BUN SERPL-MCNC: 12 MG/DL (ref 6–20)
BUN/CREAT SERPL: 19.4 (ref 7–25)
CALCIUM SPEC-SCNC: 9.3 MG/DL (ref 8.6–10.5)
CHLORIDE SERPL-SCNC: 105 MMOL/L (ref 98–107)
CO2 SERPL-SCNC: 24 MMOL/L (ref 22–29)
CREAT SERPL-MCNC: 0.62 MG/DL (ref 0.57–1)
CYTO UR: NORMAL
EGFRCR SERPLBLD CKD-EPI 2021: 106.6 ML/MIN/1.73
GLOBULIN UR ELPH-MCNC: 2.3 GM/DL
GLUCOSE SERPL-MCNC: 111 MG/DL (ref 65–99)
LAB AP CASE REPORT: NORMAL
LAB AP CLINICAL INFORMATION: NORMAL
PATH REPORT.FINAL DX SPEC: NORMAL
PATH REPORT.GROSS SPEC: NORMAL
POTASSIUM SERPL-SCNC: 4 MMOL/L (ref 3.5–5.2)
PROT SERPL-MCNC: 6.1 G/DL (ref 6–8.5)
REF LAB TEST METHOD: NORMAL
SODIUM SERPL-SCNC: 140 MMOL/L (ref 136–145)

## 2024-10-10 PROCEDURE — 25010000002 THIAMINE HCL 200 MG/2ML SOLUTION: Performed by: INTERNAL MEDICINE

## 2024-10-10 PROCEDURE — 80053 COMPREHEN METABOLIC PANEL: CPT | Performed by: HOSPITALIST

## 2024-10-10 PROCEDURE — 97166 OT EVAL MOD COMPLEX 45 MIN: CPT

## 2024-10-10 PROCEDURE — 25010000002 HEPARIN (PORCINE) PER 1000 UNITS: Performed by: INTERNAL MEDICINE

## 2024-10-10 PROCEDURE — 63710000001 ONDANSETRON ODT 4 MG TABLET DISPERSIBLE: Performed by: INTERNAL MEDICINE

## 2024-10-10 PROCEDURE — 97162 PT EVAL MOD COMPLEX 30 MIN: CPT

## 2024-10-10 PROCEDURE — 25010000002 HYDROMORPHONE PER 4 MG: Performed by: INTERNAL MEDICINE

## 2024-10-10 PROCEDURE — 99232 SBSQ HOSP IP/OBS MODERATE 35: CPT | Performed by: HOSPITALIST

## 2024-10-10 PROCEDURE — 25010000002 CEFTRIAXONE PER 250 MG: Performed by: HOSPITALIST

## 2024-10-10 RX ORDER — NICOTINE 21 MG/24HR
1 PATCH, TRANSDERMAL 24 HOURS TRANSDERMAL EVERY 24 HOURS
Status: DISCONTINUED | OUTPATIENT
Start: 2024-10-10 | End: 2024-10-16 | Stop reason: HOSPADM

## 2024-10-10 RX ADMIN — THIAMINE HYDROCHLORIDE 200 MG: 100 INJECTION, SOLUTION INTRAMUSCULAR; INTRAVENOUS at 13:48

## 2024-10-10 RX ADMIN — HYDROMORPHONE HYDROCHLORIDE 0.5 MG: 1 INJECTION, SOLUTION INTRAMUSCULAR; INTRAVENOUS; SUBCUTANEOUS at 22:36

## 2024-10-10 RX ADMIN — GUAIFENESIN 400 MG: 100 LIQUID ORAL at 05:41

## 2024-10-10 RX ADMIN — TOPIRAMATE 25 MG: 25 TABLET, FILM COATED ORAL at 20:51

## 2024-10-10 RX ADMIN — HEPARIN SODIUM 5000 UNITS: 5000 INJECTION INTRAVENOUS; SUBCUTANEOUS at 13:48

## 2024-10-10 RX ADMIN — THIAMINE HYDROCHLORIDE 200 MG: 100 INJECTION, SOLUTION INTRAMUSCULAR; INTRAVENOUS at 21:07

## 2024-10-10 RX ADMIN — NICOTINE 1 PATCH: 21 PATCH TRANSDERMAL at 13:49

## 2024-10-10 RX ADMIN — ASPIRIN 81 MG 81 MG: 81 TABLET ORAL at 08:17

## 2024-10-10 RX ADMIN — ONDANSETRON 4 MG: 4 TABLET, ORALLY DISINTEGRATING ORAL at 20:50

## 2024-10-10 RX ADMIN — MAGNESIUM OXIDE TAB 400 MG (241.3 MG ELEMENTAL MG) 400 MG: 400 (241.3 MG) TAB at 08:17

## 2024-10-10 RX ADMIN — LISINOPRIL 20 MG: 20 TABLET ORAL at 08:16

## 2024-10-10 RX ADMIN — HEPARIN SODIUM 5000 UNITS: 5000 INJECTION INTRAVENOUS; SUBCUTANEOUS at 21:06

## 2024-10-10 RX ADMIN — METRONIDAZOLE 500 MG: 500 TABLET ORAL at 05:40

## 2024-10-10 RX ADMIN — HYDROCODONE BITARTRATE AND ACETAMINOPHEN 1 TABLET: 5; 325 TABLET ORAL at 17:24

## 2024-10-10 RX ADMIN — URSODIOL 300 MG: 300 CAPSULE ORAL at 08:17

## 2024-10-10 RX ADMIN — HEPARIN SODIUM 5000 UNITS: 5000 INJECTION INTRAVENOUS; SUBCUTANEOUS at 05:41

## 2024-10-10 RX ADMIN — THIAMINE HYDROCHLORIDE 200 MG: 100 INJECTION, SOLUTION INTRAMUSCULAR; INTRAVENOUS at 05:41

## 2024-10-10 RX ADMIN — ATORVASTATIN CALCIUM 80 MG: 40 TABLET, FILM COATED ORAL at 20:51

## 2024-10-10 RX ADMIN — SODIUM CHLORIDE 2000 MG: 900 INJECTION INTRAVENOUS at 12:07

## 2024-10-10 RX ADMIN — GUAIFENESIN 400 MG: 100 LIQUID ORAL at 21:06

## 2024-10-10 RX ADMIN — URSODIOL 300 MG: 300 CAPSULE ORAL at 20:52

## 2024-10-10 RX ADMIN — TICAGRELOR 60 MG: 60 TABLET ORAL at 20:52

## 2024-10-10 RX ADMIN — Medication 10 ML: at 21:06

## 2024-10-10 RX ADMIN — TICAGRELOR 60 MG: 60 TABLET ORAL at 08:17

## 2024-10-10 RX ADMIN — LANSOPRAZOLE 15 MG: 15 TABLET, ORALLY DISINTEGRATING ORAL at 05:40

## 2024-10-10 RX ADMIN — FOLIC ACID 1 MG: 1 TABLET ORAL at 08:17

## 2024-10-10 RX ADMIN — GUAIFENESIN 400 MG: 100 LIQUID ORAL at 13:48

## 2024-10-10 RX ADMIN — LORAZEPAM 1 MG: 1 TABLET ORAL at 08:16

## 2024-10-10 RX ADMIN — Medication 10 ML: at 08:17

## 2024-10-10 RX ADMIN — HYDROCODONE BITARTRATE AND ACETAMINOPHEN 1 TABLET: 5; 325 TABLET ORAL at 08:32

## 2024-10-10 RX ADMIN — HYDROMORPHONE HYDROCHLORIDE 0.5 MG: 1 INJECTION, SOLUTION INTRAMUSCULAR; INTRAVENOUS; SUBCUTANEOUS at 05:41

## 2024-10-10 RX ADMIN — METRONIDAZOLE 500 MG: 500 TABLET ORAL at 21:06

## 2024-10-10 RX ADMIN — METRONIDAZOLE 500 MG: 500 TABLET ORAL at 13:49

## 2024-10-10 RX ADMIN — HYDROCODONE BITARTRATE AND ACETAMINOPHEN 1 TABLET: 5; 325 TABLET ORAL at 02:07

## 2024-10-10 NOTE — PLAN OF CARE
Goal Outcome Evaluation:  Plan of Care Reviewed With: patient, family        Progress: no change  Outcome Evaluation: PT eval complete. Pt presents with generalized weakness from remote CVA (LUE>LLE), balance deficits, and decreased activity tolerance warranting skilled IPPT services. Pt ambulated with CGA-Veda and is primarily affected by RUE weakness. PT rec home w/ 24/7 assist and HHPT/OT at d/c.      Anticipated Discharge Disposition (PT): home with 24/7 care, home with home health

## 2024-10-10 NOTE — CONSULTS
Orthopedic Consult  Patient: Dalila Pike                                        YOB: 1970    Date of Admission: 10/7/2024  2:24 PM    Medical Record Number: [id number]    Attending Physician: Ozzy Galindo MD    Consulting Physician: Carlos Manuel Irby MD    Reason for Consult: History of left ankle fracture    History of Present Illness: 53 y.o. female admitted to [facility] with RUQ pain [R10.11]  Cholangitis [K83.09].     The patient was admitted for abdominal pain but had a history of left ankle fracture had been seen by several my partners for this in the past.  We were consulted to give recommendations.  She was supposed to follow-up in the office but I think this hospital admission is conflicted with that office follow-up.  Patient reports to being noncompliant with wearing her boot she says her foot hurts when she weightbears out of her boot which she has been doing despite recommendations that she be nonweightbearing in the boot.  She is laying in bed somewhat somnolent difficult to arouse.    Patient denies any history of: DVT/PE, MRSA, COPD, CHF, CAD, Diabetes mellitus, Dementia or A-Fib.   The patient has history of : Stroke, alcohol abuse, tobacco abuse  The patient is on anticoagulants:     Past medical history, past surgical history, family history ALLERGIES, and home medications have been reviewed by me.     Past Medical History:   Diagnosis Date    Acute CVA (cerebrovascular accident) 08/21/2024    Alcohol use 08/21/2024    Dyslipidemia 08/21/2024    Obesity (BMI 30-39.9) 08/21/2024    PAD (peripheral artery disease) 08/21/2024    Tobacco use 08/21/2024     Past Surgical History:   Procedure Laterality Date    ENDOSCOPY N/A 10/8/2024    Procedure: ESOPHAGOGASTRODUODENOSCOPY;  Surgeon: Elie Sanders MD;  Location: [facility] ENDOSCOPY;  Service: Gastroenterology;  Laterality: N/A;    ERCP N/A 10/8/2024    Procedure: ENDOSCOPIC RETROGRADE CHOLANGIOPANCREATOGRAPHY WITH STENT  PLACEMENT;  Surgeon: Elie Sanders MD;  Location:  [facility] ENDOSCOPY;  Service: Gastroenterology;  Laterality: N/A;  SPHINCTEROTOMY @ 1737, 9-12MM AND 12-15MM BALLOON SWEEP TO CBD    INTERVENTIONAL RADIOLOGY PROCEDURE Bilateral 8/21/2024    Procedure: Carotid Cervical Angiogram;  Surgeon: Jamaal Saini MD;  Location:  [facility]ATH INVASIVE [facility]ATION;  Service: Interventional Radiology;  Laterality: Bilateral;     Social History     Occupational History    Not on file   Tobacco Use    Smoking status: Every Day     Current packs/day: 0.50     Average packs/day: 2.0 packs/day for 38.8 years (76.4 ttl pk-yrs)     Types: Cigarettes     Start date: 2024     Passive exposure: Current    Smokeless tobacco: Never    Tobacco comments:     4-5 a day   Vaping Use    Vaping status: Never Used   Substance and Sexual Activity    Alcohol use: Not Currently    Drug use: Not Currently    Sexual activity: Defer      Social History     Social History Narrative    Not on file     History reviewed. No pertinent family history.       Allergies   Allergen Reactions    Erythromycin Anaphylaxis    Latex Rash    Toradol [Ketorolac Tromethamine] Rash    Contrast Dye (Echo Or Unknown Ct/Mr) Unknown (See Comments)     Hives on chest       Home Medications:  Medications Prior to Admission   Medication Sig Dispense Refill Last Dose    aspirin 81 MG chewable tablet Chew 1 tablet Daily. 30 tablet 2 10/6/2024    atorvastatin (LIPITOR) 80 MG tablet Take 1 tablet by mouth Every Night.   10/6/2024    folic acid (FOLVITE) 1 MG tablet Take 1 tablet by mouth Daily.   10/6/2024    guaiFENesin (MUCINEX) 600 MG 12 hr tablet Take 2 tablets by mouth 2 (Two) Times a Day.   10/6/2024    HYDROcodone-acetaminophen (NORCO) 5-325 MG per tablet Take 1 tablet by mouth Every 6 (Six) Hours As Needed for Severe Pain. 10 tablet 0 10/6/2024    lisinopril (PRINIVIL,ZESTRIL) 20 MG tablet Take 1 tablet by mouth Daily.   10/6/2024    magnesium oxide (MAG-OX) 400 tablet tablet  Take 1 tablet by mouth Daily. 30 tablet 3 10/6/2024    pantoprazole (PROTONIX) 40 MG EC tablet Take 1 tablet by mouth Daily.   Past Month    ticagrelor (BRILINTA) 60 MG tablet tablet Take 1 tablet by mouth 2 (Two) Times a Day. 60 tablet 1 10/6/2024    topiramate (Topamax) 25 MG tablet Take 1 tablet by mouth Every Night. 30 tablet 1 Past Month    sodium chloride 1 g tablet Take 1 tablet by mouth 3 (Three) Times a Day As Needed (If Na <140).   More than a month       Current Medications:  Scheduled Meds:aspirin, 81 mg, Oral, Daily  atorvastatin, 80 mg, Oral, Nightly  cefTRIAXone, 2,000 mg, Intravenous, Q24H  folic acid, 1 mg, Oral, Daily  guaifenesin, 400 mg, Oral, Q8H  heparin (porcine), 5,000 Units, Subcutaneous, Q8H  lansoprazole, 15 mg, Oral, Q AM  lisinopril, 20 mg, Oral, Daily  [START ON 10/11/2024] LORazepam, 1 mg, Oral, Daily  magnesium oxide, 400 mg, Oral, Daily  metroNIDAZOLE, 500 mg, Oral, Q8H  nicotine, 1 patch, Transdermal, Q24H  sodium chloride, 10 mL, Intravenous, Q12H  thiamine (B-1) IV, 200 mg, Intravenous, Q8H   Followed by  [START ON 10/13/2024] thiamine, 100 mg, Oral, Daily  ticagrelor, 60 mg, Oral, BID  topiramate, 25 mg, Oral, Nightly  ursodiol, 300 mg, Oral, BID      Continuous Infusions:   PRN Meds:.  acetaminophen **OR** acetaminophen **OR** acetaminophen    aluminum-magnesium hydroxide-simethicone    senna-docusate sodium **AND** polyethylene glycol **AND** bisacodyl **AND** bisacodyl    Calcium Replacement - Follow Nurse / BPA Driven Protocol    HYDROcodone-acetaminophen    HYDROmorphone **AND** naloxone    LORazepam **OR** midazolam **OR** LORazepam **OR** midazolam **OR** midazolam **OR** midazolam    Magnesium Standard Dose Replacement - Follow Nurse / BPA Driven Protocol    nicotine polacrilex    ondansetron ODT **OR** ondansetron    Phosphorus Replacement - Follow Nurse / BPA Driven Protocol    Potassium Replacement - Follow Nurse / BPA Driven Protocol    sodium chloride      Review of  Systems:   A 12 point system review was reviewed with the patient and from the chart  and is negative except for as mentioned in the history.     Physical Exam: 53 y.o. female                    Vitals:    10/09/24 2308 10/10/24 0545 10/10/24 0753 10/10/24 1145   BP: 116/82 153/90 125/83 141/92   BP Location: Right arm Right arm Right arm Right arm   Patient Position: Lying Lying Lying Lying   Pulse: 103 73 71 85   Resp: 18 18 19 18   Temp: 98.9 °F (37.2 °C) 98.3 °F (36.8 °C) 97.5 °F (36.4 °C) 98.1 °F (36.7 °C)   TempSrc: Temporal Temporal Temporal Temporal   SpO2: 94% 95% 95% 96%   Weight:       Height:            Gait: Could not be tested , patient is nonambulatory.    Mental/HEENT/cardio/skin: The patient's general appearance was well-nourished, well-hydrated, no acute distress.  Orientation was alert and oriented ×3.  The patient's mood was normal.   Pulmonary exam shows normal late exchange, no labored breathing, or shortness of breath.    The skin exam showed normal temperature and color in the area of examination.    Extremities:    l left lower extremity patient reports medial sided pain has good plantar and dorsiflexion of their ankle pretty minimal lateral sided pain which is where their distal fibula fracture is skins intact minimal swelling good pulse all compartment soft and compressible    Pulses: There is good capillary refill.     Diagnostic Tests:    Results from last 7 days   Lab Units 10/09/24  0454 10/08/24  0607 10/07/24  1608   WBC 10*3/mm3 6.87 5.53 8.02   HEMOGLOBIN g/dL 14.3 13.2 14.3   HEMATOCRIT % 41.9 39.6 42.9   PLATELETS 10*3/mm3 227 197 236     Results from last 7 days   Lab Units 10/10/24  0535 10/09/24  0455 10/08/24  0607   SODIUM mmol/L 140 135* 139   POTASSIUM mmol/L 4.0 4.4 3.9   CHLORIDE mmol/L 105 103 106   CO2 mmol/L 24.0 21.0* 25.0   BUN mg/dL 12 15 13   CREATININE mg/dL 0.62 0.65 0.64   GLUCOSE mg/dL 111* 152* 118*   CALCIUM mg/dL 9.3 9.2 8.9         No results found for:  "\"URICACID\"  No results found for: \"CRYSTAL\"  Microbiology Results (last 10 days)       Procedure Component Value - Date/Time    COVID PRE-OP / PRE-PROCEDURE SCREENING ORDER (NO ISOLATION) - Swab, Nasopharynx [[id number]]  (Normal) Collected: 10/07/24 1750    Lab Status: Final result Specimen: Swab from Nasopharynx Updated: 10/07/24 1849    Narrative:      The following orders were created for panel order COVID PRE-OP / PRE-PROCEDURE SCREENING ORDER (NO ISOLATION) - Swab, Nasopharynx.  Procedure                               Abnormality         Status                     ---------                               -----------         ------                     Respiratory Panel PCR w/...[[id number]]  Normal              Final result                 Please view results for these tests on the individual orders.    Respiratory Panel PCR w/COVID-19(SARS-CoV-2) [facility] In-House, NP Swab in UTM/VTM, 2 HR TAT - Swab, Nasopharynx [[id number]]  (Normal) Collected: 10/07/24 1750    Lab Status: Final result Specimen: Swab from Nasopharynx Updated: 10/07/24 1849     ADENOVIRUS, PCR Not Detected     Coronavirus 229E Not Detected     Coronavirus HKU1 Not Detected     Coronavirus NL63 Not Detected     Coronavirus OC43 Not Detected     COVID19 Not Detected     Human Metapneumovirus Not Detected     Human Rhinovirus/Enterovirus Not Detected     Influenza A PCR Not Detected     Influenza B PCR Not Detected     Parainfluenza Virus 1 Not Detected     Parainfluenza Virus 2 Not Detected     Parainfluenza Virus 3 Not Detected     Parainfluenza Virus 4 Not Detected     RSV, PCR Not Detected     Bordetella pertussis pcr Not Detected     Bordetella parapertussis PCR Not Detected     Chlamydophila pneumoniae PCR Not Detected     Mycoplasma pneumo by PCR Not Detected    Narrative:      In the setting of a positive respiratory panel with a viral infection PLUS a negative procalcitonin without other underlying concern for bacterial " infection, consider observing off antibiotics or discontinuation of antibiotics and continue supportive care. If the respiratory panel is positive for atypical bacterial infection (Bordetella pertussis, Chlamydophila pneumoniae, or Mycoplasma pneumoniae), consider antibiotic de-escalation to target atypical bacterial infection.    Blood Culture - Blood, Hand, Right [[id number]]  (Normal) Collected: 10/07/24 1618    Lab Status: Preliminary result Specimen: Blood from Hand, Right Updated: 10/09/24 1645     Blood Culture No growth at 2 days    Narrative:      Less than seven (7) mL's of blood was collected.  Insufficient quantity may yield false negative results.    Blood Culture - Blood, Arm, Right [[id number]]  (Normal) Collected: 10/07/24 1609    Lab Status: Preliminary result Specimen: Blood from Arm, Right Updated: 10/09/24 1645     Blood Culture No growth at 2 days          XR Ankle 3+ View Left    Result Date: 10/9/2024  Impression: Persistent, nonunited distal left fibular fracture, with minimal new bone formation. Electronically Signed: Ozzy Mckeon MD  10/9/2024 10:51 AM EDT  Workstation ID: [id number]    FL ERCP pancreatic and biliary ducts    Result Date: 10/8/2024  Impression: 1. Intraoperative fluoroscopy during ERCP with metallic stent placement. Please see procedure report for full findings and details. Electronically Signed: Deandre Mendez  10/8/2024 10:39 PM EDT  Workstation ID: [id number]    MRI abdomen wo contrast mrcp    Result Date: 10/8/2024  Impression: 1. Mildly dilated biliary tree with blunted appearance of the common bile duct at the ampulla. No obvious filling defects or mass. Differential includes ampullary stricture, biliary sphincter of Oddi dysfunction, occult intraductal stone versus occult mass. Consider ERCP for further assessment. 2. Mildly fluid distention of the gallbladder without other findings to suggest acute cholecystitis by MRI. 3. Negative for acute pancreatitis. Electronically  Signed: Juan Ball MD  10/8/2024 8:27 AM EDT  Workstation ID: [id number]   Assessment: Healing left lateral malleolus fracture      RUQ pain    Alcohol use    Tobacco use    Obesity (BMI 30-39.9)    HTN (hypertension)    Dyslipidemia    History of seizures    PAD (peripheral artery disease)    Dilated cbd, acquired    Cholangitis      Plan: Will continue nonoperative management this fracture do not see a lot of bony callus formation on x-ray however the fracture remains stable and has not displaced further than its original films.  The patient has been noncompliant with her boot wearing and has been weightbearing out of her boot.  Had a long discussion with the patient that that is probably can delay her healing and could risk displacement of her fracture.  Recommended at this point 6 weeks out from original injury she could be weightbearing as tolerated in her boot for 1 more month and then we can see her back in the office in a month she can follow-up with Dr. Wynn fits  Repeat x-rays if those look normal and healed the patient come out of the boot completely and start some physical therapy    Date: 10/10/2024    Marco Hensley MD    CC: Agnes Ewing APRN; MD Mateo Main Marc P, MD

## 2024-10-10 NOTE — THERAPY EVALUATION
Patient Name: Dalila Pike  : 1970    MRN: [id number]                              Today's Date: 10/10/2024       Admit Date: 10/7/2024    Visit Dx:     ICD-10-CM ICD-9-CM   1. Dilated cbd, acquired  K83.8 576.8   2. Gastritis  K29.70 535.50     Patient Active Problem List   Diagnosis    Acute CVA (cerebrovascular accident)    Alcohol use    Tobacco use    Obesity (BMI 30-39.9)    HTN (hypertension)    Dyslipidemia    History of seizures    PAD (peripheral artery disease)    Oropharyngeal dysphagia    Acute UTI (urinary tract infection)    Hypertensive emergency    Single episode of loss of consciousness without head trauma    RUQ pain    Dilated cbd, acquired    Cholangitis     Past Medical History:   Diagnosis Date    Acute CVA (cerebrovascular accident) 2024    Alcohol use 2024    Dyslipidemia 2024    Obesity (BMI 30-39.9) 2024    PAD (peripheral artery disease) 2024    Tobacco use 2024     Past Surgical History:   Procedure Laterality Date    ENDOSCOPY N/A 10/8/2024    Procedure: ESOPHAGOGASTRODUODENOSCOPY;  Surgeon: Elie Sanders MD;  Location:  [facility];  Service: Gastroenterology;  Laterality: N/A;    ERCP N/A 10/8/2024    Procedure: ENDOSCOPIC RETROGRADE CHOLANGIOPANCREATOGRAPHY WITH STENT PLACEMENT;  Surgeon: Elie Sanders MD;  Location:  [facility];  Service: Gastroenterology;  Laterality: N/A;  SPHINCTEROTOMY @ 1737, 9-12MM AND 12-15MM BALLOON SWEEP TO CBD    INTERVENTIONAL RADIOLOGY PROCEDURE Bilateral 2024    Procedure: Carotid Cervical Angiogram;  Surgeon: Jamaal Saini MD;  Location:  [facility];  Service: Interventional Radiology;  Laterality: Bilateral;      General Information       Row Name 10/10/24 150          Physical Therapy Time and Intention    Document Type evaluation  -ES     Mode of Treatment physical therapy  -ES       Row Name 10/10/24 8643          General Information    Patient Profile Reviewed  yes  -ES     Prior Level of Function independent:;all household mobility;ADL's  -ES     Existing Precautions/Restrictions fall;other (see comments)  LLE WBAT with boot when OOB, recent R MCA ischemic stroke with LUE residual weakness  -ES     Barriers to Rehab medically complex;previous functional deficit  -ES       Row Name 10/10/24 1504          Living Environment    People in Home child(juan carlos), adult;other relative(s)  -ES       Row Name 10/10/24 1504          Home Main Entrance    Number of Stairs, Main Entrance two  -ES     Stair Railings, Main Entrance railing on right side (ascending)  -ES       Row Name 10/10/24 1504          Stairs Within Home, Primary    Number of Stairs, Within Home, Primary none  -ES       Row Name 10/10/24 1504          Cognition    Orientation Status (Cognition) oriented x 4  -ES       Row Name 10/10/24 1504          Safety Issues, Functional Mobility    Safety Issues Affecting Function (Mobility) awareness of need for assistance;insight into deficits/self-awareness;safety precautions follow-through/compliance  -ES     Impairments Affecting Function (Mobility) balance;coordination;endurance/activity tolerance;grasp;pain;visual/perceptual;sensation/sensory awareness;motor control  -ES               User Key  (r) = Recorded By, (t) = Taken By, (c) = Cosigned By      Initials Name Provider Type    ES Karie Vaca PT Physical Therapist                   Mobility       Row Name 10/10/24 1505          Bed Mobility    Comment, (Bed Mobility) received UIC  -ES       Row Name 10/10/24 1508          Sit-Stand Transfer    Sit-Stand [place] (Transfers) minimum assist (75% patient effort);verbal cues  -ES     Assistive Device (Sit-Stand Transfers) other (see comments)  UE support  -ES       Row Name 10/10/24 1500          Gait/Stairs (Locomotion)    [place] Level (Gait) contact guard;verbal cues  -ES     Assistive Device (Gait) other (see comments)  UE support  -ES     Patient was able  to Ambulate yes  -ES     Distance in Feet (Gait) 125  -ES     Deviations/Abnormal Patterns (Gait) bilateral deviations;gait speed decreased;right sided deviations;base of support, narrow;stride length decreased  -ES     Bilateral Gait Deviations forward flexed posture  -ES     Right Sided Gait Deviations weight shift ability decreased  -ES     Comment, (Gait/Stairs) Pt amb w/ Veda and UE support. Demo'd narrow ROBBI w/ decreased stride length and step-through gait pattern. CAM boot donned prior to ambulation.  -ES               User Key  (r) = Recorded By, (t) = Taken By, (c) = Cosigned By      Initials Name Provider Type    ES Karie Vaca, PT Physical Therapist                   Obj/Interventions       Row Name 10/10/24 1507          Range of Motion Comprehensive    General Range of Motion bilateral lower extremity ROM WFL  -ES       Row Name 10/10/24 1507          Strength Comprehensive (MMT)    General Manual Muscle Testing (MMT) Assessment lower extremity strength deficits identified  -ES     Comment, General Manual Muscle Testing (MMT) Assessment RLE 4+/5, LLE 4-/5  -ES       Row Name 10/10/24 1501          Balance    Balance Assessment sitting static balance;sitting dynamic balance;sit to stand dynamic balance;standing dynamic balance;standing static balance  -ES     Static Sitting Balance supervision  -ES     Dynamic Sitting Balance standby assist  -ES     Position, Sitting Balance supported;sitting in chair  -ES     Sit to Stand Dynamic Balance minimal assist;verbal cues  -ES     Static Standing Balance contact guard  -ES     Dynamic Standing Balance minimal assist  -ES     Position/Device Used, Standing Balance supported  -ES     Balance Interventions sitting;standing;sit to stand;supported;static;dynamic;occupation based/functional task  -ES       Row Name 10/10/24 1501          Sensory Assessment (Somatosensory)    Sensory Assessment (Somatosensory) LE sensation intact  -ES               User Key  (r)  = Recorded By, (t) = Taken By, (c) = Cosigned By      Initials Name Provider Type    ES Karie Vaca, PT Physical Therapist                   Goals/Plan       Row Name 10/10/24 1509          Bed Mobility Goal 1 (PT)    Activity/Assistive Device (Bed Mobility Goal 1, PT) sit to supine/supine to sit  -ES     [place] Level/Cues Needed (Bed Mobility Goal 1, PT) modified independence  -ES     Time Frame (Bed Mobility Goal 1, PT) short term goal (STG);5 days  -ES       Row Name 10/10/24 1509          Transfer Goal 1 (PT)    Activity/Assistive Device (Transfer Goal 1, PT) sit-to-stand/stand-to-sit;bed-to-chair/chair-to-bed  -ES     [place] Level/Cues Needed (Transfer Goal 1, PT) supervision required  -ES     Time Frame (Transfer Goal 1, PT) long term goal (LTG);10 days  -ES       Row Name 10/10/24 1502          Gait Training Goal 1 (PT)    Activity/Assistive Device (Gait Training Goal 1, PT) gait (walking locomotion);decrease fall risk;increase endurance/gait distance  -ES     [place] Level (Gait Training Goal 1, PT) supervision required  -ES     Distance (Gait Training Goal 1, PT) 150  -ES     Time Frame (Gait Training Goal 1, PT) long term goal (LTG);10 days  -ES       Row Name 10/10/24 1507          Therapy Assessment/Plan (PT)    Planned Therapy Interventions (PT) balance training;bed mobility training;gait training;home exercise program;neuromuscular re-education;patient/family education;strengthening;stair training;postural re-education;transfer training;ROM (range of motion)  -ES               User Key  (r) = Recorded By, (t) = Taken By, (c) = Cosigned By      Initials Name Provider Type    ES Karie Vaca, PT Physical Therapist                   Clinical Impression       Row Name 10/10/24 1505          Pain    Pretreatment Pain Rating 0/10 - no pain  -ES     Posttreatment Pain Rating 0/10 - no pain  -ES     Pre/Posttreatment Pain Comment tolerated  -ES     Pain Intervention(s)  Repositioned;Ambulation/increased activity  -ES       Row Name 10/10/24 1507          Plan of Care Review    Plan of Care Reviewed With patient;family  -ES     Progress no change  -ES     Outcome Evaluation PT eval complete. Pt presents with generalized weakness from remote CVA (LUE>LLE), balance deficits, and decreased activity tolerance warranting skilled IPPT services. Pt ambulated with CGA-Veda and is primarily affected by RUE weakness. PT rec home w/ 24/7 assist and HHPT/OT at d/c.  -ES       Row Name 10/10/24 1507          Therapy Assessment/Plan (PT)    Rehab Potential (PT) good, to achieve stated therapy goals  -ES     Criteria for Skilled Interventions Met (PT) yes;meets criteria;skilled treatment is necessary  -ES     Therapy Frequency (PT) daily  -ES     Predicted Duration of Therapy Intervention (PT) 10 days  -ES       Row Name 10/10/24 1507          Vital Signs    Pre Systolic BP Rehab --  non-tele. cleared by RN  -ES     O2 Delivery Pre Treatment room air  -ES     O2 Delivery Intra Treatment room air  -ES     O2 Delivery Post Treatment room air  -ES     Pre Patient Position Sitting  -ES     Intra Patient Position Standing  -ES     Post Patient Position Sitting  -ES       Row Name 10/10/24 1501          Positioning and Restraints    Pre-Treatment Position sitting in chair/recliner  -ES     Post Treatment Position chair  -ES     In Chair notified nsg;reclined;sitting;call light within reach;encouraged to call for assist;exit alarm on;waffle cushion;legs elevated;on mechanical lift sling;with family/caregiver  -ES               User Key  (r) = Recorded By, (t) = Taken By, (c) = Cosigned By      Initials Name Provider Type    ES Karie Vaca, PT Physical Therapist                   Outcome Measures       Row Name 10/10/24 1509 10/10/24 0832       How much help from another person do you currently need...    Turning from your back to your side while in flat bed without using bedrails? 4  -ES 4  -CM     Moving from lying on back to sitting on the side of a flat bed without bedrails? 3  -ES 3  -CM    Moving to and from a bed to a chair (including a wheelchair)? 3  -ES 2  -CM    Standing up from a chair using your arms (e.g., wheelchair, bedside chair)? 3  -ES 2  -CM    Climbing 3-5 steps with a railing? 2  -ES 2  -CM    To walk in hospital room? 3  -ES 3  -CM    AM-PAC 6 Clicks Score (PT) 18  -ES 16  -CM    Highest Level of Mobility Goal 6 --> Walk 10 steps or more  -ES 5 --> Static standing  -CM      Row Name 10/10/24 1509 10/10/24 1501       Functional Assessment    Outcome Measure Options AM-PAC 6 Clicks Basic Mobility (PT)  -ES AM-PAC 6 Clicks Daily Activity (OT)  -AJ              User Key  (r) = Recorded By, (t) = Taken By, (c) = Cosigned By      Initials Name Provider Type    Austin Hernandez, RN Registered Nurse    Karie Savage PT Physical Therapist    Eva Martinez, OT Occupational Therapist                                 Physical Therapy Education       Title: PT OT SLP Therapies (In Progress)       Topic: Physical Therapy (In Progress)       Point: Mobility training (Done)       Learning Progress Summary             Patient Acceptance, E,TB, VU by POLO at 10/10/2024 1510   Family Acceptance, E,TB, VU by POLO at 10/10/2024 1510                         Point: Home exercise program (Not Started)       Learner Progress:  Not documented in this visit.              Point: Body mechanics (Done)       Learning Progress Summary             Patient Acceptance, E,TB, VU by POLO at 10/10/2024 1510   Family Acceptance, E,TB, VU by  at 10/10/2024 1510                         Point: Precautions (Done)       Learning Progress Summary             Patient Acceptance, E,TB, VU by POLO at 10/10/2024 1510   Family Acceptance, E,TB, VU by POLO at 10/10/2024 1510                                         User Key       Initials Effective Dates Name Provider Type Discipline    POLO 08/11/22 -  Karie Vaca, MASTER Physical  Therapist PT                  PT Recommendation and Plan  Planned Therapy Interventions (PT): balance training, bed mobility training, gait training, home exercise program, neuromuscular re-education, patient/family education, strengthening, stair training, postural re-education, transfer training, ROM (range of motion)  Plan of Care Reviewed With: patient, family  Progress: no change  Outcome Evaluation: PT eval complete. Pt presents with generalized weakness from remote CVA (LUE>LLE), balance deficits, and decreased activity tolerance warranting skilled IPPT services. Pt ambulated with CGA-Veda and is primarily affected by RUE weakness. PT rec home w/ 24/7 assist and HHPT/OT at d/c.     Time Calculation:   PT Evaluation Complexity  History, PT Evaluation Complexity: 1-2 personal factors and/or comorbidities  Examination of Body Systems (PT Eval Complexity): total of 3 or more elements  Clinical Presentation (PT Evaluation Complexity): evolving  Clinical Decision Making (PT Evaluation Complexity): moderate complexity  Overall Complexity (PT Evaluation Complexity): moderate complexity     PT Charges       Row Name 10/10/24 1510             Time Calculation    Start Time 1336  -ES      PT Received On 10/10/24  -ES      PT Goal Re-Cert Due Date 10/20/24  -ES         Untimed Charges    PT Eval/Re-eval Minutes 47  -ES         Total Minutes    Untimed Charges Total Minutes 47  -ES       Total Minutes 47  -ES                User Key  (r) = Recorded By, (t) = Taken By, (c) = Cosigned By      Initials Name Provider Type    ES Karie Vaca PT Physical Therapist                  Therapy Charges for Today       Code Description Service Date Service Provider Modifiers Qty    [id number] HC PT EVAL MOD COMPLEXITY 4 10/10/2024 Karie Vaca PT GP 1            PT G-Codes  Outcome Measure Options: AM-PAC 6 Clicks Basic Mobility (PT)  AM-PAC 6 Clicks Score (PT): 18  AM-PAC 6 Clicks Score (OT): 17  PT Discharge  Summary  Anticipated Discharge Disposition (PT): home with 24/7 care, home with home health    Karie Vaca, PT  10/10/2024

## 2024-10-10 NOTE — PROGRESS NOTES
"General Surgery Daily Progress Note    Subjective:  No new complaints.  Has some epigastric discomfort.    Objective:  /83 (BP Location: Right arm, Patient Position: Lying)   Pulse 71   Temp 97.5 °F (36.4 °C) (Temporal)   Resp 19   Ht 172.7 cm (68\")   Wt 110 kg (243 lb)   SpO2 95%   BMI 36.95 kg/m²     General Appearance: Mild  Eyes: Anicteric  Neck: Trachea midline   Cardiovascular:  RRR without murmur nor rub  Lungs:  Bilateral respirations unlabored   Abdomen:  Soft, no peritonitis  Extremities:  No cyanosis or edema   Skin:  No obvious rashes   Neurologic: awake and conversant     CBC:  Results from last 7 days   Lab Units 10/09/24  0454   WBC 10*3/mm3 6.87   HEMOGLOBIN g/dL 14.3   HEMATOCRIT % 41.9   PLATELETS 10*3/mm3 227       CMP:  Results from last 7 days   Lab Units 10/10/24  0535   SODIUM mmol/L 140   POTASSIUM mmol/L 4.0   CHLORIDE mmol/L 105   CO2 mmol/L 24.0   BUN mg/dL 12   CREATININE mg/dL 0.62   CALCIUM mg/dL 9.3   BILIRUBIN mg/dL 0.7   ALK PHOS U/L 249*   ALT (SGPT) U/L 48*   AST (SGOT) U/L 34*   GLUCOSE mg/dL 111*     A/P:    No obvious gallstones on her ultrasound nor MRCP.  Ursodiol for now.  No plan for surgical intervention regarding cholecystectomy at this time.  Will defer to gastroenterology.  Will sign off.    Srinivasa Infante MD - 10/10/2024, 10:36 EDT         "

## 2024-10-10 NOTE — PLAN OF CARE
Problem: Adult Inpatient Plan of Care  Goal: Plan of Care Review  Outcome: Progressing  Flowsheets  Taken 10/10/2024 1531 by Austin Cruz, RN  Outcome Evaluation: VSS. Resting well. Emotinal today, crying at BS at times. Fx at BS. New IV started. IV ABX intermittent. Diet advanced. Continue care.  Taken 10/10/2024 1507 by Karie Vaca, PT  Progress: no change  Plan of Care Reviewed With:   patient   family   Goal Outcome Evaluation:              Outcome Evaluation: VSS. Resting well. Emotinal today, crying at BS at times. Fx at BS. New IV started. IV ABX intermittent. Diet advanced. Continue care.

## 2024-10-10 NOTE — THERAPY EVALUATION
Patient Name: Dalila Pike  : 1970    MRN: [id number]                              Today's Date: 10/10/2024       Admit Date: 10/7/2024    Visit Dx:     ICD-10-CM ICD-9-CM   1. Dilated cbd, acquired  K83.8 576.8   2. Gastritis  K29.70 535.50     Patient Active Problem List   Diagnosis    Acute CVA (cerebrovascular accident)    Alcohol use    Tobacco use    Obesity (BMI 30-39.9)    HTN (hypertension)    Dyslipidemia    History of seizures    PAD (peripheral artery disease)    Oropharyngeal dysphagia    Acute UTI (urinary tract infection)    Hypertensive emergency    Single episode of loss of consciousness without head trauma    RUQ pain    Dilated cbd, acquired    Cholangitis     Past Medical History:   Diagnosis Date    Acute CVA (cerebrovascular accident) 2024    Alcohol use 2024    Dyslipidemia 2024    Obesity (BMI 30-39.9) 2024    PAD (peripheral artery disease) 2024    Tobacco use 2024     Past Surgical History:   Procedure Laterality Date    ENDOSCOPY N/A 10/8/2024    Procedure: ESOPHAGOGASTRODUODENOSCOPY;  Surgeon: Elie Sanders MD;  Location:  [facility];  Service: Gastroenterology;  Laterality: N/A;    ERCP N/A 10/8/2024    Procedure: ENDOSCOPIC RETROGRADE CHOLANGIOPANCREATOGRAPHY WITH STENT PLACEMENT;  Surgeon: Elie Sanders MD;  Location:  [facility];  Service: Gastroenterology;  Laterality: N/A;  SPHINCTEROTOMY @ 1737, 9-12MM AND 12-15MM BALLOON SWEEP TO CBD    INTERVENTIONAL RADIOLOGY PROCEDURE Bilateral 2024    Procedure: Carotid Cervical Angiogram;  Surgeon: Jamaal Saini MD;  Location:  [facility];  Service: Interventional Radiology;  Laterality: Bilateral;      General Information       Row Name 10/10/24 1436          OT Time and Intention    Document Type evaluation  -AJ     Mode of Treatment occupational therapy  -AJ       Row Name 10/10/24 1436          General Information    Patient Profile Reviewed yes  -AJ      Prior Level of Function independent:;all household mobility;ADL's;dependent:;driving  -     Existing Precautions/Restrictions fall;other (see comments)  LLE WBAT with boot when OOB, recent R MCA ischemic stroke with LUE residual weakness.  -     Barriers to Rehab medically complex;previous functional deficit  -       Row Name 10/10/24 1436          Living Environment    People in Home child(juan carlos), adult;other relative(s)  -       Row Name 10/10/24 1436          Home Main Entrance    Number of Stairs, Main Entrance two  -AJ     Stair Railings, Main Entrance railing on right side (ascending)  -       Row Name 10/10/24 1436          Stairs Within Home, Primary    Number of Stairs, Within Home, Primary none  -       Row Name 10/10/24 1436          Cognition    Orientation Status (Cognition) oriented x 4  -       Row Name 10/10/24 1436          Safety Issues, Functional Mobility    Safety Issues Affecting Function (Mobility) awareness of need for assistance;insight into deficits/self-awareness;safety precaution awareness;safety precautions follow-through/compliance  -     Impairments Affecting Function (Mobility) balance;coordination;endurance/activity tolerance;grasp;pain;visual/perceptual;sensation/sensory awareness  -               User Key  (r) = Recorded By, (t) = Taken By, (c) = Cosigned By      Initials Name Provider Type    AJ Eva Apodaca OT Occupational Therapist                     Mobility/ADL's       Row Name 10/10/24 1444          Bed Mobility    Bed Mobility supine-sit  -     Supine-Sit [place] (Bed Mobility) moderate assist (50% patient effort);2 person assist  -     Bed Mobility, Safety Issues decreased use of arms for pushing/pulling;impaired trunk control for bed mobility  -     Assistive Device (Bed Mobility) bed rails;draw sheet;head of bed elevated  -       Row Name 10/10/24 1448          Transfers    Transfers bed-chair transfer;sit-stand transfer;stand-sit  transfer  -       Row Name 10/10/24 1444          Bed-Chair Transfer    Bed-Chair [place] (Transfers) minimum assist (75% patient effort);2 person assist  -     Assistive Device (Bed-Chair Transfers) other (see comments)  BUE support  -       Row Name 10/10/24 1444          Sit-Stand Transfer    Sit-Stand [place] (Transfers) 2 person assist;minimum assist (75% patient effort)  -     Assistive Device (Sit-Stand Transfers) other (see comments)  UE support  -       Row Name 10/10/24 1444          Stand-Sit Transfer    Stand-Sit [place] (Transfers) contact guard;1 person assist  -       Row Name 10/10/24 1444          Functional Mobility    Functional Mobility- Ind. Level contact guard assist;2 person assist required  -     Functional Mobility- Device other (see comments)  BUE support  -     Functional Mobility-Distance (Feet) --  HH distance  -       Row Name 10/10/24 1444          Activities of Daily Living    BADL Assessment/Intervention upper body dressing;feeding;lower body dressing  -       Row Name 10/10/24 1444          Upper Body Dressing Assessment/Training    [place] Level (Upper Body Dressing) don;front opening garment;moderate assist (50% patient effort)  -AJ     Position (Upper Body Dressing) edge of bed sitting  -       Row Name 10/10/24 1444          Self-Feeding Assessment/Training    [place] Level (Feeding) scoop food and bring to mouth;set up  -AJ     Position (Self-Feeding) supported sitting  -       Row Name 10/10/24 1444          Lower Body Dressing Assessment/Training    [place] Level (Lower Body Dressing) don;other (see comments);dependent (less than 25% patient effort)  cam boot  -AJ     Position (Lower Body Dressing) edge of bed sitting  -               User Key  (r) = Recorded By, (t) = Taken By, (c) = Cosigned By      Initials Name Provider Type    Eva Martinez OT Occupational Therapist                   Obj/Interventions        Row Name 10/10/24 1448          Sensory Assessment (Somatosensory)    Sensory Assessment (Somatosensory) left UE  -     Left UE Sensory Assessment general sensation;impaired  -     Sensory Assessment --  -       Row Name 10/10/24 1448          Vision Assessment/Intervention    Visual Impairment/Limitations peripheral vision impaired left  -     Visual Processing Deficit inez-inattention/neglect, left  -     Vision Assessment Comment Pt was able to track in all 4 quadrants, however c/o nausea after tracking assessment.   -       Row Name 10/10/24 1448          Range of Motion Comprehensive    General Range of Motion bilateral upper extremity ROM WFL  -AJ     Comment, General Range of Motion LUE PROM WFL, unable to hold against gravity  -       Row Name 10/10/24 1448          Strength Comprehensive (MMT)    General Manual Muscle Testing (MMT) Assessment upper extremity strength deficits identified  -     Comment, General Manual Muscle Testing (MMT) Assessment RUE 4+/5, LUE impaired  strength  -       Row Name 10/10/24 1448          Balance    Balance Assessment sitting static balance;sit to stand dynamic balance;standing static balance;sitting dynamic balance;standing dynamic balance  -     Static Sitting Balance supervision  -     Dynamic Sitting Balance minimal assist  -AJ     Position, Sitting Balance unsupported;sitting edge of bed  -     Sit to Stand Dynamic Balance 2-person assist;minimal assist  -     Static Standing Balance contact guard  -     Dynamic Standing Balance contact guard  -     Position/Device Used, Standing Balance supported;other (see comments)  BUE support  -     Balance Interventions sitting;standing;sit to stand;supported;static;dynamic;occupation based/functional task  -               User Key  (r) = Recorded By, (t) = Taken By, (c) = Cosigned By      Initials Name Provider Type    Eva Martinez OT Occupational Therapist                   Goals/Plan        Row Name 10/10/24 1459          Bed Mobility Goal 1 (OT)    Activity/Assistive Device (Bed Mobility Goal 1, OT) supine to sit  -AJ     [place] Level/Cues Needed (Bed Mobility Goal 1, OT) minimum assist (75% or more patient effort)  -AJ     Time Frame (Bed Mobility Goal 1, OT) short term goal (STG);5 days  -AJ     Progress/Outcomes (Bed Mobility Goal 1, OT) new goal  -       Row Name 10/10/24 1459          Transfer Goal 1 (OT)    Activity/Assistive Device (Transfer Goal 1, OT) bed-to-chair/chair-to-bed;toilet  -AJ     [place] Level/Cues Needed (Transfer Goal 1, OT) contact guard required  -AJ     Time Frame (Transfer Goal 1, OT) long term goal (LTG);10 days  -AJ     Progress/Outcome (Transfer Goal 1, OT) new goal  -       Row Name 10/10/24 1459          Dressing Goal 1 (OT)    Activity/Device (Dressing Goal 1, OT) upper body dressing;lower body dressing  -AJ     [place]/Cues Needed (Dressing Goal 1, OT) minimum assist (75% or more patient effort)  -AJ     Time Frame (Dressing Goal 1, OT) long term goal (LTG);10 days  -AJ     Progress/Outcome (Dressing Goal 1, OT) new goal  -       Row Name 10/10/24 1459          Toileting Goal 1 (OT)    Activity/Device (Toileting Goal 1, OT) perform perineal hygiene;adjust/manage clothing  -AJ     [place] Level/Cues Needed (Toileting Goal 1, OT) minimum assist (75% or more patient effort)  -AJ     Time Frame (Toileting Goal 1, OT) long term goal (LTG);10 days  -AJ     Progress/Outcome (Toileting Goal 1, OT) new goal  -       Row Name 10/10/24 1459          Grooming Goal 1 (OT)    Activity/Device (Grooming Goal 1, OT) oral care;wash face, hands;hair care  -AJ     [place] (Grooming Goal 1, OT) contact guard required  -AJ     Time Frame (Grooming Goal 1, OT) long term goal (LTG);10 days  -AJ     Strategies/Barriers (Grooming Goal 1, OT) standing sink side  -AJ     Progress/Outcome (Grooming Goal 1, OT) new goal  -       Row Name 10/10/24 1454           Therapy Assessment/Plan (OT)    Planned Therapy Interventions (OT) activity tolerance training;BADL retraining;functional balance retraining;occupation/activity based interventions;passive ROM/stretching;patient/caregiver education/training;ROM/therapeutic exercise;strengthening exercise;transfer/mobility retraining  -               User Key  (r) = Recorded By, (t) = Taken By, (c) = Cosigned By      Initials Name Provider Type     Eva Apodaca OT Occupational Therapist                   Clinical Impression       Row Name 10/10/24 1456          Pain Assessment    Pain Intervention(s) Repositioned;Ambulation/increased activity  -     Additional Documentation Pain Scale: FACES Pre/Post-Treatment (Group)  -       Row Name 10/10/24 1456          Pain Scale: FACES Pre/Post-Treatment    Pain: FACES Scale, Pretreatment 2-->hurts little bit  -     Posttreatment Pain Rating 2-->hurts little bit  -     Pain Location generalized  -     Pain Location - abdomen  -       Row Name 10/10/24 1456          Plan of Care Review    Plan of Care Reviewed With patient  -     Progress no change  -     Outcome Evaluation OT eval complete. Pt presents below fxl baseline with impaired strength and sensation to LUE, decreased activity tolerance, and generalized weakness. Pt would benefit from skilled IPOT services to progress to PLOF. Recommend d/c to home with 24/7 assist and  OT/PT services.  -       Row Name 10/10/24 1459          Therapy Assessment/Plan (OT)    Patient/Family Therapy Goal Statement (OT) Progress and return to PLOF  -     Rehab Potential (OT) good, to achieve stated therapy goals  -     Criteria for Skilled Therapeutic Interventions Met (OT) yes;meets criteria;skilled treatment is necessary  -     Therapy Frequency (OT) daily  -     Predicted Duration of Therapy Intervention (OT) 10 days  -       Row Name 10/10/24 1453          Therapy Plan Review/Discharge Plan (OT)    Anticipated  Discharge Disposition (OT) home with 24/7 care;home with home health  -AJ       Row Name 10/10/24 1456          Vital Signs    Pre Patient Position Supine  -AJ     Intra Patient Position Standing  -AJ     Post Patient Position Sitting  -AJ       Row Name 10/10/24 1456          Positioning and Restraints    Pre-Treatment Position in bed  -AJ     Post Treatment Position chair  -AJ     In Chair notified nsg;reclined;sitting;call light within reach;encouraged to call for assist;exit alarm on;with family/caregiver;waffle cushion;legs elevated  -AJ               User Key  (r) = Recorded By, (t) = Taken By, (c) = Cosigned By      Initials Name Provider Type    Eva Martinez OT Occupational Therapist                   Outcome Measures       Row Name 10/10/24 1501          How much help from another is currently needed...    Putting on and taking off regular lower body clothing? 2  -AJ     Bathing (including washing, rinsing, and drying) 2  -AJ     Toileting (which includes using toilet bed pan or urinal) 3  -AJ     Putting on and taking off regular upper body clothing 3  -AJ     Taking care of personal grooming (such as brushing teeth) 3  -AJ     Eating meals 4  -AJ     AM-PAC 6 Clicks Score (OT) 17  -AJ       Row Name 10/10/24 0832          How much help from another person do you currently need...    Turning from your back to your side while in flat bed without using bedrails? 4  -CM     Moving from lying on back to sitting on the side of a flat bed without bedrails? 3  -CM     Moving to and from a bed to a chair (including a wheelchair)? 2  -CM     Standing up from a chair using your arms (e.g., wheelchair, bedside chair)? 2  -CM     Climbing 3-5 steps with a railing? 2  -CM     To walk in hospital room? 3  -CM     AM-PAC 6 Clicks Score (PT) 16  -CM     Highest Level of Mobility Goal 5 --> Static standing  -CM       Row Name 10/10/24 1501          Functional Assessment    Outcome Measure Options AM-PAC 6 Clicks  Daily Activity (OT)  -               User Key  (r) = Recorded By, (t) = Taken By, (c) = Cosigned By      Initials Name Provider Type    Austin Hernandez, RN Registered Nurse    Eva Martinez OT Occupational Therapist                    Occupational Therapy Education       Title: PT OT SLP Therapies (In Progress)       Topic: Occupational Therapy (In Progress)       Point: ADL training (Done)       Description:   Instruct learner(s) on proper safety adaptation and remediation techniques during self care or transfers.   Instruct in proper use of assistive devices.                  Learning Progress Summary             Patient Acceptance, E, VU by GLENIS at 10/10/2024 1502   Family Acceptance, E, VU by GLENIS at 10/10/2024 1502                         Point: Home exercise program (Not Started)       Description:   Instruct learner(s) on appropriate technique for monitoring, assisting and/or progressing therapeutic exercises/activities.                  Learner Progress:  Not documented in this visit.              Point: Precautions (Done)       Description:   Instruct learner(s) on prescribed precautions during self-care and functional transfers.                  Learning Progress Summary             Patient Acceptance, E, VU by GLENIS at 10/10/2024 1502   Family Acceptance, E, VU by GLENIS at 10/10/2024 1502                         Point: Body mechanics (Done)       Description:   Instruct learner(s) on proper positioning and spine alignment during self-care, functional mobility activities and/or exercises.                  Learning Progress Summary             Patient Acceptance, E, VU by GLENIS at 10/10/2024 1502   Family Acceptance, E, VU by GLENIS at 10/10/2024 1502                                         User Key       Initials Effective Dates Name Provider Type [facility]    GLENIS 08/26/24 -  Eva Apodaca OT Occupational Therapist OT                  OT Recommendation and Plan  Planned Therapy Interventions (OT): activity  tolerance training, BADL retraining, functional balance retraining, occupation/activity based interventions, passive ROM/stretching, patient/caregiver education/training, ROM/therapeutic exercise, strengthening exercise, transfer/mobility retraining  Therapy Frequency (OT): daily  Plan of Care Review  Plan of Care Reviewed With: patient  Progress: no change  Outcome Evaluation: OT eval complete. Pt presents below fxl baseline with impaired strength and sensation to LUE, decreased activity tolerance, and generalized weakness. Pt would benefit from skilled IPOT services to progress to PLOF. Recommend d/c to home with 24/7 assist and  OT/PT services.     Time Calculation:   Evaluation Complexity (OT)  Review Occupational Profile/Medical/Therapy History Complexity: expanded/moderate complexity  Assessment, Occupational Performance/Identification of Deficit Complexity: 3-5 performance deficits  Clinical Decision Making Complexity (OT): detailed assessment/moderate complexity  Overall Complexity of Evaluation (OT): moderate complexity     Time Calculation- OT       Row Name 10/10/24 1503             Time Calculation- OT    OT Start Time 1335  -AJ      OT Received On 10/10/24  -AJ      OT Goal Re-Cert Due Date 10/20/24  -AJ         Untimed Charges    OT Eval/Re-eval Minutes 48  -AJ         Total Minutes    Untimed Charges Total Minutes 48  -AJ       Total Minutes 48  -AJ                User Key  (r) = Recorded By, (t) = Taken By, (c) = Cosigned By      Initials Name Provider Type    Eva Martinez OT Occupational Therapist                  Therapy Charges for Today       Code Description Service Date Service Provider Modifiers Qty    [id number]  OT EVAL MOD COMPLEXITY 4 10/10/2024 Eva Apodaca OT GO 1                 Eva Apodaca OT  10/10/2024

## 2024-10-10 NOTE — PLAN OF CARE
Chart reviewed.  Vitals stable.  LFTs improving, tbili now normal.  ADAT.  Complete course of antibiotics.  Awaiting pathology.  Continue PPI.  Repeat ERCP in 6 weeks to remove biliary stent, resweep duct and for repeat cholangiogram.  Our office will call patient and schedule this.  General surgery has evaluated with no plan for CCY at this time.  GI will sign off and see PRN.  Please let us know if there are questions or concerns.

## 2024-10-10 NOTE — PLAN OF CARE
Goal Outcome Evaluation:                                          VSS on room air. Tolerating diet. Reports dull abdominal pain-- see MAR. Adequate UOP. Arm sling in place. Family at bedside.

## 2024-10-10 NOTE — PROGRESS NOTES
[facility] Medicine Services  PROGRESS NOTE    Patient Name: Dalila Pike  : 1970  MRN: [id number]    Date of Admission: 10/7/2024  Primary Care Physician: Agnes Ewing APRN    Subjective   Subjective     CC: RUQ pain    HPI: Continues with abdominal pain. Tolerating PO. Hungry. Notes pain as bad as 9/10. Does note some LUQ pain now/epigastric pain. Notes pain with coughing.       Objective   Objective     Vital Signs:   Temp:  [97.8 °F (36.6 °C)-98.9 °F (37.2 °C)] 98.3 °F (36.8 °C)  Heart Rate:  [] 73  Resp:  [18] 18  BP: (116-171)/() 153/90     Physical Exam:  NAD, alert and oriented  OP clear, dry MM  Neck supple  RRR  CTAB  +BS, soft, RUQ tenderness better, some epigastric/LUQ tenderness now  No c/c/e  BRADY  Normal affect  No change from 10/9 otherwise    Results Reviewed:  LAB RESULTS:      Lab 10/09/24  0454 10/08/24  0607 10/07/24  1608   WBC 6.87 5.53 8.02   HEMOGLOBIN 14.3 13.2 14.3   HEMATOCRIT 41.9 39.6 42.9   PLATELETS 227 197 236   NEUTROS ABS  --   --  5.87   IMMATURE GRANS (ABS)  --   --  0.02   LYMPHS ABS  --   --  1.53   MONOS ABS  --   --  0.53   EOS ABS  --   --  0.04   MCV 90.5 92.3 92.7   PROCALCITONIN  --   --  0.03   LACTATE  --   --  1.4         Lab 10/10/24  0535 10/09/24  0455 10/08/24  0607 10/07/24  1608   SODIUM 140 135* 139 140   POTASSIUM 4.0 4.4 3.9 4.1   CHLORIDE 105 103 106 104   CO2 24.0 21.0* 25.0 27.0   ANION GAP 11.0 11.0 8.0 9.0   BUN 12 15 13 14   CREATININE 0.62 0.65 0.64 0.66   EGFR 106.6 105.4 105.8 105.0   GLUCOSE 111* 152* 118* 116*   CALCIUM 9.3 9.2 8.9 9.4   HEMOGLOBIN A1C  --   --  5.50  --    TSH  --   --  0.447  --          Lab 10/10/24  0535 10/09/24  0455 10/08/24  0607 10/07/24  1608   TOTAL PROTEIN 6.1 6.5 6.1 7.0   ALBUMIN 3.8 3.7 3.6 4.3   GLOBULIN 2.3 2.8 2.5 2.7   ALT (SGPT) 48* 60* 46* 29   AST (SGOT) 34* 54* 46* 22   BILIRUBIN 0.7 1.3* 2.0* 1.3*   ALK PHOS 249* 243* 193* 199*             Lab  10/08/24  0607   CHOLESTEROL 144   LDL CHOL 75   HDL CHOL 46   TRIGLYCERIDES 130             Brief Urine Lab Results  (Last result in the past 365 days)        Color   Clarity   Blood   Leuk Est   Nitrite   Protein   CREAT   Urine HCG        10/08/24 0317 Yellow   Clear   Negative   Negative   Negative   Negative                   Microbiology Results Abnormal       Procedure Component Value - Date/Time    Blood Culture - Blood, Hand, Right [[id number]]  (Normal) Collected: 10/07/24 1618    Lab Status: Preliminary result Specimen: Blood from Hand, Right Updated: 10/09/24 1645     Blood Culture No growth at 2 days    Narrative:      Less than seven (7) mL's of blood was collected.  Insufficient quantity may yield false negative results.    Blood Culture - Blood, Arm, Right [[id number]]  (Normal) Collected: 10/07/24 1609    Lab Status: Preliminary result Specimen: Blood from Arm, Right Updated: 10/09/24 1645     Blood Culture No growth at 2 days    COVID PRE-OP / PRE-PROCEDURE SCREENING ORDER (NO ISOLATION) - Swab, Nasopharynx [[id number]]  (Normal) Collected: 10/07/24 1750    Lab Status: Final result Specimen: Swab from Nasopharynx Updated: 10/07/24 1849    Narrative:      The following orders were created for panel order COVID PRE-OP / PRE-PROCEDURE SCREENING ORDER (NO ISOLATION) - Swab, Nasopharynx.  Procedure                               Abnormality         Status                     ---------                               -----------         ------                     Respiratory Panel PCR w/...[[id number]]  Normal              Final result                 Please view results for these tests on the individual orders.    Respiratory Panel PCR w/COVID-19(SARS-CoV-2) MICKEY/AKIL/MARIO/PAD/COR/SOULEYMANE In-House, NP Swab in UT/VTM, 2 HR TAT - Swab, Nasopharynx [[id number]]  (Normal) Collected: 10/07/24 1750    Lab Status: Final result Specimen: Swab from Nasopharynx Updated: 10/07/24 1849     ADENOVIRUS, PCR Not Detected      Coronavirus 229E Not Detected     Coronavirus HKU1 Not Detected     Coronavirus NL63 Not Detected     Coronavirus OC43 Not Detected     COVID19 Not Detected     Human Metapneumovirus Not Detected     Human Rhinovirus/Enterovirus Not Detected     Influenza A PCR Not Detected     Influenza B PCR Not Detected     Parainfluenza Virus 1 Not Detected     Parainfluenza Virus 2 Not Detected     Parainfluenza Virus 3 Not Detected     Parainfluenza Virus 4 Not Detected     RSV, PCR Not Detected     Bordetella pertussis pcr Not Detected     Bordetella parapertussis PCR Not Detected     Chlamydophila pneumoniae PCR Not Detected     Mycoplasma pneumo by PCR Not Detected    Narrative:      In the setting of a positive respiratory panel with a viral infection PLUS a negative procalcitonin without other underlying concern for bacterial infection, consider observing off antibiotics or discontinuation of antibiotics and continue supportive care. If the respiratory panel is positive for atypical bacterial infection (Bordetella pertussis, Chlamydophila pneumoniae, or Mycoplasma pneumoniae), consider antibiotic de-escalation to target atypical bacterial infection.            XR Ankle 3+ View Left    Result Date: 10/9/2024  XR ANKLE 3+ VW LEFT Date of Exam: 10/9/2024 10:16 AM EDT Indication: fracture follow up Comparison: Left ankle series 8/21/2024 Findings: Previous exam report described nondisplaced lateral malleolar fracture. On today's exam, transverse lateral malleolar fracture persists, with no apparent bony union, and minimal new bone formation along its margins. Remainder of the ankle joint appears stable. Tibiotalar joint space is well-maintained. Articular surfaces remain smooth. No new trauma is identified.     Impression: Impression: Persistent, nonunited distal left fibular fracture, with minimal new bone formation. Electronically Signed: Ozzy Mckeon MD  10/9/2024 10:51 AM EDT  Workstation ID: [id number]    [facility]  and biliary ducts    Result Date: 10/8/2024  FL ERCP PANCREATIC AND BILIARY DUCTS Date of Exam: 10/8/2024 4:54 PM EDT Indication: ERCP.   Comparison: MRCP dated 10/9/2024 Technique:  A series of radiographic digital spot films were obtained in conjunction with an endoscopic catheterization of the biliary and pancreatic ductal system, performed by the gastroenterologist. Fluoroscopic Time: 146 seconds Number of Images: 14 Findings: Intraoperative fluoroscopic images demonstrate cannulation and opacification of the common bile duct with balloon sweep and placement of a metallic stent. Please see procedure report for full findings and details.     Impression: Impression: 1. Intraoperative fluoroscopy during ERCP with metallic stent placement. Please see procedure report for full findings and details. Electronically Signed: Deandre Merrittelor  10/8/2024 10:39 PM EDT  Workstation ID: [id number]     Results for orders placed during the hospital encounter of 08/21/24    Adult Transthoracic Echo Complete W/ Cont if Necessary Per Protocol (With Agitated Saline)    Interpretation Summary    Left ventricular systolic function is normal. Calculated left ventricular EF = 68% Normal left ventricular cavity size noted. Left ventricular wall thickness is consistent with moderate concentric hypertrophy. All left ventricular wall segments contract normally. Left ventricular diastolic function was normal. Normal left atrial pressure.    The right ventricular cavity is mildly dilated. Normal right ventricular systolic function noted.    Left atrial volume is moderately increased. Saline test results are negative.    The aortic valve is structurally normal with no stenosis present. The aortic valve appears trileaflet. No significant aortic valve regurgitation is present.    The mitral valve is structurally normal with no significant stenosis present. Trace to mild mitral valve regurgitation is present.    Mild tricuspid valve regurgitation  is present. Estimated right ventricular systolic pressure from tricuspid regurgitation is mildly elevated (35-45 mmHg)    Mild dilation of the ascending aorta is present. Ascending aorta = 3.7 cm      Current medications:  Scheduled Meds:aspirin, 81 mg, Oral, Daily  atorvastatin, 80 mg, Oral, Nightly  cefTRIAXone, 2,000 mg, Intravenous, Q24H  folic acid, 1 mg, Oral, Daily  guaifenesin, 400 mg, Oral, Q8H  heparin (porcine), 5,000 Units, Subcutaneous, Q8H  lansoprazole, 15 mg, Oral, Q AM  lisinopril, 20 mg, Oral, Daily  LORazepam, 1 mg, Oral, Q12H   Followed by  [START ON 10/11/2024] LORazepam, 1 mg, Oral, Daily  magnesium oxide, 400 mg, Oral, Daily  metroNIDAZOLE, 500 mg, Oral, Q8H  nicotine, 1 patch, Transdermal, Q24H  sodium chloride, 10 mL, Intravenous, Q12H  thiamine (B-1) IV, 200 mg, Intravenous, Q8H   Followed by  [START ON 10/13/2024] thiamine, 100 mg, Oral, Daily  ticagrelor, 60 mg, Oral, BID  topiramate, 25 mg, Oral, Nightly  ursodiol, 300 mg, Oral, BID      Continuous Infusions:     PRN Meds:.  acetaminophen **OR** acetaminophen **OR** acetaminophen    aluminum-magnesium hydroxide-simethicone    senna-docusate sodium **AND** polyethylene glycol **AND** bisacodyl **AND** bisacodyl    Calcium Replacement - Follow Nurse / BPA Driven Protocol    HYDROcodone-acetaminophen    HYDROmorphone **AND** naloxone    LORazepam **OR** midazolam **OR** LORazepam **OR** midazolam **OR** midazolam **OR** midazolam    Magnesium Standard Dose Replacement - Follow Nurse / BPA Driven Protocol    nicotine polacrilex    ondansetron ODT **OR** ondansetron    Phosphorus Replacement - Follow Nurse / BPA Driven Protocol    Potassium Replacement - Follow Nurse / BPA Driven Protocol    sodium chloride    Assessment & Plan   Assessment & Plan     Active Hospital Problems    Diagnosis  POA    **RUQ pain [R10.11]  Yes    Cholangitis [K83.09]  Yes    Dilated cbd, acquired [K83.8]  Unknown    HTN (hypertension) [I10]  Yes    History of  seizures [Z87.898]  Yes    Alcohol use [Z78.9]  Yes    Obesity (BMI 30-39.9) [E66.9]  Yes    Dyslipidemia [E78.5]  Yes    PAD (peripheral artery disease) [I73.9]  Yes    Tobacco use [Z72.0]  Yes      Resolved Hospital Problems   No resolved problems to display.        Brief Hospital Course to date:  Dalila Pike is a 53 y.o. female with history of HTN, HL, PAD, obesity, seizures, ETOH use, R MCA CVA s/p thrombectomy/R ICA stent, with residual L weakness, recent L ankle fracture here with RUQ pain.    RUQ pain  -on ceftriaxone/flagyl  -MRCP reviewed  -s/p ERCP with papillary stenosis, s/p sphincterotomy/stent  -cytology pending  -GI following  -General surgery consulted for CCY, not recommended at this time  -surgery/GI recommend advancing diet  -GI recommending continued PPI, completing course of antibiotics, with follow up 6 weeks for repeat ERCP/stent removal  -monitor PO intake and abdominal pain    Recent CVA s/p thrombectomy/R ICA stent  -on ASA/brilinta/statin    HTN  -monitor    HL  -statin    ETOH use  -monitor for withdrawal    R ankle fracture  -followed by Dr. Gonzalez, with recommended CAM boot in place  -had follow up scheduled 10/7, but hospitalized  -repeat ankle xray ordered, orthopedic surgery to follow up    Tobacco abuse      Expected Discharge Location and Transportation: Rehab  Expected Discharge   Expected Discharge Date: 10/11/2024; Expected Discharge Time:      VTE Prophylaxis:  Pharmacologic VTE prophylaxis orders are present.         AM-PAC 6 Clicks Score (PT): 16 (10/09/24 2000)    CODE STATUS:   Code Status and Medical Interventions: CPR (Attempt to Resuscitate); Full Support   Ordered at: 10/07/24 5112     Level Of Support Discussed With:    Patient     Code Status (Patient has no pulse and is not breathing):    CPR (Attempt to Resuscitate)     Medical Interventions (Patient has pulse or is breathing):    Full Support       Ozzy Galindo MD  10/10/24

## 2024-10-10 NOTE — PLAN OF CARE
Goal Outcome Evaluation:  Plan of Care Reviewed With: patient        Progress: no change  Outcome Evaluation: OT eval complete. Pt presents below fxl baseline with impaired strength and sensation to LUE, decreased activity tolerance, and generalized weakness. Pt would benefit from skilled IPOT services to progress to PLOF. Recommend d/c to home with 24/7 assist and  OT/PT services.      Anticipated Discharge Disposition (OT): home with 24/7 care, home with home health

## 2024-10-11 LAB
ALBUMIN SERPL-MCNC: 3.7 G/DL (ref 3.5–5.2)
ALBUMIN/GLOB SERPL: 1.3 G/DL
ALP SERPL-CCNC: 251 U/L (ref 39–117)
ALT SERPL W P-5'-P-CCNC: 36 U/L (ref 1–33)
ANION GAP SERPL CALCULATED.3IONS-SCNC: 9 MMOL/L (ref 5–15)
AST SERPL-CCNC: 23 U/L (ref 1–32)
BILIRUB SERPL-MCNC: 0.9 MG/DL (ref 0–1.2)
BUN SERPL-MCNC: 13 MG/DL (ref 6–20)
BUN/CREAT SERPL: 21 (ref 7–25)
CALCIUM SPEC-SCNC: 9.4 MG/DL (ref 8.6–10.5)
CHLORIDE SERPL-SCNC: 105 MMOL/L (ref 98–107)
CO2 SERPL-SCNC: 24 MMOL/L (ref 22–29)
CREAT SERPL-MCNC: 0.62 MG/DL (ref 0.57–1)
EGFRCR SERPLBLD CKD-EPI 2021: 106 ML/MIN/1.73
GLOBULIN UR ELPH-MCNC: 2.9 GM/DL
GLUCOSE SERPL-MCNC: 131 MG/DL (ref 65–99)
POTASSIUM SERPL-SCNC: 3.5 MMOL/L (ref 3.5–5.2)
POTASSIUM SERPL-SCNC: 4.3 MMOL/L (ref 3.5–5.2)
PROT SERPL-MCNC: 6.6 G/DL (ref 6–8.5)
SODIUM SERPL-SCNC: 138 MMOL/L (ref 136–145)

## 2024-10-11 PROCEDURE — 63710000001 ONDANSETRON ODT 4 MG TABLET DISPERSIBLE: Performed by: INTERNAL MEDICINE

## 2024-10-11 PROCEDURE — 99232 SBSQ HOSP IP/OBS MODERATE 35: CPT | Performed by: NURSE PRACTITIONER

## 2024-10-11 PROCEDURE — 25010000002 HYDROMORPHONE PER 4 MG: Performed by: INTERNAL MEDICINE

## 2024-10-11 PROCEDURE — 25010000002 CEFTRIAXONE PER 250 MG: Performed by: HOSPITALIST

## 2024-10-11 PROCEDURE — 25010000002 HEPARIN (PORCINE) PER 1000 UNITS: Performed by: INTERNAL MEDICINE

## 2024-10-11 PROCEDURE — 25010000002 THIAMINE HCL 200 MG/2ML SOLUTION: Performed by: INTERNAL MEDICINE

## 2024-10-11 PROCEDURE — 80053 COMPREHEN METABOLIC PANEL: CPT | Performed by: HOSPITALIST

## 2024-10-11 PROCEDURE — 84132 ASSAY OF SERUM POTASSIUM: CPT | Performed by: INTERNAL MEDICINE

## 2024-10-11 RX ORDER — POTASSIUM CHLORIDE 1.5 G/1.58G
40 POWDER, FOR SOLUTION ORAL EVERY 4 HOURS
Status: COMPLETED | OUTPATIENT
Start: 2024-10-11 | End: 2024-10-11

## 2024-10-11 RX ADMIN — METRONIDAZOLE 500 MG: 500 TABLET ORAL at 21:34

## 2024-10-11 RX ADMIN — LISINOPRIL 20 MG: 20 TABLET ORAL at 08:12

## 2024-10-11 RX ADMIN — GUAIFENESIN 400 MG: 100 LIQUID ORAL at 21:34

## 2024-10-11 RX ADMIN — FOLIC ACID 1 MG: 1 TABLET ORAL at 08:12

## 2024-10-11 RX ADMIN — HYDROCODONE BITARTRATE AND ACETAMINOPHEN 1 TABLET: 5; 325 TABLET ORAL at 12:30

## 2024-10-11 RX ADMIN — LANSOPRAZOLE 15 MG: 15 TABLET, ORALLY DISINTEGRATING ORAL at 06:36

## 2024-10-11 RX ADMIN — ACETAMINOPHEN 650 MG: 325 TABLET ORAL at 23:13

## 2024-10-11 RX ADMIN — HYDROCODONE BITARTRATE AND ACETAMINOPHEN 1 TABLET: 5; 325 TABLET ORAL at 06:35

## 2024-10-11 RX ADMIN — Medication 10 ML: at 08:13

## 2024-10-11 RX ADMIN — MAGNESIUM OXIDE TAB 400 MG (241.3 MG ELEMENTAL MG) 400 MG: 400 (241.3 MG) TAB at 08:12

## 2024-10-11 RX ADMIN — NICOTINE 1 PATCH: 21 PATCH TRANSDERMAL at 14:33

## 2024-10-11 RX ADMIN — POTASSIUM CHLORIDE 40 MEQ: 1.5 POWDER, FOR SOLUTION ORAL at 14:33

## 2024-10-11 RX ADMIN — ONDANSETRON 4 MG: 4 TABLET, ORALLY DISINTEGRATING ORAL at 11:13

## 2024-10-11 RX ADMIN — THIAMINE HYDROCHLORIDE 200 MG: 100 INJECTION, SOLUTION INTRAMUSCULAR; INTRAVENOUS at 06:36

## 2024-10-11 RX ADMIN — LORAZEPAM 1 MG: 1 TABLET ORAL at 08:12

## 2024-10-11 RX ADMIN — GUAIFENESIN 400 MG: 100 LIQUID ORAL at 06:35

## 2024-10-11 RX ADMIN — ACETAMINOPHEN 650 MG: 325 TABLET ORAL at 02:49

## 2024-10-11 RX ADMIN — URSODIOL 300 MG: 300 CAPSULE ORAL at 08:12

## 2024-10-11 RX ADMIN — METRONIDAZOLE 500 MG: 500 TABLET ORAL at 06:36

## 2024-10-11 RX ADMIN — HEPARIN SODIUM 5000 UNITS: 5000 INJECTION INTRAVENOUS; SUBCUTANEOUS at 14:33

## 2024-10-11 RX ADMIN — HEPARIN SODIUM 5000 UNITS: 5000 INJECTION INTRAVENOUS; SUBCUTANEOUS at 06:36

## 2024-10-11 RX ADMIN — SODIUM CHLORIDE 2000 MG: 900 INJECTION INTRAVENOUS at 11:16

## 2024-10-11 RX ADMIN — ATORVASTATIN CALCIUM 80 MG: 40 TABLET, FILM COATED ORAL at 21:34

## 2024-10-11 RX ADMIN — POTASSIUM CHLORIDE 40 MEQ: 1.5 POWDER, FOR SOLUTION ORAL at 10:14

## 2024-10-11 RX ADMIN — URSODIOL 300 MG: 300 CAPSULE ORAL at 21:34

## 2024-10-11 RX ADMIN — HYDROCODONE BITARTRATE AND ACETAMINOPHEN 1 TABLET: 5; 325 TABLET ORAL at 00:40

## 2024-10-11 RX ADMIN — ASPIRIN 81 MG 81 MG: 81 TABLET ORAL at 08:12

## 2024-10-11 RX ADMIN — TICAGRELOR 60 MG: 60 TABLET ORAL at 08:12

## 2024-10-11 RX ADMIN — ALUMINUM HYDROXIDE, MAGNESIUM HYDROXIDE, DIMETHICONE 15 ML: 400; 400; 40 SUSPENSION ORAL at 02:50

## 2024-10-11 RX ADMIN — HYDROCODONE BITARTRATE AND ACETAMINOPHEN 1 TABLET: 5; 325 TABLET ORAL at 19:42

## 2024-10-11 RX ADMIN — THIAMINE HYDROCHLORIDE 200 MG: 100 INJECTION, SOLUTION INTRAMUSCULAR; INTRAVENOUS at 14:33

## 2024-10-11 RX ADMIN — TICAGRELOR 60 MG: 60 TABLET ORAL at 21:50

## 2024-10-11 RX ADMIN — THIAMINE HYDROCHLORIDE 200 MG: 100 INJECTION, SOLUTION INTRAMUSCULAR; INTRAVENOUS at 21:34

## 2024-10-11 RX ADMIN — METRONIDAZOLE 500 MG: 500 TABLET ORAL at 14:33

## 2024-10-11 RX ADMIN — TOPIRAMATE 25 MG: 25 TABLET, FILM COATED ORAL at 21:34

## 2024-10-11 RX ADMIN — HYDROMORPHONE HYDROCHLORIDE 0.5 MG: 1 INJECTION, SOLUTION INTRAMUSCULAR; INTRAVENOUS; SUBCUTANEOUS at 16:29

## 2024-10-11 RX ADMIN — HEPARIN SODIUM 5000 UNITS: 5000 INJECTION INTRAVENOUS; SUBCUTANEOUS at 21:34

## 2024-10-11 RX ADMIN — Medication 10 ML: at 21:39

## 2024-10-11 NOTE — CASE MANAGEMENT/SOCIAL WORK
"Continued Stay Note  [facility]     Patient Name: Dalila Pike  MRN: [id number]  Today's Date: 10/11/2024    Admit Date: 10/7/2024    Plan: Home with HH vs rehab via family   Discharge Plan       Row Name 10/11/24 1539       Plan    Plan Home with HH vs rehab via family    Plan Comments SW\"er met with patient at bedside. SW'er discussed the rec's from the therapy team which is home with HH. SW'er spoke with [facility] rep; awaiting to see how patient does with therapy over the weekend. Patient is adamant that she wants/needs rehab. SW'er will fax HH referrals just in case. SW'er discussed resources for extra help at home like Medicaid waiver. Patient explains \"I have applied for all the programs.\" Declined resources at this time. Plan is home with HH vs rehab via family                   Discharge Codes    No documentation.                 Expected Discharge Date and Time       Expected Discharge Date Expected Discharge Time    Oct 11, 2024               NEETU Cortez (Kay)    "

## 2024-10-11 NOTE — PLAN OF CARE
Goal Outcome Evaluation:                           VSS on room air. Pt reports dull aching pain in stomach, see MAR. Family at bedside. Presents more emotional and tearful tonight. Pills crushed in applesauce/pudding.

## 2024-10-11 NOTE — PROGRESS NOTES
[facility] Medicine Services  PROGRESS NOTE    Patient Name: Dalila Pike  : 1970  MRN: [id number]    Date of Admission: 10/7/2024  Primary Care Physician: Agnes Ewing APRN    Subjective   Subjective     CC:  Follow up RUQ pain     HPI:  Patient was seen resting in bed in no apparent distress.  No acute events overnight per nursing.  Family at bedside.  Continues to have left upper quadrant abdominal pain.  Reports that she feels weak and is interested in rehab placement.  No new complaints at this time.      Objective   Objective     Vital Signs:   Temp:  [97.5 °F (36.4 °C)-98.7 °F (37.1 °C)] 98.2 °F (36.8 °C)  Heart Rate:  [] 103  Resp:  [16-18] 18  BP: (117-141)/(57-92) 119/83     Physical Exam:  Constitutional: No acute distress, awake, alert, chronically ill-appearing  HENT: NCAT, mucous membranes moist  Respiratory: grossly clear, respiratory effort normal on RA   Cardiovascular: tachycardic, no murmurs, cap refill brisk   Gastrointestinal: Positive bowel sounds, soft, left upper quadrant tender to palpation  Musculoskeletal: trace BLE edema   Psychiatric: Appropriate affect, cooperative  Neurologic: Oriented x 3, LUE flaccid , speech clear  Skin: warm, dry, no visible rash     Results Reviewed:  LAB RESULTS:      Lab 10/09/24  0454 10/08/24  0607 10/07/24  1608   WBC 6.87 5.53 8.02   HEMOGLOBIN 14.3 13.2 14.3   HEMATOCRIT 41.9 39.6 42.9   PLATELETS 227 197 236   NEUTROS ABS  --   --  5.87   IMMATURE GRANS (ABS)  --   --  0.02   LYMPHS ABS  --   --  1.53   MONOS ABS  --   --  0.53   EOS ABS  --   --  0.04   MCV 90.5 92.3 92.7   PROCALCITONIN  --   --  0.03   LACTATE  --   --  1.4         Lab 10/11/24  0809 10/10/24  0535 10/09/24  0455 10/08/24  0607 10/07/24  1608   SODIUM 138 140 135* 139 140   POTASSIUM 3.5 4.0 4.4 3.9 4.1   CHLORIDE 105 105 103 106 104   CO2 24.0 24.0 21.0* 25.0 27.0   ANION GAP 9.0 11.0 11.0 8.0 9.0   BUN 13 12 15 13 14   CREATININE 0.62  0.62 0.65 0.64 0.66   EGFR 106.0 106.6 105.4 105.8 105.0   GLUCOSE 131* 111* 152* 118* 116*   CALCIUM 9.4 9.3 9.2 8.9 9.4   HEMOGLOBIN A1C  --   --   --  5.50  --    TSH  --   --   --  0.447  --          Lab 10/11/24  0809 10/10/24  0535 10/09/24  0455 10/08/24  0607 10/07/24  1608   TOTAL PROTEIN 6.6 6.1 6.5 6.1 7.0   ALBUMIN 3.7 3.8 3.7 3.6 4.3   GLOBULIN 2.9 2.3 2.8 2.5 2.7   ALT (SGPT) 36* 48* 60* 46* 29   AST (SGOT) 23 34* 54* 46* 22   BILIRUBIN 0.9 0.7 1.3* 2.0* 1.3*   ALK PHOS 251* 249* 243* 193* 199*             Lab 10/08/24  0607   CHOLESTEROL 144   LDL CHOL 75   HDL CHOL 46   TRIGLYCERIDES 130             Brief Urine Lab Results  (Last result in the past 365 days)        Color   Clarity   Blood   Leuk Est   Nitrite   Protein   CREAT   Urine HCG        10/08/24 0317 Yellow   Clear   Negative   Negative   Negative   Negative                   Microbiology Results Abnormal       Procedure Component Value - Date/Time    Blood Culture - Blood, Hand, Right [[id number]]  (Normal) Collected: 10/07/24 1618    Lab Status: Preliminary result Specimen: Blood from Hand, Right Updated: 10/10/24 1646     Blood Culture No growth at 3 days    Narrative:      Less than seven (7) mL's of blood was collected.  Insufficient quantity may yield false negative results.    Blood Culture - Blood, Arm, Right [[id number]]  (Normal) Collected: 10/07/24 1609    Lab Status: Preliminary result Specimen: Blood from Arm, Right Updated: 10/10/24 1646     Blood Culture No growth at 3 days    COVID PRE-OP / PRE-PROCEDURE SCREENING ORDER (NO ISOLATION) - Swab, Nasopharynx [[id number]]  (Normal) Collected: 10/07/24 1750    Lab Status: Final result Specimen: Swab from Nasopharynx Updated: 10/07/24 1849    Narrative:      The following orders were created for panel order COVID PRE-OP / PRE-PROCEDURE SCREENING ORDER (NO ISOLATION) - Swab, Nasopharynx.  Procedure                               Abnormality         Status                     ---------                                -----------         ------                     Respiratory Panel PCR w/...[[id number]]  Normal              Final result                 Please view results for these tests on the individual orders.    Respiratory Panel PCR w/COVID-19(SARS-CoV-2) MICKEY/AKIL/MARIO/PAD/COR/SOULEYMANE In-House, NP Swab in UTM/VTM, 2 HR TAT - Swab, Nasopharynx [[id number]]  (Normal) Collected: 10/07/24 1750    Lab Status: Final result Specimen: Swab from Nasopharynx Updated: 10/07/24 1849     ADENOVIRUS, PCR Not Detected     Coronavirus 229E Not Detected     Coronavirus HKU1 Not Detected     Coronavirus NL63 Not Detected     Coronavirus OC43 Not Detected     COVID19 Not Detected     Human Metapneumovirus Not Detected     Human Rhinovirus/Enterovirus Not Detected     Influenza A PCR Not Detected     Influenza B PCR Not Detected     Parainfluenza Virus 1 Not Detected     Parainfluenza Virus 2 Not Detected     Parainfluenza Virus 3 Not Detected     Parainfluenza Virus 4 Not Detected     RSV, PCR Not Detected     Bordetella pertussis pcr Not Detected     Bordetella parapertussis PCR Not Detected     Chlamydophila pneumoniae PCR Not Detected     Mycoplasma pneumo by PCR Not Detected    Narrative:      In the setting of a positive respiratory panel with a viral infection PLUS a negative procalcitonin without other underlying concern for bacterial infection, consider observing off antibiotics or discontinuation of antibiotics and continue supportive care. If the respiratory panel is positive for atypical bacterial infection (Bordetella pertussis, Chlamydophila pneumoniae, or Mycoplasma pneumoniae), consider antibiotic de-escalation to target atypical bacterial infection.            XR Ankle 3+ View Left    Result Date: 10/9/2024  XR ANKLE 3+ VW LEFT Date of Exam: 10/9/2024 10:16 AM EDT Indication: fracture follow up Comparison: Left ankle series 8/21/2024 Findings: Previous exam report described nondisplaced lateral malleolar  fracture. On today's exam, transverse lateral malleolar fracture persists, with no apparent bony union, and minimal new bone formation along its margins. Remainder of the ankle joint appears stable. Tibiotalar joint space is well-maintained. Articular surfaces remain smooth. No new trauma is identified.     Impression: Impression: Persistent, nonunited distal left fibular fracture, with minimal new bone formation. Electronically Signed: Ozzy Mckeon MD  10/9/2024 10:51 AM EDT  Workstation ID: [id number]     Results for orders placed during the hospital encounter of 08/21/24    Adult Transthoracic Echo Complete W/ Cont if Necessary Per Protocol (With Agitated Saline)    Interpretation Summary    Left ventricular systolic function is normal. Calculated left ventricular EF = 68% Normal left ventricular cavity size noted. Left ventricular wall thickness is consistent with moderate concentric hypertrophy. All left ventricular wall segments contract normally. Left ventricular diastolic function was normal. Normal left atrial pressure.    The right ventricular cavity is mildly dilated. Normal right ventricular systolic function noted.    Left atrial volume is moderately increased. Saline test results are negative.    The aortic valve is structurally normal with no stenosis present. The aortic valve appears trileaflet. No significant aortic valve regurgitation is present.    The mitral valve is structurally normal with no significant stenosis present. Trace to mild mitral valve regurgitation is present.    Mild tricuspid valve regurgitation is present. Estimated right ventricular systolic pressure from tricuspid regurgitation is mildly elevated (35-45 mmHg)    Mild dilation of the ascending aorta is present. Ascending aorta = 3.7 cm      Current medications:  Scheduled Meds:aspirin, 81 mg, Oral, Daily  atorvastatin, 80 mg, Oral, Nightly  cefTRIAXone, 2,000 mg, Intravenous, Q24H  folic acid, 1 mg, Oral, Daily  guaifenesin, 400  mg, Oral, Q8H  heparin (porcine), 5,000 Units, Subcutaneous, Q8H  lansoprazole, 15 mg, Oral, Q AM  lisinopril, 20 mg, Oral, Daily  magnesium oxide, 400 mg, Oral, Daily  metroNIDAZOLE, 500 mg, Oral, Q8H  nicotine, 1 patch, Transdermal, Q24H  potassium chloride, 40 mEq, Oral, Q4H  sodium chloride, 10 mL, Intravenous, Q12H  thiamine (B-1) IV, 200 mg, Intravenous, Q8H   Followed by  [START ON 10/13/2024] thiamine, 100 mg, Oral, Daily  ticagrelor, 60 mg, Oral, BID  topiramate, 25 mg, Oral, Nightly  ursodiol, 300 mg, Oral, BID      Continuous Infusions:   PRN Meds:.  acetaminophen **OR** acetaminophen **OR** acetaminophen    aluminum-magnesium hydroxide-simethicone    senna-docusate sodium **AND** polyethylene glycol **AND** bisacodyl **AND** bisacodyl    Calcium Replacement - Follow Nurse / BPA Driven Protocol    HYDROcodone-acetaminophen    HYDROmorphone **AND** naloxone    LORazepam **OR** midazolam **OR** LORazepam **OR** midazolam **OR** midazolam **OR** midazolam    Magnesium Standard Dose Replacement - Follow Nurse / BPA Driven Protocol    nicotine polacrilex    ondansetron ODT **OR** ondansetron    Phosphorus Replacement - Follow Nurse / BPA Driven Protocol    Potassium Replacement - Follow Nurse / BPA Driven Protocol    sodium chloride    Assessment & Plan   Assessment & Plan     Active Hospital Problems    Diagnosis  POA    **RUQ pain [R10.11]  Yes    Cholangitis [K83.09]  Yes    Dilated cbd, acquired [K83.8]  Unknown    HTN (hypertension) [I10]  Yes    History of seizures [Z87.898]  Yes    Alcohol use [Z78.9]  Yes    Obesity (BMI 30-39.9) [E66.9]  Yes    Dyslipidemia [E78.5]  Yes    PAD (peripheral artery disease) [I73.9]  Yes    Tobacco use [Z72.0]  Yes      Resolved Hospital Problems   No resolved problems to display.        Brief Hospital Course to date:  Dalila Pike is a 54 y.o. female  with past medical history of HTN, HL, PAD, obesity, seizures, ETOH use, R MCA CVA s/p thrombectomy/R ICA stent with  residual L weakness, and recent L ankle fracture who presented on 10/7/2024 with RUQ pain.     This patient's problems and plans were partially entered by my partner and updated as appropriate by me 10/11/24.    RUQ pain  -MRCP reviewed  -s/p ERCP with papillary stenosis, s/p sphincterotomy/stent  -cytology pending  -GI following  -General surgery consulted for CCY, not recommended at this time  -surgery/GI recommend advancing diet  -GI recommending continued PPI, completing course of antibiotics, with follow up 6 weeks for repeat ERCP/stent removal  -Continue ceftriaxone/flagyl  -monitor PO intake and abdominal pain  -AM labs     Recent CVA s/p thrombectomy/R ICA stent  -on ASA/brilinta/statin  -Follow-up stroke neurology as previously recommended     HTN  -monitor     HL  -statin     ETOH use  -monitor for withdrawal     R ankle fracture  -followed by Dr. Gonzalez, with recommended CAM boot in place  -Ortho has seen, recommended WBAT with boot x1 month, follow up with Dr. Yang in 1 month.      Tobacco abuse     Expected Discharge Location and Transportation: Rehab  Expected Discharge   Expected Discharge Date: 10/12/2024; Expected Discharge Time:       VTE Prophylaxis:  Pharmacologic VTE prophylaxis orders are present.    AM-PAC 6 Clicks Score (PT): 18 (10/10/24 2000)    CODE STATUS:   Code Status and Medical Interventions: CPR (Attempt to Resuscitate); Full Support   Ordered at: 10/07/24 3900     Level Of Support Discussed With:    Patient     Code Status (Patient has no pulse and is not breathing):    CPR (Attempt to Resuscitate)     Medical Interventions (Patient has pulse or is breathing):    Full Support       Rocky Meneses, ROXANE  10/11/24

## 2024-10-11 NOTE — DISCHARGE PLACEMENT REQUEST
"Dalila Ocasio (54 y.o. Female)      contact Vidya Doss  [phone]      Date of Birth   1970    Social Security Number       Address   [address] [place] [address]    Home Phone   [phone]    MRN   [id number]       Yazidi   None    Marital Status                               Admission Date   10/7/24    Admission Type   Elective    Admitting Provider   Brook Overton DO    Attending Provider   Brook Overton DO    Department, Room/Bed   [facility] 5B, N541/1       Discharge Date       Discharge Disposition       Discharge Destination                                 Attending Provider: Brook Overton DO    Allergies: Erythromycin, Latex, Toradol [Ketorolac Tromethamine], Contrast Dye (Echo Or Unknown Ct/mr)    Isolation: None   Infection: None   Code Status: CPR    Ht: 172.7 cm (68\")   Wt: 110 kg (243 lb)    Admission Cmt: None   Principal Problem: RUQ pain [R10.11]                   Active Insurance as of 10/7/2024       Primary Coverage       Payor Plan Insurance Group Employer/Plan Group    [employer] MEDICAID [facility] MEDICAID [id number]       Payor Plan Address Payor Plan Phone Number Payor Plan Fax Number Effective Dates    [address] [address] [phone]  2019 - None Entered    [facility] [address]         Subscriber Name Subscriber Birth Date Member ID       DALILA OCASIO 1970 [id number]                     Emergency Contacts        (Rel.) Home Phone Work Phone Mobile Phone    MaryanaAbiel gongora (Spouse) [phone] -- [phone]    GenesisRashad (Son) [phone] -- [phone]                 History & Physical        Becky Pang MD at 10/07/24 [facility]              [facility]  HISTORY AND PHYSICAL    Patient Name: Dalila Ocasio  : 1970  MRN: [id number]  Primary Care Physician: Agnes Ewing APRN  Date of admission: 10/7/2024      Subjective  Subjective     Chief " Complaint:  RUQ pain     HPI:  Dalila Pike is a 53 y.o. female with PMH of with HTN, HLD, PAD, obesity, history of seizures, ongoing tobacco abuse, EtOH use, recent R MCA territory ischemic stroke s/p thrombectomy and R ICA stent placement (8/21/24) with residual L sided weakness, also with recent L ankle fracture seen by Ortho during stroke admission who presented from [facility] with complaints of RUQ pain. Pt reports that she has had months of intermittent RUQ pain but episodes seem to come and go- lately she has noticed them more frequently and they seem more severe.  She also notes that she has had intermittent fevers with these episodes and has had fevers of up to 103-104F in the last few days since onset of RUQ pain. She thinks that a spaghetti meal is what recently set her off.  She was seen in the [facility] ED today and had labs as well as RUQ ultrasound and CT A/P.  I was only able to see RUQ prelim read which showed a CBD dilatation of 1 cm.  Her alk phos was also elevated.  All other labs were wnl.  She was transferred here for MRCP and possible GI evaluation.      Personal History     Past Medical History:   Diagnosis Date    Acute CVA (cerebrovascular accident) 08/21/2024    Alcohol use 08/21/2024    Dyslipidemia 08/21/2024    Obesity (BMI 30-39.9) 08/21/2024    PAD (peripheral artery disease) 08/21/2024    Tobacco use 08/21/2024           Past Surgical History:   Procedure Laterality Date    INTERVENTIONAL RADIOLOGY PROCEDURE Bilateral 8/21/2024    Procedure: Carotid Cervical Angiogram;  Surgeon: Jamaal Saini MD;  Location: [facility] INVASIVE LOCATION;  Service: Interventional Radiology;  Laterality: Bilateral;       Family History: family history is not on file.     Social History:  reports that she has been smoking cigarettes. She started smoking about 9 months ago. She has a 76.4 pack-year smoking history. She has been exposed to tobacco smoke. She has never used smokeless tobacco.  She reports that she does not currently use alcohol. She reports that she does not currently use drugs.  Social History     Social History Narrative    Not on file       Medications:  Available home medication information reviewed.  HYDROcodone-acetaminophen, aspirin, atorvastatin, folic acid, guaiFENesin, lisinopril, magnesium oxide, pantoprazole, sodium chloride, ticagrelor, and topiramate    Allergies   Allergen Reactions    Erythromycin Anaphylaxis    Latex Rash    Toradol [Ketorolac Tromethamine] Rash    Contrast Dye (Echo Or Unknown Ct/Mr) Unknown (See Comments)     Hives on chest       Objective  Objective     Vital Signs:   Temp:  [100.7 °F (38.2 °C)] 100.7 °F (38.2 °C)  Heart Rate:  [102] 102  Resp:  [18] 18  BP: (100)/(68) 100/68       Physical Exam   Constitutional: Awake, lethargic, falls asleep mid-sentence  Eyes: PERRLA, sclerae anicteric, no conjunctival injection  HENT: NCAT, mucous membranes moist  Neck: Supple, no thyromegaly, no lymphadenopathy, trachea midline  Respiratory: Clear to auscultation bilaterally, nonlabored respirations   Cardiovascular: RRR, no murmurs, rubs, or gallops, palpable pedal pulses bilaterally  Gastrointestinal: Positive bowel sounds, soft, nontender, nondistended  Musculoskeletal: No bilateral ankle edema, no clubbing or cyanosis to extremities  Psychiatric: Appropriate affect, cooperative  Neurologic: lethargic, left sided weakness  Skin: No rashes   Result Review:  I have personally reviewed the results from the time of this admission to 10/7/2024 16:08 EDT and agree with these findings:  [x]  Laboratory list / accordion  [x]  Microbiology  [x]  Radiology  [x]  EKG/Telemetry   []  Cardiology/Vascular   []  Pathology  [x]  Old records  []  Other:  Most notable findings include: see assessment and plan       LAB RESULTS:                              UA          8/28/2024    11:03   Urinalysis   Squamous Epithelial Cells, UA 0-2    Specific Gravity, UA 1.028    Ketones, UA  Trace    Blood, UA Negative    Leukocytes, UA Small (1+)    Nitrite, UA Positive    RBC, UA 0-2    WBC, UA 21-50    Bacteria, UA 3+        Microbiology Results (last 10 days)       ** No results found for the last 240 hours. **            No radiology results from the last 24 hrs    Results for orders placed during the hospital encounter of 08/21/24    Adult Transthoracic Echo Complete W/ Cont if Necessary Per Protocol (With Agitated Saline)    Interpretation Summary    Left ventricular systolic function is normal. Calculated left ventricular EF = 68% Normal left ventricular cavity size noted. Left ventricular wall thickness is consistent with moderate concentric hypertrophy. All left ventricular wall segments contract normally. Left ventricular diastolic function was normal. Normal left atrial pressure.    The right ventricular cavity is mildly dilated. Normal right ventricular systolic function noted.    Left atrial volume is moderately increased. Saline test results are negative.    The aortic valve is structurally normal with no stenosis present. The aortic valve appears trileaflet. No significant aortic valve regurgitation is present.    The mitral valve is structurally normal with no significant stenosis present. Trace to mild mitral valve regurgitation is present.    Mild tricuspid valve regurgitation is present. Estimated right ventricular systolic pressure from tricuspid regurgitation is mildly elevated (35-45 mmHg)    Mild dilation of the ascending aorta is present. Ascending aorta = 3.7 cm      Assessment & Plan  Assessment & Plan       RUQ pain    Alcohol use    Tobacco use    Obesity (BMI 30-39.9)    HTN (hypertension)    Dyslipidemia    History of seizures    PAD (peripheral artery disease)    Dilated cbd, acquired    Ms. Pike is a 54 yo WF with PMH of with HTN, HLD, PAD, obesity, history of seizures, ongoing tobacco abuse, EtOH use, recent R MCA territory ischemic stroke s/p thrombectomy and R ICA  stent placement (8/21/24) with residual L sided weakness, also with recent L ankle fracture seen by Ortho during stroke admission who [facility].      Plan:    Choledocholithiasis  ? Cholecystitis  -- pt with RUQ pain and reported dilated CBD on OSH ultrasound (data deficit). Also with elevated Alk phos.  Other labs were unremarkable including tbili, WBC and transaminases.    -- repeat labs now  -- get MRCP  -- reported fever of 103-104F at home.  Unsure if she also has cholecystitis as CT A/P from OSH is not currently available. Will get images uploaded to our system  -- start empiric Zosyn  -- send procal, lactate, blood cultures x 2 and will check UA with Ucx as well.  Also send for respiratory viral panel  -- consult GI in am, may need to consider General Surgery as well given concern for acute cholecystitis   -- NPO except sips for now    Recent CVA s/p thrombectomy and R ICA stent  -- with residual L sided weakness  -- pt was sent to [facility] on 8/28,however, she left AMA from there four days later.  Daughter is interested in seeing about other rehab options   -- continue ASA, Brilinta and statin for now. May need to hold DAPT if surgery is anticipated.    HTN  -- not currently on any antihypertensives    HLD  --continue statin per home regimen     Reported EtOH abuse  -- did not require any benzos for EtOH withdrawal during last admission  -- monitor for now on CIWA with PRN benzos  -- continue thiamine     Recent L ankle fracture  -- followed by Dr. Gonzalez, still has CAM boot in place but was supposed to follow up with Ortho within a week of most recent discharge- need to clarify in am    Tobacco abuse  -- encouraged cessation  -- nicotine patch while inpatient    Recent + methamphetamine  -- check UDS       Total time spent: 75 minutes  Time spent includes time reviewing chart, face-to-face time, counseling patient/family/caregiver, ordering  medications/tests/procedures, communicating with other health care professionals, documenting clinical information in the electronic health record, and coordination of care.       VTE Prophylaxis:  Pharmacologic VTE prophylaxis orders are present.          CODE STATUS:    Code Status and Medical Interventions: CPR (Attempt to Resuscitate); Full Support   Ordered at: 10/07/24 1540     Level Of Support Discussed With:    Patient     Code Status (Patient has no pulse and is not breathing):    CPR (Attempt to Resuscitate)     Medical Interventions (Patient has pulse or is breathing):    Full Support       Expected Discharge   Expected Discharge Date: 10/11/2024; Expected Discharge Time:      Becky Pang MD  10/07/24     Electronically signed by Becky Pang MD at 10/07/24 1609          Physician Progress Notes (most recent note)        Rocky Meneses APRN at 10/11/24 0905              [facility] Medicine Services  PROGRESS NOTE    Patient Name: Dalila Pike  : 1970  MRN: [id number]    Date of Admission: 10/7/2024  Primary Care Physician: Agnes Ewing APRN    Subjective   Subjective     CC:  Follow up RUQ pain     HPI:  Patient was seen resting in bed in no apparent distress.  No acute events overnight per nursing.  Family at bedside.  Continues to have left upper quadrant abdominal pain.  Reports that she feels weak and is interested in rehab placement.  No new complaints at this time.      Objective   Objective     Vital Signs:   Temp:  [97.5 °F (36.4 °C)-98.7 °F (37.1 °C)] 98.2 °F (36.8 °C)  Heart Rate:  [] 103  Resp:  [16-18] 18  BP: (117-141)/(57-92) 119/83     Physical Exam:  Constitutional: No acute distress, awake, alert, chronically ill-appearing  HENT: NCAT, mucous membranes moist  Respiratory: grossly clear, respiratory effort normal on RA   Cardiovascular: tachycardic, no murmurs, cap refill brisk   Gastrointestinal: Positive bowel sounds,  soft, left upper quadrant tender to palpation  Musculoskeletal: trace BLE edema   Psychiatric: Appropriate affect, cooperative  Neurologic: Oriented x 3, LUE flaccid , speech clear  Skin: warm, dry, no visible rash     Results Reviewed:  LAB RESULTS:      Lab 10/09/24  0454 10/08/24  0607 10/07/24  1608   WBC 6.87 5.53 8.02   HEMOGLOBIN 14.3 13.2 14.3   HEMATOCRIT 41.9 39.6 42.9   PLATELETS 227 197 236   NEUTROS ABS  --   --  5.87   IMMATURE GRANS (ABS)  --   --  0.02   LYMPHS ABS  --   --  1.53   MONOS ABS  --   --  0.53   EOS ABS  --   --  0.04   MCV 90.5 92.3 92.7   PROCALCITONIN  --   --  0.03   LACTATE  --   --  1.4         Lab 10/11/24  0809 10/10/24  0535 10/09/24  0455 10/08/24  0607 10/07/24  1608   SODIUM 138 140 135* 139 140   POTASSIUM 3.5 4.0 4.4 3.9 4.1   CHLORIDE 105 105 103 106 104   CO2 24.0 24.0 21.0* 25.0 27.0   ANION GAP 9.0 11.0 11.0 8.0 9.0   BUN 13 12 15 13 14   CREATININE 0.62 0.62 0.65 0.64 0.66   EGFR 106.0 106.6 105.4 105.8 105.0   GLUCOSE 131* 111* 152* 118* 116*   CALCIUM 9.4 9.3 9.2 8.9 9.4   HEMOGLOBIN A1C  --   --   --  5.50  --    TSH  --   --   --  0.447  --          Lab 10/11/24  0809 10/10/24  0535 10/09/24  0455 10/08/24  0607 10/07/24  1608   TOTAL PROTEIN 6.6 6.1 6.5 6.1 7.0   ALBUMIN 3.7 3.8 3.7 3.6 4.3   GLOBULIN 2.9 2.3 2.8 2.5 2.7   ALT (SGPT) 36* 48* 60* 46* 29   AST (SGOT) 23 34* 54* 46* 22   BILIRUBIN 0.9 0.7 1.3* 2.0* 1.3*   ALK PHOS 251* 249* 243* 193* 199*             Lab 10/08/24  0607   CHOLESTEROL 144   LDL CHOL 75   HDL CHOL 46   TRIGLYCERIDES 130             Brief Urine Lab Results  (Last result in the past 365 days)        Color   Clarity   Blood   Leuk Est   Nitrite   Protein   CREAT   Urine HCG        10/08/24 0317 Yellow   Clear   Negative   Negative   Negative   Negative                   Microbiology Results Abnormal       Procedure Component Value - Date/Time    Blood Culture - Blood, Hand, Right [[id number]]  (Normal) Collected: 10/07/24 1618    Lab  Status: Preliminary result Specimen: Blood from Hand, Right Updated: 10/10/24 1646     Blood Culture No growth at 3 days    Narrative:      Less than seven (7) mL's of blood was collected.  Insufficient quantity may yield false negative results.    Blood Culture - Blood, Arm, Right [[id number]]  (Normal) Collected: 10/07/24 1609    Lab Status: Preliminary result Specimen: Blood from Arm, Right Updated: 10/10/24 1646     Blood Culture No growth at 3 days    COVID PRE-OP / PRE-PROCEDURE SCREENING ORDER (NO ISOLATION) - Swab, Nasopharynx [[id number]]  (Normal) Collected: 10/07/24 1750    Lab Status: Final result Specimen: Swab from Nasopharynx Updated: 10/07/24 1849    Narrative:      The following orders were created for panel order COVID PRE-OP / PRE-PROCEDURE SCREENING ORDER (NO ISOLATION) - Swab, Nasopharynx.  Procedure                               Abnormality         Status                     ---------                               -----------         ------                     Respiratory Panel PCR w/...[[id number]]  Normal              Final result                 Please view results for these tests on the individual orders.    Respiratory Panel PCR w/COVID-19(SARS-CoV-2) MICKEY/AKIL/MARIO/PAD/COR/SOULEYMANE In-House, NP Swab in UTM/VTM, 2 HR TAT - Swab, Nasopharynx [[id number]]  (Normal) Collected: 10/07/24 1750    Lab Status: Final result Specimen: Swab from Nasopharynx Updated: 10/07/24 1849     ADENOVIRUS, PCR Not Detected     Coronavirus 229E Not Detected     Coronavirus HKU1 Not Detected     Coronavirus NL63 Not Detected     Coronavirus OC43 Not Detected     COVID19 Not Detected     Human Metapneumovirus Not Detected     Human Rhinovirus/Enterovirus Not Detected     Influenza A PCR Not Detected     Influenza B PCR Not Detected     Parainfluenza Virus 1 Not Detected     Parainfluenza Virus 2 Not Detected     Parainfluenza Virus 3 Not Detected     Parainfluenza Virus 4 Not Detected     RSV, PCR Not Detected     Bordetella  pertussis pcr Not Detected     Bordetella parapertussis PCR Not Detected     Chlamydophila pneumoniae PCR Not Detected     Mycoplasma pneumo by PCR Not Detected    Narrative:      In the setting of a positive respiratory panel with a viral infection PLUS a negative procalcitonin without other underlying concern for bacterial infection, consider observing off antibiotics or discontinuation of antibiotics and continue supportive care. If the respiratory panel is positive for atypical bacterial infection (Bordetella pertussis, Chlamydophila pneumoniae, or Mycoplasma pneumoniae), consider antibiotic de-escalation to target atypical bacterial infection.            XR Ankle 3+ View Left    Result Date: 10/9/2024  XR ANKLE 3+ VW LEFT Date of Exam: 10/9/2024 10:16 AM EDT Indication: fracture follow up Comparison: Left ankle series 8/21/2024 Findings: Previous exam report described nondisplaced lateral malleolar fracture. On today's exam, transverse lateral malleolar fracture persists, with no apparent bony union, and minimal new bone formation along its margins. Remainder of the ankle joint appears stable. Tibiotalar joint space is well-maintained. Articular surfaces remain smooth. No new trauma is identified.     Impression: Impression: Persistent, nonunited distal left fibular fracture, with minimal new bone formation. Electronically Signed: Ozzy Mckeon MD  10/9/2024 10:51 AM EDT  Workstation ID: [id number]     Results for orders placed during the hospital encounter of 08/21/24    Adult Transthoracic Echo Complete W/ Cont if Necessary Per Protocol (With Agitated Saline)    Interpretation Summary    Left ventricular systolic function is normal. Calculated left ventricular EF = 68% Normal left ventricular cavity size noted. Left ventricular wall thickness is consistent with moderate concentric hypertrophy. All left ventricular wall segments contract normally. Left ventricular diastolic function was normal. Normal left atrial  pressure.    The right ventricular cavity is mildly dilated. Normal right ventricular systolic function noted.    Left atrial volume is moderately increased. Saline test results are negative.    The aortic valve is structurally normal with no stenosis present. The aortic valve appears trileaflet. No significant aortic valve regurgitation is present.    The mitral valve is structurally normal with no significant stenosis present. Trace to mild mitral valve regurgitation is present.    Mild tricuspid valve regurgitation is present. Estimated right ventricular systolic pressure from tricuspid regurgitation is mildly elevated (35-45 mmHg)    Mild dilation of the ascending aorta is present. Ascending aorta = 3.7 cm      Current medications:  Scheduled Meds:aspirin, 81 mg, Oral, Daily  atorvastatin, 80 mg, Oral, Nightly  cefTRIAXone, 2,000 mg, Intravenous, Q24H  folic acid, 1 mg, Oral, Daily  guaifenesin, 400 mg, Oral, Q8H  heparin (porcine), 5,000 Units, Subcutaneous, Q8H  lansoprazole, 15 mg, Oral, Q AM  lisinopril, 20 mg, Oral, Daily  magnesium oxide, 400 mg, Oral, Daily  metroNIDAZOLE, 500 mg, Oral, Q8H  nicotine, 1 patch, Transdermal, Q24H  potassium chloride, 40 mEq, Oral, Q4H  sodium chloride, 10 mL, Intravenous, Q12H  thiamine (B-1) IV, 200 mg, Intravenous, Q8H   Followed by  [START ON 10/13/2024] thiamine, 100 mg, Oral, Daily  ticagrelor, 60 mg, Oral, BID  topiramate, 25 mg, Oral, Nightly  ursodiol, 300 mg, Oral, BID      Continuous Infusions:   PRN Meds:.  acetaminophen **OR** acetaminophen **OR** acetaminophen    aluminum-magnesium hydroxide-simethicone    senna-docusate sodium **AND** polyethylene glycol **AND** bisacodyl **AND** bisacodyl    Calcium Replacement - Follow Nurse / BPA Driven Protocol    HYDROcodone-acetaminophen    HYDROmorphone **AND** naloxone    LORazepam **OR** midazolam **OR** LORazepam **OR** midazolam **OR** midazolam **OR** midazolam    Magnesium Standard Dose Replacement - Follow Nurse  / BPA Driven Protocol    nicotine polacrilex    ondansetron ODT **OR** ondansetron    Phosphorus Replacement - Follow Nurse / BPA Driven Protocol    Potassium Replacement - Follow Nurse / BPA Driven Protocol    sodium chloride    Assessment & Plan   Assessment & Plan     Active Hospital Problems    Diagnosis  POA    **RUQ pain [R10.11]  Yes    Cholangitis [K83.09]  Yes    Dilated cbd, acquired [K83.8]  Unknown    HTN (hypertension) [I10]  Yes    History of seizures [Z87.898]  Yes    Alcohol use [Z78.9]  Yes    Obesity (BMI 30-39.9) [E66.9]  Yes    Dyslipidemia [E78.5]  Yes    PAD (peripheral artery disease) [I73.9]  Yes    Tobacco use [Z72.0]  Yes      Resolved Hospital Problems   No resolved problems to display.        Brief Hospital Course to date:  Dalila Pike is a 54 y.o. female  with past medical history of HTN, HL, PAD, obesity, seizures, ETOH use, R MCA CVA s/p thrombectomy/R ICA stent with residual L weakness, and recent L ankle fracture who presented on 10/7/2024 with RUQ pain.     This patient's problems and plans were partially entered by my partner and updated as appropriate by me 10/11/24.    RUQ pain  -MRCP reviewed  -s/p ERCP with papillary stenosis, s/p sphincterotomy/stent  -cytology pending  -GI following  -General surgery consulted for CCY, not recommended at this time  -surgery/GI recommend advancing diet  -GI recommending continued PPI, completing course of antibiotics, with follow up 6 weeks for repeat ERCP/stent removal  -Continue ceftriaxone/flagyl  -monitor PO intake and abdominal pain  -AM labs     Recent CVA s/p thrombectomy/R ICA stent  -on ASA/brilinta/statin  -Follow-up stroke neurology as previously recommended     HTN  -monitor     HL  -statin     ETOH use  -monitor for withdrawal     R ankle fracture  -followed by Dr. Gonzalez, with recommended CAM boot in place  -Ortho has seen, recommended WBAT with boot x1 month, follow up with Dr. Yang in 1 month.      Tobacco  abuse     Expected Discharge Location and Transportation: Rehab  Expected Discharge   Expected Discharge Date: 10/12/2024; Expected Discharge Time:       VTE Prophylaxis:  Pharmacologic VTE prophylaxis orders are present.    AM-PAC 6 Clicks Score (PT): 18 (10/10/24 2000)    CODE STATUS:   Code Status and Medical Interventions: CPR (Attempt to Resuscitate); Full Support   Ordered at: 10/07/24 1545     Level Of Support Discussed With:    Patient     Code Status (Patient has no pulse and is not breathing):    CPR (Attempt to Resuscitate)     Medical Interventions (Patient has pulse or is breathing):    Full Support       ROXANE Juarez  10/11/24      Electronically signed by Rocky Meneses APRN at 10/11/24 0915          Physical Therapy Notes (most recent note)        Karie Vaca, PT at 10/10/24 1336  Version 1 of 1         Patient Name: Dalila Pike  : 1970    MRN: [id number]                              Today's Date: 10/10/2024       Admit Date: 10/7/2024    Visit Dx:     ICD-10-CM ICD-9-CM   1. Dilated cbd, acquired  K83.8 576.8   2. Gastritis  K29.70 535.50     Patient Active Problem List   Diagnosis    Acute CVA (cerebrovascular accident)    Alcohol use    Tobacco use    Obesity (BMI 30-39.9)    HTN (hypertension)    Dyslipidemia    History of seizures    PAD (peripheral artery disease)    Oropharyngeal dysphagia    Acute UTI (urinary tract infection)    Hypertensive emergency    Single episode of loss of consciousness without head trauma    RUQ pain    Dilated cbd, acquired    Cholangitis     Past Medical History:   Diagnosis Date    Acute CVA (cerebrovascular accident) 2024    Alcohol use 2024    Dyslipidemia 2024    Obesity (BMI 30-39.9) 2024    PAD (peripheral artery disease) 2024    Tobacco use 2024     Past Surgical History:   Procedure Laterality Date    ENDOSCOPY N/A 10/8/2024    Procedure: ESOPHAGOGASTRODUODENOSCOPY;  Surgeon: Elie Sanders MD;   Location:  [facility] ENDOSCOPY;  Service: Gastroenterology;  Laterality: N/A;    ERCP N/A 10/8/2024    Procedure: ENDOSCOPIC RETROGRADE CHOLANGIOPANCREATOGRAPHY WITH STENT PLACEMENT;  Surgeon: Elie Sanders MD;  Location:  [facility] ENDOSCOPY;  Service: Gastroenterology;  Laterality: N/A;  SPHINCTEROTOMY @ 1737, 9-12MM AND 12-15MM BALLOON SWEEP TO CBD    INTERVENTIONAL RADIOLOGY PROCEDURE Bilateral 8/21/2024    Procedure: Carotid Cervical Angiogram;  Surgeon: Jamaal Saiin MD;  Location:  [facility] CATH INVASIVE LOCATION;  Service: Interventional Radiology;  Laterality: Bilateral;      General Information       Row Name 10/10/24 1504          Physical Therapy Time and Intention    Document Type evaluation  -ES     Mode of Treatment physical therapy  -ES       Row Name 10/10/24 1504          General Information    Patient Profile Reviewed yes  -ES     Prior Level of Function independent:;all household mobility;ADL's  -ES     Existing Precautions/Restrictions fall;other (see comments)  LLE WBAT with boot when OOB, recent R MCA ischemic stroke with LUE residual weakness  -ES     Barriers to Rehab medically complex;previous functional deficit  -ES       Row Name 10/10/24 1504          Living Environment    People in Home child(juan carlos), adult;other relative(s)  -ES       Row Name 10/10/24 1504          Home Main Entrance    Number of Stairs, Main Entrance two  -ES     Stair Railings, Main Entrance railing on right side (ascending)  -ES       Row Name 10/10/24 1504          Stairs Within Home, Primary    Number of Stairs, Within Home, Primary none  -ES       Row Name 10/10/24 1504          Cognition    Orientation Status (Cognition) oriented x 4  -ES       Row Name 10/10/24 1504          Safety Issues, Functional Mobility    Safety Issues Affecting Function (Mobility) awareness of need for assistance;insight into deficits/self-awareness;safety precautions follow-through/compliance  -ES     Impairments Affecting Function (Mobility)  balance;coordination;endurance/activity tolerance;grasp;pain;visual/perceptual;sensation/sensory awareness;motor control  -ES               User Key  (r) = Recorded By, (t) = Taken By, (c) = Cosigned By      Initials Name Provider Type    ES Karie Vaac PT Physical Therapist                   Mobility       Row Name 10/10/24 1505          Bed Mobility    Comment, (Bed Mobility) received UIC  -ES       Row Name 10/10/24 1505          Sit-Stand Transfer    Sit-Stand [place] (Transfers) minimum assist (75% patient effort);verbal cues  -ES     Assistive Device (Sit-Stand Transfers) other (see comments)  UE support  -ES       Row Name 10/10/24 1505          Gait/Stairs (Locomotion)    [place] Level (Gait) contact guard;verbal cues  -ES     Assistive Device (Gait) other (see comments)  UE support  -ES     Patient was able to Ambulate yes  -ES     Distance in Feet (Gait) 125  -ES     Deviations/Abnormal Patterns (Gait) bilateral deviations;gait speed decreased;right sided deviations;base of support, narrow;stride length decreased  -ES     Bilateral Gait Deviations forward flexed posture  -ES     Right Sided Gait Deviations weight shift ability decreased  -ES     Comment, (Gait/Stairs) Pt amb w/ Veda and UE support. Demo'd narrow ROBBI w/ decreased stride length and step-through gait pattern. CAM boot donned prior to ambulation.  -ES               User Key  (r) = Recorded By, (t) = Taken By, (c) = Cosigned By      Initials Name Provider Type    Karie Savage PT Physical Therapist                   Obj/Interventions       Row Name 10/10/24 1507          Range of Motion Comprehensive    General Range of Motion bilateral lower extremity ROM WFL  -ES       Row Name 10/10/24 1507          Strength Comprehensive (MMT)    General Manual Muscle Testing (MMT) Assessment lower extremity strength deficits identified  -ES     Comment, General Manual Muscle Testing (MMT) Assessment RLE 4+/5, LLE 4-/5  -ES       Row  Name 10/10/24 1507          Balance    Balance Assessment sitting static balance;sitting dynamic balance;sit to stand dynamic balance;standing dynamic balance;standing static balance  -ES     Static Sitting Balance supervision  -ES     Dynamic Sitting Balance standby assist  -ES     Position, Sitting Balance supported;sitting in chair  -ES     Sit to Stand Dynamic Balance minimal assist;verbal cues  -ES     Static Standing Balance contact guard  -ES     Dynamic Standing Balance minimal assist  -ES     Position/Device Used, Standing Balance supported  -ES     Balance Interventions sitting;standing;sit to stand;supported;static;dynamic;occupation based/functional task  -ES       Row Name 10/10/24 1507          Sensory Assessment (Somatosensory)    Sensory Assessment (Somatosensory) LE sensation intact  -ES               User Key  (r) = Recorded By, (t) = Taken By, (c) = Cosigned By      Initials Name Provider Type    ES Karie Vaca PT Physical Therapist                   Goals/Plan       Row Name 10/10/24 1507          Bed Mobility Goal 1 (PT)    Activity/Assistive Device (Bed Mobility Goal 1, PT) sit to supine/supine to sit  -ES     [place] Level/Cues Needed (Bed Mobility Goal 1, PT) modified independence  -ES     Time Frame (Bed Mobility Goal 1, PT) short term goal (STG);5 days  -ES       Row Name 10/10/24 1508          Transfer Goal 1 (PT)    Activity/Assistive Device (Transfer Goal 1, PT) sit-to-stand/stand-to-sit;bed-to-chair/chair-to-bed  -ES     [place] Level/Cues Needed (Transfer Goal 1, PT) supervision required  -ES     Time Frame (Transfer Goal 1, PT) long term goal (LTG);10 days  -ES       Row Name 10/10/24 1503          Gait Training Goal 1 (PT)    Activity/Assistive Device (Gait Training Goal 1, PT) gait (walking locomotion);decrease fall risk;increase endurance/gait distance  -ES     [place] Level (Gait Training Goal 1, PT) supervision required  -ES     Distance (Gait Training Goal  1, PT) 150  -ES     Time Frame (Gait Training Goal 1, PT) long term goal (LTG);10 days  -ES       Row Name 10/10/24 1509          Therapy Assessment/Plan (PT)    Planned Therapy Interventions (PT) balance training;bed mobility training;gait training;home exercise program;neuromuscular re-education;patient/family education;strengthening;stair training;postural re-education;transfer training;ROM (range of motion)  -ES               User Key  (r) = Recorded By, (t) = Taken By, (c) = Cosigned By      Initials Name Provider Type    ES Karie Vaca, PT Physical Therapist                   Clinical Impression       Row Name 10/10/24 1504          Pain    Pretreatment Pain Rating 0/10 - no pain  -ES     Posttreatment Pain Rating 0/10 - no pain  -ES     Pre/Posttreatment Pain Comment tolerated  -ES     Pain Intervention(s) Repositioned;Ambulation/increased activity  -ES       Row Name 10/10/24 1500          Plan of Care Review    Plan of Care Reviewed With patient;family  -ES     Progress no change  -ES     Outcome Evaluation PT eval complete. Pt presents with generalized weakness from remote CVA (LUE>LLE), balance deficits, and decreased activity tolerance warranting skilled IPPT services. Pt ambulated with CGA-Veda and is primarily affected by RUE weakness. PT rec home w/ 24/7 assist and HHPT/OT at d/c.  -ES       Row Name 10/10/24 1503          Therapy Assessment/Plan (PT)    Rehab Potential (PT) good, to achieve stated therapy goals  -ES     Criteria for Skilled Interventions Met (PT) yes;meets criteria;skilled treatment is necessary  -ES     Therapy Frequency (PT) daily  -ES     Predicted Duration of Therapy Intervention (PT) 10 days  -ES       Row Name 10/10/24 1503          Vital Signs    Pre Systolic BP Rehab --  non-tele. cleared by RN  -ES     O2 Delivery Pre Treatment room air  -ES     O2 Delivery Intra Treatment room air  -ES     O2 Delivery Post Treatment room air  -ES     Pre Patient Position Sitting  -ES      Intra Patient Position Standing  -ES     Post Patient Position Sitting  -ES       Row Name 10/10/24 1507          Positioning and Restraints    Pre-Treatment Position sitting in chair/recliner  -ES     Post Treatment Position chair  -ES     In Chair notified nsg;reclined;sitting;call light within reach;encouraged to call for assist;exit alarm on;waffle cushion;legs elevated;on mechanical lift sling;with family/caregiver  -ES               User Key  (r) = Recorded By, (t) = Taken By, (c) = Cosigned By      Initials Name Provider Type    Karie Savage, PT Physical Therapist                   Outcome Measures       Row Name 10/10/24 1509 10/10/24 0832       How much help from another person do you currently need...    Turning from your back to your side while in flat bed without using bedrails? 4  -ES 4  -CM    Moving from lying on back to sitting on the side of a flat bed without bedrails? 3  -ES 3  -CM    Moving to and from a bed to a chair (including a wheelchair)? 3  -ES 2  -CM    Standing up from a chair using your arms (e.g., wheelchair, bedside chair)? 3  -ES 2  -CM    Climbing 3-5 steps with a railing? 2  -ES 2  -CM    To walk in hospital room? 3  -ES 3  -CM    AM-PAC 6 Clicks Score (PT) 18  -ES 16  -CM    Highest Level of Mobility Goal 6 --> Walk 10 steps or more  -ES 5 --> Static standing  -CM      Row Name 10/10/24 1509 10/10/24 1501       Functional Assessment    Outcome Measure Options AM-PAC 6 Clicks Basic Mobility (PT)  -ES AM-PAC 6 Clicks Daily Activity (OT)  -AJ              User Key  (r) = Recorded By, (t) = Taken By, (c) = Cosigned By      Initials Name Provider Type    Austin Hernandez, RN Registered Nurse    Karie Savage, PT Physical Therapist    Eva Martinez OT Occupational Therapist                                 Physical Therapy Education       Title: PT OT SLP Therapies (In Progress)       Topic: Physical Therapy (In Progress)       Point: Mobility training (Done)        Learning Progress Summary             Patient Acceptance, E,TB, VU by ES at 10/10/2024 1510   Family Acceptance, E,TB, VU by ES at 10/10/2024 1510                         Point: Home exercise program (Not Started)       Learner Progress:  Not documented in this visit.              Point: Body mechanics (Done)       Learning Progress Summary             Patient Acceptance, E,TB, VU by ES at 10/10/2024 1510   Family Acceptance, E,TB, VU by ES at 10/10/2024 1510                         Point: Precautions (Done)       Learning Progress Summary             Patient Acceptance, E,TB, VU by ES at 10/10/2024 1510   Family Acceptance, E,TB, VU by ES at 10/10/2024 1510                                         User Key       Initials Effective Dates Name Provider Type Discipline     08/11/22 -  Karie Vaca, PT Physical Therapist PT                  PT Recommendation and Plan  Planned Therapy Interventions (PT): balance training, bed mobility training, gait training, home exercise program, neuromuscular re-education, patient/family education, strengthening, stair training, postural re-education, transfer training, ROM (range of motion)  Plan of Care Reviewed With: patient, family  Progress: no change  Outcome Evaluation: PT eval complete. Pt presents with generalized weakness from remote CVA (LUE>LLE), balance deficits, and decreased activity tolerance warranting skilled IPPT services. Pt ambulated with CGA-Veda and is primarily affected by RUE weakness. PT rec home w/ 24/7 assist and HHPT/OT at d/c.     Time Calculation:   PT Evaluation Complexity  History, PT Evaluation Complexity: 1-2 personal factors and/or comorbidities  Examination of Body Systems (PT Eval Complexity): total of 3 or more elements  Clinical Presentation (PT Evaluation Complexity): evolving  Clinical Decision Making (PT Evaluation Complexity): moderate complexity  Overall Complexity (PT Evaluation Complexity): moderate complexity     PT Charges        Row Name 10/10/24 1510             Time Calculation    Start Time 1336  -ES      PT Received On 10/10/24  -ES      PT Goal Re-Cert Due Date 10/20/24  -ES         Untimed Charges    PT Eval/Re-eval Minutes 47  -ES         Total Minutes    Untimed Charges Total Minutes 47  -ES       Total Minutes 47  -ES                User Key  (r) = Recorded By, (t) = Taken By, (c) = Cosigned By      Initials Name Provider Type    ES Karie Vaca, PT Physical Therapist                  Therapy Charges for Today       Code Description Service Date Service Provider Modifiers Qty    13460764402 HC PT EVAL MOD COMPLEXITY 4 10/10/2024 Karie Vaca, PT GP 1            PT G-Codes  Outcome Measure Options: AM-PAC 6 Clicks Basic Mobility (PT)  AM-PAC 6 Clicks Score (PT): 18  AM-PAC 6 Clicks Score (OT): 17  PT Discharge Summary  Anticipated Discharge Disposition (PT): home with  care, home with home health    Karie Vaca PT  10/10/2024      Electronically signed by Karie Vaca PT at 10/10/24 1510          Occupational Therapy Notes (most recent note)        Eva Apodaca, OT at 10/10/24 1335          Patient Name: Dalila Pike  : 1970    MRN: [id number]                              Today's Date: 10/10/2024       Admit Date: 10/7/2024    Visit Dx:     ICD-10-CM ICD-9-CM   1. Dilated cbd, acquired  K83.8 576.8   2. Gastritis  K29.70 535.50     Patient Active Problem List   Diagnosis    Acute CVA (cerebrovascular accident)    Alcohol use    Tobacco use    Obesity (BMI 30-39.9)    HTN (hypertension)    Dyslipidemia    History of seizures    PAD (peripheral artery disease)    Oropharyngeal dysphagia    Acute UTI (urinary tract infection)    Hypertensive emergency    Single episode of loss of consciousness without head trauma    RUQ pain    Dilated cbd, acquired    Cholangitis     Past Medical History:   Diagnosis Date    Acute CVA (cerebrovascular accident) 2024    Alcohol use 2024    Dyslipidemia  08/21/2024    Obesity (BMI 30-39.9) 08/21/2024    PAD (peripheral artery disease) 08/21/2024    Tobacco use 08/21/2024     Past Surgical History:   Procedure Laterality Date    ENDOSCOPY N/A 10/8/2024    Procedure: ESOPHAGOGASTRODUODENOSCOPY;  Surgeon: Elie Sanders MD;  Location:  [facility];  Service: Gastroenterology;  Laterality: N/A;    ERCP N/A 10/8/2024    Procedure: ENDOSCOPIC RETROGRADE CHOLANGIOPANCREATOGRAPHY WITH STENT PLACEMENT;  Surgeon: Elie Sanders MD;  Location:  [facility];  Service: Gastroenterology;  Laterality: N/A;  SPHINCTEROTOMY @ 1737, 9-12MM AND 12-15MM BALLOON SWEEP TO CBD    INTERVENTIONAL RADIOLOGY PROCEDURE Bilateral 8/21/2024    Procedure: Carotid Cervical Angiogram;  Surgeon: Jamaal Saini MD;  Location:  [facility];  Service: Interventional Radiology;  Laterality: Bilateral;      General Information       Row Name 10/10/24 1436          OT Time and Intention    Document Type evaluation  -     Mode of Treatment occupational therapy  -       Row Name 10/10/24 1436          General Information    Patient Profile Reviewed yes  -AJ     Prior Level of Function independent:;all household mobility;ADL's;dependent:;driving  -AJ     Existing Precautions/Restrictions fall;other (see comments)  LLE WBAT with boot when OOB, recent R MCA ischemic stroke with LUE residual weakness.  -AJ     Barriers to Rehab medically complex;previous functional deficit  -       Row Name 10/10/24 1436          Living Environment    People in Home child(juan carlos), adult;other relative(s)  -       Row Name 10/10/24 1436          Home Main Entrance    Number of Stairs, Main Entrance two  -AJ     Stair Railings, Main Entrance railing on right side (ascending)  -       Row Name 10/10/24 1436          Stairs Within Home, Primary    Number of Stairs, Within Home, Primary none  -       Row Name 10/10/24 1436          Cognition    Orientation Status (Cognition) oriented x 4  -        Row Name 10/10/24 1436          Safety Issues, Functional Mobility    Safety Issues Affecting Function (Mobility) awareness of need for assistance;insight into deficits/self-awareness;safety precaution awareness;safety precautions follow-through/compliance  -     Impairments Affecting Function (Mobility) balance;coordination;endurance/activity tolerance;grasp;pain;visual/perceptual;sensation/sensory awareness  -               User Key  (r) = Recorded By, (t) = Taken By, (c) = Cosigned By      Initials Name Provider Type    Eva Martinez OT Occupational Therapist                     Mobility/ADL's       [facility] Name 10/10/24 1444          Bed Mobility    Bed Mobility supine-sit  -     Supine-Sit [place] (Bed Mobility) moderate assist (50% patient effort);2 person assist  -     Bed Mobility, Safety Issues decreased use of arms for pushing/pulling;impaired trunk control for bed mobility  -     Assistive Device (Bed Mobility) bed rails;draw sheet;head of bed elevated  -[facility] Name 10/10/24 1444          Transfers    Transfers bed-chair transfer;sit-stand transfer;stand-sit transfer  -AJ       Row Name 10/10/24 1444          Bed-Chair Transfer    Bed-Chair [place] (Transfers) minimum assist (75% patient effort);2 person assist  -     Assistive Device (Bed-Chair Transfers) other (see comments)  BUE support  -[facility] Name 10/10/24 1444          Sit-Stand Transfer    Sit-Stand [place] (Transfers) 2 person assist;minimum assist (75% patient effort)  -     Assistive Device (Sit-Stand Transfers) other (see comments)  UE support  -       Row Name 10/10/24 1444          Stand-Sit Transfer    Stand-Sit [place] (Transfers) contact guard;1 person assist  -AJ       Row Name 10/10/24 1444          Functional Mobility    Functional Mobility- Ind. Level contact guard assist;2 person assist required  -     Functional Mobility- Device other (see comments)  BUE support  -      Functional Mobility-Distance (Feet) --  HH distance  -       Row Name 10/10/24 1444          Activities of Daily Living    BADL Assessment/Intervention upper body dressing;feeding;lower body dressing  -[facility] Name 10/10/24 1444          Upper Body Dressing Assessment/Training    [place] Level (Upper Body Dressing) don;front opening garment;moderate assist (50% patient effort)  -     Position (Upper Body Dressing) edge of bed sitting  -       Row Name 10/10/24 1444          Self-Feeding Assessment/Training    [place] Level (Feeding) scoop food and bring to mouth;set up  -     Position (Self-Feeding) supported sitting  -       Row Name 10/10/24 1444          Lower Body Dressing Assessment/Training    [place] Level (Lower Body Dressing) don;other (see comments);dependent (less than 25% patient effort)  cam boot  -     Position (Lower Body Dressing) edge of bed sitting  -               User Key  (r) = Recorded By, (t) = Taken By, (c) = Cosigned By      Initials Name Provider Type    Eva Martinez OT Occupational Therapist                   Obj/Interventions       Row Name 10/10/24 1448          Sensory Assessment (Somatosensory)    Sensory Assessment (Somatosensory) left UE  -     Left UE Sensory Assessment general sensation;impaired  -     Sensory Assessment --  -       Row Name 10/10/24 1448          Vision Assessment/Intervention    Visual Impairment/Limitations peripheral vision impaired left  -     Visual Processing Deficit inez-inattention/neglect, left  -     Vision Assessment Comment Pt was able to track in all 4 quadrants, however c/o nausea after tracking assessment.   -[facility] Name 10/10/24 1448          Range of Motion Comprehensive    General Range of Motion bilateral upper extremity ROM WFL  -     Comment, General Range of Motion LUE PROM WFL, unable to hold against gravity  -[facility] Name 10/10/24 1448          Strength Comprehensive (MMT)     General Manual Muscle Testing (MMT) Assessment upper extremity strength deficits identified  -     Comment, General Manual Muscle Testing (MMT) Assessment RUE 4+/5, LUE impaired  strength  -       Row Name 10/10/24 1447          Balance    Balance Assessment sitting static balance;sit to stand dynamic balance;standing static balance;sitting dynamic balance;standing dynamic balance  -     Static Sitting Balance supervision  -     Dynamic Sitting Balance minimal assist  -AJ     Position, Sitting Balance unsupported;sitting edge of bed  -     Sit to Stand Dynamic Balance 2-person assist;minimal assist  -     Static Standing Balance contact guard  -     Dynamic Standing Balance contact guard  -     Position/Device Used, Standing Balance supported;other (see comments)  BUE support  -     Balance Interventions sitting;standing;sit to stand;supported;static;dynamic;occupation based/functional task  -               User Key  (r) = Recorded By, (t) = Taken By, (c) = Cosigned By      Initials Name Provider Type    Eva Martinez OT Occupational Therapist                   Goals/Plan       Row Name 10/10/24 6705          Bed Mobility Goal 1 (OT)    Activity/Assistive Device (Bed Mobility Goal 1, OT) supine to sit  -     [place] Level/Cues Needed (Bed Mobility Goal 1, OT) minimum assist (75% or more patient effort)  -     Time Frame (Bed Mobility Goal 1, OT) short term goal (STG);5 days  -     Progress/Outcomes (Bed Mobility Goal 1, OT) new goal  -       Row Name 10/10/24 3848          Transfer Goal 1 (OT)    Activity/Assistive Device (Transfer Goal 1, OT) bed-to-chair/chair-to-bed;toilet  -     [place] Level/Cues Needed (Transfer Goal 1, OT) contact guard required  -     Time Frame (Transfer Goal 1, OT) long term goal (LTG);10 days  -     Progress/Outcome (Transfer Goal 1, OT) new goal  -       Row Name 10/10/24 2667          Dressing Goal 1 (OT)    Activity/Device  (Dressing Goal 1, OT) upper body dressing;lower body dressing  -AJ     [place]/Cues Needed (Dressing Goal 1, OT) minimum assist (75% or more patient effort)  -AJ     Time Frame (Dressing Goal 1, OT) long term goal (LTG);10 days  -AJ     Progress/Outcome (Dressing Goal 1, OT) new goal  -       Row Name 10/10/24 1458          Toileting Goal 1 (OT)    Activity/Device (Toileting Goal 1, OT) perform perineal hygiene;adjust/manage clothing  -AJ     [place] Level/Cues Needed (Toileting Goal 1, OT) minimum assist (75% or more patient effort)  -AJ     Time Frame (Toileting Goal 1, OT) long term goal (LTG);10 days  -AJ     Progress/Outcome (Toileting Goal 1, OT) new goal  -       Row Name 10/10/24 1458          Grooming Goal 1 (OT)    Activity/Device (Grooming Goal 1, OT) oral care;wash face, hands;hair care  -AJ     [place] (Grooming Goal 1, OT) contact guard required  -AJ     Time Frame (Grooming Goal 1, OT) long term goal (LTG);10 days  -AJ     Strategies/Barriers (Grooming Goal 1, OT) standing sink side  -AJ     Progress/Outcome (Grooming Goal 1, OT) new goal  -       Row Name 10/10/24 8600          Therapy Assessment/Plan (OT)    Planned Therapy Interventions (OT) activity tolerance training;BADL retraining;functional balance retraining;occupation/activity based interventions;passive ROM/stretching;patient/caregiver education/training;ROM/therapeutic exercise;strengthening exercise;transfer/mobility retraining  -               User Key  (r) = Recorded By, (t) = Taken By, (c) = Cosigned By      Initials Name Provider Type    AJ Eva Apodaca OT Occupational Therapist                   Clinical Impression       Row Name 10/10/24 3767          Pain Assessment    Pain Intervention(s) Repositioned;Ambulation/increased activity  -     Additional Documentation Pain Scale: FACES Pre/Post-Treatment (Group)  -       Row Name 10/10/24 1733          Pain Scale: FACES Pre/Post-Treatment    Pain: FACES  Scale, Pretreatment 2-->hurts little bit  -AJ     Posttreatment Pain Rating 2-->hurts little bit  -AJ     Pain Location generalized  -     Pain Location - abdomen  -       Row Name 10/10/24 1456          Plan of Care Review    Plan of Care Reviewed With patient  -     Progress no change  -     Outcome Evaluation OT eval complete. Pt presents below fxl baseline with impaired strength and sensation to LUE, decreased activity tolerance, and generalized weakness. Pt would benefit from skilled IPOT services to progress to PLOF. Recommend d/c to home with 24/7 assist and  OT/PT services.  -       Row Name 10/10/24 1456          Therapy Assessment/Plan (OT)    Patient/Family Therapy Goal Statement (OT) Progress and return to PLOF  -     Rehab Potential (OT) good, to achieve stated therapy goals  -     Criteria for Skilled Therapeutic Interventions Met (OT) yes;meets criteria;skilled treatment is necessary  -     Therapy Frequency (OT) daily  -AJ     Predicted Duration of Therapy Intervention (OT) 10 days  -       Row Name 10/10/24 1456          Therapy Plan Review/Discharge Plan (OT)    Anticipated Discharge Disposition (OT) home with 24/7 care;home with home health  -       Row Name 10/10/24 1456          Vital Signs    Pre Patient Position Supine  -AJ     Intra Patient Position Standing  -AJ     Post Patient Position Sitting  -       Row Name 10/10/24 1456          Positioning and Restraints    Pre-Treatment Position in bed  -AJ     Post Treatment Position chair  -AJ     In Chair notified nsg;reclined;sitting;call light within reach;encouraged to call for assist;exit alarm on;with family/caregiver;waffle cushion;legs elevated  -               User Key  (r) = Recorded By, (t) = Taken By, (c) = Cosigned By      Initials Name Provider Type    Eva Martinez, OT Occupational Therapist                   Outcome Measures       Row Name 10/10/24 1808          How much help from another is currently  needed...    Putting on and taking off regular lower body clothing? 2  -AJ     Bathing (including washing, rinsing, and drying) 2  -AJ     Toileting (which includes using toilet bed pan or urinal) 3  -AJ     Putting on and taking off regular upper body clothing 3  -AJ     Taking care of personal grooming (such as brushing teeth) 3  -AJ     Eating meals 4  -AJ     AM-PAC 6 Clicks Score (OT) 17  -AJ       Row Name 10/10/24 0832          How much help from another person do you currently need...    Turning from your back to your side while in flat bed without using bedrails? 4  -CM     Moving from lying on back to sitting on the side of a flat bed without bedrails? 3  -CM     Moving to and from a bed to a chair (including a wheelchair)? 2  -CM     Standing up from a chair using your arms (e.g., wheelchair, bedside chair)? 2  -CM     Climbing 3-5 steps with a railing? 2  -CM     To walk in hospital room? 3  -CM     AM-PAC 6 Clicks Score (PT) 16  -CM     Highest Level of Mobility Goal 5 --> Static standing  -CM       Row Name 10/10/24 1501          Functional Assessment    Outcome Measure Options AM-PAC 6 Clicks Daily Activity (OT)  -AJ               User Key  (r) = Recorded By, (t) = Taken By, (c) = Cosigned By      Initials Name Provider Type    Austin Hernandez, RN Registered Nurse    Eva Martinez OT Occupational Therapist                    Occupational Therapy Education       Title: PT OT SLP Therapies (In Progress)       Topic: Occupational Therapy (In Progress)       Point: ADL training (Done)       Description:   Instruct learner(s) on proper safety adaptation and remediation techniques during self care or transfers.   Instruct in proper use of assistive devices.                  Learning Progress Summary             Patient Acceptance, E, VU by GLENIS at 10/10/2024 1502   Family Acceptance, E, VU by GLENIS at 10/10/2024 1502                         Point: Home exercise program (Not Started)       Description:    Instruct learner(s) on appropriate technique for monitoring, assisting and/or progressing therapeutic exercises/activities.                  Learner Progress:  Not documented in this visit.              Point: Precautions (Done)       Description:   Instruct learner(s) on prescribed precautions during self-care and functional transfers.                  Learning Progress Summary             Patient Acceptance, E, VU by GLENIS at 10/10/2024 1502   Family Acceptance, E, VU by GLENIS at 10/10/2024 1502                         Point: Body mechanics (Done)       Description:   Instruct learner(s) on proper positioning and spine alignment during self-care, functional mobility activities and/or exercises.                  Learning Progress Summary             Patient Acceptance, E, VU by GLENIS at 10/10/2024 1502   Family Acceptance, E, VU by GLENIS at 10/10/2024 1502                                         User Key       Initials Effective Dates Name Provider Type Discipline    GLENIS 08/26/24 -  Eva Apodaca OT Occupational Therapist OT                  OT Recommendation and Plan  Planned Therapy Interventions (OT): activity tolerance training, BADL retraining, functional balance retraining, occupation/activity based interventions, passive ROM/stretching, patient/caregiver education/training, ROM/therapeutic exercise, strengthening exercise, transfer/mobility retraining  Therapy Frequency (OT): daily  Plan of Care Review  Plan of Care Reviewed With: patient  Progress: no change  Outcome Evaluation: OT eval complete. Pt presents below fxl baseline with impaired strength and sensation to LUE, decreased activity tolerance, and generalized weakness. Pt would benefit from skilled IPOT services to progress to PLOF. Recommend d/c to home with 24/7 assist and  OT/PT services.     Time Calculation:   Evaluation Complexity (OT)  Review Occupational Profile/Medical/Therapy History Complexity: expanded/moderate complexity  Assessment,  Occupational Performance/Identification of Deficit Complexity: 3-5 performance deficits  Clinical Decision Making Complexity (OT): detailed assessment/moderate complexity  Overall Complexity of Evaluation (OT): moderate complexity     Time Calculation- OT       Row Name 10/10/24 1503             Time Calculation- OT    OT Start Time 1335  -AJ      OT Received On 10/10/24  -AJ      OT Goal Re-Cert Due Date 10/20/24  -AJ         Untimed Charges    OT Eval/Re-eval Minutes 48  -AJ         Total Minutes    Untimed Charges Total Minutes 48  -AJ       Total Minutes 48  -AJ                User Key  (r) = Recorded By, (t) = Taken By, (c) = Cosigned By      Initials Name Provider Type    AJ Eva Apodaca OT Occupational Therapist                  Therapy Charges for Today       Code Description Service Date Service Provider Modifiers Qty    15127073453 HC OT EVAL MOD COMPLEXITY 4 10/10/2024 Eva Apodaca OT GO 1                 Eva Apodaca OT  10/10/2024    Electronically signed by Eva Apodaca OT at 10/10/24 0385

## 2024-10-12 ENCOUNTER — APPOINTMENT (OUTPATIENT)
Dept: GENERAL RADIOLOGY | Facility: HOSPITAL | Age: 54
End: 2024-10-12
Payer: MEDICAID

## 2024-10-12 LAB
ALBUMIN SERPL-MCNC: 3.6 G/DL (ref 3.5–5.2)
ALBUMIN/GLOB SERPL: 1.3 G/DL
ALP SERPL-CCNC: 236 U/L (ref 39–117)
ALT SERPL W P-5'-P-CCNC: 25 U/L (ref 1–33)
ANION GAP SERPL CALCULATED.3IONS-SCNC: 11 MMOL/L (ref 5–15)
AST SERPL-CCNC: 17 U/L (ref 1–32)
BACTERIA SPEC AEROBE CULT: NORMAL
BACTERIA SPEC AEROBE CULT: NORMAL
BILIRUB SERPL-MCNC: 0.6 MG/DL (ref 0–1.2)
BUN SERPL-MCNC: 14 MG/DL (ref 6–20)
BUN/CREAT SERPL: 22.6 (ref 7–25)
CALCIUM SPEC-SCNC: 9.4 MG/DL (ref 8.6–10.5)
CHLORIDE SERPL-SCNC: 104 MMOL/L (ref 98–107)
CO2 SERPL-SCNC: 24 MMOL/L (ref 22–29)
CREAT SERPL-MCNC: 0.62 MG/DL (ref 0.57–1)
DEPRECATED RDW RBC AUTO: 39.5 FL (ref 37–54)
EGFRCR SERPLBLD CKD-EPI 2021: 106 ML/MIN/1.73
ERYTHROCYTE [DISTWIDTH] IN BLOOD BY AUTOMATED COUNT: 11.7 % (ref 12.3–15.4)
GLOBULIN UR ELPH-MCNC: 2.7 GM/DL
GLUCOSE SERPL-MCNC: 125 MG/DL (ref 65–99)
HCT VFR BLD AUTO: 39.4 % (ref 34–46.6)
HGB BLD-MCNC: 13.3 G/DL (ref 12–15.9)
MAGNESIUM SERPL-MCNC: 2.2 MG/DL (ref 1.6–2.6)
MCH RBC QN AUTO: 31 PG (ref 26.6–33)
MCHC RBC AUTO-ENTMCNC: 33.8 G/DL (ref 31.5–35.7)
MCV RBC AUTO: 91.8 FL (ref 79–97)
PLATELET # BLD AUTO: 265 10*3/MM3 (ref 140–450)
PMV BLD AUTO: 9.7 FL (ref 6–12)
POTASSIUM SERPL-SCNC: 3.7 MMOL/L (ref 3.5–5.2)
PROT SERPL-MCNC: 6.3 G/DL (ref 6–8.5)
RBC # BLD AUTO: 4.29 10*6/MM3 (ref 3.77–5.28)
SODIUM SERPL-SCNC: 139 MMOL/L (ref 136–145)
WBC NRBC COR # BLD AUTO: 6.56 10*3/MM3 (ref 3.4–10.8)

## 2024-10-12 PROCEDURE — 73610 X-RAY EXAM OF ANKLE: CPT

## 2024-10-12 PROCEDURE — 80053 COMPREHEN METABOLIC PANEL: CPT | Performed by: NURSE PRACTITIONER

## 2024-10-12 PROCEDURE — 85027 COMPLETE CBC AUTOMATED: CPT | Performed by: NURSE PRACTITIONER

## 2024-10-12 PROCEDURE — 25010000002 HYDROMORPHONE PER 4 MG: Performed by: INTERNAL MEDICINE

## 2024-10-12 PROCEDURE — 25010000002 CEFTRIAXONE PER 250 MG: Performed by: HOSPITALIST

## 2024-10-12 PROCEDURE — 25010000002 THIAMINE HCL 200 MG/2ML SOLUTION: Performed by: INTERNAL MEDICINE

## 2024-10-12 PROCEDURE — 83735 ASSAY OF MAGNESIUM: CPT | Performed by: NURSE PRACTITIONER

## 2024-10-12 PROCEDURE — 99232 SBSQ HOSP IP/OBS MODERATE 35: CPT | Performed by: NURSE PRACTITIONER

## 2024-10-12 PROCEDURE — 25010000002 HEPARIN (PORCINE) PER 1000 UNITS: Performed by: INTERNAL MEDICINE

## 2024-10-12 RX ORDER — HYDROCODONE BITARTRATE AND ACETAMINOPHEN 5; 325 MG/1; MG/1
1 TABLET ORAL EVERY 6 HOURS PRN
Status: DISCONTINUED | OUTPATIENT
Start: 2024-10-12 | End: 2024-10-16 | Stop reason: HOSPADM

## 2024-10-12 RX ORDER — HYDROXYZINE HYDROCHLORIDE 25 MG/1
25 TABLET, FILM COATED ORAL 3 TIMES DAILY PRN
Status: DISCONTINUED | OUTPATIENT
Start: 2024-10-12 | End: 2024-10-16 | Stop reason: HOSPADM

## 2024-10-12 RX ADMIN — ATORVASTATIN CALCIUM 80 MG: 40 TABLET, FILM COATED ORAL at 22:08

## 2024-10-12 RX ADMIN — TOPIRAMATE 25 MG: 25 TABLET, FILM COATED ORAL at 22:08

## 2024-10-12 RX ADMIN — HYDROMORPHONE HYDROCHLORIDE 0.5 MG: 1 INJECTION, SOLUTION INTRAMUSCULAR; INTRAVENOUS; SUBCUTANEOUS at 00:42

## 2024-10-12 RX ADMIN — ALUMINUM HYDROXIDE, MAGNESIUM HYDROXIDE, DIMETHICONE 15 ML: 400; 400; 40 SUSPENSION ORAL at 20:41

## 2024-10-12 RX ADMIN — HYDROCODONE BITARTRATE AND ACETAMINOPHEN 1 TABLET: 5; 325 TABLET ORAL at 19:07

## 2024-10-12 RX ADMIN — HEPARIN SODIUM 5000 UNITS: 5000 INJECTION INTRAVENOUS; SUBCUTANEOUS at 13:26

## 2024-10-12 RX ADMIN — TICAGRELOR 60 MG: 60 TABLET ORAL at 09:26

## 2024-10-12 RX ADMIN — HYDROCODONE BITARTRATE AND ACETAMINOPHEN 1 TABLET: 5; 325 TABLET ORAL at 13:27

## 2024-10-12 RX ADMIN — URSODIOL 300 MG: 300 CAPSULE ORAL at 22:09

## 2024-10-12 RX ADMIN — LISINOPRIL 20 MG: 20 TABLET ORAL at 09:25

## 2024-10-12 RX ADMIN — HEPARIN SODIUM 5000 UNITS: 5000 INJECTION INTRAVENOUS; SUBCUTANEOUS at 06:08

## 2024-10-12 RX ADMIN — THIAMINE HYDROCHLORIDE 200 MG: 100 INJECTION, SOLUTION INTRAMUSCULAR; INTRAVENOUS at 13:26

## 2024-10-12 RX ADMIN — URSODIOL 300 MG: 300 CAPSULE ORAL at 09:26

## 2024-10-12 RX ADMIN — GUAIFENESIN 400 MG: 100 LIQUID ORAL at 13:34

## 2024-10-12 RX ADMIN — LANSOPRAZOLE 15 MG: 15 TABLET, ORALLY DISINTEGRATING ORAL at 06:08

## 2024-10-12 RX ADMIN — METRONIDAZOLE 500 MG: 500 TABLET ORAL at 13:27

## 2024-10-12 RX ADMIN — NICOTINE 1 PATCH: 21 PATCH TRANSDERMAL at 13:30

## 2024-10-12 RX ADMIN — ACETAMINOPHEN 650 MG: 325 TABLET ORAL at 16:52

## 2024-10-12 RX ADMIN — Medication 5 MG: at 22:08

## 2024-10-12 RX ADMIN — GUAIFENESIN 400 MG: 100 LIQUID ORAL at 22:07

## 2024-10-12 RX ADMIN — METRONIDAZOLE 500 MG: 500 TABLET ORAL at 22:09

## 2024-10-12 RX ADMIN — Medication 10 ML: at 09:26

## 2024-10-12 RX ADMIN — HEPARIN SODIUM 5000 UNITS: 5000 INJECTION INTRAVENOUS; SUBCUTANEOUS at 22:09

## 2024-10-12 RX ADMIN — SODIUM CHLORIDE 2000 MG: 900 INJECTION INTRAVENOUS at 11:36

## 2024-10-12 RX ADMIN — HYDROCODONE BITARTRATE AND ACETAMINOPHEN 1 TABLET: 5; 325 TABLET ORAL at 06:08

## 2024-10-12 RX ADMIN — ASPIRIN 81 MG 81 MG: 81 TABLET ORAL at 09:26

## 2024-10-12 RX ADMIN — MAGNESIUM OXIDE TAB 400 MG (241.3 MG ELEMENTAL MG) 400 MG: 400 (241.3 MG) TAB at 09:26

## 2024-10-12 RX ADMIN — METRONIDAZOLE 500 MG: 500 TABLET ORAL at 06:08

## 2024-10-12 RX ADMIN — Medication 10 ML: at 22:14

## 2024-10-12 RX ADMIN — GUAIFENESIN 400 MG: 100 LIQUID ORAL at 06:08

## 2024-10-12 RX ADMIN — FOLIC ACID 1 MG: 1 TABLET ORAL at 09:26

## 2024-10-12 RX ADMIN — ALUMINUM HYDROXIDE, MAGNESIUM HYDROXIDE, DIMETHICONE 15 ML: 400; 400; 40 SUSPENSION ORAL at 09:33

## 2024-10-12 RX ADMIN — TICAGRELOR 60 MG: 60 TABLET ORAL at 22:08

## 2024-10-12 RX ADMIN — THIAMINE HYDROCHLORIDE 200 MG: 100 INJECTION, SOLUTION INTRAMUSCULAR; INTRAVENOUS at 06:08

## 2024-10-12 NOTE — PLAN OF CARE
Goal Outcome Evaluation:          Cold pack to foot helping with pain. Warmth to abd helps with abd pain. Pain medication changes reviewed with pt.  is at the bedside very supportive and involved with care.

## 2024-10-12 NOTE — PROGRESS NOTES
[facility] Medicine Services  PROGRESS NOTE    Patient Name: Dalila Pike  : 1970  MRN: [id number]    Date of Admission: 10/7/2024  Primary Care Physician: Agnes Ewing APRN    Subjective   Subjective     CC:  Follow up RUQ pain     HPI:  Patient seen resting in bed in no apparent distress. No acute events overnight per nursing. Pain has been uncontrolled at times, currently improved. Continues to await placement.     Objective   Objective     Vital Signs:   Temp:  [97.3 °F (36.3 °C)-98.5 °F (36.9 °C)] 97.3 °F (36.3 °C)  Heart Rate:  [72-92] 77  Resp:  [18] 18  BP: (114-131)/(77-89) 131/88     Physical Exam:  Constitutional: No acute distress, awake, alert, chronically ill appearing   HENT: NCAT, mucous membranes moist  Respiratory: grossly clear, diminished in bases, respiratory effort normal   Cardiovascular: RRR, no murmurs, cap refill brisk   Gastrointestinal: Positive bowel sounds, soft, nontender, nondistended  Musculoskeletal: trace BLE edema   Psychiatric: Appropriate affect, cooperative  Neurologic: Oriented x 3, LUE flaccid, speech clear  Skin: warm, dry, no visible rash       Results Reviewed:  LAB RESULTS:      Lab 10/12/24  0537 10/09/24  0454 10/08/24  0607 10/07/24  1608   WBC 6.56 6.87 5.53 8.02   HEMOGLOBIN 13.3 14.3 13.2 14.3   HEMATOCRIT 39.4 41.9 39.6 42.9   PLATELETS 265 227 197 236   NEUTROS ABS  --   --   --  5.87   IMMATURE GRANS (ABS)  --   --   --  0.02   LYMPHS ABS  --   --   --  1.53   MONOS ABS  --   --   --  0.53   EOS ABS  --   --   --  0.04   MCV 91.8 90.5 92.3 92.7   PROCALCITONIN  --   --   --  0.03   LACTATE  --   --   --  1.4         Lab 10/12/24  0537 10/11/24  1737 10/11/24  0809 10/10/24  0535 10/09/24  0455 10/08/24  0607   SODIUM 139  --  138 140 135* 139   POTASSIUM 3.7 4.3 3.5 4.0 4.4 3.9   CHLORIDE 104  --  105 105 103 106   CO2 24.0  --  24.0 24.0 21.0* 25.0   ANION GAP 11.0  --  9.0 11.0 11.0 8.0   BUN 14  --  13 12 15 13    CREATININE 0.62  --  0.62 0.62 0.65 0.64   EGFR 106.0  --  106.0 106.6 105.4 105.8   GLUCOSE 125*  --  131* 111* 152* 118*   CALCIUM 9.4  --  9.4 9.3 9.2 8.9   MAGNESIUM 2.2  --   --   --   --   --    HEMOGLOBIN A1C  --   --   --   --   --  5.50   TSH  --   --   --   --   --  0.447         Lab 10/12/24  0537 10/11/24  0809 10/10/24  0535 10/09/24  0455 10/08/24  0607   TOTAL PROTEIN 6.3 6.6 6.1 6.5 6.1   ALBUMIN 3.6 3.7 3.8 3.7 3.6   GLOBULIN 2.7 2.9 2.3 2.8 2.5   ALT (SGPT) 25 36* 48* 60* 46*   AST (SGOT) 17 23 34* 54* 46*   BILIRUBIN 0.6 0.9 0.7 1.3* 2.0*   ALK PHOS 236* 251* 249* 243* 193*             Lab 10/08/24  0607   CHOLESTEROL 144   LDL CHOL 75   HDL CHOL 46   TRIGLYCERIDES 130             Brief Urine Lab Results  (Last result in the past 365 days)        Color   Clarity   Blood   Leuk Est   Nitrite   Protein   CREAT   Urine HCG        10/08/24 0317 Yellow   Clear   Negative   Negative   Negative   Negative                   Microbiology Results Abnormal       Procedure Component Value - Date/Time    Blood Culture - Blood, Hand, Right [[id number]]  (Normal) Collected: 10/07/24 1618    Lab Status: Preliminary result Specimen: Blood from Hand, Right Updated: 10/11/24 1645     Blood Culture No growth at 4 days    Narrative:      Less than seven (7) mL's of blood was collected.  Insufficient quantity may yield false negative results.    Blood Culture - Blood, Arm, Right [[id number]]  (Normal) Collected: 10/07/24 1609    Lab Status: Preliminary result Specimen: Blood from Arm, Right Updated: 10/11/24 1645     Blood Culture No growth at 4 days    COVID PRE-OP / PRE-PROCEDURE SCREENING ORDER (NO ISOLATION) - Swab, Nasopharynx [[id number]]  (Normal) Collected: 10/07/24 1750    Lab Status: Final result Specimen: Swab from Nasopharynx Updated: 10/07/24 8644    Narrative:      The following orders were created for panel order COVID PRE-OP / PRE-PROCEDURE SCREENING ORDER (NO ISOLATION) - Swab, Nasopharynx.  Procedure                                Abnormality         Status                     ---------                               -----------         ------                     Respiratory Panel PCR w/...[[id number]]  Normal              Final result                 Please view results for these tests on the individual orders.    Respiratory Panel PCR w/COVID-19(SARS-CoV-2) MICKEY/AKIL/MARIO/PAD/COR/SOULEYMANE In-House, NP Swab in UTM/VTM, 2 HR TAT - Swab, Nasopharynx [[id number]]  (Normal) Collected: 10/07/24 1750    Lab Status: Final result Specimen: Swab from Nasopharynx Updated: 10/07/24 5665     ADENOVIRUS, PCR Not Detected     Coronavirus 229E Not Detected     Coronavirus HKU1 Not Detected     Coronavirus NL63 Not Detected     Coronavirus OC43 Not Detected     COVID19 Not Detected     Human Metapneumovirus Not Detected     Human Rhinovirus/Enterovirus Not Detected     Influenza A PCR Not Detected     Influenza B PCR Not Detected     Parainfluenza Virus 1 Not Detected     Parainfluenza Virus 2 Not Detected     Parainfluenza Virus 3 Not Detected     Parainfluenza Virus 4 Not Detected     RSV, PCR Not Detected     Bordetella pertussis pcr Not Detected     Bordetella parapertussis PCR Not Detected     Chlamydophila pneumoniae PCR Not Detected     Mycoplasma pneumo by PCR Not Detected    Narrative:      In the setting of a positive respiratory panel with a viral infection PLUS a negative procalcitonin without other underlying concern for bacterial infection, consider observing off antibiotics or discontinuation of antibiotics and continue supportive care. If the respiratory panel is positive for atypical bacterial infection (Bordetella pertussis, Chlamydophila pneumoniae, or Mycoplasma pneumoniae), consider antibiotic de-escalation to target atypical bacterial infection.            No radiology results from the last 24 hrs    Results for orders placed during the hospital encounter of 08/21/24    Adult Transthoracic Echo Complete W/ Cont if  Necessary Per Protocol (With Agitated Saline)    Interpretation Summary    Left ventricular systolic function is normal. Calculated left ventricular EF = 68% Normal left ventricular cavity size noted. Left ventricular wall thickness is consistent with moderate concentric hypertrophy. All left ventricular wall segments contract normally. Left ventricular diastolic function was normal. Normal left atrial pressure.    The right ventricular cavity is mildly dilated. Normal right ventricular systolic function noted.    Left atrial volume is moderately increased. Saline test results are negative.    The aortic valve is structurally normal with no stenosis present. The aortic valve appears trileaflet. No significant aortic valve regurgitation is present.    The mitral valve is structurally normal with no significant stenosis present. Trace to mild mitral valve regurgitation is present.    Mild tricuspid valve regurgitation is present. Estimated right ventricular systolic pressure from tricuspid regurgitation is mildly elevated (35-45 mmHg)    Mild dilation of the ascending aorta is present. Ascending aorta = 3.7 cm      Current medications:  Scheduled Meds:aspirin, 81 mg, Oral, Daily  atorvastatin, 80 mg, Oral, Nightly  cefTRIAXone, 2,000 mg, Intravenous, Q24H  folic acid, 1 mg, Oral, Daily  guaifenesin, 400 mg, Oral, Q8H  heparin (porcine), 5,000 Units, Subcutaneous, Q8H  lansoprazole, 15 mg, Oral, Q AM  lisinopril, 20 mg, Oral, Daily  magnesium oxide, 400 mg, Oral, Daily  metroNIDAZOLE, 500 mg, Oral, Q8H  nicotine, 1 patch, Transdermal, Q24H  sodium chloride, 10 mL, Intravenous, Q12H  thiamine (B-1) IV, 200 mg, Intravenous, Q8H   Followed by  [START ON 10/13/2024] thiamine, 100 mg, Oral, Daily  ticagrelor, 60 mg, Oral, BID  topiramate, 25 mg, Oral, Nightly  ursodiol, 300 mg, Oral, BID      Continuous Infusions:   PRN Meds:.  acetaminophen **OR** acetaminophen **OR** acetaminophen    aluminum-magnesium  hydroxide-simethicone    senna-docusate sodium **AND** polyethylene glycol **AND** bisacodyl **AND** bisacodyl    Calcium Replacement - Follow Nurse / BPA Driven Protocol    HYDROcodone-acetaminophen    HYDROmorphone **AND** naloxone    LORazepam **OR** midazolam **OR** LORazepam **OR** midazolam **OR** midazolam **OR** midazolam    Magnesium Standard Dose Replacement - Follow Nurse / BPA Driven Protocol    nicotine polacrilex    ondansetron ODT **OR** ondansetron    Phosphorus Replacement - Follow Nurse / BPA Driven Protocol    Potassium Replacement - Follow Nurse / BPA Driven Protocol    sodium chloride    Assessment & Plan   Assessment & Plan     Active Hospital Problems    Diagnosis  POA    **RUQ pain [R10.11]  Yes    Cholangitis [K83.09]  Yes    Dilated cbd, acquired [K83.8]  Unknown    HTN (hypertension) [I10]  Yes    History of seizures [Z87.898]  Yes    Alcohol use [Z78.9]  Yes    Obesity (BMI 30-39.9) [E66.9]  Yes    Dyslipidemia [E78.5]  Yes    PAD (peripheral artery disease) [I73.9]  Yes    Tobacco use [Z72.0]  Yes      Resolved Hospital Problems   No resolved problems to display.        Brief Hospital Course to date:  Dalila Pike is a 54 y.o. female with past medical history of HTN, HL, PAD, obesity, seizures, ETOH use, R MCA CVA s/p thrombectomy/R ICA stent with residual L weakness, and recent L ankle fracture who presented on 10/7/2024 with RUQ pain.     This patient's problems and plans were partially entered by my partner and updated as appropriate by me 10/11/24.     RUQ pain  -MRCP reviewed  -s/p ERCP with papillary stenosis, s/p sphincterotomy/stent  -cytology pending  -GI following  -General surgery consulted for CCY, not recommended at this time  -surgery/GI recommend advancing diet  -GI recommending continued PPI, completing course of antibiotics, with follow up 6 weeks for repeat ERCP/stent removal  -Continue ceftriaxone/flagyl  -monitor PO intake and abdominal pain     Recent CVA s/p  thrombectomy/R ICA stent  -on ASA/brilinta/statin  -Follow-up stroke neurology as previously recommended     HTN  -monitor     HL  -statin     ETOH use  -monitor for withdrawal     R ankle fracture  -followed by Dr. Gonzalez, with recommended CAM boot in place  -Ortho has seen, recommended WBAT with boot x1 month, follow up with Dr. Yang in 1 month.      Tobacco abuse     Expected Discharge Location and Transportation: Rehab  Expected Discharge   Expected Discharge Date: 10/14/2024; Expected Discharge Time:       VTE Prophylaxis:  Pharmacologic VTE prophylaxis orders are present.         AM-PAC 6 Clicks Score (PT): 18 (10/12/24 7560)    CODE STATUS:   Code Status and Medical Interventions: CPR (Attempt to Resuscitate); Full Support   Ordered at: 10/07/24 7699     Level Of Support Discussed With:    Patient     Code Status (Patient has no pulse and is not breathing):    CPR (Attempt to Resuscitate)     Medical Interventions (Patient has pulse or is breathing):    Full Support       ROXANE Juarez  10/12/24

## 2024-10-12 NOTE — PLAN OF CARE
VSS.  A/O x 4.   PRN medication given for c/o abdominal pain, foot pain.   UO adequate.   Ambulating with assist of standby, walking boot encouraged.

## 2024-10-13 PROCEDURE — 25010000002 CEFTRIAXONE PER 250 MG: Performed by: HOSPITALIST

## 2024-10-13 PROCEDURE — 99232 SBSQ HOSP IP/OBS MODERATE 35: CPT | Performed by: NURSE PRACTITIONER

## 2024-10-13 PROCEDURE — 97530 THERAPEUTIC ACTIVITIES: CPT

## 2024-10-13 PROCEDURE — 25010000002 HEPARIN (PORCINE) PER 1000 UNITS: Performed by: INTERNAL MEDICINE

## 2024-10-13 PROCEDURE — 97110 THERAPEUTIC EXERCISES: CPT

## 2024-10-13 PROCEDURE — 97116 GAIT TRAINING THERAPY: CPT

## 2024-10-13 PROCEDURE — 63710000001 ONDANSETRON ODT 4 MG TABLET DISPERSIBLE: Performed by: INTERNAL MEDICINE

## 2024-10-13 RX ORDER — TRAZODONE HYDROCHLORIDE 50 MG/1
50 TABLET, FILM COATED ORAL NIGHTLY
Status: DISCONTINUED | OUTPATIENT
Start: 2024-10-13 | End: 2024-10-16 | Stop reason: HOSPADM

## 2024-10-13 RX ADMIN — METRONIDAZOLE 500 MG: 500 TABLET ORAL at 14:28

## 2024-10-13 RX ADMIN — TICAGRELOR 60 MG: 60 TABLET ORAL at 21:01

## 2024-10-13 RX ADMIN — ALUMINUM HYDROXIDE, MAGNESIUM HYDROXIDE, DIMETHICONE 15 ML: 400; 400; 40 SUSPENSION ORAL at 21:01

## 2024-10-13 RX ADMIN — ALUMINUM HYDROXIDE, MAGNESIUM HYDROXIDE, DIMETHICONE 15 ML: 400; 400; 40 SUSPENSION ORAL at 11:25

## 2024-10-13 RX ADMIN — ONDANSETRON 4 MG: 4 TABLET, ORALLY DISINTEGRATING ORAL at 16:37

## 2024-10-13 RX ADMIN — URSODIOL 300 MG: 300 CAPSULE ORAL at 21:01

## 2024-10-13 RX ADMIN — ASPIRIN 81 MG 81 MG: 81 TABLET ORAL at 08:23

## 2024-10-13 RX ADMIN — TOPIRAMATE 25 MG: 25 TABLET, FILM COATED ORAL at 21:01

## 2024-10-13 RX ADMIN — HYDROCODONE BITARTRATE AND ACETAMINOPHEN 1 TABLET: 5; 325 TABLET ORAL at 21:01

## 2024-10-13 RX ADMIN — Medication 10 ML: at 08:34

## 2024-10-13 RX ADMIN — TRAZODONE HYDROCHLORIDE 50 MG: 50 TABLET ORAL at 21:02

## 2024-10-13 RX ADMIN — URSODIOL 300 MG: 300 CAPSULE ORAL at 08:24

## 2024-10-13 RX ADMIN — ATORVASTATIN CALCIUM 80 MG: 40 TABLET, FILM COATED ORAL at 21:01

## 2024-10-13 RX ADMIN — HEPARIN SODIUM 5000 UNITS: 5000 INJECTION INTRAVENOUS; SUBCUTANEOUS at 06:14

## 2024-10-13 RX ADMIN — LANSOPRAZOLE 15 MG: 15 TABLET, ORALLY DISINTEGRATING ORAL at 06:14

## 2024-10-13 RX ADMIN — SENNOSIDES AND DOCUSATE SODIUM 2 TABLET: 50; 8.6 TABLET ORAL at 11:26

## 2024-10-13 RX ADMIN — ACETAMINOPHEN 650 MG: 325 TABLET ORAL at 11:32

## 2024-10-13 RX ADMIN — Medication 10 ML: at 21:02

## 2024-10-13 RX ADMIN — MAGNESIUM OXIDE TAB 400 MG (241.3 MG ELEMENTAL MG) 400 MG: 400 (241.3 MG) TAB at 08:24

## 2024-10-13 RX ADMIN — SODIUM CHLORIDE 2000 MG: 900 INJECTION INTRAVENOUS at 11:25

## 2024-10-13 RX ADMIN — METRONIDAZOLE 500 MG: 500 TABLET ORAL at 21:01

## 2024-10-13 RX ADMIN — HYDROCODONE BITARTRATE AND ACETAMINOPHEN 1 TABLET: 5; 325 TABLET ORAL at 02:01

## 2024-10-13 RX ADMIN — NICOTINE 1 PATCH: 21 PATCH TRANSDERMAL at 14:28

## 2024-10-13 RX ADMIN — GUAIFENESIN 400 MG: 100 LIQUID ORAL at 21:01

## 2024-10-13 RX ADMIN — LISINOPRIL 20 MG: 20 TABLET ORAL at 08:24

## 2024-10-13 RX ADMIN — HEPARIN SODIUM 5000 UNITS: 5000 INJECTION INTRAVENOUS; SUBCUTANEOUS at 21:02

## 2024-10-13 RX ADMIN — THIAMINE HCL TAB 100 MG 100 MG: 100 TAB at 08:13

## 2024-10-13 RX ADMIN — HYDROCODONE BITARTRATE AND ACETAMINOPHEN 1 TABLET: 5; 325 TABLET ORAL at 08:22

## 2024-10-13 RX ADMIN — METRONIDAZOLE 500 MG: 500 TABLET ORAL at 08:23

## 2024-10-13 RX ADMIN — HEPARIN SODIUM 5000 UNITS: 5000 INJECTION INTRAVENOUS; SUBCUTANEOUS at 14:27

## 2024-10-13 RX ADMIN — HYDROCODONE BITARTRATE AND ACETAMINOPHEN 1 TABLET: 5; 325 TABLET ORAL at 14:27

## 2024-10-13 RX ADMIN — TICAGRELOR 60 MG: 60 TABLET ORAL at 08:24

## 2024-10-13 RX ADMIN — GUAIFENESIN 200 MG: 100 LIQUID ORAL at 14:27

## 2024-10-13 RX ADMIN — FOLIC ACID 1 MG: 1 TABLET ORAL at 08:23

## 2024-10-13 RX ADMIN — ALUMINUM HYDROXIDE, MAGNESIUM HYDROXIDE, DIMETHICONE 15 ML: 400; 400; 40 SUSPENSION ORAL at 02:44

## 2024-10-13 NOTE — PLAN OF CARE
Goal Outcome Evaluation:  Plan of Care Reviewed With: patient, family        Progress: no change  Outcome Evaluation: Baseline ADL completion and related mobility remains limited by ongoing LLE healing (boot AAT, antalgic gait complicated by baseline L weakness), LUE weakness (paretic), and decreased functional endurance - will cont IPOT per POC as tolerated. Good effort during session, ModA for LB tasks, bimanual skills limited by incomplete motor return to LUE, reviewed self-AAROM ther-ex. Pt would benefit from IRF at discharge for best functional outcomes.    Anticipated Discharge Disposition (OT): inpatient rehabilitation facility

## 2024-10-13 NOTE — PLAN OF CARE
Goal Outcome Evaluation:  Plan of Care Reviewed With: patient  Plan of Care Reviewed With: patient        Progress: no change  Outcome Evaluation: Pt. amb 20 ft w/ CGA, WBAT LLE (wearing LUE sling, & L walking boot, but decl. AD) & performed LE ther exer EOB & UIC, but def. UE exer d/t pain & earlier OT session. Noted HR 88, desat 96%, & LUE pain 7/10.

## 2024-10-13 NOTE — THERAPY TREATMENT NOTE
Patient Name: Dalila Pike  : 1970    MRN: [id number]                              Today's Date: 10/13/2024       Admit Date: 10/7/2024    Visit Dx:     ICD-10-CM ICD-9-CM   1. Dilated cbd, acquired  K83.8 576.8   2. Gastritis  K29.70 535.50   3. Cholangitis  K83.09 576.1     Patient Active Problem List   Diagnosis    Acute CVA (cerebrovascular accident)    Alcohol use    Tobacco use    Obesity (BMI 30-39.9)    HTN (hypertension)    Dyslipidemia    History of seizures    PAD (peripheral artery disease)    Oropharyngeal dysphagia    Acute UTI (urinary tract infection)    Hypertensive emergency    Single episode of loss of consciousness without head trauma    RUQ pain    Dilated cbd, acquired    Cholangitis     Past Medical History:   Diagnosis Date    Acute CVA (cerebrovascular accident) 2024    Alcohol use 2024    Dyslipidemia 2024    Obesity (BMI 30-39.9) 2024    PAD (peripheral artery disease) 2024    Tobacco use 2024     Past Surgical History:   Procedure Laterality Date    ENDOSCOPY N/A 10/8/2024    Procedure: ESOPHAGOGASTRODUODENOSCOPY;  Surgeon: Elie Sanders MD;  Location:  [facility];  Service: Gastroenterology;  Laterality: N/A;    ERCP N/A 10/8/2024    Procedure: ENDOSCOPIC RETROGRADE CHOLANGIOPANCREATOGRAPHY WITH STENT PLACEMENT;  Surgeon: Elie Sanders MD;  Location:  [facility];  Service: Gastroenterology;  Laterality: N/A;  SPHINCTEROTOMY @ 1737, 9-12MM AND 12-15MM BALLOON SWEEP TO CBD    INTERVENTIONAL RADIOLOGY PROCEDURE Bilateral 2024    Procedure: Carotid Cervical Angiogram;  Surgeon: Jamaal Saini MD;  Location:  [facility];  Service: Interventional Radiology;  Laterality: Bilateral;      General Information       Row Name 10/13/24 1023          OT Time and Intention    Document Type therapy note (daily note)  -CS     Mode of Treatment occupational therapy  -CS     Patient Effort good  -CS       Row Name  10/13/24 1023          General Information    Patient Profile Reviewed yes  -CS     Existing Precautions/Restrictions fall;other (see comments)  LLE WBAT with boot when OOB, recent R MCA ischemic stroke with LUE residual weakness  -CS     Barriers to Rehab medically complex;previous functional deficit;physical barrier  -CS       Row Name 10/13/24 1023          Cognition    Orientation Status (Cognition) oriented x 4  -CS       Row Name 10/13/24 1023          Safety Issues/Impairments Affecting Functional Mobility    Safety Issues Affecting Function (Mobility) insight into deficits/self-awareness;safety precautions follow-through/compliance;safety precaution awareness;judgment;sequencing abilities  -CS     Impairments Affecting Function (Mobility) balance;coordination;endurance/activity tolerance;pain;visual/perceptual;sensation/sensory awareness;motor control  -CS               User Key  (r) = Recorded By, (t) = Taken By, (c) = Cosigned By      Initials Name Provider Type    CS Ignacio Valentine OT Occupational Therapist                     Mobility/ADL's       Row Name 10/13/24 1027          Bed Mobility    Bed Mobility supine-sit;scooting/bridging  -CS     Scooting/Bridging [place] (Bed Mobility) contact guard  -CS     Supine-Sit [place] (Bed Mobility) minimum assist (75% patient effort)  -CS     Bed Mobility, Safety Issues decreased use of arms for pushing/pulling;decreased use of legs for bridging/pushing  -CS     Assistive Device (Bed Mobility) head of bed elevated;bed rails  -CS     Comment, (Bed Mobility) appropriate sequencing intact  -CS       Row Name 10/13/24 1027          Transfers    Transfers bed-chair transfer;sit-stand transfer  -CS     Comment, (Transfers) donned boot EOB with MaxA, sling adjustment, RUE support, no dizziness reported intact  -CS       Row Name 10/13/24 1027          Bed-Chair Transfer    Bed-Chair [place] (Transfers) contact guard;verbal cues  -CS       Row Name  10/13/24 1027          Sit-Stand Transfer    Sit-Stand Lane (Transfers) contact guard;verbal cues  -       Row Name 10/13/24 1027          Functional Mobility    Functional Mobility- Comment CGA ([place]ector with increased fatigue)  -CS     Patient was able to Ambulate yes  -       Row Name 10/13/24 1027          Activities of Daily Living    BADL Assessment/Intervention lower body dressing;feeding  -       Row Name 10/13/24 1027          Lower Body Dressing Assessment/Training    [place] (Lower Body Dressing) don;shoes/slippers;maximum assist (25% patient effort)  -     Position (Lower Body Dressing) edge of bed sitting  -CS     Comment, (Lower Body Dressing) L boot, Ind limited by poor bimanual skills 2/2 LUE weakness  -               User Key  (r) = Recorded By, (t) = Taken By, (c) = Cosigned By      Initials Name Provider Type     Ignacio Valentine, OT Occupational Therapist                   Obj/Interventions       Row Name 10/13/24 1103          Motor Skills    Motor Skills functional endurance  -     Functional Endurance moderate, easily fatigued  -     Therapeutic Exercise shoulder;elbow/forearm;wrist  verbally reviewed self-AAROM ther-ex Pt is familiar with, emphasized compliance for optimal outcomes  -       Row Name 10/13/24 1103          Balance    Balance Assessment sitting static balance;sitting dynamic balance;standing dynamic balance;standing static balance  -     Static Sitting Balance supervision  -     Dynamic Sitting Balance standby assist  -CS     Position, Sitting Balance unsupported;sitting edge of bed  -     Static Standing Balance contact guard  -     Dynamic Standing Balance minimal assist  -CS     Position/Device Used, Standing Balance supported  -     Balance Interventions sitting;standing;occupation based/functional task;sit to stand  -     Comment, Balance RUE support for all standing activities  -               User Key  (r) = Recorded By, (t)  = Taken By, (c) = Cosigned By      Initials Name Provider Type    Ignacio Leon L, OT Occupational Therapist                   Goals/Plan    No documentation.                  Clinical Impression       [facility] Name 10/13/24 1104          Pain Assessment    Additional Documentation Pain Scale: FACES Pre/Post-Treatment (Group)  -[facility] Name 10/13/24 1104          Pain Scale: FACES Pre/Post-Treatment    Pain: FACES Scale, Pretreatment 0-->no hurt  -CS     Posttreatment Pain Rating 0-->no hurt  -CS       [facility] Name 10/13/24 1104          Plan of Care Review    Plan of Care Reviewed With patient;family  -CS     Progress no change  -CS     Outcome Evaluation Baseline ADL completion and related mobility remains limited by ongoing LLE healing (boot AAT, antalgic gait complicated by baseline L weakness), LUE weakness (paretic), and decreased functional endurance - will cont IPOT per POC as tolerated. Good effort during session, ModA for LB tasks, bimanual skills limited by incomplete motor return to LUE, reviewed self-AAROM ther-ex. Pt would benefit from IRF at discharge for best functional outcomes.  -[facility] Name 10/13/24 1104          Therapy Plan Review/Discharge Plan (OT)    Anticipated Discharge Disposition (OT) inpatient rehabilitation facility  -[facility] Name 10/13/24 1104          Vital Signs    Pre Systolic BP Rehab --  tx  -CS     Pre Patient Position Supine  -CS     Intra Patient Position Standing  -CS     Post Patient Position Sitting  -[facility] Name 10/13/24 1104          Positioning and Restraints    Pre-Treatment Position in bed  -CS     Post Treatment Position chair  -CS     In Chair notified nsg;reclined;sitting;call light within reach;encouraged to call for assist;exit alarm on;LUE elevated;RUE elevated;legs elevated;on mechanical lift sling;waffle cushion  -               User Key  (r) = Recorded By, (t) = Taken By, (c) = Cosigned By      Initials Name Provider Type    Ignacio Leon  CARLOS, OT Occupational Therapist                   Outcome Measures       Row Name 10/13/24 1107          How much help from another is currently needed...    Putting on and taking off regular lower body clothing? 2  -CS     Bathing (including washing, rinsing, and drying) 2  -CS     Toileting (which includes using toilet bed pan or urinal) 3  -CS     Putting on and taking off regular upper body clothing 3  -CS     Taking care of personal grooming (such as brushing teeth) 3  -CS     Eating meals 3  -CS     AM-PAC 6 Clicks Score (OT) 16  -CS       Row Name 10/13/24 1107          Functional Assessment    Outcome Measure Options AM-PAC 6 Clicks Daily Activity (OT)  -CS               User Key  (r) = Recorded By, (t) = Taken By, (c) = Cosigned By      Initials Name Provider Type    CS Ignacio Valentine OT Occupational Therapist                    Occupational Therapy Education       Title: PT OT SLP Therapies (In Progress)       Topic: Occupational Therapy (Done)       Point: ADL training (Done)       Description:   Instruct learner(s) on proper safety adaptation and remediation techniques during self care or transfers.   Instruct in proper use of assistive devices.                  Learning Progress Summary            Patient Acceptance, E,D, VU,DU by LISA at 10/13/2024 1108    Acceptance, E, VU by GLENIS at 10/10/2024 1502   Family Acceptance, E, VU by GLENIS at 10/10/2024 1502                      Point: Home exercise program (Done)       Description:   Instruct learner(s) on appropriate technique for monitoring, assisting and/or progressing therapeutic exercises/activities.                  Learning Progress Summary            Patient Acceptance, E,D, VU,DU by LISA at 10/13/2024 1108                      Point: Precautions (Done)       Description:   Instruct learner(s) on prescribed precautions during self-care and functional transfers.                  Learning Progress Summary            Patient Acceptance, E,D, VU,DU by LISA at  10/13/2024 1108    Acceptance, E, VU by  at 10/10/2024 1502   Family Acceptance, E, VU by GLENIS at 10/10/2024 1502                      Point: Body mechanics (Done)       Description:   Instruct learner(s) on proper positioning and spine alignment during self-care, functional mobility activities and/or exercises.                  Learning Progress Summary            Patient Acceptance, E,D, VU,DU by  at 10/13/2024 1108    Acceptance, E, VU by GLENIS at 10/10/2024 1502   Family Acceptance, E, VU by AJ at 10/10/2024 1502                                      User Key       Initials Effective Dates Name Provider Type Discipline     06/16/21 -  Ignacio Valentine OT Occupational Therapist OT    GLENIS 08/26/24 -  Eva Apodaca OT Occupational Therapist OT                  OT Recommendation and Plan     Plan of Care Review  Plan of Care Reviewed With: patient, family  Progress: no change  Outcome Evaluation: Baseline ADL completion and related mobility remains limited by ongoing LLE healing (boot AAT, antalgic gait complicated by baseline L weakness), LUE weakness (paretic), and decreased functional endurance - will cont IPOT per POC as tolerated. Good effort during session, ModA for LB tasks, bimanual skills limited by incomplete motor return to LUE, reviewed self-AAROM ther-ex. Pt would benefit from IRF at discharge for best functional outcomes.     Time Calculation:         Time Calculation- OT       Row Name 10/13/24 1101             Time Calculation- OT    OT Start Time 0955  -CS      OT Received On 10/13/24  -CS      OT Goal Re-Cert Due Date 10/20/24  -CS         Timed Charges    96387 - OT Therapeutic Activity Minutes 12  -CS      73664 - OT Self Care/Mgmt Minutes 6  -CS         Total Minutes    Timed Charges Total Minutes 18  -CS       Total Minutes 18  -CS                User Key  (r) = Recorded By, (t) = Taken By, (c) = Cosigned By      Initials Name Provider Type    Ignacio Leon OT Occupational Therapist                   Therapy Charges for Today       Code Description Service Date Service Provider Modifiers Qty    [id number]  OT THERAPEUTIC ACT EA 15 MIN 10/13/2024 Ignacio Valentine, OT GO 1                 Ignacio Valentine OT  10/13/2024

## 2024-10-13 NOTE — PROGRESS NOTES
[facility] Medicine Services  PROGRESS NOTE    Patient Name: Dalila Pike  : 1970  MRN: [id number]    Date of Admission: 10/7/2024  Primary Care Physician: Agnes Ewing APRN    Subjective   Subjective     CC:  Follow up RUQ pain     HPI:  Patient seen resting in bed in no apparent distress. No acute events overnight per nursing. Pain control remain a challenge. Reports difficulty sleeping last night. Discussed plan to trial Trazodone. Awaiting placement.       Objective   Objective     Vital Signs:   Temp:  [97.9 °F (36.6 °C)-98.3 °F (36.8 °C)] 98 °F (36.7 °C)  Heart Rate:  [] 71  Resp:  [18] 18  BP: (100-136)/(71-87) 131/87     Physical Exam:  Constitutional: No acute distress, awake, alert, chronically ill-appearing  HENT: NCAT, mucous membranes moist  Respiratory: Grossly clear, diminished in bases, respiratory effort normal on room air  Cardiovascular: RRR, no murmurs, cap refill brisk   Gastrointestinal: Positive bowel sounds, soft, RUQ tender to palpation  Musculoskeletal: Trace BLE edema, left ankle tender to palpation  Psychiatric: Appropriate affect, cooperative  Neurologic: Oriented x 3, LUE flaccid, speech clear  Skin: warm, dry, no visible rash       Results Reviewed:  LAB RESULTS:      Lab 10/12/24  0537 10/09/24  0454 10/08/24  0607 10/07/24  1608   WBC 6.56 6.87 5.53 8.02   HEMOGLOBIN 13.3 14.3 13.2 14.3   HEMATOCRIT 39.4 41.9 39.6 42.9   PLATELETS 265 227 197 236   NEUTROS ABS  --   --   --  5.87   IMMATURE GRANS (ABS)  --   --   --  0.02   LYMPHS ABS  --   --   --  1.53   MONOS ABS  --   --   --  0.53   EOS ABS  --   --   --  0.04   MCV 91.8 90.5 92.3 92.7   PROCALCITONIN  --   --   --  0.03   LACTATE  --   --   --  1.4         Lab 10/12/24  0537 10/11/24  1737 10/11/24  0809 10/10/24  0535 10/09/24  0455 10/08/24  0607   SODIUM 139  --  138 140 135* 139   POTASSIUM 3.7 4.3 3.5 4.0 4.4 3.9   CHLORIDE 104  --  105 105 103 106   CO2 24.0  --  24.0 24.0  21.0* 25.0   ANION GAP 11.0  --  9.0 11.0 11.0 8.0   BUN 14  --  13 12 15 13   CREATININE 0.62  --  0.62 0.62 0.65 0.64   EGFR 106.0  --  106.0 106.6 105.4 105.8   GLUCOSE 125*  --  131* 111* 152* 118*   CALCIUM 9.4  --  9.4 9.3 9.2 8.9   MAGNESIUM 2.2  --   --   --   --   --    HEMOGLOBIN A1C  --   --   --   --   --  5.50   TSH  --   --   --   --   --  0.447         Lab 10/12/24  0537 10/11/24  0809 10/10/24  0535 10/09/24  0455 10/08/24  0607   TOTAL PROTEIN 6.3 6.6 6.1 6.5 6.1   ALBUMIN 3.6 3.7 3.8 3.7 3.6   GLOBULIN 2.7 2.9 2.3 2.8 2.5   ALT (SGPT) 25 36* 48* 60* 46*   AST (SGOT) 17 23 34* 54* 46*   BILIRUBIN 0.6 0.9 0.7 1.3* 2.0*   ALK PHOS 236* 251* 249* 243* 193*             Lab 10/08/24  0607   CHOLESTEROL 144   LDL CHOL 75   HDL CHOL 46   TRIGLYCERIDES 130             Brief Urine Lab Results  (Last result in the past 365 days)        Color   Clarity   Blood   Leuk Est   Nitrite   Protein   CREAT   Urine HCG        10/08/24 0317 Yellow   Clear   Negative   Negative   Negative   Negative                   Microbiology Results Abnormal       Procedure Component Value - Date/Time    Blood Culture - Blood, Hand, Right [[id number]]  (Normal) Collected: 10/07/24 1618    Lab Status: Final result Specimen: Blood from Hand, Right Updated: 10/12/24 1645     Blood Culture No growth at 5 days    Narrative:      Less than seven (7) mL's of blood was collected.  Insufficient quantity may yield false negative results.    Blood Culture - Blood, Arm, Right [[id number]]  (Normal) Collected: 10/07/24 1609    Lab Status: Final result Specimen: Blood from Arm, Right Updated: 10/12/24 1645     Blood Culture No growth at 5 days    COVID PRE-OP / PRE-PROCEDURE SCREENING ORDER (NO ISOLATION) - Swab, Nasopharynx [[id number]]  (Normal) Collected: 10/07/24 1750    Lab Status: Final result Specimen: Swab from Nasopharynx Updated: 10/07/24 4849    Narrative:      The following orders were created for panel order COVID PRE-OP / PRE-PROCEDURE  SCREENING ORDER (NO ISOLATION) - Swab, Nasopharynx.  Procedure                               Abnormality         Status                     ---------                               -----------         ------                     Respiratory Panel PCR w/...[[id number]]  Normal              Final result                 Please view results for these tests on the individual orders.    Respiratory Panel PCR w/COVID-19(SARS-CoV-2) MICKEY/AKIL/MARIO/PAD/COR/SOULEYMANE In-House, NP Swab in UTM/VTM, 2 HR TAT - Swab, Nasopharynx [[id number]]  (Normal) Collected: 10/07/24 1750    Lab Status: Final result Specimen: Swab from Nasopharynx Updated: 10/07/24 1844     ADENOVIRUS, PCR Not Detected     Coronavirus 229E Not Detected     Coronavirus HKU1 Not Detected     Coronavirus NL63 Not Detected     Coronavirus OC43 Not Detected     COVID19 Not Detected     Human Metapneumovirus Not Detected     Human Rhinovirus/Enterovirus Not Detected     Influenza A PCR Not Detected     Influenza B PCR Not Detected     Parainfluenza Virus 1 Not Detected     Parainfluenza Virus 2 Not Detected     Parainfluenza Virus 3 Not Detected     Parainfluenza Virus 4 Not Detected     RSV, PCR Not Detected     Bordetella pertussis pcr Not Detected     Bordetella parapertussis PCR Not Detected     Chlamydophila pneumoniae PCR Not Detected     Mycoplasma pneumo by PCR Not Detected    Narrative:      In the setting of a positive respiratory panel with a viral infection PLUS a negative procalcitonin without other underlying concern for bacterial infection, consider observing off antibiotics or discontinuation of antibiotics and continue supportive care. If the respiratory panel is positive for atypical bacterial infection (Bordetella pertussis, Chlamydophila pneumoniae, or Mycoplasma pneumoniae), consider antibiotic de-escalation to target atypical bacterial infection.            XR Ankle 3+ View Left    Result Date: 10/12/2024  XR ANKLE 3+ VW LEFT Date of Exam: 10/12/2024 5:04  PM EDT Indication: Recent ankle fracture, new fall, pain Comparison: 10/9/2024 Findings: Again seen is minimally displaced fracture of the distal fibula, medial malleolus and also likely the anterior tibial plafond. No definite posterior malleolar fracture. No new fracture seen. Plantar calcaneal spurring and Achilles enthesopathy. Ankle mortise appears symmetric. Talar dome appears intact. No traumatic malalignment on this nonweightbearing exam. Degenerative changes predominate of the midfoot.     Impression: Impression: Again seen are minimal displaced fracture of the distal fibula and medial malleolus and also likely of the anterior tibial plafond similar to most recent prior examination. No new fracture seen. Electronically Signed: Javier Javed MD  10/12/2024 5:40 PM EDT  Workstation ID: [id number]     Results for orders placed during the hospital encounter of 08/21/24    Adult Transthoracic Echo Complete W/ Cont if Necessary Per Protocol (With Agitated Saline)    Interpretation Summary    Left ventricular systolic function is normal. Calculated left ventricular EF = 68% Normal left ventricular cavity size noted. Left ventricular wall thickness is consistent with moderate concentric hypertrophy. All left ventricular wall segments contract normally. Left ventricular diastolic function was normal. Normal left atrial pressure.    The right ventricular cavity is mildly dilated. Normal right ventricular systolic function noted.    Left atrial volume is moderately increased. Saline test results are negative.    The aortic valve is structurally normal with no stenosis present. The aortic valve appears trileaflet. No significant aortic valve regurgitation is present.    The mitral valve is structurally normal with no significant stenosis present. Trace to mild mitral valve regurgitation is present.    Mild tricuspid valve regurgitation is present. Estimated right ventricular systolic pressure from tricuspid  regurgitation is mildly elevated (35-45 mmHg)    Mild dilation of the ascending aorta is present. Ascending aorta = 3.7 cm      Current medications:  Scheduled Meds:aspirin, 81 mg, Oral, Daily  atorvastatin, 80 mg, Oral, Nightly  cefTRIAXone, 2,000 mg, Intravenous, Q24H  folic acid, 1 mg, Oral, Daily  guaifenesin, 400 mg, Oral, Q8H  heparin (porcine), 5,000 Units, Subcutaneous, Q8H  lansoprazole, 15 mg, Oral, Q AM  lisinopril, 20 mg, Oral, Daily  magnesium oxide, 400 mg, Oral, Daily  melatonin, 5 mg, Oral, Nightly  metroNIDAZOLE, 500 mg, Oral, Q8H  nicotine, 1 patch, Transdermal, Q24H  sodium chloride, 10 mL, Intravenous, Q12H  thiamine, 100 mg, Oral, Daily  ticagrelor, 60 mg, Oral, BID  topiramate, 25 mg, Oral, Nightly  ursodiol, 300 mg, Oral, BID      Continuous Infusions:   PRN Meds:.  acetaminophen **OR** acetaminophen **OR** acetaminophen    aluminum-magnesium hydroxide-simethicone    senna-docusate sodium **AND** polyethylene glycol **AND** bisacodyl **AND** bisacodyl    Calcium Replacement - Follow Nurse / BPA Driven Protocol    HYDROcodone-acetaminophen    hydrOXYzine    Magnesium Standard Dose Replacement - Follow Nurse / BPA Driven Protocol    [] HYDROmorphone **AND** naloxone    nicotine polacrilex    ondansetron ODT **OR** ondansetron    Phosphorus Replacement - Follow Nurse / BPA Driven Protocol    Potassium Replacement - Follow Nurse / BPA Driven Protocol    sodium chloride    Assessment & Plan   Assessment & Plan     Active Hospital Problems    Diagnosis  POA    **RUQ pain [R10.11]  Yes    Cholangitis [K83.09]  Yes    Dilated cbd, acquired [K83.8]  Unknown    HTN (hypertension) [I10]  Yes    History of seizures [Z87.898]  Yes    Alcohol use [Z78.9]  Yes    Obesity (BMI 30-39.9) [E66.9]  Yes    Dyslipidemia [E78.5]  Yes    PAD (peripheral artery disease) [I73.9]  Yes    Tobacco use [Z72.0]  Yes      Resolved Hospital Problems   No resolved problems to display.        Brief Hospital Course to  date:  Dalila Pike is a 54 y.o. female with past medical history of HTN, HL, PAD, obesity, seizures, ETOH use, R MCA CVA s/p thrombectomy/R ICA stent with residual L weakness, and recent L ankle fracture who presented on 10/7/2024 with RUQ pain.     This patient's problems and plans were partially entered by my partner and updated as appropriate by me 10/13/24.     RUQ pain  -MRCP reviewed  -s/p ERCP with papillary stenosis, s/p sphincterotomy/stent  -cytology pending  -GI following  -General surgery consulted for CCY, not recommended at this time  -surgery/GI recommend advancing diet  -GI recommending continued PPI, completing course of antibiotics, with follow up 6 weeks for repeat ERCP/stent removal  -Continue ceftriaxone/flagyl, plan to continue through 10/14  -monitor PO intake and abdominal pain     Recent CVA s/p thrombectomy/R ICA stent  -on ASA/brilinta/statin  -Follow-up stroke neurology as previously recommended     HTN  -monitor     HL  -statin     ETOH use  -s/p CIWA   -monitor for withdrawal     R ankle fracture  -followed by Dr. Gonzalez, with recommended CAM boot in place  -Ortho has seen, recommended WBAT with boot x1 month, follow up with Dr. Yang in 1 month.      Tobacco abuse     Expected Discharge Location and Transportation: Rehab  Expected Discharge   Expected Discharge Date: 10/14/2024; Expected Discharge Time:       VTE Prophylaxis:  Pharmacologic VTE prophylaxis orders are present.      AM-PAC 6 Clicks Score (PT): 18 (10/12/24 2035)    CODE STATUS:   Code Status and Medical Interventions: CPR (Attempt to Resuscitate); Full Support   Ordered at: 10/07/24 3277     Level Of Support Discussed With:    Patient     Code Status (Patient has no pulse and is not breathing):    CPR (Attempt to Resuscitate)     Medical Interventions (Patient has pulse or is breathing):    Full Support       Rocky Meneses, ROXANE  10/13/24

## 2024-10-13 NOTE — PROGRESS NOTES
I called and discussed results with patient over the phone this evening.  Please ensure she has repeat ERCP scheduled in 6-8 weeks.   Cytology and pathology results from biliary brushing as follows:    Brushing from the lower bile duct show no evidence of cancer.  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    Biopsies from the stomach were normal.  Biopsies from the esophagus show benign changes related to reflux without evidence for Kaplan's esophagus.   Continue proton pump inhibitor.

## 2024-10-14 LAB
ALBUMIN SERPL-MCNC: 3.4 G/DL (ref 3.5–5.2)
ALBUMIN/GLOB SERPL: 1.2 G/DL
ALP SERPL-CCNC: 219 U/L (ref 39–117)
ALT SERPL W P-5'-P-CCNC: 14 U/L (ref 1–33)
ANION GAP SERPL CALCULATED.3IONS-SCNC: 9 MMOL/L (ref 5–15)
AST SERPL-CCNC: 13 U/L (ref 1–32)
BILIRUB SERPL-MCNC: 0.4 MG/DL (ref 0–1.2)
BUN SERPL-MCNC: 9 MG/DL (ref 6–20)
BUN/CREAT SERPL: 13.4 (ref 7–25)
CALCIUM SPEC-SCNC: 9.3 MG/DL (ref 8.6–10.5)
CHLORIDE SERPL-SCNC: 105 MMOL/L (ref 98–107)
CO2 SERPL-SCNC: 24 MMOL/L (ref 22–29)
CREAT SERPL-MCNC: 0.67 MG/DL (ref 0.57–1)
DEPRECATED RDW RBC AUTO: 39 FL (ref 37–54)
EGFRCR SERPLBLD CKD-EPI 2021: 104 ML/MIN/1.73
ERYTHROCYTE [DISTWIDTH] IN BLOOD BY AUTOMATED COUNT: 11.4 % (ref 12.3–15.4)
GLOBULIN UR ELPH-MCNC: 2.9 GM/DL
GLUCOSE SERPL-MCNC: 142 MG/DL (ref 65–99)
HCT VFR BLD AUTO: 37.7 % (ref 34–46.6)
HGB BLD-MCNC: 12.5 G/DL (ref 12–15.9)
MCH RBC QN AUTO: 30.5 PG (ref 26.6–33)
MCHC RBC AUTO-ENTMCNC: 33.2 G/DL (ref 31.5–35.7)
MCV RBC AUTO: 92 FL (ref 79–97)
PLATELET # BLD AUTO: 297 10*3/MM3 (ref 140–450)
PMV BLD AUTO: 10.2 FL (ref 6–12)
POTASSIUM SERPL-SCNC: 3.5 MMOL/L (ref 3.5–5.2)
PROT SERPL-MCNC: 6.3 G/DL (ref 6–8.5)
RBC # BLD AUTO: 4.1 10*6/MM3 (ref 3.77–5.28)
SODIUM SERPL-SCNC: 138 MMOL/L (ref 136–145)
WBC NRBC COR # BLD AUTO: 6.46 10*3/MM3 (ref 3.4–10.8)

## 2024-10-14 PROCEDURE — 85027 COMPLETE CBC AUTOMATED: CPT | Performed by: NURSE PRACTITIONER

## 2024-10-14 PROCEDURE — 80053 COMPREHEN METABOLIC PANEL: CPT | Performed by: NURSE PRACTITIONER

## 2024-10-14 PROCEDURE — 25010000002 HEPARIN (PORCINE) PER 1000 UNITS: Performed by: INTERNAL MEDICINE

## 2024-10-14 PROCEDURE — 99232 SBSQ HOSP IP/OBS MODERATE 35: CPT | Performed by: INTERNAL MEDICINE

## 2024-10-14 PROCEDURE — 25010000002 CEFTRIAXONE PER 250 MG: Performed by: HOSPITALIST

## 2024-10-14 RX ADMIN — GUAIFENESIN 400 MG: 100 LIQUID ORAL at 05:56

## 2024-10-14 RX ADMIN — HEPARIN SODIUM 5000 UNITS: 5000 INJECTION INTRAVENOUS; SUBCUTANEOUS at 05:56

## 2024-10-14 RX ADMIN — THIAMINE HCL TAB 100 MG 100 MG: 100 TAB at 09:23

## 2024-10-14 RX ADMIN — GUAIFENESIN 400 MG: 100 LIQUID ORAL at 21:04

## 2024-10-14 RX ADMIN — ALUMINUM HYDROXIDE, MAGNESIUM HYDROXIDE, DIMETHICONE 15 ML: 400; 400; 40 SUSPENSION ORAL at 18:16

## 2024-10-14 RX ADMIN — TOPIRAMATE 25 MG: 25 TABLET, FILM COATED ORAL at 21:05

## 2024-10-14 RX ADMIN — URSODIOL 300 MG: 300 CAPSULE ORAL at 09:23

## 2024-10-14 RX ADMIN — TRAZODONE HYDROCHLORIDE 50 MG: 50 TABLET ORAL at 21:04

## 2024-10-14 RX ADMIN — METRONIDAZOLE 500 MG: 500 TABLET ORAL at 14:22

## 2024-10-14 RX ADMIN — HYDROCODONE BITARTRATE AND ACETAMINOPHEN 1 TABLET: 5; 325 TABLET ORAL at 09:48

## 2024-10-14 RX ADMIN — TICAGRELOR 60 MG: 60 TABLET ORAL at 09:23

## 2024-10-14 RX ADMIN — ATORVASTATIN CALCIUM 80 MG: 40 TABLET, FILM COATED ORAL at 21:04

## 2024-10-14 RX ADMIN — Medication 10 ML: at 21:05

## 2024-10-14 RX ADMIN — MAGNESIUM OXIDE TAB 400 MG (241.3 MG ELEMENTAL MG) 400 MG: 400 (241.3 MG) TAB at 09:23

## 2024-10-14 RX ADMIN — HEPARIN SODIUM 5000 UNITS: 5000 INJECTION INTRAVENOUS; SUBCUTANEOUS at 21:05

## 2024-10-14 RX ADMIN — FOLIC ACID 1 MG: 1 TABLET ORAL at 09:23

## 2024-10-14 RX ADMIN — Medication 10 ML: at 09:24

## 2024-10-14 RX ADMIN — METRONIDAZOLE 500 MG: 500 TABLET ORAL at 21:04

## 2024-10-14 RX ADMIN — GUAIFENESIN 400 MG: 100 LIQUID ORAL at 14:22

## 2024-10-14 RX ADMIN — SODIUM CHLORIDE 2000 MG: 900 INJECTION INTRAVENOUS at 12:29

## 2024-10-14 RX ADMIN — NICOTINE 1 PATCH: 21 PATCH TRANSDERMAL at 14:31

## 2024-10-14 RX ADMIN — METRONIDAZOLE 500 MG: 500 TABLET ORAL at 05:56

## 2024-10-14 RX ADMIN — ASPIRIN 81 MG 81 MG: 81 TABLET ORAL at 09:23

## 2024-10-14 RX ADMIN — HYDROCODONE BITARTRATE AND ACETAMINOPHEN 1 TABLET: 5; 325 TABLET ORAL at 03:25

## 2024-10-14 RX ADMIN — HYDROCODONE BITARTRATE AND ACETAMINOPHEN 1 TABLET: 5; 325 TABLET ORAL at 18:23

## 2024-10-14 RX ADMIN — TICAGRELOR 60 MG: 60 TABLET ORAL at 21:04

## 2024-10-14 RX ADMIN — LISINOPRIL 20 MG: 20 TABLET ORAL at 09:23

## 2024-10-14 RX ADMIN — HEPARIN SODIUM 5000 UNITS: 5000 INJECTION INTRAVENOUS; SUBCUTANEOUS at 14:22

## 2024-10-14 RX ADMIN — LANSOPRAZOLE 15 MG: 15 TABLET, ORALLY DISINTEGRATING ORAL at 05:56

## 2024-10-14 RX ADMIN — URSODIOL 300 MG: 300 CAPSULE ORAL at 21:04

## 2024-10-14 NOTE — CASE MANAGEMENT/SOCIAL WORK
Continued Stay Note  [facility]     Patient Name: Dalila Pike  MRN: [id number]  Today's Date: 10/14/2024    Admit Date: 10/7/2024    Plan: Acute Rehab   Discharge Plan       Row Name 10/14/24 1443       Plan    Plan Acute Rehab    Patient/Family in Agreement with Plan yes    Provided Post Acute Provider List? Yes    Post Acute Provider List Inpatient Rehab    Provided Post Acute Provider Quality & Resource List? Yes    Post Acute Provider Quality and Resource List Inpatient Rehab    Delivered To Patient    Method of Delivery In person    Plan Comments [facility] has declined to offer the patient a bed. I have efaxed a referral to [facility] at [facility] acute rehab [place] and spoke with Malik at [facility]. Patient understand that if she wants to go to rehab, we will have to expand the search pending the determination from those two locations. Patient Choice list given. Complex CM following x [phone]    Final Discharge Disposition Code 62 - inpatient rehab facility                   Discharge Codes    No documentation.                 Expected Discharge Date and Time       Expected Discharge Date Expected Discharge Time    Oct 14, 2024               Bety Maradiaga RN

## 2024-10-14 NOTE — DISCHARGE PLACEMENT REQUEST
"Dalila Ocasio (54 y.o. Female)       Date of Birth   1970    Social Security Number       Address   [address] [place] [address]    Home Phone   [phone]    MRN   [id number]       Yazidism   None    Marital Status                               Admission Date   10/7/24    Admission Type   Elective    Admitting Provider   Brook Overton DO    Attending Provider   Brook Overton DO    Department, Room/Bed   [facility] 5B, N541/1       Discharge Date       Discharge Disposition       Discharge Destination                                 Attending Provider: Brook Overton DO    Allergies: Erythromycin, Latex, Toradol [Ketorolac Tromethamine], Contrast Dye (Echo Or Unknown Ct/mr)    Isolation: None   Infection: None   Code Status: CPR    Ht: 172.7 cm (68\")   Wt: 110 kg (243 lb)    Admission Cmt: None   Principal Problem: RUQ pain [R10.11]                   Active Insurance as of 10/7/2024       Primary Coverage       Payor Plan Insurance Group Employer/Plan Group    [facility] MEDICAID [facility] MEDICAID [id number]       Payor Plan Address Payor Plan Phone Number Payor Plan Fax Number Effective Dates    [address] [address] [phone]  2019 - None Entered    [facility] [address]         Subscriber Name Subscriber Birth Date Member ID       DALILA OCASIO 1970 [id number]                     Emergency Contacts        (Rel.) Home Phone Work Phone Mobile Phone    Abiel Mijares (Spouse) [phone] -- [phone]    GenesisRashad (Son) [phone] -- [phone]                 History & Physical        Becky Pang MD at 10/07/24 [facility]              [facility] Medicine Services  HISTORY AND PHYSICAL    Patient Name: Dalila Ocasio  : 1970  MRN: [id number]  Primary Care Physician: Agnes Ewing APRN  Date of admission: 10/7/2024      Subjective  Subjective     Chief Complaint:  RUQ pain     HPI:  Dalila L " Glendy is a 53 y.o. female with PMH of with HTN, HLD, PAD, obesity, history of seizures, ongoing tobacco abuse, EtOH use, recent R MCA territory ischemic stroke s/p thrombectomy and R ICA stent placement (8/21/24) with residual L sided weakness, also with recent L ankle fracture seen by Ortho during stroke admission who presented from [facility] with complaints of RUQ pain. Pt reports that she has had months of intermittent RUQ pain but episodes seem to come and go- lately she has noticed them more frequently and they seem more severe.  She also notes that she has had intermittent fevers with these episodes and has had fevers of up to 103-104F in the last few days since onset of RUQ pain. She thinks that a spaghetti meal is what recently set her off.  She was seen in the [facility] ED today and had labs as well as RUQ ultrasound and CT A/P.  I was only able to see RUQ prelim read which showed a CBD dilatation of 1 cm.  Her alk phos was also elevated.  All other labs were wnl.  She was transferred here for MRCP and possible GI evaluation.      Personal History     Past Medical History:   Diagnosis Date    Acute CVA (cerebrovascular accident) 08/21/2024    Alcohol use 08/21/2024    Dyslipidemia 08/21/2024    Obesity (BMI 30-39.9) 08/21/2024    PAD (peripheral artery disease) 08/21/2024    Tobacco use 08/21/2024           Past Surgical History:   Procedure Laterality Date    INTERVENTIONAL RADIOLOGY PROCEDURE Bilateral 8/21/2024    Procedure: Carotid Cervical Angiogram;  Surgeon: Jamaal Saini MD;  Location: [facility] INVASIVE LOCATION;  Service: Interventional Radiology;  Laterality: Bilateral;       Family History: family history is not on file.     Social History:  reports that she has been smoking cigarettes. She started smoking about 9 months ago. She has a 76.4 pack-year smoking history. She has been exposed to tobacco smoke. She has never used smokeless tobacco. She reports that she does not currently  use alcohol. She reports that she does not currently use drugs.  Social History     Social History Narrative    Not on file       Medications:  Available home medication information reviewed.  HYDROcodone-acetaminophen, aspirin, atorvastatin, folic acid, guaiFENesin, lisinopril, magnesium oxide, pantoprazole, sodium chloride, ticagrelor, and topiramate    Allergies   Allergen Reactions    Erythromycin Anaphylaxis    Latex Rash    Toradol [Ketorolac Tromethamine] Rash    Contrast Dye (Echo Or Unknown Ct/Mr) Unknown (See Comments)     Hives on chest       Objective  Objective     Vital Signs:   Temp:  [100.7 °F (38.2 °C)] 100.7 °F (38.2 °C)  Heart Rate:  [102] 102  Resp:  [18] 18  BP: (100)/(68) 100/68       Physical Exam   Constitutional: Awake, lethargic, falls asleep mid-sentence  Eyes: PERRLA, sclerae anicteric, no conjunctival injection  HENT: NCAT, mucous membranes moist  Neck: Supple, no thyromegaly, no lymphadenopathy, trachea midline  Respiratory: Clear to auscultation bilaterally, nonlabored respirations   Cardiovascular: RRR, no murmurs, rubs, or gallops, palpable pedal pulses bilaterally  Gastrointestinal: Positive bowel sounds, soft, nontender, nondistended  Musculoskeletal: No bilateral ankle edema, no clubbing or cyanosis to extremities  Psychiatric: Appropriate affect, cooperative  Neurologic: lethargic, left sided weakness  Skin: No rashes   Result Review:  I have personally reviewed the results from the time of this admission to 10/7/2024 16:08 EDT and agree with these findings:  [x]  Laboratory list / accordion  [x]  Microbiology  [x]  Radiology  [x]  EKG/Telemetry   []  Cardiology/Vascular   []  Pathology  [x]  Old records  []  Other:  Most notable findings include: see assessment and plan       LAB RESULTS:                              UA          8/28/2024    11:03   Urinalysis   Squamous Epithelial Cells, UA 0-2    Specific Gravity, UA 1.028    Ketones, UA Trace    Blood, UA Negative     Leukocytes, UA Small (1+)    Nitrite, UA Positive    RBC, UA 0-2    WBC, UA 21-50    Bacteria, UA 3+        Microbiology Results (last 10 days)       ** No results found for the last 240 hours. **            No radiology results from the last 24 hrs    Results for orders placed during the hospital encounter of 08/21/24    Adult Transthoracic Echo Complete W/ Cont if Necessary Per Protocol (With Agitated Saline)    Interpretation Summary    Left ventricular systolic function is normal. Calculated left ventricular EF = 68% Normal left ventricular cavity size noted. Left ventricular wall thickness is consistent with moderate concentric hypertrophy. All left ventricular wall segments contract normally. Left ventricular diastolic function was normal. Normal left atrial pressure.    The right ventricular cavity is mildly dilated. Normal right ventricular systolic function noted.    Left atrial volume is moderately increased. Saline test results are negative.    The aortic valve is structurally normal with no stenosis present. The aortic valve appears trileaflet. No significant aortic valve regurgitation is present.    The mitral valve is structurally normal with no significant stenosis present. Trace to mild mitral valve regurgitation is present.    Mild tricuspid valve regurgitation is present. Estimated right ventricular systolic pressure from tricuspid regurgitation is mildly elevated (35-45 mmHg)    Mild dilation of the ascending aorta is present. Ascending aorta = 3.7 cm      Assessment & Plan  Assessment & Plan       RUQ pain    Alcohol use    Tobacco use    Obesity (BMI 30-39.9)    HTN (hypertension)    Dyslipidemia    History of seizures    PAD (peripheral artery disease)    Dilated cbd, acquired    Ms. Pike is a 54 yo WF with PMH of with HTN, HLD, PAD, obesity, history of seizures, ongoing tobacco abuse, EtOH use, recent R MCA territory ischemic stroke s/p thrombectomy and R ICA stent placement (8/21/24) with  residual L sided weakness, also with recent L ankle fracture seen by Ortho during stroke admission who presented from [facility] with complaints of RUQ pain.      Plan:    Choledocholithiasis  ? Cholecystitis  -- pt with RUQ pain and reported dilated CBD on OSH ultrasound (data deficit). Also with elevated Alk phos.  Other labs were unremarkable including tbili, WBC and transaminases.    -- repeat labs now  -- get MRCP  -- reported fever of 103-104F at home.  Unsure if she also has cholecystitis as CT A/P from OSH is not currently available. Will get images uploaded to our system  -- start empiric Zosyn  -- send procal, lactate, blood cultures x 2 and will check UA with Ucx as well.  Also send for respiratory viral panel  -- consult GI in am, may need to consider General Surgery as well given concern for acute cholecystitis   -- NPO except sips for now    Recent CVA s/p thrombectomy and R ICA stent  -- with residual L sided weakness  -- pt was sent to [facility] on 8/28,however, she left AMA from there four days later.  Daughter is interested in seeing about other rehab options   -- continue ASA, Brilinta and statin for now. May need to hold DAPT if surgery is anticipated.    HTN  -- not currently on any antihypertensives    HLD  --continue statin per home regimen     Reported EtOH abuse  -- did not require any benzos for EtOH withdrawal during last admission  -- monitor for now on CIWA with PRN benzos  -- continue thiamine     Recent L ankle fracture  -- followed by Dr. Gonzalez, still has CAM boot in place but was supposed to follow up with Ortho within a week of most recent discharge- need to clarify in am    Tobacco abuse  -- encouraged cessation  -- nicotine patch while inpatient    Recent + methamphetamine  -- check UDS       Total time spent: 75 minutes  Time spent includes time reviewing chart, face-to-face time, counseling patient/family/caregiver, ordering medications/tests/procedures, communicating with  other health care professionals, documenting clinical information in the electronic health record, and coordination of care.       VTE Prophylaxis:  Pharmacologic VTE prophylaxis orders are present.          CODE STATUS:    Code Status and Medical Interventions: CPR (Attempt to Resuscitate); Full Support   Ordered at: 10/07/24 1545     Level Of Support Discussed With:    Patient     Code Status (Patient has no pulse and is not breathing):    CPR (Attempt to Resuscitate)     Medical Interventions (Patient has pulse or is breathing):    Full Support       Expected Discharge   Expected Discharge Date: 10/11/2024; Expected Discharge Time:      Becky Pang MD  10/07/24     Electronically signed by Becky Pang MD at 10/07/24 1609          Physician Progress Notes (most recent note)        Brook Overton DO at 10/14/24 0758              [facility] Medicine Services  PROGRESS NOTE    Patient Name: Dalila Pike  : 1970  MRN: [id number]    Date of Admission: 10/7/2024  Primary Care Physician: Agnes Ewing APRN    Subjective   Subjective     CC:  Abd pain     HPI:  No acute events.  States that she is overall feeling okay.  Still having abdominal pain.  Reviewed pathology and need for repeat ERCP.      Objective   Objective     Vital Signs:   Temp:  [97.2 °F (36.2 °C)-98.7 °F (37.1 °C)] 97.2 °F (36.2 °C)  Heart Rate:  [71-93] 81  Resp:  [16] 16  BP: (103-137)/(69-97) 103/69     Physical Exam:  Constitutional: No acute distress, awake, alert  Respiratory: Clear to auscultation bilaterally, respiratory effort normal   Cardiovascular: RRR, no murmurs  Gastrointestinal: Positive bowel sounds, soft, mild tenderness   Musculoskeletal: No bilateral ankle edema  Psychiatric: Appropriate affect, cooperative  Neurologic: Oriented x 3, strength symmetric in all extremities, Cranial Nerves grossly intact to confrontation, speech clear      Results Reviewed:  LAB RESULTS:       Lab 10/14/24  0541 10/12/24  0537 10/09/24  0454 10/08/24  0607 10/07/24  1608   WBC 6.46 6.56 6.87 5.53 8.02   HEMOGLOBIN 12.5 13.3 14.3 13.2 14.3   HEMATOCRIT 37.7 39.4 41.9 39.6 42.9   PLATELETS 297 265 227 197 236   NEUTROS ABS  --   --   --   --  5.87   IMMATURE GRANS (ABS)  --   --   --   --  0.02   LYMPHS ABS  --   --   --   --  1.53   MONOS ABS  --   --   --   --  0.53   EOS ABS  --   --   --   --  0.04   MCV 92.0 91.8 90.5 92.3 92.7   PROCALCITONIN  --   --   --   --  0.03   LACTATE  --   --   --   --  1.4         Lab 10/14/24  0541 10/12/24  0537 10/11/24  1737 10/11/24  0809 10/10/24  0535 10/09/24  0455 10/08/24  0607   SODIUM 138 139  --  138 140 135* 139   POTASSIUM 3.5 3.7 4.3 3.5 4.0 4.4 3.9   CHLORIDE 105 104  --  105 105 103 106   CO2 24.0 24.0  --  24.0 24.0 21.0* 25.0   ANION GAP 9.0 11.0  --  9.0 11.0 11.0 8.0   BUN 9 14  --  13 12 15 13   CREATININE 0.67 0.62  --  0.62 0.62 0.65 0.64   EGFR 104.0 106.0  --  106.0 106.6 105.4 105.8   GLUCOSE 142* 125*  --  131* 111* 152* 118*   CALCIUM 9.3 9.4  --  9.4 9.3 9.2 8.9   MAGNESIUM  --  2.2  --   --   --   --   --    HEMOGLOBIN A1C  --   --   --   --   --   --  5.50   TSH  --   --   --   --   --   --  0.447         Lab 10/14/24  0541 10/12/24  0537 10/11/24  0809 10/10/24  0535 10/09/24  0455   TOTAL PROTEIN 6.3 6.3 6.6 6.1 6.5   ALBUMIN 3.4* 3.6 3.7 3.8 3.7   GLOBULIN 2.9 2.7 2.9 2.3 2.8   ALT (SGPT) 14 25 36* 48* 60*   AST (SGOT) 13 17 23 34* 54*   BILIRUBIN 0.4 0.6 0.9 0.7 1.3*   ALK PHOS 219* 236* 251* 249* 243*             Lab 10/08/24  0607   CHOLESTEROL 144   LDL CHOL 75   HDL CHOL 46   TRIGLYCERIDES 130             Brief Urine Lab Results  (Last result in the past 365 days)        Color   Clarity   Blood   Leuk Est   Nitrite   Protein   CREAT   Urine HCG        10/08/24 0317 Yellow   Clear   Negative   Negative   Negative   Negative                   Microbiology Results Abnormal       Procedure Component Value - Date/Time    Blood Culture -  Blood, Hand, Right [[id number]]  (Normal) Collected: 10/07/24 1618    Lab Status: Final result Specimen: Blood from Hand, Right Updated: 10/12/24 1645     Blood Culture No growth at 5 days    Narrative:      Less than seven (7) mL's of blood was collected.  Insufficient quantity may yield false negative results.    Blood Culture - Blood, Arm, Right [[id number]]  (Normal) Collected: 10/07/24 1609    Lab Status: Final result Specimen: Blood from Arm, Right Updated: 10/12/24 1645     Blood Culture No growth at 5 days    COVID PRE-OP / PRE-PROCEDURE SCREENING ORDER (NO ISOLATION) - Swab, Nasopharynx [[id number]]  (Normal) Collected: 10/07/24 1750    Lab Status: Final result Specimen: Swab from Nasopharynx Updated: 10/07/24 1849    Narrative:      The following orders were created for panel order COVID PRE-OP / PRE-PROCEDURE SCREENING ORDER (NO ISOLATION) - Swab, Nasopharynx.  Procedure                               Abnormality         Status                     ---------                               -----------         ------                     Respiratory Panel PCR w/...[[id number]]  Normal              Final result                 Please view results for these tests on the individual orders.    Respiratory Panel PCR w/COVID-19(SARS-CoV-2) MICKEY/AKIL/MARIO/PAD/COR/SOULEYMANE In-House, NP Swab in UTM/VTM, 2 HR TAT - Swab, Nasopharynx [[id number]]  (Normal) Collected: 10/07/24 1750    Lab Status: Final result Specimen: Swab from Nasopharynx Updated: 10/07/24 1849     ADENOVIRUS, PCR Not Detected     Coronavirus 229E Not Detected     Coronavirus HKU1 Not Detected     Coronavirus NL63 Not Detected     Coronavirus OC43 Not Detected     COVID19 Not Detected     Human Metapneumovirus Not Detected     Human Rhinovirus/Enterovirus Not Detected     Influenza A PCR Not Detected     Influenza B PCR Not Detected     Parainfluenza Virus 1 Not Detected     Parainfluenza Virus 2 Not Detected     Parainfluenza Virus 3 Not Detected     Parainfluenza  Virus 4 Not Detected     RSV, PCR Not Detected     Bordetella pertussis pcr Not Detected     Bordetella parapertussis PCR Not Detected     Chlamydophila pneumoniae PCR Not Detected     Mycoplasma pneumo by PCR Not Detected    Narrative:      In the setting of a positive respiratory panel with a viral infection PLUS a negative procalcitonin without other underlying concern for bacterial infection, consider observing off antibiotics or discontinuation of antibiotics and continue supportive care. If the respiratory panel is positive for atypical bacterial infection (Bordetella pertussis, Chlamydophila pneumoniae, or Mycoplasma pneumoniae), consider antibiotic de-escalation to target atypical bacterial infection.            XR Ankle 3+ View Left    Result Date: 10/12/2024  XR ANKLE 3+ VW LEFT Date of Exam: 10/12/2024 5:04 PM EDT Indication: Recent ankle fracture, new fall, pain Comparison: 10/9/2024 Findings: Again seen is minimally displaced fracture of the distal fibula, medial malleolus and also likely the anterior tibial plafond. No definite posterior malleolar fracture. No new fracture seen. Plantar calcaneal spurring and Achilles enthesopathy. Ankle mortise appears symmetric. Talar dome appears intact. No traumatic malalignment on this nonweightbearing exam. Degenerative changes predominate of the midfoot.     Impression: Impression: Again seen are minimal displaced fracture of the distal fibula and medial malleolus and also likely of the anterior tibial plafond similar to most recent prior examination. No new fracture seen. Electronically Signed: Javier Javed MD  10/12/2024 5:40 PM EDT  Workstation ID: [id number]     Results for orders placed during the hospital encounter of 08/21/24    Adult Transthoracic Echo Complete W/ Cont if Necessary Per Protocol (With Agitated Saline)    Interpretation Summary    Left ventricular systolic function is normal. Calculated left ventricular EF = 68% Normal left ventricular  cavity size noted. Left ventricular wall thickness is consistent with moderate concentric hypertrophy. All left ventricular wall segments contract normally. Left ventricular diastolic function was normal. Normal left atrial pressure.    The right ventricular cavity is mildly dilated. Normal right ventricular systolic function noted.    Left atrial volume is moderately increased. Saline test results are negative.    The aortic valve is structurally normal with no stenosis present. The aortic valve appears trileaflet. No significant aortic valve regurgitation is present.    The mitral valve is structurally normal with no significant stenosis present. Trace to mild mitral valve regurgitation is present.    Mild tricuspid valve regurgitation is present. Estimated right ventricular systolic pressure from tricuspid regurgitation is mildly elevated (35-45 mmHg)    Mild dilation of the ascending aorta is present. Ascending aorta = 3.7 cm      Current medications:  Scheduled Meds:aspirin, 81 mg, Oral, Daily  atorvastatin, 80 mg, Oral, Nightly  cefTRIAXone, 2,000 mg, Intravenous, Q24H  folic acid, 1 mg, Oral, Daily  guaifenesin, 400 mg, Oral, Q8H  heparin (porcine), 5,000 Units, Subcutaneous, Q8H  lansoprazole, 15 mg, Oral, Q AM  lisinopril, 20 mg, Oral, Daily  magnesium oxide, 400 mg, Oral, Daily  metroNIDAZOLE, 500 mg, Oral, Q8H  nicotine, 1 patch, Transdermal, Q24H  sodium chloride, 10 mL, Intravenous, Q12H  thiamine, 100 mg, Oral, Daily  ticagrelor, 60 mg, Oral, BID  topiramate, 25 mg, Oral, Nightly  traZODone, 50 mg, Oral, Nightly  ursodiol, 300 mg, Oral, BID      Continuous Infusions:   PRN Meds:.  acetaminophen **OR** acetaminophen **OR** acetaminophen    aluminum-magnesium hydroxide-simethicone    senna-docusate sodium **AND** polyethylene glycol **AND** bisacodyl **AND** bisacodyl    Calcium Replacement - Follow Nurse / BPA Driven Protocol    HYDROcodone-acetaminophen    hydrOXYzine    Magnesium Standard Dose  Replacement - Follow Nurse / BPA Driven Protocol    [] HYDROmorphone **AND** naloxone    nicotine polacrilex    ondansetron ODT **OR** ondansetron    Phosphorus Replacement - Follow Nurse / BPA Driven Protocol    Potassium Replacement - Follow Nurse / BPA Driven Protocol    sodium chloride    Assessment & Plan   Assessment & Plan     Active Hospital Problems    Diagnosis  POA    **RUQ pain [R10.11]  Yes    Cholangitis [K83.09]  Yes    Dilated cbd, acquired [K83.8]  Unknown    HTN (hypertension) [I10]  Yes    History of seizures [Z87.898]  Yes    Alcohol use [Z78.9]  Yes    Obesity (BMI 30-39.9) [E66.9]  Yes    Dyslipidemia [E78.5]  Yes    PAD (peripheral artery disease) [I73.9]  Yes    Tobacco use [Z72.0]  Yes      Resolved Hospital Problems   No resolved problems to display.        Brief Hospital Course to date:  Dalila Pike is a 54 y.o. female with past medical history of HTN, HL, PAD, obesity, seizures, ETOH use, R MCA CVA s/p thrombectomy/R ICA stent with residual L weakness, and recent L ankle fracture who presented on 10/7/2024 with RUQ pain.     This patient's problems and plans were partially entered by my partner and updated as appropriate by me 10/13/24.     RUQ pain  -MRCP with mildly dilated biliary tree with blunted appearance of the common bile duct of the ampulla; no obvious filling defect  -s/p ERCP with papillary stenosis, s/p sphincterotomy/stent  -Brushings in the lower bile duct with no evidence of cancer; stomach biopsies were normal and esophageal biopsies with changes related to reflux  -Recommend repeat ERCP in 6 to 8 weeks  -General surgery consulted for CCY, not recommended at this time  -GI recommending continued PPI, completing course of antibiotics, with follow up 6 weeks for repeat ERCP/stent removal  -Continue ceftriaxone/flagyl, plan to continue through 10/14  - Continue PPI      Recent CVA s/p thrombectomy/R ICA stent  -on ASA/brilinta/statin  -Follow-up stroke neurology as  previously recommended     HTN  - lisinoril. Stable.      HL  -statin     ETOH use  -s/p CIWA   -monitor for withdrawal     R ankle fracture  -followed by Dr. Gonzalez, with recommended CAM boot in place  -Ortho has seen, recommended WBAT with boot x1 month, follow up with Dr. Yang in 1 month.      Tobacco abuse    Expected Discharge Location and Transportation: rehab  Expected Discharge   Expected Discharge Date: 10/14/2024; Expected Discharge Time:      VTE Prophylaxis:  Pharmacologic VTE prophylaxis orders are present.         AM-PAC 6 Clicks Score (PT): 20 (10/13/24 2000)    CODE STATUS:   Code Status and Medical Interventions: CPR (Attempt to Resuscitate); Full Support   Ordered at: 10/07/24 1545     Level Of Support Discussed With:    Patient     Code Status (Patient has no pulse and is not breathing):    CPR (Attempt to Resuscitate)     Medical Interventions (Patient has pulse or is breathing):    Full Support       Brook Overton DO  10/14/24        Electronically signed by Brook Overton DO at 10/14/24 0803          Physical Therapy Notes (most recent note)        Divina Maciel, PT at 10/13/24 1807  Version 1 of 1         Patient Name: Dalila Pike  : 1970    MRN: [id number]                              Today's Date: 10/13/2024       Admit Date: 10/7/2024    Visit Dx:     ICD-10-CM ICD-9-CM   1. Dilated cbd, acquired  K83.8 576.8   2. Gastritis  K29.70 535.50   3. Cholangitis  K83.09 576.1     Patient Active Problem List   Diagnosis    Acute CVA (cerebrovascular accident)    Alcohol use    Tobacco use    Obesity (BMI 30-39.9)    HTN (hypertension)    Dyslipidemia    History of seizures    PAD (peripheral artery disease)    Oropharyngeal dysphagia    Acute UTI (urinary tract infection)    Hypertensive emergency    Single episode of loss of consciousness without head trauma    RUQ pain    Dilated cbd, acquired    Cholangitis     Past Medical History:   Diagnosis Date    Acute  CVA (cerebrovascular accident) 08/21/2024    Alcohol use 08/21/2024    Dyslipidemia 08/21/2024    Obesity (BMI 30-39.9) 08/21/2024    PAD (peripheral artery disease) 08/21/2024    Tobacco use 08/21/2024     Past Surgical History:   Procedure Laterality Date    ENDOSCOPY N/A 10/8/2024    Procedure: ESOPHAGOGASTRODUODENOSCOPY;  Surgeon: Elie Sanders MD;  Location:  [email or web address] ENDOSCOPY;  Service: Gastroenterology;  Laterality: N/A;    ERCP N/A 10/8/2024    Procedure: ENDOSCOPIC RETROGRADE CHOLANGIOPANCREATOGRAPHY WITH STENT PLACEMENT;  Surgeon: Elie Sanders MD;  Location: [facility] ENDOSCOPY;  Service: Gastroenterology;  Laterality: N/A;  SPHINCTEROTOMY @ 1737, 9-12MM AND 12-15MM BALLOON SWEEP TO CBD    INTERVENTIONAL RADIOLOGY PROCEDURE Bilateral 8/21/2024    Procedure: Carotid Cervical Angiogram;  Surgeon: Jamaal Saini MD;  Location: [facility] CATH INVASIVE LOCATION;  Service: Interventional Radiology;  Laterality: Bilateral;      General Information       Row Name 10/13/24 1731          Physical Therapy Time and Intention    Document Type therapy note (daily note)  -DM     Mode of Treatment physical therapy  -DM       Row Name 10/13/24 1731          General Information    Existing Precautions/Restrictions fall;brace worn when out of bed;other (see comments);seizures  10-8: ERCP w/ stent; sz prec d/t ETOH;Recent R MCA isch CVA w/ resid. LUE weakness;Uses LUE sling; LLE walking boot (WBAT)when OOB  -DM               User Key  (r) = Recorded By, (t) = Taken By, (c) = Cosigned By      Initials Name Provider Type    DM Divina Maciel, PT Physical Therapist                   Mobility       Row Name 10/13/24 1731          Bed Mobility    Bed Mobility rolling right;scooting/bridging;supine-sit  -DM     Rolling Right [place] (Bed Mobility) independent  -DM     Scooting/Bridging [place] (Bed Mobility) modified independence  -DM     Supine-Sit [place] (Bed Mobility) modified independence  -DM     Assistive  Device (Bed Mobility) bed rails;head of bed elevated  -DM     Comment, (Bed Mobility) per pt, has 7/10 LUE discomf,but did not want to req. pain med from nsg; once EOB, donned L walking boot w/ min A of PT to fasten lower velcro straps (otherwise SBA to don boot & fasten upper straps),then donned LUE sling w/ min A; instructed in PLB exer  -DM       Row Name 10/13/24 1731          Transfers    Comment, (Transfers) performed LAQ, sitting marches, then STS transf w/ reminder of WBAT LLE  -DM       Row Name 10/13/24 1731          Sit-Stand Transfer    Sit-Stand [place] (Transfers) contact guard;verbal cues  PT init supp. LUE until pt had it completely seated in sling  -DM     Assistive Device (Sit-Stand Transfers) --  decl.SPC or use of R Bedrail  -DM       Row Name 10/13/24 1731          Gait/Stairs (Locomotion)    [place] Level (Gait) verbal cues;contact guard  Agreed to gt in room, but decl. kenney d/t long walk earlier  -DM     Assistive Device (Gait) --  decl.AD  -DM     Distance in Feet (Gait) 20  -DM     Deviations/Abnormal Patterns (Gait) bilateral deviations;base of support, narrow;shea decreased;stride length decreased  Decr step length;wearing L boot  -DM     Left Sided Gait Deviations forward flexed posture;heel strike decreased  Gazing@ floor freq & shifting LUE position in sling  -DM     Comment, (Gait/Stairs) Cues for incr step length & ROBBI, trunk ext/focusing at chair, & PLB; HR 88  -DM               User Key  (r) = Recorded By, (t) = Taken By, (c) = Cosigned By      Initials Name Provider Type    DM Divina Maciel, PT Physical Therapist                   Obj/Interventions       Row Name 10/13/24 1731          Motor Skills    Therapeutic Exercise hip;knee;ankle  BUE per OT  -DM       Row Name 10/13/24 1731          Hip (Therapeutic Exercise)    Hip (Therapeutic Exercise) AROM (active range of motion);isometric exercises  -DM     Hip AROM (Therapeutic Exercise)  bilateral;flexion;extension;aBduction;aDduction;external rotation;internal rotation;sitting;10 repetitions  -DM     Hip Isometrics (Therapeutic Exercise) bilateral;gluteal sets;sitting;10 repetitions  -DM       Row Name 10/13/24 1731          Knee (Therapeutic Exercise)    Knee (Therapeutic Exercise) AROM (active range of motion);isometric exercises  -DM     Knee AROM (Therapeutic Exercise) bilateral;flexion;extension;SAQ (short arc quad);SLR (straight leg raise);LAQ (long arc quad);heel slides;sitting;10 repetitions  Init min A for LLE KTC to supp L heel up off of surface, then pt performed final 5 reps indep  -DM     Knee Isometrics (Therapeutic Exercise) bilateral;quad sets;sitting;10 repetitions;other (see comments)  Also 5 reps R HS sets (def. w/ LLE d/t keeping pressure off L heel)  -DM       Row Name 10/13/24 1731          Ankle (Therapeutic Exercise)    Ankle (Therapeutic Exercise) AROM (active range of motion)  -DM     Ankle AROM (Therapeutic Exercise) right;dorsiflexion;plantarflexion;sitting;10 repetitions;other (see comments)  AC  -DM       Row Name 10/13/24 1731          Balance    Static Sitting Balance independent  -DM     Dynamic Sitting Balance independent  -DM     Position, Sitting Balance unsupported;sitting in chair;sitting edge of bed  -DM     Static Standing Balance standby assist  -DM     Dynamic Standing Balance verbal cues;contact guard  -DM     Position/Device Used, Standing Balance unsupported;other (see comments)  init min A of PT to supp.LUE, then pt.supp. w/ sling in place  -DM     Balance Interventions sitting;standing;static;dynamic;weight shifting activity  -DM               User Key  (r) = Recorded By, (t) = Taken By, (c) = Cosigned By      Initials Name Provider Type    DM Divina Maciel, PT Physical Therapist                   Goals/Plan    No documentation.                  Clinical Impression       Row Name 10/13/24 1731          Pain    Pretreatment Pain Rating 7/10  -DM      Posttreatment Pain Rating 7/10  -DM     Pain Location extremity  -DM     Pain Side/Orientation left;upper  -DM     Pain Management Interventions positioning techniques utilized  -DM     Response to Pain Interventions activity participation with tolerable pain  -DM     Pain Intervention(s) Repositioned;Rest;Elevated  -DM       Row Name 10/13/24 1731          Plan of Care Review    Plan of Care Reviewed With patient  -DM     Progress no change  -DM     Outcome Evaluation Pt. amb 20 ft w/ CGA, WBAT LLE (wearing LUE sling, & L walking boot, but decl. AD) & performed LE ther exer EOB & UIC, but def. UE exer d/t pain & earlier OT session. Noted HR 88, desat 96%, & LUE pain 7/10.  -DM       Row Name 10/13/24 1731          Vital Signs    Pre Systolic BP Rehab 130  -DM     Pre Treatment Diastolic BP 83  -DM     Pretreatment Heart Rate (beats/min) 88  -DM     Pre SpO2 (%) 96  -DM     O2 Delivery Pre Treatment room air  -DM     O2 Delivery Intra Treatment room air  -DM     O2 Delivery Post Treatment room air  -DM     Pre Patient Position Supine  -DM     Intra Patient Position Standing  -DM     Post Patient Position Sitting  -DM       Row Name 10/13/24 1731          Positioning and Restraints    Pre-Treatment Position in bed  -DM     Post Treatment Position chair  -DM     In Chair notified nsg;reclined;call light within reach;encouraged to call for assist;exit alarm on;LUE elevated;waffle cushion;legs elevated  -DM       Row Name 10/13/24 1731          Plan of Care Review    Plan of Care Reviewed With patient  -DM               User Key  (r) = Recorded By, (t) = Taken By, (c) = Cosigned By      Initials Name Provider Type    DM Divina Maciel, PT Physical Therapist                   Outcome Measures       Row Name 10/13/24 1731          How much help from another person do you currently need...    Turning from your back to your side while in flat bed without using bedrails? 4  -DM     Moving from lying on back to sitting on  the side of a flat bed without bedrails? 4  -DM     Moving to and from a bed to a chair (including a wheelchair)? 3  -DM     Standing up from a chair using your arms (e.g., wheelchair, bedside chair)? 3  -DM     Climbing 3-5 steps with a railing? 3  -DM     To walk in hospital room? 3  -DM     AM-PAC 6 Clicks Score (PT) 20  -DM     Highest Level of Mobility Goal 6 --> Walk 10 steps or more  -DM       Row Name 10/13/24 1731 10/13/24 1107       Functional Assessment    Outcome Measure Options AM-PAC 6 Clicks Basic Mobility (PT)  -DM AM-PAC 6 Clicks Daily Activity (OT)  -CS              User Key  (r) = Recorded By, (t) = Taken By, (c) = Cosigned By      Initials Name Provider Type    DM Divina Maciel, PT Physical Therapist    CS Ignacio Valentine, OT Occupational Therapist                                 Physical Therapy Education       Title: PT OT SLP Therapies (In Progress)       Topic: Physical Therapy (In Progress)       Point: Mobility training (In Progress)       Learning Progress Summary            Patient Acceptance, E,D, NR by DM at 10/13/2024 1805    Acceptance, E,TB, VU by ES at 10/10/2024 1510   Family Acceptance, E,TB, VU by ES at 10/10/2024 1510                      Point: Home exercise program (In Progress)       Learning Progress Summary            Patient Acceptance, E,D, NR by DM at 10/13/2024 1805                      Point: Body mechanics (In Progress)       Learning Progress Summary            Patient Acceptance, E,D, NR by DM at 10/13/2024 1805    Acceptance, E,TB, VU by ES at 10/10/2024 1510   Family Acceptance, E,TB, VU by ES at 10/10/2024 1510                      Point: Precautions (In Progress)       Learning Progress Summary            Patient Acceptance, E,D, NR by DM at 10/13/2024 1805    Acceptance, E,TB, VU by ES at 10/10/2024 1510   Family Acceptance, E,TB, VU by ES at 10/10/2024 1510                                      User Key       Initials Effective Dates Name Provider Type  Discipline    DM 23 -  Divina Maciel, PT Physical Therapist PT    ES 22 -  Karie Vaca PT Physical Therapist PT                  PT Recommendation and Plan     Plan of Care Reviewed With: patient  Progress: no change  Outcome Evaluation: Pt. amb 20 ft w/ CGA, WBAT LLE (wearing LUE sling, & L walking boot, but decl. AD) & performed LE ther exer EOB & UIC, but def. UE exer d/t pain & earlier OT session. Noted HR 88, desat 96%, & LUE pain 7/10.     Time Calculation:         PT Charges       Row Name 10/13/24 1806             Time Calculation    Start Time   -DM      PT Received On 10/13/24  -DM      PT Goal Re-Cert Due Date 10/20/24  -DM         Time Calculation- PT    Total Timed Code Minutes- PT 24 minute(s)  -DM         Timed Charges    93307 - PT Therapeutic Exercise Minutes 14  -DM      20265 - Gait Training Minutes  10  -DM         Total Minutes    Timed Charges Total Minutes 24  -DM       Total Minutes 24  -DM                User Key  (r) = Recorded By, (t) = Taken By, (c) = Cosigned By      Initials Name Provider Type    DM Divina Maciel, PT Physical Therapist                  Therapy Charges for Today       Code Description Service Date Service Provider Modifiers Qty    79394547413 HC PT THER PROC EA 15 MIN 10/13/2024 Divina Maciel, PT GP 1    28345711081 HC GAIT TRAINING EA 15 MIN 10/13/2024 Divina Maciel, PT GP 1            PT G-Codes  Outcome Measure Options: AM-PAC 6 Clicks Basic Mobility (PT)  AM-PAC 6 Clicks Score (PT): 20  AM-PAC 6 Clicks Score (OT): 16       Divina Maciel, PT  10/13/2024      Electronically signed by Divina Maciel, PT at 10/13/24 1847          Occupational Therapy Notes (most recent note)        Ignacio Valentine, OT at 10/13/24 0904          Patient Name: Dalila Pike  : 1970    MRN: [id number]                              Today's Date: 10/13/2024       Admit Date: 10/7/2024    Visit Dx:     ICD-10-CM ICD-9-CM   1. Dilated cbd,  acquired  K83.8 576.8   2. Gastritis  K29.70 535.50   3. Cholangitis  K83.09 576.1     Patient Active Problem List   Diagnosis    Acute CVA (cerebrovascular accident)    Alcohol use    Tobacco use    Obesity (BMI 30-39.9)    HTN (hypertension)    Dyslipidemia    History of seizures    PAD (peripheral artery disease)    Oropharyngeal dysphagia    Acute UTI (urinary tract infection)    Hypertensive emergency    Single episode of loss of consciousness without head trauma    RUQ pain    Dilated cbd, acquired    Cholangitis     Past Medical History:   Diagnosis Date    Acute CVA (cerebrovascular accident) 08/21/2024    Alcohol use 08/21/2024    Dyslipidemia 08/21/2024    Obesity (BMI 30-39.9) 08/21/2024    PAD (peripheral artery disease) 08/21/2024    Tobacco use 08/21/2024     Past Surgical History:   Procedure Laterality Date    ENDOSCOPY N/A 10/8/2024    Procedure: ESOPHAGOGASTRODUODENOSCOPY;  Surgeon: Elie Sanders MD;  Location:  [facility];  Service: Gastroenterology;  Laterality: N/A;    ERCP N/A 10/8/2024    Procedure: ENDOSCOPIC RETROGRADE CHOLANGIOPANCREATOGRAPHY WITH STENT PLACEMENT;  Surgeon: Elie Sanders MD;  Location: [facility];  Service: Gastroenterology;  Laterality: N/A;  SPHINCTEROTOMY @ 1737, 9-12MM AND 12-15MM BALLOON SWEEP TO CBD    INTERVENTIONAL RADIOLOGY PROCEDURE Bilateral 8/21/2024    Procedure: Carotid Cervical Angiogram;  Surgeon: Jamaal Saini MD;  Location: [facility] CATH INVASIVE LOCATION;  Service: Interventional Radiology;  Laterality: Bilateral;      General Information       Row Name 10/13/24 1023          OT Time and Intention    Document Type therapy note (daily note)  -CS     Mode of Treatment occupational therapy  -CS     Patient Effort good  -CS       Row Name 10/13/24 1023          General Information    Patient Profile Reviewed yes  -CS     Existing Precautions/Restrictions fall;other (see comments)  LLE WBAT with boot when OOB, recent R MCA ischemic stroke with  LUE residual weakness  -CS     Barriers to Rehab medically complex;previous functional deficit;physical barrier  -CS       Row Name 10/13/24 1023          Cognition    Orientation Status (Cognition) oriented x 4  -CS       Row Name 10/13/24 1023          Safety Issues/Impairments Affecting Functional Mobility    Safety Issues Affecting Function (Mobility) insight into deficits/self-awareness;safety precautions follow-through/compliance;safety precaution awareness;judgment;sequencing abilities  -CS     Impairments Affecting Function (Mobility) balance;coordination;endurance/activity tolerance;pain;visual/perceptual;sensation/sensory awareness;motor control  -CS               User Key  (r) = Recorded By, (t) = Taken By, (c) = Cosigned By      Initials Name Provider Type    CS Ignacio Valentine OT Occupational Therapist                     Mobility/ADL's       Row Name 10/13/24 1027          Bed Mobility    Bed Mobility supine-sit;scooting/bridging  -CS     Scooting/Bridging [place] (Bed Mobility) contact guard  -CS     Supine-Sit [place] (Bed Mobility) minimum assist (75% patient effort)  -CS     Bed Mobility, Safety Issues decreased use of arms for pushing/pulling;decreased use of legs for bridging/pushing  -CS     Assistive Device (Bed Mobility) head of bed elevated;bed rails  -CS     Comment, (Bed Mobility) appropriate sequencing intact  -CS       Row Name 10/13/24 1027          Transfers    Transfers bed-chair transfer;sit-stand transfer  -CS     Comment, (Transfers) donned boot EOB with MaxA, sling adjustment, RUE support, no dizziness reported intact  -CS       Row Name 10/13/24 1027          Bed-Chair Transfer    Bed-Chair [place] (Transfers) contact guard;verbal cues  -CS       Row Name 10/13/24 1027          Sit-Stand Transfer    Sit-Stand [place] (Transfers) contact guard;verbal cues  -CS       Row Name 10/13/24 1027          Functional Mobility    Functional Mobility- Comment CGA ([place]  with increased fatigue)  -CS     Patient was able to Ambulate yes  -CS       Row Name 10/13/24 1027          Activities of Daily Living    BADL Assessment/Intervention lower body dressing;feeding  -CS       Row Name 10/13/24 1027          Lower Body Dressing Assessment/Training    [place] Level (Lower Body Dressing) don;shoes/slippers;maximum assist (25% patient effort)  -CS     Position (Lower Body Dressing) edge of bed sitting  -CS     Comment, (Lower Body Dressing) L boot, Ind limited by poor bimanual skills 2/2 LUE weakness  -CS               User Key  (r) = Recorded By, (t) = Taken By, (c) = Cosigned By      Initials Name Provider Type    Ignacio Leon OT Occupational Therapist                   Obj/Interventions       Row Name 10/13/24 1103          Motor Skills    Motor Skills functional endurance  -     Functional Endurance moderate, easily fatigued  -     Therapeutic Exercise shoulder;elbow/forearm;wrist  verbally reviewed self-AAROM ther-ex Pt is familiar with, emphasized compliance for optimal outcomes  -       Row Name 10/13/24 1103          Balance    Balance Assessment sitting static balance;sitting dynamic balance;standing dynamic balance;standing static balance  -     Static Sitting Balance supervision  -     Dynamic Sitting Balance standby assist  -CS     Position, Sitting Balance unsupported;sitting edge of bed  -CS     Static Standing Balance contact guard  -CS     Dynamic Standing Balance minimal assist  -CS     Position/Device Used, Standing Balance supported  -CS     Balance Interventions sitting;standing;occupation based/functional task;sit to stand  -CS     Comment, Balance RUE support for all standing activities  -CS               User Key  (r) = Recorded By, (t) = Taken By, (c) = Cosigned By      Initials Name Provider Type    Ignacio Leon OT Occupational Therapist                   Goals/Plan    No documentation.                  Clinical Impression       Row  Name 10/13/24 1104          Pain Assessment    Additional Documentation Pain Scale: FACES Pre/Post-Treatment (Group)  -       Row Name 10/13/24 1104          Pain Scale: FACES Pre/Post-Treatment    Pain: FACES Scale, Pretreatment 0-->no hurt  -CS     Posttreatment Pain Rating 0-->no hurt  -CS       Row Name 10/13/24 1104          Plan of Care Review    Plan of Care Reviewed With patient;family  -CS     Progress no change  -CS     Outcome Evaluation Baseline ADL completion and related mobility remains limited by ongoing LLE healing (boot AAT, antalgic gait complicated by baseline L weakness), LUE weakness (paretic), and decreased functional endurance - will cont IPOT per POC as tolerated. Good effort during session, ModA for LB tasks, bimanual skills limited by incomplete motor return to LUE, reviewed self-AAROM ther-ex. Pt would benefit from IRF at discharge for best functional outcomes.  -       Row Name 10/13/24 1104          Therapy Plan Review/Discharge Plan (OT)    Anticipated Discharge Disposition (OT) inpatient rehabilitation facility  -       Row Name 10/13/24 1104          Vital Signs    Pre Systolic BP Rehab --  tx  -CS     Pre Patient Position Supine  -CS     Intra Patient Position Standing  -CS     Post Patient Position Sitting  -CS       Row Name 10/13/24 1104          Positioning and Restraints    Pre-Treatment Position in bed  -CS     Post Treatment Position chair  -CS     In Chair notified nsg;reclined;sitting;call light within reach;encouraged to call for assist;exit alarm on;LUE elevated;RUE elevated;legs elevated;on mechanical lift sling;waffle cushion  -CS               User Key  (r) = Recorded By, (t) = Taken By, (c) = Cosigned By      Initials Name Provider Type    Ignacio Leon, OT Occupational Therapist                   Outcome Measures       Row Name 10/13/24 1107          How much help from another is currently needed...    Putting on and taking off regular lower body clothing?  2  -CS     Bathing (including washing, rinsing, and drying) 2  -CS     Toileting (which includes using toilet bed pan or urinal) 3  -CS     Putting on and taking off regular upper body clothing 3  -CS     Taking care of personal grooming (such as brushing teeth) 3  -CS     Eating meals 3  -CS     AM-PAC 6 Clicks Score (OT) 16  -CS       Row Name 10/13/24 1107          Functional Assessment    Outcome Measure Options AM-PAC 6 Clicks Daily Activity (OT)  -CS               User Key  (r) = Recorded By, (t) = Taken By, (c) = Cosigned By      Initials Name Provider Type    CS Ignacio Valentine OT Occupational Therapist                    Occupational Therapy Education       Title: PT OT SLP Therapies (In Progress)       Topic: Occupational Therapy (Done)       Point: ADL training (Done)       Description:   Instruct learner(s) on proper safety adaptation and remediation techniques during self care or transfers.   Instruct in proper use of assistive devices.                  Learning Progress Summary            Patient Acceptance, E,D, VU,DU by LISA at 10/13/2024 1108    Acceptance, E, VU by GLENIS at 10/10/2024 1502   Family Acceptance, E, VU by GLENIS at 10/10/2024 1502                      Point: Home exercise program (Done)       Description:   Instruct learner(s) on appropriate technique for monitoring, assisting and/or progressing therapeutic exercises/activities.                  Learning Progress Summary            Patient Acceptance, E,D, VU,DU by LISA at 10/13/2024 1108                      Point: Precautions (Done)       Description:   Instruct learner(s) on prescribed precautions during self-care and functional transfers.                  Learning Progress Summary            Patient Acceptance, E,D, VU,DU by LISA at 10/13/2024 1108    Acceptance, E, VU by GLENIS at 10/10/2024 1502   Family Acceptance, E, VU by GLENIS at 10/10/2024 1502                      Point: Body mechanics (Done)       Description:   Instruct learner(s) on  proper positioning and spine alignment during self-care, functional mobility activities and/or exercises.                  Learning Progress Summary            Patient Acceptance, E,D, VU,DU by LISA at 10/13/2024 1108    Acceptance, E, VU by  at 10/10/2024 1502   Family Acceptance, E, VU by  at 10/10/2024 1502                                      User Key       Initials Effective Dates Name Provider Type Discipline    LISA 06/16/21 -  Ignacio Valentine OT Occupational Therapist OT    GLENIS 08/26/24 -  Eva Apodaca OT Occupational Therapist OT                  OT Recommendation and Plan     Plan of Care Review  Plan of Care Reviewed With: patient, family  Progress: no change  Outcome Evaluation: Baseline ADL completion and related mobility remains limited by ongoing LLE healing (boot AAT, antalgic gait complicated by baseline L weakness), LUE weakness (paretic), and decreased functional endurance - will cont IPOT per POC as tolerated. Good effort during session, ModA for LB tasks, bimanual skills limited by incomplete motor return to LUE, reviewed self-AAROM ther-ex. Pt would benefit from IRF at discharge for best functional outcomes.     Time Calculation:         Time Calculation- OT       Row Name 10/13/24 1101             Time Calculation- OT    OT Start Time 0955  -CS      OT Received On 10/13/24  -CS      OT Goal Re-Cert Due Date 10/20/24  -CS         Timed Charges    03961 - OT Therapeutic Activity Minutes 12  -CS      95567 - OT Self Care/Mgmt Minutes 6  -CS         Total Minutes    Timed Charges Total Minutes 18  -CS       Total Minutes 18  -CS                User Key  (r) = Recorded By, (t) = Taken By, (c) = Cosigned By      Initials Name Provider Type     Ignacio Valentine OT Occupational Therapist                  Therapy Charges for Today       Code Description Service Date Service Provider Modifiers Qty    94740142208  OT THERAPEUTIC ACT EA 15 MIN 10/13/2024 Ignacio Valentine OT GO 1                  Ignacio Valentine, OT  10/13/2024    Electronically signed by Ignacio Valentine, OT at 10/13/24 5593

## 2024-10-14 NOTE — PROGRESS NOTES
[facility] Medicine Services  PROGRESS NOTE    Patient Name: Dalila Pike  : 1970  MRN: [id number]    Date of Admission: 10/7/2024  Primary Care Physician: Agnes Ewing APRN    Subjective   Subjective     CC:  Abd pain     HPI:  No acute events.  States that she is overall feeling okay.  Still having abdominal pain.  Reviewed pathology and need for repeat ERCP.      Objective   Objective     Vital Signs:   Temp:  [97.2 °F (36.2 °C)-98.7 °F (37.1 °C)] 97.2 °F (36.2 °C)  Heart Rate:  [71-93] 81  Resp:  [16] 16  BP: (103-137)/(69-97) 103/69     Physical Exam:  Constitutional: No acute distress, awake, alert  Respiratory: Clear to auscultation bilaterally, respiratory effort normal   Cardiovascular: RRR, no murmurs  Gastrointestinal: Positive bowel sounds, soft, mild tenderness   Musculoskeletal: No bilateral ankle edema  Psychiatric: Appropriate affect, cooperative  Neurologic: Oriented x 3, strength symmetric in all extremities, Cranial Nerves grossly intact to confrontation, speech clear      Results Reviewed:  LAB RESULTS:      Lab 10/14/24  0541 10/12/24  0537 10/09/24  0454 10/08/24  0607 10/07/24  1608   WBC 6.46 6.56 6.87 5.53 8.02   HEMOGLOBIN 12.5 13.3 14.3 13.2 14.3   HEMATOCRIT 37.7 39.4 41.9 39.6 42.9   PLATELETS 297 265 227 197 236   NEUTROS ABS  --   --   --   --  5.87   IMMATURE GRANS (ABS)  --   --   --   --  0.02   LYMPHS ABS  --   --   --   --  1.53   MONOS ABS  --   --   --   --  0.53   EOS ABS  --   --   --   --  0.04   MCV 92.0 91.8 90.5 92.3 92.7   PROCALCITONIN  --   --   --   --  0.03   LACTATE  --   --   --   --  1.4         Lab 10/14/24  0541 10/12/24  0537 10/11/24  1737 10/11/24  0809 10/10/24  0535 10/09/24  0455 10/08/24  0607   SODIUM 138 139  --  138 140 135* 139   POTASSIUM 3.5 3.7 4.3 3.5 4.0 4.4 3.9   CHLORIDE 105 104  --  105 105 103 106   CO2 24.0 24.0  --  24.0 24.0 21.0* 25.0   ANION GAP 9.0 11.0  --  9.0 11.0 11.0 8.0   BUN 9 14  --  13  12 15 13   CREATININE 0.67 0.62  --  0.62 0.62 0.65 0.64   EGFR 104.0 106.0  --  106.0 106.6 105.4 105.8   GLUCOSE 142* 125*  --  131* 111* 152* 118*   CALCIUM 9.3 9.4  --  9.4 9.3 9.2 8.9   MAGNESIUM  --  2.2  --   --   --   --   --    HEMOGLOBIN A1C  --   --   --   --   --   --  5.50   TSH  --   --   --   --   --   --  0.447         Lab 10/14/24  0541 10/12/24  0537 10/11/24  0809 10/10/24  0535 10/09/24  0455   TOTAL PROTEIN 6.3 6.3 6.6 6.1 6.5   ALBUMIN 3.4* 3.6 3.7 3.8 3.7   GLOBULIN 2.9 2.7 2.9 2.3 2.8   ALT (SGPT) 14 25 36* 48* 60*   AST (SGOT) 13 17 23 34* 54*   BILIRUBIN 0.4 0.6 0.9 0.7 1.3*   ALK PHOS 219* 236* 251* 249* 243*             Lab 10/08/24  0607   CHOLESTEROL 144   LDL CHOL 75   HDL CHOL 46   TRIGLYCERIDES 130             Brief Urine Lab Results  (Last result in the past 365 days)        Color   Clarity   Blood   Leuk Est   Nitrite   Protein   CREAT   Urine HCG        10/08/24 0317 Yellow   Clear   Negative   Negative   Negative   Negative                   Microbiology Results Abnormal       Procedure Component Value - Date/Time    Blood Culture - Blood, Hand, Right [[id number]]  (Normal) Collected: 10/07/24 1618    Lab Status: Final result Specimen: Blood from Hand, Right Updated: 10/12/24 1645     Blood Culture No growth at 5 days    Narrative:      Less than seven (7) mL's of blood was collected.  Insufficient quantity may yield false negative results.    Blood Culture - Blood, Arm, Right [[id number]]  (Normal) Collected: 10/07/24 1609    Lab Status: Final result Specimen: Blood from Arm, Right Updated: 10/12/24 1645     Blood Culture No growth at 5 days    COVID PRE-OP / PRE-PROCEDURE SCREENING ORDER (NO ISOLATION) - Swab, Nasopharynx [[id number]]  (Normal) Collected: 10/07/24 1750    Lab Status: Final result Specimen: Swab from Nasopharynx Updated: 10/07/24 9726    Narrative:      The following orders were created for panel order COVID PRE-OP / PRE-PROCEDURE SCREENING ORDER (NO ISOLATION) -  Swab, Nasopharynx.  Procedure                               Abnormality         Status                     ---------                               -----------         ------                     Respiratory Panel PCR w/...[[id number]]  Normal              Final result                 Please view results for these tests on the individual orders.    Respiratory Panel PCR w/COVID-19(SARS-CoV-2) MICKEY/AKIL/MARIO/PAD/COR/SOULEYMANE In-House, NP Swab in UTM/VTM, 2 HR TAT - Swab, Nasopharynx [[id number]]  (Normal) Collected: 10/07/24 1750    Lab Status: Final result Specimen: Swab from Nasopharynx Updated: 10/07/24 1118     ADENOVIRUS, PCR Not Detected     Coronavirus 229E Not Detected     Coronavirus HKU1 Not Detected     Coronavirus NL63 Not Detected     Coronavirus OC43 Not Detected     COVID19 Not Detected     Human Metapneumovirus Not Detected     Human Rhinovirus/Enterovirus Not Detected     Influenza A PCR Not Detected     Influenza B PCR Not Detected     Parainfluenza Virus 1 Not Detected     Parainfluenza Virus 2 Not Detected     Parainfluenza Virus 3 Not Detected     Parainfluenza Virus 4 Not Detected     RSV, PCR Not Detected     Bordetella pertussis pcr Not Detected     Bordetella parapertussis PCR Not Detected     Chlamydophila pneumoniae PCR Not Detected     Mycoplasma pneumo by PCR Not Detected    Narrative:      In the setting of a positive respiratory panel with a viral infection PLUS a negative procalcitonin without other underlying concern for bacterial infection, consider observing off antibiotics or discontinuation of antibiotics and continue supportive care. If the respiratory panel is positive for atypical bacterial infection (Bordetella pertussis, Chlamydophila pneumoniae, or Mycoplasma pneumoniae), consider antibiotic de-escalation to target atypical bacterial infection.            XR Ankle 3+ View Left    Result Date: 10/12/2024  XR ANKLE 3+ VW LEFT Date of Exam: 10/12/2024 5:04 PM EDT Indication: Recent ankle  fracture, new fall, pain Comparison: 10/9/2024 Findings: Again seen is minimally displaced fracture of the distal fibula, medial malleolus and also likely the anterior tibial plafond. No definite posterior malleolar fracture. No new fracture seen. Plantar calcaneal spurring and Achilles enthesopathy. Ankle mortise appears symmetric. Talar dome appears intact. No traumatic malalignment on this nonweightbearing exam. Degenerative changes predominate of the midfoot.     Impression: Impression: Again seen are minimal displaced fracture of the distal fibula and medial malleolus and also likely of the anterior tibial plafond similar to most recent prior examination. No new fracture seen. Electronically Signed: Javier Javed MD  10/12/2024 5:40 PM EDT  Workstation ID: [id number]     Results for orders placed during the hospital encounter of 08/21/24    Adult Transthoracic Echo Complete W/ Cont if Necessary Per Protocol (With Agitated Saline)    Interpretation Summary    Left ventricular systolic function is normal. Calculated left ventricular EF = 68% Normal left ventricular cavity size noted. Left ventricular wall thickness is consistent with moderate concentric hypertrophy. All left ventricular wall segments contract normally. Left ventricular diastolic function was normal. Normal left atrial pressure.    The right ventricular cavity is mildly dilated. Normal right ventricular systolic function noted.    Left atrial volume is moderately increased. Saline test results are negative.    The aortic valve is structurally normal with no stenosis present. The aortic valve appears trileaflet. No significant aortic valve regurgitation is present.    The mitral valve is structurally normal with no significant stenosis present. Trace to mild mitral valve regurgitation is present.    Mild tricuspid valve regurgitation is present. Estimated right ventricular systolic pressure from tricuspid regurgitation is mildly elevated (35-45  mmHg)    Mild dilation of the ascending aorta is present. Ascending aorta = 3.7 cm      Current medications:  Scheduled Meds:aspirin, 81 mg, Oral, Daily  atorvastatin, 80 mg, Oral, Nightly  cefTRIAXone, 2,000 mg, Intravenous, Q24H  folic acid, 1 mg, Oral, Daily  guaifenesin, 400 mg, Oral, Q8H  heparin (porcine), 5,000 Units, Subcutaneous, Q8H  lansoprazole, 15 mg, Oral, Q AM  lisinopril, 20 mg, Oral, Daily  magnesium oxide, 400 mg, Oral, Daily  metroNIDAZOLE, 500 mg, Oral, Q8H  nicotine, 1 patch, Transdermal, Q24H  sodium chloride, 10 mL, Intravenous, Q12H  thiamine, 100 mg, Oral, Daily  ticagrelor, 60 mg, Oral, BID  topiramate, 25 mg, Oral, Nightly  traZODone, 50 mg, Oral, Nightly  ursodiol, 300 mg, Oral, BID      Continuous Infusions:   PRN Meds:.  acetaminophen **OR** acetaminophen **OR** acetaminophen    aluminum-magnesium hydroxide-simethicone    senna-docusate sodium **AND** polyethylene glycol **AND** bisacodyl **AND** bisacodyl    Calcium Replacement - Follow Nurse / BPA Driven Protocol    HYDROcodone-acetaminophen    hydrOXYzine    Magnesium Standard Dose Replacement - Follow Nurse / BPA Driven Protocol    [] HYDROmorphone **AND** naloxone    nicotine polacrilex    ondansetron ODT **OR** ondansetron    Phosphorus Replacement - Follow Nurse / BPA Driven Protocol    Potassium Replacement - Follow Nurse / BPA Driven Protocol    sodium chloride    Assessment & Plan   Assessment & Plan     Active Hospital Problems    Diagnosis  POA    **RUQ pain [R10.11]  Yes    Cholangitis [K83.09]  Yes    Dilated cbd, acquired [K83.8]  Unknown    HTN (hypertension) [I10]  Yes    History of seizures [Z87.898]  Yes    Alcohol use [Z78.9]  Yes    Obesity (BMI 30-39.9) [E66.9]  Yes    Dyslipidemia [E78.5]  Yes    PAD (peripheral artery disease) [I73.9]  Yes    Tobacco use [Z72.0]  Yes      Resolved Hospital Problems   No resolved problems to display.        Brief Hospital Course to date:  Dalila Pike is a 54 y.o. female  with past medical history of HTN, HL, PAD, obesity, seizures, ETOH use, R MCA CVA s/p thrombectomy/R ICA stent with residual L weakness, and recent L ankle fracture who presented on 10/7/2024 with RUQ pain.     This patient's problems and plans were partially entered by my partner and updated as appropriate by me 10/13/24.     RUQ pain  -MRCP with mildly dilated biliary tree with blunted appearance of the common bile duct of the ampulla; no obvious filling defect  -s/p ERCP with papillary stenosis, s/p sphincterotomy/stent  -Brushings in the lower bile duct with no evidence of cancer; stomach biopsies were normal and esophageal biopsies with changes related to reflux  -Recommend repeat ERCP in 6 to 8 weeks  -General surgery consulted for CCY, not recommended at this time  -GI recommending continued PPI, completing course of antibiotics, with follow up 6 weeks for repeat ERCP/stent removal  -Continue ceftriaxone/flagyl, plan to continue through 10/14  - Continue PPI      Recent CVA s/p thrombectomy/R ICA stent  -on ASA/brilinta/statin  -Follow-up stroke neurology as previously recommended     HTN  - lisinoril. Stable.      HL  -statin     ETOH use  -s/p CIWA   -monitor for withdrawal     R ankle fracture  -followed by Dr. Gonzalez, with recommended CAM boot in place  -Ortho has seen, recommended WBAT with boot x1 month, follow up with Dr. Yang in 1 month.      Tobacco abuse    Expected Discharge Location and Transportation: rehab  Expected Discharge   Expected Discharge Date: 10/14/2024; Expected Discharge Time:      VTE Prophylaxis:  Pharmacologic VTE prophylaxis orders are present.         AM-PAC 6 Clicks Score (PT): 20 (10/13/24 2000)    CODE STATUS:   Code Status and Medical Interventions: CPR (Attempt to Resuscitate); Full Support   Ordered at: 10/07/24 5636     Level Of Support Discussed With:    Patient     Code Status (Patient has no pulse and is not breathing):    CPR (Attempt to Resuscitate)      Medical Interventions (Patient has pulse or is breathing):    Full Support       Brook Overton,   10/14/24

## 2024-10-14 NOTE — PAYOR COMM NOTE
"[facility]   phone: [phone]  fax: [phone]    Updated clinical    Dalila Ocasio (54 y.o. Female)       Date of Birth   1970    Social Security Number       Address   [address] [place] [address]    Home Phone   [phone]    MRN   [id number]       Faith   None    Marital Status                               Admission Date   10/7/24    Admission Type   Elective    Admitting Provider   Brook Overton DO    Attending Provider   Brook Overton DO    Department, Room/Bed   [facility] 5B, N541/1       Discharge Date       Discharge Disposition       Discharge Destination                                 Attending Provider: Brook Overton DO    Allergies: Erythromycin, Latex, Toradol [Ketorolac Tromethamine], Contrast Dye (Echo Or Unknown Ct/mr)    Isolation: None   Infection: None   Code Status: CPR    Ht: 172.7 cm (68\")   Wt: 110 kg (243 lb)    Admission Cmt: None   Principal Problem: RUQ pain [R10.11]                   Active Insurance as of 10/7/2024       Primary Coverage       Payor Plan Insurance Group Employer/Plan Group    ANTHEM MEDICAID ANTH MEDICAID [id number]       Payor Plan Address Payor Plan Phone Number Payor Plan Fax Number Effective Dates    [address] [address] [phone]  7/2/2019 - None Entered    [facility] [id number]         Subscriber Name Subscriber Birth Date Member ID       DALILA OCASIO 1970 [id number]                     Emergency Contacts        (Rel.) Home Phone Work Phone Mobile Phone    MaryanaAbiel (Spouse) [phone] -- [phone]    GenesisRashad (Son) [phone] -- [phone]              Lab Results (last 24 hours)       Procedure Component Value Units Date/Time    Comprehensive Metabolic Panel [[id number]]  (Abnormal) Collected: 10/14/24 0541    Specimen: Blood Updated: 10/14/24 0708     Glucose 142 mg/dL      BUN 9 mg/dL      Creatinine 0.67 mg/dL      Sodium 138 mmol/L      Potassium 3.5 " mmol/L      Chloride 105 mmol/L      CO2 24.0 mmol/L      Calcium 9.3 mg/dL      Total Protein 6.3 g/dL      Albumin 3.4 g/dL      ALT (SGPT) 14 U/L      AST (SGOT) 13 U/L      Alkaline Phosphatase 219 U/L      Total Bilirubin 0.4 mg/dL      Globulin 2.9 gm/dL      Comment: Calculated Result        A/G Ratio 1.2 g/dL      BUN/Creatinine Ratio 13.4     Anion Gap 9.0 mmol/L      eGFR 104.0 mL/min/1.73     Narrative:      GFR Normal >60  Chronic Kidney Disease <60  Kidney Failure <15      CBC (No Diff) [[id number]]  (Abnormal) Collected: 10/14/24 0541    Specimen: Blood Updated: 10/14/24 0645     WBC 6.46 10*3/mm3      RBC 4.10 10*6/mm3      Hemoglobin 12.5 g/dL      Hematocrit 37.7 %      MCV 92.0 fL      MCH 30.5 pg      MCHC 33.2 g/dL      RDW 11.4 %      RDW-SD 39.0 fl      MPV 10.2 fL      Platelets 297 10*3/mm3           Imaging Results (Last 24 Hours)       ** No results found for the last 24 hours. **             Physician Progress Notes (last 24 hours)        Brook Overton DO at 10/14/24 0758              [facility] Medicine Services  PROGRESS NOTE    Patient Name: Dalila Pike  : 1970  MRN: [id number]    Date of Admission: 10/7/2024  Primary Care Physician: Agnes Ewing APRN    Subjective   Subjective     CC:  Abd pain     HPI:  No acute events.  States that she is overall feeling okay.  Still having abdominal pain.  Reviewed pathology and need for repeat ERCP.      Objective   Objective     Vital Signs:   Temp:  [97.2 °F (36.2 °C)-98.7 °F (37.1 °C)] 97.2 °F (36.2 °C)  Heart Rate:  [71-93] 81  Resp:  [16] 16  BP: (103-137)/(69-97) 103/69     Physical Exam:  Constitutional: No acute distress, awake, alert  Respiratory: Clear to auscultation bilaterally, respiratory effort normal   Cardiovascular: RRR, no murmurs  Gastrointestinal: Positive bowel sounds, soft, mild tenderness   Musculoskeletal: No bilateral ankle edema  Psychiatric: Appropriate affect,  cooperative  Neurologic: Oriented x 3, strength symmetric in all extremities, Cranial Nerves grossly intact to confrontation, speech clear      Results Reviewed:  LAB RESULTS:      Lab 10/14/24  0541 10/12/24  0537 10/09/24  0454 10/08/24  0607 10/07/24  1608   WBC 6.46 6.56 6.87 5.53 8.02   HEMOGLOBIN 12.5 13.3 14.3 13.2 14.3   HEMATOCRIT 37.7 39.4 41.9 39.6 42.9   PLATELETS 297 265 227 197 236   NEUTROS ABS  --   --   --   --  5.87   IMMATURE GRANS (ABS)  --   --   --   --  0.02   LYMPHS ABS  --   --   --   --  1.53   MONOS ABS  --   --   --   --  0.53   EOS ABS  --   --   --   --  0.04   MCV 92.0 91.8 90.5 92.3 92.7   PROCALCITONIN  --   --   --   --  0.03   LACTATE  --   --   --   --  1.4         Lab 10/14/24  0541 10/12/24  0537 10/11/24  1737 10/11/24  0809 10/10/24  0535 10/09/24  0455 10/08/24  0607   SODIUM 138 139  --  138 140 135* 139   POTASSIUM 3.5 3.7 4.3 3.5 4.0 4.4 3.9   CHLORIDE 105 104  --  105 105 103 106   CO2 24.0 24.0  --  24.0 24.0 21.0* 25.0   ANION GAP 9.0 11.0  --  9.0 11.0 11.0 8.0   BUN 9 14  --  13 12 15 13   CREATININE 0.67 0.62  --  0.62 0.62 0.65 0.64   EGFR 104.0 106.0  --  106.0 106.6 105.4 105.8   GLUCOSE 142* 125*  --  131* 111* 152* 118*   CALCIUM 9.3 9.4  --  9.4 9.3 9.2 8.9   MAGNESIUM  --  2.2  --   --   --   --   --    HEMOGLOBIN A1C  --   --   --   --   --   --  5.50   TSH  --   --   --   --   --   --  0.447         Lab 10/14/24  0541 10/12/24  0537 10/11/24  0809 10/10/24  0535 10/09/24  0455   TOTAL PROTEIN 6.3 6.3 6.6 6.1 6.5   ALBUMIN 3.4* 3.6 3.7 3.8 3.7   GLOBULIN 2.9 2.7 2.9 2.3 2.8   ALT (SGPT) 14 25 36* 48* 60*   AST (SGOT) 13 17 23 34* 54*   BILIRUBIN 0.4 0.6 0.9 0.7 1.3*   ALK PHOS 219* 236* 251* 249* 243*             Lab 10/08/24  0607   CHOLESTEROL 144   LDL CHOL 75   HDL CHOL 46   TRIGLYCERIDES 130             Brief Urine Lab Results  (Last result in the past 365 days)        Color   Clarity   Blood   Leuk Est   Nitrite   Protein   CREAT   Urine HCG         10/08/24 0317 Yellow   Clear   Negative   Negative   Negative   Negative                   Microbiology Results Abnormal       Procedure Component Value - Date/Time    Blood Culture - Blood, Hand, Right [[id number]]  (Normal) Collected: 10/07/24 1618    Lab Status: Final result Specimen: Blood from Hand, Right Updated: 10/12/24 1645     Blood Culture No growth at 5 days    Narrative:      Less than seven (7) mL's of blood was collected.  Insufficient quantity may yield false negative results.    Blood Culture - Blood, Arm, Right [[id number]]  (Normal) Collected: 10/07/24 1609    Lab Status: Final result Specimen: Blood from Arm, Right Updated: 10/12/24 1645     Blood Culture No growth at 5 days    COVID PRE-OP / PRE-PROCEDURE SCREENING ORDER (NO ISOLATION) - Swab, Nasopharynx [[id number]]  (Normal) Collected: 10/07/24 1750    Lab Status: Final result Specimen: Swab from Nasopharynx Updated: 10/07/24 1849    Narrative:      The following orders were created for panel order COVID PRE-OP / PRE-PROCEDURE SCREENING ORDER (NO ISOLATION) - Swab, Nasopharynx.  Procedure                               Abnormality         Status                     ---------                               -----------         ------                     Respiratory Panel PCR w/...[[id number]]  Normal              Final result                 Please view results for these tests on the individual orders.    Respiratory Panel PCR w/COVID-19(SARS-CoV-2) MICKEY/AKIL/MARIO/PAD/COR/SOULEYMANE In-House, NP Swab in UTM/VTM, 2 HR TAT - Swab, Nasopharynx [[id number]]  (Normal) Collected: 10/07/24 1750    Lab Status: Final result Specimen: Swab from Nasopharynx Updated: 10/07/24 1849     ADENOVIRUS, PCR Not Detected     Coronavirus 229E Not Detected     Coronavirus HKU1 Not Detected     Coronavirus NL63 Not Detected     Coronavirus OC43 Not Detected     COVID19 Not Detected     Human Metapneumovirus Not Detected     Human Rhinovirus/Enterovirus Not Detected     Influenza A  PCR Not Detected     Influenza B PCR Not Detected     Parainfluenza Virus 1 Not Detected     Parainfluenza Virus 2 Not Detected     Parainfluenza Virus 3 Not Detected     Parainfluenza Virus 4 Not Detected     RSV, PCR Not Detected     Bordetella pertussis pcr Not Detected     Bordetella parapertussis PCR Not Detected     Chlamydophila pneumoniae PCR Not Detected     Mycoplasma pneumo by PCR Not Detected    Narrative:      In the setting of a positive respiratory panel with a viral infection PLUS a negative procalcitonin without other underlying concern for bacterial infection, consider observing off antibiotics or discontinuation of antibiotics and continue supportive care. If the respiratory panel is positive for atypical bacterial infection (Bordetella pertussis, Chlamydophila pneumoniae, or Mycoplasma pneumoniae), consider antibiotic de-escalation to target atypical bacterial infection.            XR Ankle 3+ View Left    Result Date: 10/12/2024  XR ANKLE 3+ VW LEFT Date of Exam: 10/12/2024 5:04 PM EDT Indication: Recent ankle fracture, new fall, pain Comparison: 10/9/2024 Findings: Again seen is minimally displaced fracture of the distal fibula, medial malleolus and also likely the anterior tibial plafond. No definite posterior malleolar fracture. No new fracture seen. Plantar calcaneal spurring and Achilles enthesopathy. Ankle mortise appears symmetric. Talar dome appears intact. No traumatic malalignment on this nonweightbearing exam. Degenerative changes predominate of the midfoot.     Impression: Impression: Again seen are minimal displaced fracture of the distal fibula and medial malleolus and also likely of the anterior tibial plafond similar to most recent prior examination. No new fracture seen. Electronically Signed: Javier Javed MD  10/12/2024 5:40 PM EDT  Workstation ID: [id number]     Results for orders placed during the hospital encounter of 08/21/24    Adult Transthoracic Echo Complete W/ Cont  if Necessary Per Protocol (With Agitated Saline)    Interpretation Summary    Left ventricular systolic function is normal. Calculated left ventricular EF = 68% Normal left ventricular cavity size noted. Left ventricular wall thickness is consistent with moderate concentric hypertrophy. All left ventricular wall segments contract normally. Left ventricular diastolic function was normal. Normal left atrial pressure.    The right ventricular cavity is mildly dilated. Normal right ventricular systolic function noted.    Left atrial volume is moderately increased. Saline test results are negative.    The aortic valve is structurally normal with no stenosis present. The aortic valve appears trileaflet. No significant aortic valve regurgitation is present.    The mitral valve is structurally normal with no significant stenosis present. Trace to mild mitral valve regurgitation is present.    Mild tricuspid valve regurgitation is present. Estimated right ventricular systolic pressure from tricuspid regurgitation is mildly elevated (35-45 mmHg)    Mild dilation of the ascending aorta is present. Ascending aorta = 3.7 cm      Current medications:  Scheduled Meds:aspirin, 81 mg, Oral, Daily  atorvastatin, 80 mg, Oral, Nightly  cefTRIAXone, 2,000 mg, Intravenous, Q24H  folic acid, 1 mg, Oral, Daily  guaifenesin, 400 mg, Oral, Q8H  heparin (porcine), 5,000 Units, Subcutaneous, Q8H  lansoprazole, 15 mg, Oral, Q AM  lisinopril, 20 mg, Oral, Daily  magnesium oxide, 400 mg, Oral, Daily  metroNIDAZOLE, 500 mg, Oral, Q8H  nicotine, 1 patch, Transdermal, Q24H  sodium chloride, 10 mL, Intravenous, Q12H  thiamine, 100 mg, Oral, Daily  ticagrelor, 60 mg, Oral, BID  topiramate, 25 mg, Oral, Nightly  traZODone, 50 mg, Oral, Nightly  ursodiol, 300 mg, Oral, BID      Continuous Infusions:   PRN Meds:.  acetaminophen **OR** acetaminophen **OR** acetaminophen    aluminum-magnesium hydroxide-simethicone    senna-docusate sodium **AND**  polyethylene glycol **AND** bisacodyl **AND** bisacodyl    Calcium Replacement - Follow Nurse / BPA Driven Protocol    HYDROcodone-acetaminophen    hydrOXYzine    Magnesium Standard Dose Replacement - Follow Nurse / BPA Driven Protocol    [] HYDROmorphone **AND** naloxone    nicotine polacrilex    ondansetron ODT **OR** ondansetron    Phosphorus Replacement - Follow Nurse / BPA Driven Protocol    Potassium Replacement - Follow Nurse / BPA Driven Protocol    sodium chloride    Assessment & Plan   Assessment & Plan     Active Hospital Problems    Diagnosis  POA    **RUQ pain [R10.11]  Yes    Cholangitis [K83.09]  Yes    Dilated cbd, acquired [K83.8]  Unknown    HTN (hypertension) [I10]  Yes    History of seizures [Z87.898]  Yes    Alcohol use [Z78.9]  Yes    Obesity (BMI 30-39.9) [E66.9]  Yes    Dyslipidemia [E78.5]  Yes    PAD (peripheral artery disease) [I73.9]  Yes    Tobacco use [Z72.0]  Yes      Resolved Hospital Problems   No resolved problems to display.        Brief Hospital Course to date:  Dalila Pike is a 54 y.o. female with past medical history of HTN, HL, PAD, obesity, seizures, ETOH use, R MCA CVA s/p thrombectomy/R ICA stent with residual L weakness, and recent L ankle fracture who presented on 10/7/2024 with RUQ pain.     This patient's problems and plans were partially entered by my partner and updated as appropriate by me 10/13/24.     RUQ pain  -MRCP with mildly dilated biliary tree with blunted appearance of the common bile duct of the ampulla; no obvious filling defect  -s/p ERCP with papillary stenosis, s/p sphincterotomy/stent  -Brushings in the lower bile duct with no evidence of cancer; stomach biopsies were normal and esophageal biopsies with changes related to reflux  -Recommend repeat ERCP in 6 to 8 weeks  -General surgery consulted for CCY, not recommended at this time  -GI recommending continued PPI, completing course of antibiotics, with follow up 6 weeks for repeat ERCP/stent  removal  -Continue ceftriaxone/flagyl, plan to continue through 10/14  - Continue PPI      Recent CVA s/p thrombectomy/R ICA stent  -on ASA/brilinta/statin  -Follow-up stroke neurology as previously recommended     HTN  - lisinoril. Stable.      HL  -statin     ETOH use  -s/p CIWA   -monitor for withdrawal     R ankle fracture  -followed by Dr. Gonzalez, with recommended CAM boot in place  -Ortho has seen, recommended WBAT with boot x1 month, follow up with Dr. Yang in 1 month.      Tobacco abuse    Expected Discharge Location and Transportation: rehab  Expected Discharge   Expected Discharge Date: 10/14/2024; Expected Discharge Time:      VTE Prophylaxis:  Pharmacologic VTE prophylaxis orders are present.         AM-PAC 6 Clicks Score (PT): 20 (10/13/24 2000)    CODE STATUS:   Code Status and Medical Interventions: CPR (Attempt to Resuscitate); Full Support   Ordered at: 10/07/24 4195     Level Of Support Discussed With:    Patient     Code Status (Patient has no pulse and is not breathing):    CPR (Attempt to Resuscitate)     Medical Interventions (Patient has pulse or is breathing):    Full Support       Brook Overton DO  10/14/24        Electronically signed by Brook Overton DO at 10/14/24 0803       Elie Sanders MD at 10/13/24 4259          I called and discussed results with patient over the phone this evening.  Please ensure she has repeat ERCP scheduled in 6-8 weeks.   Cytology and pathology results from biliary brushing as follows:    Brushing from the lower bile duct show no evidence of cancer.  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    Biopsies from the stomach were normal.  Biopsies from the esophagus show benign changes related to reflux without evidence for Kaplan's esophagus.   Continue proton pump inhibitor.  Electronically signed by Elie Sanders MD at 10/13/24  1842       Consult Notes (last 24 hours)  Notes from 10/13/24 1250 through 10/14/24 1250   No notes of this type exist for this encounter.

## 2024-10-14 NOTE — PLAN OF CARE
Goal Outcome Evaluation:                                          VSS on room air. Tolerating diet. BM tonight. PRN norco x1. Resting well tonight. Family at bedside. Up with SB with boot. UOP adequate   [Pelvic Pain] : pelvic pain [FreeTextEntry3] : normal size uterus, 8 mm ee, no free fluid, no masses, patient was slightly tender during exam

## 2024-10-15 PROCEDURE — 25010000002 HEPARIN (PORCINE) PER 1000 UNITS: Performed by: INTERNAL MEDICINE

## 2024-10-15 PROCEDURE — 63710000001 ONDANSETRON ODT 4 MG TABLET DISPERSIBLE: Performed by: INTERNAL MEDICINE

## 2024-10-15 PROCEDURE — 99232 SBSQ HOSP IP/OBS MODERATE 35: CPT | Performed by: NURSE PRACTITIONER

## 2024-10-15 RX ADMIN — URSODIOL 300 MG: 300 CAPSULE ORAL at 08:40

## 2024-10-15 RX ADMIN — ONDANSETRON 4 MG: 4 TABLET, ORALLY DISINTEGRATING ORAL at 12:11

## 2024-10-15 RX ADMIN — HEPARIN SODIUM 5000 UNITS: 5000 INJECTION INTRAVENOUS; SUBCUTANEOUS at 05:41

## 2024-10-15 RX ADMIN — METRONIDAZOLE 500 MG: 500 TABLET ORAL at 05:41

## 2024-10-15 RX ADMIN — HYDROXYZINE HYDROCHLORIDE 25 MG: 25 TABLET, FILM COATED ORAL at 12:07

## 2024-10-15 RX ADMIN — FOLIC ACID 1 MG: 1 TABLET ORAL at 08:41

## 2024-10-15 RX ADMIN — GUAIFENESIN 400 MG: 100 LIQUID ORAL at 05:41

## 2024-10-15 RX ADMIN — HYDROCODONE BITARTRATE AND ACETAMINOPHEN 1 TABLET: 5; 325 TABLET ORAL at 05:47

## 2024-10-15 RX ADMIN — LANSOPRAZOLE 15 MG: 15 TABLET, ORALLY DISINTEGRATING ORAL at 05:41

## 2024-10-15 RX ADMIN — GUAIFENESIN 400 MG: 100 LIQUID ORAL at 21:08

## 2024-10-15 RX ADMIN — GUAIFENESIN 400 MG: 100 LIQUID ORAL at 14:44

## 2024-10-15 RX ADMIN — ASPIRIN 81 MG 81 MG: 81 TABLET ORAL at 08:41

## 2024-10-15 RX ADMIN — HYDROCODONE BITARTRATE AND ACETAMINOPHEN 1 TABLET: 5; 325 TABLET ORAL at 21:17

## 2024-10-15 RX ADMIN — TICAGRELOR 60 MG: 60 TABLET ORAL at 08:40

## 2024-10-15 RX ADMIN — HEPARIN SODIUM 5000 UNITS: 5000 INJECTION INTRAVENOUS; SUBCUTANEOUS at 21:09

## 2024-10-15 RX ADMIN — TRAZODONE HYDROCHLORIDE 50 MG: 50 TABLET ORAL at 21:09

## 2024-10-15 RX ADMIN — TICAGRELOR 60 MG: 60 TABLET ORAL at 21:09

## 2024-10-15 RX ADMIN — THIAMINE HCL TAB 100 MG 100 MG: 100 TAB at 08:40

## 2024-10-15 RX ADMIN — MAGNESIUM OXIDE TAB 400 MG (241.3 MG ELEMENTAL MG) 400 MG: 400 (241.3 MG) TAB at 08:40

## 2024-10-15 RX ADMIN — LISINOPRIL 20 MG: 20 TABLET ORAL at 08:41

## 2024-10-15 RX ADMIN — HEPARIN SODIUM 5000 UNITS: 5000 INJECTION INTRAVENOUS; SUBCUTANEOUS at 14:44

## 2024-10-15 RX ADMIN — ACETAMINOPHEN 650 MG: 325 TABLET ORAL at 10:30

## 2024-10-15 RX ADMIN — ATORVASTATIN CALCIUM 80 MG: 40 TABLET, FILM COATED ORAL at 21:09

## 2024-10-15 RX ADMIN — TOPIRAMATE 25 MG: 25 TABLET, FILM COATED ORAL at 21:09

## 2024-10-15 RX ADMIN — HYDROCODONE BITARTRATE AND ACETAMINOPHEN 1 TABLET: 5; 325 TABLET ORAL at 12:06

## 2024-10-15 RX ADMIN — URSODIOL 300 MG: 300 CAPSULE ORAL at 21:09

## 2024-10-15 RX ADMIN — NICOTINE 1 PATCH: 21 PATCH TRANSDERMAL at 14:48

## 2024-10-15 RX ADMIN — Medication 10 ML: at 21:09

## 2024-10-15 NOTE — PROGRESS NOTES
Nutrition Services    Patient Name:  Dalila Pike  YOB: 1970  MRN: [id number]  Admit Date:  10/7/2024    Patient screened for LOS. Chart reviewed. No significant weight changes documented/reported and PO intake has been adequate. Visited with at bedside, reports tolerating diet advanced 10/10, eating adequately. Understands low fat diet, no questions/concerns. Will follow in peripheral, please consult if needed.     Electronically signed by:  Ibis Tariq RD  10/15/24 18:44 EDT

## 2024-10-15 NOTE — PLAN OF CARE
Goal Outcome Evaluation:      Patient has rested in bed today, spouse at bedside. A&o x4, has weakness to her left side. Scattered bruises noted. Room air. Fracture right foot with boot. She is awaiting rehab. VSS will continue to monitor

## 2024-10-15 NOTE — CASE MANAGEMENT/SOCIAL WORK
Continued Stay Note  [facility]     Patient Name: Dalila Pike  MRN: [id number]  Today's Date: 10/15/2024    Admit Date: 10/7/2024    Plan: [facility] Acute [facility]hab   Discharge Plan       Row Name 10/15/24 1112       Plan    Plan [facility]    Patient/Family in Agreement with Plan yes    Plan Comments Patient has been offered a bed at [facility]. Per Malik, they have accepted and will start the insurance authorization today. Patient in agreement with the plan. Complex CM following x [phone]    Final Discharge Disposition Code 62 - inpatient rehab facility                   Discharge Codes    No documentation.                 Expected Discharge Date and Time       Expected Discharge Date Expected Discharge Time    Oct 14, 2024               Bety Maradiaga RN

## 2024-10-15 NOTE — PLAN OF CARE
Goal Outcome Evaluation:                                          VSS on room air. Tolerating diet. Pt in better spirits tonight. Resting well. Spouse at bedside. Adequate UOP. Awaiting placement

## 2024-10-15 NOTE — PLAN OF CARE
Goal Outcome Evaluation:              Outcome Evaluation: VSS on RA. Complaints of being ready to discharge to rehab. Waiting on insurance approval.  at bedside during afternoon. Sleeping between care. Continue with current POC.

## 2024-10-15 NOTE — PROGRESS NOTES
[facility]  PROGRESS NOTE    Patient Name: Dalila Pike  : 1970  MRN: [id number]    Date of Admission: 10/7/2024  Primary Care Physician: Agnes Ewing APRN    Subjective   Subjective     CC:  Abd pain     HPI:  Patient seen resting up in bed awake.  No acute distress.  Rates her abdominal pain 6-7/10 scale.  No nausea or vomiting.  Having a mild headache today.  Chronic left-sided weakness stable.  Eager to rehab.      Objective   Objective     Vital Signs:   Temp:  [97.2 °F (36.2 °C)-98.3 °F (36.8 °C)] 98 °F (36.7 °C)  Heart Rate:  [] 89  Resp:  [16-18] 18  BP: (111-119)/(72-85) 113/77     Physical Exam:  Constitutional: No acute distress, awake, alert.  Resting up in bed.  No visitors at bedside.  Respiratory: Clear to auscultation bilaterally, respiratory effort normal on room air.  Sats WNL.  Cardiovascular: RRR, no murmurs  Gastrointestinal: Positive bowel sounds, soft, mild tenderness to palpation.  No guarding or rebound.  Obese.  Musculoskeletal: No bilateral ankle edema.   Psychiatric: Appropriate affect, cooperative  Neurologic: Oriented x 3, strength symmetric in all extremities, Cranial Nerves grossly intact to confrontation, speech clear.  Follows commands.      Results Reviewed:  LAB RESULTS:      Lab 10/14/24  0541 10/12/24  0537 10/09/24  0454   WBC 6.46 6.56 6.87   HEMOGLOBIN 12.5 13.3 14.3   HEMATOCRIT 37.7 39.4 41.9   PLATELETS 297 265 227   MCV 92.0 91.8 90.5         Lab 10/14/24  0541 10/12/24  0537 10/11/24  1737 10/11/24  0809 10/10/24  0535 10/09/24  0455   SODIUM 138 139  --  138 140 135*   POTASSIUM 3.5 3.7 4.3 3.5 4.0 4.4   CHLORIDE 105 104  --  105 105 103   CO2 24.0 24.0  --  24.0 24.0 21.0*   ANION GAP 9.0 11.0  --  9.0 11.0 11.0   BUN 9 14  --  13 12 15   CREATININE 0.67 0.62  --  0.62 0.62 0.65   EGFR 104.0 106.0  --  106.0 106.6 105.4   GLUCOSE 142* 125*  --  131* 111* 152*   CALCIUM 9.3 9.4  --  9.4 9.3 9.2    MAGNESIUM  --  2.2  --   --   --   --          Lab 10/14/24  0541 10/12/24  0537 10/11/24  0809 10/10/24  0535 10/09/24  0455   TOTAL PROTEIN 6.3 6.3 6.6 6.1 6.5   ALBUMIN 3.4* 3.6 3.7 3.8 3.7   GLOBULIN 2.9 2.7 2.9 2.3 2.8   ALT (SGPT) 14 25 36* 48* 60*   AST (SGOT) 13 17 23 34* 54*   BILIRUBIN 0.4 0.6 0.9 0.7 1.3*   ALK PHOS 219* 236* 251* 249* 243*                       Brief Urine Lab Results  (Last result in the past 365 days)        Color   Clarity   Blood   Leuk Est   Nitrite   Protein   CREAT   Urine HCG        10/08/24 0317 Yellow   Clear   Negative   Negative   Negative   Negative                   Microbiology Results Abnormal       Procedure Component Value - Date/Time    Blood Culture - Blood, Hand, Right [[id number]]  (Normal) Collected: 10/07/24 1618    Lab Status: Final result Specimen: Blood from Hand, Right Updated: 10/12/24 1645     Blood Culture No growth at 5 days    Narrative:      Less than seven (7) mL's of blood was collected.  Insufficient quantity may yield false negative results.    Blood Culture - Blood, Arm, Right [[id number]]  (Normal) Collected: 10/07/24 1609    Lab Status: Final result Specimen: Blood from Arm, Right Updated: 10/12/24 1645     Blood Culture No growth at 5 days    COVID PRE-OP / PRE-PROCEDURE SCREENING ORDER (NO ISOLATION) - Swab, Nasopharynx [[id number]]  (Normal) Collected: 10/07/24 1750    Lab Status: Final result Specimen: Swab from Nasopharynx Updated: 10/07/24 1849    Narrative:      The following orders were created for panel order COVID PRE-OP / PRE-PROCEDURE SCREENING ORDER (NO ISOLATION) - Swab, Nasopharynx.  Procedure                               Abnormality         Status                     ---------                               -----------         ------                     Respiratory Panel PCR w/...[[id number]]  Normal              Final result                 Please view results for these tests on the individual orders.    Respiratory Panel PCR  w/COVID-19(SARS-CoV-2) MICKEY/AKIL/MARIO/PAD/COR/SOULEYMANE In-House, NP Swab in UTM/VTM, 2 HR TAT - Swab, Nasopharynx [[id number]]  (Normal) Collected: 10/07/24 1750    Lab Status: Final result Specimen: Swab from Nasopharynx Updated: 10/07/24 4311     ADENOVIRUS, PCR Not Detected     Coronavirus 229E Not Detected     Coronavirus HKU1 Not Detected     Coronavirus NL63 Not Detected     Coronavirus OC43 Not Detected     COVID19 Not Detected     Human Metapneumovirus Not Detected     Human Rhinovirus/Enterovirus Not Detected     Influenza A PCR Not Detected     Influenza B PCR Not Detected     Parainfluenza Virus 1 Not Detected     Parainfluenza Virus 2 Not Detected     Parainfluenza Virus 3 Not Detected     Parainfluenza Virus 4 Not Detected     RSV, PCR Not Detected     Bordetella pertussis pcr Not Detected     Bordetella parapertussis PCR Not Detected     Chlamydophila pneumoniae PCR Not Detected     Mycoplasma pneumo by PCR Not Detected    Narrative:      In the setting of a positive respiratory panel with a viral infection PLUS a negative procalcitonin without other underlying concern for bacterial infection, consider observing off antibiotics or discontinuation of antibiotics and continue supportive care. If the respiratory panel is positive for atypical bacterial infection (Bordetella pertussis, Chlamydophila pneumoniae, or Mycoplasma pneumoniae), consider antibiotic de-escalation to target atypical bacterial infection.            No radiology results from the last 24 hrs    Results for orders placed during the hospital encounter of 08/21/24    Adult Transthoracic Echo Complete W/ Cont if Necessary Per Protocol (With Agitated Saline)    Interpretation Summary    Left ventricular systolic function is normal. Calculated left ventricular EF = 68% Normal left ventricular cavity size noted. Left ventricular wall thickness is consistent with moderate concentric hypertrophy. All left ventricular wall segments contract normally.  Left ventricular diastolic function was normal. Normal left atrial pressure.    The right ventricular cavity is mildly dilated. Normal right ventricular systolic function noted.    Left atrial volume is moderately increased. Saline test results are negative.    The aortic valve is structurally normal with no stenosis present. The aortic valve appears trileaflet. No significant aortic valve regurgitation is present.    The mitral valve is structurally normal with no significant stenosis present. Trace to mild mitral valve regurgitation is present.    Mild tricuspid valve regurgitation is present. Estimated right ventricular systolic pressure from tricuspid regurgitation is mildly elevated (35-45 mmHg)    Mild dilation of the ascending aorta is present. Ascending aorta = 3.7 cm      Current medications:  Scheduled Meds:aspirin, 81 mg, Oral, Daily  atorvastatin, 80 mg, Oral, Nightly  folic acid, 1 mg, Oral, Daily  guaifenesin, 400 mg, Oral, Q8H  heparin (porcine), 5,000 Units, Subcutaneous, Q8H  lansoprazole, 15 mg, Oral, Q AM  lisinopril, 20 mg, Oral, Daily  magnesium oxide, 400 mg, Oral, Daily  nicotine, 1 patch, Transdermal, Q24H  sodium chloride, 10 mL, Intravenous, Q12H  thiamine, 100 mg, Oral, Daily  ticagrelor, 60 mg, Oral, BID  topiramate, 25 mg, Oral, Nightly  traZODone, 50 mg, Oral, Nightly  ursodiol, 300 mg, Oral, BID      Continuous Infusions:   PRN Meds:.  acetaminophen **OR** acetaminophen **OR** acetaminophen    aluminum-magnesium hydroxide-simethicone    senna-docusate sodium **AND** polyethylene glycol **AND** bisacodyl **AND** bisacodyl    Calcium Replacement - Follow Nurse / BPA Driven Protocol    HYDROcodone-acetaminophen    hydrOXYzine    Magnesium Standard Dose Replacement - Follow Nurse / BPA Driven Protocol    [] HYDROmorphone **AND** naloxone    nicotine polacrilex    ondansetron ODT **OR** ondansetron    Phosphorus Replacement - Follow Nurse / BPA Driven Protocol    Potassium  Replacement - Follow Nurse / BPA Driven Protocol    sodium chloride    Assessment & Plan   Assessment & Plan     Active Hospital Problems    Diagnosis  POA    **RUQ pain [R10.11]  Yes    Cholangitis [K83.09]  Yes    Dilated cbd, acquired [K83.8]  Unknown    HTN (hypertension) [I10]  Yes    History of seizures [Z87.898]  Yes    Alcohol use [Z78.9]  Yes    Obesity (BMI 30-39.9) [E66.9]  Yes    Dyslipidemia [E78.5]  Yes    PAD (peripheral artery disease) [I73.9]  Yes    Tobacco use [Z72.0]  Yes      Resolved Hospital Problems   No resolved problems to display.        Brief Hospital Course to date:  Dalila Pike is a 54 y.o. female with past medical history of HTN, HL, PAD, obesity, seizures, ETOH use, R MCA CVA s/p thrombectomy/R ICA stent with residual L weakness, and recent L ankle fracture who presented on 10/7/2024 with RUQ pain.     This patient's problems and plans were partially entered by my partner and updated as appropriate by me 10/13/24.    Assessment/Plan:     RUQ pain  -GI consulted   -MRCP with mildly dilated biliary tree with blunted appearance of the common bile duct of the ampulla; no obvious filling defect  -s/p ERCP with papillary stenosis, s/p sphincterotomy/stent  -Brushings in the lower bile duct with no evidence of cancer; stomach biopsies were normal and esophageal biopsies with changes related to reflux  -Recommend repeat ERCP in 6 to 8 weeks  -General surgery consulted for CCY, not recommended at this time  -GI recommending continued PPI, completing course of antibiotics, with follow up 6 weeks for repeat ERCP/stent removal  -s/p ceftriaxone/flagyl through 10/14  - Continue PPI      Recent CVA s/p thrombectomy/R ICA stent  -on ASA/brilinta/statin  -Follow-up stroke neurology as previously recommended     HTN  - lisinoril. Stable.      HL  -statin     ETOH use  -s/p CIWA   -monitor for withdrawal     Rt ankle fracture  -followed by Dr. Gonzalez, with recommended CAM boot in  place  -Ortho has seen, recommended WBAT with boot x1 month  --follow up with Dr. Yang in 1 month.      Tobacco abuse    Expected Discharge Location and Transportation: rehab  Expected Discharge   Expected Discharge Date: 10/16/2024; Expected Discharge Time:      VTE Prophylaxis:  Pharmacologic VTE prophylaxis orders are present.         AM-PAC 6 Clicks Score (PT): 19 (10/14/24 2000)    CODE STATUS:   Code Status and Medical Interventions: CPR (Attempt to Resuscitate); Full Support   Ordered at: 10/07/24 9287     Level Of Support Discussed With:    Patient     Code Status (Patient has no pulse and is not breathing):    CPR (Attempt to Resuscitate)     Medical Interventions (Patient has pulse or is breathing):    Full Support       Anna Bruce, APRN  10/15/24

## 2024-10-16 ENCOUNTER — DOCUMENTATION (OUTPATIENT)
Dept: INTERNAL MEDICINE | Facility: HOSPITAL | Age: 54
End: 2024-10-16
Payer: MEDICAID

## 2024-10-16 ENCOUNTER — HOSPITAL ENCOUNTER (INPATIENT)
Facility: HOSPITAL | Age: 54
DRG: 056 | End: 2024-10-16
Attending: FAMILY MEDICINE | Admitting: FAMILY MEDICINE
Payer: MEDICAID

## 2024-10-16 ENCOUNTER — PREP FOR SURGERY (OUTPATIENT)
Dept: OTHER | Facility: HOSPITAL | Age: 54
End: 2024-10-16
Payer: MEDICAID

## 2024-10-16 VITALS
SYSTOLIC BLOOD PRESSURE: 94 MMHG | DIASTOLIC BLOOD PRESSURE: 66 MMHG | RESPIRATION RATE: 17 BRPM | HEIGHT: 68 IN | OXYGEN SATURATION: 95 % | WEIGHT: 243 LBS | BODY MASS INDEX: 36.83 KG/M2 | TEMPERATURE: 97.5 F | HEART RATE: 85 BPM

## 2024-10-16 DIAGNOSIS — R53.81 DEBILITY: Primary | ICD-10-CM

## 2024-10-16 DIAGNOSIS — K83.09 CHOLANGITIS: ICD-10-CM

## 2024-10-16 PROCEDURE — 25010000002 HEPARIN (PORCINE) PER 1000 UNITS: Performed by: FAMILY MEDICINE

## 2024-10-16 PROCEDURE — 99239 HOSP IP/OBS DSCHRG MGMT >30: CPT | Performed by: NURSE PRACTITIONER

## 2024-10-16 PROCEDURE — 25010000002 HEPARIN (PORCINE) PER 1000 UNITS: Performed by: INTERNAL MEDICINE

## 2024-10-16 RX ORDER — LISINOPRIL 10 MG/1
20 TABLET ORAL
Status: DISCONTINUED | OUTPATIENT
Start: 2024-10-17 | End: 2024-10-23 | Stop reason: HOSPADM

## 2024-10-16 RX ORDER — HYDROXYZINE HYDROCHLORIDE 25 MG/1
25 TABLET, FILM COATED ORAL 3 TIMES DAILY PRN
Start: 2024-10-16 | End: 2024-10-16

## 2024-10-16 RX ORDER — NICOTINE 21 MG/24HR
1 PATCH, TRANSDERMAL 24 HOURS TRANSDERMAL
Status: CANCELLED | OUTPATIENT
Start: 2024-10-16

## 2024-10-16 RX ORDER — POLYETHYLENE GLYCOL 3350 17 G/17G
17 POWDER, FOR SOLUTION ORAL DAILY
Status: CANCELLED | OUTPATIENT
Start: 2024-10-16

## 2024-10-16 RX ORDER — GAUZE BANDAGE 2" X 2"
100 BANDAGE TOPICAL DAILY
Status: CANCELLED | OUTPATIENT
Start: 2024-10-16

## 2024-10-16 RX ORDER — ASPIRIN 81 MG/1
81 TABLET ORAL DAILY
COMMUNITY

## 2024-10-16 RX ORDER — ACETAMINOPHEN 325 MG/1
650 TABLET ORAL EVERY 4 HOURS PRN
Status: ON HOLD
Start: 2024-10-16 | End: 2024-10-16

## 2024-10-16 RX ORDER — ASPIRIN 81 MG/1
81 TABLET ORAL DAILY
Status: DISCONTINUED | OUTPATIENT
Start: 2024-10-17 | End: 2024-10-23 | Stop reason: HOSPADM

## 2024-10-16 RX ORDER — ALUMINA, MAGNESIA, AND SIMETHICONE 2400; 2400; 240 MG/30ML; MG/30ML; MG/30ML
15 SUSPENSION ORAL EVERY 6 HOURS PRN
Status: CANCELLED | OUTPATIENT
Start: 2024-10-16

## 2024-10-16 RX ORDER — TOPIRAMATE 25 MG/1
25 TABLET, FILM COATED ORAL NIGHTLY
Status: DISCONTINUED | OUTPATIENT
Start: 2024-10-16 | End: 2024-10-23 | Stop reason: HOSPADM

## 2024-10-16 RX ORDER — ASPIRIN 81 MG/1
81 TABLET ORAL DAILY
Status: CANCELLED | OUTPATIENT
Start: 2024-10-16

## 2024-10-16 RX ORDER — HEPARIN SODIUM 5000 [USP'U]/ML
5000 INJECTION, SOLUTION INTRAVENOUS; SUBCUTANEOUS EVERY 8 HOURS SCHEDULED
Status: CANCELLED | OUTPATIENT
Start: 2024-10-16

## 2024-10-16 RX ORDER — LANSOPRAZOLE 30 MG/1
30 TABLET, ORALLY DISINTEGRATING, DELAYED RELEASE ORAL
Status: CANCELLED | OUTPATIENT
Start: 2024-10-17

## 2024-10-16 RX ORDER — LISINOPRIL 10 MG/1
20 TABLET ORAL
Status: CANCELLED | OUTPATIENT
Start: 2024-10-16

## 2024-10-16 RX ORDER — URSODIOL 300 MG/1
300 CAPSULE ORAL 2 TIMES DAILY
Start: 2024-10-16 | End: 2024-10-16

## 2024-10-16 RX ORDER — GUAIFENESIN 600 MG/1
600 TABLET, EXTENDED RELEASE ORAL 2 TIMES DAILY PRN
COMMUNITY

## 2024-10-16 RX ORDER — AMOXICILLIN 250 MG
2 CAPSULE ORAL 2 TIMES DAILY
Status: DISCONTINUED | OUTPATIENT
Start: 2024-10-16 | End: 2024-10-23 | Stop reason: HOSPADM

## 2024-10-16 RX ORDER — LANSOPRAZOLE 30 MG/1
30 TABLET, ORALLY DISINTEGRATING, DELAYED RELEASE ORAL
Status: DISCONTINUED | OUTPATIENT
Start: 2024-10-17 | End: 2024-10-23 | Stop reason: HOSPADM

## 2024-10-16 RX ORDER — TOPIRAMATE 25 MG/1
25 TABLET, FILM COATED ORAL NIGHTLY
Status: CANCELLED | OUTPATIENT
Start: 2024-10-16

## 2024-10-16 RX ORDER — HYDROXYZINE HYDROCHLORIDE 25 MG/1
25 TABLET, FILM COATED ORAL 3 TIMES DAILY PRN
Status: DISCONTINUED | OUTPATIENT
Start: 2024-10-16 | End: 2024-10-23 | Stop reason: HOSPADM

## 2024-10-16 RX ORDER — NICOTINE 21 MG/24HR
1 PATCH, TRANSDERMAL 24 HOURS TRANSDERMAL
Status: DISCONTINUED | OUTPATIENT
Start: 2024-10-16 | End: 2024-10-23 | Stop reason: HOSPADM

## 2024-10-16 RX ORDER — ATORVASTATIN CALCIUM 40 MG/1
80 TABLET, FILM COATED ORAL NIGHTLY
Status: DISCONTINUED | OUTPATIENT
Start: 2024-10-16 | End: 2024-10-23 | Stop reason: HOSPADM

## 2024-10-16 RX ORDER — POLYETHYLENE GLYCOL 3350 17 G/17G
17 POWDER, FOR SOLUTION ORAL DAILY PRN
Status: ON HOLD
Start: 2024-10-16 | End: 2024-10-16

## 2024-10-16 RX ORDER — LANOLIN ALCOHOL/MO/W.PET/CERES
100 CREAM (GRAM) TOPICAL DAILY
Start: 2024-10-17

## 2024-10-16 RX ORDER — HYDROCODONE BITARTRATE AND ACETAMINOPHEN 5; 325 MG/1; MG/1
1 TABLET ORAL EVERY 6 HOURS PRN
Status: CANCELLED | OUTPATIENT
Start: 2024-10-16 | End: 2024-10-21

## 2024-10-16 RX ORDER — FOLIC ACID 1 MG/1
1 TABLET ORAL DAILY
COMMUNITY

## 2024-10-16 RX ORDER — URSODIOL 300 MG/1
300 CAPSULE ORAL 2 TIMES DAILY
Status: CANCELLED | OUTPATIENT
Start: 2024-10-16

## 2024-10-16 RX ORDER — ATORVASTATIN CALCIUM 40 MG/1
80 TABLET, FILM COATED ORAL NIGHTLY
Status: CANCELLED | OUTPATIENT
Start: 2024-10-16

## 2024-10-16 RX ORDER — NICOTINE 21 MG/24HR
1 PATCH, TRANSDERMAL 24 HOURS TRANSDERMAL EVERY 24 HOURS
Start: 2024-10-16 | End: 2024-10-16

## 2024-10-16 RX ORDER — ALUMINA, MAGNESIA, AND SIMETHICONE 2400; 2400; 240 MG/30ML; MG/30ML; MG/30ML
15 SUSPENSION ORAL EVERY 6 HOURS PRN
Status: DISCONTINUED | OUTPATIENT
Start: 2024-10-16 | End: 2024-10-23 | Stop reason: HOSPADM

## 2024-10-16 RX ORDER — POLYETHYLENE GLYCOL 3350 17 G/17G
17 POWDER, FOR SOLUTION ORAL DAILY
Status: DISCONTINUED | OUTPATIENT
Start: 2024-10-16 | End: 2024-10-23 | Stop reason: HOSPADM

## 2024-10-16 RX ORDER — PANTOPRAZOLE SODIUM 40 MG/1
40 TABLET, DELAYED RELEASE ORAL DAILY
COMMUNITY

## 2024-10-16 RX ORDER — TRAZODONE HYDROCHLORIDE 50 MG/1
50 TABLET, FILM COATED ORAL NIGHTLY
Start: 2024-10-16 | End: 2024-10-16

## 2024-10-16 RX ORDER — LISINOPRIL 10 MG/1
10 TABLET ORAL DAILY
Status: ON HOLD | COMMUNITY
End: 2024-10-23

## 2024-10-16 RX ORDER — FOLIC ACID 1 MG/1
1 TABLET ORAL DAILY
Status: DISCONTINUED | OUTPATIENT
Start: 2024-10-17 | End: 2024-10-23 | Stop reason: HOSPADM

## 2024-10-16 RX ORDER — TRAZODONE HYDROCHLORIDE 50 MG/1
50 TABLET, FILM COATED ORAL NIGHTLY
Status: DISCONTINUED | OUTPATIENT
Start: 2024-10-16 | End: 2024-10-23 | Stop reason: HOSPADM

## 2024-10-16 RX ORDER — ACETAMINOPHEN 325 MG/1
650 TABLET ORAL EVERY 4 HOURS PRN
Status: DISCONTINUED | OUTPATIENT
Start: 2024-10-16 | End: 2024-10-23 | Stop reason: HOSPADM

## 2024-10-16 RX ORDER — TRAMADOL HYDROCHLORIDE 50 MG/1
50 TABLET ORAL NIGHTLY
COMMUNITY
End: 2024-10-23 | Stop reason: HOSPADM

## 2024-10-16 RX ORDER — NYSTATIN 100000 U/G
1 CREAM TOPICAL EVERY 12 HOURS SCHEDULED
Status: DISCONTINUED | OUTPATIENT
Start: 2024-10-16 | End: 2024-10-23 | Stop reason: HOSPADM

## 2024-10-16 RX ORDER — HEPARIN SODIUM 5000 [USP'U]/ML
5000 INJECTION, SOLUTION INTRAVENOUS; SUBCUTANEOUS EVERY 8 HOURS SCHEDULED
Status: DISCONTINUED | OUTPATIENT
Start: 2024-10-16 | End: 2024-10-20

## 2024-10-16 RX ORDER — GUAIFENESIN 200 MG/10ML
200 LIQUID ORAL EVERY 4 HOURS PRN
Status: DISCONTINUED | OUTPATIENT
Start: 2024-10-16 | End: 2024-10-23 | Stop reason: HOSPADM

## 2024-10-16 RX ORDER — AMOXICILLIN 250 MG
2 CAPSULE ORAL 2 TIMES DAILY PRN
Status: ON HOLD
Start: 2024-10-16 | End: 2024-10-16

## 2024-10-16 RX ORDER — FOLIC ACID 1 MG/1
1 TABLET ORAL DAILY
Status: CANCELLED | OUTPATIENT
Start: 2024-10-16

## 2024-10-16 RX ORDER — GUAIFENESIN 200 MG/10ML
200 LIQUID ORAL EVERY 4 HOURS PRN
Status: CANCELLED | OUTPATIENT
Start: 2024-10-16

## 2024-10-16 RX ORDER — GUAIFENESIN 200 MG/10ML
400 LIQUID ORAL EVERY 8 HOURS SCHEDULED
Status: ON HOLD
Start: 2024-10-16 | End: 2024-10-16

## 2024-10-16 RX ORDER — HYDROCODONE BITARTRATE AND ACETAMINOPHEN 5; 325 MG/1; MG/1
1 TABLET ORAL EVERY 6 HOURS PRN
Status: DISCONTINUED | OUTPATIENT
Start: 2024-10-16 | End: 2024-10-23 | Stop reason: HOSPADM

## 2024-10-16 RX ORDER — OXYCODONE HYDROCHLORIDE 5 MG/1
5 TABLET ORAL DAILY PRN
COMMUNITY
End: 2024-10-23 | Stop reason: HOSPADM

## 2024-10-16 RX ORDER — URSODIOL 300 MG/1
300 CAPSULE ORAL 2 TIMES DAILY
Status: DISCONTINUED | OUTPATIENT
Start: 2024-10-16 | End: 2024-10-23 | Stop reason: HOSPADM

## 2024-10-16 RX ORDER — ACETAMINOPHEN 325 MG/1
650 TABLET ORAL EVERY 4 HOURS PRN
Status: CANCELLED | OUTPATIENT
Start: 2024-10-16

## 2024-10-16 RX ORDER — HYDROXYZINE HYDROCHLORIDE 25 MG/1
25 TABLET, FILM COATED ORAL 3 TIMES DAILY PRN
Status: CANCELLED | OUTPATIENT
Start: 2024-10-16

## 2024-10-16 RX ORDER — TRAZODONE HYDROCHLORIDE 50 MG/1
50 TABLET, FILM COATED ORAL NIGHTLY
Status: CANCELLED | OUTPATIENT
Start: 2024-10-16

## 2024-10-16 RX ORDER — ATORVASTATIN CALCIUM 80 MG/1
80 TABLET, FILM COATED ORAL DAILY
COMMUNITY

## 2024-10-16 RX ORDER — AMOXICILLIN 250 MG
2 CAPSULE ORAL 2 TIMES DAILY
Status: CANCELLED | OUTPATIENT
Start: 2024-10-16

## 2024-10-16 RX ORDER — TOPIRAMATE 25 MG/1
25 TABLET, FILM COATED ORAL NIGHTLY
COMMUNITY

## 2024-10-16 RX ORDER — OXYCODONE HYDROCHLORIDE 5 MG/1
5 CAPSULE ORAL 2 TIMES DAILY PRN
Status: ON HOLD | COMMUNITY
End: 2024-10-16

## 2024-10-16 RX ADMIN — TICAGRELOR 60 MG: 60 TABLET ORAL at 21:02

## 2024-10-16 RX ADMIN — HEPARIN SODIUM 5000 UNITS: 5000 INJECTION INTRAVENOUS; SUBCUTANEOUS at 21:01

## 2024-10-16 RX ADMIN — NYSTATIN 1 APPLICATION: 100000 CREAM TOPICAL at 21:07

## 2024-10-16 RX ADMIN — TRAZODONE HYDROCHLORIDE 50 MG: 50 TABLET ORAL at 21:02

## 2024-10-16 RX ADMIN — THIAMINE HCL TAB 100 MG 100 MG: 100 TAB at 08:26

## 2024-10-16 RX ADMIN — TICAGRELOR 60 MG: 60 TABLET ORAL at 08:26

## 2024-10-16 RX ADMIN — TOPIRAMATE 25 MG: 25 TABLET, FILM COATED ORAL at 21:02

## 2024-10-16 RX ADMIN — LANSOPRAZOLE 15 MG: 15 TABLET, ORALLY DISINTEGRATING ORAL at 06:10

## 2024-10-16 RX ADMIN — ATORVASTATIN CALCIUM 80 MG: 40 TABLET, FILM COATED ORAL at 21:02

## 2024-10-16 RX ADMIN — URSODIOL 300 MG: 300 CAPSULE ORAL at 21:02

## 2024-10-16 RX ADMIN — NICOTINE 1 PATCH: 21 PATCH TRANSDERMAL at 17:14

## 2024-10-16 RX ADMIN — GUAIFENESIN 400 MG: 100 LIQUID ORAL at 06:09

## 2024-10-16 RX ADMIN — SENNOSIDES AND DOCUSATE SODIUM 2 TABLET: 50; 8.6 TABLET ORAL at 21:02

## 2024-10-16 RX ADMIN — HEPARIN SODIUM 5000 UNITS: 5000 INJECTION INTRAVENOUS; SUBCUTANEOUS at 06:09

## 2024-10-16 RX ADMIN — URSODIOL 300 MG: 300 CAPSULE ORAL at 08:26

## 2024-10-16 RX ADMIN — FOLIC ACID 1 MG: 1 TABLET ORAL at 08:26

## 2024-10-16 RX ADMIN — HYDROCODONE BITARTRATE AND ACETAMINOPHEN 1 TABLET: 5; 325 TABLET ORAL at 06:10

## 2024-10-16 RX ADMIN — ASPIRIN 81 MG 81 MG: 81 TABLET ORAL at 08:25

## 2024-10-16 RX ADMIN — MAGNESIUM OXIDE TAB 400 MG (241.3 MG ELEMENTAL MG) 400 MG: 400 (241.3 MG) TAB at 08:25

## 2024-10-16 NOTE — PLAN OF CARE
Goal Outcome Evaluation:                                          VSS on room air. Tolerating diet. IV removed per nurse communication. Pills whole in pudding. Adequate UOP. Spouse at bedside. Pending placement.

## 2024-10-16 NOTE — PLAN OF CARE
Problem: Pain Chronic (Persistent) (Comorbidity Management)  Goal: Acceptable Pain Control and Functional Ability  Outcome: Progressing  Intervention: Manage Persistent Pain  Recent Flowsheet Documentation  Taken 10/16/2024 1200 by Brian Malloy, RN  Medication Review/Management: medications reviewed  Taken 10/16/2024 1000 by Brian Malloy, RN  Medication Review/Management: medications reviewed  Taken 10/16/2024 0800 by Brian Malloy, RN  Medication Review/Management: medications reviewed  Intervention: Optimize Psychosocial Wellbeing  Recent Flowsheet Documentation  Taken 10/16/2024 0800 by Brian Malloy, RN  Diversional Activities: television  Family/Support System Care: self-care encouraged   Goal Outcome Evaluation:  Plan of Care Reviewed With: patient  Plan of Care Reviewed With: patient, spouse        Progress: improving     Pain controlled. Tolerating diet. Ambulating. Voiding spontaneously.  at bedside. Discharging to [facility] acute rehab.

## 2024-10-16 NOTE — PROGRESS NOTES
"Patient Assessment Instrument  Quality Indicators - Admission FY 2023    Section A. Ethnicity/ Race/Language  Ethnicity:  Not of , /a, [place] Origin  Race:  White  Preferred Language:  English  Requests  to Communicate:   No    Section A. Transportation      Section B. Hearing and Vision  Expression of Ideas and Wants: Expresses complex messages without difficulty and  with speech that is clear and easy to understand.  Understanding Verbal and Non-Verbal Content: Understands: Clear comprehension  without cues or repetitions.  Ability to Hear:  Adequate - no difficulty in normal conversation, social  interaction, listening to TV  Ability to See in Adequate Light:  Adequate - sees fine detail, such as regular  print in newspapers/books    Section B. Health Literacy  Frequency of Needing Assistance Reading:  Never      Section C. Cognitive Patterns  Brief Interview for Mental Status (BIMS) was conducted.  Repetition of Three Words: Three words  Able to report correct year: Correct  Able to report correct month: Accurate within 5 days  Able to report correct day of the week: Correct  Able to recall \"sock\": Yes, no cue required  Able to recall \"blue\": Yes, no cue required  Able to recall \"bed\": Yes, no cue required    BIMS SUMMARY SCORE: 15 Cognitively intact Patient was able to complete the Brief  Interview for Mental Status    Section C. Signs and Symptoms of Delirium (from CAM)  Evidence of Acute Change in Mental Status:   No  Inattention: Behavior not present  Thinking Disorganized or Incoherent:   Behavior not present  Altered Level of Consciousness:   Behavior not present    Section D. Mood  Presence of little interest or pleasure in doing things:   No  Frequency of having little interest or pleasure in doing things:   Never or 1  day  Presence of feeling down, depressed, or hopeless:   No  Frequency of feeling down, depressed, or hopeless:   Never or 1 day   Interview Ended. Above " responses do not meet criteria to continue.  Total Severity Score:   00    Section D. Social Isolation  Frequ[place]y of Feeling Lonely or Isolated:  Never    Section [id number]. Prior Functioning      Section [id number]. Prior Device Use      Section [id number]. Self Care Performance      Section BN0711. Self Care Discharge Goals      Section [id number]. Mobility Performance      Section IC0103. Mobility Discharge Goals      Section H. Bladder and Bowel      Section I. Active Diagnosis      Section J. Health Conditions      Section J. Health Conditions (Pain)  Pain Effect on Sleep:   Occasionally  Pain Interference with Therapy Activities:   Rarely or not at all  Pain Interference with Day-to-Day Activities:   Rarely or not at all    Section K. Swallowing/Nutritional Status    Nutritional Approaches on Admission:    Section M. Skin Conditions  Unhealed Pressure Ulcer/Injuries at Stage 1 or Higher on Admission:  No.    Section N. Medication        Section O. Special Treatments, Procedures, and Programs    Signed by: Madai Burden RN

## 2024-10-16 NOTE — PAYOR COMM NOTE
"Dalila Ocasio (54 y.o. Female)       Date of Birth   1970    Social Security Number       Address   [address] [place] [address]    Home Phone   [phone]    MRN   [id number]       Oriental orthodox   None    Marital Status                               Admission Date   10/7/24    Admission Type   Elective    Admitting Provider   Indio Valles DO    Attending Provider   Indio Valles DO    Department, Room/Bed   [facility] 5B, N541/1       Discharge Date       Discharge Disposition   Rehab Facility or Unit (DC - External)    Discharge Destination                                 Attending Provider: Indio Valles DO    Allergies: Erythromycin, Latex, Toradol [Ketorolac Tromethamine], Contrast Dye (Echo Or Unknown Ct/mr)    Isolation: None   Infection: None   Code Status: CPR    Ht: 172.7 cm (68\")   Wt: 110 kg (243 lb)    Admission Cmt: None   Principal Problem: RUQ pain [R10.11]                   Active Insurance as of 10/7/2024       Primary Coverage       Payor Plan Insurance Group Employer/Plan Group    [facility] MEDICAID [facility] MEDICAID [id number]       Payor Plan Address Payor Plan Phone Number Payor Plan Fax Number Effective Dates    [address] [address] [phone]  2019 - None Entered    [facility] [address]         Subscriber Name Subscriber Birth Date Member ID       DALILA OCASIO 1970 [id number]                     Emergency Contacts        (Rel.) Home Phone Work Phone Mobile Phone    Abiel Mijraes (Spouse) [phone] -- [phone]    GenesisRashad (Son) [phone] -- [phone]                 Discharge Summary        Anna Bruce, ROXANE at 10/16/24 0950              [facility] Medicine Services  TRANSFER SUMMARY    Patient Name: Dalila Ocasio  : 1970  MRN: [id number]    Date of Admission: 10/7/2024  Date of Discharge:  10/16/2024  Length of Stay: 7  Primary Care Physician: Agnes Ewing, " "APRN    Consults       Date and Time Order Name Status Description    10/8/2024  6:04 PM Inpatient General Surgery Consult Completed     10/8/2024  7:27 AM Inpatient Orthopedic Surgery Consult      10/7/2024  3:45 PM Inpatient Gastroenterology Consult Completed             Hospital Course     Presenting Problem:   RUQ pain [R10.11]  Cholangitis [K83.09]    Active Hospital Problems    Diagnosis  POA    **RUQ pain [R10.11]  Yes    Cholangitis [K83.09]  Yes    Dilated cbd, acquired [K83.8]  Unknown    HTN (hypertension) [I10]  Yes    History of seizures [Z87.898]  Yes    Alcohol use [Z78.9]  Yes    Obesity (BMI 30-39.9) [E66.9]  Yes    Dyslipidemia [E78.5]  Yes    PAD (peripheral artery disease) [I73.9]  Yes    Tobacco use [Z72.0]  Yes      Resolved Hospital Problems   No resolved problems to display.          Hospital Course:  Dalila Pike is a 54 y.o. female with past medical history of HTN, HL, PAD, obesity, seizures, ETOH use, R MCA CVA s/p thrombectomy/R ICA stent with residual L weakness, and recent L ankle fracture who presented on 10/7/2024 with RUQ pain.     RUQ pain  -GI consulted   -MRCP with mildly dilated biliary tree with blunted appearance of the common bile duct of the ampulla; no obvious filling defect  -s/p ERCP with papillary stenosis, s/p sphincterotomy/stent  -General surgery consulted for CCY, not recommended at this time  -GI recommending continued PPI, completing course of antibiotics-s/p ceftriaxone/flagyl through 10/14  -Continue PPI   DC recs Per GI: \"I would recommend repeat ERCP in 6-8 weeks to remove biliary stent and to repeat cholangiogram to reassess what appears to be benign papillary stenosis.  Biopsies from the stomach were normal.  Biopsies from the esophagus show benign changes related to reflux without evidence for Kaplan's esophagus.   Continue proton pump inhibitor.\"     Recent CVA s/p thrombectomy/R ICA stent  -on ASA/brilinta/statin  -Follow-up stroke neurology as " previously recommended     HTN  - lisinoril. Stable.      HL  -statin     ETOH use  -s/p CIWA   -monitor for withdrawal. Has now been here for 9 days.  No s/s of w/d.    --continue to encourage abstinence.      Rt ankle fracture  -followed by Dr. Gonzalez, with recommended CAM boot in place  -Ortho has seen, recommended WBAT with boot x1 month  --follow up with Dr. Yang in 1 month.      Tobacco abuse  --assist/advise, nicotine patch      Seen resting up in bed in no acute distress.  Awake and alert.  Hemodynamically stable and afebrile.  Has been cleared by all services for transfer to acute rehab today.  Going on medications as below with follow-ups as noted.    Discharge Follow Up Recommendations for outpatient labs/diagnostics:  Patient is cleared for transfer to acute rehab today by all services.  Going on medications as below with follow-ups as noted.    --To be seen by provider at rehab on arrival, PCP 1 week of discharge  -- Follow-up with [facility] in 4 weeks  -- Follow-up with orthopedics in 1 month    Day of Discharge     HPI:   Resting up in bed in no acute distress.  Awake and alert.  States she feels a little better.  Chronic left-sided weakness stable.  Denies nausea/vomiting.  Tolerating diet.  Abdominal pain improved at 4/10 scale.  No new issues.  Eager to get to rehab today as planned.    Review of Systems  Gen- No fevers, chills  CV- No chest pain, palpitations  Resp- No cough, dyspnea  GI- as above        Vital Signs:   Temp:  [97.2 °F (36.2 °C)-98.5 °F (36.9 °C)] 97.5 °F (36.4 °C)  Heart Rate:  [74-89] 85  Resp:  [17-18] 17  BP: ()/(66-92) 94/66     Physical Exam:  Constitutional: No acute distress, awake, alert.  Resting up in bed.  No visitors at bedside.   at bedside.  Respiratory: Clear to auscultation bilaterally, respiratory effort normal on room air.  Sats WNL.  Cardiovascular: RRR, no murmurs  Gastrointestinal: Positive bowel sounds, soft, mild tenderness to  "palpation.  No guarding or rebound.  Obese.  Musculoskeletal: No bilateral ankle edema.   Psychiatric: Appropriate affect, cooperative  Neurologic: Oriented x 3, strength symmetric in all extremities, Cranial Nerves grossly intact to confrontation, speech clear.  Follows commands.    Pertinent Results     Results from last 7 days   Lab Units 10/14/24  0541 10/12/24  0537 10/11/24  1737 10/11/24  0809 10/10/24  0535   WBC 10*3/mm3 6.46 6.56  --   --   --    HEMOGLOBIN g/dL 12.5 13.3  --   --   --    HEMATOCRIT % 37.7 39.4  --   --   --    PLATELETS 10*3/mm3 297 265  --   --   --    SODIUM mmol/L 138 139  --  138 140   POTASSIUM mmol/L 3.5 3.7 4.3 3.5 4.0   CHLORIDE mmol/L 105 104  --  105 105   CO2 mmol/L 24.0 24.0  --  24.0 24.0   BUN mg/dL 9 14  --  13 12   CREATININE mg/dL 0.67 0.62  --  0.62 0.62   GLUCOSE mg/dL 142* 125*  --  131* 111*   CALCIUM mg/dL 9.3 9.4  --  9.4 9.3     Results from last 7 days   Lab Units 10/14/24  0541 10/12/24  0537 10/11/24  0809 10/10/24  0535   BILIRUBIN mg/dL 0.4 0.6 0.9 0.7   ALK PHOS U/L 219* 236* 251* 249*   ALT (SGPT) U/L 14 25 36* 48*   AST (SGOT) U/L 13 17 23 34*           Invalid input(s): \"TG\", \"LDLCALC\", \"LDLREALC\"      Brief Urine Lab Results  (Last result in the past 365 days)        Color   Clarity   Blood   Leuk Est   Nitrite   Protein   CREAT   Urine HCG        10/08/24 0317 Yellow   Clear   Negative   Negative   Negative   Negative                   Microbiology Results Abnormal       Procedure Component Value - Date/Time    Blood Culture - Blood, Hand, Right [[id number]]  (Normal) Collected: 10/07/24 1618    Lab Status: Final result Specimen: Blood from Hand, Right Updated: 10/12/24 1645     Blood Culture No growth at 5 days    Narrative:      Less than seven (7) mL's of blood was collected.  Insufficient quantity may yield false negative results.    Blood Culture - Blood, Arm, Right [[id number]]  (Normal) Collected: 10/07/24 3196    Lab Status: Final result Specimen: " Blood from Arm, Right Updated: 10/12/24 1645     Blood Culture No growth at 5 days    COVID PRE-OP / PRE-PROCEDURE SCREENING ORDER (NO ISOLATION) - Swab, Nasopharynx [[id number]]  (Normal) Collected: 10/07/24 1750    Lab Status: Final result Specimen: Swab from Nasopharynx Updated: 10/07/24 1849    Narrative:      The following orders were created for panel order COVID PRE-OP / PRE-PROCEDURE SCREENING ORDER (NO ISOLATION) - Swab, Nasopharynx.  Procedure                               Abnormality         Status                     ---------                               -----------         ------                     Respiratory Panel PCR w/...[[id number]]  Normal              Final result                 Please view results for these tests on the individual orders.    Respiratory Panel PCR w/COVID-19(SARS-CoV-2) MICKEY/AKIL/MARIO/PAD/COR/SOULEYMANE In-House, NP Swab in UTM/VTM, 2 HR TAT - Swab, Nasopharynx [[id number]]  (Normal) Collected: 10/07/24 1750    Lab Status: Final result Specimen: Swab from Nasopharynx Updated: 10/07/24 1849     ADENOVIRUS, PCR Not Detected     Coronavirus 229E Not Detected     Coronavirus HKU1 Not Detected     Coronavirus NL63 Not Detected     Coronavirus OC43 Not Detected     COVID19 Not Detected     Human Metapneumovirus Not Detected     Human Rhinovirus/Enterovirus Not Detected     Influenza A PCR Not Detected     Influenza B PCR Not Detected     Parainfluenza Virus 1 Not Detected     Parainfluenza Virus 2 Not Detected     Parainfluenza Virus 3 Not Detected     Parainfluenza Virus 4 Not Detected     RSV, PCR Not Detected     Bordetella pertussis pcr Not Detected     Bordetella parapertussis PCR Not Detected     Chlamydophila pneumoniae PCR Not Detected     Mycoplasma pneumo by PCR Not Detected    Narrative:      In the setting of a positive respiratory panel with a viral infection PLUS a negative procalcitonin without other underlying concern for bacterial infection, consider observing off antibiotics  or discontinuation of antibiotics and continue supportive care. If the respiratory panel is positive for atypical bacterial infection (Bordetella pertussis, Chlamydophila pneumoniae, or Mycoplasma pneumoniae), consider antibiotic de-escalation to target atypical bacterial infection.            Imaging Results (All)       Procedure Component Value Units Date/Time    XR Ankle 3+ View Left [[id number]] Collected: 10/12/24 1729     Updated: 10/12/24 1743    Narrative:      XR ANKLE 3+ VW LEFT    Date of Exam: 10/12/2024 5:04 PM EDT    Indication: Recent ankle fracture, new fall, pain    Comparison: 10/9/2024    Findings:  Again seen is minimally displaced fracture of the distal fibula, medial malleolus and also likely the anterior tibial plafond. No definite posterior malleolar fracture. No new fracture seen. Plantar calcaneal spurring and Achilles enthesopathy. Ankle   mortise appears symmetric. Talar dome appears intact. No traumatic malalignment on this nonweightbearing exam. Degenerative changes predominate of the midfoot.      Impression:      Impression:  Again seen are minimal displaced fracture of the distal fibula and medial malleolus and also likely of the anterior tibial plafond similar to most recent prior examination. No new fracture seen.      Electronically Signed: Javier Javed MD    10/12/2024 5:40 PM EDT    Workstation ID: [id number]    FL ERCP pancreatic and biliary ducts [[id number]] Collected: 10/08/24 2237     Updated: 10/08/24 2243    Narrative:      FL ERCP PANCREATIC AND BILIARY DUCTS    Date of Exam: 10/8/2024 4:54 PM EDT    Indication: ERCP.       Comparison: MRCP dated 10/9/2024    Technique:  A series of radiographic digital spot films were obtained in conjunction with an endoscopic catheterization of the biliary and pancreatic ductal system, performed by the gastroenterologist.    Fluoroscopic Time: 146 seconds    Number of Images: 14    Findings:  Intraoperative fluoroscopic images  demonstrate cannulation and opacification of the common bile duct with balloon sweep and placement of a metallic stent. Please see procedure report for full findings and details.      Impression:      Impression:  1. Intraoperative fluoroscopy during ERCP with metallic stent placement. Please see procedure report for full findings and details.      Electronically Signed: Deandre Merrittelor    10/8/2024 10:39 PM EDT    Workstation ID: [id number]    MRI abdomen wo contrast mrcp [[id number]] Collected: 10/08/24 0820     Updated: 10/08/24 0830    Narrative:      MRI ABDOMEN WO CONTRAST MRCP    Date of Exam: 10/8/2024 2:47 AM EDT    Indication: dilated CBD on RUQ U/S from OSH.     Comparison: CT abdomen pelvis 10/7/2024    Technique:  Routine multiplanar/multisequence images of the abdomen were obtained with MRCP sequences without contrast administration.      Findings:  Heart size appears borderline/mildly enlarged. Lower lungs grossly clear within limitations of MRI.    Normal size and contour of liver and spleen. No obvious liver lesion. Mild fluid distention of the gallbladder without wall thickening or pericholecystic fluid. No visible stone. Mild dilation of the biliary tree with somewhat blunted appearance of   common bile duct at ampulla. Upstream common bile duct measures 1.2 cm, mid common bile duct 1.2 cm and downstream common bile duct 0.6 cm. No obvious filling defect or mass.    Normal size, contour and signal intensity of the pancreas. No main duct dilation or divisum morphology. Spleen is homogeneous. No suspicious adrenal nodule. Symmetric renal size and contour. No hydronephrosis. Expected configuration stomach and duodenum.   No evidence of bowel obstruction or active inflammation. Appendix normal right lower quadrant. Normal caliber abdominal aorta. Few prominent prashanth hepatic lymph nodes commonly reactive otherwise no suspicious adenopathy. No ascites or drainable   collection.    No acute abdominal wall  findings within included field-of-view. Multilevel degenerative endplate change without infiltrative marrow process. Slight lower thoracic levocurvature.      Impression:      Impression:  1. Mildly dilated biliary tree with blunted appearance of the common bile duct at the ampulla. No obvious filling defects or mass. Differential includes ampullary stricture, biliary sphincter of Oddi dysfunction, occult intraductal stone versus occult   mass. Consider ERCP for further assessment.  2. Mildly fluid distention of the gallbladder without other findings to suggest acute cholecystitis by MRI.  3. Negative for acute pancreatitis.        Electronically Signed: Juan Ball MD    10/8/2024 8:27 AM EDT    Workstation ID: [id number]    US Outside Films [[id number]] Resulted: 10/07/24 1629     Updated: 10/07/24 1629    Narrative:      This procedure was auto-finalized with no dictation required.    CT Outside Films [[id number]] Resulted: 10/07/24 1628     Updated: 10/07/24 1628    Narrative:      This procedure was auto-finalized with no dictation required.            Results for orders placed during the hospital encounter of 08/21/24    Adult Transthoracic Echo Complete W/ Cont if Necessary Per Protocol (With Agitated Saline)    Interpretation Summary    Left ventricular systolic function is normal. Calculated left ventricular EF = 68% Normal left ventricular cavity size noted. Left ventricular wall thickness is consistent with moderate concentric hypertrophy. All left ventricular wall segments contract normally. Left ventricular diastolic function was normal. Normal left atrial pressure.    The right ventricular cavity is mildly dilated. Normal right ventricular systolic function noted.    Left atrial volume is moderately increased. Saline test results are negative.    The aortic valve is structurally normal with no stenosis present. The aortic valve appears trileaflet. No significant aortic valve regurgitation is present.     The mitral valve is structurally normal with no significant stenosis present. Trace to mild mitral valve regurgitation is present.    Mild tricuspid valve regurgitation is present. Estimated right ventricular systolic pressure from tricuspid regurgitation is mildly elevated (35-45 mmHg)    Mild dilation of the ascending aorta is present. Ascending aorta = 3.7 cm          Discharge Details        Discharge Medications        New Medications        Instructions Start Date   acetaminophen 325 MG tablet  Commonly known as: TYLENOL   650 mg, Oral, Every 4 Hours PRN      guaifenesin 100 MG/5ML liquid  Commonly known as: ROBITUSSIN  Replaces: guaiFENesin 600 MG 12 hr tablet   400 mg, Oral, Every 8 Hours Scheduled      hydrOXYzine 25 MG tablet  Commonly known as: ATARAX   25 mg, Oral, 3 Times Daily PRN      lansoprazole 15 MG Tablet Delayed Release Dispersible disintegrating tablet  Commonly known as: PREVACID SOLUTAB   15 mg, Oral, Every Early Morning   Start Date: October 17, 2024     nicotine 21 MG/24HR patch  Commonly known as: NICODERM CQ   1 patch, Transdermal, Every 24 Hours      nicotine polacrilex 4 MG gum  Commonly known as: NICORETTE   4 mg, Mouth/Throat, Every 1 Hour PRN      polyethylene glycol 17 g packet  Commonly known as: MIRALAX   17 g, Oral, Daily PRN      sennosides-docusate 8.6-50 MG per tablet  Commonly known as: PERICOLACE   2 tablets, Oral, 2 Times Daily PRN      thiamine 100 MG tablet  Commonly known as: VITAMIN B1   100 mg, Oral, Daily   Start Date: October 17, 2024     traZODone 50 MG tablet  Commonly known as: DESYREL   50 mg, Oral, Nightly      ursodiol 300 MG capsule  Commonly known as: ACTIGALL   300 mg, Oral, 2 Times Daily             Continue These Medications        Instructions Start Date   aspirin 81 MG chewable tablet   81 mg, Oral, Daily      atorvastatin 80 MG tablet  Commonly known as: LIPITOR   80 mg, Oral, Nightly      folic acid 1 MG tablet  Commonly known as: FOLVITE   1 mg,  Oral, Daily      HYDROcodone-acetaminophen 5-325 MG per tablet  Commonly known as: NORCO   1 tablet, Oral, Every 6 Hours PRN      lisinopril 20 MG tablet  Commonly known as: PRINIVIL,ZESTRIL   20 mg, Oral, Daily      magnesium oxide 400 tablet tablet  Commonly known as: MAG-OX   400 mg, Oral, Daily      ticagrelor 60 MG tablet tablet  Commonly known as: BRILINTA   60 mg, Oral, 2 Times Daily      topiramate 25 MG tablet  Commonly known as: Topamax   25 mg, Oral, Nightly             Stop These Medications      guaiFENesin 600 MG 12 hr tablet  Commonly known as: MUCINEX  Replaced by: guaifenesin 100 MG/5ML liquid     pantoprazole 40 MG EC tablet  Commonly known as: PROTONIX     sodium chloride 1 g tablet              Allergies   Allergen Reactions    Erythromycin Anaphylaxis    Latex Rash    Toradol [Ketorolac Tromethamine] Rash    Contrast Dye (Echo Or Unknown Ct/Mr) Unknown (See Comments)     Hives on chest         Discharge Disposition:  Rehab Facility or Unit (DC - External)    Discharge Diet:  Diet Order   Procedures    Diet: Gastrointestinal; Fat-Restricted; Fluid Consistency: Thin (IDDSI 0)       Discharge Activity:  Activity Instructions       Measure Blood Pressure      Other Activity Instructions      Recommended at this point 6 weeks out from original injury she could be weightbearing as tolerated in her boot for 1 more month and then we can see her back in the office in a month she can follow-up with Dr. Wynn fits  Repeat x-rays if those look normal and healed the patient come out of the boot completely and start some physical therapy              CODE STATUS:    Code Status and Medical Interventions: CPR (Attempt to Resuscitate); Full Support   Ordered at: 10/07/24 1887     Level Of Support Discussed With:    Patient     Code Status (Patient has no pulse and is not breathing):    CPR (Attempt to Resuscitate)     Medical Interventions (Patient has pulse or is breathing):    Full Support         Future  Appointments   Date Time Provider Department Center   11/12/2024  9:15 AM Dinorah López APRN [facility]   11/12/2024 11:00 AM [facility] OP VASC CART RM 2 BH [facility] N[facility]  [facility]   11/12/2024  2:30 PM Jamaal Saini MD [facility]   12/20/2024  1:30 PM Emilie Calzada APRN [facility]       Additional Instructions for the Follow-ups that You Need to Schedule       Ambulatory Referral to Home Health   As directed      Face to Face Visit Date: 10/11/2024   Follow-up provider for Plan of Care?: I treated the patient in an acute care facility and will not continue treatment after discharge.   Follow-up provider: AGNES DELAROSA [[id number]]   Reason/Clinical Findings: Cholangitis [K83.09]   Describe mobility limitations that make leaving home difficult: Ipmaired functional mobility, gait and balance   Nursing/Therapeutic Services Requested: Skilled Nursing Physical Therapy Occupational Therapy   Skilled nursing orders: Medication education   PT orders: Gait Training Strengthening Ultrasound Home safety assessment   Weight Bearing Status: As Tolerated   Occupational orders: Activities of daily living Cognition Strengthening Energy conservation Home safety assessment Fine motor   Frequency: 1 Week 1        Ambulatory referral for Screening EGD   As directed      Schedule repeat ERCP in 6 weeks to remove biliary stent, repeat balloon sweep and cholangiogram to assess what appears to be benign papillary stenosis    Schedule repeat ERCP in 6 weeks to remove biliary stent, repeat balloon sweep and cholangiogram to assess what appears to be benign papillary stenosis    Order Comments: Schedule repeat ERCP in 6 weeks to remove biliary stent, repeat balloon sweep and cholangiogram to assess what appears to be benign papillary stenosis         Discharge Follow-up with PCP   As directed       Currently Documented PCP:    Agnes Delarosa APRN    PCP Phone Number:    [phone]     Follow Up Details: To be seen by provider at  facility on arrival, PCP 1 week of discharge        Discharge Follow-up with Specialty: Follow-up with [facility] in 4 weeks (please schedule appointment prior to DC)   As directed      Specialty: Follow-up with [facility] in 4 weeks (please schedule appointment prior to DC)        Discharge Follow-up with Specified Provider: Follow-up with orthopedics in 1 month (please schedule appointment prior to DC)   As directed      To: Follow-up with orthopedics in 1 month (please schedule appointment prior to DC)              Electronically signed by ROXANE Dai, 10/16/24, 11:27 AM EDT.      Time Spent on Discharge: I spent 45 minutes on this discharge activity which included: face-to-face encounter with the patient, reviewing the data in the system, coordination of the care with the nursing staff as well as consultants, documentation, and entering orders.        Electronically signed by Anna Bruce APRN at 10/16/24 1127

## 2024-10-16 NOTE — PROGRESS NOTES
Rehabilitation Nursing  Inpatient Rehabilitation Plan of Care Note    Plan of Care  Pain    Pain Management (Active)  Current Status (10/16/2024 3:52:00 PM): Potential for increased pain  Weekly Goal: optimal pain control  Discharge Goal: optimal pain control    Safety    Potential for Injury (Active)  Current Status (10/16/2024 3:52:00 PM): Potential for falls  Weekly Goal: No falls  Discharge Goal: No falls    Signed by: Madai Burden RN

## 2024-10-16 NOTE — CASE MANAGEMENT/SOCIAL WORK
Case Management Discharge Note      Final Note: Patient insurance has approve for her to go to [facility] for acute rehab. RN to call report to [phone]. They will pull the discharge summary out of Epic. Patient's spouse in agreement to transport her to the facility today. No need to change the pharmacy as they have their own.    Provided Post Acute Provider List?: Yes  Post Acute Provider List: Inpatient Rehab  Provided Post Acute Provider Quality & Resource List?: Yes  Post Acute Provider Quality and Resource List: Inpatient Rehab  Delivered To: Patient  Method of Delivery: In person    Selected Continued Care - Admitted Since 10/7/2024       Destination Coordination complete.      Service Provider Services Address Phone Fax Patient Preferred    [facility] [address] [facility] [address] [phone] --       Internal Comment last updated by Bety Maradiaga RN 10/14/2024 Ayan1    Malik is reviewing                         Durable Medical Equipment    No services have been selected for the patient.                Dialysis/Infusion    No services have been selected for the patient.                Home Medical Care    No services have been selected for the patient.                Therapy    No services have been selected for the patient.                Community Resources    No services have been selected for the patient.                Community & DME    No services have been selected for the patient.                    Selected Continued Care - Prior Encounters Includes continued care and service providers with selected services from prior encounters from 7/9/2024 to 10/16/2024      Discharged on 8/28/2024 Admission date: 8/21/2024 - Discharge disposition: Rehab Facility or Unit (DC - External)      Destination       Service Provider Services Address Phone Fax Patient Preferred    [facility] [address]  [place], [facility] [address] [phone] --                          Transportation Services  Private: Car    Final Discharge Disposition Code: 62 - inpatient rehab facility

## 2024-10-16 NOTE — DISCHARGE SUMMARY
"    [facility] Medicine Services  TRANSFER SUMMARY    Patient Name: Dalila Pike  : 1970  MRN: [id number]    Date of Admission: 10/7/2024  Date of Discharge:  10/16/2024  Length of Stay: 7  Primary Care Physician: Agnes Ewing APRN    Consults       Date and Time Order Name Status Description    10/8/2024  6:04 PM Inpatient General Surgery Consult Completed     10/8/2024  7:27 AM Inpatient Orthopedic Surgery Consult      10/7/2024  3:45 PM Inpatient Gastroenterology Consult Completed             Hospital Course     Presenting Problem:   RUQ pain [R10.11]  Cholangitis [K83.09]    Active Hospital Problems    Diagnosis  POA    **RUQ pain [R10.11]  Yes    Cholangitis [K83.09]  Yes    Dilated cbd, acquired [K83.8]  Unknown    HTN (hypertension) [I10]  Yes    History of seizures [Z87.898]  Yes    Alcohol use [Z78.9]  Yes    Obesity (BMI 30-39.9) [E66.9]  Yes    Dyslipidemia [E78.5]  Yes    PAD (peripheral artery disease) [I73.9]  Yes    Tobacco use [Z72.0]  Yes      Resolved Hospital Problems   No resolved problems to display.          Hospital Course:  Dalila Pike is a 54 y.o. female with past medical history of HTN, HL, PAD, obesity, seizures, ETOH use, R MCA CVA s/p thrombectomy/R ICA stent with residual L weakness, and recent L ankle fracture who presented on 10/7/2024 with RUQ pain.     RUQ pain  -GI consulted   -MRCP with mildly dilated biliary tree with blunted appearance of the common bile duct of the ampulla; no obvious filling defect  -s/p ERCP with papillary stenosis, s/p sphincterotomy/stent  -General surgery consulted for CCY, not recommended at this time  -GI recommending continued PPI, completing course of antibiotics-s/p ceftriaxone/flagyl through 10/14  -Continue PPI   DC recs Per GI: \"I would recommend repeat ERCP in 6-8 weeks to remove biliary stent and to repeat cholangiogram to reassess what appears to be benign papillary stenosis.  Biopsies from the stomach " "were normal.  Biopsies from the esophagus show benign changes related to reflux without evidence for Kaplan's esophagus.   Continue proton pump inhibitor.\"     Recent CVA s/p thrombectomy/R ICA stent  -on ASA/brilinta/statin  -Follow-up stroke neurology as previously recommended     HTN  - Continue lisinopril. Stable.      HL  -Continue statin     ETOH use  -s/p CIWA   -monitor for withdrawal. Has now been here for 9 days.  No s/s of w/d.    --continue to encourage abstinence.      Rt ankle fracture  -followed by Dr. Gonzalez, with recommended CAM boot in place  -Ortho has seen, recommended WBAT with boot x1 month  --follow up with Dr. Yang in 1 month.      Tobacco abuse  --assist/advise, nicotine patch      Seen resting up in bed in no acute distress.  Awake and alert.  Hemodynamically stable and afebrile.  Has been cleared by all services for transfer to acute rehab today.  Going on medications as below with follow-ups as noted.    Discharge Follow Up Recommendations for outpatient labs/diagnostics:  Patient is cleared for transfer to acute rehab today by all services.  Going on medications as below with follow-ups as noted.    --To be seen by provider at rehab on arrival, PCP 1 week of discharge  -- Follow-up with [facility] in 4 weeks  -- Follow-up with orthopedics in 1 month    Day of Discharge     HPI:   Resting up in bed in no acute distress.  Awake and alert.  States she feels a little better.  Chronic left-sided weakness stable.  Denies nausea/vomiting.  Tolerating diet.  Abdominal pain improved at 4/10 scale.  No new issues.  Eager to get to rehab today as planned.    Review of Systems  Gen- No fevers, chills  CV- No chest pain, palpitations  Resp- No cough, dyspnea  GI- as above        Vital Signs:   Temp:  [97.2 °F (36.2 °C)-98.5 °F (36.9 °C)] 97.5 °F (36.4 °C)  Heart Rate:  [74-89] 85  Resp:  [17-18] 17  BP: ()/(66-92) 94/66     Physical Exam:  Constitutional: No acute distress, " "awake, alert.  Resting up in bed.  No visitors at bedside.   at bedside.  Respiratory: Clear to auscultation bilaterally, respiratory effort normal on room air.  Sats WNL.  Cardiovascular: RRR, no murmurs  Gastrointestinal: Positive bowel sounds, soft, mild tenderness to palpation.  No guarding or rebound.  Obese.  Musculoskeletal: No bilateral ankle edema.   Psychiatric: Appropriate affect, cooperative  Neurologic: Oriented x 3, strength symmetric in all extremities, Cranial Nerves grossly intact to confrontation, speech clear.  Follows commands.    Pertinent Results     Results from last 7 days   Lab Units 10/14/24  0541 10/12/24  0537 10/11/24  1737 10/11/24  0809 10/10/24  0535   WBC 10*3/mm3 6.46 6.56  --   --   --    HEMOGLOBIN g/dL 12.5 13.3  --   --   --    HEMATOCRIT % 37.7 39.4  --   --   --    PLATELETS 10*3/mm3 297 265  --   --   --    SODIUM mmol/L 138 139  --  138 140   POTASSIUM mmol/L 3.5 3.7 4.3 3.5 4.0   CHLORIDE mmol/L 105 104  --  105 105   CO2 mmol/L 24.0 24.0  --  24.0 24.0   BUN mg/dL 9 14  --  13 12   CREATININE mg/dL 0.67 0.62  --  0.62 0.62   GLUCOSE mg/dL 142* 125*  --  131* 111*   CALCIUM mg/dL 9.3 9.4  --  9.4 9.3     Results from last 7 days   Lab Units 10/14/24  0541 10/12/24  0537 10/11/24  0809 10/10/24  0535   BILIRUBIN mg/dL 0.4 0.6 0.9 0.7   ALK PHOS U/L 219* 236* 251* 249*   ALT (SGPT) U/L 14 25 36* 48*   AST (SGOT) U/L 13 17 23 34*           Invalid input(s): \"TG\", \"LDLCALC\", \"LDLREALC\"      Brief Urine Lab Results  (Last result in the past 365 days)        Color   Clarity   Blood   Leuk Est   Nitrite   Protein   CREAT   Urine HCG        10/08/24 0317 Yellow   Clear   Negative   Negative   Negative   Negative                   Microbiology Results Abnormal       Procedure Component Value - Date/Time    Blood Culture - Blood, Hand, Right [[id number]]  (Normal) Collected: 10/07/24 5909    Lab Status: Final result Specimen: Blood from Hand, Right Updated: 10/12/24 9990     " Blood Culture No growth at 5 days    Narrative:      Less than seven (7) mL's of blood was collected.  Insufficient quantity may yield false negative results.    Blood Culture - Blood, Arm, Right [[id number]]  (Normal) Collected: 10/07/24 1609    Lab Status: Final result Specimen: Blood from Arm, Right Updated: 10/12/24 1645     Blood Culture No growth at 5 days    COVID PRE-OP / PRE-PROCEDURE SCREENING ORDER (NO ISOLATION) - Swab, Nasopharynx [[id number]]  (Normal) Collected: 10/07/24 1750    Lab Status: Final result Specimen: Swab from Nasopharynx Updated: 10/07/24 1849    Narrative:      The following orders were created for panel order COVID PRE-OP / PRE-PROCEDURE SCREENING ORDER (NO ISOLATION) - Swab, Nasopharynx.  Procedure                               Abnormality         Status                     ---------                               -----------         ------                     Respiratory Panel PCR w/...[[id number]]  Normal              Final result                 Please view results for these tests on the individual orders.    Respiratory Panel PCR w/COVID-19(SARS-CoV-2) MICKEY/AKIL/MARIO/PAD/COR/SOULEYMANE In-House, NP Swab in UTM/VTM, 2 HR TAT - Swab, Nasopharynx [[id number]]  (Normal) Collected: 10/07/24 1750    Lab Status: Final result Specimen: Swab from Nasopharynx Updated: 10/07/24 1849     ADENOVIRUS, PCR Not Detected     Coronavirus 229E Not Detected     Coronavirus HKU1 Not Detected     Coronavirus NL63 Not Detected     Coronavirus OC43 Not Detected     COVID19 Not Detected     Human Metapneumovirus Not Detected     Human Rhinovirus/Enterovirus Not Detected     Influenza A PCR Not Detected     Influenza B PCR Not Detected     Parainfluenza Virus 1 Not Detected     Parainfluenza Virus 2 Not Detected     Parainfluenza Virus 3 Not Detected     Parainfluenza Virus 4 Not Detected     RSV, PCR Not Detected     Bordetella pertussis pcr Not Detected     Bordetella parapertussis PCR Not Detected     Chlamydophila  pneumoniae PCR Not Detected     Mycoplasma pneumo by PCR Not Detected    Narrative:      In the setting of a positive respiratory panel with a viral infection PLUS a negative procalcitonin without other underlying concern for bacterial infection, consider observing off antibiotics or discontinuation of antibiotics and continue supportive care. If the respiratory panel is positive for atypical bacterial infection (Bordetella pertussis, Chlamydophila pneumoniae, or Mycoplasma pneumoniae), consider antibiotic de-escalation to target atypical bacterial infection.            Imaging Results (All)       Procedure Component Value Units Date/Time    XR Ankle 3+ View Left [[id number]] Collected: 10/12/24 1729     Updated: 10/12/24 1743    Narrative:      XR ANKLE 3+ VW LEFT    Date of Exam: 10/12/2024 5:04 PM EDT    Indication: Recent ankle fracture, new fall, pain    Comparison: 10/9/2024    Findings:  Again seen is minimally displaced fracture of the distal fibula, medial malleolus and also likely the anterior tibial plafond. No definite posterior malleolar fracture. No new fracture seen. Plantar calcaneal spurring and Achilles enthesopathy. Ankle   mortise appears symmetric. Talar dome appears intact. No traumatic malalignment on this nonweightbearing exam. Degenerative changes predominate of the midfoot.      Impression:      Impression:  Again seen are minimal displaced fracture of the distal fibula and medial malleolus and also likely of the anterior tibial plafond similar to most recent prior examination. No new fracture seen.      Electronically Signed: Javier Javed MD    10/12/2024 5:40 PM EDT    Workstation ID: [id number]    FL ERCP pancreatic and biliary ducts [[id number]] Collected: 10/08/24 2237     Updated: 10/08/24 2243    Narrative:      FL ERCP PANCREATIC AND BILIARY DUCTS    Date of Exam: 10/8/2024 4:54 PM EDT    Indication: ERCP.       Comparison: MRCP dated 10/9/2024    Technique:  A series of  radiographic digital spot films were obtained in conjunction with an endoscopic catheterization of the biliary and pancreatic ductal system, performed by the gastroenterologist.    Fluoroscopic Time: 146 seconds    Number of Images: 14    Findings:  Intraoperative fluoroscopic images demonstrate cannulation and opacification of the common bile duct with balloon sweep and placement of a metallic stent. Please see procedure report for full findings and details.      Impression:      Impression:  1. Intraoperative fluoroscopy during ERCP with metallic stent placement. Please see procedure report for full findings and details.      Electronically Signed: Deandre Mendez    10/8/2024 10:39 PM EDT    Workstation ID: [id number]    MRI abdomen wo contrast mrcp [[id number]] Collected: 10/08/24 0820     Updated: 10/08/24 0830    Narrative:      MRI ABDOMEN WO CONTRAST MRCP    Date of Exam: 10/8/2024 2:47 AM EDT    Indication: dilated CBD on RUQ U/S from OSH.     Comparison: CT abdomen pelvis 10/7/2024    Technique:  Routine multiplanar/multisequence images of the abdomen were obtained with MRCP sequences without contrast administration.      Findings:  Heart size appears borderline/mildly enlarged. Lower lungs grossly clear within limitations of MRI.    Normal size and contour of liver and spleen. No obvious liver lesion. Mild fluid distention of the gallbladder without wall thickening or pericholecystic fluid. No visible stone. Mild dilation of the biliary tree with somewhat blunted appearance of   common bile duct at ampulla. Upstream common bile duct measures 1.2 cm, mid common bile duct 1.2 cm and downstream common bile duct 0.6 cm. No obvious filling defect or mass.    Normal size, contour and signal intensity of the pancreas. No main duct dilation or divisum morphology. Spleen is homogeneous. No suspicious adrenal nodule. Symmetric renal size and contour. No hydronephrosis. Expected configuration stomach and duodenum.   No  evidence of bowel obstruction or active inflammation. Appendix normal right lower quadrant. Normal caliber abdominal aorta. Few prominent prashanth hepatic lymph nodes commonly reactive otherwise no suspicious adenopathy. No ascites or drainable   collection.    No acute abdominal wall findings within included field-of-view. Multilevel degenerative endplate change without infiltrative marrow process. Slight lower thoracic levocurvature.      Impression:      Impression:  1. Mildly dilated biliary tree with blunted appearance of the common bile duct at the ampulla. No obvious filling defects or mass. Differential includes ampullary stricture, biliary sphincter of Oddi dysfunction, occult intraductal stone versus occult   mass. Consider ERCP for further assessment.  2. Mildly fluid distention of the gallbladder without other findings to suggest acute cholecystitis by MRI.  3. Negative for acute pancreatitis.        Electronically Signed: Juan Ball MD    10/8/2024 8:27 AM EDT    Workstation ID: [id number]    US Outside Films [[id number]] Resulted: 10/07/24 1629     Updated: 10/07/24 1629    Narrative:      This procedure was auto-finalized with no dictation required.    CT Outside Films [[id number]] Resulted: 10/07/24 1628     Updated: 10/07/24 1628    Narrative:      This procedure was auto-finalized with no dictation required.            Results for orders placed during the hospital encounter of 08/21/24    Adult Transthoracic Echo Complete W/ Cont if Necessary Per Protocol (With Agitated Saline)    Interpretation Summary    Left ventricular systolic function is normal. Calculated left ventricular EF = 68% Normal left ventricular cavity size noted. Left ventricular wall thickness is consistent with moderate concentric hypertrophy. All left ventricular wall segments contract normally. Left ventricular diastolic function was normal. Normal left atrial pressure.    The right ventricular cavity is mildly dilated. Normal  right ventricular systolic function noted.    Left atrial volume is moderately increased. Saline test results are negative.    The aortic valve is structurally normal with no stenosis present. The aortic valve appears trileaflet. No significant aortic valve regurgitation is present.    The mitral valve is structurally normal with no significant stenosis present. Trace to mild mitral valve regurgitation is present.    Mild tricuspid valve regurgitation is present. Estimated right ventricular systolic pressure from tricuspid regurgitation is mildly elevated (35-45 mmHg)    Mild dilation of the ascending aorta is present. Ascending aorta = 3.7 cm          Discharge Details        Discharge Medications        New Medications        Instructions Start Date   acetaminophen 325 MG tablet  Commonly known as: TYLENOL   650 mg, Oral, Every 4 Hours PRN      guaifenesin 100 MG/5ML liquid  Commonly known as: ROBITUSSIN  Replaces: guaiFENesin 600 MG 12 hr tablet   400 mg, Oral, Every 8 Hours Scheduled      hydrOXYzine 25 MG tablet  Commonly known as: ATARAX   25 mg, Oral, 3 Times Daily PRN      lansoprazole 15 MG Tablet Delayed Release Dispersible disintegrating tablet  Commonly known as: PREVACID SOLUTAB   15 mg, Oral, Every Early Morning   Start Date: October 17, 2024     nicotine 21 MG/24HR patch  Commonly known as: NICODERM CQ   1 patch, Transdermal, Every 24 Hours      nicotine polacrilex 4 MG gum  Commonly known as: NICORETTE   4 mg, Mouth/Throat, Every 1 Hour PRN      polyethylene glycol 17 g packet  Commonly known as: MIRALAX   17 g, Oral, Daily PRN      sennosides-docusate 8.6-50 MG per tablet  Commonly known as: PERICOLACE   2 tablets, Oral, 2 Times Daily PRN      thiamine 100 MG tablet  Commonly known as: VITAMIN B1   100 mg, Oral, Daily   Start Date: October 17, 2024     traZODone 50 MG tablet  Commonly known as: DESYREL   50 mg, Oral, Nightly      ursodiol 300 MG capsule  Commonly known as: ACTIGALL   300 mg, Oral, 2  Times Daily             Continue These Medications        Instructions Start Date   aspirin 81 MG chewable tablet   81 mg, Oral, Daily      atorvastatin 80 MG tablet  Commonly known as: LIPITOR   80 mg, Oral, Nightly      folic acid 1 MG tablet  Commonly known as: FOLVITE   1 mg, Oral, Daily      HYDROcodone-acetaminophen 5-325 MG per tablet  Commonly known as: NORCO   1 tablet, Oral, Every 6 Hours PRN      lisinopril 20 MG tablet  Commonly known as: PRINIVIL,ZESTRIL   20 mg, Oral, Daily      magnesium oxide 400 tablet tablet  Commonly known as: MAG-OX   400 mg, Oral, Daily      ticagrelor 60 MG tablet tablet  Commonly known as: BRILINTA   60 mg, Oral, 2 Times Daily      topiramate 25 MG tablet  Commonly known as: Topamax   25 mg, Oral, Nightly             Stop These Medications      guaiFENesin 600 MG 12 hr tablet  Commonly known as: MUCINEX  Replaced by: guaifenesin 100 MG/5ML liquid     pantoprazole 40 MG EC tablet  Commonly known as: PROTONIX     sodium chloride 1 g tablet              Allergies   Allergen Reactions    Erythromycin Anaphylaxis    Latex Rash    Toradol [Ketorolac Tromethamine] Rash    Contrast Dye (Echo Or Unknown Ct/Mr) Unknown (See Comments)     Hives on chest         Discharge Disposition:  Rehab Facility or Unit (DC - External)    Discharge Diet:  Diet Order   Procedures    Diet: Gastrointestinal; Fat-Restricted; Fluid Consistency: Thin (IDDSI 0)       Discharge Activity:  Activity Instructions       Measure Blood Pressure      Other Activity Instructions      Recommended at this point 6 weeks out from original injury she could be weightbearing as tolerated in her boot for 1 more month and then we can see her back in the office in a month she can follow-up with Dr. Wynn fits  Repeat x-rays if those look normal and healed the patient come out of the boot completely and start some physical therapy              CODE STATUS:    Code Status and Medical Interventions: CPR (Attempt to  Resuscitate); Full Support   Ordered at: 10/07/24 1545     Level Of Support Discussed With:    Patient     Code Status (Patient has no pulse and is not breathing):    CPR (Attempt to Resuscitate)     Medical Interventions (Patient has pulse or is breathing):    Full Support         Future Appointments   Date Time Provider Department Center   11/12/2024  9:15 AM Dinorah López APRN [facility]   11/12/2024 11:00 AM [facility] OP VASC CART RM 2 BH [facility] N[facility]  [facility]   11/12/2024  2:30 PM Jamaal Saini MD [facility]   12/20/2024  1:30 PM Emilie Calzada APRN [facility]       Additional Instructions for the Follow-ups that You Need to Schedule       Ambulatory Referral to Home Health   As directed      Face to Face Visit Date: 10/11/2024   Follow-up provider for Plan of Care?: I treated the patient in an acute care facility and will not continue treatment after discharge.   Follow-up provider: ILSA DELAROSA [[id number]]   Reason/Clinical Findings: Cholangitis [K83.09]   Describe mobility limitations that make leaving home difficult: Ipmaired functional mobility, gait and balance   Nursing/Therapeutic Services Requested: Skilled Nursing Physical Therapy Occupational Therapy   Skilled nursing orders: Medication education   PT orders: Gait Training Strengthening Ultrasound Home safety assessment   Weight Bearing Status: As Tolerated   Occupational orders: Activities of daily living Cognition Strengthening Energy conservation Home safety assessment Fine motor   Frequency: 1 Week 1        Ambulatory referral for Screening EGD   As directed      Schedule repeat ERCP in 6 weeks to remove biliary stent, repeat balloon sweep and cholangiogram to assess what appears to be benign papillary stenosis    Schedule repeat ERCP in 6 weeks to remove biliary stent, repeat balloon sweep and cholangiogram to assess what appears to be benign papillary stenosis    Order Comments: Schedule repeat ERCP in 6 weeks to remove biliary stent,  repeat balloon sweep and cholangiogram to assess what appears to be benign papillary stenosis         Discharge Follow-up with PCP   As directed       Currently Documented PCP:    Agnes Ewing APRN    PCP Phone Number:    [phone]     Follow Up Details: To be seen by provider at facility on arrival, PCP 1 week of discharge        Discharge Follow-up with Specialty: Follow-up with [facility] in 4 weeks (please schedule appointment prior to DC)   As directed      Specialty: Follow-up with [facility] in 4 weeks (please schedule appointment prior to DC)        Discharge Follow-up with Specified Provider: Follow-up with orthopedics in 1 month (please schedule appointment prior to DC)   As directed      To: Follow-up with orthopedics in 1 month (please schedule appointment prior to DC)              Electronically signed by ROXANE Dai, 10/16/24, 11:27 AM EDT.      Time Spent on Discharge: I spent 45 minutes on this discharge activity which included: face-to-face encounter with the patient, reviewing the data in the system, coordination of the care with the nursing staff as well as consultants, documentation, and entering orders.

## 2024-10-17 LAB
ALBUMIN SERPL-MCNC: 3.7 G/DL (ref 3.5–5.2)
ALBUMIN/GLOB SERPL: 1.1 G/DL
ALP SERPL-CCNC: 205 U/L (ref 39–117)
ALT SERPL W P-5'-P-CCNC: 13 U/L (ref 1–33)
ANION GAP SERPL CALCULATED.3IONS-SCNC: 12 MMOL/L (ref 5–15)
AST SERPL-CCNC: 18 U/L (ref 1–32)
BASOPHILS # BLD AUTO: 0.05 10*3/MM3 (ref 0–0.2)
BASOPHILS NFR BLD AUTO: 0.6 % (ref 0–1.5)
BILIRUB SERPL-MCNC: 0.4 MG/DL (ref 0–1.2)
BUN SERPL-MCNC: 9 MG/DL (ref 6–20)
BUN/CREAT SERPL: 11.8 (ref 7–25)
CALCIUM SPEC-SCNC: 9.9 MG/DL (ref 8.6–10.5)
CHLORIDE SERPL-SCNC: 104 MMOL/L (ref 98–107)
CO2 SERPL-SCNC: 24 MMOL/L (ref 22–29)
CREAT SERPL-MCNC: 0.76 MG/DL (ref 0.57–1)
DEPRECATED RDW RBC AUTO: 41 FL (ref 37–54)
EGFRCR SERPLBLD CKD-EPI 2021: 93.3 ML/MIN/1.73
EOSINOPHIL # BLD AUTO: 0.23 10*3/MM3 (ref 0–0.4)
EOSINOPHIL NFR BLD AUTO: 2.7 % (ref 0.3–6.2)
ERYTHROCYTE [DISTWIDTH] IN BLOOD BY AUTOMATED COUNT: 11.7 % (ref 12.3–15.4)
GLOBULIN UR ELPH-MCNC: 3.4 GM/DL
GLUCOSE SERPL-MCNC: 126 MG/DL (ref 65–99)
HCT VFR BLD AUTO: 42.2 % (ref 34–46.6)
HGB BLD-MCNC: 13.1 G/DL (ref 12–15.9)
IMM GRANULOCYTES # BLD AUTO: 0.05 10*3/MM3 (ref 0–0.05)
IMM GRANULOCYTES NFR BLD AUTO: 0.6 % (ref 0–0.5)
LYMPHOCYTES # BLD AUTO: 2.22 10*3/MM3 (ref 0.7–3.1)
LYMPHOCYTES NFR BLD AUTO: 26.1 % (ref 19.6–45.3)
MAGNESIUM SERPL-MCNC: 2.4 MG/DL (ref 1.6–2.6)
MCH RBC QN AUTO: 29.8 PG (ref 26.6–33)
MCHC RBC AUTO-ENTMCNC: 31 G/DL (ref 31.5–35.7)
MCV RBC AUTO: 96.1 FL (ref 79–97)
MONOCYTES # BLD AUTO: 0.48 10*3/MM3 (ref 0.1–0.9)
MONOCYTES NFR BLD AUTO: 5.6 % (ref 5–12)
NEUTROPHILS NFR BLD AUTO: 5.48 10*3/MM3 (ref 1.7–7)
NEUTROPHILS NFR BLD AUTO: 64.4 % (ref 42.7–76)
NRBC BLD AUTO-RTO: 0 /100 WBC (ref 0–0.2)
PLATELET # BLD AUTO: 339 10*3/MM3 (ref 140–450)
PMV BLD AUTO: 10.1 FL (ref 6–12)
POTASSIUM SERPL-SCNC: 3.8 MMOL/L (ref 3.5–5.2)
PROT SERPL-MCNC: 7.1 G/DL (ref 6–8.5)
RBC # BLD AUTO: 4.39 10*6/MM3 (ref 3.77–5.28)
SODIUM SERPL-SCNC: 140 MMOL/L (ref 136–145)
WBC NRBC COR # BLD AUTO: 8.51 10*3/MM3 (ref 3.4–10.8)

## 2024-10-17 PROCEDURE — 97161 PT EVAL LOW COMPLEX 20 MIN: CPT

## 2024-10-17 PROCEDURE — 85025 COMPLETE CBC W/AUTO DIFF WBC: CPT | Performed by: FAMILY MEDICINE

## 2024-10-17 PROCEDURE — 97535 SELF CARE MNGMENT TRAINING: CPT | Performed by: OCCUPATIONAL THERAPIST

## 2024-10-17 PROCEDURE — 97116 GAIT TRAINING THERAPY: CPT

## 2024-10-17 PROCEDURE — 97112 NEUROMUSCULAR REEDUCATION: CPT | Performed by: OCCUPATIONAL THERAPIST

## 2024-10-17 PROCEDURE — 97530 THERAPEUTIC ACTIVITIES: CPT

## 2024-10-17 PROCEDURE — 97166 OT EVAL MOD COMPLEX 45 MIN: CPT | Performed by: OCCUPATIONAL THERAPIST

## 2024-10-17 PROCEDURE — 63710000001 ONDANSETRON ODT 4 MG TABLET DISPERSIBLE: Performed by: FAMILY MEDICINE

## 2024-10-17 PROCEDURE — 97112 NEUROMUSCULAR REEDUCATION: CPT

## 2024-10-17 PROCEDURE — 83735 ASSAY OF MAGNESIUM: CPT | Performed by: FAMILY MEDICINE

## 2024-10-17 PROCEDURE — 80053 COMPREHEN METABOLIC PANEL: CPT | Performed by: FAMILY MEDICINE

## 2024-10-17 PROCEDURE — 99223 1ST HOSP IP/OBS HIGH 75: CPT | Performed by: FAMILY MEDICINE

## 2024-10-17 PROCEDURE — 97110 THERAPEUTIC EXERCISES: CPT

## 2024-10-17 PROCEDURE — 25010000002 HEPARIN (PORCINE) PER 1000 UNITS: Performed by: FAMILY MEDICINE

## 2024-10-17 RX ORDER — ONDANSETRON 4 MG/1
4 TABLET, ORALLY DISINTEGRATING ORAL EVERY 6 HOURS PRN
Status: DISCONTINUED | OUTPATIENT
Start: 2024-10-17 | End: 2024-10-23 | Stop reason: HOSPADM

## 2024-10-17 RX ADMIN — URSODIOL 300 MG: 300 CAPSULE ORAL at 08:31

## 2024-10-17 RX ADMIN — URSODIOL 300 MG: 300 CAPSULE ORAL at 20:34

## 2024-10-17 RX ADMIN — HEPARIN SODIUM 5000 UNITS: 5000 INJECTION INTRAVENOUS; SUBCUTANEOUS at 22:59

## 2024-10-17 RX ADMIN — HYDROCODONE BITARTRATE AND ACETAMINOPHEN 1 TABLET: 5; 325 TABLET ORAL at 05:26

## 2024-10-17 RX ADMIN — HEPARIN SODIUM 5000 UNITS: 5000 INJECTION INTRAVENOUS; SUBCUTANEOUS at 14:05

## 2024-10-17 RX ADMIN — FOLIC ACID 1 MG: 1 TABLET ORAL at 08:30

## 2024-10-17 RX ADMIN — HYDROXYZINE HYDROCHLORIDE 25 MG: 25 TABLET ORAL at 20:34

## 2024-10-17 RX ADMIN — NICOTINE 1 PATCH: 21 PATCH TRANSDERMAL at 08:31

## 2024-10-17 RX ADMIN — SENNOSIDES AND DOCUSATE SODIUM 2 TABLET: 50; 8.6 TABLET ORAL at 20:33

## 2024-10-17 RX ADMIN — TRAZODONE HYDROCHLORIDE 50 MG: 50 TABLET ORAL at 20:34

## 2024-10-17 RX ADMIN — POLYETHYLENE GLYCOL (3350) 17 G: 17 POWDER, FOR SOLUTION ORAL at 08:31

## 2024-10-17 RX ADMIN — Medication 400 MG: at 08:31

## 2024-10-17 RX ADMIN — HYDROCODONE BITARTRATE AND ACETAMINOPHEN 1 TABLET: 5; 325 TABLET ORAL at 20:33

## 2024-10-17 RX ADMIN — HEPARIN SODIUM 5000 UNITS: 5000 INJECTION INTRAVENOUS; SUBCUTANEOUS at 05:15

## 2024-10-17 RX ADMIN — ONDANSETRON 4 MG: 4 TABLET, ORALLY DISINTEGRATING ORAL at 14:04

## 2024-10-17 RX ADMIN — TICAGRELOR 60 MG: 60 TABLET ORAL at 08:31

## 2024-10-17 RX ADMIN — LANSOPRAZOLE 30 MG: 30 TABLET, ORALLY DISINTEGRATING ORAL at 05:15

## 2024-10-17 RX ADMIN — ATORVASTATIN CALCIUM 80 MG: 40 TABLET, FILM COATED ORAL at 20:33

## 2024-10-17 RX ADMIN — NYSTATIN 1 APPLICATION: 100000 CREAM TOPICAL at 08:31

## 2024-10-17 RX ADMIN — ASPIRIN 81 MG: 81 TABLET, COATED ORAL at 08:30

## 2024-10-17 RX ADMIN — LISINOPRIL 20 MG: 10 TABLET ORAL at 08:30

## 2024-10-17 RX ADMIN — TICAGRELOR 60 MG: 60 TABLET ORAL at 20:33

## 2024-10-17 RX ADMIN — TOPIRAMATE 25 MG: 25 TABLET, FILM COATED ORAL at 20:33

## 2024-10-17 RX ADMIN — NYSTATIN 1 APPLICATION: 100000 CREAM TOPICAL at 20:35

## 2024-10-17 RX ADMIN — HYDROCODONE BITARTRATE AND ACETAMINOPHEN 1 TABLET: 5; 325 TABLET ORAL at 11:33

## 2024-10-17 RX ADMIN — Medication 100 MG: at 08:30

## 2024-10-17 NOTE — H&P
[facility] HOSPITALIST HISTORY AND PHYSICAL    Patient Identification:  Name:  Dalila Pike  Age:  54 y.o.  Sex:  female  :  1970  MRN:  [id number]   Visit Number:  [id number]  Room number:  105/2S  Primary Care Physician:  Agnes Ewing APRN     Subjective     10/16/2024   Chief complaint: Debility; history of CVA with left hemiparesis; recent cholangitis    History of presenting illness:  54 y.o. female  This pt is seen today for post admission physician evaluation to the inpatient rehabilitation facility.  Patient is a 54-year-old female with a history of hypertension, dyslipidemia, peripheral arterial disease, obesity tobacco abuse, history of ethanol use, history of right MCA territory ischemic stroke with thrombectomy and right ICA stent placement in August with residual weakness.  Patient with left ankle fracture as well.  Patient recently admitted to [facility] with: [place].  Patient was treated with IV Rocephin and Flagyl and did require ERCP and was noted to have papillary stenosis and had sphincterotomy and stent placement.  Patient was evaluated by general surgery who recommended 9 surgery at this time.  Patient did was noted to have some GERD changes in the esophagus but no evidence of Kaplan's esophagus and was maintained on PPI during the admission.  Patient has some generalized weakness and does have left upper extremity paresis as well as 4 out of 5 strength in the left lower extremity.  Patient has signs of a left ankle fracture which she states she sustained when she was dropped when she had her stroke.  In any event patient was thought to be a good candidate for inpatient physical rehab.      Patient continued to progress medically.  Physical therapy and Occupational therapy evaluations were completed with recommended acute inpatient rehabilitation referral for continued functional mobility intervention and reeducation.  Acute rehab referral ordered  for continued medical monitoring and management post prolonged hospitalization, continued respiratory status monitoring, lab monitoring, pain mgt needs, bowel/bladder care with new medication education, skin monitoring and breakdown prevention along with ongoing medical comorbidities that require ongoing care.    The preadmission mini-FIM score as assessed by the referring facility as follows: Eating 5; grooming 4; bathing 3; dressing upper body 4; dressing lower body 2; toileting 3; transfers to bed chair and wheelchair 5; transfers to toilet 5; locomotion 5; bladder management 4; bowel management 4; comprehension 7; expression 7; substractions 7; problem solving 7; memory 7.    ---------------------------------------------------------------------------------------------------------------------   Review of Systems:  General: Denies fever denies chills  HEENT: Denies headache does have left eye increased sensitivity to light  Heart: Denies chest pain or palpitations  Lungs: No cough no wheezing, no shortness of breath  Abdomen: Still has some residual right upper quadrant discomfort after stent placement  : Negative  Musculoskeletal: Left ankle soreness  Neuro: Left-sided hemiparesis  Skin: No rash  ---------------------------------------------------------------------------------------------------------------------   Past Medical History:   Diagnosis Date    Acute CVA (cerebrovascular accident) 08/21/2024    Alcohol use 08/21/2024    Dyslipidemia 08/21/2024    Obesity (BMI 30-39.9) 08/21/2024    PAD (peripheral artery disease) 08/21/2024    Tobacco use 08/21/2024     Past Surgical History:   Procedure Laterality Date    ENDOSCOPY N/A 10/8/2024    Procedure: ESOPHAGOGASTRODUODENOSCOPY;  Surgeon: Elie Sanders MD;  Location: [facility] ENDOSCOPY;  Service: Gastroenterology;  Laterality: N/A;    ERCP N/A 10/8/2024    Procedure: ENDOSCOPIC RETROGRADE CHOLANGIOPANCREATOGRAPHY WITH STENT PLACEMENT;  Surgeon: Elie Sanders,  MD;  Location:  [facility] ENDOSCOPY;  Service: Gastroenterology;  Laterality: N/A;  SPHINCTEROTOMY @ 1737, 9-12MM AND 12-15MM BALLOON SWEEP TO CBD    INTERVENTIONAL RADIOLOGY PROCEDURE Bilateral 8/21/2024    Procedure: Carotid Cervical Angiogram;  Surgeon: Jamaal Saini MD;  Location:  [facility] CATH INVASIVE LOCATION;  Service: Interventional Radiology;  Laterality: Bilateral;     History reviewed. No pertinent family history.  Social History     Socioeconomic History    Marital status:    Tobacco Use    Smoking status: Every Day     Current packs/day: 0.50     Average packs/day: 2.0 packs/day for 38.8 years (76.4 ttl pk-yrs)     Types: Cigarettes     Start date: 2024     Passive exposure: Current    Smokeless tobacco: Never    Tobacco comments:     4-5 a day   Vaping Use    Vaping status: Never Used   Substance and Sexual Activity    Alcohol use: Not Currently    Drug use: Not Currently    Sexual activity: Defer     ---------------------------------------------------------------------------------------------------------------------   Allergies:  Erythromycin, Latex, Toradol [ketorolac tromethamine], and Contrast dye (echo or unknown ct/mr)  ---------------------------------------------------------------------------------------------------------------------   Medications below are reported home medications pulling from within the system; at this time, these medications have not been reconciled unless otherwise specified and are in the verification process for further verifcation as current home medications.    Prior to Admission Medications       Prescriptions Last Dose Informant Patient Reported? Taking?    aspirin 81 MG EC tablet Unknown  Yes No    Take 1 tablet by mouth Daily.    atorvastatin (LIPITOR) 80 MG tablet Unknown  Yes No    Take 1 tablet by mouth Daily.    cholecalciferol (VITAMIN D3) 1.25 MG (64255 UT) capsule Unknown  Yes No    Take 1 capsule by mouth Every 7 (Seven) Days.    folic acid (FOLVITE)  1 MG tablet Unknown  Yes No    Take 1 tablet by mouth Daily.    guaiFENesin (MUCINEX) 600 MG 12 hr tablet Unknown  Yes No    Take 1 tablet by mouth 2 (Two) Times a Day As Needed for Cough or Congestion.    lisinopril (PRINIVIL,ZESTRIL) 10 MG tablet Unknown  Yes No    Take 1 tablet by mouth Daily.    oxyCODONE (ROXICODONE) 5 MG immediate release tablet Unknown  Yes No    Take 1 tablet by mouth Daily As Needed for Moderate Pain or Severe Pain.    pantoprazole (PROTONIX) 40 MG EC tablet Unknown  Yes No    Take 1 tablet by mouth Daily.    thiamine (VITAMIN B1) 100 MG tablet Unknown  No No    Take 1 tablet by mouth Daily.    ticagrelor (Brilinta) 60 MG tablet tablet Unknown  Yes No    Take 1 tablet by mouth 2 (Two) Times a Day.    topiramate (TOPAMAX) 25 MG tablet Unknown  Yes No    Take 1 tablet by mouth Every Night.    traMADol (ULTRAM) 50 MG tablet Unknown  Yes No    Take 1 tablet by mouth Every Night.          Objective     Vital Signs:  Temp:  [98 °F (36.7 °C)-98.4 °F (36.9 °C)] 98 °F (36.7 °C)  Heart Rate:  [91-98] 98  Resp:  [18] 18  BP: (114-129)/(83-86) 129/86    No data found.  SpO2:  [96 %] 96 %  on   ;   Device (Oxygen Therapy): room air  Body mass index is 34.21 kg/m².    Wt Readings from Last 3 Encounters:   10/16/24 102 kg (225 lb)   10/08/24 110 kg (243 lb)   09/19/24 110 kg (243 lb)      ---------------------------------------------------------------------------------------------------------------------     Physical exam:  Constitutional: Overweight female no distress  HEENT: Normocephalic atraumatic  Neck:   Supple  Cardiovascular: Regular rate and rhythm  Pulmonary/Chest: Clear  Abdominal: Positive bowel sounds soft.   Musculoskeletal: No obvious arthropathy  Neurological: 4 out of 5 strength in the left lower extremity 2 out of 5 strength in the left upper extremity.  No obvious cranial nerve abnormalities noted  Skin: No rash  Peripheral vascular: No  edema  Genitourinary::  ---------------------------------------------------------------------------------------------------------------------    No orders to display           Last echocardiogram:  Results for orders placed during the hospital encounter of 08/21/24    Adult Transthoracic Echo Complete W/ Cont if Necessary Per Protocol (With Agitated Saline)    Interpretation Summary    Left ventricular systolic function is normal. Calculated left ventricular EF = 68% Normal left ventricular cavity size noted. Left ventricular wall thickness is consistent with moderate concentric hypertrophy. All left ventricular wall segments contract normally. Left ventricular diastolic function was normal. Normal left atrial pressure.    The right ventricular cavity is mildly dilated. Normal right ventricular systolic function noted.    Left atrial volume is moderately increased. Saline test results are negative.    The aortic valve is structurally normal with no stenosis present. The aortic valve appears trileaflet. No significant aortic valve regurgitation is present.    The mitral valve is structurally normal with no significant stenosis present. Trace to mild mitral valve regurgitation is present.    Mild tricuspid valve regurgitation is present. Estimated right ventricular systolic pressure from tricuspid regurgitation is mildly elevated (35-45 mmHg)    Mild dilation of the ascending aorta is present. Ascending aorta = 3.7 cm    --------------------------------------------------------------------------------------------------------------------  Labs:  Results from last 7 days   Lab Units 10/17/24  0344 10/14/24  0541 10/12/24  0537   WBC 10*3/mm3 8.51 6.46 6.56   HEMOGLOBIN g/dL 13.1 12.5 13.3   HEMATOCRIT % 42.2 37.7 39.4   MCV fL 96.1 92.0 91.8   MCHC g/dL 31.0* 33.2 33.8   PLATELETS 10*3/mm3 339 297 265         Results from last 7 days   Lab Units 10/17/24  0344 10/14/24  0541 10/12/24  0537   SODIUM mmol/L 140 138 139  "  POTASSIUM mmol/L 3.8 3.5 3.7   MAGNESIUM mg/dL 2.4  --  2.2   CHLORIDE mmol/L 104 105 104   CO2 mmol/L 24.0 24.0 24.0   BUN mg/dL 9 9 14   CREATININE mg/dL 0.76 0.67 0.62   CALCIUM mg/dL 9.9 9.3 9.4   GLUCOSE mg/dL 126* 142* 125*   ALBUMIN g/dL 3.7 3.4* 3.6   BILIRUBIN mg/dL 0.4 0.4 0.6   ALK PHOS U/L 205* 219* 236*   AST (SGOT) U/L 18 13 17   ALT (SGPT) U/L 13 14 25   Estimated Creatinine Clearance: 105.7 mL/min (by C-G formula based on SCr of 0.76 mg/dL).  No results found for: \"AMMONIA\"          No results found for: \"HGBA1C\", \"POCGLU\"  Lab Results   Component Value Date    TSH 0.447 10/08/2024     No results found for: \"PREGTESTUR\", \"PREGSERUM\", \"HCG\", \"HCGQUANT\"  Pain Management Panel  More data may exist         Latest Ref Rng & Units 10/7/2024 8/21/2024   Pain Management Panel   Amphetamine, Urine Qual Negative Negative  Negative    Barbiturates Screen, Urine Negative Negative  Negative    Benzodiazepine Screen, Urine Negative Negative  Negative    Buprenorphine, Screen, Urine Negative Negative  Negative    Cocaine Screen, Urine Negative Negative  Negative    Fentanyl, Urine Negative Negative  Negative    Methadone Screen , Urine Negative Negative  Negative    Methamphetamine, Ur Negative Negative  Positive       Details                 Brief Urine Lab Results  (Last result in the past 365 days)        Color   Clarity   Blood   Leuk Est   Nitrite   Protein   CREAT   Urine HCG        10/08/24 0317 Yellow   Clear   Negative   Negative   Negative   Negative                 No results found for: \"BLOODCX\"  No results found for: \"URINECX\"  No results found for: \"WOUNDCX\"  No results found for: \"STOOLCX\"    Last Urine Toxicity  More data may exist         Latest Ref Rng & Units 10/7/2024 8/21/2024   LAST URINE TOXICITY RESULTS   Amphetamine, Urine Qual Negative Negative  Negative    Barbiturates Screen, Urine Negative Negative  Negative    Benzodiazepine Screen, Urine Negative Negative  Negative    Buprenorphine, " Screen, Urine Negative Negative  Negative    Cocaine Screen, Urine Negative Negative  Negative    Fentanyl, Urine Negative Negative  Negative    Methadone Screen , Urine Negative Negative  Negative    Methamphetamine, Ur Negative Negative  Positive       Details                   I have personally looked at the labs and they are summarized above.  ----------------------------------------------------------------------------------------------------------------------  Detailed radiology reports for the last 24 hours:    Imaging Results (Last 24 Hours)       ** No results found for the last 24 hours. **          Final impressions for the last 30 days of radiology reports:    XR Ankle 3+ View Left    Result Date: 10/12/2024  Impression: Again seen are minimal displaced fracture of the distal fibula and medial malleolus and also likely of the anterior tibial plafond similar to most recent prior examination. No new fracture seen. Electronically Signed: Javier Javed MD  10/12/2024 5:40 PM EDT  Workstation ID: [id number]    XR Ankle 3+ View Left    Result Date: 10/9/2024  Impression: Persistent, nonunited distal left fibular fracture, with minimal new bone formation. Electronically Signed: Ozzy Mckeon MD  10/9/2024 10:51 AM EDT  Workstation ID: [id number]    FL ERCP pancreatic and biliary ducts    Result Date: 10/8/2024  Impression: 1. Intraoperative fluoroscopy during ERCP with metallic stent placement. Please see procedure report for full findings and details. Electronically Signed: Deandre Mendez  10/8/2024 10:39 PM EDT  Workstation ID: [id number]    MRI abdomen wo contrast mrcp    Result Date: 10/8/2024  Impression: 1. Mildly dilated biliary tree with blunted appearance of the common bile duct at the ampulla. No obvious filling defects or mass. Differential includes ampullary stricture, biliary sphincter of Oddi dysfunction, occult intraductal stone versus occult mass. Consider ERCP for further assessment. 2. Mildly fluid  distention of the gallbladder without other findings to suggest acute cholecystitis by MRI. 3. Negative for acute pancreatitis. Electronically Signed: Juan Ball MD  10/8/2024 8:27 AM EDT  Workstation ID: [id number]   I have personally looked at the radiology images and read the final radiology report.    Assessment & Plan    Debility with history of stroke in August 2024 with residual left hemiparesis--patient will require physical therapy 90 minutes/day 5 to 6 days/week for therapeutic exercise, range of motion, endurance, gait training, safety, stair navigation and strengthening.  Patient will require occupational therapy 90 minutes/day 5 to 6 days/week for help with dressing, ADLs, feeding, home skills, safety and toileting techniques.  Anticipate patient being able to safely perform activities of daily living using adaptive equipment for improved functional mobility.  Independent safe bowel and bladder function.  Able to navigate home and community territory safe without injury or falls.  Anticipate patient going home with assistance may require home health services may require wheelchair, bedside commode and walker at discharge    History of right MCA distribution CVA with history of thrombectomy and right ICA stent patient has been continued on aspirin, Brilinta and statin therapy.  See above    Status post cholangitis patient's status post sphincterotomy and stent placement after ERCP.  Patient is completed antibiotic course.  Patient is to follow-up with GI in 6 to 8 weeks for repeat ERCP    Hypertension continue Zestril    Dyslipidemia continue statin therapy    History of ethanol use patient is 10 days since last use.  No evidence of withdrawal symptoms at this time    Left ankle fracture patient was evaluated by Ortho at [facility].  They recommend nonoperative care at this time.  Patient is able to bear weight in cam boot.    Tobacco use recommend cessation continue nicotine replacement  at this time    GERD continue PPI      VTE Prophylaxis:   Heparin    Falls Risk Assessment high risk secondary to left hemiparesis and recent left ankle fracture        Ti Kaur MD  [facility] Hospitalist  10/17/24  07:29 EDT

## 2024-10-17 NOTE — PROGRESS NOTES
"Assisted By: PT    CC: Follow-up on debility    Interview History/HPI: Patient states she is overall doing okay, she did go out to smoke this morning but is still adamant she is going to stay off of these, she was told in August that smoking contributed to her stroke and she had been off of the since August.  She states she is still \"aspirates thin liquids\" after the stroke.  Patient did go to [facility] for about 8 days but left AMA.  She states she did get some outpatient speech therapy as well.          Current Hospital Meds:  aspirin, 81 mg, Oral, Daily  atorvastatin, 80 mg, Oral, Nightly  folic acid, 1 mg, Oral, Daily  heparin (porcine), 5,000 Units, Subcutaneous, Q8H  lansoprazole, 30 mg, Oral, Q AM  lisinopril, 20 mg, Oral, Q24H  magnesium oxide, 400 mg, Oral, Daily  nicotine, 1 patch, Transdermal, Q24H  nystatin, 1 Application, Topical, Q12H  polyethylene glycol, 17 g, Oral, Daily  senna-docusate sodium, 2 tablet, Oral, BID  thiamine, 100 mg, Oral, Daily  ticagrelor, 60 mg, Oral, BID  topiramate, 25 mg, Oral, Nightly  traZODone, 50 mg, Oral, Nightly  ursodiol, 300 mg, Oral, BID         Vitals:    10/17/24 0800   BP: 134/86   Pulse: 101   Resp: 20   Temp: 98.6 °F (37 °C)   SpO2:          Intake/Output Summary (Last 24 hours) at 10/17/2024 1127  Last data filed at 10/17/2024 0900  Gross per 24 hour   Intake 1080 ml   Output --   Net 1080 ml       EXAM: 107/76, 16, 86, 97.7, 98% saturated on room air.  She has a very weak left arm, she cannot really move her fingers at all, she has some biceps and triceps and some minimal shoulder movement, she is in a cam boot on the left ankle, she can move however her left leg significant better than she can move her left arm.  She still has some mild dysarthria noted from her previous stroke.      Diet: Cardiac; Healthy Heart (2-3 Na+); Texture: Soft to Chew (NDD 3); Soft to Chew: Chopped Meat; Fluid Consistency: Thin (IDDSI 0)        LABS:     Lab Results (last 48 " hours)       Procedure Component Value Units Date/Time    Comprehensive Metabolic Panel [[id number]]  (Abnormal) Collected: 10/17/24 0344    Specimen: Blood Updated: 10/17/24 0512     Glucose 126 mg/dL      BUN 9 mg/dL      Creatinine 0.76 mg/dL      Sodium 140 mmol/L      Potassium 3.8 mmol/L      Comment: Slight hemolysis detected by analyzer. Result may be falsely elevated.        Chloride 104 mmol/L      CO2 24.0 mmol/L      Calcium 9.9 mg/dL      Total Protein 7.1 g/dL      Albumin 3.7 g/dL      ALT (SGPT) 13 U/L      AST (SGOT) 18 U/L      Alkaline Phosphatase 205 U/L      Total Bilirubin 0.4 mg/dL      Globulin 3.4 gm/dL      A/G Ratio 1.1 g/dL      BUN/Creatinine Ratio 11.8     Anion Gap 12.0 mmol/L      eGFR 93.3 mL/min/1.73     Narrative:      GFR Normal >60  Chronic Kidney Disease <60  Kidney Failure <15      Magnesium [[id number]]  (Normal) Collected: 10/17/24 0344    Specimen: Blood Updated: 10/17/24 0512     Magnesium 2.4 mg/dL     CBC Auto Differential [[id number]]  (Abnormal) Collected: 10/17/24 0344    Specimen: Blood Updated: 10/17/24 0448     WBC 8.51 10*3/mm3      RBC 4.39 10*6/mm3      Hemoglobin 13.1 g/dL      Hematocrit 42.2 %      MCV 96.1 fL      MCH 29.8 pg      MCHC 31.0 g/dL      RDW 11.7 %      RDW-SD 41.0 fl      MPV 10.1 fL      Platelets 339 10*3/mm3      Neutrophil % 64.4 %      Lymphocyte % 26.1 %      Monocyte % 5.6 %      Eosinophil % 2.7 %      Basophil % 0.6 %      Immature Grans % 0.6 %      Neutrophils, Absolute 5.48 10*3/mm3      Lymphocytes, Absolute 2.22 10*3/mm3      Monocytes, Absolute 0.48 10*3/mm3      Eosinophils, Absolute 0.23 10*3/mm3      Basophils, Absolute 0.05 10*3/mm3      Immature Grans, Absolute 0.05 10*3/mm3      nRBC 0.0 /100 WBC                  Radiology:    Imaging Results (Last 72 Hours)       ** No results found for the last 72 hours. **            Results for orders placed during the hospital encounter of 08/21/24    Adult Transthoracic Echo Complete W/  Cont if Necessary Per Protocol (With Agitated Saline)    Interpretation Summary    Left ventricular systolic function is normal. Calculated left ventricular EF = 68% Normal left ventricular cavity size noted. Left ventricular wall thickness is consistent with moderate concentric hypertrophy. All left ventricular wall segments contract normally. Left ventricular diastolic function was normal. Normal left atrial pressure.    The right ventricular cavity is mildly dilated. Normal right ventricular systolic function noted.    Left atrial volume is moderately increased. Saline test results are negative.    The aortic valve is structurally normal with no stenosis present. The aortic valve appears trileaflet. No significant aortic valve regurgitation is present.    The mitral valve is structurally normal with no significant stenosis present. Trace to mild mitral valve regurgitation is present.    Mild tricuspid valve regurgitation is present. Estimated right ventricular systolic pressure from tricuspid regurgitation is mildly elevated (35-45 mmHg)    Mild dilation of the ascending aorta is present. Ascending aorta = 3.7 cm      Assessment/Plan:   Debility, complicated by previous CVA from August, patient is working with OT/PT, with PT, patient is modified independent for bed mobility, supervision for transfers, supervision for gait and walked 320 then 100 feet.  With OT, patient is mod assist with bathing, min assist upper body dressing, mod to max lower body dressing, contact-guard for grooming, mod toileting.  Since she is complaining of some thin liquid aspirate, she is on chopped diet, also still some mild dysarthria from her CVA, have asked speech therapy to evaluate.    Recent cholecystitis treated with Flagyl and Rocephin status post ERCP, noted that time to have papillary stenosis of the sphincterotomy and stent placement 9.  No surgery was recommended by surgery at that time.  GI to follow-up in 6 to 8 weeks.   Continue Actigall    History of right MCA distribution CVA, status post thrombectomy and stent placement, on DAPT aspirin Brilinta as well as statin therapy.    Left ankle fracture, conservative management per Ortho in [place], continue ambulation as tolerated with a cam boot.    GERD, continue Prevacid    History of EtOH, no evidence of withdrawal she is now almost 2 weeks out.  Patient is on thiamine.    Hypertension, controlled on lisinopril    DVT prophylaxis, subcu Lovenox, she did have a bruise on her right anterior abdominal area from injections.  Appears to be aging.    Tobacco use, on NicoDerm, counseled to quit.    Stephon Lange MD

## 2024-10-17 NOTE — PLAN OF CARE
Problem: Rehabilitation (IRF) Plan of Care  Goal: Absence of New-Onset Illness or Injury  Intervention: Identify and Manage Fall Risk  Recent Flowsheet Documentation  Taken 10/17/2024 1000 by Marie Horner, RN  Safety Promotion/Fall Prevention:   fall prevention program maintained   nonskid shoes/slippers when out of bed   safety round/check completed  Taken 10/17/2024 0800 by Marie Horner, RN  Safety Promotion/Fall Prevention:   fall prevention program maintained   nonskid shoes/slippers when out of bed   safety round/check completed   Goal Outcome Evaluation:

## 2024-10-17 NOTE — PROGRESS NOTES
Inpatient Rehabilitation Functional Measures Assessment and Plan of Care    Plan of Care      Functional Measures  ELMER Eating:  ELMER Grooming:  ELMER Bathing:  ELMER Upper Body Dressing:  ELMER Lower Body Dressing:  ELMER Toileting:    ELMER Bladder Management  Level of Assistance:  Frequency/Number of Accidents this Shift:    ELMER Bowel Management  Level of Assistance:  Frequency/Number of Accidents this Shift:    ELMER Bed/Chair/Wheelchair Transfer:  Bed/chair/wheelchair Transfer Score = 5.  Patient is supervision/set-up for transferring to and from the  bed/chair/wheelchair, requiring: No assistive devices were required.  ELMER Toilet Transfer:  ELMER Tub/Shower Transfer:    Previously Documented Mode of Locomotion at Discharge:  ELMER Expected Mode of Locomotion at Discharge: The expected mode of most  frequently used locomotion, at discharge, is expected to be walking.  ELMER Walk/Wheelchair:  WHEELCHAIR OBSERVATION   Wheelchair did not occur.    WALK OBSERVATION   Walk Distance Scale = 3.  Distance walked is greater than 150 feet. Walk Score  = 5.  Patient requires supervision or set up for walking. Patient walked a  distance of  300 feet. No assistive devices were required.  ELMER Stairs:  Stairs Score = 2.  Patient requires supervision for household  negotiation of stairs. Patient negotiated  5 stairs. Patient requires the  following assistive device(s): Handrail(s).    ELMER Comprehension:  ELMER Expression:  ELMER Social Interaction:  ELMER Problem Solving:  ELMER Memory:    Therapy Mode Minutes  Occupational Therapy:  Physical Therapy: Individual: 90 minutes.  Speech Language Pathology:    Discharge Functional Goals:  Bed, Chair, Wheelchair Transfers: 6  Walk: 6    Signed by: Ruth Pastor, Physical Therapist

## 2024-10-17 NOTE — PROGRESS NOTES
Rehabilitation Nursing  Inpatient Rehabilitation Plan of Care Note    Plan of Care  Pain    Pain Management (Active)  Current Status (10/16/2024 3:52:00 PM): Potential for increased pain  Weekly Goal: optimal pain control  Discharge Goal: optimal pain control    Safety    Potential for Injury (Active)  Current Status (10/16/2024 3:52:00 PM): Potential for falls  Weekly Goal: No falls  Discharge Goal: No falls    Signed by: Eliza Barragan RN

## 2024-10-17 NOTE — PROGRESS NOTES
Patient Assessment Instrument  Quality Indicators - Admission FY 2023    Section A. Ethnicity/ Race/Language  Ethnicity:  Race:  Preferred Language:    Section A. Transportation      Section B. Hearing and Vision        Section B. Health Literacy        Section C. Cognitive Patterns      Section C. Signs and Symptoms of Delirium (from CAM)      Section D. Mood      Section D. Social Isolation      Section [id number]. Prior Functioning      Section BO0140. Prior Device Use      Section DO7573. Self Care Performance     Eating: [place] sets up or cleans up; patient completes activity. [place] assists  only prior to or following the activity.   Oral Hygiene: [place] provides verbal cues and/or touching/steadying and/or  contact guard assistance as patient completes activity.   Toileting Hygiene: Sec[place] does less than half the effort. [place] lifts, holds  or supports trunk or limbs but provides less than half the effort.   Shower/Bathe Self: [place] does less than half the effort. [place] lifts, holds  or supports trunk or limbs but provides less than half the effort.   Upper Body Dressing: [place] does less than half the effort. [place]o lifts, holds  or supports trunk or limbs but provides less than half the effort.   Lower Body Dressing: [place] does more than half the effort. Seco lifts or  holds trunk or limbs and provides more than half the effort.   Putting On/Taking Off Footwear: [place] does more than half the effort. Seco  lifts or holds trunk or limbs and provides more than half the effort.    Section [id number]. Self Care Discharge Goals     Eating: [place] sets up or cleans up; patient completes activity. [place] assists  only prior to or following the activity.   Oral Hygiene: [place] sets up or cleans up; patient completes activity. [place]  assists only prior to or following the activity.   Toileting Hygiene: [place] sets up or cleans up; patient completes activity.  [place] assists only prior to or following the  activity.   Shower/Bathe Self: [place] sets up or cleans up; patient completes activity.  [place] assists only prior to or following the activity.   Upper Body Dressing: [place] sets up or cleans up; patient completes activity.  [place] assists only prior to or following the activity.   Lower Body Dressing: [place] sets up or cleans up; patient completes activity.  [place] assists only prior to or following the activity.   Putting On/Taking Off Footwear: [place] sets up or cleans up; patient completes  activity. [place] assists only prior to or following the activity.    Section [id number]. Mobility Performance      Section [id number]. Mobility Discharge Goals      Section H. Bladder and Bowel      Section I. Active Diagnosis      Section J. Health Conditions      Section J. Health Conditions (Pain)      Section K. Swallowing/Nutritional Status    Nutritional Approaches on Admission:    Section M. Skin Conditions      Section N. Medication        Section O. Special Treatments, Procedures, and Programs    Signed by: Pennie Rome, Occupational Therapist

## 2024-10-17 NOTE — SIGNIFICANT NOTE
10/17/24 0944   Living Environment   People in Home spouse   Name(s) of People in Home Abiel Mijares-spouse   Current Living Arrangements home  (Pt lives in a single-wide mobile home with 3 steps to enter home with hand rail on right side.)   Potentially Unsafe Housing Conditions none   In the past 12 months has the electric, gas, oil, or water company threatened to shut off services in your home? No   Primary Care Provided by self;spouse/significant other   Provides Primary Care For no one   Family Caregiver if Needed spouse   Family Caregiver Names Abiel Mijares   Quality of Family Relationships helpful;involved;supportive   Able to Return to Prior Arrangements yes   Living Arrangement Comments Pt is a 55 Y/O female admitted to physical rehab on 10-16-24 with dx of debility from [facility].  Spoke to pt who states living at home with spouse Abiel Mijares.  Pt did not take spouse's last name when they got .  Pt has two sons:  Ronald Hurtado ([facility]) and Rashad Hurtado ([place]).  Pt says she talks to her sons daily.  Pt was working prior to CVA on 8-21-24.  Pt is self-employed and spouse is unemployed at this time.  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  Pt has [place] Medicaid.  PCP is ROXANE [facility].  Pt uses [facility].  Pt says son Rashad Hurtado is POA  and she has a living will.  Pt says she walked with W/C in front of her and spouse assisted her in/out of shower, with dressing and assisted some with toileting prior to hospital admission.  Spouse provided housekeeping, shopping and transportation.  Pt says she was cooking prior to admission.   Resource/Environmental Concerns   Resource/Environmental Concerns none   Transportation Concerns none   Transition Planning   Patient/Family Anticipates Transition to home with family   Patient/Family Anticipated Services at Transition   (To be recommended closer to discharge date.)   Discharge Needs Assessment (IRF)   Concerns to be Addressed  adjustment to diagnosis/illness   Equipment Currently Used at Home bp cuff;pulse ox;glucometer;wheelchair  (Pt has a rolling walker, bedside commode, and shower chair at home but did not use these items prior to admission.  Pt also has crutches.)   Current Discharge Risk physical impairment   Discharge Coordination/Progress Pt plans to return home with spouse providing assistance with caregiving needs at discharge. Team conference will be held on 10-22-24.  SS will follow and assist with discharge planning.

## 2024-10-17 NOTE — PROGRESS NOTES
Patient Assessment Instrument  Quality Indicators - Admission FY 2023    Section A. Ethnicity/ Race/Language  Ethnicity:  Race:  Preferred Language:    Section A. Transportation      Section B. Hearing and Vision        Section B. Health Literacy        Section C. Cognitive Patterns      Section C. Signs and Symptoms of Delirium (from CAM)      Section D. Mood      Section D. Social Isolation      Section [id number]. Prior Functioning      Section [id number]. Prior Device Use      Section [id number]. Self Care Performance      Section NB2312. Self Care Discharge Goals      Section [id number]. Mobility Performance     Roll Left and Right: Patient completed the activities by themself with no  assistance from a helper.   Sit to Lying: Patient completed the activities by themself with no assistance  from a helper.   Lying to Sitting on Side of Bed: Patient completed the activities by themself  with no assistance from a helper.   Sit to Stand: [place] provides verbal cues and/or touching/steadying and/or  contact guard assistance as patient completes activity. Assistance may be  provided throughout the activity or intermittently.   Chair/Bed to Chair Transfer: [place] provides verbal cues and/or  touching/steadying and/or contact guard assistance as patient completes  activity. Assistance may be provided throughout the activity or intermittently.   Toilet Transfer [place] provides verbal cues and/or touching/steadying and/or  contact guard assistance as patient completes activity. Assistance may be  provided throughout the activity or intermittently.   Car Transfer: [place] provides verbal cues and/or touching/steadying and/or  contact guard assistance as patient completes activity. Assistance may be  provided throughout the activity or intermittently.   Walk 10 Feet:   [place] provides verbal cues and/or touching/steadying and/or  contact guard assistance as patient completes activity. Assistance may be  provided throughout the activity or  intermittently.  Walk 50 Feet with 2 Turns:   [place] provides verbal cues and/or  touching/steadying and/or contact guard assistance as patient completes  activity. Assistance may be provided throughout the activity or intermittently.  Walk 150 Feet:   [place] provides verbal cues and/or touching/steadying and/or  contact guard assistance as patient completes activity. Assistance may be  provided throughout the activity or intermittently.  Walking 10 Feet on Uneven Surfaces:   Not attempted due to medical or safety  concerns.  1 Step Over Curb or Up/Down Stair:   [place] provides verbal cues and/or  touching/steadying and/or contact guard assistance as patient completes  activity. Assistance may be provided throughout the activity or intermittently.  4 Steps Up and Down, With/Without Rail:   [place] provides verbal cues and/or  touching/steadying and/or contact guard assistance as patient completes  activity. Assistance may be provided throughout the activity or intermittently.  12 Steps Up and Down, With/Without Rail:   Not attempted due to medical or  safety concerns.  Picking up an Object:   [place] provides verbal cues and/or touching/steadying  and/or contact guard assistance as patient completes activity. Assistance may be  provided throughout the activity or intermittently.  Uses Wheelchair/Scooter: No    Section [id number]. Mobility Discharge Goals     Sit to Stand: Patient completed the activities by themself with no assistance  from a helper.   Chair/Bed to Chair Transfer: Patient completed the activities by themself with  no assistance from a helper.   Walk Discharge Goals:   Walk 150 Feet: Patient completed the activities by themself with no assistance  from a helper.   4 Steps Up and Down, With/Without Rail: Patient completed the activities by  themself with no assistance from a helper.    Section H. Bladder and Bowel      Section I. Active Diagnosis      Section J. Health Conditions      Section J. Health  Conditions (Pain)      Section K. Swallowing/Nutritional Status    Nutritional Approaches on Admission:    Section M. Skin Conditions      Section N. Medication        Section O. Special Treatments, Procedures, and Programs    Signed by: Ruth Pastor, Physical Therapist

## 2024-10-17 NOTE — PROGRESS NOTES
Inpatient Rehabilitation Functional Measures Assessment and Plan of Care    Plan of Care  Self Care    [OT] Dressing (Lower)(Active)  Current Status(10/17/2024): max/mod  Weekly Goal(10/24/2024): mod/min  Discharge Goal: sup/set up    Functional Measures  ELMER Eating:  ELMER Grooming:  ELMER Bathing:  ELMER Upper Body Dressing:  ELMER Lower Body Dressing:  ELMER Toileting:    ELMER Bladder Management  Level of Assistance:  Frequency/Number of Accidents this Shift:    ELMER Bowel Management  Level of Assistance:  Frequency/Number of Accidents this Shift:    ELMER Bed/Chair/Wheelchair Transfer:  ELMER Toilet Transfer:  ELMER Tub/Shower Transfer:    Previously Documented Mode of Locomotion at Discharge:  [facility] Expected Mode of Locomotion at Discharge:  [facility] Walk/Wheelchair:  [facility] Stairs:    [facility] Comprehension:  [facility] Expression:  [facility] Social Interaction:  [facility] Problem Solving:  ELMER Memory:    Therapy Mode Minutes  Occupational Therapy: Individual: 90 minutes.  Physical Therapy:  Speech Language Pathology:    Discharge Functional Goals:    Signed by: Pennie Rome, Occupational Therapist

## 2024-10-17 NOTE — THERAPY EVALUATION
Inpatient Rehabilitation - Physical Therapy Initial Evaluation        Geoff     Patient Name: Dalila Pike  : 1970  MRN: [id number]    Today's Date: 10/17/2024                    Admit Date: 10/16/2024      Visit Dx:   No diagnosis found.    Patient Active Problem List   Diagnosis    Acute CVA (cerebrovascular accident)    Alcohol use    Tobacco use    Obesity (BMI 30-39.9)    HTN (hypertension)    Dyslipidemia    History of seizures    PAD (peripheral artery disease)    Oropharyngeal dysphagia    Acute UTI (urinary tract infection)    Hypertensive emergency    Single episode of loss of consciousness without head trauma    RUQ pain    Dilated cbd, acquired    Cholangitis    Debility       Past Medical History:   Diagnosis Date    Acute CVA (cerebrovascular accident) 2024    Alcohol use 2024    Dyslipidemia 2024    Obesity (BMI 30-39.9) 2024    PAD (peripheral artery disease) 2024    Tobacco use 2024       Past Surgical History:   Procedure Laterality Date    ENDOSCOPY N/A 10/8/2024    Procedure: ESOPHAGOGASTRODUODENOSCOPY;  Surgeon: Elie Sanders MD;  Location:  [facility];  Service: Gastroenterology;  Laterality: N/A;    ERCP N/A 10/8/2024    Procedure: ENDOSCOPIC RETROGRADE CHOLANGIOPANCREATOGRAPHY WITH STENT PLACEMENT;  Surgeon: Elie Sanders MD;  Location:  [facility];  Service: Gastroenterology;  Laterality: N/A;  SPHINCTEROTOMY @ 1737, 9-12MM AND 12-15MM BALLOON SWEEP TO CBD    INTERVENTIONAL RADIOLOGY PROCEDURE Bilateral 2024    Procedure: Carotid Cervical Angiogram;  Surgeon: Jamaal Saini MD;  Location: [facility] CATH INVASIVE LOCATION;  Service: Interventional Radiology;  Laterality: Bilateral;       PT ASSESSMENT (Last 12 Hours)       IRF PT Evaluation and Treatment       Row Name 10/17/24 1214          PT Time and Intention    Document Type initial evaluation;daily treatment  -LB     Mode of Treatment individual therapy;physical therapy   -LB       Row Name 10/17/24 1214          General Information    Existing Precautions/Restrictions fall  LUE flaccid, L ankle CAM boot-WBAT  -LB       Row Name 10/17/24 1214          Pain Scale: FACES Pre/Post-Treatment    Pain: FACES Scale, Pretreatment 4-->hurts little more  -LB     Posttreatment Pain Rating 4-->hurts little more  -LB     Pre/Posttreatment Pain Comment L ankle  -LB     Pain Intervention(s) Repositioned;Rest  she already notified RN  -LB       Row Name 10/17/24 1214          Cognition/Psychosocial    Affect/Mental Status (Cognition) WFL  -LB     Follows Commands (Cognition) WFL  -LB     Personal Safety Interventions gait belt;nonskid shoes/slippers when out of bed;supervised activity  -LB       Row Name 10/17/24 1214          Range of Motion (ROM)    Range of Motion bilateral lower extremities;ROM is WFL  L ankle not tested since it is in a boot but she reports having full AROM in it.  -LB       Row Name 10/17/24 1214          Strength (Manual Muscle Testing)    Strength (Manual Muscle Testing) --  LUE flaccid, L hip/knee WFL-ankle not tested  -LB       Row Name 10/17/24 1214          Sensory    Additional Documentation --  she reports numbness in LUE, intact but dull sensation in LLE  -LB       Row Name 10/17/24 1214          Mobility    Extremity Weight-bearing Status left lower extremity  -LB     Left Lower Extremity (Weight-bearing Status) --  WBAT with CAM boot  -LB       Row Name 10/17/24 1214          Bed Mobility    All Activities, [place] (Bed Mobility) modified independence  -LB       Row Name 10/17/24 1214          Transfer Assessment/Treatment    Transfers toilet transfer  -LB       Row Name 10/17/24 1214          Bed-Chair Transfer    Bed-Chair [place] (Transfers) supervision  -LB     Assistive Device (Bed-Chair Transfers) wheelchair  -LB       Row Name 10/17/24 1214          Chair-Bed Transfer    Chair-Bed [place] (Transfers) supervision  -LB     Assistive Device  (Chair-Bed Transfers) wheelchair  -LB       Row Name 10/17/24 1214          Sit-Stand Transfer    Sit-Stand [place] (Transfers) supervision  -LB     Assistive Device (Sit-Stand Transfers) wheelchair  -LB       Row Name 10/17/24 1214          Stand-Sit Transfer    Stand-Sit [place] (Transfers) supervision  -LB     Assistive Device (Stand-Sit Transfers) wheelchair  -LB       Row Name 10/17/24 1214          Toilet Transfer    Type (Toilet Transfer) stand pivot/stand step  -LB     [place] Level (Toilet Transfer) supervision  -LB     Assistive Device (Toilet Transfer) grab bars/safety frame  -LB       Row Name 10/17/24 1214          Gait/Stairs (Locomotion)    [place] Level (Gait) supervision  -LB     Assistive Device (Gait) --  no AD  -LB     Distance in Feet (Gait) 300  bid  -LB     Deviations/Abnormal Patterns (Gait) antalgic;gait speed decreased  -LB     [place] Level (Stairs) supervision  -LB     Handrail Location (Stairs) right side (ascending);right side (descending)  -LB     Number of Steps (Stairs) 5  -LB     Ascending Technique (Stairs) step-over-step  -LB     Descending Technique (Stairs) step-over-step  -LB     Stairs, Safety Issues sequencing ability decreased;weight-shifting ability decreased  -LB       Row Name 10/17/24 1214          Safety Issues/Impairments Affecting Functional Mobility    Impairments Affecting Function (Mobility) balance;endurance/activity tolerance;muscle tone abnormal;sensation/sensory awareness;pain;strength  -LB       Row Name 10/17/24 1214          Balance    Dynamic Standing Balance --  standing bag toss at goal with RUE in PB,  with reacher  -LB       Row Name 10/17/24 1214          Motor Skills    Therapeutic Exercise --  sitting ex  -LB       Row Name 10/17/24 1214          Aerobic Exercise    Type (Aerobic Exercise) recumbent stationary bike  -LB     Time Performed (Aerobic Exercise) level 1.0, 15 min  -LB     Comment, Aerobic Exercise  (Therapeutic Exercise) removed pedal strap due to boot and used velcro straps to secure L foot  -LB       Row Name 10/17/24 1214          Positioning and Restraints    Pre-Treatment Position --  am-w/c, pm-bed  -LB     In Bed call light within reach;encouraged to call for assist  -LB       Row Name 10/17/24 1214          Therapy Assessment/Plan (PT)    Patient's Goals For Discharge return home;return to all previous roles/activities  -LB       Row Name 10/17/24 1214          Therapy Assessment/Plan (PT)    PT Diagnosis (PT) CVA with LHP, L ankle fx  -LB     Rehab Potential/Prognosis (PT) good  -LB     Frequency of Treatment (PT) 5 times per week  -LB     Estimated Duration of Therapy (PT) 1 week  -LB     Problem List (PT) balance;hemiparesis/hemiplegia;mobility;muscle tone;sensation;strength;pain  -LB     Activity Limitations Related to Problem List (PT) unable to ambulate safely;unable to transfer safely  -LB       Row Name 10/17/24 1214          Therapy Plan Review/Discharge Plan (PT)    Therapy Plan Review (PT) evaluation/treatment results reviewed;care plan/treatment goals reviewed;risks/benefits reviewed;participants voiced agreement with care plan  -LB     Anticipated Equipment Needs at Discharge (PT Eval) --  tbd  -LB     Anticipated Discharge Disposition (PT) home;home with outpatient therapy services  -LB       Row Name 10/17/24 1214          IRF PT Goals    Transfer Goal Selection (PT-IRF) transfers, PT goal 1  -LB     Gait (Walking Locomotion) Goal Selection (PT-IRF) gait, PT goal 1  -LB     Stairs Goal Selection (PT-IRF) stairs, PT goal 1  -LB       Row Name 10/17/24 1214          Transfer Goal 1 (PT-IRF)    Activity/Assistive Device (Transfer Goal 1, PT-IRF) sit-to-stand/stand-to-sit;bed-to-chair/chair-to-bed  -LB     [place] Level (Transfer Goal 1, PT-IRF) modified independence  -LB     Time Frame (Transfer Goal 1, PT-IRF) by discharge  -LB       Row Name 10/17/24 1214          Gait/Walking  Locomotion Goal 1 (PT-IRF)    Activity/Assistive Device (Gait/Walking Locomotion Goal 1, PT-IRF) --  AAD  -LB     Gait/Walking Locomotion Distance Goal 1 (PT-IRF) 300'  -LB     [place] Level (Gait/Walking Locomotion Goal 1, PT-IRF) modified independence  -LB     Time Frame (Gait/Walking Locomotion Goal 1, PT-IRF) by discharge  -LB       Row Name 10/17/24 1214          Stairs Goal 1 (PT-IRF)    Activity/Assistive Device (Stairs Goal 1, PT-IRF) ascending stairs;descending stairs  -LB     Number of Stairs (Stairs Goal 1, PT-IRF) 5  -LB     [place] Level (Stairs Goal 1, PT-IRF) modified independence  -LB     Time Frame (Stairs Goal 1, PT-IRF) by discharge  -LB               User Key  (r) = Recorded By, (t) = Taken By, (c) = Cosigned By      Initials Name Provider Type    Ruth Whitley, PT Physical Therapist                  Wound 10/08/24 2200 Left upper flank (Active)   Closure Open to air 10/17/24 0800   Base moist;red 10/16/24 2110   Periwound intact 10/16/24 2110   Periwound Temperature warm 10/16/24 2110   [place] Skin Turgor soft 10/16/24 2110   Drainage Amount none 10/16/24 2110   Periwound Care dry periwound area maintained 10/16/24 2110     Physical Therapy Education       Title: PT OT SLP Therapies (In Progress)       Topic: Physical Therapy (Done)       Point: Mobility training (Done)       Learning Progress Summary            Patient Acceptance, E, VU,NR by LB at 10/17/2024 1229                      Point: Home exercise program (Done)       Learning Progress Summary            Patient Acceptance, E, VU,NR by LB at 10/17/2024 1229                      Point: Body mechanics (Done)       Learning Progress Summary            Patient Acceptance, E, VU,NR by LB at 10/17/2024 1229                      Point: Precautions (Done)       Learning Progress Summary            Patient Acceptance, E, VU,NR by LB at 10/17/2024 1229                                      User Key       Initials Effective  Dates Name Provider Type Discipline     06/16/21 -  Ruth Pastor, PT Physical Therapist PT                    PT Recommendation and Plan    Planned Therapy Interventions (PT): balance training, bed mobility training, gait training, neuromuscular re-education, patient/family education, stair training, strengthening, transfer training  Frequency of Treatment (PT): 5 times per week  Anticipated Equipment Needs at Discharge (PT Eval):  (tbd)                  Time Calculation:      PT Charges       Row Name 10/17/24 1500 10/17/24 1459          Time Calculation    Start Time 1245  -LB 1045  -LB     Stop Time 1330  -LB 1130  -LB     Time Calculation (min) 45 min  -LB 45 min  -LB     PT Received On -- 10/17/24  -LB     PT Goal Re-Cert Due Date -- 10/24/24  -LB               User Key  (r) = Recorded By, (t) = Taken By, (c) = Cosigned By      Initials Name Provider Type    LB Ruth Pastor, PT Physical Therapist                    Therapy Charges for Today       Code Description Service Date Service Provider Modifiers Qty    69474576118 HC PT EVAL LOW COMPLEXITY 1 10/17/2024 Ruth Pastor, PT GP 1    88313460321 HC GAIT TRAINING EA 15 MIN 10/17/2024 Ruth Pastor, PT GP 1    17204937665 HC PT THER PROC EA 15 MIN 10/17/2024 Ruth Pastor, PT GP 2    63223304892 HC PT THERAPEUTIC ACT EA 15 MIN 10/17/2024 Ruth Pastor, PT GP 1    57265539166 HC PT NEUROMUSC RE EDUCATION EA 15 MIN 10/17/2024 Ruth Pastor, PT GP 1              PT G-Codes  AM-PAC 6 Clicks Score (PT): 19      Ruth Pastor, PT  10/17/2024

## 2024-10-17 NOTE — PLAN OF CARE
Goal Outcome Evaluation:  Plan of Care Reviewed With: patient        Progress: improving  Outcome Summary: Patient resting in bed at this time. VSS on RA. Sling in place. Patient voices no concerns at this time. Will continue with plan of care.

## 2024-10-17 NOTE — THERAPY EVALUATION
Inpatient Rehabilitation - Occupational Therapy Initial Evaluation     Geoff     Patient Name: Dalila Pike  : 1970  MRN: [id number]    Today's Date: 10/17/2024                 Admit Date: 10/16/2024       No diagnosis found.    Patient Active Problem List   Diagnosis    Acute CVA (cerebrovascular accident)    Alcohol use    Tobacco use    Obesity (BMI 30-39.9)    HTN (hypertension)    Dyslipidemia    History of seizures    PAD (peripheral artery disease)    Oropharyngeal dysphagia    Acute UTI (urinary tract infection)    Hypertensive emergency    Single episode of loss of consciousness without head trauma    RUQ pain    Dilated cbd, acquired    Cholangitis    Debility       Past Medical History:   Diagnosis Date    Acute CVA (cerebrovascular accident) 2024    Alcohol use 2024    Dyslipidemia 2024    Obesity (BMI 30-39.9) 2024    PAD (peripheral artery disease) 2024    Tobacco use 2024       Past Surgical History:   Procedure Laterality Date    ENDOSCOPY N/A 10/8/2024    Procedure: ESOPHAGOGASTRODUODENOSCOPY;  Surgeon: Elie Sanders MD;  Location:  [facility];  Service: Gastroenterology;  Laterality: N/A;    ERCP N/A 10/8/2024    Procedure: ENDOSCOPIC RETROGRADE CHOLANGIOPANCREATOGRAPHY WITH STENT PLACEMENT;  Surgeon: Elie Sanders MD;  Location:  [facility];  Service: Gastroenterology;  Laterality: N/A;  SPHINCTEROTOMY @ 1737, 9-12MM AND 12-15MM BALLOON SWEEP TO CBD    INTERVENTIONAL RADIOLOGY PROCEDURE Bilateral 2024    Procedure: Carotid Cervical Angiogram;  Surgeon: Jamaal Saini MD;  Location: [facility] CATH INVASIVE LOCATION;  Service: Interventional Radiology;  Laterality: Bilateral;             IRF OT ASSESSMENT FLOWSHEET (Last 12 Hours)       IRF OT Evaluation and Treatment       Row Name 10/17/24 1400          OT Time and Intention    Document Type initial evaluation;daily treatment  -     Mode of Treatment individual  therapy;occupational therapy  -     Patient Effort good  -[facility] Name 10/17/24 1400          General Information    Patient/Family/Caregiver Comments/Observations patient seen by OT today for evaluation and treatment due to diagnosis of debility. 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 patient agreeable to therapy and tolerated well with no complaints.  -     Existing Precautions/Restrictions fall  -[facility] Name 10/17/24 1400          Previous Level of Function/Home Environm    BADLs, Previous Functional Level independent;partial assistance  -[facility] Name 10/17/24 1400          Living Environment    Current Living Arrangements home  -     People in Home spouse  -     Primary Care Provided by self;spouse/significant other  -[facility] Name 10/17/24 1400          Cognition/Psychosocial    Affect/Mental Status (Cognition) WFL  -     Orientation Status (Cognition) oriented to;place;situation;person  -     Follows Commands (Cognition) WFL;verbal cues/prompting required  -     Personal Safety Interventions supervised activity  impulsive  -[facility] Name 10/17/24 1400          Range of Motion Comprehensive    General Range of Motion upper extremity range of motion deficits identified  RUE WFL, LUE trace movement left shoulder and elbow, absent wrist and hand. minimal subluxation noted left shoulder. patient instructed on proper positioning left UE  while up in chair and in bed for left shoulder and left hand edema  -[facility] Name 10/17/24 1400          Strength Comprehensive (MMT)    General Manual Muscle Testing (MMT) Assessment upper extremity strength deficits identified  RUE 4/5, LUE 1/5  -[facility] Name 10/17/24 1400          Sensory    Additional Documentation Sensory Interventions (Group)  patient reports some decreased sensation left hand and UE  -[facility] Name 10/17/24 1400          Vision Assessment/Intervention    Visual  Impairment/Limitations WFL  -AH       Row Name 10/17/24 1400          Bathing    [place] Level (Bathing) moderate assist (50% patient effort)  -AH       Row Name 10/17/24 1400          Upper Body Dressing    [place] Level (Upper Body Dressing) minimum assist (75% or more patient effort)  -AH       Row Name 10/17/24 1400          Lower Body Dressing    [place] Level (Lower Body Dressing) moderate assist (50% patient effort);maximum assist (25% patient effort)  -AH       Row Name 10/17/24 1400          Grooming    [place] Level (Grooming) contact guard assist  -AH       Row Name 10/17/24 1400          Toileting    [place] Level (Toileting) moderate assist (50% patient effort)  -AH       Row Name 10/17/24 1400          Self-Feeding    [place] Level (Self-Feeding) supervision  -AH       Row Name 10/17/24 1400          Bed-Chair Transfer    Bed-Chair [place] (Transfers) contact guard;verbal cues;supervision  -AH       Row Name 10/17/24 1400          Toilet Transfer    Type (Toilet Transfer) stand pivot/stand step  -     [place] Level (Toilet Transfer) contact guard;supervision;verbal cues  -AH       Row Name 10/17/24 1400          Safety Issues/Impairments Affecting Functional Mobility    Safety Issues Affecting Function (Mobility) impulsivity  -AH       Row Name 10/17/24 1400          Motor Skills    Motor Skills coordination;functional endurance;neuro-muscular function  -     Coordination fine motor deficit;gross motor deficit  RUE  -     Neuromuscular Function left;upper extremity  PROM, AAROM, edema massage, positioning  -AH       Row Name 10/17/24 1400          Positioning and Restraints    Post Treatment Position wheelchair  -     In Wheelchair sitting;with PT  -AH       Row Name 10/17/24 1400          Therapy Assessment/Plan (OT)    Patient's Goals For Discharge return home;take care of myself at home  -AH       Row Name 10/17/24 1400          Therapy Assessment/Plan  (OT)    Rehab Potential/Prognosis (OT) good  -     Frequency of Treatment (OT) 5 times per week  -     Problem List (OT) coordination;range of motion (ROM);strength  -     Activity Limitations Related to Problem List (OT) BADLs not performed adequately or safely  -     Planned Therapy Interventions (OT) activity tolerance training;adaptive equipment training;BADL retraining;neuromuscular control/coordination retraining;passive ROM/stretching;patient/caregiver education/training;ROM/therapeutic exercise;strengthening exercise;transfer/mobility retraining  -       Row Name 10/17/24 1400          Bathing Goal 1 (OT-IRF)    Activity/Device (Bathing Goal 1, OT-IRF) bathing skills, all  -     [place] Level (Bathing Goal 1, OT-IRF) minimum assist (75% or more patient effort)  -       Row Name 10/17/24 1400          Bathing Goal 2 (OT-IRF)    Activity/Device (Bathing Goal 2, OT-IRF) bathing skills, all  -AH     [place] Level (Bathing Goal 2, OT-IRF) set-up required;supervision required  -       Row Name 10/17/24 1400          LB Dressing Goal 1 (OT-IRF)    Activity/Device (LB Dressing Goal 1, OT-IRF) lower body dressing  -     [place] (LB Dressing Goal 1, OT-IRF) minimum assist (75% or more patient effort)  -       Row Name 10/17/24 1400          LB Dressing Goal 2 (OT-IRF)    Activity/Device (LB Dressing Goal 2, OT-IRF) lower body dressing  -     [place] (LB Dressing Goal 2, OT-IRF) set-up required;supervision required  -       Row Name 10/17/24 1400          Strength Goal 2 (OT-IRF)    Strength Goal 2 (OT-IRF) patient will improve LUE strength to utilize as active assist with self care activities.  -               User Key  (r) = Recorded By, (t) = Taken By, (c) = Cosigned By      Initials Name Effective Dates     Laura Rome, OT 06/03/24 -                      Occupational Therapy Education       Title: PT OT SLP Therapies (In Progress)       Topic: Occupational  Therapy (Not Started)       Point: ADL training (Not Started)       Description:   Instruct learner(s) on proper safety adaptation and remediation techniques during self care or transfers.   Instruct in proper use of assistive devices.                  Learner Progress:  Not documented in this visit.              Point: Home exercise program (Not Started)       Description:   Instruct learner(s) on appropriate technique for monitoring, assisting and/or progressing therapeutic exercises/activities.                  Learner Progress:  Not documented in this visit.              Point: Precautions (Not Started)       Description:   Instruct learner(s) on prescribed precautions during self-care and functional transfers.                  Learner Progress:  Not documented in this visit.              Point: Body mechanics (Not Started)       Description:   Instruct learner(s) on proper positioning and spine alignment during self-care, functional mobility activities and/or exercises.                  Learner Progress:  Not documented in this visit.                                        OT Recommendation and Plan    Planned Therapy Interventions (OT): activity tolerance training, adaptive equipment training, BADL retraining, neuromuscular control/coordination retraining, passive ROM/stretching, patient/caregiver education/training, ROM/therapeutic exercise, strengthening exercise, transfer/mobility retraining                    Time Calculation:      Time Calculation- OT       Row Name 10/17/24 1423             Time Calculation-     OT Start Time 0915  -      OT Stop Time 1045  -      OT Time Calculation (min) 90 min  -                User Key  (r) = Recorded By, (t) = Taken By, (c) = Cosigned By      Initials Name Provider Type    Laura London, OT Occupational Therapist                  Therapy Charges for Today       Code Description Service Date Service Provider Modifiers Qty    82014200851  OT EVAL  MOD COMPLEXITY 3 10/17/2024 Laura Rome, OT GO 1    77417168852 HC OT SELF CARE/MGMT/TRAIN EA 15 MIN 10/17/2024 Laura Rome OT GO 1    23830859315 HC OT NEUROMUSC RE EDUCATION EA 15 MIN 10/17/2024 Laura Rome OT GO 2                     Laura Rome OT  10/17/2024

## 2024-10-18 LAB
BACTERIA UR QL AUTO: ABNORMAL /HPF
BILIRUB UR QL STRIP: NEGATIVE
CLARITY UR: CLEAR
COLOR UR: YELLOW
FLUAV RNA RESP QL NAA+PROBE: NOT DETECTED
FLUBV RNA RESP QL NAA+PROBE: NOT DETECTED
GLUCOSE UR STRIP-MCNC: NEGATIVE MG/DL
HGB UR QL STRIP.AUTO: ABNORMAL
HYALINE CASTS UR QL AUTO: ABNORMAL /LPF
KETONES UR QL STRIP: NEGATIVE
LEUKOCYTE ESTERASE UR QL STRIP.AUTO: NEGATIVE
NITRITE UR QL STRIP: NEGATIVE
PH UR STRIP.AUTO: 5.5 [PH] (ref 5–8)
PROT UR QL STRIP: NEGATIVE
RBC # UR STRIP: ABNORMAL /HPF
REF LAB TEST METHOD: ABNORMAL
SARS-COV-2 RNA RESP QL NAA+PROBE: NOT DETECTED
SP GR UR STRIP: 1.01 (ref 1–1.03)
SQUAMOUS #/AREA URNS HPF: ABNORMAL /HPF
UROBILINOGEN UR QL STRIP: ABNORMAL
WBC # UR STRIP: ABNORMAL /HPF

## 2024-10-18 PROCEDURE — 81001 URINALYSIS AUTO W/SCOPE: CPT | Performed by: FAMILY MEDICINE

## 2024-10-18 PROCEDURE — 97112 NEUROMUSCULAR REEDUCATION: CPT | Performed by: OCCUPATIONAL THERAPIST

## 2024-10-18 PROCEDURE — 99231 SBSQ HOSP IP/OBS SF/LOW 25: CPT | Performed by: FAMILY MEDICINE

## 2024-10-18 PROCEDURE — 87636 SARSCOV2 & INF A&B AMP PRB: CPT | Performed by: FAMILY MEDICINE

## 2024-10-18 PROCEDURE — 63710000001 ONDANSETRON ODT 4 MG TABLET DISPERSIBLE: Performed by: FAMILY MEDICINE

## 2024-10-18 PROCEDURE — 97535 SELF CARE MNGMENT TRAINING: CPT | Performed by: OCCUPATIONAL THERAPIST

## 2024-10-18 PROCEDURE — 97112 NEUROMUSCULAR REEDUCATION: CPT

## 2024-10-18 PROCEDURE — 97116 GAIT TRAINING THERAPY: CPT

## 2024-10-18 PROCEDURE — 97530 THERAPEUTIC ACTIVITIES: CPT

## 2024-10-18 PROCEDURE — 97110 THERAPEUTIC EXERCISES: CPT

## 2024-10-18 PROCEDURE — 97032 APPL MODALITY 1+ESTIM EA 15: CPT | Performed by: OCCUPATIONAL THERAPIST

## 2024-10-18 PROCEDURE — 25010000002 HEPARIN (PORCINE) PER 1000 UNITS: Performed by: FAMILY MEDICINE

## 2024-10-18 RX ORDER — FLUTICASONE PROPIONATE 50 UG/1
2 SPRAY, METERED NASAL DAILY
Status: DISCONTINUED | OUTPATIENT
Start: 2024-10-18 | End: 2024-10-23 | Stop reason: HOSPADM

## 2024-10-18 RX ORDER — PSEUDOEPHEDRINE HCL 30 MG
60 TABLET ORAL EVERY 8 HOURS PRN
Status: DISCONTINUED | OUTPATIENT
Start: 2024-10-18 | End: 2024-10-23 | Stop reason: HOSPADM

## 2024-10-18 RX ADMIN — LANSOPRAZOLE 30 MG: 30 TABLET, ORALLY DISINTEGRATING ORAL at 05:20

## 2024-10-18 RX ADMIN — URSODIOL 300 MG: 300 CAPSULE ORAL at 21:32

## 2024-10-18 RX ADMIN — TRAZODONE HYDROCHLORIDE 50 MG: 50 TABLET ORAL at 21:32

## 2024-10-18 RX ADMIN — ATORVASTATIN CALCIUM 80 MG: 40 TABLET, FILM COATED ORAL at 21:32

## 2024-10-18 RX ADMIN — URSODIOL 300 MG: 300 CAPSULE ORAL at 08:43

## 2024-10-18 RX ADMIN — TOPIRAMATE 25 MG: 25 TABLET, FILM COATED ORAL at 21:32

## 2024-10-18 RX ADMIN — NYSTATIN 1 APPLICATION: 100000 CREAM TOPICAL at 08:41

## 2024-10-18 RX ADMIN — ONDANSETRON 4 MG: 4 TABLET, ORALLY DISINTEGRATING ORAL at 01:26

## 2024-10-18 RX ADMIN — HYDROCODONE BITARTRATE AND ACETAMINOPHEN 1 TABLET: 5; 325 TABLET ORAL at 21:32

## 2024-10-18 RX ADMIN — SENNOSIDES AND DOCUSATE SODIUM 2 TABLET: 50; 8.6 TABLET ORAL at 08:45

## 2024-10-18 RX ADMIN — HEPARIN SODIUM 5000 UNITS: 5000 INJECTION INTRAVENOUS; SUBCUTANEOUS at 05:20

## 2024-10-18 RX ADMIN — TICAGRELOR 60 MG: 60 TABLET ORAL at 21:32

## 2024-10-18 RX ADMIN — HEPARIN SODIUM 5000 UNITS: 5000 INJECTION INTRAVENOUS; SUBCUTANEOUS at 14:17

## 2024-10-18 RX ADMIN — LISINOPRIL 20 MG: 10 TABLET ORAL at 08:44

## 2024-10-18 RX ADMIN — FLUTICASONE PROPIONATE 2 SPRAY: 50 SPRAY, METERED NASAL at 17:18

## 2024-10-18 RX ADMIN — HYDROXYZINE HYDROCHLORIDE 25 MG: 25 TABLET ORAL at 21:32

## 2024-10-18 RX ADMIN — TICAGRELOR 60 MG: 60 TABLET ORAL at 08:44

## 2024-10-18 RX ADMIN — FOLIC ACID 1 MG: 1 TABLET ORAL at 08:44

## 2024-10-18 RX ADMIN — Medication 400 MG: at 08:44

## 2024-10-18 RX ADMIN — ASPIRIN 81 MG: 81 TABLET, COATED ORAL at 08:44

## 2024-10-18 RX ADMIN — HYDROCODONE BITARTRATE AND ACETAMINOPHEN 1 TABLET: 5; 325 TABLET ORAL at 05:20

## 2024-10-18 RX ADMIN — HEPARIN SODIUM 5000 UNITS: 5000 INJECTION INTRAVENOUS; SUBCUTANEOUS at 21:33

## 2024-10-18 RX ADMIN — Medication 100 MG: at 08:44

## 2024-10-18 RX ADMIN — NYSTATIN 1 APPLICATION: 100000 CREAM TOPICAL at 21:35

## 2024-10-18 RX ADMIN — SENNOSIDES AND DOCUSATE SODIUM 2 TABLET: 50; 8.6 TABLET ORAL at 21:33

## 2024-10-18 RX ADMIN — NICOTINE 1 PATCH: 21 PATCH TRANSDERMAL at 08:42

## 2024-10-18 NOTE — PROGRESS NOTES
[facility]  PROGRESS NOTE     Patient Identification:  Name:  Dalila Pike  Age:  54 y.o.  Sex:  female  :  1970  MRN:  [id number]  Visit Number:  [id number]  ROOM: 105/     Primary Care Provider:  Agnes Ewign APRN    Length of stay in inpatient status:  2    Subjective     Chief Compliant: Debility with history of recent CVA and left ankle fracture    History of Presenting Illness: 54-year-old female who has been treated for debility at this time.  Patient did have CVA in 2024 with some residual left hemiparesis more pronounced than left upper extremity.  Patient also was recently treated at outside hospital for cholangitis and did have sphincterotomy and stent placement after ERCP.  Patient states that her left ankle seems to be doing well and as long she is wearing the cam boot she is able to ambulate without much difficulty.  Patient has no other complaints at this time and feels reasonably good    Objective     Current Hospital Meds:aspirin, 81 mg, Oral, Daily  atorvastatin, 80 mg, Oral, Nightly  folic acid, 1 mg, Oral, Daily  heparin (porcine), 5,000 Units, Subcutaneous, Q8H  lansoprazole, 30 mg, Oral, Q AM  lisinopril, 20 mg, Oral, Q24H  magnesium oxide, 400 mg, Oral, Daily  nicotine, 1 patch, Transdermal, Q24H  nystatin, 1 Application, Topical, Q12H  polyethylene glycol, 17 g, Oral, Daily  senna-docusate sodium, 2 tablet, Oral, BID  thiamine, 100 mg, Oral, Daily  ticagrelor, 60 mg, Oral, BID  topiramate, 25 mg, Oral, Nightly  traZODone, 50 mg, Oral, Nightly  ursodiol, 300 mg, Oral, BID       ----------------------------------------------------------------------------------------------------------------------  Vital Signs:  Temp:  [97.9 °F (36.6 °C)-98.4 °F (36.9 °C)] 97.9 °F (36.6 °C)  Heart Rate:  [82-94] 94  Resp:  [18] 18  BP: (107-120)/(70) 120/70  SpO2:  [98 %] 98 %  on   ;   Device (Oxygen Therapy): room air  Body mass index is 34.21 kg/m².    Wt Readings from  Last 3 Encounters:   10/16/24 102 kg (225 lb)   10/08/24 110 kg (243 lb)   09/19/24 110 kg (243 lb)     Intake & Output (last 3 days)         10/15 0701  10/16 0700 10/16 0701  10/17 0700 10/17 0701  10/18 0700 10/18 0701  10/19 0700    P.O.  840 840 240    I.V. (mL/kg)  0 (0)      Total Intake(mL/kg)  840 (8.2) 840 (8.2) 240 (2.4)    Net  +840 +840 +240            Urine Unmeasured Occurrence  3 x 6 x           Diet: Cardiac; Healthy Heart (2-3 Na+); Texture: Soft to Chew (NDD 3); Soft to Chew: Chopped Meat; Fluid Consistency: Thin (IDDSI 0)  ----------------------------------------------------------------------------------------------------------------------  Physical exam:  Constitutional: No acute distress  HEENT: Normocephalic atraumatic  Neck:   Supple  Cardiovascular: Regular rate and rhythm  Pulmonary/Chest: Clear to auscultation  Abdominal: Positive bowel sounds soft.   Musculoskeletal: Left ankle is in boot  Neurological: Left hemiparesis with 2-3 out of 5 strength in the left upper extremity 4+ out of 5 in the left lower extremity  Skin: No rash  Peripheral vascular: No edema  Genitourinary:  ----------------------------------------------------------------------------------------------------------------------    Last echocardiogram:  Results for orders placed during the hospital encounter of 08/21/24    Adult Transthoracic Echo Complete W/ Cont if Necessary Per Protocol (With Agitated Saline)    Interpretation Summary    Left ventricular systolic function is normal. Calculated left ventricular EF = 68% Normal left ventricular cavity size noted. Left ventricular wall thickness is consistent with moderate concentric hypertrophy. All left ventricular wall segments contract normally. Left ventricular diastolic function was normal. Normal left atrial pressure.    The right ventricular cavity is mildly dilated. Normal right ventricular systolic function noted.    Left atrial volume is moderately increased.  "Saline test results are negative.    The aortic valve is structurally normal with no stenosis present. The aortic valve appears trileaflet. No significant aortic valve regurgitation is present.    The mitral valve is structurally normal with no significant stenosis present. Trace to mild mitral valve regurgitation is present.    Mild tricuspid valve regurgitation is present. Estimated right ventricular systolic pressure from tricuspid regurgitation is mildly elevated (35-45 mmHg)    Mild dilation of the ascending aorta is present. Ascending aorta = 3.7 cm    ----------------------------------------------------------------------------------------------------------------------  Results from last 7 days   Lab Units 10/17/24  0344 10/14/24  0541 10/12/24  0537   WBC 10*3/mm3 8.51 6.46 6.56   HEMOGLOBIN g/dL 13.1 12.5 13.3   HEMATOCRIT % 42.2 37.7 39.4   MCV fL 96.1 92.0 91.8   MCHC g/dL 31.0* 33.2 33.8   PLATELETS 10*3/mm3 339 297 265         Results from last 7 days   Lab Units 10/17/24  0344 10/14/24  0541 10/12/24  0537   SODIUM mmol/L 140 138 139   POTASSIUM mmol/L 3.8 3.5 3.7   MAGNESIUM mg/dL 2.4  --  2.2   CHLORIDE mmol/L 104 105 104   CO2 mmol/L 24.0 24.0 24.0   BUN mg/dL 9 9 14   CREATININE mg/dL 0.76 0.67 0.62   CALCIUM mg/dL 9.9 9.3 9.4   GLUCOSE mg/dL 126* 142* 125*   ALBUMIN g/dL 3.7 3.4* 3.6   BILIRUBIN mg/dL 0.4 0.4 0.6   ALK PHOS U/L 205* 219* 236*   AST (SGOT) U/L 18 13 17   ALT (SGPT) U/L 13 14 25   Estimated Creatinine Clearance: 105.7 mL/min (by C-G formula based on SCr of 0.76 mg/dL).  No results found for: \"AMMONIA\"              No results found for: \"HGBA1C\", \"POCGLU\"  Lab Results   Component Value Date    TSH 0.447 10/08/2024     No results found for: \"PREGTESTUR\", \"PREGSERUM\", \"HCG\", \"HCGQUANT\"  Pain Management Panel  More data may exist         Latest Ref Rng & Units 10/7/2024 8/21/2024   Pain Management Panel   Amphetamine, Urine Qual Negative Negative  Negative    Barbiturates Screen, Urine " "Negative Negative  Negative    Benzodiazepine Screen, Urine Negative Negative  Negative    Buprenorphine, Screen, Urine Negative Negative  Negative    Cocaine Screen, Urine Negative Negative  Negative    Fentanyl, Urine Negative Negative  Negative    Methadone Screen , Urine Negative Negative  Negative    Methamphetamine, Ur Negative Negative  Positive       Details                 Brief Urine Lab Results  (Last result in the past 365 days)        Color   Clarity   Blood   Leuk Est   Nitrite   Protein   CREAT   Urine HCG        10/08/24 0317 Yellow   Clear   Negative   Negative   Negative   Negative                 No results found for: \"BLOODCX\"      No results found for: \"URINECX\"  No results found for: \"WOUNDCX\"  No results found for: \"STOOLCX\"        I have personally looked at the labs and they are summarized above.  ----------------------------------------------------------------------------------------------------------------------  Detailed radiology reports for the last 24 hours:    Imaging Results (Last 24 Hours)       ** No results found for the last 24 hours. **          Final impressions for the last 30 days of radiology reports:    XR Ankle 3+ View Left    Result Date: 10/12/2024  Impression: Again seen are minimal displaced fracture of the distal fibula and medial malleolus and also likely of the anterior tibial plafond similar to most recent prior examination. No new fracture seen. Electronically Signed: Javier Javed MD  10/12/2024 5:40 PM EDT  Workstation ID: [id number]    XR Ankle 3+ View Left    Result Date: 10/9/2024  Impression: Persistent, nonunited distal left fibular fracture, with minimal new bone formation. Electronically Signed: Ozzy Mckeon MD  10/9/2024 10:51 AM EDT  Workstation ID: [id number]    FL ERCP pancreatic and biliary ducts    Result Date: 10/8/2024  Impression: 1. Intraoperative fluoroscopy during ERCP with metallic stent placement. Please see procedure report for full findings " and details. Electronically Signed: Deandre Merrittelor  10/8/2024 10:39 PM EDT  Workstation ID: [id number]    MRI abdomen wo contrast mrcp    Result Date: 10/8/2024  Impression: 1. Mildly dilated biliary tree with blunted appearance of the common bile duct at the ampulla. No obvious filling defects or mass. Differential includes ampullary stricture, biliary sphincter of Oddi dysfunction, occult intraductal stone versus occult mass. Consider ERCP for further assessment. 2. Mildly fluid distention of the gallbladder without other findings to suggest acute cholecystitis by MRI. 3. Negative for acute pancreatitis. Electronically Signed: Juan Ball MD  10/8/2024 8:27 AM EDT  Workstation ID: [id number]   I have personally looked at the radiology images and read the final radiology report.    Assessment & Plan    Debility with history of CVA in August 2024 with residual left hemiparesis--patient requiring supervision for transfers.  Was able to ambulate 300 feet twice with no assistive device yesterday and was able to navigate 5 steps yesterday.  Required moderate assistance for bathing; minimum assist for upper body dressing; moderate to maximum assist for lower body dressing; contact-guard for grooming; moderate assistance for toileting.    Right MCA distribution CVA with history of thrombectomy and right ICA stent placement continue aspirin Brilinta and statin therapy.    Status post cholangitis patient is status post sphincterotomy and stent placement after ERCP.  Has completed antibiotic course but is to follow-up with GI in 6 to 8 weeks for repeat ERCP and likely removal of stent.    Hypertension continue Zestril    Dyslipidemia statin therapy    History of ethanol use--11-day since last use no evidence withdrawal    Left ankle fracture was evaluated by Ortho at [facility] they recommended nonoperative care at this time patient is can bear weight in cam boot    Tobacco use recommend cessation    GERD  continue PPI    VTE Prophylaxis:   Heparin        Ti Kaur MD  [facility]  10/18/24  10:06 EDT

## 2024-10-18 NOTE — THERAPY TREATMENT NOTE
Inpatient Rehabilitation - Occupational Therapy Treatment Note     Geoff     Patient Name: Dalila Pike  : 1970  MRN: [id number]    Today's Date: 10/18/2024                 Admit Date: 10/16/2024       No diagnosis found.    Patient Active Problem List   Diagnosis    Acute CVA (cerebrovascular accident)    Alcohol use    Tobacco use    Obesity (BMI 30-39.9)    HTN (hypertension)    Dyslipidemia    History of seizures    PAD (peripheral artery disease)    Oropharyngeal dysphagia    Acute UTI (urinary tract infection)    Hypertensive emergency    Single episode of loss of consciousness without head trauma    RUQ pain    Dilated cbd, acquired    Cholangitis    Debility       Past Medical History:   Diagnosis Date    Acute CVA (cerebrovascular accident) 2024    Alcohol use 2024    Dyslipidemia 2024    Obesity (BMI 30-39.9) 2024    PAD (peripheral artery disease) 2024    Tobacco use 2024       Past Surgical History:   Procedure Laterality Date    ENDOSCOPY N/A 10/8/2024    Procedure: ESOPHAGOGASTRODUODENOSCOPY;  Surgeon: Elie Sanders MD;  Location:  [facility] ENDOSCOPY;  Service: Gastroenterology;  Laterality: N/A;    ERCP N/A 10/8/2024    Procedure: ENDOSCOPIC RETROGRADE CHOLANGIOPANCREATOGRAPHY WITH STENT PLACEMENT;  Surgeon: Elie Sanders MD;  Location:  [facility] ENDOSCOPY;  Service: Gastroenterology;  Laterality: N/A;  SPHINCTEROTOMY @ 1737, 9-12MM AND 12-15MM BALLOON SWEEP TO CBD    INTERVENTIONAL RADIOLOGY PROCEDURE Bilateral 2024    Procedure: Carotid Cervical Angiogram;  Surgeon: Jamaal Saini MD;  Location[facility] CATH INVASIVE LOCATION;  Service: Interventional Radiology;  Laterality: Bilateral;             IRF OT ASSESSMENT FLOWSHEET (Last 12 Hours)       IRF OT Evaluation and Treatment       Row Name 10/18/24 1300          OT Time and Intention    Document Type daily treatment  -AH     Mode of Treatment individual therapy;occupational therapy  -      Patient Effort good  -[facility] Name 10/18/24 1300          General Information    Patient/Family/Caregiver Comments/Observations patient agreeable to therapy. patient tolerated therapy well with no complaints.  -     Existing Precautions/Restrictions fall  -[facility] Name 10/18/24 1300          Cognition/Psychosocial    Affect/Mental Status (Cognition) WFL  -[facility] Name 10/18/24 1300          Chair-Bed Transfer    Chair-Bed Burkett (Transfers) supervision;verbal cues  -[facility] Name 10/18/24 1300          Motor Skills    Motor Skills coordination;functional endurance;neuro-muscular function  -     Neuromuscular Function left;upper extremity  PROM, AAROM, massage, UE Bike left hand assisted NMES left wrist and digit ext  therapy program 2 x15 min. patient tolerated well with good response and no complaints.  -       Row Name 10/18/24 1300          Positioning and Restraints    Post Treatment Position bed  -     In Bed sitting EOB;call light within reach;encouraged to call for assist  -               User Key  (r) = Recorded By, (t) = Taken By, (c) = Cosigned By      Initials Name Effective Dates     Laura Rome, OT 06/03/24 -                      Occupational Therapy Education       Title: PT OT SLP Therapies (In Progress)       Topic: Occupational Therapy (Not Started)       Point: ADL training (Not Started)       Description:   Instruct learner(s) on proper safety adaptation and remediation techniques during self care or transfers.   Instruct in proper use of assistive devices.                  Learner Progress:  Not documented in this visit.              Point: Home exercise program (Not Started)       Description:   Instruct learner(s) on appropriate technique for monitoring, assisting and/or progressing therapeutic exercises/activities.                  Learner Progress:  Not documented in this visit.              Point: Precautions (Not Started)       Description:    Instruct learner(s) on prescribed precautions during self-care and functional transfers.                  Learner Progress:  Not documented in this visit.              Point: Body mechanics (Not Started)       Description:   Instruct learner(s) on proper positioning and spine alignment during self-care, functional mobility activities and/or exercises.                  Learner Progress:  Not documented in this visit.                                        OT Recommendation and Plan    Planned Therapy Interventions (OT): activity tolerance training, adaptive equipment training, BADL retraining, neuromuscular control/coordination retraining, passive ROM/stretching, patient/caregiver education/training, ROM/therapeutic exercise, strengthening exercise, transfer/mobility retraining                    Time Calculation:      Time Calculation- OT       Row Name 10/18/24 1334             Time Calculation-     OT Start Time 0915  -      OT Stop Time 1045  -      OT Time Calculation (min) 90 min  -                User Key  (r) = Recorded By, (t) = Taken By, (c) = Cosigned By      Initials Name Provider Type     Laura Rome, OT Occupational Therapist                  Therapy Charges for Today       Code Description Service Date Service Provider Modifiers Qty    39502734829 HC OT EVAL MOD COMPLEXITY 3 10/17/2024 Laura Rome, OT GO 1    32312152250 HC OT SELF CARE/MGMT/TRAIN EA 15 MIN 10/17/2024 Laura Rome OT GO 1    98611956718 HC OT NEUROMUSC RE EDUCATION EA 15 MIN 10/17/2024 Laura Rome, OT GO 2    69918176561 HC OT SELF CARE/MGMT/TRAIN EA 15 MIN 10/18/2024 Laura Rome OT GO 2    46388185510 HC OT NEUROMUSC RE EDUCATION EA 15 MIN 10/18/2024 Laura Rome OT GO 3    87771907179 HC OT ELEC STIM EA-PER 15 MIN 10/18/2024 Laura Rome OT GO 1                     Laura Rome OT  10/18/2024

## 2024-10-18 NOTE — PLAN OF CARE
Problem: Rehabilitation (IRF) Plan of Care  Goal: Absence of New-Onset Illness or Injury  Intervention: Identify and Manage Fall Risk  Recent Flowsheet Documentation  Taken 10/18/2024 1000 by Marie Horner, RN  Safety Promotion/Fall Prevention:   fall prevention program maintained   nonskid shoes/slippers when out of bed   safety round/check completed  Taken 10/18/2024 0805 by Marie Horner, RN  Safety Promotion/Fall Prevention:   fall prevention program maintained   nonskid shoes/slippers when out of bed   safety round/check completed   Goal Outcome Evaluation:

## 2024-10-18 NOTE — PLAN OF CARE
Goal Outcome Evaluation:            Pt is progressing medically and physically with all therapies, continue with current plan of care.

## 2024-10-18 NOTE — PROGRESS NOTES
Occupational Therapy:    Physical Therapy: Individual: 100 minutes.    Speech Language Pathology:    Signed by: Rakel Ramirez PTA

## 2024-10-18 NOTE — PROGRESS NOTES
Rehabilitation Nursing  Inpatient Rehabilitation Plan of Care Note    Plan of Care  Pain    Pain Management (Active)  Current Status (10/16/2024 3:52:00 PM): Potential for increased pain  Weekly Goal: optimal pain control  Discharge Goal: optimal pain control    Safety    Potential for Injury (Active)  Current Status (10/16/2024 3:52:00 PM): Potential for falls  Weekly Goal: No falls  Discharge Goal: No fallsC    Signed by: Marie Horner Nurse

## 2024-10-18 NOTE — THERAPY TREATMENT NOTE
Inpatient Rehabilitation - Physical Therapy Treatment Note        Geoff     Patient Name: Dalila Pike  : 1970  MRN: [id number]    Today's Date: 10/18/2024                    Admit Date: 10/16/2024      Visit Dx:   No diagnosis found.    Patient Active Problem List   Diagnosis    Acute CVA (cerebrovascular accident)    Alcohol use    Tobacco use    Obesity (BMI 30-39.9)    HTN (hypertension)    Dyslipidemia    History of seizures    PAD (peripheral artery disease)    Oropharyngeal dysphagia    Acute UTI (urinary tract infection)    Hypertensive emergency    Single episode of loss of consciousness without head trauma    RUQ pain    Dilated cbd, acquired    Cholangitis    Debility       Past Medical History:   Diagnosis Date    Acute CVA (cerebrovascular accident) 2024    Alcohol use 2024    Dyslipidemia 2024    Obesity (BMI 30-39.9) 2024    PAD (peripheral artery disease) 2024    Tobacco use 2024       Past Surgical History:   Procedure Laterality Date    ENDOSCOPY N/A 10/8/2024    Procedure: ESOPHAGOGASTRODUODENOSCOPY;  Surgeon: Elie Sanders MD;  Location:  [facility] ENDOSCOPY;  Service: Gastroenterology;  Laterality: N/A;    ERCP N/A 10/8/2024    Procedure: ENDOSCOPIC RETROGRADE CHOLANGIOPANCREATOGRAPHY WITH STENT PLACEMENT;  Surgeon: Elie Sanders MD;  Location:  [facility] ENDOSCOPY;  Service: Gastroenterology;  Laterality: N/A;  SPHINCTEROTOMY @ 1737, 9-12MM AND 12-15MM BALLOON SWEEP TO CBD    INTERVENTIONAL RADIOLOGY PROCEDURE Bilateral 2024    Procedure: Carotid Cervical Angiogram;  Surgeon: Jamaal Saini MD;  Location:  [facility];  Service: Interventional Radiology;  Laterality: Bilateral;       PT ASSESSMENT (Last 12 Hours)       IRF PT Evaluation and Treatment       Row Name 10/18/24 1244          PT Time and Intention    Document Type daily treatment  -LL     Mode of Treatment individual therapy;physical therapy  -LL      Patient/Family/Caregiver Comments/Observations Patient had no new c/o this date and was agreeable to PT participation.  -LL       Row Name 10/18/24 1244          General Information    Existing Precautions/Restrictions fall  LUE flaccid, L ankle CAM boot-WBAT  -LL       Row Name 10/18/24 1244          Pain Scale: FACES Pre/Post-Treatment    Pain: FACES Scale, Pretreatment 4-->hurts little more  -LL     Posttreatment Pain Rating 4-->hurts little more  -LL       Row Name 10/18/24 1244          Cognition/Psychosocial    Affect/Mental Status (Cognition) WFL  -LL     Orientation Status (Cognition) oriented to;place;situation;person  -LL     Follows Commands (Cognition) WFL  -LL     Personal Safety Interventions nonskid shoes/slippers when out of bed;supervised activity  -LL     Cognitive Function (Cognition) WFL  -LL       Row Name 10/18/24 1244          Mobility    Extremity Weight-bearing Status left lower extremity  -LL     Left Lower Extremity (Weight-bearing Status) --  WBAT with CAM boot  -LL       Row Name 10/18/24 1244          Bed Mobility    All Activities, [place] (Bed Mobility) modified independence  -LL       Row Name 10/18/24 1244          Transfer Assessment/Treatment    Transfers toilet transfer;car transfer  -LL       Row Name 10/18/24 1244          Bed-Chair Transfer    Bed-Chair [place] (Transfers) supervision  -LL     Assistive Device (Bed-Chair Transfers) wheelchair  -LL       Row Name 10/18/24 1244          Chair-Bed Transfer    Chair-Bed [place] (Transfers) supervision  -LL     Assistive Device (Chair-Bed Transfers) wheelchair  -LL       Row Name 10/18/24 1244          Sit-Stand Transfer    Sit-Stand [place] (Transfers) supervision  -LL     Assistive Device (Sit-Stand Transfers) wheelchair  -LL       Row Name 10/18/24 1244          Stand-Sit Transfer    Stand-Sit [place] (Transfers) supervision  -LL     Assistive Device (Stand-Sit Transfers) wheelchair  -LL       Row Name  10/18/24 1244          Toilet Transfer    Type (Toilet Transfer) stand pivot/stand step  -LL     [place] Level (Toilet Transfer) supervision  -LL     Assistive Device (Toilet Transfer) grab bars/safety frame  -LL       Row Name 10/18/24 1244          Car Transfer    Type (Car Transfer) sit-stand;stand-sit  -LL     [place] Level (Car Transfer) supervision  -LL       Row Name 10/18/24 1244          Gait/Stairs (Locomotion)    [place] Level (Gait) supervision  -LL     Assistive Device (Gait) --  no AD  -LL     Distance in Feet (Gait) --  280', 120'  -LL     Deviations/Abnormal Patterns (Gait) antalgic;gait speed decreased  -LL     [place] Level (Stairs) supervision  -LL     Handrail Location (Stairs) right side (ascending);right side (descending)  -LL     Number of Steps (Stairs) 10  -LL     Ascending Technique (Stairs) step-over-step  -LL     Descending Technique (Stairs) step-over-step  -LL       Row Name 10/18/24 1244          Safety Issues/Impairments Affecting Functional Mobility    Impairments Affecting Function (Mobility) balance;endurance/activity tolerance;muscle tone abnormal;sensation/sensory awareness;pain;strength  -LL       Row Name 10/18/24 1244          Balance    Comment, Balance Unsupported standing dynamic balance for functional tasks around room.  -LL       Row Name 10/18/24 1244          Hip (Therapeutic Exercise)    Hip (Therapeutic Exercise) strengthening exercise  -LL     Hip Strengthening (Therapeutic Exercise) bilateral;flexion;extension;aBduction;aDduction;marching while seated;sitting;2 lb free weight;resistance band;green  -LL       Row Name 10/18/24 1244          Knee (Therapeutic Exercise)    Knee (Therapeutic Exercise) strengthening exercise  -LL     Knee Strengthening (Therapeutic Exercise) bilateral;flexion;extension;marching while seated;LAQ (long arc quad);hamstring curls;sitting;2 lb free weight;resistance band;green  -LL       Row Name 10/18/24 1244           Ankle (Therapeutic Exercise)    Ankle (Therapeutic Exercise) strengthening exercise  -       Row Name 10/18/24 1244          Positioning and Restraints    Pre-Treatment Position in bed  -LL     Post Treatment Position wheelchair  -LL     In Wheelchair sitting;with OT  -       Row Name 10/18/24 1244          Therapy Assessment/Plan (PT)    Patient's Goals For Discharge return home;return to all previous roles/activities  -       Row Name 10/18/24 1244          Therapy Assessment/Plan (PT)    Rehab Potential/Prognosis (PT) good  -LL     Frequency of Treatment (PT) 5 times per week  -LL     Estimated Duration of Therapy (PT) 1 week  -     Problem List (PT) balance;hemiparesis/hemiplegia;mobility;muscle tone;sensation;strength;pain  -     Activity Limitations Related to Problem List (PT) unable to ambulate safely;unable to transfer safely  -       Row Name 10/18/24 1244          Therapy Plan Review/Discharge Plan (PT)    Anticipated Equipment Needs at Discharge (PT Eval) --  tbd  -LL     Anticipated Discharge Disposition (PT) home;home with outpatient therapy services  -       Row Name 10/18/24 1244          IRF PT Goals    Transfer Goal Selection (PT-IRF) transfers, PT goal 1  -LL     Gait (Walking Locomotion) Goal Selection (PT-IRF) gait, PT goal 1  -LL     Stairs Goal Selection (PT-IRF) stairs, PT goal 1  -       Row Name 10/18/24 1244          Transfer Goal 1 (PT-IRF)    Activity/Assistive Device (Transfer Goal 1, PT-IRF) sit-to-stand/stand-to-sit;bed-to-chair/chair-to-bed  -LL     [place] Level (Transfer Goal 1, PT-IRF) modified independence  -LL     Time Frame (Transfer Goal 1, PT-IRF) by discharge  -       Row Name 10/18/24 1248          Gait/Walking Locomotion Goal 1 (PT-IRF)    Activity/Assistive Device (Gait/Walking Locomotion Goal 1, PT-IRF) --  AAD  -LL     Gait/Walking Locomotion Distance Goal 1 (PT-IRF) 300'  -LL     [place] Level (Gait/Walking Locomotion Goal 1, PT-IRF)  modified independence  -LL     Time Frame (Gait/Walking Locomotion Goal 1, PT-IRF) by discharge  -LL       Row Name 10/18/24 1244          Stairs Goal 1 (PT-IRF)    Activity/Assistive Device (Stairs Goal 1, PT-IRF) ascending stairs;descending stairs  -LL     Number of Stairs (Stairs Goal 1, PT-IRF) 5  -LL     [place] Level (Stairs Goal 1, PT-IRF) modified independence  -LL     Time Frame (Stairs Goal 1, PT-IRF) by discharge  -LL               User Key  (r) = Recorded By, (t) = Taken By, (c) = Cosigned By      Initials Name Provider Type    LL Rakel Ramirez PTA Physical Therapist Assistant                  Wound 10/08/24 2200 Left upper flank (Active)   Closure Open to air 10/18/24 0805   Base moist;red 10/17/24 1920     Physical Therapy Education       Title: PT OT SLP Therapies (In Progress)       Topic: Physical Therapy (Done)       Point: Mobility training (Done)       Learning Progress Summary            Patient Acceptance, E,D, VU,NR by LL at 10/18/2024 1249    Acceptance, E, VU,NR by LB at 10/17/2024 1229                      Point: Home exercise program (Done)       Learning Progress Summary            Patient Acceptance, E,D, VU,NR by LL at 10/18/2024 1249    Acceptance, E, VU,NR by LB at 10/17/2024 1229                      Point: Body mechanics (Done)       Learning Progress Summary            Patient Acceptance, E,D, VU,NR by LL at 10/18/2024 1249    Acceptance, E, VU,NR by LB at 10/17/2024 1229                      Point: Precautions (Done)       Learning Progress Summary            Patient Acceptance, E,D, VU,NR by LL at 10/18/2024 1249    Acceptance, E, VU,NR by LB at 10/17/2024 1229                                      User Key       Initials Effective Dates Name Provider Type Discipline    LB 06/16/21 -  Ruth Pastor, PT Physical Therapist PT     05/02/16 -  Rakel Ramirez PTA Physical Therapist Assistant PT                    PT Recommendation and Plan    Frequency of Treatment  (PT): 5 times per week  Anticipated Equipment Needs at Discharge (PT Eval):  (tbd)                  Time Calculation:      PT Charges       Row Name 10/18/24 1249             Time Calculation    Start Time 0735  -LL      Stop Time 0915  -LL      Time Calculation (min) 100 min  -LL      PT Received On 10/18/24  -LL         Time Calculation- PT    Total Timed Code Minutes-  minute(s)  -LL                User Key  (r) = Recorded By, (t) = Taken By, (c) = Cosigned By      Initials Name Provider Type     Rakel Ramirez PTA Physical Therapist Assistant                    Therapy Charges for Today       Code Description Service Date Service Provider Modifiers Qty    59561067800 HC GAIT TRAINING EA 15 MIN 10/18/2024 Rakel Ramirez PTA GP, CQ 1    08228919279 HC PT NEUROMUSC RE EDUCATION EA 15 MIN 10/18/2024 Rakel Ramirez PTA GP, CQ 2    45483420741 HC PT THERAPEUTIC ACT EA 15 MIN 10/18/2024 Rakel Ramirez PTA GP 1    23605012186 HC PT THER PROC EA 15 MIN 10/18/2024 Rakel Ramirez PTA GP 3              PT G-Codes  AM-PAC 6 Clicks Score (PT): 19      Lorraine Ramirez PTA  10/18/2024

## 2024-10-18 NOTE — PROGRESS NOTES
Occupational Therapy: Individual: 90 minutes.    Physical Therapy:    Speech Language Pathology:    Signed by: Pennie Rome, Occupational Therapist

## 2024-10-19 PROCEDURE — 25010000002 HEPARIN (PORCINE) PER 1000 UNITS: Performed by: FAMILY MEDICINE

## 2024-10-19 PROCEDURE — 99231 SBSQ HOSP IP/OBS SF/LOW 25: CPT | Performed by: FAMILY MEDICINE

## 2024-10-19 PROCEDURE — 97110 THERAPEUTIC EXERCISES: CPT

## 2024-10-19 PROCEDURE — 97032 APPL MODALITY 1+ESTIM EA 15: CPT | Performed by: OCCUPATIONAL THERAPIST

## 2024-10-19 PROCEDURE — 97112 NEUROMUSCULAR REEDUCATION: CPT | Performed by: OCCUPATIONAL THERAPIST

## 2024-10-19 PROCEDURE — 97535 SELF CARE MNGMENT TRAINING: CPT | Performed by: OCCUPATIONAL THERAPIST

## 2024-10-19 PROCEDURE — 97530 THERAPEUTIC ACTIVITIES: CPT | Performed by: OCCUPATIONAL THERAPIST

## 2024-10-19 PROCEDURE — 97116 GAIT TRAINING THERAPY: CPT

## 2024-10-19 PROCEDURE — 97530 THERAPEUTIC ACTIVITIES: CPT

## 2024-10-19 RX ADMIN — HEPARIN SODIUM 5000 UNITS: 5000 INJECTION INTRAVENOUS; SUBCUTANEOUS at 21:10

## 2024-10-19 RX ADMIN — Medication 400 MG: at 09:41

## 2024-10-19 RX ADMIN — Medication 100 MG: at 09:41

## 2024-10-19 RX ADMIN — ATORVASTATIN CALCIUM 80 MG: 40 TABLET, FILM COATED ORAL at 20:20

## 2024-10-19 RX ADMIN — TOPIRAMATE 25 MG: 25 TABLET, FILM COATED ORAL at 20:20

## 2024-10-19 RX ADMIN — TRAZODONE HYDROCHLORIDE 50 MG: 50 TABLET ORAL at 20:20

## 2024-10-19 RX ADMIN — TICAGRELOR 60 MG: 60 TABLET ORAL at 20:19

## 2024-10-19 RX ADMIN — SENNOSIDES AND DOCUSATE SODIUM 2 TABLET: 50; 8.6 TABLET ORAL at 20:20

## 2024-10-19 RX ADMIN — NICOTINE 1 PATCH: 21 PATCH TRANSDERMAL at 06:01

## 2024-10-19 RX ADMIN — HEPARIN SODIUM 5000 UNITS: 5000 INJECTION INTRAVENOUS; SUBCUTANEOUS at 14:08

## 2024-10-19 RX ADMIN — TICAGRELOR 60 MG: 60 TABLET ORAL at 09:41

## 2024-10-19 RX ADMIN — URSODIOL 300 MG: 300 CAPSULE ORAL at 20:19

## 2024-10-19 RX ADMIN — NYSTATIN 1 APPLICATION: 100000 CREAM TOPICAL at 09:58

## 2024-10-19 RX ADMIN — NYSTATIN 1 APPLICATION: 100000 CREAM TOPICAL at 20:21

## 2024-10-19 RX ADMIN — FLUTICASONE PROPIONATE 2 SPRAY: 50 SPRAY, METERED NASAL at 09:40

## 2024-10-19 RX ADMIN — URSODIOL 300 MG: 300 CAPSULE ORAL at 09:41

## 2024-10-19 RX ADMIN — HYDROCODONE BITARTRATE AND ACETAMINOPHEN 1 TABLET: 5; 325 TABLET ORAL at 04:30

## 2024-10-19 RX ADMIN — HEPARIN SODIUM 5000 UNITS: 5000 INJECTION INTRAVENOUS; SUBCUTANEOUS at 05:11

## 2024-10-19 RX ADMIN — HYDROCODONE BITARTRATE AND ACETAMINOPHEN 1 TABLET: 5; 325 TABLET ORAL at 20:19

## 2024-10-19 RX ADMIN — LISINOPRIL 20 MG: 10 TABLET ORAL at 09:41

## 2024-10-19 RX ADMIN — SENNOSIDES AND DOCUSATE SODIUM 2 TABLET: 50; 8.6 TABLET ORAL at 09:41

## 2024-10-19 RX ADMIN — HYDROXYZINE HYDROCHLORIDE 25 MG: 25 TABLET ORAL at 20:20

## 2024-10-19 RX ADMIN — OFLOXACIN 50000 UNITS: 300 TABLET, COATED ORAL at 16:32

## 2024-10-19 RX ADMIN — ASPIRIN 81 MG: 81 TABLET, COATED ORAL at 09:41

## 2024-10-19 RX ADMIN — LANSOPRAZOLE 30 MG: 30 TABLET, ORALLY DISINTEGRATING ORAL at 05:11

## 2024-10-19 RX ADMIN — FOLIC ACID 1 MG: 1 TABLET ORAL at 09:41

## 2024-10-19 NOTE — PROGRESS NOTES
[facility]  PROGRESS NOTE     Patient Identification:  Name:  Dalila Pike  Age:  54 y.o.  Sex:  female  :  1970  MRN:  [id number]  Visit Number:  [id number]  ROOM: [facility]     Primary Care Provider:  gAnes Ewing APRN    Length of stay in inpatient status:  3    Subjective     Chief Compliant: Debility with recent history of CVA and left ankle fracture     History of Presenting Illness: 54-year-old female who is doing reasonably well at this time.  Patient did have sinus congestion yesterday.  Patient tested negative for flu and COVID.  She states that decongestants were helpful to her.    Objective     Current Hospital Meds:aspirin, 81 mg, Oral, Daily  atorvastatin, 80 mg, Oral, Nightly  fluticasone, 2 spray, Each Nare, Daily  folic acid, 1 mg, Oral, Daily  heparin (porcine), 5,000 Units, Subcutaneous, Q8H  lansoprazole, 30 mg, Oral, Q AM  lisinopril, 20 mg, Oral, Q24H  magnesium oxide, 400 mg, Oral, Daily  nicotine, 1 patch, Transdermal, Q24H  nystatin, 1 Application, Topical, Q12H  polyethylene glycol, 17 g, Oral, Daily  senna-docusate sodium, 2 tablet, Oral, BID  thiamine, 100 mg, Oral, Daily  ticagrelor, 60 mg, Oral, BID  topiramate, 25 mg, Oral, Nightly  traZODone, 50 mg, Oral, Nightly  ursodiol, 300 mg, Oral, BID       ----------------------------------------------------------------------------------------------------------------------  Vital Signs:  Temp:  [98.5 °F (36.9 °C)-98.9 °F (37.2 °C)] 98.5 °F (36.9 °C)  Heart Rate:  [67-84] 67  Resp:  [16-18] 16  BP: (107-108)/(71-73) 108/71  SpO2:  [97 %] 97 %  on   ;   Device (Oxygen Therapy): room air  Body mass index is 34.21 kg/m².    Wt Readings from Last 3 Encounters:   10/16/24 102 kg (225 lb)   10/08/24 110 kg (243 lb)   24 110 kg (243 lb)     Intake & Output (last 3 days)         10/16 0701  10/17 0700 10/17 0701  10/18 0700 10/18 0701  10/19 0700 10/19 0701  10/20 0700    P.O. 840 840 720     I.V. (mL/kg) 0 (0)        Total Intake(mL/kg) 840 (8.2) 840 (8.2) 720 (7.1)     Net +840 +840 +720             Urine Unmeasured Occurrence 3 x 6 x 5 x           Diet: Cardiac; Healthy Heart (2-3 Na+); Texture: Soft to Chew (NDD 3); Soft to Chew: Chopped Meat; Fluid Consistency: Thin (IDDSI 0)  ----------------------------------------------------------------------------------------------------------------------  Physical exam:  Constitutional: No acute distress  HEENT: Normocephalic atraumatic  Neck:   Supple  Cardiovascular: Regular rate and rhythm  Pulmonary/Chest: Clear to auscultation  Abdominal: Positive bowel sounds soft.   Musculoskeletal: Left upper extremity in sling  Neurological: Patient with a left-sided hemiparesis  Skin: No rash  Peripheral vascular: No edema  Genitourinary:  ----------------------------------------------------------------------------------------------------------------------    Last echocardiogram:  Results for orders placed during the hospital encounter of 08/21/24    Adult Transthoracic Echo Complete W/ Cont if Necessary Per Protocol (With Agitated Saline)    Interpretation Summary    Left ventricular systolic function is normal. Calculated left ventricular EF = 68% Normal left ventricular cavity size noted. Left ventricular wall thickness is consistent with moderate concentric hypertrophy. All left ventricular wall segments contract normally. Left ventricular diastolic function was normal. Normal left atrial pressure.    The right ventricular cavity is mildly dilated. Normal right ventricular systolic function noted.    Left atrial volume is moderately increased. Saline test results are negative.    The aortic valve is structurally normal with no stenosis present. The aortic valve appears trileaflet. No significant aortic valve regurgitation is present.    The mitral valve is structurally normal with no significant stenosis present. Trace to mild mitral valve regurgitation is present.    Mild tricuspid valve  "regurgitation is present. Estimated right ventricular systolic pressure from tricuspid regurgitation is mildly elevated (35-45 mmHg)    Mild dilation of the ascending aorta is present. Ascending aorta = 3.7 cm    ----------------------------------------------------------------------------------------------------------------------  Results from last 7 days   Lab Units 10/17/24  0344 10/14/24  0541   WBC 10*3/mm3 8.51 6.46   HEMOGLOBIN g/dL 13.1 12.5   HEMATOCRIT % 42.2 37.7   MCV fL 96.1 92.0   MCHC g/dL 31.0* 33.2   PLATELETS 10*3/mm3 339 297         Results from last 7 days   Lab Units 10/17/24  0344 10/14/24  0541   SODIUM mmol/L 140 138   POTASSIUM mmol/L 3.8 3.5   MAGNESIUM mg/dL 2.4  --    CHLORIDE mmol/L 104 105   CO2 mmol/L 24.0 24.0   BUN mg/dL 9 9   CREATININE mg/dL 0.76 0.67   CALCIUM mg/dL 9.9 9.3   GLUCOSE mg/dL 126* 142*   ALBUMIN g/dL 3.7 3.4*   BILIRUBIN mg/dL 0.4 0.4   ALK PHOS U/L 205* 219*   AST (SGOT) U/L 18 13   ALT (SGPT) U/L 13 14   Estimated Creatinine Clearance: 105.7 mL/min (by C-G formula based on SCr of 0.76 mg/dL).  No results found for: \"AMMONIA\"              No results found for: \"HGBA1C\", \"POCGLU\"  Lab Results   Component Value Date    TSH 0.447 10/08/2024     No results found for: \"PREGTESTUR\", \"PREGSERUM\", \"HCG\", \"HCGQUANT\"  Pain Management Panel  More data may exist         Latest Ref Rng & Units 10/7/2024 8/21/2024   Pain Management Panel   Amphetamine, Urine Qual Negative Negative  Negative    Barbiturates Screen, Urine Negative Negative  Negative    Benzodiazepine Screen, Urine Negative Negative  Negative    Buprenorphine, Screen, Urine Negative Negative  Negative    Cocaine Screen, Urine Negative Negative  Negative    Fentanyl, Urine Negative Negative  Negative    Methadone Screen , Urine Negative Negative  Negative    Methamphetamine, Ur Negative Negative  Positive       Details                 Brief Urine Lab Results  (Last result in the past 365 days)        Color   Clarity   " "Blood   Leuk Est   Nitrite   Protein   CREAT   Urine HCG        10/18/24 1546 Yellow   Clear   Trace   Negative   Negative   Negative                 No results found for: \"BLOODCX\"  Results from last 7 days   Lab Units 10/18/24  1546   NITRITE UA  Negative   WBC UA /HPF 3-5*   BACTERIA UA /HPF None Seen   SQUAM EPITHEL UA /HPF 3-6*     No results found for: \"URINECX\"  No results found for: \"WOUNDCX\"  No results found for: \"STOOLCX\"        I have personally looked at the labs and they are summarized above.  ----------------------------------------------------------------------------------------------------------------------  Detailed radiology reports for the last 24 hours:    Imaging Results (Last 24 Hours)       ** No results found for the last 24 hours. **          Final impressions for the last 30 days of radiology reports:    XR Ankle 3+ View Left    Result Date: 10/12/2024  Impression: Again seen are minimal displaced fracture of the distal fibula and medial malleolus and also likely of the anterior tibial plafond similar to most recent prior examination. No new fracture seen. Electronically Signed: Javier Javed MD  10/12/2024 5:40 PM EDT  Workstation ID: [id number]    XR Ankle 3+ View Left    Result Date: 10/9/2024  Impression: Persistent, nonunited distal left fibular fracture, with minimal new bone formation. Electronically Signed: Ozzy Mckeon MD  10/9/2024 10:51 AM EDT  Workstation ID: [id number]    FL ERCP pancreatic and biliary ducts    Result Date: 10/8/2024  Impression: 1. Intraoperative fluoroscopy during ERCP with metallic stent placement. Please see procedure report for full findings and details. Electronically Signed: Deandre Mendez  10/8/2024 10:39 PM EDT  Workstation ID: [id number]    MRI abdomen wo contrast mrcp    Result Date: 10/8/2024  Impression: 1. Mildly dilated biliary tree with blunted appearance of the common bile duct at the ampulla. No obvious filling defects or mass. Differential " includes ampullary stricture, biliary sphincter of Oddi dysfunction, occult intraductal stone versus occult mass. Consider ERCP for further assessment. 2. Mildly fluid distention of the gallbladder without other findings to suggest acute cholecystitis by MRI. 3. Negative for acute pancreatitis. Electronically Signed: Juna Ball MD  10/8/2024 8:27 AM EDT  Workstation ID: [id number]   I have personally looked at the radiology images and read the final radiology report.    Assessment & Plan    Debility with history of CVA in August 2024 with residual left hemiparesis.  Patient modified independent for bed mobility; requires supervision for transfers; ambulated 280 feet and 120 feet with no assistive device and supervision did navigate 10 steps as well.    Status post right MCA distribution CVA with history of thrombectomy and right ICA stent placement.  Patient's continue aspirin, Brilinta and statin therapy.    Status post cholangitis patient's status post sphincterotomy and stent placement after ERCP.  She completed antibiotic course and the decision was made to forego cholecystectomy for now.  Patient is to follow-up with GI in 6 to 8 weeks for repeat ERCP and likely removal of stent.    Hypertension continue ACE inhibitor    Dyslipidemia statin therapy    History of ethanol use day 12 since last use no evidence of withdrawal.    Left ankle fracture may ambulate as tolerated with Cam boot    Tobacco use recommend cessation    GERD continue PPI    VTE Prophylaxis:   Heparin        Ti Kaur MD  [facility]  10/19/24  09:17 EDT

## 2024-10-19 NOTE — THERAPY TREATMENT NOTE
Inpatient Rehabilitation - Physical Therapy Treatment Note        Geoff     Patient Name: Dalila Pike  : 1970  MRN: [id number]    Today's Date: 10/19/2024                    Admit Date: 10/16/2024      Visit Dx:   No diagnosis found.    Patient Active Problem List   Diagnosis    Acute CVA (cerebrovascular accident)    Alcohol use    Tobacco use    Obesity (BMI 30-39.9)    HTN (hypertension)    Dyslipidemia    History of seizures    PAD (peripheral artery disease)    Oropharyngeal dysphagia    Acute UTI (urinary tract infection)    Hypertensive emergency    Single episode of loss of consciousness without head trauma    RUQ pain    Dilated cbd, acquired    Cholangitis    Debility       Past Medical History:   Diagnosis Date    Acute CVA (cerebrovascular accident) 2024    Alcohol use 2024    Dyslipidemia 2024    Obesity (BMI 30-39.9) 2024    PAD (peripheral artery disease) 2024    Tobacco use 2024       Past Surgical History:   Procedure Laterality Date    ENDOSCOPY N/A 10/8/2024    Procedure: ESOPHAGOGASTRODUODENOSCOPY;  Surgeon: Elie Sanders MD;  Location:  [facility] ENDOSCOPY;  Service: Gastroenterology;  Laterality: N/A;    ERCP N/A 10/8/2024    Procedure: ENDOSCOPIC RETROGRADE CHOLANGIOPANCREATOGRAPHY WITH STENT PLACEMENT;  Surgeon: Elie Sanders MD;  Location:  [facility] ENDOSCOPY;  Service: Gastroenterology;  Laterality: N/A;  SPHINCTEROTOMY @ 1737, 9-12MM AND 12-15MM BALLOON SWEEP TO CBD    INTERVENTIONAL RADIOLOGY PROCEDURE Bilateral 2024    Procedure: Carotid Cervical Angiogram;  Surgeon: Jamaal Saini MD;  Location:  [facility];  Service: Interventional Radiology;  Laterality: Bilateral;       PT ASSESSMENT (Last 12 Hours)       IRF PT Evaluation and Treatment       Row Name 10/19/24 1256          PT Time and Intention    Document Type daily treatment  -KM     Mode of Treatment individual therapy;physical therapy  -KM     Total  Minutes, Physical Therapy 45  -KM     Evaluation/Treatment Not Performed patient/family declined, not feeling well  pt. deferred afternoon session  -KM       Row Name 10/19/24 1256          General Information    Patient Profile Reviewed yes  -KM     Existing Precautions/Restrictions fall  -KM       Row Name 10/19/24 1256          Cognition/Psychosocial    Affect/Mental Status (Cognition) WFL  -KM     Orientation Status (Cognition) oriented x 3  -KM     Follows Commands (Cognition) WFL  -KM     Personal Safety Interventions fall prevention program maintained;gait belt;muscle strengthening facilitated;nonskid shoes/slippers when out of bed;supervised activity  -KM       Row Name 10/19/24 1256          Mobility    Extremity Weight-bearing Status left lower extremity  -KM     Left Lower Extremity (Weight-bearing Status) weight-bearing as tolerated (WBAT);other (see comments)  CAM boot  -KM       Row Name 10/19/24 1256          Bed Mobility    Bed Mobility bed mobility (all) activities  -KM     All Activities, Storey (Bed Mobility) modified independence  -KM       Row Name 10/19/24 1256          Transfer Assessment/Treatment    Transfers sit-stand transfer;stand-sit transfer;bed-chair transfer;chair-bed transfer  -KM       Row Name 10/19/24 1256          Bed-Chair Transfer    Bed-Chair Storey (Transfers) supervision  -KM       Row Name 10/19/24 1256          Chair-Bed Transfer    Chair-Bed [place]y (Transfers) supervision  -KM       Row Name 10/19/24 1256          Sit-Stand Transfer    Sit-Stand Storey (Transfers) supervision  -KM       Row Name 10/19/24 1256          Stand-Sit Transfer    Stand-Sit Storey (Transfers) supervision  -KM       Row Name 10/19/24 1256          Gait/Stairs (Locomotion)    Gait/Stairs Locomotion gait/ambulation independence;distance ambulated  -KM     Storey Level (Gait) supervision  -KM     Patient was able to Ambulate yes  -KM     Distance in Feet (Gait) --   320', 100'  -KM     Pattern (Gait) step-through  -KM     Deviations/Abnormal Patterns (Gait) antalgic;gait speed decreased  -KM       Row Name 10/19/24 1256          Safety Issues/Impairments Affecting Functional Mobility    Impairments Affecting Function (Mobility) balance;endurance/activity tolerance;pain  -KM       Row Name 10/19/24 1256          Balance    Balance Interventions sitting;trunk training exercise  -KM       Row Name 10/19/24 1256          Motor Skills    Comments, Therapeutic Exercise seated ther-ex: marches, kicks, ankle pf/df  -KM     Additional Documentation Comments, Therapeutic Exercise (Row)  -KM       Row Name 10/19/24 1256          Positioning and Restraints    Pre-Treatment Position in bed  -KM     Post Treatment Position bed  -KM     In Bed supine;call light within reach;encouraged to call for assist  -KM       Row Name 10/19/24 1256          Daily Progress Summary (PT)    Daily Progress Summary (PT) Pt. was able to demonstrate high level mobility skills. She ambulated increased distance w/ supervision. Pt. would continue to benefit from skilled PT services.  -KM               User Key  (r) = Recorded By, (t) = Taken By, (c) = Cosigned By      Initials Name Provider Type    Niall Sauceda, PT Physical Therapist                  Wound 10/08/24 2200 Left upper flank (Active)   Closure Open to air 10/18/24 1902   Base moist;red 10/18/24 1902     Physical Therapy Education       Title: PT OT SLP Therapies (In Progress)       Topic: Physical Therapy (Done)       Point: Mobility training (Done)       Learning Progress Summary            Patient Acceptance, E,D, VU,NR by LL at 10/18/2024 1249    Acceptance, E, VU,NR by LB at 10/17/2024 1229                      Point: Home exercise program (Done)       Learning Progress Summary            Patient Acceptance, E,D, VU,NR by LL at 10/18/2024 1249    Acceptance, E, VU,NR by LB at 10/17/2024 1229                      Point: Body mechanics (Done)        Learning Progress Summary            Patient Acceptance, E,D, VU,NR by  at 10/18/2024 1249    Acceptance, E, VU,NR by LB at 10/17/2024 1229                      Point: Precautions (Done)       Learning Progress Summary            Patient Acceptance, E,D, VU,NR by  at 10/18/2024 1249    Acceptance, E, VU,NR by LB at 10/17/2024 1229                                      User Key       Initials Effective Dates Name Provider Type Discipline     06/16/21 -  Ruth Pastor, PT Physical Therapist PT     05/02/16 -  Rakel Ramirez PTA Physical Therapist Assistant PT                    PT Recommendation and Plan          Daily Progress Summary (PT)  Daily Progress Summary (PT): Pt. was able to demonstrate high level mobility skills. She ambulated increased distance w/ supervision. Pt. would continue to benefit from skilled PT services.               Time Calculation:      PT Charges       Row Name 10/19/24 1253             Time Calculation    Start Time 1045  -KM      Stop Time 1130  -KM      Time Calculation (min) 45 min  -KM      PT Received On 10/19/24  -KM         Time Calculation- PT    Total Timed Code Minutes- PT 45 minute(s)  -KM                User Key  (r) = Recorded By, (t) = Taken By, (c) = Cosigned By      Initials Name Provider Type    KM Niall Fabian, PT Physical Therapist                    Therapy Charges for Today       Code Description Service Date Service Provider Modifiers Qty    82548587728 HC PT THERAPEUTIC ACT EA 15 MIN 10/19/2024 Niall Fabian, PT GP 1    51224763740 HC PT THER PROC EA 15 MIN 10/19/2024 Niall Fabian, PT GP 1    94127970173 HC GAIT TRAINING EA 15 MIN 10/19/2024 Niall Fabian, PT GP 1              PT G-Codes  AM-PAC 6 Clicks Score (PT): 19      Niall Fabian PT  10/19/2024

## 2024-10-19 NOTE — PROGRESS NOTES
Rehabilitation Nursing  Inpatient Rehabilitation Plan of Care Note    Plan of Care  Copy from Ryan    Pain Management (Active)  Current Status (10/16/2024 3:52:00 PM): Potential for increased pain  Weekly Goal: optimal pain control  Discharge Goal: optimal pain control    Safety    Potential for Injury (Active)  Current Status (10/16/2024 3:52:00 PM): Potential for falls  Weekly Goal: No falls  Discharge Goal: No falls    Signed by: Marilyn Steven, Nurse

## 2024-10-19 NOTE — PLAN OF CARE
Goal Outcome Evaluation:           Problem: Rehabilitation (IRF) Plan of Care  Goal: Plan of Care Review  Outcome: Progressing  Flowsheets (Taken 10/17/2024 0207 by Naty Norton, RN)  Progress: improving  Plan of Care Reviewed With: patient  Goal: Patient-Specific Goal (Individualized)  Outcome: Progressing  Goal: Absence of New-Onset Illness or Injury  Outcome: Progressing  Intervention: Identify and Manage Fall Risk  Recent Flowsheet Documentation  Taken 10/19/2024 1000 by Laura Burden RN  Safety Promotion/Fall Prevention:   nonskid shoes/slippers when out of bed   safety round/check completed  Taken 10/19/2024 0800 by Laura Burden RN  Safety Promotion/Fall Prevention:   nonskid shoes/slippers when out of bed   safety round/check completed  Intervention: Prevent Infection  Recent Flowsheet Documentation  Taken 10/19/2024 0800 by Laura Burden RN  Infection Prevention: hand hygiene promoted  Goal: Optimal Comfort and Wellbeing  Outcome: Progressing  Goal: Home and Community Transition Plan Established  Outcome: Progressing     Problem: Fall Injury Risk  Goal: Absence of Fall and Fall-Related Injury  Outcome: Progressing  Intervention: Identify and Manage Contributors  Recent Flowsheet Documentation  Taken 10/19/2024 0800 by Laura Burden RN  Medication Review/Management: medications reviewed  Intervention: Promote Injury-Free Environment  Recent Flowsheet Documentation  Taken 10/19/2024 1000 by Laura Burden RN  Safety Promotion/Fall Prevention:   nonskid shoes/slippers when out of bed   safety round/check completed  Taken 10/19/2024 0800 by Laura Burden RN  Safety Promotion/Fall Prevention:   nonskid shoes/slippers when out of bed   safety round/check completed

## 2024-10-19 NOTE — PROGRESS NOTES
Physical Medicine and Rehabilitation  Inpatient Rehabilitation Interdisciplinary Plan of Care    Demographics            Age: 54Y            Gender: Female    Admission Date: 10/16/2024 3:52:00 PM  Rehabilitation Diagnosis:  debility    Plan of Care  Anticipated Discharge Date/Estimated Length of Stay: 14 days  Anticipated Discharge Destination: Community discharge with assistance  Discharge Plan : Pt plans to return home with spouse providing assistance at  discharge.  Medical Necessity Expected Level Rationale: good  Intensity and Duration: an average of 3 hours/5 days per week  Medical Supervision and 24 Hour Rehab Nursing: x  Physical Therapy: x  PT Intensity/Duration: PT 1.5 hours per day/5 days per week  Occupational Therapy: x  OT Intensity/Duration: OT 1.5 hours per day/5 days per week  Social Work: x  Therapeutic Recreation: x  Updated (if changes indicated)  No changes to plan.    Based on the patient's medical and functional status, their prognosis and  expected level of functional improvement is: good    Interdisciplinary Problem/Goals/Status  Copy from POCMobility    [PT] Bed/Chair/Wheelchair (Active)  Current Status (10/17/2024 12:00:00 AM): Sup  Weekly Goal: MI  Discharge Goal: MI    [PT] Walk (Active)  Current Status (10/17/2024 12:00:00 AM): amb 300' no AD Sup  Weekly Goal: amb 300' AAD MI  Discharge Goal: amb 300' AAD MI    Self Care    [OT] Dressing (Lower) (Active)  Current Status (10/17/2024 12:00:00 AM): max/mod  Weekly Goal: mod/min  Discharge Goal: sup/set up    Pain    [RN] Pain Management (Active)  Current Status (10/16/2024 3:52:00 PM): Potential for increased pain  Weekly Goal: optimal pain control  Discharge Goal: optimal pain control    Safety    [RN] Potential for Injury (Active)  Current Status (10/16/2024 3:52:00 PM): Potential for falls  Weekly Goal: No falls  Discharge Goal: No falls    Medical Problems    Comments:    Signed by: Ti Kaur Physician

## 2024-10-19 NOTE — THERAPY TREATMENT NOTE
Inpatient Rehabilitation - Occupational Therapy Treatment Note     Geoff     Patient Name: Dalila Pike  : 1970  MRN: [id number]    Today's Date: 10/19/2024                 Admit Date: 10/16/2024       No diagnosis found.    Patient Active Problem List   Diagnosis    Acute CVA (cerebrovascular accident)    Alcohol use    Tobacco use    Obesity (BMI 30-39.9)    HTN (hypertension)    Dyslipidemia    History of seizures    PAD (peripheral artery disease)    Oropharyngeal dysphagia    Acute UTI (urinary tract infection)    Hypertensive emergency    Single episode of loss of consciousness without head trauma    RUQ pain    Dilated cbd, acquired    Cholangitis    Debility       Past Medical History:   Diagnosis Date    Acute CVA (cerebrovascular accident) 2024    Alcohol use 2024    Dyslipidemia 2024    Obesity (BMI 30-39.9) 2024    PAD (peripheral artery disease) 2024    Tobacco use 2024       Past Surgical History:   Procedure Laterality Date    ENDOSCOPY N/A 10/8/2024    Procedure: ESOPHAGOGASTRODUODENOSCOPY;  Surgeon: Elie Sanders MD;  Location:  [facility] ENDOSCOPY;  Service: Gastroenterology;  Laterality: N/A;    ERCP N/A 10/8/2024    Procedure: ENDOSCOPIC RETROGRADE CHOLANGIOPANCREATOGRAPHY WITH STENT PLACEMENT;  Surgeon: Elie Sanders MD;  Location:  [facility] ENDOSCOPY;  Service: Gastroenterology;  Laterality: N/A;  SPHINCTEROTOMY @ 1737, 9-12MM AND 12-15MM BALLOON SWEEP TO CBD    INTERVENTIONAL RADIOLOGY PROCEDURE Bilateral 2024    Procedure: Carotid Cervical Angiogram;  Surgeon: Jamaal Saini MD;  Location: [facility] CATH INVASIVE LOCATION;  Service: Interventional Radiology;  Laterality: Bilateral;             IRF OT ASSESSMENT FLOWSHEET (Last 12 Hours)       IRF OT Evaluation and Treatment       Row Name 10/19/24 1200          OT Time and Intention    Document Type daily treatment  -AH     Mode of Treatment individual therapy;occupational therapy  -      Patient Effort good  -[facility] Name 10/19/24 1200          General Information    Patient/Family/Caregiver Comments/Observations patient agreeable to therapy. patient tolerated therapy well with no complaints.  -     Existing Precautions/Restrictions fall  -       Row Name 10/19/24 1200          Cognition/Psychosocial    Affect/Mental Status (Cognition) WFL  -[facility] Name 10/19/24 1200          Bed-Chair Transfer    Bed-Chair Drake (Transfers) supervision;verbal cues  -       Row Name 10/19/24 1200          Motor Skills    Motor Skills neuro-muscular function  -     Neuromuscular Function upper extremity  PROM, AAROM, arm skate, NMES left wrist and digit ext.  program 2 with good response. patient tolerated well. gross grasp, massage  -     Therapeutic Exercise shoulder;elbow/forearm;wrist;hand  UE bike with left hand assisted  -[facility] Name 10/19/24 1200          Positioning and Restraints    Post Treatment Position bed  -     In Bed supine;call light within reach;encouraged to call for assist  -               User Key  (r) = Recorded By, (t) = Taken By, (c) = Cosigned By      Initials Name Effective Dates     Laura Rome, OT 06/03/24 -                      Occupational Therapy Education       Title: PT OT SLP Therapies (In Progress)       Topic: Occupational Therapy (Not Started)       Point: ADL training (Not Started)       Description:   Instruct learner(s) on proper safety adaptation and remediation techniques during self care or transfers.   Instruct in proper use of assistive devices.                  Learner Progress:  Not documented in this visit.              Point: Home exercise program (Not Started)       Description:   Instruct learner(s) on appropriate technique for monitoring, assisting and/or progressing therapeutic exercises/activities.                  Learner Progress:  Not documented in this visit.              Point: Precautions (Not Started)        Description:   Instruct learner(s) on prescribed precautions during self-care and functional transfers.                  Learner Progress:  Not documented in this visit.              Point: Body mechanics (Not Started)       Description:   Instruct learner(s) on proper positioning and spine alignment during self-care, functional mobility activities and/or exercises.                  Learner Progress:  Not documented in this visit.                                        OT Recommendation and Plan    Planned Therapy Interventions (OT): activity tolerance training, adaptive equipment training, BADL retraining, neuromuscular control/coordination retraining, passive ROM/stretching, patient/caregiver education/training, ROM/therapeutic exercise, strengthening exercise, transfer/mobility retraining                    Time Calculation:      Time Calculation- OT       Row Name 10/19/24 1212             Time Calculation-     OT Start Time 0830  -      OT Stop Time 1000  -      OT Time Calculation (min) 90 min  -                User Key  (r) = Recorded By, (t) = Taken By, (c) = Cosigned By      Initials Name Provider Type     Laura Rome, OT Occupational Therapist                  Therapy Charges for Today       Code Description Service Date Service Provider Modifiers Qty    51481110855 HC OT SELF CARE/MGMT/TRAIN EA 15 MIN 10/18/2024 Laura Rome, OT GO 2    00505575094 HC OT NEUROMUSC RE EDUCATION EA 15 MIN 10/18/2024 Laura Rome, OT GO 3    95113372023 HC OT ELEC STIM EA-PER 15 MIN 10/18/2024 Laura Rome, OT GO 1    17939301808 HC OT SELF CARE/MGMT/TRAIN EA 15 MIN 10/19/2024 Laura Rome, OT GO 1    38009097465 HC OT NEUROMUSC RE EDUCATION EA 15 MIN 10/19/2024 Laura Rome, OT GO 3    92508225681 HC OT THERAPEUTIC ACT EA 15 MIN 10/19/2024 Laura Rome, OT GO 1    82241514198 HC OT ELEC STIM EA-PER 15 MIN 10/19/2024 Laura Rome, OT GO 1                      Laura Rome, OT  10/19/2024

## 2024-10-19 NOTE — PLAN OF CARE
Goal Outcome Evaluation:         No acute distress noted at this time. Continue current plan of care.

## 2024-10-20 PROCEDURE — 25010000002 ENOXAPARIN PER 10 MG: Performed by: FAMILY MEDICINE

## 2024-10-20 PROCEDURE — 25010000002 HEPARIN (PORCINE) PER 1000 UNITS: Performed by: FAMILY MEDICINE

## 2024-10-20 RX ORDER — NICOTINE 21 MG/24HR
1 PATCH, TRANSDERMAL 24 HOURS TRANSDERMAL ONCE
Status: COMPLETED | OUTPATIENT
Start: 2024-10-20 | End: 2024-10-21

## 2024-10-20 RX ORDER — ENOXAPARIN SODIUM 100 MG/ML
40 INJECTION SUBCUTANEOUS EVERY 24 HOURS
Status: DISCONTINUED | OUTPATIENT
Start: 2024-10-20 | End: 2024-10-23 | Stop reason: HOSPADM

## 2024-10-20 RX ORDER — BISACODYL 5 MG/1
10 TABLET, DELAYED RELEASE ORAL DAILY PRN
Status: DISCONTINUED | OUTPATIENT
Start: 2024-10-20 | End: 2024-10-23 | Stop reason: HOSPADM

## 2024-10-20 RX ADMIN — HYDROCODONE BITARTRATE AND ACETAMINOPHEN 1 TABLET: 5; 325 TABLET ORAL at 05:30

## 2024-10-20 RX ADMIN — Medication 400 MG: at 08:29

## 2024-10-20 RX ADMIN — ASPIRIN 81 MG: 81 TABLET, COATED ORAL at 08:29

## 2024-10-20 RX ADMIN — URSODIOL 300 MG: 300 CAPSULE ORAL at 08:29

## 2024-10-20 RX ADMIN — NICOTINE 1 PATCH: 21 PATCH TRANSDERMAL at 08:30

## 2024-10-20 RX ADMIN — HEPARIN SODIUM 5000 UNITS: 5000 INJECTION INTRAVENOUS; SUBCUTANEOUS at 05:31

## 2024-10-20 RX ADMIN — HYDROCODONE BITARTRATE AND ACETAMINOPHEN 1 TABLET: 5; 325 TABLET ORAL at 20:58

## 2024-10-20 RX ADMIN — LANSOPRAZOLE 30 MG: 30 TABLET, ORALLY DISINTEGRATING ORAL at 05:31

## 2024-10-20 RX ADMIN — TICAGRELOR 60 MG: 60 TABLET ORAL at 08:29

## 2024-10-20 RX ADMIN — Medication 100 MG: at 08:29

## 2024-10-20 RX ADMIN — TRAZODONE HYDROCHLORIDE 50 MG: 50 TABLET ORAL at 20:58

## 2024-10-20 RX ADMIN — NYSTATIN 1 APPLICATION: 100000 CREAM TOPICAL at 21:06

## 2024-10-20 RX ADMIN — NYSTATIN 1 APPLICATION: 100000 CREAM TOPICAL at 08:30

## 2024-10-20 RX ADMIN — NICOTINE 1 PATCH: 21 PATCH TRANSDERMAL at 15:26

## 2024-10-20 RX ADMIN — HYDROCODONE BITARTRATE AND ACETAMINOPHEN 1 TABLET: 5; 325 TABLET ORAL at 11:55

## 2024-10-20 RX ADMIN — LISINOPRIL 20 MG: 10 TABLET ORAL at 08:29

## 2024-10-20 RX ADMIN — SENNOSIDES AND DOCUSATE SODIUM 2 TABLET: 50; 8.6 TABLET ORAL at 20:58

## 2024-10-20 RX ADMIN — FLUTICASONE PROPIONATE 2 SPRAY: 50 SPRAY, METERED NASAL at 08:30

## 2024-10-20 RX ADMIN — SENNOSIDES AND DOCUSATE SODIUM 2 TABLET: 50; 8.6 TABLET ORAL at 08:29

## 2024-10-20 RX ADMIN — URSODIOL 300 MG: 300 CAPSULE ORAL at 20:58

## 2024-10-20 RX ADMIN — ENOXAPARIN SODIUM 40 MG: 100 INJECTION SUBCUTANEOUS at 11:55

## 2024-10-20 RX ADMIN — TOPIRAMATE 25 MG: 25 TABLET, FILM COATED ORAL at 20:58

## 2024-10-20 RX ADMIN — BISACODYL 10 MG: 5 TABLET, COATED ORAL at 12:22

## 2024-10-20 RX ADMIN — TICAGRELOR 60 MG: 60 TABLET ORAL at 20:58

## 2024-10-20 RX ADMIN — POLYETHYLENE GLYCOL (3350) 17 G: 17 POWDER, FOR SOLUTION ORAL at 08:29

## 2024-10-20 RX ADMIN — HYDROXYZINE HYDROCHLORIDE 25 MG: 25 TABLET ORAL at 20:58

## 2024-10-20 RX ADMIN — ATORVASTATIN CALCIUM 80 MG: 40 TABLET, FILM COATED ORAL at 20:58

## 2024-10-20 RX ADMIN — FOLIC ACID 1 MG: 1 TABLET ORAL at 08:29

## 2024-10-20 NOTE — PLAN OF CARE
Goal Outcome Evaluation:         Resting in bed with no acute distress noted at this time. Continue current plan of care.

## 2024-10-20 NOTE — PROGRESS NOTES
Rehabilitation Nursing  Inpatient Rehabilitation Plan of Care Note    Plan of Care  Copy from Ryan    Pain Management (Active)  Current Status (10/16/2024 3:52:00 PM): Potential for increased pain  Weekly Goal: optimal pain control  Discharge Goal: optimal pain control    Safety    Potential for Injury (Active)  Current Status (10/16/2024 3:52:00 PM): Potential for falls  Weekly Goal: No falls  Discharge Goal: No falls    Signed by: Dunia Heredia, Nurse

## 2024-10-21 ENCOUNTER — APPOINTMENT (OUTPATIENT)
Dept: GENERAL RADIOLOGY | Facility: HOSPITAL | Age: 54
DRG: 056 | End: 2024-10-21
Payer: MEDICAID

## 2024-10-21 VITALS
OXYGEN SATURATION: 98 % | DIASTOLIC BLOOD PRESSURE: 78 MMHG | BODY MASS INDEX: 34.1 KG/M2 | WEIGHT: 225 LBS | HEIGHT: 68 IN | RESPIRATION RATE: 18 BRPM | TEMPERATURE: 98 F | SYSTOLIC BLOOD PRESSURE: 118 MMHG | HEART RATE: 84 BPM

## 2024-10-21 PROCEDURE — 25010000002 ENOXAPARIN PER 10 MG: Performed by: FAMILY MEDICINE

## 2024-10-21 PROCEDURE — 97110 THERAPEUTIC EXERCISES: CPT

## 2024-10-21 PROCEDURE — 99232 SBSQ HOSP IP/OBS MODERATE 35: CPT | Performed by: INTERNAL MEDICINE

## 2024-10-21 PROCEDURE — 97112 NEUROMUSCULAR REEDUCATION: CPT

## 2024-10-21 PROCEDURE — 97112 NEUROMUSCULAR REEDUCATION: CPT | Performed by: OCCUPATIONAL THERAPIST

## 2024-10-21 PROCEDURE — 97116 GAIT TRAINING THERAPY: CPT

## 2024-10-21 PROCEDURE — 97535 SELF CARE MNGMENT TRAINING: CPT | Performed by: OCCUPATIONAL THERAPIST

## 2024-10-21 PROCEDURE — 97032 APPL MODALITY 1+ESTIM EA 15: CPT | Performed by: OCCUPATIONAL THERAPIST

## 2024-10-21 PROCEDURE — 74230 X-RAY XM SWLNG FUNCJ C+: CPT

## 2024-10-21 PROCEDURE — 92611 MOTION FLUOROSCOPY/SWALLOW: CPT

## 2024-10-21 PROCEDURE — 97530 THERAPEUTIC ACTIVITIES: CPT

## 2024-10-21 PROCEDURE — 97110 THERAPEUTIC EXERCISES: CPT | Performed by: OCCUPATIONAL THERAPIST

## 2024-10-21 PROCEDURE — 74230 X-RAY XM SWLNG FUNCJ C+: CPT | Performed by: RADIOLOGY

## 2024-10-21 RX ORDER — NICOTINE 21 MG/24HR
1 PATCH, TRANSDERMAL 24 HOURS TRANSDERMAL ONCE
Status: DISCONTINUED | OUTPATIENT
Start: 2024-10-21 | End: 2024-10-23 | Stop reason: HOSPADM

## 2024-10-21 RX ADMIN — ATORVASTATIN CALCIUM 80 MG: 40 TABLET, FILM COATED ORAL at 20:37

## 2024-10-21 RX ADMIN — ENOXAPARIN SODIUM 40 MG: 100 INJECTION SUBCUTANEOUS at 12:13

## 2024-10-21 RX ADMIN — Medication 400 MG: at 08:50

## 2024-10-21 RX ADMIN — FLUTICASONE PROPIONATE 2 SPRAY: 50 SPRAY, METERED NASAL at 08:49

## 2024-10-21 RX ADMIN — LANSOPRAZOLE 30 MG: 30 TABLET, ORALLY DISINTEGRATING ORAL at 05:50

## 2024-10-21 RX ADMIN — URSODIOL 300 MG: 300 CAPSULE ORAL at 20:36

## 2024-10-21 RX ADMIN — ACETAMINOPHEN 650 MG: 325 TABLET ORAL at 12:15

## 2024-10-21 RX ADMIN — TICAGRELOR 60 MG: 60 TABLET ORAL at 08:49

## 2024-10-21 RX ADMIN — POLYETHYLENE GLYCOL (3350) 17 G: 17 POWDER, FOR SOLUTION ORAL at 08:48

## 2024-10-21 RX ADMIN — Medication 100 MG: at 08:50

## 2024-10-21 RX ADMIN — TRAZODONE HYDROCHLORIDE 50 MG: 50 TABLET ORAL at 20:37

## 2024-10-21 RX ADMIN — HYDROCODONE BITARTRATE AND ACETAMINOPHEN 1 TABLET: 5; 325 TABLET ORAL at 07:06

## 2024-10-21 RX ADMIN — ASPIRIN 81 MG: 81 TABLET, COATED ORAL at 08:49

## 2024-10-21 RX ADMIN — HYDROCODONE BITARTRATE AND ACETAMINOPHEN 1 TABLET: 5; 325 TABLET ORAL at 13:06

## 2024-10-21 RX ADMIN — SENNOSIDES AND DOCUSATE SODIUM 2 TABLET: 50; 8.6 TABLET ORAL at 20:36

## 2024-10-21 RX ADMIN — TOPIRAMATE 25 MG: 25 TABLET, FILM COATED ORAL at 20:37

## 2024-10-21 RX ADMIN — NYSTATIN 1 APPLICATION: 100000 CREAM TOPICAL at 20:43

## 2024-10-21 RX ADMIN — TICAGRELOR 60 MG: 60 TABLET ORAL at 20:37

## 2024-10-21 RX ADMIN — SENNOSIDES AND DOCUSATE SODIUM 2 TABLET: 50; 8.6 TABLET ORAL at 08:49

## 2024-10-21 RX ADMIN — FOLIC ACID 1 MG: 1 TABLET ORAL at 08:50

## 2024-10-21 RX ADMIN — BISACODYL 10 MG: 5 TABLET, COATED ORAL at 17:40

## 2024-10-21 RX ADMIN — LISINOPRIL 20 MG: 10 TABLET ORAL at 08:50

## 2024-10-21 RX ADMIN — NICOTINE 1 PATCH: 21 PATCH TRANSDERMAL at 08:52

## 2024-10-21 RX ADMIN — URSODIOL 300 MG: 300 CAPSULE ORAL at 08:49

## 2024-10-21 NOTE — MBS/VFSS/FEES
Inpatient Rehabilitation - Speech Language Pathology   Swallow Initial Evaluation [facility]  MODIFIED BARIUM SWALLOW STUDY     Patient Name: Dalila Pike  : 1970  MRN: [id number]  Today's Date: 10/21/2024     Admit Date: 10/16/2024    Dalila Pike  presented to the radiology suite this am from [facility] IRF to participate in an instrumental MBS to objectively evaluate safety/efficacy of swallowing fnx, determine safest/least restrictive diet. She has a PMH significant for hypertension, dyslipidemia, peripheral arterial disease, obesity tobacco abuse, history of ethanol use, history of right MCA territory ischemic stroke with thrombectomy and right ICA stent placement (24) with residual weakness, and recent left ankle fracture.      Per EMR review, Ms Pike was evaluated by SLP department of [facility] during admission in August following CVA. Initial FEES performed 24 revealed silent aspiration of thin liquids during the swallow w/ deep penetration of nectar and solids during the swallow. Per lack of intact sensation w/ penetration and aspiration she was recommended NPO. Repeat FEES on 24 revealed a mild to moderate oropharyngeal dysphagia w/ deep laryngeal penetration w/ thin liquids via straw which was eliminated w/ cup trials of thin liquids. She was recommended for a modified po diet of mechanical soft textures, chopped meats, and thin liquids. She did require ERCP as well and was noted w/ papillary stenosis w/ sphincterotomy and stent placement. She was noted w/ GERD changes to esophagus w/o evidenced of Kaplan's esophagus.     This am, she has reported that she continues to aspirate thin liquids. Given hx of silent aspiration and most recent imaging demonstrating deep penetration w/ thin liquids, she is felt to most benefit from instrumental MBS to r/o aspiration.       Social History     Socioeconomic History    Marital status:    Tobacco Use    Smoking status: Every Day     Current  packs/day: 0.50     Average packs/day: 2.0 packs/day for 38.8 years (76.4 ttl pk-yrs)     Types: Cigarettes     Start date: 2024     Passive exposure: Current    Smokeless tobacco: Never    Tobacco comments:     4-5 a day   Vaping Use    Vaping status: Never Used   Substance and Sexual Activity    Alcohol use: Not Currently    Drug use: Not Currently    Sexual activity: Defer      Imaging:  No recent chest imaging is available for review.   Labs:  No recent lab values are available for review.     Diet Orders (active) (From admission, onward)       Start     Ordered    10/21/24 1209  Diet: Cardiac; Healthy Heart (2-3 Na+); Texture: Regular (IDDSI 7); Fluid Consistency: Thin (IDDSI 0)  Diet Effective Now         10/21/24 1209                  She was observed on room air w/o complications across this evaluation.     Risks and benefits of the procedure were explained w/ patient verbalizing understanding/agreement to participate. Proceed per protocol.     She was positioned upright and centered in a soft strap supportive chair to accept multiple po presentations of solid cracker, puree, honey thick, nectar thick, and thin liquids via spoon, cup and straw, along w/ whole placebo pill in puree. She was able to self provide po trials.      All views are from the lateral plane.     Facial/oral structures were symmetrical upon observation w/o lingual deviation upon protrusion. Oral mucosa were moist, pink and clean. Secretions were clear, thin, and well controlled. OROM/SHYLA was wfl to imitate oral postures. Gag was not assessed. Volitional cough was adequate in intensity, clear in quality, nonproductive. Vocal quality was adequate in intensity, clear in quality w/ intelligible speech. Patient was a/a and cooperative to participate, oriented to person, place, and time, follows simple directives, and participates in simple conversational exchanges.     Upon po presentations, adequate bolus anticipation w/ good labial seal for  bolus clearance via spoon bowl, cup rim stability and suction via straw.  Bolus formation, manipulation, and control were wfl w/ rotary mastication pattern. A-p transit was timely w/o significant oral residue. Tongue base retraction and linguavelar seal were adequate w/o premature spillage. No laryngeal penetration or aspiration evidenced before the swallow.     Pharyngeal swallow was timely w/ adequate hyolaryngeal elevation and epiglottic inversion. Pharyngeal contraction was adequate w/o significant residue. No laryngeal penetration or aspiration evidenced during or after the swallow.                Penetration-Aspiration Scale Scoring  Consistency: Teaspoon Bolus Cup Bolus Straw Bolus   Puree 1     Honey 1     Nectar 1  1   Thin 1 1 1   Solid 1       *Reference*      She declined whole placebo pill as she states she has never been able to swallow whole pills and that she takes medications crushed. She is agreeable to additional presentation of puree for full esophageal sweep.     Full esophageal sweep reveals mucosal protrusion at the approximate level of C4-C5. This does not impede bolus flow of any consistency.       Motility appears wfl w/o retrograde flow noted.        Visit Dx:   No diagnosis found.  Patient Active Problem List   Diagnosis    Acute CVA (cerebrovascular accident)    Alcohol use    Tobacco use    Obesity (BMI 30-39.9)    HTN (hypertension)    Dyslipidemia    History of seizures    PAD (peripheral artery disease)    Oropharyngeal dysphagia    Acute UTI (urinary tract infection)    Hypertensive emergency    Single episode of loss of consciousness without head trauma    RUQ pain    Dilated cbd, acquired    Cholangitis    Debility     Past Medical History:   Diagnosis Date    Acute CVA (cerebrovascular accident) 08/21/2024    Alcohol use 08/21/2024    Dyslipidemia 08/21/2024    Obesity (BMI 30-39.9) 08/21/2024    PAD (peripheral artery disease) 08/21/2024    Tobacco use 08/21/2024     Past  Surgical History:   Procedure Laterality Date    ENDOSCOPY N/A 10/8/2024    Procedure: ESOPHAGOGASTRODUODENOSCOPY;  Surgeon: Elie Sanders MD;  Location:  [facility];  Service: Gastroenterology;  Laterality: N/A;    ERCP N/A 10/8/2024    Procedure: ENDOSCOPIC RETROGRADE CHOLANGIOPANCREATOGRAPHY WITH STENT PLACEMENT;  Surgeon: Elie Sanders MD;  Location:  [facility];  Service: Gastroenterology;  Laterality: N/A;  SPHINCTEROTOMY @ 1737, 9-12MM AND 12-15MM BALLOON SWEEP TO CBD    INTERVENTIONAL RADIOLOGY PROCEDURE Bilateral 8/21/2024    Procedure: Carotid Cervical Angiogram;  Surgeon: Jamaal Saini MD;  Location:  [facility];  Service: Interventional Radiology;  Laterality: Bilateral;     Impression:     Ms Pike presented w/ a wfl oropharyngeal swallow w/o laryngeal penetration or aspiration across this evaluation. She additionally presented w/ a mucosal protrusion at the approximate level of C4-C5 which did not impede bolus flow of any consistency. No residue remained on this protrusion post swallow completion.     Per this evaluation, Ms Pike is felt to most benefit from diet advancement to regular textures and thin liquids w/ medications administered per pt preference.     SLP Recommendation and Plan     1. Regular textures, thin liquids.   2. Medications per pt preference in puree/thins.   3. ADRIANE precautions.   4. Oral care protocol.   5. Universal aspiration precautions.   6. Fully upright and centered for any and all po intake.     No further SLP f/u warranted at this time.     D/w patient results and recommendations w/ verbal understanding and agreement.     D/w RN results and recommendations w/ verbal understanding and agreement.     Thank you for allowing me to participate in the care of your patient-  Rajani Colón M.S., CCC-SLP        EDUCATION  The patient has been educated in the following areas:   Dysphagia (Swallowing Impairment).     Time Calculation:    Time  Calculation- SLP       Row Name 10/21/24 1218             Time Calculation- SLP    SLP Start Time 1120  -JR      SLP Stop Time 1215  -      SLP Time Calculation (min) 55 min  -                User Key  (r) = Recorded By, (t) = Taken By, (c) = Cosigned By      Initials Name Provider Type    Rajani Amaro MS CCC-SLP Speech and Language Pathologist                    Therapy Charges for Today       Code Description Service Date Service Provider Modifiers Qty    39248908376 HC ST MOTION FLUORO EVAL SWALLOW 4 10/21/2024 Rajani Colón MS CCC-SLP GN 1            Rajani Colón MS CCC-DOREEN  10/21/2024

## 2024-10-21 NOTE — THERAPY TREATMENT NOTE
Inpatient Rehabilitation - Physical Therapy Treatment Note        Geoff     Patient Name: Dalila Pike  : 1970  MRN: [id number]    Today's Date: 10/21/2024                    Admit Date: 10/16/2024      Visit Dx:   No diagnosis found.    Patient Active Problem List   Diagnosis    Acute CVA (cerebrovascular accident)    Alcohol use    Tobacco use    Obesity (BMI 30-39.9)    HTN (hypertension)    Dyslipidemia    History of seizures    PAD (peripheral artery disease)    Oropharyngeal dysphagia    Acute UTI (urinary tract infection)    Hypertensive emergency    Single episode of loss of consciousness without head trauma    RUQ pain    Dilated cbd, acquired    Cholangitis    Debility       Past Medical History:   Diagnosis Date    Acute CVA (cerebrovascular accident) 2024    Alcohol use 2024    Dyslipidemia 2024    Obesity (BMI 30-39.9) 2024    PAD (peripheral artery disease) 2024    Tobacco use 2024       Past Surgical History:   Procedure Laterality Date    ENDOSCOPY N/A 10/8/2024    Procedure: ESOPHAGOGASTRODUODENOSCOPY;  Surgeon: Elie Sanders MD;  Location:  [facility];  Service: Gastroenterology;  Laterality: N/A;    ERCP N/A 10/8/2024    Procedure: ENDOSCOPIC RETROGRADE CHOLANGIOPANCREATOGRAPHY WITH STENT PLACEMENT;  Surgeon: Elie Sanders MD;  Location:  [facility];  Service: Gastroenterology;  Laterality: N/A;  SPHINCTEROTOMY @ 1737, 9-12MM AND 12-15MM BALLOON SWEEP TO CBD    INTERVENTIONAL RADIOLOGY PROCEDURE Bilateral 2024    Procedure: Carotid Cervical Angiogram;  Surgeon: Jamaal Saini MD;  Location:  [facility];  Service: Interventional Radiology;  Laterality: Bilateral;       PT ASSESSMENT (Last 12 Hours)       IRF PT Evaluation and Treatment       Row Name 10/21/24 0990          PT Time and Intention    Document Type daily treatment  BID treatment session  -LL     Mode of Treatment individual therapy;physical therapy   -LL     Patient/Family/Caregiver Comments/Observations Patient had no new c/o this date and was agreeable to PT participation.  -LL       Row Name 10/21/24 1430          General Information    Patient Profile Reviewed yes  -LL     Existing Precautions/Restrictions fall  -LL       Row Name 10/21/24 1430          Cognition/Psychosocial    Affect/Mental Status (Cognition) WFL  -LL     Orientation Status (Cognition) oriented x 3  -LL     Follows Commands (Cognition) WFL  -LL     Personal Safety Interventions nonskid shoes/slippers when out of bed  -LL     Cognitive Function (Cognition) WFL  -LL       Row Name 10/21/24 1430          Mobility    Extremity Weight-bearing Status left lower extremity  -LL     Left Lower Extremity (Weight-bearing Status) weight-bearing as tolerated (WBAT);other (see comments)  CAM boot  -LL       Row Name 10/21/24 1430          Bed Mobility    Bed Mobility bed mobility (all) activities  -LL     All Activities, [place] (Bed Mobility) modified independence  -LL       Row Name 10/21/24 1430          Transfer Assessment/Treatment    Transfers sit-stand transfer;stand-sit transfer;bed-chair transfer;chair-bed transfer  -LL       Row Name 10/21/24 1430          Bed-Chair Transfer    Bed-Chair [place] (Transfers) modified independence  -LL       Row Name 10/21/24 1430          Chair-Bed Transfer    Chair-Bed [place] (Transfers) modified independence  -LL       Row Name 10/21/24 1430          Sit-Stand Transfer    Sit-Stand [place] (Transfers) modified independence  -LL       Row Name 10/21/24 1430          Stand-Sit Transfer    Stand-Sit [place] (Transfers) modified independence  -LL       Row Name 10/21/24 1430          Gait/Stairs (Locomotion)    Gait/Stairs Locomotion gait/ambulation independence;distance ambulated  -     [place] Level (Gait) supervision;modified independence  -     Distance in Feet (Gait) --  160' x 3 in AM; 400' on sidewalk  -LL     Pattern  (Gait) step-through  -LL     Deviations/Abnormal Patterns (Gait) antalgic;gait speed decreased  -LL     [place] Level (Stairs) modified independence;supervision  -LL     Handrail Location (Stairs) right side (ascending);right side (descending)  -LL     Number of Steps (Stairs) 12  -LL     Ascending Technique (Stairs) step-over-step  -LL     Descending Technique (Stairs) step-over-step  -LL     Stairs, Safety Issues sequencing ability decreased  -LL       Row Name 10/21/24 1430          Safety Issues/Impairments Affecting Functional Mobility    Impairments Affecting Function (Mobility) balance;endurance/activity tolerance;pain  -LL       Row Name 10/21/24 1430          Balance    Comment, Balance Unsupported standing bean bag toss reaching outside ROBBI and crossing midline; unsupported standing dynamic balance for functional tasks; weaving around objects in pathway  -LL       Row Name 10/21/24 1430          Motor Skills    Comments, Therapeutic Exercise Seated ex's w/ 2.5# & BTB  -LL       Row Name 10/21/24 1430          Positioning and Restraints    Pre-Treatment Position in bed  -LL     Post Treatment Position wheelchair  In AM; bed in PM  -LL     In Bed sitting EOB;call light within reach;encouraged to call for assist;side rails up x2  In PM  -LL     In Wheelchair sitting;with OT  In AM  -LL               User Key  (r) = Recorded By, (t) = Taken By, (c) = Cosigned By      Initials Name Provider Type     Rakel Ramirez PTA Physical Therapist Assistant                  Wound 10/08/24 2200 Left upper flank (Active)   Closure Open to air 10/21/24 1023   Base moist;red 10/21/24 1023     Physical Therapy Education       Title: PT OT SLP Therapies (In Progress)       Topic: Physical Therapy (Done)       Point: Mobility training (Done)       Learning Progress Summary            Patient Acceptance, E,D, VU by  at 10/21/2024 1441    Acceptance, E,D, VU,NR by LL at 10/18/2024 1249    Acceptance, E, VU,NR by LB at  10/17/2024 1229                      Point: Home exercise program (Done)       Learning Progress Summary            Patient Acceptance, E,D, VU by LL at 10/21/2024 1441    Acceptance, E,D, VU,NR by LL at 10/18/2024 1249    Acceptance, E, VU,NR by LB at 10/17/2024 1229                      Point: Body mechanics (Done)       Learning Progress Summary            Patient Acceptance, E,D, VU by LL at 10/21/2024 1441    Acceptance, E,D, VU,NR by LL at 10/18/2024 1249    Acceptance, E, VU,NR by LB at 10/17/2024 1229                      Point: Precautions (Done)       Learning Progress Summary            Patient Acceptance, E,D, VU by LL at 10/21/2024 1441    Acceptance, E,D, VU,NR by LL at 10/18/2024 1249    Acceptance, E, VU,NR by LB at 10/17/2024 1229                                      User Key       Initials Effective Dates Name Provider Type Discipline    LB 06/16/21 -  Ruth Pastor, PT Physical Therapist PT     05/02/16 -  Rakel Ramirez PTA Physical Therapist Assistant PT                    PT Recommendation and Plan    Frequency of Treatment (PT): 5 times per week  Anticipated Equipment Needs at Discharge (PT Eval):  (tbd)                  Time Calculation:      PT Charges       Row Name 10/21/24 1443 10/21/24 1441          Time Calculation    Start Time 1245  -LL 0750  -LL     Stop Time 1330  -LL 0915  -LL     Time Calculation (min) 45 min  -LL 85 min  -LL     PT Received On -- 10/21/24  -        Time Calculation- PT    Total Timed Code Minutes- PT 45 minute(s)  -LL 85 minute(s)  -LL               User Key  (r) = Recorded By, (t) = Taken By, (c) = Cosigned By      Initials Name Provider Type     Rakel Ramirez PTA Physical Therapist Assistant                    Therapy Charges for Today       Code Description Service Date Service Provider Modifiers Qty    37696128670 HC GAIT TRAINING EA 15 MIN 10/21/2024 Rakel Ramirez PTA GP 3    13795655355 HC PT NEUROMUSC RE EDUCATION EA 15 MIN 10/21/2024  Rakel Ramirez, TIGIST GP 3    91226190908 HC PT THERAPEUTIC ACT EA 15 MIN 10/21/2024 Rakel Ramirez, TIGIST GP 1    22203101732 HC PT THER PROC EA 15 MIN 10/21/2024 Rakel Ramirez, TIGIST GP 2              PT G-Codes  AM-PAC 6 Clicks Score (PT): 19      Rakel. TIGIST Ramirez  10/21/2024

## 2024-10-21 NOTE — PROGRESS NOTES
PPS CMG Coordinator  Inpatient Rehabilitation Admission    Ethnic Group: White.  Marital Status:  Marital Status: .    IRF Admission Date:  10/16/2024  Admission Class: Initial Rehab.  Admit From:  [facility]    Pre-Hospital Living: Home. Pre-Hospital Living  With: (2) Family/Relatives.    Payment Sources: Primary: Not Listed.  Secondary: Not Listed.  Impairment Group: 16 Debility (non-cardiac, non-pulmonary)  Date of Onset of Impairment: 10/07/2024    Etiologic Diagnosis Code(s):  Rank Code      Description  1    R53.81    Other malaise    Comorbidities:  Rank Code      Description    1    I69.354   Hemiplegia and hemiparesis following cerebral                 infarction affecting left non-dominant side  2    S82.892A  Other fracture of left lower leg, initial                 encounter for closed fracture  3    I10       Essential (primary) hypertension  4    I73.9     Peripheral vascular disease, unspecified  5    E66.9     Obesity, unspecified  6    F17.210   Nicotine dependence, cigarettes, uncomplicated  7    E78.5     Hyperlipidemia, unspecified  8    K21.9     Gastro-esophageal reflux disease without                 esophagitis  9    R53.1     Weakness    Height on Admission: 68 inches.  Weight on Admission: 225 pounds.    Are there any arthritis conditions recorded for Impairment Group, Etiologic  Diagnosis, or Comorbid Conditions that meet all of the regulatory requirements  for IRF classification (in 42 .29(b)(2)(x), (xi), and xii))?  No    ELMER Bladder Accidents:  0 - Accidents.  Bladder Score = 7. Patient has not had an accident.  ELMER Bowel Accident: 0 -Accidents.  Bowel Score = 6. Patient has no accidents, but uses a device/medications. bowel  meds    Signed by: Nurse Katya

## 2024-10-21 NOTE — THERAPY TREATMENT NOTE
Inpatient Rehabilitation - Occupational Therapy Treatment Note     Geoff     Patient Name: Dalila Pike  : 1970  MRN: [id number]    Today's Date: 10/21/2024                 Admit Date: 10/16/2024       No diagnosis found.    Patient Active Problem List   Diagnosis    Acute CVA (cerebrovascular accident)    Alcohol use    Tobacco use    Obesity (BMI 30-39.9)    HTN (hypertension)    Dyslipidemia    History of seizures    PAD (peripheral artery disease)    Oropharyngeal dysphagia    Acute UTI (urinary tract infection)    Hypertensive emergency    Single episode of loss of consciousness without head trauma    RUQ pain    Dilated cbd, acquired    Cholangitis    Debility       Past Medical History:   Diagnosis Date    Acute CVA (cerebrovascular accident) 2024    Alcohol use 2024    Dyslipidemia 2024    Obesity (BMI 30-39.9) 2024    PAD (peripheral artery disease) 2024    Tobacco use 2024       Past Surgical History:   Procedure Laterality Date    ENDOSCOPY N/A 10/8/2024    Procedure: ESOPHAGOGASTRODUODENOSCOPY;  Surgeon: Elie Sanders MD;  Location:  [facility];  Service: Gastroenterology;  Laterality: N/A;    ERCP N/A 10/8/2024    Procedure: ENDOSCOPIC RETROGRADE CHOLANGIOPANCREATOGRAPHY WITH STENT PLACEMENT;  Surgeon: Elie Sanders MD;  Location:  [facility];  Service: Gastroenterology;  Laterality: N/A;  SPHINCTEROTOMY @ 1737, 9-12MM AND 12-15MM BALLOON SWEEP TO CBD    INTERVENTIONAL RADIOLOGY PROCEDURE Bilateral 2024    Procedure: Carotid Cervical Angiogram;  Surgeon: Jamaal Saini MD;  [facility]: [facility];  Service: Interventional Radiology;  Laterality: Bilateral;             IRF OT ASSESSMENT FLOWSHEET (Last 12 Hours)       IRF OT Evaluation and Treatment       Row Name 10/21/24 1300          OT Time and Intention    Document Type daily treatment  -AH     Mode of Treatment individual therapy;occupational therapy  -      Patient Effort good  -[facility] Name 10/21/24 1300          General Information    Patient/Family/Caregiver Comments/Observations patient agreeable to therapy. patient tolerated therapy well with no complaints.  -     Existing Precautions/Restrictions fall  -       Row Name 10/21/24 1300          Cognition/Psychosocial    Affect/Mental Status (Cognition) [facility]  -     Orientation Status (Cognition) oriented to;person;place;situation  -     Follows Commands (Cognition) WFL  -       Row Name 10/21/24 1300          Chair-Bed Transfer    Chair-Bed [place] (Transfers) supervision;standby assist  -     Assistive Device (Chair-Bed Transfers) wheelchair  -       Row Name 10/21/24 1300          Motor Skills    Motor Skills coordination;functional endurance;neuro-muscular function  -     Neuromuscular Function left;upper extremity  PROM, AAROM, vibration, massage, gross grasp, NMES left wrist and digit extension, program 2. patient tolerated well with good response.  -     Therapeutic Exercise shoulder;elbow/forearm;wrist;hand  UE bike with left hand assisted, pushbox left hand assisted  -[facility] Name 10/21/24 1300          Positioning and Restraints    Post Treatment Position bed  -     In Bed sitting EOB;call light within reach;encouraged to call for assist  -               User Key  (r) = Recorded By, (t) = Taken By, (c) = Cosigned By      Initials Name Effective Dates     Laura Rome, OT 06/03/24 -                      Occupational Therapy Education       Title: PT OT SLP Therapies (In Progress)       Topic: Occupational Therapy (Not Started)       Point: ADL training (Not Started)       Description:   Instruct learner(s) on proper safety adaptation and remediation techniques during self care or transfers.   Instruct in proper use of assistive devices.                  Learner Progress:  Not documented in this visit.              Point: Home exercise program (Not Started)        Description:   Instruct learner(s) on appropriate technique for monitoring, assisting and/or progressing therapeutic exercises/activities.                  Learner Progress:  Not documented in this visit.              Point: Precautions (Not Started)       Description:   Instruct learner(s) on prescribed precautions during self-care and functional transfers.                  Learner Progress:  Not documented in this visit.              Point: Body mechanics (Not Started)       Description:   Instruct learner(s) on proper positioning and spine alignment during self-care, functional mobility activities and/or exercises.                  Learner Progress:  Not documented in this visit.                                        OT Recommendation and Plan    Planned Therapy Interventions (OT): activity tolerance training, adaptive equipment training, BADL retraining, neuromuscular control/coordination retraining, passive ROM/stretching, patient/caregiver education/training, ROM/therapeutic exercise, strengthening exercise, transfer/mobility retraining                    Time Calculation:      Time Calculation- OT       Row Name 10/21/24 1342             Time Calculation-     OT Start Time 0915  -      OT Stop Time 1045  -      OT Time Calculation (min) 90 min  -                User Key  (r) = Recorded By, (t) = Taken By, (c) = Cosigned By      Initials Name Provider Type     Laura Rome OT Occupational Therapist                  Therapy Charges for Today       Code Description Service Date Service Provider Modifiers Qty    80007603320  OT SELF CARE/MGMT/TRAIN EA 15 MIN 10/21/2024 Laura Rome OT GO 1    [id number]  OT NEUROMUSC RE EDUCATION EA 15 MIN 10/21/2024 Laura Rome OT GO 3    [id number]  OT ELEC STIM EA-PER 15 MIN 10/21/2024 Laura Rome OT GO 1    [id number]  OT THER PROC EA 15 MIN 10/21/2024 Laura Rome OT GO 1                     Laura  Pennie Rome, OT  10/21/2024

## 2024-10-21 NOTE — PLAN OF CARE
Problem: Rehabilitation (IRF) Plan of Care  Goal: Plan of Care Review  Outcome: Progressing  Flowsheets (Taken 10/20/2024 2251)  Progress: no change  Plan of Care Reviewed With: patient  Goal: Patient-Specific Goal (Individualized)  Outcome: Progressing  Goal: Absence of New-Onset Illness or Injury  Outcome: Progressing  Intervention: Identify and Manage Fall Risk  Recent Flowsheet Documentation  Taken 10/20/2024 2209 by Cris Mcneill, RN  Safety Promotion/Fall Prevention:   safety round/check completed   room organization consistent   nonskid shoes/slippers when out of bed   mobility aid in reach   fall prevention program maintained   elopement precautions   clutter free environment maintained   assistive device/personal items within reach  Taken 10/20/2024 2000 by Cris Mcneill RN  Safety Promotion/Fall Prevention:   safety round/check completed   room organization consistent   nonskid shoes/slippers when out of bed   mobility aid in reach   fall prevention program maintained   elopement precautions   clutter free environment maintained   assistive device/personal items within reach  Intervention: Prevent Infection  Recent Flowsheet Documentation  Taken 10/20/2024 2209 by Cris Mcneill, RN  Infection Prevention:   personal protective equipment utilized   rest/sleep promoted   single patient room provided  Taken 10/20/2024 2000 by Cris Mcneill, RN  Infection Prevention:   personal protective equipment utilized   rest/sleep promoted   single patient room provided  Goal: Optimal Comfort and Wellbeing  Outcome: Progressing  Intervention: Monitor Pain and Promote Comfort  Recent Flowsheet Documentation  Taken 10/20/2024 2058 by Cris Mcneill, RN  Pain Management Interventions:   pain management plan reviewed with patient/caregiver   pain medication given  Goal: Home and Community Transition Plan Established  Outcome: Progressing     Problem: Fall Injury  Risk  Goal: Absence of Fall and Fall-Related Injury  Outcome: Progressing  Intervention: Promote Injury-Free Environment  Recent Flowsheet Documentation  Taken 10/20/2024 2209 by Cris Mcneill, RN  Safety Promotion/Fall Prevention:   safety round/check completed   room organization consistent   nonskid shoes/slippers when out of bed   mobility aid in [facility]   fall prevention program maintained   elopement precautions   clutter free environment maintained   assistive device/personal items within reach  Taken 10/20/2024 2000 by Cris Mcneill, RN  Safety Promotion/Fall Prevention:   safety round/check completed   room organization consistent   nonskid shoes/slippers when out of bed   mobility aid in [facility]   fall prevention program maintained   elopement precautions   clutter free environment maintained   assistive device/personal items within reach   Goal Outcome Evaluation:  Plan of Care Reviewed With: patient        Progress: no change

## 2024-10-21 NOTE — PROGRESS NOTES
Rehabilitation Nursing  Inpatient Rehabilitation Plan of Care Note    Plan of Care  Copy from Ryan    Pain Management (Active)  Current Status (10/16/2024 3:52:00 PM): Potential for increased pain  Weekly Goal: optimal pain control  Discharge Goal: optimal pain control    Safety    Potential for Injury (Active)  Current Status (10/16/2024 3:52:00 PM): Potential for falls  Weekly Goal: No falls  Discharge Goal: No falls    Signed by: Bertha Fong RN

## 2024-10-21 NOTE — PROGRESS NOTES
Occupational Therapy:    Physical Therapy: Individual: 130 minutes.    Speech Language Pathology:    Signed by: Rakel Ramirez PTA

## 2024-10-22 ENCOUNTER — APPOINTMENT (OUTPATIENT)
Dept: ULTRASOUND IMAGING | Facility: HOSPITAL | Age: 54
DRG: 056 | End: 2024-10-22
Payer: MEDICAID

## 2024-10-22 ENCOUNTER — APPOINTMENT (OUTPATIENT)
Dept: GENERAL RADIOLOGY | Facility: HOSPITAL | Age: 54
DRG: 056 | End: 2024-10-22
Payer: MEDICAID

## 2024-10-22 LAB
ALBUMIN SERPL-MCNC: 3.8 G/DL (ref 3.5–5.2)
ALBUMIN/GLOB SERPL: 1.2 G/DL
ALP SERPL-CCNC: 207 U/L (ref 39–117)
ALT SERPL W P-5'-P-CCNC: 13 U/L (ref 1–33)
ANION GAP SERPL CALCULATED.3IONS-SCNC: 9.1 MMOL/L (ref 5–15)
AST SERPL-CCNC: 18 U/L (ref 1–32)
BASOPHILS # BLD AUTO: 0.06 10*3/MM3 (ref 0–0.2)
BASOPHILS NFR BLD AUTO: 0.6 % (ref 0–1.5)
BILIRUB SERPL-MCNC: 0.6 MG/DL (ref 0–1.2)
BUN SERPL-MCNC: 11 MG/DL (ref 6–20)
BUN/CREAT SERPL: 16.9 (ref 7–25)
CALCIUM SPEC-SCNC: 9.8 MG/DL (ref 8.6–10.5)
CHLORIDE SERPL-SCNC: 100 MMOL/L (ref 98–107)
CO2 SERPL-SCNC: 25.9 MMOL/L (ref 22–29)
CREAT SERPL-MCNC: 0.65 MG/DL (ref 0.57–1)
D DIMER PPP FEU-MCNC: 0.73 MCGFEU/ML (ref 0–0.54)
DEPRECATED RDW RBC AUTO: 40.3 FL (ref 37–54)
EGFRCR SERPLBLD CKD-EPI 2021: 104.8 ML/MIN/1.73
EOSINOPHIL # BLD AUTO: 0.18 10*3/MM3 (ref 0–0.4)
EOSINOPHIL NFR BLD AUTO: 1.8 % (ref 0.3–6.2)
ERYTHROCYTE [DISTWIDTH] IN BLOOD BY AUTOMATED COUNT: 11.8 % (ref 12.3–15.4)
GEN 5 2HR TROPONIN T REFLEX: <6 NG/L
GLOBULIN UR ELPH-MCNC: 3.2 GM/DL
GLUCOSE SERPL-MCNC: 131 MG/DL (ref 65–99)
HCT VFR BLD AUTO: 40.4 % (ref 34–46.6)
HGB BLD-MCNC: 13.2 G/DL (ref 12–15.9)
IMM GRANULOCYTES # BLD AUTO: 0.05 10*3/MM3 (ref 0–0.05)
IMM GRANULOCYTES NFR BLD AUTO: 0.5 % (ref 0–0.5)
LYMPHOCYTES # BLD AUTO: 1.69 10*3/MM3 (ref 0.7–3.1)
LYMPHOCYTES NFR BLD AUTO: 16.8 % (ref 19.6–45.3)
MCH RBC QN AUTO: 30.6 PG (ref 26.6–33)
MCHC RBC AUTO-ENTMCNC: 32.7 G/DL (ref 31.5–35.7)
MCV RBC AUTO: 93.5 FL (ref 79–97)
MONOCYTES # BLD AUTO: 0.5 10*3/MM3 (ref 0.1–0.9)
MONOCYTES NFR BLD AUTO: 5 % (ref 5–12)
NEUTROPHILS NFR BLD AUTO: 7.55 10*3/MM3 (ref 1.7–7)
NEUTROPHILS NFR BLD AUTO: 75.3 % (ref 42.7–76)
NRBC BLD AUTO-RTO: 0 /100 WBC (ref 0–0.2)
PLATELET # BLD AUTO: 301 10*3/MM3 (ref 140–450)
PMV BLD AUTO: 9.4 FL (ref 6–12)
POTASSIUM SERPL-SCNC: 4.3 MMOL/L (ref 3.5–5.2)
PROT SERPL-MCNC: 7 G/DL (ref 6–8.5)
QT INTERVAL: 344 MS
QTC INTERVAL: 418 MS
RBC # BLD AUTO: 4.32 10*6/MM3 (ref 3.77–5.28)
SODIUM SERPL-SCNC: 135 MMOL/L (ref 136–145)
TROPONIN T DELTA: NORMAL
TROPONIN T SERPL HS-MCNC: <6 NG/L
WBC NRBC COR # BLD AUTO: 10.03 10*3/MM3 (ref 3.4–10.8)

## 2024-10-22 PROCEDURE — 99222 1ST HOSP IP/OBS MODERATE 55: CPT | Performed by: SPECIALIST

## 2024-10-22 PROCEDURE — 97112 NEUROMUSCULAR REEDUCATION: CPT | Performed by: OCCUPATIONAL THERAPIST

## 2024-10-22 PROCEDURE — 97116 GAIT TRAINING THERAPY: CPT

## 2024-10-22 PROCEDURE — 85025 COMPLETE CBC W/AUTO DIFF WBC: CPT | Performed by: INTERNAL MEDICINE

## 2024-10-22 PROCEDURE — 97535 SELF CARE MNGMENT TRAINING: CPT | Performed by: OCCUPATIONAL THERAPIST

## 2024-10-22 PROCEDURE — 80053 COMPREHEN METABOLIC PANEL: CPT | Performed by: INTERNAL MEDICINE

## 2024-10-22 PROCEDURE — 97032 APPL MODALITY 1+ESTIM EA 15: CPT | Performed by: OCCUPATIONAL THERAPIST

## 2024-10-22 PROCEDURE — 71045 X-RAY EXAM CHEST 1 VIEW: CPT | Performed by: RADIOLOGY

## 2024-10-22 PROCEDURE — 84484 ASSAY OF TROPONIN QUANT: CPT | Performed by: INTERNAL MEDICINE

## 2024-10-22 PROCEDURE — 85379 FIBRIN DEGRADATION QUANT: CPT | Performed by: INTERNAL MEDICINE

## 2024-10-22 PROCEDURE — 97112 NEUROMUSCULAR REEDUCATION: CPT

## 2024-10-22 PROCEDURE — 93970 EXTREMITY STUDY: CPT

## 2024-10-22 PROCEDURE — 99232 SBSQ HOSP IP/OBS MODERATE 35: CPT | Performed by: INTERNAL MEDICINE

## 2024-10-22 PROCEDURE — 25010000002 ENOXAPARIN PER 10 MG: Performed by: FAMILY MEDICINE

## 2024-10-22 PROCEDURE — 93005 ELECTROCARDIOGRAM TRACING: CPT | Performed by: INTERNAL MEDICINE

## 2024-10-22 PROCEDURE — 97110 THERAPEUTIC EXERCISES: CPT

## 2024-10-22 PROCEDURE — 71045 X-RAY EXAM CHEST 1 VIEW: CPT

## 2024-10-22 PROCEDURE — 93970 EXTREMITY STUDY: CPT | Performed by: RADIOLOGY

## 2024-10-22 RX ADMIN — NYSTATIN 1 APPLICATION: 100000 CREAM TOPICAL at 10:00

## 2024-10-22 RX ADMIN — LISINOPRIL 20 MG: 10 TABLET ORAL at 09:40

## 2024-10-22 RX ADMIN — FOLIC ACID 1 MG: 1 TABLET ORAL at 09:40

## 2024-10-22 RX ADMIN — HYDROXYZINE HYDROCHLORIDE 25 MG: 25 TABLET ORAL at 20:47

## 2024-10-22 RX ADMIN — SENNOSIDES AND DOCUSATE SODIUM 2 TABLET: 50; 8.6 TABLET ORAL at 09:40

## 2024-10-22 RX ADMIN — TRAZODONE HYDROCHLORIDE 50 MG: 50 TABLET ORAL at 20:44

## 2024-10-22 RX ADMIN — POLYETHYLENE GLYCOL (3350) 17 G: 17 POWDER, FOR SOLUTION ORAL at 09:42

## 2024-10-22 RX ADMIN — Medication 100 MG: at 09:40

## 2024-10-22 RX ADMIN — TOPIRAMATE 25 MG: 25 TABLET, FILM COATED ORAL at 20:45

## 2024-10-22 RX ADMIN — LANSOPRAZOLE 30 MG: 30 TABLET, ORALLY DISINTEGRATING ORAL at 05:58

## 2024-10-22 RX ADMIN — TICAGRELOR 60 MG: 60 TABLET ORAL at 09:40

## 2024-10-22 RX ADMIN — TICAGRELOR 60 MG: 60 TABLET ORAL at 20:45

## 2024-10-22 RX ADMIN — SENNOSIDES AND DOCUSATE SODIUM 2 TABLET: 50; 8.6 TABLET ORAL at 20:44

## 2024-10-22 RX ADMIN — HYDROCODONE BITARTRATE AND ACETAMINOPHEN 1 TABLET: 5; 325 TABLET ORAL at 02:41

## 2024-10-22 RX ADMIN — URSODIOL 300 MG: 300 CAPSULE ORAL at 20:44

## 2024-10-22 RX ADMIN — NICOTINE 1 PATCH: 21 PATCH TRANSDERMAL at 09:39

## 2024-10-22 RX ADMIN — ENOXAPARIN SODIUM 40 MG: 100 INJECTION SUBCUTANEOUS at 10:04

## 2024-10-22 RX ADMIN — URSODIOL 300 MG: 300 CAPSULE ORAL at 09:40

## 2024-10-22 RX ADMIN — FLUTICASONE PROPIONATE 2 SPRAY: 50 SPRAY, METERED NASAL at 09:41

## 2024-10-22 RX ADMIN — NYSTATIN 1 APPLICATION: 100000 CREAM TOPICAL at 20:45

## 2024-10-22 RX ADMIN — Medication 400 MG: at 09:40

## 2024-10-22 RX ADMIN — ASPIRIN 81 MG: 81 TABLET, COATED ORAL at 09:40

## 2024-10-22 RX ADMIN — ATORVASTATIN CALCIUM 80 MG: 40 TABLET, FILM COATED ORAL at 20:44

## 2024-10-22 RX ADMIN — HYDROCODONE BITARTRATE AND ACETAMINOPHEN 1 TABLET: 5; 325 TABLET ORAL at 13:49

## 2024-10-22 NOTE — PLAN OF CARE
Problem: Rehabilitation (IRF) Plan of Care  Goal: Plan of Care Review  Outcome: Progressing  Flowsheets (Taken 10/22/2024 0012)  Progress: improving  Plan of Care Reviewed With: patient  Goal: Patient-Specific Goal (Individualized)  Outcome: Progressing  Goal: Absence of New-Onset Illness or Injury  Outcome: Progressing  Intervention: Identify and Manage Fall Risk  Description: Perform standard risk assessment on admission using a validated tool or comprehensive approach appropriate to the patient; reassess fall risk frequently, with change in status or transfer to another level of care.  Communicate risk to interprofessional healthcare team; ensure fall risk visible cue.  Determine need for increased observation, equipment and environmental modification, as well as use of supportive, nonskid footwear.  Adjust safety measures to individual needs and identified risk factors.  Reinforce the importance of active participation with fall risk prevention, safety and physical activity with the patient and family.  Perform regular intentional rounding to assess need for position change, pain management, attention to personal needs and assistance with toileting.  Recent Flowsheet Documentation  Taken 10/22/2024 0000 by Libby Poole RN  Safety Promotion/Fall Prevention:   nonskid shoes/slippers when out of bed   safety round/check completed  Taken 10/21/2024 2200 by Libby Poole RN  Safety Promotion/Fall Prevention:   nonskid shoes/slippers when out of bed   safety round/check completed  Taken 10/21/2024 2000 by Libby Poole RN  Safety Promotion/Fall Prevention:   nonskid shoes/slippers when out of bed   safety round/check completed  Goal: Optimal Comfort and Wellbeing  Outcome: Progressing  Goal: Home and Community Transition Plan Established  Outcome: Progressing   Goal Outcome Evaluation:  Plan of Care Reviewed With: patient        Progress: improving

## 2024-10-22 NOTE — PROGRESS NOTES
Occupational Therapy:    Physical Therapy:    Speech Language Pathology: Individual: 55 minutes.    Signed by: DOREEN Wilson

## 2024-10-22 NOTE — SIGNIFICANT NOTE
10/22/24 1324   Plan   Plan Left message for [facility] Outpatient Therapy [phone].

## 2024-10-22 NOTE — SIGNIFICANT NOTE
10/22/24 0855   Plan   Plan Team conference held today.  Spoke to pt about plans for discharge on 10-23-24 and recommendations for outpatient PT/OT.  Pt prefers [facility].  DME is not recommended.  Pt will return home with spouse at discharge.   Post Acute Provider List Outpatient Therapy   Delivered To Patient   Method of Delivery In person

## 2024-10-22 NOTE — THERAPY TREATMENT NOTE
Inpatient Rehabilitation - Physical Therapy Treatment Note        Geoff     Patient Name: Dalila Pike  : 1970  MRN: [id number]    Today's Date: 10/22/2024                    Admit Date: 10/16/2024      Visit Dx:   No diagnosis found.    Patient Active Problem List   Diagnosis    Acute CVA (cerebrovascular accident)    Alcohol use    Tobacco use    Obesity (BMI 30-39.9)    HTN (hypertension)    Dyslipidemia    History of seizures    PAD (peripheral artery disease)    Oropharyngeal dysphagia    Acute UTI (urinary tract infection)    Hypertensive emergency    Single episode of loss of consciousness without head trauma    RUQ pain    Dilated cbd, acquired    Cholangitis    Debility       Past Medical History:   Diagnosis Date    Acute CVA (cerebrovascular accident) 2024    Alcohol use 2024    Dyslipidemia 2024    Obesity (BMI 30-39.9) 2024    PAD (peripheral artery disease) 2024    Tobacco use 2024       Past Surgical History:   Procedure Laterality Date    ENDOSCOPY N/A 10/8/2024    Procedure: ESOPHAGOGASTRODUODENOSCOPY;  Surgeon: Elie Sanders MD;  Location:  [facility];  Service: Gastroenterology;  Laterality: N/A;    ERCP N/A 10/8/2024    Procedure: ENDOSCOPIC RETROGRADE CHOLANGIOPANCREATOGRAPHY WITH STENT PLACEMENT;  Surgeon: Elie Sanders MD;  Location:  [facility];  Service: Gastroenterology;  Laterality: N/A;  SPHINCTEROTOMY @ 1737, 9-12MM AND 12-15MM BALLOON SWEEP TO CBD    INTERVENTIONAL RADIOLOGY PROCEDURE Bilateral 2024    Procedure: Carotid Cervical Angiogram;  Surgeon: Jamaal Saini MD;  Location:  [facility];  Service: Interventional Radiology;  Laterality: Bilateral;       PT ASSESSMENT (Last 12 Hours)       IRF PT Evaluation and Treatment       Row Name 10/22/24 1310          PT Time and Intention    Document Type daily treatment  BID treatment session  -LL     Mode of Treatment individual therapy;physical therapy   -LL     Patient/Family/Caregiver Comments/Observations During AM session, patient c/o chest pain.  Patient returned to bed per MD instruction.  MD notified PTA when patient was medically cleared to continue therapy.  CAM boot donned on L LE during therapy.  -LL       Row Name 10/22/24 1310          General Information    Patient Profile Reviewed yes  -LL     Existing Precautions/Restrictions fall  -LL       Row Name 10/22/24 1310          Cognition/Psychosocial    Affect/Mental Status (Cognition) WFL  -LL     Orientation Status (Cognition) oriented x 3  -LL     Follows Commands (Cognition) WFL  -LL     Personal Safety Interventions nonskid shoes/slippers when out of bed  -LL     Cognitive Function (Cognition) WFL  -LL       Row Name 10/22/24 1310          Mobility    Extremity Weight-bearing Status left lower extremity  -LL     Left Lower Extremity (Weight-bearing Status) weight-bearing as tolerated (WBAT);other (see comments)  CAM boot  -LL       Row Name 10/22/24 1310          Bed Mobility    Bed Mobility bed mobility (all) activities  -LL     All Activities, [place] (Bed Mobility) modified independence  -LL       Row Name 10/22/24 1310          Transfer Assessment/Treatment    Transfers sit-stand transfer;stand-sit transfer;bed-chair transfer;chair-bed transfer  -LL       Row Name 10/22/24 1310          Bed-Chair Transfer    Bed-Chair [place] (Transfers) modified independence  -LL       Row Name 10/22/24 1310          Chair-Bed Transfer    Chair-Bed [place] (Transfers) modified independence  -LL       Row Name 10/22/24 1310          Sit-Stand Transfer    Sit-Stand [place] (Transfers) modified independence  -LL       Row Name 10/22/24 1310          Stand-Sit Transfer    Stand-Sit [place] (Transfers) modified independence  -LL       Row Name 10/22/24 1310          Gait/Stairs (Locomotion)    Gait/Stairs Locomotion gait/ambulation independence;distance ambulated  -LL     [place] Level  (Gait) modified independence  -LL     Distance in Feet (Gait) --  120' in AM; 320' in PM  -LL     Pattern (Gait) step-through  -LL     Deviations/Abnormal Patterns (Gait) antalgic;gait speed decreased  -LL       Row Name 10/22/24 1310          Safety Issues/Impairments Affecting Functional Mobility    Impairments Affecting Function (Mobility) balance;endurance/activity tolerance;pain  -LL       Row Name 10/22/24 1310          Balance    Comment, Balance Weaving in/out of cones; pickup of cones from floor MI/S  -LL       Row Name 10/22/24 1310          Hip (Therapeutic Exercise)    Hip Strengthening (Therapeutic Exercise) bilateral;flexion;extension;aBduction;aDduction;marching while seated;sitting;resistance band;blue  2.5#  -LL       Row Name 10/22/24 1310          Knee (Therapeutic Exercise)    Knee Strengthening (Therapeutic Exercise) bilateral;flexion;extension;marching while seated;LAQ (long arc quad);hamstring curls;sitting;resistance band;blue  2.5#  -LL       Row Name 10/22/24 1310          Positioning and Restraints    Pre-Treatment Position --  WC w/ OT in AM; bed in PM  -LL     Post Treatment Position bed  -LL     In Bed fowlers;call light within reach;encouraged to call for assist;side rails up x2  Spouse present in PM  -LL               User Key  (r) = Recorded By, (t) = Taken By, (c) = Cosigned By      Initials Name Provider Type    LL Rakel Ramirez PTA Physical Therapist Assistant                  Wound 10/08/24 2200 Left upper flank (Active)   Closure Open to air 10/21/24 2030   Base moist;red 10/21/24 2030     Physical Therapy Education       Title: PT OT SLP Therapies (In Progress)       Topic: Physical Therapy (Done)       Point: Mobility training (Done)       Learning Progress Summary            Patient Acceptance, E,D, VU by LL at 10/22/2024 1313    Acceptance, E,D, VU by LL at 10/21/2024 1441    Acceptance, E,D, VU,NR by LL at 10/18/2024 1249    Acceptance, E, VU,NR by LB at 10/17/2024 1229    Significant Other Acceptance, E,D, VU by LL at 10/22/2024 1313                      Point: Home exercise program (Done)       Learning Progress Summary            Patient Acceptance, E,D, VU by LL at 10/22/2024 1313    Acceptance, E,D, VU by LL at 10/21/2024 1441    Acceptance, E,D, VU,NR by LL at 10/18/2024 1249    Acceptance, E, VU,NR by LB at 10/17/2024 1229   Significant Other Acceptance, E,D, VU by LL at 10/22/2024 1313                      Point: Body mechanics (Done)       Learning Progress Summary            Patient Acceptance, E,D, VU by LL at 10/22/2024 1313    Acceptance, E,D, VU by LL at 10/21/2024 1441    Acceptance, E,D, VU,NR by LL at 10/18/2024 1249    Acceptance, E, VU,NR by LB at 10/17/2024 1229   Significant Other Acceptance, E,D, VU by LL at 10/22/2024 1313                      Point: Precautions (Done)       Learning Progress Summary            Patient Acceptance, E,D, VU by LL at 10/22/2024 1313    Acceptance, E,D, VU by LL at 10/21/2024 1441    Acceptance, E,D, VU,NR by LL at 10/18/2024 1249    Acceptance, E, VU,NR by LB at 10/17/2024 1229   Significant Other Acceptance, E,D, VU by LL at 10/22/2024 1313                                      User Key       Initials Effective Dates Name Provider Type Discipline     06/16/21 -  Ruth Pastor, PT Physical Therapist PT     05/02/16 -  Rakel Ramirez PTA Physical Therapist Assistant PT                    PT Recommendation and Plan    Frequency of Treatment (PT): 5 times per week  Anticipated Equipment Needs at Discharge (PT Eval):  (tbd)                  Time Calculation:      PT Charges       Row Name 10/22/24 1315 10/22/24 1314          Time Calculation    Start Time 1225  -LL 0915  -LL     Stop Time 1310  -LL 0955  -LL     Time Calculation (min) 45 min  -LL 40 min  -LL     PT Received On -- 10/22/24  -LL        Time Calculation- PT    Total Timed Code Minutes- PT 45 minute(s)  -LL 40 minute(s)  -LL               User Key  (r) =  Recorded By, (t) = Taken By, (c) = Cosigned By      Initials Name Provider Type     Rakel Ramirez PTA Physical Therapist Assistant                    Therapy Charges for Today       Code Description Service Date Service Provider Modifiers Qty    59597995007 HC GAIT TRAINING EA 15 MIN 10/21/2024 Rakel Ramirez, PTA GP 3    39868373547 HC PT NEUROMUSC RE EDUCATION EA 15 MIN 10/21/2024 Rakel Ramirez, PTA GP 3    27932162236 HC PT THERAPEUTIC ACT EA 15 MIN 10/21/2024 Rakel Ramirez, PTA GP 1    41170899743 HC PT THER PROC EA 15 MIN 10/21/2024 Rakel Ramirez, PTA GP 2    17178903473 HC GAIT TRAINING EA 15 MIN 10/22/2024 Rakel Ramirez, TIGIST GP, CQ 2    06596502057 HC PT NEUROMUSC RE EDUCATION EA 15 MIN 10/22/2024 Rakel Ramirez, TIGIST GP, CQ 3    25819299416 HC PT THER PROC EA 15 MIN 10/22/2024 Rakel Ramirez, TIGIST GP, CQ 1              PT G-Codes  AM-PAC 6 Clicks Score (PT): 20      Rakel. TIGIST Ramirez  10/22/2024

## 2024-10-22 NOTE — PROGRESS NOTES
Occupational Therapy:    Physical Therapy: Individual: 85 minutes.    Speech Language Pathology:    Signed by: Rakel Ramirez PTA

## 2024-10-22 NOTE — PROGRESS NOTES
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 non-dominant side  S82.892A Other fracture of left lower leg, initial encounter for closed fracture  I10 Essential (primary) hypertension  I73.9 Peripheral vascular disease, unspecified  E66.9 Obesity, unspecified  F17.210 Nicotine dependence, cigarettes, uncomplicated  E78.5 Hyperlipidemia, unspecified  K21.9 Gastro-esophageal reflux disease without esophagitis  R53.1 Weakness      Plan of Care  Anticipated Discharge Date/Estimated Length of Stay: 14 days  Anticipated Discharge Destination: Community discharge with assistance  Discharge Plan : Pt plans to return home with spouse providing assistance at  discharge.  Medical Necessity Expected Level Rationale: good  Intensity and Duration: an average of 3 hours/5 days per week  Medical Supervision and 24 Hour Rehab Nursing: x  Physical Therapy: x  PT Intensity/Duration: PT 1.5 hours per day/5 days per week  Occupational Therapy: x  OT Intensity/Duration: OT 1.5 hours per day/5 days per week  Social Work: x  Therapeutic Recreation: x  Updated (if changes indicated)    Anticipated Discharge Date/Estimated Length of Stay:   10-23-24      Discharge Plan of Care: Home Health Services Physical Therapy strengthening,  endurance, gait training, transfer training, balance, therapeutic exercise,  neuro re-education, coordination, steps/stairs 3 times per week for 4 weeks  Occupational Therapy home safety, strengthening, ROM, coordination, neuro  re-education 3 times per week for 4  weeks    Based on the patient's medical and functional status, their prognosis and  expected level of functional improvement is: good      Interdisciplinary Problem/Goals/Status  Pain    [RN] Pain Management(Active)  Current Status(10/16/2024): Potential for increased pain  Weekly Goal(10/30/2024): optimal pain control  Discharge Goal: optimal pain control        Safety    [RN] Potential for Injury(Active)  Current Status(10/16/2024): Potential for falls  Weekly Goal(10/30/2024): No falls  Discharge Goal: No falls        Self Care    [OT] Dressing (Lower)(Active)  Current Status(10/17/2024): max/mod  Weekly Goal(10/24/2024): mod/min  Discharge Goal: sup/set up        Mobility    [PT] Bed/Chair/Wheelchair(Active)  Current Status(10/17/2024): Sup  Weekly Goal(10/24/2024): MI  Discharge Goal: MI    [PT] Walk(Active)  Current Status(10/17/2024): amb 300' no AD Sup  Weekly Goal(10/24/2024): amb 300' AAD MI  Discharge Goal: amb 300' AAD MI    Comments: Pt plans to return home with spouse at discharge.    Signed by: NEVIN Gonzalez    Physician CoSigned By: Stephon Lange 10/22/2024 15:17:36

## 2024-10-22 NOTE — PLAN OF CARE
Goal Outcome Evaluation:  Plan of Care Reviewed With: patient        Progress: improving  Outcome Summary: PROGRESSING WITH CURRENT THERAPIES; SLING LEFT ARM; CONTINUE PLAN OF CARE; MONITOR; TO BE NPO AFTER MN TONIGHT; NO CAFFEINE AFTER 7PM TONIGHT

## 2024-10-22 NOTE — CONSULTS
[facility]  CONSULT  NOTE      Patient information:  Date of Admit: 10/16/2024  Date of Consult: 10/22/24  Hospitalist/Referring MD:Ti Kaur MD;   PCP: Agnes Ewing APRN  MRN:  [id number]  Visit Number:  [id number]    LOS: 6  CODE STATUS:  Code Status and Medical Interventions: CPR (Attempt to Resuscitate); Full Support   Ordered at: 10/16/24 1615     Level Of Support Discussed With:    Patient     Code Status (Patient has no pulse and is not breathing):    CPR (Attempt to Resuscitate)     Medical Interventions (Patient has pulse or is breathing):    Full Support       PROBLEM LIST: Principal Problem:    Debility      Inpatient Cardiology Consult  Consult performed by: Latanya Gay APRN  Consult ordered by: Stephon Lange MD        10:59 EDT  10/22/2024    Reason for Cardiology consultation:chest pain     General Cardiology Consulting Physician: Dr. Sonali Andres MD    Primary Cardiologist: Dr. Alfaro    Assessment    Intermittent chest pain, with negative troponin and unremarkable EKG  History of severe contrast allergy  Recent CVA with thrombectomy on the right MCA territory  Coronary artery disease s/p PCI of the right carotid  Ongoing tobacco abuse  Essential hypertension  Dyslipidemia  PAD          Planning   1.  Will plan for stress testing in a.m. for assessment of ischemia  2.  Strongly advised to quit smoking  3.  Echocardiogram with normal systolic function  4.  Blood pressure controlled continue current management          Subjective Data     10/22/2024    ADMISSION INFORMATION:  No chief complaint on file.    History of Present Illness    Dalila Pike is a 54 y.o. female with a past medical history significant for severe contrast dye allergy, hypertension, dyslipidemia, peripheral arterial disease, obesity tobacco abuse, history of ethanol use, history of right MCA territory ischemic stroke with thrombectomy and right ICA stent placement in  August with residual weakness.     Cardiology has been consulted for further evaluation and management for chest pain with risk factors including smoking, hypertension, family history, and CVA in August 2024..     Primary Cardiologist has been Dr. Alfaro.     Patient was in room  B when she was seen and examined by Dr. Andres.  Patient is lying in bed resting quietly.  No acute distress noted at this time.  Patient reports she did have chest pain in the past and had a stress test done at  1 to 2 years ago and it was okay.  She reports this morning she did have a sudden onset of chest pain that lasted about 10 minutes that resolved with rest.  She reports she has only had 2 cigarettes since she had her stroke.  Patient currently denies any chest pain at this time.  Patient reports a severe contrast allergy dye with swelling of throat, shortness of breath, and rash.  Discussed further evaluation with a Hina stress test in a.m. and patient has agreed to proceed with this.    ORDERS:  N.p.o. after midnight 10/22/2024, Tuesday   No caffeine after 7 PM (1900) 10/22/2024, Tuesday   Hina stress test 10/23/2024, Wednesday       Personal History     Cardiac risk factors:cerebral vascular disease, hypercholesterolemia, hypertension, smoking, and Positive family Hx. of premature athersclerotivc disease.      Last Echo: Results for orders placed during the hospital encounter of 08/21/24    Adult Transthoracic Echo Complete W/ Cont if Necessary Per Protocol (With Agitated Saline)    Interpretation Summary    Left ventricular systolic function is normal. Calculated left ventricular EF = 68% Normal left ventricular cavity size noted. Left ventricular wall thickness is consistent with moderate concentric hypertrophy. All left ventricular wall segments contract normally. Left ventricular diastolic function was normal. Normal left atrial pressure.    The right ventricular cavity is mildly dilated. Normal right ventricular  systolic function noted.    Left atrial volume is moderately increased. Saline test results are negative.    The aortic valve is structurally normal with no stenosis present. The aortic valve appears trileaflet. No significant aortic valve regurgitation is present.    The mitral valve is structurally normal with no significant stenosis present. Trace to mild mitral valve regurgitation is present.    Mild tricuspid valve regurgitation is present. Estimated right ventricular systolic pressure from tricuspid regurgitation is mildly elevated (35-45 mmHg)    Mild dilation of the ascending aorta is present. Ascending aorta = 3.7 cm         Last Stress:       Last Cath:                           Past Medical History:   Diagnosis Date    Acute CVA (cerebrovascular accident) 08/21/2024    Alcohol use 08/21/2024    Dyslipidemia 08/21/2024    Obesity (BMI 30-39.9) 08/21/2024    PAD (peripheral artery disease) 08/21/2024    Tobacco use 08/21/2024     Past Surgical History:   Procedure Laterality Date    ENDOSCOPY N/A 10/8/2024    Procedure: ESOPHAGOGASTRODUODENOSCOPY;  Surgeon: Elie Sanders MD;  Location: [facility];  Service: Gastroenterology;  Laterality: N/A;    ERCP N/A 10/8/2024    Procedure: ENDOSCOPIC RETROGRADE CHOLANGIOPANCREATOGRAPHY WITH STENT PLACEMENT;  Surgeon: Elie Sanders MD;  Location: [facility];  Service: Gastroenterology;  Laterality: N/A;  SPHINCTEROTOMY @ 1737, 9-12MM AND 12-15MM BALLOON SWEEP TO CBD    INTERVENTIONAL RADIOLOGY PROCEDURE Bilateral 8/21/2024    Procedure: Carotid Cervical Angiogram;  Surgeon: Jamaal Saini MD;  Location: [facility]ASIVE LOCATION;  Service: Interventional Radiology;  Laterality: Bilateral;     History reviewed. No pertinent family history.  Social History     Tobacco Use    Smoking status: Every Day     Current packs/day: 0.50     Average packs/day: 2.0 packs/day for 38.8 years (76.4 ttl pk-yrs)     Types: Cigarettes     Start date: 2024     Passive  exposure: Current    Smokeless tobacco: Never    Tobacco comments:     4-5 a day   Vaping Use    Vaping status: Never Used   Substance Use Topics    Alcohol use: Not Currently    Drug use: Not Currently     ALLERGIES: Erythromycin, Latex, Toradol [ketorolac tromethamine], and Contrast dye (echo or unknown ct/mr)    Medications listed below are reported home medications pulling from within the system:  Medications Prior to Admission   Medication Sig Dispense Refill Last Dose/Taking    aspirin 81 MG EC tablet Take 1 tablet by mouth Daily.   Unknown    atorvastatin (LIPITOR) 80 MG tablet Take 1 tablet by mouth Daily.   Unknown    cholecalciferol (VITAMIN D3) 1.25 MG (17252 UT) capsule Take 1 capsule by mouth Every 7 (Seven) Days.   Unknown    folic acid (FOLVITE) 1 MG tablet Take 1 tablet by mouth Daily.   Unknown    guaiFENesin (MUCINEX) 600 MG 12 hr tablet Take 1 tablet by mouth 2 (Two) Times a Day As Needed for Cough or Congestion.   Unknown    lisinopril (PRINIVIL,ZESTRIL) 10 MG tablet Take 1 tablet by mouth Daily.   Unknown    oxyCODONE (ROXICODONE) 5 MG immediate release tablet Take 1 tablet by mouth Daily As Needed for Moderate Pain or Severe Pain.   Unknown    pantoprazole (PROTONIX) 40 MG EC tablet Take 1 tablet by mouth Daily.   Unknown    thiamine (VITAMIN B1) 100 MG tablet Take 1 tablet by mouth Daily.   Unknown    ticagrelor (Brilinta) 60 MG tablet tablet Take 1 tablet by mouth 2 (Two) Times a Day.   Unknown    topiramate (TOPAMAX) 25 MG tablet Take 1 tablet by mouth Every Night.   Unknown    traMADol (ULTRAM) 50 MG tablet Take 1 tablet by mouth Every Night.   Unknown       Review of Systems   Constitutional:  Negative for activity change, appetite change and diaphoresis.   HENT:  Negative for facial swelling and trouble swallowing.    Eyes:  Negative for visual disturbance.   Respiratory:  Negative for shortness of breath.    Cardiovascular:  Positive for chest pain. Negative for palpitations and leg  swelling.   Gastrointestinal:  Negative for blood in stool, nausea and vomiting.   Endocrine: Negative.    Genitourinary:  Negative for hematuria.   Musculoskeletal:  Negative for gait problem.   Skin:  Negative for color change.   Neurological:  Negative for dizziness, syncope, speech difficulty, weakness, light-headedness and headaches.   Psychiatric/Behavioral:  Negative for agitation and behavioral problems.      Objective Data   Vital Signs  Temp:  [97.6 °F (36.4 °C)-98 °F (36.7 °C)] 97.6 °F (36.4 °C)  Heart Rate:  [] 108  Resp:  [16-18] 16  BP: (118-153)/(78-90) 142/90  Device (Oxygen Therapy): room air  Vital Signs (last 72 hrs)         10/19 0700  10/20 0659 10/20 0700  10/21 0659 10/21 0700  10/22 0659 10/22 0700  10/22 1059   Most Recent      Temp (°F) 98.1 -  98.5    97.3 -  98.1    97.7 -  98      97.6     97.6 (36.4) 10/22 0700    Heart Rate 67 -  83    73 -  89    84 -  86    71 -  108     108 10/22 0940    Resp 16 -  17    16 -  20    16 -  18      16     16 10/22 0700    /71 -  114/78    102/64 -  130/88    107/76 -  118/78    142/90 -  153/89     142/90 10/22 0940    SpO2 (%) 97 -  98    96 -  97      98      99     99 10/22 0700          BMI:   Body mass index is 34.21 kg/m².  WEIGHT:  Wt Readings from Last 3 Encounters:   10/16/24 102 kg (225 lb)   10/08/24 110 kg (243 lb)   09/19/24 110 kg (243 lb)     DIET:  Diet: Cardiac; Healthy Heart (2-3 Na+); Texture: Regular (IDDSI 7); Fluid Consistency: Thin (IDDSI 0)  I&O:  Intake & Output (last 3 days)         10/19 0701  10/20 0700 10/20 0701  10/21 0700 10/21 0701  10/22 0700 10/22 0701  10/23 0700    P.O. 1440 1680 1920 240    Total Intake(mL/kg) 1440 (14.1) 1680 (16.5) 1920 (18.8) 240 (2.4)    Net +1440 +1680 +1920 +240            Urine Unmeasured Occurrence 4 x 5 x 6 x 1 x    Stool Unmeasured Occurrence  1 x            Physical Exam  Constitutional:       Appearance: Normal appearance.   HENT:      Head: Normocephalic and atraumatic.       Nose: Nose normal.      Mouth/Throat:      Mouth: Mucous membranes are moist.      Pharynx: Oropharynx is clear.   Eyes:      Conjunctiva/sclera: Conjunctivae normal.   Cardiovascular:      Rate and Rhythm: Normal rate and regular rhythm.      Pulses: Normal pulses.      Heart sounds: Normal heart sounds.   Pulmonary:      Effort: Pulmonary effort is normal.      Breath sounds: Normal breath sounds.   Abdominal:      General: Bowel sounds are normal.      Palpations: Abdomen is soft.   Musculoskeletal:         General: Normal range of motion.      Cervical back: Normal range of motion.   Skin:     General: Skin is warm and dry.   Neurological:      General: No focal deficit present.      Mental Status: She is alert and oriented to person, place, and time.   Psychiatric:         Mood and Affect: Mood normal.         Behavior: Behavior normal.       Results review   Results Review:    I have reviewed the patient's new clinical results. 10/22/24 10:59 EDT    Results from last 7 days   Lab Units 10/22/24  1009   HSTROP T ng/L <6     Lab Results   Component Value Date    PROBNP 269.4 08/22/2024     Results from last 7 days   Lab Units 10/22/24  1009 10/17/24  0344   WBC 10*3/mm3 10.03 8.51   HEMOGLOBIN g/dL 13.2 13.1   PLATELETS 10*3/mm3 301 339     Results from last 7 days   Lab Units 10/22/24  1009 10/17/24  0344   SODIUM mmol/L 135* 140   POTASSIUM mmol/L 4.3 3.8   CHLORIDE mmol/L 100 104   CO2 mmol/L 25.9 24.0   BUN mg/dL 11 9   CREATININE mg/dL 0.65 0.76   CALCIUM mg/dL 9.8 9.9   GLUCOSE mg/dL 131* 126*   ALT (SGPT) U/L 13 13   AST (SGOT) U/L 18 18     Lab Results   Component Value Date    MG 2.4 10/17/2024    MG 2.2 10/12/2024    MG 2.3 08/27/2024     Lab Results   Component Value Date    CHOL 144 10/08/2024    TRIG 130 10/08/2024    HDL 46 10/08/2024    LDL 75 10/08/2024     Estimated Creatinine Clearance: 123.6 mL/min (by C-G formula based on SCr of 0.65 mg/dL).  Lab Results   Component Value Date    HGBA1C  "5.50 10/08/2024    HGBA1C 5.50 08/22/2024     Lab Results   Component Value Date    INR 0.9 06/26/2021     No results found for: \"LABHEPA\"  No components found for: \"DIG\"  Lab Results   Component Value Date    TSH 0.447 10/08/2024      No results found for: \"URICACID\"  Pain Management Panel  More data may exist         Latest Ref Rng & Units 10/7/2024 8/21/2024   Pain Management Panel   Amphetamine, Urine Qual Negative Negative  Negative    Barbiturates Screen, Urine Negative Negative  Negative    Benzodiazepine Screen, Urine Negative Negative  Negative    Buprenorphine, Screen, Urine Negative Negative  Negative    Cocaine Screen, Urine Negative Negative  Negative    Fentanyl, Urine Negative Negative  Negative    Methadone Screen , Urine Negative Negative  Negative    Methamphetamine, Ur Negative Negative  Positive       Details                 Microbiology Results (last 10 days)       Procedure Component Value - Date/Time    COVID PRE-OP / PRE-PROCEDURE SCREENING ORDER (NO ISOLATION) - Swab, Nasopharynx [[id number]]  (Normal) Collected: 10/18/24 1534    Lab Status: Final result Specimen: Swab from Nasopharynx Updated: 10/18/24 1635    Narrative:      The following orders were created for panel order COVID PRE-OP / PRE-PROCEDURE SCREENING ORDER (NO ISOLATION) - Swab, Nasopharynx.  Procedure                               Abnormality         Status                     ---------                               -----------         ------                     COVID-19 and FLU A/B PCR...[[id number]]  Normal              Final result                 Please view results for these tests on the individual orders.    COVID-19 and FLU A/B PCR, 1 HR TAT - Swab, Nasopharynx [[id number]]  (Normal) Collected: 10/18/24 1534    Lab Status: Final result Specimen: Swab from Nasopharynx Updated: 10/18/24 1635     COVID19 Not Detected     Influenza A PCR Not Detected     Influenza B PCR Not Detected    Narrative:      Fact sheet for providers: " "https://www.fda.gov/media/184019/download    Fact sheet for patients: https://www.fda.gov/media/916011/download    Test performed by PCR.           No results found for: \"BLOODCX\"      ECG:        ECG/EMG Results (last 24 hours)       Procedure Component Value Units Date/Time    ECG 12 Lead Chest Pain [[id number]] Collected: 10/22/24 1036     Updated: 10/22/24 1036     QT Interval 344 ms      QTC Interval 418 ms     Narrative:      Test Reason : Chest Pain  Blood Pressure :   */*   mmHG  Vent. Rate :  89 BPM     Atrial Rate :  89 BPM     P-R Int : 148 ms          QRS Dur :  80 ms      QT Int : 344 ms       P-R-T Axes :  15  28  40 degrees     QTc Int : 418 ms    Normal sinus rhythm  Possible Left atrial enlargement  Borderline ECG  When compared with ECG of 08-OCT-2024 15:57, (Unconfirmed)  No significant change was found    Referred By: REN           Confirmed By:             RADIOLOGY STUDIES:  Imaging Results (Last 72 Hours)       Procedure Component Value Units Date/Time    XR Chest 1 View [[id number]] Resulted: 10/22/24 1008     Updated: 10/22/24 1054    FL Video Swallow Single Contrast [[id number]] Collected: 10/21/24 1247     Updated: 10/21/24 1249    Narrative:      FL VIDEO SWALLOW SINGLE-CONTRAST-     CLINICAL INDICATION: Aspiration r/o        TECHNIQUE:   Speech therapy service was present. The patient was examined in the  sitting lateral position and was given several consistencies of barium.     FINDINGS: There was no penetration or aspiration during the study.  Patient protected her airway adequately.        FLUOROSCOPY TIME: 0.9 minutes     Images: Cine loop was acquired       Impression:      No evidence of aspiration.     For additional information please see the report provided by the speech  therapy service     This report was finalized on 10/21/2024 12:47 PM by Dr. Carlos Martines MD.               ALLERGIES: Erythromycin, Latex, Toradol [ketorolac tromethamine], and Contrast dye (echo or unknown " ct/mr)    CURRENT MEDICATIONS:  Current list of medications may not reflect those currently placed in orders that are not signed or are being held.     aspirin, 81 mg, Oral, Daily  atorvastatin, 80 mg, Oral, Nightly  cholecalciferol, 50,000 Units, Oral, Q7 Days  enoxaparin, 40 mg, Subcutaneous, Q24H  fluticasone, 2 spray, Each Nare, Daily  folic acid, 1 mg, Oral, Daily  lansoprazole, 30 mg, Oral, Q AM  lisinopril, 20 mg, Oral, Q24H  magnesium oxide, 400 mg, Oral, Daily  nicotine, 1 patch, Transdermal, Q24H  nicotine, 1 patch, Transdermal, Once  nystatin, 1 Application, Topical, Q12H  polyethylene glycol, 17 g, Oral, Daily  senna-docusate sodium, 2 tablet, Oral, BID  thiamine, 100 mg, Oral, Daily  ticagrelor, 60 mg, Oral, BID  topiramate, 25 mg, Oral, Nightly  traZODone, 50 mg, Oral, Nightly  ursodiol, 300 mg, Oral, BID           acetaminophen    aluminum-magnesium hydroxide-simethicone    bisacodyl    guaifenesin    HYDROcodone-acetaminophen    hydrOXYzine    nicotine polacrilex    ondansetron ODT    pseudoephedrine        Thank you very much for asking us to be involved in this patient's care. Please do not hesitate to call for any questions or concerns.     I have discussed the patients findings and recommendations with the patient.    Electronically signed by ROXANE Vivar, 10/22/24, 3:50 PM EDT.   Electronically signed by Sonali Andres MD, 10/22/24, 4:14 PM EDT.                        Please note that portions of this note were copied and has been reviewed and is accurate as of 10/22/2024 .      Please note that portions of this note were completed with a voice recognition program.

## 2024-10-22 NOTE — PLAN OF CARE
Goal Outcome Evaluation:              Problem: Rehabilitation ([facility]) Plan of Care  Goal: Plan of Care Review  Outcome: Progressing  Flowsheets (Taken 10/22/2024 0012 by Libby Poole RN)  Progress: improving  Plan of Care Reviewed With: patient  Goal: Patient-Specific Goal (Individualized)  Outcome: Progressing  Goal: Absence of New-Onset Illness or Injury  Outcome: Progressing  Intervention: Identify and Manage Fall Risk  Recent Flowsheet Documentation  Taken 10/22/2024 1400 by Laura Burden RN  Safety Promotion/Fall Prevention:   nonskid shoes/slippers when out of bed   safety round/check completed  Taken 10/22/2024 1200 by Laura Burden RN  Safety Promotion/Fall Prevention:   nonskid shoes/slippers when out of bed   safety round/check completed  Taken 10/22/2024 1000 by Laura Burden RN  Safety Promotion/Fall Prevention:   nonskid shoes/slippers when out of bed   safety round/check completed  Taken 10/22/2024 0800 by Laura Burden RN  Safety Promotion/Fall Prevention:   nonskid shoes/slippers when out of bed   safety round/check completed  Intervention: Prevent Skin Injury  Recent Flowsheet Documentation  Taken 10/22/2024 0940 by Laura Burden RN  Skin Protection: protective footwear used  Intervention: Prevent VTE (Venous Thromboembolism)  Recent Flowsheet Documentation  Taken 10/22/2024 0940 by Laura Burden RN  VTE Prevention/Management: (Lovenox) --  Intervention: Prevent Infection  Recent Flowsheet Documentation  Taken 10/22/2024 0800 by Laura Burden RN  Infection Prevention: hand hygiene promoted  Goal: Optimal Comfort and Wellbeing  Outcome: Progressing  Intervention: Provide Person-Centered Care  Recent Flowsheet Documentation  Taken 10/22/2024 0940 by Laura Burden RN  Trust Relationship/Rapport:   care explained   questions answered  Intervention: Monitor Pain and Promote Comfort  Recent Flowsheet Documentation  Taken 10/22/2024 0940 by Laura Burden RN  Pain Management  Interventions: position adjusted  Goal: Home and Community Transition Plan Established  Outcome: Progressing     Problem: Fall Injury Risk  Goal: Absence of Fall and Fall-Related Injury  Outcome: Progressing  Intervention: Identify and Manage Contributors  Recent Flowsheet Documentation  Taken 10/22/2024 0800 by Laura Burden RN  Medication Review/Management: medications reviewed  Intervention: Promote Injury-Free Environment  Recent Flowsheet Documentation  Taken 10/22/2024 1400 by Laura Burden RN  Safety Promotion/Fall Prevention:   nonskid shoes/slippers when out of bed   safety round/check completed  Taken 10/22/2024 1200 by Laura Burden RN  Safety Promotion/Fall Prevention:   nonskid shoes/slippers when out of bed   safety round/check completed  Taken 10/22/2024 1000 by Laura Burden RN  Safety Promotion/Fall Prevention:   nonskid shoes/slippers when out of bed   safety round/check completed  Taken 10/22/2024 0800 by Laura Burden RN  Safety Promotion/Fall Prevention:   nonskid shoes/slippers when out of bed   safety round/check completed

## 2024-10-22 NOTE — THERAPY TREATMENT NOTE
Inpatient Rehabilitation - Occupational Therapy Treatment Note     Geoff     Patient Name: Dalila Pike  : 1970  MRN: [id number]    Today's Date: 10/22/2024                 Admit Date: 10/16/2024       No diagnosis found.    Patient Active Problem List   Diagnosis    Acute CVA (cerebrovascular accident)    Alcohol use    Tobacco use    Obesity (BMI 30-39.9)    HTN (hypertension)    Dyslipidemia    History of seizures    PAD (peripheral artery disease)    Oropharyngeal dysphagia    Acute UTI (urinary tract infection)    Hypertensive emergency    Single episode of loss of consciousness without head trauma    RUQ pain    Dilated cbd, acquired    Cholangitis    Debility       Past Medical History:   Diagnosis Date    Acute CVA (cerebrovascular accident) 2024    Alcohol use 2024    Dyslipidemia 2024    Obesity (BMI 30-39.9) 2024    PAD (peripheral artery disease) 2024    Tobacco use 2024       Past Surgical History:   Procedure Laterality Date    ENDOSCOPY N/A 10/8/2024    Procedure: ESOPHAGOGASTRODUODENOSCOPY;  Surgeon: Elie Sanders MD;  Location:  [facility];  Service: Gastroenterology;  Laterality: N/A;    ERCP N/A 10/8/2024    Procedure: ENDOSCOPIC RETROGRADE CHOLANGIOPANCREATOGRAPHY WITH STENT PLACEMENT;  Surgeon: Elie Sanders MD;  Location:  [facility];  Service: Gastroenterology;  Laterality: N/A;  SPHINCTEROTOMY @ 1737, 9-12MM AND 12-15MM BALLOON SWEEP TO CBD    INTERVENTIONAL RADIOLOGY PROCEDURE Bilateral 2024    Procedure: Carotid Cervical Angiogram;  Surgeon: Jamaal Saini MD;  Location: [facility] CATH INVASIVE LOCATION;  Service: Interventional Radiology;  Laterality: Bilateral;             IRF OT ASSESSMENT FLOWSHEET (Last 12 Hours)       IRF OT Evaluation and Treatment       Row Name 10/22/24 1400          OT Time and Intention    Document Type daily treatment  -AH     Mode of Treatment individual therapy;occupational therapy  -      Patient Effort good  -[facility] Name 10/22/24 1400          General Information    Patient/Family/Caregiver Comments/Observations patient scheduled for ADL session this AM, patient agreeable to therapy but declined shower. patient tolerated therapy well with no complaints. patient has demonstrated occasional trace left , some increased elbow ROM noted as well.  -     Existing Precautions/Restrictions fall  -[facility] Name 10/22/24 1400          Cognition/Psychosocial    Affect/Mental Status (Cognition) WFL  -[facility] Name 10/22/24 1400          Upper Body Dressing    [place] Level (Upper Body Dressing) supervision;verbal cues  -[facility] Name 10/22/24 1400          Lower Body Dressing    [place] Level (Lower Body Dressing) supervision;verbal cues  -[facility] Name 10/22/24 1400          Toileting    [place] Level (Toileting) supervision;verbal cues  -[facility] Name 10/22/24 1400          Bed-Chair Transfer    Bed-Chair [place] (Transfers) supervision;modified independence;verbal [facility] Name 10/22/24 1400          Toilet Transfer    Type (Toilet Transfer) stand pivot/stand step  -     [place] Level (Toilet Transfer) supervision;standby assist;verbal cues  -[facility] Name 10/22/24 1400          Motor Skills    Motor Skills coordination;functional endurance;neuro-muscular function  -     Neuromuscular Function left;upper extremity  NMES left wrist and digit extension program 2 with good response. PROM, AAROM, vibration, massage, gross grasp  -     Therapeutic Exercise shoulder;elbow/forearm;wrist;hand  BUE ROM, UE bike with left hand assisted  -[facility] Name 10/22/24 1400          Positioning and Restraints    Post Treatment Position wheelchair  -     In Bed sitting;with PT  -               User Key  (r) = Recorded By, (t) = Taken By, (c) = Cosigned By      Initials Name Effective Dates     Laura Rome, OT 10/22/24 -                       Occupational Therapy Education       Title: PT OT SLP Therapies (In Progress)       Topic: Occupational Therapy (Not Started)       Point: ADL training (Not Started)       Description:   Instruct learner(s) on proper safety adaptation and remediation techniques during self care or transfers.   Instruct in proper use of assistive devices.                  Learner Progress:  Not documented in this visit.              Point: Home exercise program (Not Started)       Description:   Instruct learner(s) on appropriate technique for monitoring, assisting and/or progressing therapeutic exercises/activities.                  Learner Progress:  Not documented in this visit.              Point: Precautions (Not Started)       Description:   Instruct learner(s) on prescribed precautions during self-care and functional transfers.                  Learner Progress:  Not documented in this visit.              Point: Body mechanics (Not Started)       Description:   Instruct learner(s) on proper positioning and spine alignment during self-care, functional mobility activities and/or exercises.                  Learner Progress:  Not documented in this visit.                                        OT Recommendation and Plan    Planned Therapy Interventions (OT): activity tolerance training, adaptive equipment training, BADL retraining, neuromuscular control/coordination retraining, passive ROM/stretching, patient/caregiver education/training, ROM/therapeutic exercise, strengthening exercise, transfer/mobility retraining                    Time Calculation:      Time Calculation- OT       Row Name 10/22/24 1425             Time Calculation-     OT Start Time 0745  -      OT Stop Time 0915  -      OT Time Calculation (min) 90 min  -                User Key  (r) = Recorded By, (t) = Taken By, (c) = Cosigned By      Initials Name Provider Type    Laura London, OT Occupational Therapist                   Therapy Charges for Today       Code Description Service Date Service Provider Modifiers Qty    86608610306 HC OT SELF CARE/MGMT/TRAIN EA 15 MIN 10/21/2024 Laura Rome, OT GO 1    24950948079 HC OT NEUROMUSC RE EDUCATION EA 15 MIN 10/21/2024 Laura Rome, OT GO 3    01031037965 HC OT ELEC STIM EA-PER 15 MIN 10/21/2024 Laura Rome, OT GO 1    39730493875 HC OT THER PROC EA 15 MIN 10/21/2024 Laura Rome, OT GO 1    64416168565 HC OT NEUROMUSC RE EDUCATION EA 15 MIN 10/22/2024 Larua Rome, OT GO 3    70467279099 HC OT ELEC STIM EA-PER 15 MIN 10/22/2024 Laura Rome, OT GO 1    34855173524 HC OT SELF CARE/MGMT/TRAIN EA 15 MIN 10/22/2024 Laura Rome, OT GO 2                     Laura Rome OT  10/22/2024

## 2024-10-22 NOTE — PROGRESS NOTES
"Assisted By: PT    CC: Follow-up on debility    Interview History/HPI: Patient states she is doing okay initially however she did develop chest pain, she only described this is a 2/10, it developed after she ambulated with therapy.  She has no history of coronary disease and she states she did not had chest pain before however on further questioning she states she did have a stress test but a year ago at the [facility] for \"chest pain\".  Patient denied any shortness of breath.  Within a short amount of time pain resolved.  Patient was placed back in bed upon workup was undertaken.          Current Hospital Meds:  aspirin, 81 mg, Oral, Daily  atorvastatin, 80 mg, Oral, Nightly  cholecalciferol, 50,000 Units, Oral, Q7 Days  enoxaparin, 40 mg, Subcutaneous, Q24H  fluticasone, 2 spray, Each Nare, Daily  folic acid, 1 mg, Oral, Daily  lansoprazole, 30 mg, Oral, Q AM  lisinopril, 20 mg, Oral, Q24H  magnesium oxide, 400 mg, Oral, Daily  nicotine, 1 patch, Transdermal, Q24H  nicotine, 1 patch, Transdermal, Once  nystatin, 1 Application, Topical, Q12H  polyethylene glycol, 17 g, Oral, Daily  senna-docusate sodium, 2 tablet, Oral, BID  thiamine, 100 mg, Oral, Daily  ticagrelor, 60 mg, Oral, BID  topiramate, 25 mg, Oral, Nightly  traZODone, 50 mg, Oral, Nightly  ursodiol, 300 mg, Oral, BID         Vitals:    10/22/24 0940   BP: 142/90   Pulse: 108   Resp:    Temp:    SpO2:          Intake/Output Summary (Last 24 hours) at 10/22/2024 1238  Last data filed at 10/22/2024 0940  Gross per 24 hour   Intake 1800 ml   Output --   Net 1800 ml       EXAM: Temperature is 97.6, initial pulse 71 prior to ambulating, respiratory rate 16, room air saturation 9%.  Lungs were clear, heart regular rate and rhythm, extremities without edema she has her cam boot in place, patient had just finished smoking recently as well.      Diet: Cardiac; Healthy Heart (2-3 Na+); Texture: Regular (IDDSI 7); Fluid Consistency: Thin (IDDSI " "0)        LABS:     Lab Results (last 48 hours)       Procedure Component Value Units Date/Time    High Sensitivity Troponin T 2Hr [[id number]] Collected: 10/22/24 1204    Specimen: Blood Updated: 10/22/24 1212    D-dimer, Quantitative [[id number]]  (Abnormal) Collected: 10/22/24 1010    Specimen: Blood Updated: 10/22/24 1040     D-Dimer, Quantitative 0.73 MCGFEU/mL     Narrative:      According to the assay 's published package insert, a normal (<0.50 MCGFEU/mL) D-dimer result in conjunction with a non-high clinical probability assessment, excludes deep vein thrombosis (DVT) and pulmonary embolism (PE) with high sensitivity.    D-dimer values increase with age and this can make VTE exclusion of an older population difficult. To address this, the [employer], based on best available evidence and recent guidelines, recommends that clinicians use age-adjusted D-dimer thresholds in patients greater than 50 years of age with: a) a low probability of PE who do not meet all Pulmonary Embolism Rule Out Criteria, or b) in those with intermediate probability of PE.   The formula for an age-adjusted D-dimer cut-off is \"age/100\".  For example, a 60 year old patient would have an age-adjusted cut-off of 0.60 MCGFEU/mL and an 80 year old 0.80 MCGFEU/mL.    High Sensitivity Troponin T [[id number]]  (Normal) Collected: 10/22/24 1009    Specimen: Blood Updated: 10/22/24 1039     HS Troponin T <6 ng/L     Narrative:      High Sensitive Troponin T Reference Range:  <14.0 ng/L- Negative Female for AMI  <22.0 ng/L- Negative Male for AMI  >=14 - Abnormal Female indicating possible myocardial injury.  >=22 - Abnormal Male indicating possible myocardial injury.   Clinicians would have to utilize clinical acumen, EKG, Troponin, and serial changes to determine if it is an Acute Myocardial Infarction or myocardial injury due to an underlying chronic condition.         Comprehensive Metabolic Panel [[id number]]  " (Abnormal) Collected: 10/22/24 1009    Specimen: Blood Updated: 10/22/24 1039     Glucose 131 mg/dL      BUN 11 mg/dL      Creatinine 0.65 mg/dL      Sodium 135 mmol/L      Potassium 4.3 mmol/L      Chloride 100 mmol/L      CO2 25.9 mmol/L      Calcium 9.8 mg/dL      Total Protein 7.0 g/dL      Albumin 3.8 g/dL      ALT (SGPT) 13 U/L      AST (SGOT) 18 U/L      Alkaline Phosphatase 207 U/L      Total Bilirubin 0.6 mg/dL      Globulin 3.2 gm/dL      A/G Ratio 1.2 g/dL      BUN/Creatinine Ratio 16.9     Anion Gap 9.1 mmol/L      eGFR 104.8 mL/min/1.73     Narrative:      GFR Normal >60  Chronic Kidney Disease <60  Kidney Failure <15      CBC & Differential [[id number]]  (Abnormal) Collected: 10/22/24 1009    Specimen: Blood Updated: 10/22/24 1018    Narrative:      The following orders were created for panel order CBC & Differential.  Procedure                               Abnormality         Status                     ---------                               -----------         ------                     CBC Auto Differential[[id number]]        Abnormal            Final result                 Please view results for these tests on the individual orders.    CBC Auto Differential [[id number]]  (Abnormal) Collected: 10/22/24 1009    Specimen: Blood Updated: 10/22/24 1018     WBC 10.03 10*3/mm3      RBC 4.32 10*6/mm3      Hemoglobin 13.2 g/dL      Hematocrit 40.4 %      MCV 93.5 fL      MCH 30.6 pg      MCHC 32.7 g/dL      RDW 11.8 %      RDW-SD 40.3 fl      MPV 9.4 fL      Platelets 301 10*3/mm3      Neutrophil % 75.3 %      Lymphocyte % 16.8 %      Monocyte % 5.0 %      Eosinophil % 1.8 %      Basophil % 0.6 %      Immature Grans % 0.5 %      Neutrophils, Absolute 7.55 10*3/mm3      Lymphocytes, Absolute 1.69 10*3/mm3      Monocytes, Absolute 0.50 10*3/mm3      Eosinophils, Absolute 0.18 10*3/mm3      Basophils, Absolute 0.06 10*3/mm3      Immature Grans, Absolute 0.05 10*3/mm3      nRBC 0.0 /100 WBC                   Radiology:    Imaging Results (Last 72 Hours)       Procedure Component Value Units Date/Time    US Venous Doppler Lower Extremity Bilateral (duplex) [[id number]] Collected: 10/22/24 1209     Updated: 10/22/24 1212    Narrative:      US VENOUS DOPPLER LOWER EXTREMITY BILATERAL (DUPLEX)-     CLINICAL INDICATION: positive dimer        COMPARISON: None available     TECHNIQUE: Color Doppler imaging was used with compression and  augmentation to evaluate the lower extremity deep venous system.     FINDINGS:   There is patent spontaneous flow from the common femoral vein through  the posterior tibial veins.  There was no internal clot or area of noncompressibility.  Normal augmentation was elicited where applicable.       Impression:      No DVT in the lower extremities on today's exam.      This report was finalized on 10/22/2024 12:09 PM by Dr. Carlos Martines MD.       XR Chest 1 View [[id number]] Collected: 10/22/24 1112     Updated: 10/22/24 1114    Narrative:      XR CHEST 1 VW-     CLINICAL INDICATION: Chest pain        COMPARISON: 8/28/2024     TECHNIQUE: Single frontal view of the chest.     FINDINGS:     LUNGS: Lungs are adequately aerated.      HEART AND MEDIASTINUM: Heart and mediastinal contours are unremarkable        SKELETON: Bony and soft tissue structures are unremarkable.             Impression:      No radiographic evidence of acute cardiac or pulmonary disease.           This report was finalized on 10/22/2024 11:12 AM by Dr. Carlos Martines MD.       FL Video Swallow Single Contrast [[id number]] Collected: 10/21/24 1247     Updated: 10/21/24 1249    Narrative:      FL VIDEO SWALLOW SINGLE-CONTRAST-     CLINICAL INDICATION: Aspiration r/o        TECHNIQUE:   Speech therapy service was present. The patient was examined in the  sitting lateral position and was given several consistencies of barium.     FINDINGS: There was no penetration or aspiration during the study.  Patient protected her airway  adequately.        FLUOROSCOPY TIME: 0.9 minutes     Images: Cine loop was acquired       Impression:      No evidence of aspiration.     For additional information please see the report provided by the speech  therapy service     This report was finalized on 10/21/2024 12:47 PM by Dr. Carlos Martines MD.               Results for orders placed during the hospital encounter of 08/21/24    Adult Transthoracic Echo Complete W/ Cont if Necessary Per Protocol (With Agitated Saline)    Interpretation Summary    Left ventricular systolic function is normal. Calculated left ventricular EF = 68% Normal left ventricular cavity size noted. Left ventricular wall thickness is consistent with moderate concentric hypertrophy. All left ventricular wall segments contract normally. Left ventricular diastolic function was normal. Normal left atrial pressure.    The right ventricular cavity is mildly dilated. Normal right ventricular systolic function noted.    Left atrial volume is moderately increased. Saline test results are negative.    The aortic valve is structurally normal with no stenosis present. The aortic valve appears trileaflet. No significant aortic valve regurgitation is present.    The mitral valve is structurally normal with no significant stenosis present. Trace to mild mitral valve regurgitation is present.    Mild tricuspid valve regurgitation is present. Estimated right ventricular systolic pressure from tricuspid regurgitation is mildly elevated (35-45 mmHg)    Mild dilation of the ascending aorta is present. Ascending aorta = 3.7 cm      Assessment/Plan:   Debility, patient is working with OT/PT and doing very well.  Patient also had a swallowing evaluation by speech therapy yesterday and passed this, no evidence of aspiration.  Patient with OT is set up overall for ADLs, she is modified independent overall for her bed mobility and transfers more was able to ambulate 400 feet yesterday.  We are planning to send  her home tomorrow but the chest pain workup made him leave this.    Chest pain, some typical some atypical factors, patient does have risk factors and that she is a smoker, has hypertension, she does have a family history, lipid profile was acceptable as of 10/8.  She is not diabetic.  EKG normal, troponin is negative x 2.  Her dimer was only minimally elevated 0.7, she has a contrast allergy, I think the risk of CT scanning with contrast outweighs the benefit at this time but I did do venous Dopplers bilaterally that were negative.  I have asked cardiology to evaluate.  Chest x-ray is negative.  Patient is already on DAPT.  Also on statin.    Recent cholecystitis treated with Flagyl and Rocephin status post ERCP, noted that time to have papillary stenosis of the sphincterotomy and stent placement .  No surgery was recommended by surgery at that time.  GI to follow-up in 6 to 8 weeks.  Continue Actigall.  Alkaline phosphatase 207 but transaminases normal.     History of right MCA distribution CVA, status post thrombectomy and stent placement, on DAPT aspirin Brilinta as well as statin therapy.     Left ankle fracture, conservative management per Ortho in [place], continue ambulation as tolerated with a cam boot.     GERD, continue Prevacid     History of EtOH, no evidence of withdrawal.  Stable.     Hypertension, controlled on lisinopril     DVT prophylaxis, subcu Lovenox,     Tobacco use, on NicoDerm, counseled to quit.    Stephon Lange MD

## 2024-10-22 NOTE — SIGNIFICANT NOTE
10/22/24 1420   Plan   Plan Contacted [facility] [phone] per pt preference per Radha about discharge on 10-23-24 and need for outpatient PT 3 x wk x 4 wks evaluate and treat for strengthening, endurance, gait training, transfer training, balance, therapeutic exercise, neuro re-education, coordination, steps/stairs and outpatient OT 3 x wk x 4 wks evaluate and treat for home safety, strengthening, ROM, coordination, neuro re-education.  Faxed face sheet, H&P, PT/OT notes and MD progress note to [phone].  Ambulatory referral for outpatient PT/OT and discharge summary to be faxed at discharge.  Pt has an appointment on 10-29-24 at 10:30 am eastern standard time (they are on central time) for PT evaluation; pt to arrive at 10:15 am for paperwork.  Pt to bring I.D. and insurance card.  OT to be scheduled at next appointment.  Reviewed this information with pt and spouse who are agreeable.  Pt has Cardiology consult.  Pt to call spouse tomorrow to confirm discharge and he will come for transport.  Spouse will assist pt at home and provide transportation to outpatient therapy.  Will follow.   Patient/Family in Agreement with Plan yes

## 2024-10-23 VITALS
HEIGHT: 68 IN | HEART RATE: 91 BPM | OXYGEN SATURATION: 95 % | RESPIRATION RATE: 16 BRPM | WEIGHT: 225 LBS | TEMPERATURE: 97.8 F | DIASTOLIC BLOOD PRESSURE: 69 MMHG | SYSTOLIC BLOOD PRESSURE: 102 MMHG | BODY MASS INDEX: 34.1 KG/M2

## 2024-10-23 PROCEDURE — 97535 SELF CARE MNGMENT TRAINING: CPT | Performed by: OCCUPATIONAL THERAPIST

## 2024-10-23 PROCEDURE — 97112 NEUROMUSCULAR REEDUCATION: CPT

## 2024-10-23 PROCEDURE — 99239 HOSP IP/OBS DSCHRG MGMT >30: CPT | Performed by: INTERNAL MEDICINE

## 2024-10-23 PROCEDURE — 97032 APPL MODALITY 1+ESTIM EA 15: CPT | Performed by: OCCUPATIONAL THERAPIST

## 2024-10-23 PROCEDURE — 97116 GAIT TRAINING THERAPY: CPT

## 2024-10-23 RX ORDER — TRAZODONE HYDROCHLORIDE 50 MG/1
50 TABLET, FILM COATED ORAL NIGHTLY
Qty: 30 TABLET | Refills: 0 | Status: SHIPPED | OUTPATIENT
Start: 2024-10-23

## 2024-10-23 RX ORDER — URSODIOL 300 MG/1
300 CAPSULE ORAL 2 TIMES DAILY
Qty: 60 CAPSULE | Refills: 0 | Status: SHIPPED | OUTPATIENT
Start: 2024-10-23

## 2024-10-23 RX ORDER — HYDROCODONE BITARTRATE AND ACETAMINOPHEN 5; 325 MG/1; MG/1
1 TABLET ORAL EVERY 8 HOURS PRN
Qty: 9 TABLET | Refills: 0 | Status: SHIPPED | OUTPATIENT
Start: 2024-10-23 | End: 2024-10-26

## 2024-10-23 RX ORDER — POLYETHYLENE GLYCOL 3350 17 G/17G
17 POWDER, FOR SOLUTION ORAL DAILY
Qty: 238 G | Refills: 0 | Status: SHIPPED | OUTPATIENT
Start: 2024-10-23

## 2024-10-23 RX ORDER — LISINOPRIL 20 MG/1
20 TABLET ORAL DAILY
Qty: 30 TABLET | Refills: 0 | Status: SHIPPED | OUTPATIENT
Start: 2024-10-23

## 2024-10-23 RX ORDER — ACETAMINOPHEN 325 MG/1
650 TABLET ORAL EVERY 6 HOURS PRN
Start: 2024-10-23

## 2024-10-23 RX ADMIN — NYSTATIN 1 APPLICATION: 100000 CREAM TOPICAL at 09:18

## 2024-10-23 RX ADMIN — NICOTINE 1 PATCH: 21 PATCH TRANSDERMAL at 09:28

## 2024-10-23 RX ADMIN — ASPIRIN 81 MG: 81 TABLET, COATED ORAL at 09:16

## 2024-10-23 RX ADMIN — Medication 400 MG: at 09:17

## 2024-10-23 RX ADMIN — FOLIC ACID 1 MG: 1 TABLET ORAL at 09:17

## 2024-10-23 RX ADMIN — LANSOPRAZOLE 30 MG: 30 TABLET, ORALLY DISINTEGRATING ORAL at 05:01

## 2024-10-23 RX ADMIN — HYDROCODONE BITARTRATE AND ACETAMINOPHEN 1 TABLET: 5; 325 TABLET ORAL at 05:01

## 2024-10-23 RX ADMIN — TICAGRELOR 60 MG: 60 TABLET ORAL at 09:16

## 2024-10-23 RX ADMIN — SENNOSIDES AND DOCUSATE SODIUM 2 TABLET: 50; 8.6 TABLET ORAL at 09:17

## 2024-10-23 RX ADMIN — Medication 100 MG: at 09:17

## 2024-10-23 RX ADMIN — LISINOPRIL 20 MG: 10 TABLET ORAL at 09:16

## 2024-10-23 RX ADMIN — FLUTICASONE PROPIONATE 2 SPRAY: 50 SPRAY, METERED NASAL at 09:18

## 2024-10-23 RX ADMIN — URSODIOL 300 MG: 300 CAPSULE ORAL at 09:16

## 2024-10-23 NOTE — PROGRESS NOTES
Occupational Therapy: Individual: 30 minutes.    Physical Therapy:    Speech Language Pathology:    Signed by: Pennie Rome, Occupational Therapist

## 2024-10-23 NOTE — DISCHARGE SUMMARY
"Date of admission: 10/16/2024  Date of discharge: 10/23/2024    Principal diagnosis: Debility  Secondary diagnosis:  -History of right MCA CVA in with poor motor movement of the left arm complicating her therapy.  -Chest pain  -Status post cholangitis with sphincterotomy and stent placement after ERCP.  Patient is stable and has follow-up for stent removal, she is on Actigall for her  -Hypertension  -Dyslipidemia  -Left ankle fracture  -Ongoing tobacco use counseled to quit  -Gerd    Consultants:  Cardiology    Procedures:  Venous Dopplers  Chest x-ray    Exam: Assisted by Madai WISDOM as well as patient's .  Patient lying in bed, still poor movement left arm, otherwise mood is okay, speech is unchanged, lungs are clear, heart regular rate and rhythm, no edema.  Vital signs: 102/69, 16, 91, 97.8, saturation on room air 95%.    Hospital course: Patient was admitted with debility complicated by her previous CVA.  She also had had a left ankle fracture but she was weight-bear as tolerated with the cam boot.  Patient was seen by all 3 modalities of therapy as she was complaining of some \"aspiration\" with thin liquids.  She was seen by SLP and a modified barium swallow was done, no evidence of aspiration.  She is on a regular consistency thin liquid diet and no further SLP services were needed.  Initially with OT, patient was mod assist bathing, min assist upper body dressing, mod assist lower body dressing, contact-guard for grooming, mod assist for toileting and supervision for self-feeding.  Initially with PT, she was supervision for transfer and is able to walk 300 feet twice with no assistive device.  Now after sessions, she is supervision overall for her ADLs.  Modified independent for transfers and modified independent for gait, she walked yesterday 120 feet in the morning there in 20 feet in the p.m. with modified independent.  From a therapy standpoint she is done very well and thought stable for discharge.  " Yesterday, after walking with PT she developed some substernal chest pain.  She rated this a 2/10 and was just an ache.  No shortness of breath or nausea.  Workup was undertaken, I did an EKG that was normal.  Troponins also were normal.  Of note she did have a D-dimer that was minimally elevated 0.7 however she has a severe contrast allergy and I did check venous Dopplers which were negative, it was thought the risk of a contrasted CT was higher than the benefits of this was not done.  This was discussed with both patient and patient's .  Patient was to have a stress test today per cardiology but she after think about this decided she did not want to do this.  We discussed this in detail and she will follow-up with her PCP and they can discuss whether she wants to get this is an outpatient or not.  Patient is already on antiplatelet and statin.  Pain did not.  Recurred including with PT sessions patient was strongly encouraged to quit smoking, she quit smoking after her August stroke but had restarted.  She states she already has nicotine patches at home.    Disposition: Home with     Condition: Stable and improved    Follow-up:  PCP 1 week  Outpatient PT/OT\  Neurosurgery as scheduled  Cardiology as scheduled in [place]  GI as scheduled for stent removal  Orthopedics as scheduled for follow-up of her ankle fracture    Diet: Cardiac regular/thin    DME requirements, none    Weightbearing: As tolerated with her cam boot on    Medications:  Tylenol 650 every 6 as needed  Aspirin 81 mg a day  Lipitor 80 mg a day  Brilinta 60 mg twice daily  Colecalciferol 50,000 units week  Folic acid 1 mg a day  Mucinex 600 mg twice daily as needed  Norco 5 every 8 hours as needed for moderate pain  Lisinopril 20 mg daily  Protonix 40 mg a day  MiraLAX 17 g daily  Thiamine 100 mg daily  Topamax 25 mg every night  Trazodone 50 mg every night  Actigall 300 mg twice daily    Greater than 30-minute

## 2024-10-23 NOTE — SIGNIFICANT NOTE
10/23/24 1110   PT Discharge Summary   Reason for Discharge Discharge from facility   Outcomes Achieved Able to achieve all goals within established timeline   Discharge Destination Home with assist;Home with outpatient services

## 2024-10-23 NOTE — PROGRESS NOTES
Patient Assessment Instrument  Quality Indicators - Discharge FY 2023    Section A. Transportation      Section A. Medication List        Section B. Health Literacy      Section C. BIMS      Section C. Signs and Symptoms of Delirium (from CAM)      Section D. Mood      Section D. Social Isolation      Section GG. Self-Care Performance     Eating: Patient completed the activities by themself with no assistance from a  helper.   Oral Hygiene: Patient completed the activities by themself with no assistance  from a helper.   Toileting Hygiene: : Patient completed the activities by themself with no  assistance from a helper.   Shower/Bathe Self: Phillips sets up or cleans up; patient completes activity.  Phillips assists only prior to or following the activity.   Upper Body Dressing: Patient completed the activities by themself with no  assistance from a helper.   Lower Body Dressing: Patient completed the activities by themself with no  assistance from a helper.   Putting On/Taking Off Footwear: Patient completed the activities by themself  with no assistance from a helper.    Section GG. Mobility Performance      Section J. Health Conditions Discharge      Section J. Health Conditions (Pain)      Section K. Swallowing/Nutritional Status  Nutritional Approaches Past 7 Days:  Nutritional Approaches at Discharge:    Section M. Skin Conditions Discharge      . Current Number of Unhealed Pressure Ulcers      Section N. Medication        Section O. Special Treatments, Procedures, and Programs    Signed by: Pennie Rome, Occupational Therapist

## 2024-10-23 NOTE — PAYOR COMM NOTE
"Nava Mcallister, RN   Utilization Review Nurse for Inpatient Rehab   Phone: [phone]  Fax: [phone]  Email: [email or web address]  [facility] NPI: [id number]    DISCHARGE NOTIFICATION WITH DISCHARGE SUMMARY  Member:  Dalila Pike  :  1970  REFERENCE# [id number]  ID# [id number]  Admission Date:  10/16/24  Discharge Date:  10/23/24  Disposition:  Home with family; [facility] for outpatient PT and OT.    Dalila Pike (54 y.o. Female)       Date of Birth   1970    Social Security Number       Address   [address] [place] [address]    Home Phone   [phone]    MRN   [id number]       [place]   None    Marital Status                               Admission Date   10/16/24    Admission Type   Elective    Admitting Provider   Ti Kaur MD    Attending Provider   Stephon Lange MD    Department, Room/Bed   [facility], 105/2S       Discharge Date       Discharge Disposition   Home or Self Care    Discharge Destination                                 Attending Provider: Stephon Lange MD    Allergies: Erythromycin, Latex, Toradol [Ketorolac Tromethamine], Contrast Dye (Echo Or Unknown Ct/mr)    Isolation: None   Infection: None   Code Status: CPR    Ht: 172.7 cm (68\")   Wt: 102 kg (225 lb)    Admission Cmt: None   Principal Problem: Debility [R53.81]                 [facility] Encounter Date/Time: 10/16/2024 [facility]   Hospital Account: [id number]    MRN: [id number]   Patient:  Dalila Pike   Contact Serial #: [id number]   SSN:          ENCOUNTER             Patient Class: Rehab IP   Unit: [facility]   Hospital Service: Medicine     Bed: 105/2S   Admitting Provider: Ti Kaur MD   Referring Physician: Brook Overton   Attending Provider:     Adm Diagnosis: Debility [R53.81]      PATIENT                 Name: Dalila Pike : 1970 (54 yrs)   Address: [address] [place] [address] Sex: Female "   City: [place] [address]     Marital Status:          Ethnicity: NOT                Race: WHITE   Primary Care Provider: Agnes Ewing APRN         Primary Phone: [phone]   EMERGENCY CONTACT   Contact Name Legal Guardian? Relationship to Patient Home Phone Work Phone   1. MaryanaAbiel  2. Rashad Hurtado No  No Spouse  Son [phone]              GUARANTOR            Guarantor: Dalila Ocasio     : 1970   Address: [address] [place] [address] Sex: [place] [id number]     Relation to Patient: Self       Home Phone: [phone]   Guarantor ID: [id number]       Work Phone:     GUARANTOR EMPLOYER   Employer: UNEMPLOYED         Status: NOT EMPLO*      COVERAGE          PRIMARY INSURANCE   Payor: [employer] MEDICAID Plan: [place] MEDICAID   Group Number: [id number] Insurance Type: INDEMNITY   Subscriber Name: DALILA OCASIO Subscriber : 1970   Subscriber ID: [id number] Coverage Address: [place] [address]   Pre-Certification #: Pre-cert number: N/A Authorization #:     Pat. Rel. to Subscriber: Self Coverage Phone: [phone]           Yola Hernandez MSW     Case Management  Significant Note     Signed  Date of Service:  10/22/24 1639  Creation Time:  10/22/24 1639     Signed             10/22/24 1420   Plan   [facility] [phone] per pt preference per Radha about discharge on 10-23-24 and need for outpatient PT 3 x wk x 4 wks evaluate and treat for strengthening, endurance, gait training, transfer training, balance, therapeutic exercise, neuro re-education, coordination, steps/stairs and outpatient OT 3 x wk x 4 wks evaluate and treat for home safety, strengthening, ROM, coordination, neuro re-education.  Faxed face sheet, H&P, PT/OT notes and MD progress note to [phone].  Ambulatory referral for outpatient PT/OT and discharge summary to be faxed at discharge.  Pt has an  appointment on 10-29-24 at 10:30 am eastern standard time (they are on central time) for PT evaluation; pt to arrive at 10:15 am for paperwork.  Pt to bring I.D. and insurance card.  OT to be scheduled at next appointment.  Reviewed this information with pt and spouse who are agreeable.  Pt has Cardiology consult.  Pt to call spouse tomorrow to confirm discharge and he will come for transport.  Spouse will assist pt at home and provide transportation to outpatient therapy.  Will follow.   Patient/Family in Agreement with Plan yes                    Yola Hernandez, NEETU     Case Management  Significant Note     Signed  Date of Service:  10/22/24 1324  Creation Time:  10/22/24 1324     Signed             10/22/24 0855   Plan   Plan Team conference held today.  Spoke to pt about plans for discharge on 10-23-24 and recommendations for outpatient PT/OT.  Pt prefers [facility].  DME is not recommended.  Pt will return home with spouse at discharge.   Post Acute Provider List Outpatient Therapy   Delivered To Patient   Method of Delivery In person                    Stephon Lange MD   Physician  Medicine     Discharge Summary     Signed     Date of Service: 10/23/24 0821  Creation Time: 10/23/24 0821     Signed         Date of admission: 10/16/2024  Date of discharge: 10/23/2024     Principal diagnosis: Debility  Secondary diagnosis:  -History of right MCA CVA in with poor motor movement of the left arm complicating her therapy.  -Chest pain  -Status post cholangitis with sphincterotomy and stent placement after ERCP.  Patient is stable and has follow-up for stent removal, she is on Actigall for her  -Hypertension  -Dyslipidemia  -Left ankle fracture  -Ongoing tobacco use counseled to quit  -Gerd     Consultants:  Cardiology     Procedures:  Venous Dopplers  Chest x-ray     Exam: Assisted by Madai WISDOM as well as patient's .  Patient lying in bed, still poor  "movement left arm, otherwise mood is okay, speech is unchanged, lungs are clear, heart regular rate and rhythm, no edema.  Vital signs: 102/69, 16, 91, 97.8, saturation on room air 95%.     Hospital course: Patient was admitted with debility complicated by her previous CVA.  She also had had a left ankle fracture but she was weight-bear as tolerated with the cam boot.  Patient was seen by all 3 modalities of therapy as she was complaining of some \"aspiration\" with thin liquids.  She was seen by SLP and a modified barium swallow was done, no evidence of aspiration.  She is on a regular consistency thin liquid diet and no further SLP services were needed.  Initially with OT, patient was mod assist bathing, min assist upper body dressing, mod assist lower body dressing, contact-guard for grooming, mod assist for toileting and supervision for self-feeding.  Initially with PT, she was supervision for transfer and is able to walk 300 feet twice with no assistive device.  Now after sessions, she is supervision overall for her ADLs.  Modified independent for transfers and modified independent for gait, she walked yesterday 120 feet in the morning there in 20 feet in the p.m. with modified independent.  From a therapy standpoint she is done very well and thought stable for discharge.  Yesterday, after walking with PT she developed some substernal chest pain.  She rated this a 2/10 and was just an ache.  No shortness of breath or nausea.  Workup was undertaken, I did an EKG that was normal.  Troponins also were normal.  Of note she did have a D-dimer that was minimally elevated 0.7 however she has a severe contrast allergy and I did check venous Dopplers which were negative, it was thought the risk of a contrasted CT was higher than the benefits of this was not done.  This was discussed with both patient and patient's .  Patient was to have a stress test today per cardiology but she after think about this decided she " did not want to do this.  We discussed this in detail and she will follow-up with her PCP and they can discuss whether she wants to get this is an outpatient or not.  Patient is already on antiplatelet and statin.  Pain did not.  Recurred including with PT sessions patient was strongly encouraged to quit smoking, she quit smoking after her August stroke but had restarted.  She states she already has nicotine patches at home.     Disposition: Home with      Condition: Stable and improved     Follow-up:  PCP 1 week  Outpatient PT/OT\  Neurosurgery as scheduled  Cardiology as scheduled in [place]  GI as scheduled for stent removal  Orthopedics as scheduled for follow-up of her ankle fracture     Diet: Cardiac regular/thin     DME requirements, none     Weightbearing: As tolerated with her cam boot on     Medications:  Tylenol 650 every 6 as needed  Aspirin 81 mg a day  Lipitor 80 mg a day  Brilinta 60 mg twice daily  Colecalciferol 50,000 units week  Folic acid 1 mg a day  Mucinex 600 mg twice daily as needed  Norco 5 every 8 hours as needed for moderate pain  Lisinopril 20 mg daily  Protonix 40 mg a day  MiraLAX 17 g daily  Thiamine 100 mg daily  Topamax 25 mg every night  Trazodone 50 mg every night  Actigall 300 mg twice daily     Greater than 30-minute

## 2024-10-23 NOTE — PLAN OF CARE
Goal Outcome Evaluation:              Problem: Rehabilitation (IRF) Plan of Care  Goal: Plan of Care Review  Outcome: Progressing  Flowsheets (Taken 10/22/2024 1940 by Keri No, RN)  Progress: improving  Plan of Care Reviewed With: patient  Goal: Patient-Specific Goal (Individualized)  Outcome: Progressing  Goal: Absence of New-Onset Illness or Injury  Outcome: Progressing  Intervention: Identify and Manage Fall Risk  Recent Flowsheet Documentation  Taken 10/23/2024 0800 by Laura Burden RN  Safety Promotion/Fall Prevention:   nonskid shoes/slippers when out of bed   safety round/check completed  Intervention: Prevent Skin Injury  Recent Flowsheet Documentation  Taken 10/23/2024 0916 by Laura Burden RN  Skin Protection: protective footwear used  Intervention: Prevent Infection  Recent Flowsheet Documentation  Taken 10/23/2024 0800 by Laura Burden RN  Infection Prevention: hand hygiene promoted  Goal: Optimal Comfort and Wellbeing  Outcome: Progressing  Intervention: Provide Person-Centered Care  Recent Flowsheet Documentation  Taken 10/23/2024 0916 by Laura Burden RN  Trust Relationship/Rapport: care explained  Intervention: Monitor Pain and Promote Comfort  Recent Flowsheet Documentation  Taken 10/23/2024 0916 by Laura Burden RN  Pain Management Interventions: position adjusted  Goal: Home and Community Transition Plan Established  Outcome: Progressing     Problem: Fall Injury Risk  Goal: Absence of Fall and Fall-Related Injury  Outcome: Progressing  Intervention: Identify and Manage Contributors  Recent Flowsheet Documentation  Taken 10/23/2024 0800 by Laura Burden RN  Medication Review/Management: medications reviewed  Intervention: Promote Injury-Free Environment  Recent Flowsheet Documentation  Taken 10/23/2024 0800 by Laura Burden RN  Safety Promotion/Fall Prevention:   nonskid shoes/slippers when out of bed   safety round/check completed     Problem: Skin Injury Risk  Increased  Goal: Skin Health and Integrity  Outcome: Progressing  Intervention: Optimize Skin Protection  Recent Flowsheet Documentation  Taken 10/23/2024 0916 by Laura Burden RN  Pressure Reduction Techniques: frequent weight shift encouraged  Pressure Reduction Devices: heel offloading device utilized  Skin Protection: protective footwear used  Taken 10/23/2024 0800 by Laura Burden, RN  Head of Bed (HOB) Positioning: HOB elevated

## 2024-10-23 NOTE — THERAPY DISCHARGE NOTE
Inpatient Rehabilitation - Physical Therapy Treatment Note/Discharge   Geoff     Patient Name: Dalila Pike  : 1970  MRN: [id number]  Today's Date: 10/23/2024                Admit Date: 10/16/2024    Visit Dx:    ICD-10-CM ICD-9-CM   1. Debility  R53.81 799.3   2. Cholangitis  K83.09 576.1     Patient Active Problem List   Diagnosis    Acute CVA (cerebrovascular accident)    Alcohol use    Tobacco use    Obesity (BMI 30-39.9)    HTN (hypertension)    Dyslipidemia    History of seizures    PAD (peripheral artery disease)    Oropharyngeal dysphagia    Acute UTI (urinary tract infection)    Hypertensive emergency    Single episode of loss of consciousness without head trauma    RUQ pain    Dilated cbd, acquired    Cholangitis    Debility     Past Medical History:   Diagnosis Date    Acute CVA (cerebrovascular accident) 2024    Alcohol use 2024    Dyslipidemia 2024    Obesity (BMI 30-39.9) 2024    PAD (peripheral artery disease) 2024    Tobacco use 2024     Past Surgical History:   Procedure Laterality Date    ENDOSCOPY N/A 10/8/2024    Procedure: ESOPHAGOGASTRODUODENOSCOPY;  Surgeon: Elie Sanders MD;  Location:  [facility];  Service: Gastroenterology;  Laterality: N/A;    ERCP N/A 10/8/2024    Procedure: ENDOSCOPIC RETROGRADE CHOLANGIOPANCREATOGRAPHY WITH STENT PLACEMENT;  Surgeon: Elie Sanders MD;  Location:  [facility] ENDOSCOPY;  Service: Gastroenterology;  Laterality: N/A;  SPHINCTEROTOMY @ 1737, 9-12MM AND 12-15MM BALLOON SWEEP TO CBD    INTERVENTIONAL RADIOLOGY PROCEDURE Bilateral 2024    Procedure: Carotid Cervical Angiogram;  Surgeon: Jamaal Saini MD;  Location:  [facility]ATH INVASIVE [facility]ATION;  Service: Interventional Radiology;  Laterality: Bilateral;       PT ASSESSMENT (Last 12 Hours)       IRF PT Evaluation and Treatment       Row Name 10/23/24 1107          PT Time and Intention    Document Type --  Discharge Treatment  -LL     Mode of  Treatment individual therapy;physical therapy  -LL     Patient/Family/Caregiver Comments/Observations Patient discharged from IRF to home this date with assistance of spouse and with OP PT.  Education completed with patient & spouse regarding home safety, HEP, current level of assistance required for functional mobility.  Written materials issued and no questions/concerns voiced.  -LL       Row Name 10/23/24 1107          General Information    Patient Profile Reviewed yes  -LL     Existing Precautions/Restrictions fall  -LL       Row Name 10/23/24 1107          Cognition/Psychosocial    Affect/Mental Status (Cognition) WFL  -LL     Orientation Status (Cognition) oriented x 3  -LL     Follows Commands (Cognition) WFL  -LL     Personal Safety Interventions nonskid shoes/slippers when out of bed  -LL     Cognitive Function (Cognition) WFL  -LL       Row Name 10/23/24 1107          Mobility    Extremity Weight-bearing Status left lower extremity  -LL     Left Lower Extremity (Weight-bearing Status) weight-bearing as tolerated (WBAT);other (see comments)  CAM boot  -LL       Row Name 10/23/24 1107          Bed Mobility    Bed Mobility bed mobility (all) activities  -LL     All Activities, Sanders (Bed Mobility) modified independence  -LL       Row Name 10/23/24 1107          Transfer Assessment/Treatment    Transfers sit-stand transfer;stand-sit transfer;bed-chair transfer;chair-bed transfer  -LL       Row Name 10/23/24 1107          Bed-Chair Transfer    Bed-Chair Sanders (Transfers) modified independence  -LL       Row Name 10/23/24 1107          Chair-Bed Transfer    Chair-Bed Sanders (Transfers) modified independence  -LL       Row Name 10/23/24 1107          Sit-Stand Transfer    Sit-Stand Sanders (Transfers) modified independence  -LL       Row Name 10/23/24 1107          Stand-Sit Transfer    Stand-Sit Sanders (Transfers) modified independence  -LL       Row Name 10/23/24 1107           Toilet Transfer    Type (Toilet Transfer) sit-stand;stand-sit  -LL     [place] Level (Toilet Transfer) modified independence  -LL     Assistive Device (Toilet Transfer) grab bars/safety frame  -LL       Row Name 10/23/24 1107          Gait/Stairs (Locomotion)    Gait/Stairs Locomotion gait/ambulation independence;distance ambulated  -LL     [place] Level (Gait) modified independence  -LL     Distance in Feet (Gait) 320  1/2 inside, 1/2 on sidewalk  -LL     Pattern (Gait) step-through  -LL     Deviations/Abnormal Patterns (Gait) antalgic;gait speed decreased  -LL       Row Name 10/23/24 1107          Safety Issues/Impairments Affecting Functional Mobility    Impairments Affecting Function (Mobility) balance;endurance/activity tolerance;pain  -LL       Row Name 10/23/24 1107          Balance    Comment, Balance Standing dynamic balance with functional tasks  -LL       Row Name 10/23/24 1107          Motor Skills    Comments, Therapeutic Exercise HEP reviewed; BTB issued  -LL       Row Name 10/23/24 1107          Positioning and Restraints    Pre-Treatment Position in bed  -LL     Post Treatment Position other  -LL     Other Position --  Sitting outside with spouse present  -       Row Name 10/23/24 1107          Transfer Goal 1 (PT-IRF)    Progress/Outcomes (Transfer Goal 1, PT-IRF) goal met  -LL       Row Name 10/23/24 1107          Gait/Walking Locomotion Goal 1 (PT-IRF)    Progress/Outcomes (Gait/Walking Locomotion Goal 1, PT-IRF) goal met  -LL       Row Name 10/23/24 1107          Stairs Goal 1 (PT-IRF)    Progress/Outcomes (Stairs Goal 1, PT-IRF) goal met  -LL               User Key  (r) = Recorded By, (t) = Taken By, (c) = Cosigned By      Initials Name Provider Type    Rakel Blunt PTA Physical Therapist Assistant                    Physical Therapy Education       Title: PT OT SLP Therapies (Resolved)       Topic: Physical Therapy (Resolved)       Point: Mobility training (Resolved)        Learning Progress Summary            Patient Acceptance, E,D, VU by LL at 10/22/2024 1313    Acceptance, E,D, VU by LL at 10/21/2024 1441    Acceptance, E,D, VU,NR by LL at 10/18/2024 1249    Acceptance, E, VU,NR by LB at 10/17/2024 1229   Significant Other Acceptance, E,D, VU by LL at 10/22/2024 1313                      Point: Home exercise program (Resolved)       Learning Progress Summary            Patient Acceptance, E,D, VU by LL at 10/22/2024 1313    Acceptance, E,D, VU by LL at 10/21/2024 1441    Acceptance, E,D, VU,NR by LL at 10/18/2024 1249    Acceptance, E, VU,NR by LB at 10/17/2024 1229   Significant Other Acceptance, E,D, VU by LL at 10/22/2024 1313                      Point: Body mechanics (Resolved)       Learning Progress Summary            Patient Acceptance, E,D, VU by LL at 10/22/2024 1313    Acceptance, E,D, VU by LL at 10/21/2024 1441    Acceptance, E,D, VU,NR by LL at 10/18/2024 1249    Acceptance, E, VU,NR by LB at 10/17/2024 1229   Significant Other Acceptance, E,D, VU by LL at 10/22/2024 1313                      Point: Precautions (Resolved)       Learning Progress Summary            Patient Acceptance, E,D, VU by LL at 10/22/2024 1313    Acceptance, E,D, VU by LL at 10/21/2024 1441    Acceptance, E,D, VU,NR by LL at 10/18/2024 1249    Acceptance, E, VU,NR by LB at 10/17/2024 1229   Significant Other Acceptance, E,D, VU by LL at 10/22/2024 1313                                      User Key       Initials Effective Dates Name Provider Type Discipline     06/16/21 -  Ruth Pastor, PT Physical Therapist PT     05/02/16 -  Rakel Ramirez PTA Physical Therapist Assistant PT                    PT Recommendation and Plan  Frequency of Treatment (PT): 5 times per week  Anticipated Equipment Needs at Discharge (PT Eval):  (tbd)            Time Calculation:    PT Charges       Row Name 10/23/24 1111             Time Calculation    Start Time 0915  -LL      Stop Time 1000  -LL       Time Calculation (min) 45 min  -LL      PT Received On 10/23/24  -LL         Time Calculation- PT    Total Timed Code Minutes- PT 45 minute(s)  -LL                User Key  (r) = Recorded By, (t) = Taken By, (c) = Cosigned By      Initials Name Provider Type    LL Rakel Ramirez PTA Physical Therapist Assistant                    Therapy Charges for Today       Code Description Service Date Service Provider Modifiers Qty    57435353832 HC GAIT TRAINING EA 15 MIN 10/22/2024 Rakel Ramirez, TIGIST GP, CQ 2    31672974149 HC PT NEUROMUSC RE EDUCATION EA 15 MIN 10/22/2024 Rakel Ramirez, TIGIST GP, CQ 3    96047000656 HC PT THER PROC EA 15 MIN 10/22/2024 Rakel Ramirez, TIGIST GP, CQ 1    68516003951 HC GAIT TRAINING EA 15 MIN 10/23/2024 Rakel Ramirez, TIGIST GP, CQ 1    87002218880 HC PT NEUROMUSC RE EDUCATION EA 15 MIN 10/23/2024 Rakel Ramirez PTA GP, CQ 2            PT G-Codes  AM-PAC 6 Clicks Score (PT): 19    PT Discharge Summary  Reason for Discharge: Discharge from facility  Outcomes Achieved: Able to achieve all goals within established timeline  Discharge Destination: Home with assist, Home with outpatient services    Lorraine Ramirez PTA  10/23/2024

## 2024-10-23 NOTE — PROGRESS NOTES
Rehabilitation Nursing  Inpatient Rehabilitation Plan of Care Note    Plan of Care  Copy from POC    Pain    Pain Management (Active)  Current Status (10/16/2024 3:52:00 PM): Potential for increased pain  Weekly Goal: optimal pain control  Discharge Goal: optimal pain control    Safety    Potential for Injury (Active)  Current Status (10/16/2024 3:52:00 PM): Potential for falls  Weekly Goal: No falls  Discharge Goal: No falls    RN Interventions    Pain -  RN: Assess pain q shift and PRN; meds per MD mcguire; optimize patient comfort  [RN]    Safety -  RN: Asees safety q 1 hr; call light and items within reach; siderails up x2 [RN]    Signed by: Nurse Nohemi

## 2024-10-23 NOTE — SIGNIFICANT NOTE
10/23/24 1105   Plan   Plan Spoke to pt and spouse about appointment for outpatient PT evaluation at [facility] on 10-29-24 at 10:30 am EST; arrive at 10:15 am for paperwork.  Explained OT evaluation will be scheduled with next PT appointment.  Faxed ambulatory referrals for PT/OT and discharge summary to [facility] [phone].  Pt is going home with spouse today who will provide assistance.  Spouse will transport pt home and to outpatient therapy.

## 2024-10-23 NOTE — PLAN OF CARE
Problem: Rehabilitation (IRF) Plan of Care  Goal: Plan of Care Review  Outcome: Progressing  Flowsheets (Taken 10/22/2024 1940 by Keri No RN)  Progress: improving  Plan of Care Reviewed With: patient  Goal: Patient-Specific Goal (Individualized)  Outcome: Progressing  Goal: Absence of New-Onset Illness or Injury  Outcome: Progressing  Intervention: Identify and Manage Fall Risk  Recent Flowsheet Documentation  Taken 10/23/2024 0800 by Laura Burden RN  Safety Promotion/Fall Prevention:   nonskid shoes/slippers when out of bed   safety round/check completed  Intervention: Prevent Skin Injury  Recent Flowsheet Documentation  Taken 10/23/2024 0916 by Laura Burden RN  Skin Protection: protective footwear used  Intervention: Prevent Infection  Recent Flowsheet Documentation  Taken 10/23/2024 0800 by Laura Burden RN  Infection Prevention: hand hygiene promoted  Goal: Optimal Comfort and Wellbeing  Outcome: Progressing  Intervention: Provide Person-Centered Care  Recent Flowsheet Documentation  Taken 10/23/2024 0916 by Laura Burden RN  Trust Relationship/Rapport: care explained  Intervention: Monitor Pain and Promote Comfort  Recent Flowsheet Documentation  Taken 10/23/2024 0916 by Laura Burden RN  Pain Management Interventions: position adjusted  Goal: Home and Community Transition Plan Established  Outcome: Progressing     Problem: Fall Injury Risk  Goal: Absence of Fall and Fall-Related Injury  Outcome: Progressing  Intervention: Identify and Manage Contributors  Recent Flowsheet Documentation  Taken 10/23/2024 0800 by Laura Burden RN  Medication Review/Management: medications reviewed  Intervention: Promote Injury-Free Environment  Recent Flowsheet Documentation  Taken 10/23/2024 0800 by Laura Burden RN  Safety Promotion/Fall Prevention:   nonskid shoes/slippers when out of bed   safety round/check completed     Problem: Skin Injury Risk Increased  Goal: Skin Health and  Integrity  Outcome: Progressing  Intervention: Optimize Skin Protection  Recent Flowsheet Documentation  Taken 10/23/2024 0916 by Laura Burden RN  Pressure Reduction Techniques: frequent weight shift encouraged  Pressure Reduction Devices: heel offloading device utilized  Skin Protection: protective footwear used  Taken 10/23/2024 0800 by Laura Burden, RN  Head of Bed (HOB) Positioning: HOB elevated   Goal Outcome Evaluation:

## 2024-10-23 NOTE — THERAPY DISCHARGE NOTE
Inpatient Rehabilitation - IRF Occupational Therapy Treatment Note/Discharge   Geoff     Patient Name: Dalila Pike  : 1970  MRN: [id number]  Today's Date: 10/23/2024               Admit Date: 10/16/2024       ICD-10-CM ICD-9-CM   1. Debility  R53.81 799.3   2. Cholangitis  K83.09 576.1     Patient Active Problem List   Diagnosis    Acute CVA (cerebrovascular accident)    Alcohol use    Tobacco use    Obesity (BMI 30-39.9)    HTN (hypertension)    Dyslipidemia    History of seizures    PAD (peripheral artery disease)    Oropharyngeal dysphagia    Acute UTI (urinary tract infection)    Hypertensive emergency    Single episode of loss of consciousness without head trauma    RUQ pain    Dilated cbd, acquired    Cholangitis    Debility     Past Medical History:   Diagnosis Date    Acute CVA (cerebrovascular accident) 2024    Alcohol use 2024    Dyslipidemia 2024    Obesity (BMI 30-39.9) 2024    PAD (peripheral artery disease) 2024    Tobacco use 2024     Past Surgical History:   Procedure Laterality Date    ENDOSCOPY N/A 10/8/2024    Procedure: ESOPHAGOGASTRODUODENOSCOPY;  Surgeon: Elie Sanders MD;  Location:  [facility];  Service: Gastroenterology;  Laterality: N/A;    ERCP N/A 10/8/2024    Procedure: ENDOSCOPIC RETROGRADE CHOLANGIOPANCREATOGRAPHY WITH STENT PLACEMENT;  Surgeon: Elie Sanders MD;  Location:  [facility];  Service: Gastroenterology;  Laterality: N/A;  SPHINCTEROTOMY @ 1737, 9-12MM AND 12-15MM BALLOON SWEEP TO CBD    INTERVENTIONAL RADIOLOGY PROCEDURE Bilateral 2024    Procedure: Carotid Cervical Angiogram;  Surgeon: Jamaal Saini MD;  Location:  [facility];  Service: Interventional Radiology;  Laterality: Bilateral;       IRF OT ASSESSMENT FLOWSHEET (Last 12 Hours)       IRF OT Evaluation and Treatment       Row Name 10/23/24 1300          OT Time and Intention    Document Type discharge evaluation;daily treatment   -     Mode of Treatment individual therapy;occupational therapy  -     Patient Effort good  -[facility] Name 10/23/24 1300          General Information    Patient/Family/Caregiver Comments/Observations patient seen in AM for treatment, discharged home with assist of family. patient provided reacher, foam sponge. patient provided with HEP for left UE as well. patient verbalized understanding of HEP as well as stating she is going to purchase therapy items for home. patient verbalized no concerns regarding self care at home. patient to receive outpatient therapy services.  -     Existing Precautions/Restrictions fall  -       Row Name 10/23/24 1300          Cognition/Psychosocial    Affect/Mental Status (Cognition) WFL  -       Row Name 10/23/24 1300          Bathing    [place] Level (Bathing) set up  -[facility] Name 10/23/24 1300          Upper Body Dressing    [place] Level (Upper Body Dressing) modified independence  [facility]       Row Name 10/23/24 1300          Lower Body Dressing    [place] Level (Lower Body Dressing) modified independence  -       Row Name 10/23/24 1300          Grooming    [place] Level (Grooming) set up;modified independence  -       Row Name 10/23/24 1300          Toileting    [place] Level (Toileting) modified independence  [facility]       Row Name 10/23/24 1300          Self-Feeding    [place] Level (Self-Feeding) modified [place]  -       Row Name 10/23/24 1300          Motor Skills    Neuromuscular Function left;upper extremity  NMES left wrist and digit extension completed, therapy program 2 with good contraction noted. gross grasp act  -[facility] Name 10/23/24 1300          Bathing Goal 1 (OT-IRF)    Progress/Outcomes (Bathing Goal 1, OT-IRF) goal met  -[facility] Name 10/23/24 1300          Bathing Goal 2 (OT-IRF)    Progress/Outcomes (Bathing Goal 2, OT-IRF) goal met  -[facility] Name 10/23/24 1300          LB Dressing Goal 1  (OT-IRF)    Progress/Outcomes (LB Dressing Goal 1, OT-IRF) goal met  -       Row Name 10/23/24 1300          LB Dressing Goal 2 (OT-IRF)    Progress/Outcomes (LB Dressing Goal 2, OT-IRF) goal met  -       Row Name 10/23/24 1300          Strength Goal 2 (OT-IRF)    Progress/Outcomes (Strength Goal 2, OT-IRF) goal partially met  -               User Key  (r) = Recorded By, (t) = Taken By, (c) = Cosigned By      Initials Name Effective Dates    Laura London, OT 10/22/24 -                   Wound 10/08/24 2200 Left upper flank (Active)   Dressing Appearance open to air 10/22/24 1930   Closure Open to air 10/23/24 0916   Base moist;red 10/23/24 0916   Drainage Amount none 10/23/24 0916   Care, Wound barrier applied 10/22/24 1930   Dressing Care open to air 10/23/24 0916       Occupational Therapy Education       Title: PT OT SLP Therapies (Resolved)       Topic: Occupational Therapy (Resolved)       Point: ADL training (Resolved)       Description:   Instruct learner(s) on proper safety adaptation and remediation techniques during self care or transfers.   Instruct in proper use of assistive devices.                  Learner Progress:  Not documented in this visit.              Point: Home exercise program (Resolved)       Description:   Instruct learner(s) on appropriate technique for monitoring, assisting and/or progressing therapeutic exercises/activities.                  Learner Progress:  Not documented in this visit.              Point: Precautions (Resolved)       Description:   Instruct learner(s) on prescribed precautions during self-care and functional transfers.                  Learner Progress:  Not documented in this visit.              Point: Body mechanics (Resolved)       Description:   Instruct learner(s) on proper positioning and spine alignment during self-care, functional mobility activities and/or exercises.                  Learner Progress:  Not documented in this visit.                                     OT Recommendation and Plan  Anticipated Discharge Disposition (OT): home with assist, home with outpatient therapy services  Planned Therapy Interventions (OT): activity tolerance training, adaptive equipment training, BADL retraining, neuromuscular control/coordination retraining, passive ROM/stretching, patient/caregiver education/training, ROM/therapeutic exercise, strengthening exercise, transfer/mobility retraining           OT IRF GOALS       Row Name 10/23/24 1300 10/17/24 1400          Bathing Goal 1 (OT-IRF)    Activity/Device (Bathing Goal 1, OT-IRF) -- bathing skills, all  -AH     [place] Level (Bathing Goal 1, OT-IRF) -- minimum assist (75% or more patient effort)  -AH     Progress/Outcomes (Bathing Goal 1, OT-IRF) goal met  -AH --        Bathing Goal 2 (OT-IRF)    Activity/Device (Bathing Goal 2, OT-IRF) -- bathing skills, all  -AH     [place] Level (Bathing Goal 2, OT-IRF) -- set-up required;supervision required  -AH     Progress/Outcomes (Bathing Goal 2, OT-IRF) goal met  -AH --        LB Dressing Goal 1 (OT-IRF)    Activity/Device (LB Dressing Goal 1, OT-IRF) -- lower body dressing  -AH     [place] (LB Dressing Goal 1, OT-IRF) -- minimum assist (75% or more patient effort)  -AH     Progress/Outcomes (LB Dressing Goal 1, OT-IRF) goal met  -AH --        LB Dressing Goal 2 (OT-IRF)    Activity/Device (LB Dressing Goal 2, OT-IRF) -- lower body dressing  -AH     [place] (LB Dressing Goal 2, OT-IRF) -- set-up required;supervision required  -AH     Progress/Outcomes (LB Dressing Goal 2, OT-IRF) goal met  -AH --        Strength Goal 2 (OT-IRF)    Strength Goal 2 (OT-IRF) -- patient will improve LUE strength to utilize as active assist with self care activities.  -AH     Progress/Outcomes (Strength Goal 2, OT-IRF) goal partially met  -AH --               User Key  (r) = Recorded By, (t) = Taken By, (c) = Cosigned By      Initials Name Provider Type    TOMER Rome  Laura Casper, OT Occupational Therapist     Laura Rome OT Occupational Therapist                        Time Calculation:    Time Calculation- OT       Row Name 10/23/24 1346             Time Calculation-     OT Start Time 0830  -      OT Stop Time 0900  -      OT Time Calculation (min) 30 min  -                User Key  (r) = Recorded By, (t) = Taken By, (c) = Cosigned By      Initials Name Provider Type     Laura Rome OT Occupational Therapist                    Therapy Charges for Today       Code Description Service Date Service Provider Modifiers Qty    03830067537 HC OT NEUROMUSC RE EDUCATION EA 15 MIN 10/22/2024 Laura Rome OT GO 3    15674160586 HC OT ELEC STIM EA-PER 15 MIN 10/22/2024 Laura Rome OT GO 1    56344994108 HC OT SELF CARE/MGMT/TRAIN EA 15 MIN 10/22/2024 Laura Rome, OT GO 2    17887777571 HC OT SELF CARE/MGMT/TRAIN EA 15 MIN 10/23/2024 Laura Rome OT GO 1    [id number]  OT ELEC STIM EA-PER 15 MIN 10/23/2024 Laura Rome, OT GO 1                 OT Discharge Summary  Anticipated Discharge Disposition (OT): home with assist, home with outpatient therapy services  Reason for Discharge: Discharge from facility  Outcomes Achieved: Able to achieve all goals within established timeline  Discharge Destination: Home with assist, Home with outpatient services    Laura Rome OT  10/23/2024

## 2024-11-08 ENCOUNTER — TELEPHONE (OUTPATIENT)
Dept: NEUROLOGY | Facility: CLINIC | Age: 54
End: 2024-11-08
Payer: MEDICAID

## 2024-11-08 NOTE — TELEPHONE ENCOUNTER
Called patient to reschedule her appointment.  Patient's new appointment is on 11/19/24 at 9:00AM.

## 2024-11-12 ENCOUNTER — OFFICE VISIT (OUTPATIENT)
Dept: NEUROSURGERY | Facility: CLINIC | Age: 54
End: 2024-11-12
Payer: MEDICAID

## 2024-11-12 ENCOUNTER — HOSPITAL ENCOUNTER (OUTPATIENT)
Dept: CARDIOLOGY | Facility: HOSPITAL | Age: 54
Discharge: HOME | End: 2024-11-12
Payer: MEDICAID

## 2024-11-12 VITALS
DIASTOLIC BLOOD PRESSURE: 70 MMHG | TEMPERATURE: 97.7 F | HEIGHT: 68 IN | SYSTOLIC BLOOD PRESSURE: 106 MMHG | BODY MASS INDEX: 32.13 KG/M2 | WEIGHT: 212 LBS

## 2024-11-12 VITALS — HEIGHT: 68 IN | WEIGHT: 224.87 LBS | BODY MASS INDEX: 34.08 KG/M2

## 2024-11-12 DIAGNOSIS — I63.9 ACUTE CVA (CEREBROVASCULAR ACCIDENT): Primary | ICD-10-CM

## 2024-11-12 DIAGNOSIS — I63.9 ACUTE CVA (CEREBROVASCULAR ACCIDENT): ICD-10-CM

## 2024-11-12 LAB
BH CV MID RIGHT ICA HIDDEN LRR: 1 CM
BH CV RIGHT CCA HIDDEN LRR: 1 CM/S
BH CV VAS CAROTID RIGHT DISTAL STENT EDV: 12.5 CM/S
BH CV VAS CAROTID RIGHT DISTAL STENT PSV: 39 CM/S
BH CV VAS CAROTID RIGHT DISTAL TO STENT NATIVE VESSEL E: 19 CM/S
BH CV VAS CAROTID RIGHT DISTAL TO STENT PSV: 64 CM/S
BH CV VAS CAROTID RIGHT MID STENT EDV: 11.9 CM/S
BH CV VAS CAROTID RIGHT MID STENT PSV: 40.8 CM/S
BH CV VAS CAROTID RIGHT PROXIMAL STENT EDV: 16.9 CM/S
BH CV VAS CAROTID RIGHT PROXIMAL STENT PSV: 50.6 CM/S
BH CV VAS CAROTID RIGHT STENT NATIVE VESSEL PROXIMAL EDV: 18.6 CM/S
BH CV VAS CAROTID RIGHT STENT NATIVE VESSEL PROXIMAL PS: 61.5 CM/S
BH CV XLRA MEAS LEFT DIST CCA EDV: 28.6 CM/SEC
BH CV XLRA MEAS LEFT DIST CCA PSV: 79.3 CM/SEC
BH CV XLRA MEAS LEFT DIST ICA EDV: 42.2 CM/SEC
BH CV XLRA MEAS LEFT DIST ICA PSV: 87.6 CM/SEC
BH CV XLRA MEAS LEFT ICA/CCA RATIO: 1.37
BH CV XLRA MEAS LEFT MID CCA EDV: 32.4 CM/SEC
BH CV XLRA MEAS LEFT MID CCA PSV: 70.5 CM/SEC
BH CV XLRA MEAS LEFT MID ICA EDV: 39.6 CM/SEC
BH CV XLRA MEAS LEFT MID ICA PSV: 98 CM/SEC
BH CV XLRA MEAS LEFT PROX CCA EDV: 26.1 CM/SEC
BH CV XLRA MEAS LEFT PROX CCA PSV: 79.3 CM/SEC
BH CV XLRA MEAS LEFT PROX ECA EDV: 27.3 CM/SEC
BH CV XLRA MEAS LEFT PROX ECA PSV: 90.2 CM/SEC
BH CV XLRA MEAS LEFT PROX ICA EDV: 40.9 CM/SEC
BH CV XLRA MEAS LEFT PROX ICA PSV: 108.4 CM/SEC
BH CV XLRA MEAS LEFT PROX SCLA EDV: 12.8 CM/SEC
BH CV XLRA MEAS LEFT PROX SCLA PSV: 88.7 CM/SEC
BH CV XLRA MEAS LEFT VERTEBRAL A PSV: 30.1 CM/SEC
BH CV XLRA MEAS RIGHT DIST CCA EDV: 18.6 CM/SEC
BH CV XLRA MEAS RIGHT DIST CCA PSV: 61.5 CM/SEC
BH CV XLRA MEAS RIGHT DIST ICA EDV: 23.3 CM/SEC
BH CV XLRA MEAS RIGHT DIST ICA PSV: 64.5 CM/SEC
BH CV XLRA MEAS RIGHT ICA/CCA RATIO: 1.07
BH CV XLRA MEAS RIGHT MID CCA EDV: 17.2 CM/SEC
BH CV XLRA MEAS RIGHT MID CCA PSV: 57.1 CM/SEC
BH CV XLRA MEAS RIGHT MID ICA EDV: 12.5 CM/SEC
BH CV XLRA MEAS RIGHT MID ICA PSV: 39 CM/SEC
BH CV XLRA MEAS RIGHT PROX CCA EDV: 18 CM/SEC
BH CV XLRA MEAS RIGHT PROX CCA PSV: 60.5 CM/SEC
BH CV XLRA MEAS RIGHT PROX ECA EDV: 27 CM/SEC
BH CV XLRA MEAS RIGHT PROX ECA PSV: 101 CM/SEC
BH CV XLRA MEAS RIGHT PROX ICA EDV: 11.9 CM/SEC
BH CV XLRA MEAS RIGHT PROX ICA PSV: 40.8 CM/SEC
BH CV XLRA MEAS RIGHT PROX SCLA PSV: 104.7 CM/SEC
BH CV XLRA MEAS RIGHT VERTEBRAL A PSV: 34.8 CM/SEC
BH CVPROX RIGHT ICA HIDDEN LRR: 1 CM

## 2024-11-12 PROCEDURE — 93880 EXTRACRANIAL BILAT STUDY: CPT

## 2024-11-12 PROCEDURE — 93880 EXTRACRANIAL BILAT STUDY: CPT | Performed by: INTERNAL MEDICINE

## 2024-11-12 RX ORDER — FLUTICASONE PROPIONATE 50 MCG
SPRAY, SUSPENSION (ML) NASAL
COMMUNITY
Start: 2024-11-01

## 2024-11-12 RX ORDER — LISINOPRIL 10 MG/1
10 TABLET ORAL
COMMUNITY
Start: 2024-11-01

## 2024-11-12 NOTE — PROGRESS NOTES
"NEUROSURGERY PROGRESS NOTE    Patient: Dalila Pike  : 1970    Primary Care Provider: Agnes Ewing APRN    Chief Complaint: Stroke     Subjective: This is a 54-year-old female who underwent carotid artery stent placement and thrombectomy for tandem right ICA and MCA occlusions in August of this year.  She is here today for follow-up.  Overall, patient is doing well.  She does have residual left-sided weakness from her stroke, but this is slowly improving.  She is ambulatory.  She has been taking her aspirin and Brilinta appropriately.    Objective    Vital Signs: Blood pressure 106/70, temperature 97.7 °F (36.5 °C), temperature source Infrared, height 172.7 cm (67.99\"), weight 96.2 kg (212 lb).    Physical Exam  Awake, alert and oriented x 3  Opens eyes spont  Pupils 3 mm rx bilat  Extraocular muscles intact bilaterally  Face symmetric bilaterally  Tongue midline  5/5 in the right upper and bilateral lower extremities.  The left upper extremity is diffusely 4 -    Current Medications:   Current Outpatient Medications:     acetaminophen (TYLENOL) 325 MG tablet, Take 2 tablets by mouth Every 6 (Six) Hours As Needed for Mild Pain., Disp: , Rfl:     aspirin 81 MG EC tablet, Take 1 tablet by mouth Daily., Disp: , Rfl:     atorvastatin (LIPITOR) 80 MG tablet, Take 1 tablet by mouth Daily., Disp: , Rfl:     cholecalciferol (VITAMIN D3) 1.25 MG (66847 UT) capsule, Take 1 capsule by mouth Every 7 (Seven) Days., Disp: , Rfl:     fluticasone (FLONASE) 50 MCG/ACT nasal spray, , Disp: , Rfl:     folic acid (FOLVITE) 1 MG tablet, Take 1 tablet by mouth Daily., Disp: , Rfl:     guaiFENesin (MUCINEX) 600 MG 12 hr tablet, Take 1 tablet by mouth 2 (Two) Times a Day As Needed for Cough or Congestion., Disp: , Rfl:     lisinopril (PRINIVIL,ZESTRIL) 10 MG tablet, 1 tablet., Disp: , Rfl:     pantoprazole (PROTONIX) 40 MG EC tablet, Take 1 tablet by mouth Daily., Disp: , Rfl:     polyethylene glycol (MIRALAX) 17 GM/SCOOP " powder, Mix 17 grams (cap filled to line inside) of powder in 4 to 8 ounces of liquid and take by mouth Daily., Disp: 238 g, Rfl: 0    thiamine (VITAMIN B1) 100 MG tablet, Take 1 tablet by mouth Daily., Disp: , Rfl:     ticagrelor (Brilinta) 60 MG tablet tablet, Take 1 tablet by mouth 2 (Two) Times a Day., Disp: , Rfl:     topiramate (TOPAMAX) 25 MG tablet, Take 1 tablet by mouth Every Night., Disp: , Rfl:     traZODone (DESYREL) 50 MG tablet, Take 1 tablet by mouth Every Night., Disp: 30 tablet, Rfl: 0    ursodiol (ACTIGALL) 300 MG capsule, Take 1 capsule by mouth 2 (Two) Times a Day., Disp: 60 capsule, Rfl: 0     Laboratory Results:                              Brief Urine Lab Results  (Last result in the past 365 days)        Color   Clarity   Blood   Leuk Est   Nitrite   Protein   CREAT   Urine HCG        10/18/24 1546 Yellow   Clear   Trace   Negative   Negative   Negative                 Microbiology Results (last 10 days)       ** No results found for the last 240 hours. **            Diagnostic Imaging: I reviewed and independently interpreted the new imaging.     Assessment/Plan:  This is a 54-year-old female who underwent thrombectomy and carotid artery stent placement for tandem right ICA MCA occlusions a little less than 3 months ago.  Clinically, the patient is doing well.  She is getting significant improvement in her left-sided weakness.  Her carotid Doppler show good patency of the stent.  I would like for her to remain on Brilinta for another couple of weeks so that it will be a total of 3 months from stent placement, but then she can stop it. She does need to remain on a baby aspirin indefinitely.  We will arrange for the patient to follow-up with me in 6 months with new carotid Dopplers.    Diagnoses and all orders for this visit:    1. Acute CVA (cerebrovascular accident) (Primary)  -     Duplex Carotid Ultrasound CAR; Future      Dalila Pike  reports that she has quit smoking. Her smoking use  included cigarettes. She started smoking about 10 months ago. She has a 76.4 pack-year smoking history. She has been exposed to tobacco smoke. She has never used smokeless tobacco.         Jamaal Saini MD  11/12/24  14:37 EST

## 2024-11-14 ENCOUNTER — PREP FOR SURGERY (OUTPATIENT)
Dept: OTHER | Facility: HOSPITAL | Age: 54
End: 2024-11-14
Payer: MEDICAID

## 2024-11-14 ENCOUNTER — OFFICE VISIT (OUTPATIENT)
Dept: GASTROENTEROLOGY | Facility: CLINIC | Age: 54
End: 2024-11-14
Payer: MEDICAID

## 2024-11-14 ENCOUNTER — CALL CENTER PROGRAMS (OUTPATIENT)
Dept: CALL CENTER | Facility: HOSPITAL | Age: 54
End: 2024-11-14
Payer: MEDICAID

## 2024-11-14 VITALS
WEIGHT: 212 LBS | HEIGHT: 68 IN | HEART RATE: 88 BPM | OXYGEN SATURATION: 98 % | BODY MASS INDEX: 32.13 KG/M2 | TEMPERATURE: 97.1 F

## 2024-11-14 DIAGNOSIS — K31.5 DUODENAL PAPILLARY STENOSIS: ICD-10-CM

## 2024-11-14 DIAGNOSIS — K21.9 GASTROESOPHAGEAL REFLUX DISEASE WITHOUT ESOPHAGITIS: ICD-10-CM

## 2024-11-14 DIAGNOSIS — K59.03 DRUG INDUCED CONSTIPATION: Primary | ICD-10-CM

## 2024-11-14 DIAGNOSIS — S82.892S CLOSED FRACTURE OF LEFT ANKLE, SEQUELA: ICD-10-CM

## 2024-11-14 DIAGNOSIS — Z46.89 ENCOUNTER FOR REMOVAL OF BILIARY STENT: Primary | ICD-10-CM

## 2024-11-14 RX ORDER — SODIUM CHLORIDE, SODIUM LACTATE, POTASSIUM CHLORIDE, CALCIUM CHLORIDE 600; 310; 30; 20 MG/100ML; MG/100ML; MG/100ML; MG/100ML
1000 INJECTION, SOLUTION INTRAVENOUS CONTINUOUS
OUTPATIENT
Start: 2024-11-14 | End: 2024-11-14

## 2024-11-14 RX ORDER — PANTOPRAZOLE SODIUM 40 MG/1
40 TABLET, DELAYED RELEASE ORAL DAILY
Qty: 90 TABLET | Refills: 3 | Status: SHIPPED | OUTPATIENT
Start: 2024-11-14

## 2024-11-14 RX ORDER — INDOMETHACIN 100 MG
100 SUPPOSITORY, RECTAL RECTAL ONCE
OUTPATIENT
Start: 2024-11-14 | End: 2024-11-14

## 2024-11-14 NOTE — PATIENT INSTRUCTIONS
Recommend bowel cleanse with Miralax, which is over the counter. Dissolve 6 capfuls into 32 ounces of sugar-free gatorade/powerade and drink 8 ounces every 30 minutes until gone. Follow this with regular use of Miralax, titrating to achieve/maintain soft/formed bowel movements. Miralax is safe to use long-term. You can also repeat the bowel cleanse as needed.   Recommend 25-30 grams of fiber daily. May use Fibercon tablets to supplement as needed. Fibercon is less likely to cause gas/bloating.   Drink 64-80 ounces of water daily, with ideally half of that containing electrolytes (sugar free gatorade/powerade, electrolyte tablets)  Exercise is helpful for maintaining healthy bowel habits.      Dr. Coto with [facility] in [place] [phone]

## 2024-11-14 NOTE — H&P (VIEW-ONLY)
Office Note     Name: Dalila Pike    : 1970     MRN: [id number]     Chief Complaint  Abdomina Pain, Bile Duct Stent F/U    Subjective     History of Present Illness:  Dalila Pike is a 54 y.o. female who presents today for hospital follow-up, after recent admission in October for papillary stenosis.  She presented with complaints of intermittent RUQ pain/pressure with fever/chills for several months, with elevated LFTs and dilated bile duct on arrival.  ERCP showed dilated CBD down to the level of the major papula, suggestive of papillary stenosis.  Brushings showed no evidence of cancer and biopsies of the stomach were normal.  Esophagus biopsies consistent with reflux, with no evidence of Kaplan's esophagus.  Due to her antiplatelet use, metal stent was placed following sphincterotomy.  She was recommended to repeat ERCP in 6 weeks for removal of stent and to resweep duct/repeat cholangiogram. She completed a course of rocephin and flagyl.     Today she reports that she's been having severe RLQ pain since her procedure that keeps her awake at night. She's having some significant constipation that started since initiation of ursodiol, having a bowel movement every 4-5 days, and that's only with assistance of dulcolax and miralax.      She had a recent right ICA and MCA stroke in August with subsequent thrombectomy and right ICA stent placement (2024).  She is on asa, brilinta, and statin. She has had persistent left-sided weakness and is getting around with the assistance of wheelchair.  She is a former smoker, with 76-pack-year history and has a history of heavy alcohol use after the passing of her daughter but is not currently.  She's quit smoking and has lost 50 lbs intentionally.     Past Medical History:   Past Medical History:   Diagnosis Date    Acute CVA (cerebrovascular accident) 2024    Alcohol use 2024    Alcoholism     Anemia     Cholelithiasis     Diabetes mellitus      Dyslipidemia 08/21/2024    Fatty liver     Hyperlipidemia     Hypertension     Obesity (BMI 30-39.9) 08/21/2024    PAD (peripheral artery disease) 08/21/2024    Tobacco use 08/21/2024       Past Surgical History:   Past Surgical History:   Procedure Laterality Date    ABDOMINAL SURGERY      ENDOSCOPY N/A 10/08/2024    Procedure: ESOPHAGOGASTRODUODENOSCOPY;  Surgeon: Elie Sanders MD;  Location:  [facility];  Service: Gastroenterology;  Laterality: N/A;    ERCP N/A 10/08/2024    Procedure: ENDOSCOPIC RETROGRADE CHOLANGIOPANCREATOGRAPHY WITH STENT PLACEMENT;  Surgeon: Elie Sanders MD;  Location:  [facility];  Service: Gastroenterology;  Laterality: N/A;  SPHINCTEROTOMY @ 1737, 9-12MM AND 12-15MM BALLOON SWEEP TO CBD    INTERVENTIONAL RADIOLOGY PROCEDURE Bilateral 08/21/2024    Procedure: Carotid Cervical Angiogram;  Surgeon: Jamaal Saini MD;  Location:  [facility];  Service: Interventional Radiology;  Laterality: Bilateral;       Immunizations:   There is no immunization history on file for this patient.     Medications:     Current Outpatient Medications:     aspirin 81 MG EC tablet, Take 1 tablet by mouth Daily., Disp: , Rfl:     atorvastatin (LIPITOR) 80 MG tablet, Take 1 tablet by mouth Daily., Disp: , Rfl:     cholecalciferol (VITAMIN D3) 1.25 MG (44364 UT) capsule, Take 1 capsule by mouth Every 7 (Seven) Days., Disp: , Rfl:     fluticasone (FLONASE) 50 MCG/ACT nasal spray, , Disp: , Rfl:     folic acid (FOLVITE) 1 MG tablet, Take 1 tablet by mouth Daily., Disp: , Rfl:     lisinopril (PRINIVIL,ZESTRIL) 10 MG tablet, 1 tablet., Disp: , Rfl:     pantoprazole (PROTONIX) 40 MG EC tablet, Take 1 tablet by mouth Daily., Disp: 90 tablet, Rfl: 3    polyethylene glycol (MIRALAX) 17 GM/SCOOP powder, Mix 17 grams (cap filled to line inside) of powder in 4 to 8 ounces of liquid and take by mouth Daily., Disp: 238 g, Rfl: 0    thiamine (VITAMIN B1) 100 MG tablet, Take 1 tablet by mouth  "Daily., Disp: , Rfl:     ticagrelor (Brilinta) 60 MG tablet tablet, Take 1 tablet by mouth 2 (Two) Times a Day., Disp: , Rfl:     topiramate (TOPAMAX) 25 MG tablet, Take 1 tablet by mouth Every Night., Disp: , Rfl:     traZODone (DESYREL) 50 MG tablet, Take 1 tablet by mouth Every Night., Disp: 30 tablet, Rfl: 0    ursodiol (ACTIGALL) 300 MG capsule, Take 1 capsule by mouth 2 (Two) Times a Day., Disp: 60 capsule, Rfl: 0    linaclotide (Linzess) 290 MCG capsule capsule, Take 1 capsule by mouth Every Morning Before Breakfast., Disp: 30 capsule, Rfl: 11    Allergies:   Allergies   Allergen Reactions    Erythromycin Anaphylaxis    Latex Rash    Toradol [Ketorolac Tromethamine] Rash    Contrast Dye (Echo Or Unknown Ct/Mr) Unknown (See Comments)     Hives on chest       Family History:   Family History   Problem Relation Age of Onset    Alcohol abuse Father        Social History:   Social History     Socioeconomic History    Marital status:    Tobacco Use    Smoking status: Former     Average packs/day: 2.0 packs/day for 38.8 years (76.4 ttl pk-yrs)     Types: Cigarettes     Start date: 2024     Passive exposure: Current    Smokeless tobacco: Never    Tobacco comments:     4-5 a day   Vaping Use    Vaping status: Never Used   Substance and Sexual Activity    Alcohol use: Not Currently    Drug use: Not Currently    Sexual activity: Defer         Objective     Vital Signs  Pulse 88   Temp 97.1 °F (36.2 °C) (Infrared)   Ht 172.7 cm (68\")   Wt 96.2 kg (212 lb)   SpO2 98%   BMI 32.23 kg/m²   Estimated body mass index is 32.23 kg/m² as calculated from the following:    Height as of this encounter: 172.7 cm (68\").    Weight as of this encounter: 96.2 kg (212 lb).            Physical Exam  Vitals reviewed.   Constitutional:       General: She is awake.   Cardiovascular:      Rate and Rhythm: Normal rate.   Pulmonary:      Effort: Pulmonary effort is normal.   Abdominal:      Palpations: Abdomen is soft.      " Tenderness: There is no abdominal tenderness.   Skin:     General: Skin is warm and dry.   Neurological:      Mental Status: She is alert.          Assessment and Plan     Assessment & Plan  Drug induced constipation  Suspect her right lower quadrant abdominal pain may be related to her significant constipation.  She is only having a bowel movement every 4 to 5 days, and that is with the aid of MiraLAX and Dulcolax.  Recommend bowel cleanse with Miralax, which is over the counter. Dissolve 6 capfuls into 32 ounces of sugar-free gatorade/powerade and drink 8 ounces every 30 minutes until gone. Follow this with regular use of Miralax as needed, titrating to achieve/maintain soft/formed bowel movements. Miralax is safe to use long-term. You can also repeat the bowel cleanse as needed.   Recommend 25-30 grams of fiber daily. May use Fibercon tablets to supplement as needed. Fibercon is less likely to cause gas/bloating.   Drink 64-80 ounces of water daily, with ideally half of that containing electrolytes (sugar free gatorade/powerade, electrolyte tablets)  Exercise is helpful for maintaining healthy bowel habits  Will also start Linzess 290 mcg, given ongoing need for pain control.     Orders:    linaclotide (Linzess) 290 MCG capsule capsule; Take 1 capsule by mouth Every Morning Before Breakfast.    Closed fracture of left ankle, sequela  Patient reports ongoing difficulty with pain control, despite prior hydrocodone 10 mg and tramadol, but is currently without pain medication.  She currently does not have a PCP and is requesting assistance with pain management.  Orders:    Ambulatory Referral to Pain Management    Gastroesophageal reflux disease without esophagitis    Orders:    pantoprazole (PROTONIX) 40 MG EC tablet; Take 1 tablet by mouth Daily.    Duodenal papillary stenosis  Needs repeat ERCP for stent removal. She has had no recurrence of the pain that she initially presented with. I suspect her current RLQ  abdominal pain is more related to significant constipation.             Follow Up  After ERCP/3 months, unless needed sooner pending ERCP findings    ROXANE Hernandez  [facility]  [facility]  [address] [id number]

## 2024-11-14 NOTE — OUTREACH NOTE
Stroke Isabel Survey      Flowsheet Row Responses   Facility patient discharged from? [place]   Attempt successful No   Unsuccessful attempts Attempt 1            Selam HOLLAND - Registered Nurse

## 2024-11-14 NOTE — PROGRESS NOTES
Office Note     Name: Dalila Pike    : 1970     MRN: [id number]     Chief Complaint  Abdomina Pain, Bile Duct Stent F/U    Subjective     History of Present Illness:  Dalila Pike is a 54 y.o. female who presents today for hospital follow-up, after recent admission in October for papillary stenosis.  She presented with complaints of intermittent RUQ pain/pressure with fever/chills for several months, with elevated LFTs and dilated bile duct on arrival.  ERCP showed dilated CBD down to the level of the major papula, suggestive of papillary stenosis.  Brushings showed no evidence of cancer and biopsies of the stomach were normal.  Esophagus biopsies consistent with reflux, with no evidence of Kaplan's esophagus.  Due to her antiplatelet use, metal stent was placed following sphincterotomy.  She was recommended to repeat ERCP in 6 weeks for removal of stent and to resweep duct/repeat cholangiogram. She completed a course of rocephin and flagyl.     Today she reports that she's been having severe RLQ pain since her procedure that keeps her awake at night. She's having some significant constipation that started since initiation of ursodiol, having a bowel movement every 4-5 days, and that's only with assistance of dulcolax and miralax.      She had a recent right ICA and MCA stroke in August with subsequent thrombectomy and right ICA stent placement (2024).  She is on asa, brilinta, and statin. She has had persistent left-sided weakness and is getting around with the assistance of wheelchair.  She is a former smoker, with 76-pack-year history and has a history of heavy alcohol use after the passing of her daughter but is not currently.  She's quit smoking and has lost 50 lbs intentionally.     Past Medical History:   Past Medical History:   Diagnosis Date    Acute CVA (cerebrovascular accident) 2024    Alcohol use 2024    Alcoholism     Anemia     Cholelithiasis     Diabetes mellitus      Dyslipidemia 08/21/2024    Fatty liver     Hyperlipidemia     Hypertension     Obesity (BMI 30-39.9) 08/21/2024    PAD (peripheral artery disease) 08/21/2024    Tobacco use 08/21/2024       Past Surgical History:   Past Surgical History:   Procedure Laterality Date    ABDOMINAL SURGERY      ENDOSCOPY N/A 10/08/2024    Procedure: ESOPHAGOGASTRODUODENOSCOPY;  Surgeon: Elie Sanders MD;  Location:  [facility];  Service: Gastroenterology;  Laterality: N/A;    ERCP N/A 10/08/2024    Procedure: ENDOSCOPIC RETROGRADE CHOLANGIOPANCREATOGRAPHY WITH STENT PLACEMENT;  Surgeon: Elie Sanders MD;  Location:  [facility];  Service: Gastroenterology;  Laterality: N/A;  SPHINCTEROTOMY @ 1737, 9-12MM AND 12-15MM BALLOON SWEEP TO CBD    INTERVENTIONAL RADIOLOGY PROCEDURE Bilateral 08/21/2024    Procedure: Carotid Cervical Angiogram;  Surgeon: Jamaal Saini MD;  Location:  [facility];  Service: Interventional Radiology;  Laterality: Bilateral;       Immunizations:   There is no immunization history on file for this patient.     Medications:     Current Outpatient Medications:     aspirin 81 MG EC tablet, Take 1 tablet by mouth Daily., Disp: , Rfl:     atorvastatin (LIPITOR) 80 MG tablet, Take 1 tablet by mouth Daily., Disp: , Rfl:     cholecalciferol (VITAMIN D3) 1.25 MG (66255 UT) capsule, Take 1 capsule by mouth Every 7 (Seven) Days., Disp: , Rfl:     fluticasone (FLONASE) 50 MCG/ACT nasal spray, , Disp: , Rfl:     folic acid (FOLVITE) 1 MG tablet, Take 1 tablet by mouth Daily., Disp: , Rfl:     lisinopril (PRINIVIL,ZESTRIL) 10 MG tablet, 1 tablet., Disp: , Rfl:     pantoprazole (PROTONIX) 40 MG EC tablet, Take 1 tablet by mouth Daily., Disp: 90 tablet, Rfl: 3    polyethylene glycol (MIRALAX) 17 GM/SCOOP powder, Mix 17 grams (cap filled to line inside) of powder in 4 to 8 ounces of liquid and take by mouth Daily., Disp: 238 g, Rfl: 0    thiamine (VITAMIN B1) 100 MG tablet, Take 1 tablet by mouth  "Daily., Disp: , Rfl:     ticagrelor (Brilinta) 60 MG tablet tablet, Take 1 tablet by mouth 2 (Two) Times a Day., Disp: , Rfl:     topiramate (TOPAMAX) 25 MG tablet, Take 1 tablet by mouth Every Night., Disp: , Rfl:     traZODone (DESYREL) 50 MG tablet, Take 1 tablet by mouth Every Night., Disp: 30 tablet, Rfl: 0    ursodiol (ACTIGALL) 300 MG capsule, Take 1 capsule by mouth 2 (Two) Times a Day., Disp: 60 capsule, Rfl: 0    linaclotide (Linzess) 290 MCG capsule capsule, Take 1 capsule by mouth Every Morning Before Breakfast., Disp: 30 capsule, Rfl: 11    Allergies:   Allergies   Allergen Reactions    Erythromycin Anaphylaxis    Latex Rash    Toradol [Ketorolac Tromethamine] Rash    Contrast Dye (Echo Or Unknown Ct/Mr) Unknown (See Comments)     Hives on chest       Family History:   Family History   Problem Relation Age of Onset    Alcohol abuse Father        Social History:   Social History     Socioeconomic History    Marital status:    Tobacco Use    Smoking status: Former     Average packs/day: 2.0 packs/day for 38.8 years (76.4 ttl pk-yrs)     Types: Cigarettes     Start date: 2024     Passive exposure: Current    Smokeless tobacco: Never    Tobacco comments:     4-5 a day   Vaping Use    Vaping status: Never Used   Substance and Sexual Activity    Alcohol use: Not Currently    Drug use: Not Currently    Sexual activity: Defer         Objective     Vital Signs  Pulse 88   Temp 97.1 °F (36.2 °C) (Infrared)   Ht 172.7 cm (68\")   Wt 96.2 kg (212 lb)   SpO2 98%   BMI 32.23 kg/m²   Estimated body mass index is 32.23 kg/m² as calculated from the following:    Height as of this encounter: 172.7 cm (68\").    Weight as of this encounter: 96.2 kg (212 lb).            Physical Exam  Vitals reviewed.   Constitutional:       General: She is awake.   Cardiovascular:      Rate and Rhythm: Normal rate.   Pulmonary:      Effort: Pulmonary effort is normal.   Abdominal:      Palpations: Abdomen is soft.      " Tenderness: There is no abdominal tenderness.   Skin:     General: Skin is warm and dry.   Neurological:      Mental Status: She is alert.          Assessment and Plan     Assessment & Plan  Drug induced constipation  Suspect her right lower quadrant abdominal pain may be related to her significant constipation.  She is only having a bowel movement every 4 to 5 days, and that is with the aid of MiraLAX and Dulcolax.  Recommend bowel cleanse with Miralax, which is over the counter. Dissolve 6 capfuls into 32 ounces of sugar-free gatorade/powerade and drink 8 ounces every 30 minutes until gone. Follow this with regular use of Miralax as needed, titrating to achieve/maintain soft/formed bowel movements. Miralax is safe to use long-term. You can also repeat the bowel cleanse as needed.   Recommend 25-30 grams of fiber daily. May use Fibercon tablets to supplement as needed. Fibercon is less likely to cause gas/bloating.   Drink 64-80 ounces of water daily, with ideally half of that containing electrolytes (sugar free gatorade/powerade, electrolyte tablets)  Exercise is helpful for maintaining healthy bowel habits  Will also start Linzess 290 mcg, given ongoing need for pain control.     Orders:    linaclotide (Linzess) 290 MCG capsule capsule; Take 1 capsule by mouth Every Morning Before Breakfast.    Closed fracture of left ankle, sequela  Patient reports ongoing difficulty with pain control, despite prior hydrocodone 10 mg and tramadol, but is currently without pain medication.  She currently does not have a PCP and is requesting assistance with pain management.  Orders:    Ambulatory Referral to Pain Management    Gastroesophageal reflux disease without esophagitis    Orders:    pantoprazole (PROTONIX) 40 MG EC tablet; Take 1 tablet by mouth Daily.    Duodenal papillary stenosis  Needs repeat ERCP for stent removal. She has had no recurrence of the pain that she initially presented with. I suspect her current RLQ  abdominal pain is more related to significant constipation.             Follow Up  After ERCP/3 months, unless needed sooner pending ERCP findings    ROXANE Hernandez  [facility]  [facility]  [address] [id number]

## 2024-11-19 ENCOUNTER — CALL CENTER PROGRAMS (OUTPATIENT)
Dept: CALL CENTER | Facility: HOSPITAL | Age: 54
End: 2024-11-19
Payer: MEDICAID

## 2024-11-19 ENCOUNTER — PRIOR AUTHORIZATION (OUTPATIENT)
Dept: GASTROENTEROLOGY | Facility: CLINIC | Age: 54
End: 2024-11-19
Payer: MEDICAID

## 2024-11-19 ENCOUNTER — OFFICE VISIT (OUTPATIENT)
Dept: NEUROLOGY | Facility: CLINIC | Age: 54
End: 2024-11-19
Payer: MEDICAID

## 2024-11-19 VITALS
HEIGHT: 68 IN | HEART RATE: 88 BPM | DIASTOLIC BLOOD PRESSURE: 78 MMHG | SYSTOLIC BLOOD PRESSURE: 116 MMHG | BODY MASS INDEX: 31.22 KG/M2 | WEIGHT: 206 LBS | TEMPERATURE: 96.7 F | OXYGEN SATURATION: 97 %

## 2024-11-19 DIAGNOSIS — R06.83 SNORING: Primary | ICD-10-CM

## 2024-11-19 PROCEDURE — 3074F SYST BP LT 130 MM HG: CPT | Performed by: NURSE PRACTITIONER

## 2024-11-19 PROCEDURE — 99214 OFFICE O/P EST MOD 30 MIN: CPT | Performed by: NURSE PRACTITIONER

## 2024-11-19 PROCEDURE — 3078F DIAST BP <80 MM HG: CPT | Performed by: NURSE PRACTITIONER

## 2024-11-19 RX ORDER — TOPIRAMATE 25 MG/1
25 TABLET, FILM COATED ORAL 2 TIMES DAILY
Qty: 60 TABLET | Refills: 2 | Status: SHIPPED | OUTPATIENT
Start: 2024-11-19

## 2024-11-19 NOTE — PROGRESS NOTES
Stroke Clinic Office Visit     Encounter Date: 2024   Patient Name: Dalila Pike  : 1970  MRN: [id number]   PCP: Provider, No Known    History of Present Illness  Dalila Pike is a 54 y.o. right handed female here for follow up after starting Topamx for chronic headache after acute ischemic stroke.   PMH: EtOH, tobacco abuse, hypertension, hyperlipidemia, type 2 diabetes, history of substance abuse, obesity, seizures (possible pseudoseizure on Topamax), PAD, angina, and R MCA CVA 2024 with left arm hemiparesis and chronic headache.    The patient arrives today with her .  She continues to struggle with almost daily headaches despite starting 25mg Topamax once daily.    Her headaches have not changed in character or severity.  She is having difficulty falling asleep and staying asleep as well.  She recently underwent bile duct stenting and will be following up with general surgery for stent removal.    The patient has a recent history of right MCA ischemic stroke with left arm hemiparesis, which is improving with daily exercise and PT.  She arrives today with her arm in a sling to decrease dependant edema, which is helping.  She no longer has right cervical neck pain since wearing the sling.  The patient is following a Mediterranean diet and closely monitoring her stroke risk factors with her PCP and Cardiology.    Patient states, she has purposefully lost 57 pounds since her stroke in August.  The patient reports, she is independent with her ADLs.    Patient denies any other neurological symptoms, including: headache, vision changes, dysesthesias, loss of consciousness, seizure or new areas of motor weakness.       Subjective      Medical History        Past Medical History:   Diagnosis Date    Acute CVA (cerebrovascular accident) 2024    Alcohol use 2024    Dyslipidemia 2024    Obesity (BMI 30-39.9) 2024    PAD (peripheral artery disease) 2024    Tobacco  use 08/21/2024         Surgical History         Past Surgical History:   Procedure Laterality Date    INTERVENTIONAL RADIOLOGY PROCEDURE Bilateral 8/21/2024     Procedure: Carotid Cervical Angiogram;  Surgeon: Jamaal Saini MD;  Location: [facility] INVASIVE LOCATION;  Service: Interventional Radiology;  Laterality: Bilateral;         History reviewed. No pertinent family history.   Social History   Social History            Socioeconomic History    Marital status:    Tobacco Use    Smoking status: Every Day       Current packs/day: 2.00       Average packs/day: 2.0 packs/day for 38.0 years (76.0 ttl pk-yrs)       Types: Cigarettes       Passive exposure: Current    Smokeless tobacco: Never    Tobacco comments:       4-5 a day   Vaping Use    Vaping status: Never Used   Substance and Sexual Activity    Alcohol use: Not Currently    Drug use: Not Currently    Sexual activity: Defer           Current Medications      Current Outpatient Medications:     aspirin 81 MG chewable tablet, Chew 1 tablet Daily., Disp: 30 tablet, Rfl: 2    atorvastatin (LIPITOR) 80 MG tablet, Take 1 tablet by mouth Every Night., Disp: , Rfl:     folic acid (FOLVITE) 1 MG tablet, Take 1 tablet by mouth Daily., Disp: , Rfl:     guaiFENesin (MUCINEX) 600 MG 12 hr tablet, Take 2 tablets by mouth 2 (Two) Times a Day., Disp: , Rfl:     HYDROcodone-acetaminophen (NORCO) 5-325 MG per tablet, Take 1 tablet by mouth Every 6 (Six) Hours As Needed for Severe Pain., Disp: 10 tablet, Rfl: 0    lisinopril (PRINIVIL,ZESTRIL) 20 MG tablet, Take 1 tablet by mouth Daily., Disp: , Rfl:     pantoprazole (PROTONIX) 40 MG EC tablet, Take 1 tablet by mouth Daily., Disp: , Rfl:     sodium chloride 1 g tablet, Take 1 tablet by mouth 3 (Three) Times a Day As Needed (If Na <140)., Disp: , Rfl:     thiamine (VITAMIN B1) 100 MG tablet, 1 tablet by Nasogastric route Daily., Disp: , Rfl:     ticagrelor (BRILINTA) 60 MG tablet tablet, Take 1 tablet by mouth 2  "(Two) Times a Day., Disp: 60 tablet, Rfl: 1    magnesium oxide (MAG-OX) 400 tablet tablet, Take 1 tablet by mouth Daily. (Patient not taking: Reported on 9/19/2024), Disp: 30 tablet, Rfl: 3      Allergies         Allergies   Allergen Reactions    Erythromycin Anaphylaxis    Latex Rash    Toradol [Ketorolac Tromethamine] Rash    Contrast Dye (Echo Or Unknown Ct/Mr) Unknown (See Comments)       Hives on chest               Objective      Physical Exam:  Vitals       Vitals:     09/19/24 1247   BP: 140/84   Pulse: 71   Temp: 97.6 °F (36.4 °C)   SpO2: 98%   Weight: 110 kg (243 lb)  Comment: stated   Height: 172.7 cm (67.99\")         Body mass index is 36.96 kg/m².      Physical Exam:  General Appearance: Alert  Eyes: Anicteric sclera  HEENT: no scleral injection   Lungs: respirations appear comfortable, no obvious increased work of breathing  Extremities: No cyanosis or fingernail clubbing   Skin: No rashes in exposed skin areas     Neurological Examination:   Mental status: Alert and oriented to person, place, and time. Speech with no dysarthria, able to name and repeat with no difficulty.    Cranial Nerves: Visual fields intact. Extraocular movements are intact with no nystagmus. Facial sensation intact. Face symmetrical. Hearing grossly intact. Palate movement is symmetric. Full shoulder shrug bilaterally. Tongue protrudes midline.   Sensory: decreased light touch sensation of left upper and lower extremities.    Motor: Normal tone throughout. Normal bulk. Pronator drift is absent.  Left upper extremity: 4-/5 deltoid, tricep, bicep, interosseous, hand .   Right upper extremity: 5/5 deltoid, tricep, bicep, interosseous, hand .   Left lower extremity: 4/5 iliopsoas, knee extension/flexion, foot dorsi/plantarflexion.  Right lower extremity: 5/5 iliopsoas, knee extension/flexion, foot dorsi/plantarflexion.  Cerebellar: Finger-to-nose intact. Heel-to-shin intact. Rapid alternating movements are intact.   Gait: " Normal.     NIHSS:      1a  Level of consciousness: 0=alert; keenly responsive   1b. LOC questions:  0=Performs both tasks correctly   1c. LOC commands: 0=Answers both questions correctly   2.  Best Gaze: 0=normal   3.  Visual: 0=No visual loss   4. Facial Palsy: 0=Normal symmetric movement   5a.  Motor left arm: 3=No effort against gravity, limb falls   5b.  Motor right arm: 0=No drift, limb holds 90 (or 45) degrees for full 10 seconds   6a. motor left le=Some effort against gravity, limb cannot get to or maintain (if cured) 90 (or 45) degrees, drifts down to bed, but has some effort against gravity   6b  Motor right le=No drift, limb holds 90 (or 45) degrees for full 10 seconds   7. Limb Ataxia: 1=Present in one limb   8.  Sensory: 2=Severe to total sensory loss; patient is not aware of being touched in face, arm, leg   9. Best Language:  0=No aphasia, normal   10. Dysarthria: 0=Normal   11. Extinction and Inattention: 2=Profound inez-inattention or inez-inattention to more than one modality. Does not recognize own hand or orients only to one side of space         Modified Trego Score based on in-office assessment of alertness, orientation, and physical mobility. Further assessment with neurocognitive testing may be needed to elucidate full extent of cognitive abilities: 1 = No significant disability (Able to carry out all usual activities, despite some symptoms)       PHQ-9 Depression Screening  Little interest or pleasure in doing things? Not at all   Feeling down, depressed, or hopeless? Several days   PHQ-2 Total Score 1   Trouble falling or staying asleep, or sleeping too much?     Feeling tired or having little energy?     Poor appetite or overeating?     Feeling bad about yourself - or that you are a failure or have let yourself or your family down?     Trouble concentrating on things, such as reading the newspaper or watching television?     Moving or speaking so slowly that other people could  have noticed? Or the opposite - being so fidgety or restless that you have been moving around a lot more than usual?     Thoughts that you would be better off dead, or of hurting yourself in some way?     PHQ-9 Total Score     If you checked off any problems, how difficult have these problems made it for you to do your work, take care of things at home, or get along with other people?          STEADI Fall Risk Clinician Key Questions   Have you fallen in the past year?: No  Do you feel unsteady with walking?: Yes  Stay Idependant Patient Questions   Patient Fall Risk Assessment Score : 0  Fall Risk Category  Fall Risk Category: ModerateSTEADI Fall Risk Clinician Key Questions   Have you fallen in the past year?: No  Do you feel unsteady with walking?: Yes  Stay Idependant Patient Questions   Patient Fall Risk Assessment Score : 0  Fall Risk Category  Fall Risk Category: Moderate        Imaging Reviewed:   MRI Brain Without Contrast [[id number]] (Order [id number])  Order  Status: Final result      Study Notes      Darby Chandler on 8/22/2024  3:29 AM EDT   Stroke      Appointment Information     PACS Images      Radiology Images  Study Result     Narrative & Impression   MRI BRAIN WO CONTRAST   Date of Exam: 8/22/2024 3:28 AM EDT   Indication: Stroke, follow up. FUP mechanical thrombectomy and carotid stent; RMCA stroke.   Comparison: CT head/angiography/perfusion 8/21/2024.   IMPRESSION:  Impression:  1.Large acute infarct involving the majority of the right MCA territory.  2.No evidence of intracranial hemorrhage.  3.Mild chronic small vessel ischemic change.            CT HEAD WO CONTRAST   Date of Exam: 8/22/2024 4:40 AM EDT   Indication: Stroke, follow up  f/up MT and GLENROY 8/20, RMCA stroke.   IMPRESSION:  Impression:  1.Large right MCA distribution infarct with a small focus of hemorrhage in the subinsular region.  2.Mild mass effect with 2 mm leftward shift of midline structures.         Laboratory Results:    Hemoglobin   Date Value Ref Range Status   10/22/2024 13.2 12.0 - 15.9 g/dL Final   06/26/2021 16.2 (H) 11.2 - 15.7 g/dL Final     Hematocrit   Date Value Ref Range Status   10/22/2024 40.4 34.0 - 46.6 % Final   06/26/2021 47.9 (H) 34.0 - 45.0 % Final     Platelets   Date Value Ref Range Status   10/22/2024 301 140 - 450 10*3/mm3 Final   06/26/2021 236 155 - 369 10*3/uL Final     Hemoglobin A1C   Date Value Ref Range Status   10/08/2024 5.50 4.80 - 5.60 % Final     LDL Cholesterol    Date Value Ref Range Status   10/08/2024 75 0 - 100 mg/dL Final     AST (SGOT)   Date Value Ref Range Status   10/22/2024 18 1 - 32 U/L Final   06/26/2021 110 (H) 11 - 32 U/L Final     ALT (SGPT)   Date Value Ref Range Status   10/22/2024 13 1 - 33 U/L Final   06/26/2021 78 (H) 8 - 33 U/L Final           Assessment / Plan    54 y/o right-handed female with recent history of RMCA stroke, left upper extremity hemiparesis, s/p thrombectomy and L CEA (Brilinta).    Etiology of stroke likely thromboembolic.  Three weeks after her stroke, she had an episode suspicious of seizure/pseudoseizure.     Her neck pain has slightly improved with placing her left upper extremity in a sling.  She continues to struggle with almost daily headaches (30 headache days/month), despite starting Topamax.  Her headaches have not changed in character or severity.  PMHx: EtOH, tobacco abuse, hypertension, hyperlipidemia, type 2 diabetes, history of substance abuse, obesity, seizures, PAD, and angina.  S  On exam: Left upper extremity 4/5 throughout, which is much improved since her last appointment.  The patient's daily headaches, snoring, and possible sleep apnea, recommended consultation with sleep medicine.  Assessment & Plan    History of RMCA acute ischemic stroke  -Patient will continue to closely monitor her stroke risk factors with Primary Care Physician and Cardiology.  Holter monitor-results pending.    -Continue 81 mg ASA                    60 mg  "Trcagrelor (Brilenta) for 6 months per Dr. Christopher Cardiology                   80 mg Atorvastatin   -Mediterranean Diet  -chair yoga exercise 30 minutes 3-4 times/week  -The patient was advised to follow up with her primary care physician for ongoing management and screening for hypertension, hypercholesterolemia, and diabetes, weight control, smoking cessation, and pain management.     -The patient was counseled on long-term blood pressure goal less than 120/80, long-term LDL goal less than 70, and long-term hemoglobin A1c goal less than 7.0% for stroke prevention. This was also printed for the patient in the after visit summary.  -The patient was counseled if she experiences symptoms of acute onset unilateral arm, leg, or face weakness/numbness/tingling, unilateral facial droop, slurred speech/word finding difficulty, visual disturbance (\"curtain falling\" or visual field loss), or severe headache she should call 911 and present to the nearest emergency department immediately.    3.  Left CEA, patient is taking Brillinta and closely following up with neursurgery.      2.  Headache  -The patient's headaches remain constant no change in character.  Her neck pain has improved with using a left arm sling.  ,  -The headaches could be a result of her weight loss, stroke, or sleep apnea.   - Increase to one 25 mg Topamax twice daily.  -Patient advised to follow up with Dr. Perez for continuation of care and treatment of Migraine and seizure.          topiramate (TOPAMAX) 25 MG tablet; Take 1 tablet by mouth 2 (Two) Times a Day.  Dispense: 60 tablet; Refill: 2    3. Insomnia/Snoring.  The patient reports that trazodone is not helping with sleep. She is concerned about her inability to sleep for extended periods.   Further evaluation-Sleep Medicine consult Order provided to the patient today.          Snoring (Primary)  -     Ambulatory Referral to Sleep Medicine  4. Seizures.  The patient has not experienced any seizures " since starting Topamax.     5. Smoking cessation.  The patient has successfully quit smoking since October 7 and is using nicotine patches. She is advised to continue using the patches as they have been effective.    6. Vitamin D deficiency.  The patient is now taking vitamin D supplements to aid in healing and bone health.    7. Bile duct stent.  The patient had a stent placed in her bile duct and is experiencing discomfort. She will return this Friday to have the stent removed.    8. Physical therapy.  The patient is using a TENS unit and a vibration unit at home for physical therapy. She is advised to continue these exercises to improve strength and reduce swelling.    9. High cholesterol.  The patient is monitoring her diet and has lost 57 pounds. She is advised to continue following a healthy diet to manage her cholesterol levels.    10. Hypertension.  The patient is advised to continue monitoring her blood pressure and follow a healthy lifestyle to manage her hypertension.    I spent 30 minutes caring for Dalila on this date of service. This time includes time spent by me in the following activities:reviewing tests, performing a medically appropriate examination and/or evaluation , counseling and educating the patient/family/caregiver, ordering medications, tests, or procedures, and documenting information in the medical record    Follow Up  No follow-ups on file.    ROXANE [place]  [facility] NEURO STROKE     Part of this note may be an electronic transcription/translation of spoken language to printed text using the Dragon Dictation System.

## 2024-11-19 NOTE — OUTREACH NOTE
Stroke Isabel Survey      Flowsheet Row Responses   Facility patient discharged from? [facility]   Attempt successful Yes  [Stroke clinic appt today, no call neccessary]   Isabel score call completed Yes   Comments Stroke clinic appt today in office. In person Isabel score of 1 documented.            DEVIN ZAPATA - Registered Nurse

## 2024-11-21 ENCOUNTER — ANESTHESIA EVENT (OUTPATIENT)
Dept: GASTROENTEROLOGY | Facility: HOSPITAL | Age: 54
End: 2024-11-21
Payer: MEDICAID

## 2024-11-21 NOTE — ANESTHESIA PREPROCEDURE EVALUATION
Anesthesia Evaluation     Patient summary reviewed and Nursing notes reviewed   no history of anesthetic complications:   NPO Solid Status: > 8 hours  NPO Liquid Status: > 2 hours           Airway   Mallampati: II  TM distance: >3 FB  Neck ROM: full  No difficulty expected  Dental    (+) edentulous, upper dentures and lower dentures    Pulmonary - normal exam    breath sounds clear to auscultation  (+) a smoker Former, cigarettes,sleep apnea (nc) on CPAP  (-) COPD, recent URI, no home oxygen  Cardiovascular - normal exam    ECG reviewed  Rhythm: regular  Rate: normal    (+) hypertension, PVD, hyperlipidemia,  carotid artery disease (s/p right ICA stent 08/2024)  (-) past MI, dysrhythmias, angina, cardiac stents    ROS comment: Stress Test 2022 -normal nuclear stress test.  Stress ECG: small ST segment changes·    Perfusion SPECT- normal MPS. 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    ROS Comment: [facility] sto for thrombectomy aug 2024   GI/Hepatic/Renal/Endo    (+) obesity, morbid obesity, GERD, liver disease history of elevated LFT, diabetes mellitus type 2    Musculoskeletal     Abdominal    Substance History   (+) alcohol use (Hx of EtOH abuse; none for past year)     OB/GYN          Other        ROS/Med Hx Other: Brillanta   Stable labs August and October                     Anesthesia Plan    ASA 3     general     (Minimize large swings in BP given recent CVA and ICA stent    Denies N/V/GERD today some abdo pain)  intravenous induction     Anesthetic plan, risks, benefits, and alternatives have been provided, discussed and informed consent has been obtained with: patient.    Plan discussed with CRNA.        CODE STATUS:

## 2024-11-22 ENCOUNTER — ANESTHESIA (OUTPATIENT)
Dept: GASTROENTEROLOGY | Facility: HOSPITAL | Age: 54
End: 2024-11-22
Payer: MEDICAID

## 2024-11-22 ENCOUNTER — APPOINTMENT (OUTPATIENT)
Dept: GENERAL RADIOLOGY | Facility: HOSPITAL | Age: 54
End: 2024-11-22
Payer: MEDICAID

## 2024-11-22 ENCOUNTER — HOSPITAL ENCOUNTER (OUTPATIENT)
Facility: HOSPITAL | Age: 54
Setting detail: HOSPITAL OUTPATIENT SURGERY
Discharge: HOME | End: 2024-11-22
Attending: INTERNAL MEDICINE | Admitting: INTERNAL MEDICINE
Payer: MEDICAID

## 2024-11-22 VITALS
HEIGHT: 68 IN | DIASTOLIC BLOOD PRESSURE: 104 MMHG | BODY MASS INDEX: 30.77 KG/M2 | WEIGHT: 203 LBS | HEART RATE: 90 BPM | SYSTOLIC BLOOD PRESSURE: 161 MMHG | OXYGEN SATURATION: 98 % | RESPIRATION RATE: 18 BRPM | TEMPERATURE: 97 F

## 2024-11-22 DIAGNOSIS — Z46.89 ENCOUNTER FOR REMOVAL OF BILIARY STENT: ICD-10-CM

## 2024-11-22 LAB
GLUCOSE BLDC GLUCOMTR-MCNC: 115 MG/DL (ref 70–130)
HCT VFR BLD AUTO: 39.6 % (ref 34–46.6)
POTASSIUM SERPL-SCNC: 3.6 MMOL/L (ref 3.5–5.2)

## 2024-11-22 PROCEDURE — 25810000003 SODIUM CHLORIDE 0.9 % SOLUTION

## 2024-11-22 PROCEDURE — 74328 X-RAY BILE DUCT ENDOSCOPY: CPT | Performed by: INTERNAL MEDICINE

## 2024-11-22 PROCEDURE — 25010000002 ESMOLOL 100 MG/10ML SOLUTION

## 2024-11-22 PROCEDURE — 82948 REAGENT STRIP/BLOOD GLUCOSE: CPT

## 2024-11-22 PROCEDURE — 43275 ERCP REMOVE FORGN BODY DUCT: CPT | Performed by: INTERNAL MEDICINE

## 2024-11-22 PROCEDURE — 84132 ASSAY OF SERUM POTASSIUM: CPT | Performed by: ANESTHESIOLOGY

## 2024-11-22 PROCEDURE — 85014 HEMATOCRIT: CPT | Performed by: ANESTHESIOLOGY

## 2024-11-22 PROCEDURE — 43273 ENDOSCOPIC PANCREATOSCOPY: CPT | Performed by: INTERNAL MEDICINE

## 2024-11-22 PROCEDURE — C1769 GUIDE WIRE: HCPCS | Performed by: INTERNAL MEDICINE

## 2024-11-22 PROCEDURE — 25010000002 LIDOCAINE PF 1% 1 % SOLUTION

## 2024-11-22 PROCEDURE — 25010000002 PROPOFOL 10 MG/ML EMULSION

## 2024-11-22 PROCEDURE — 25010000002 MIDAZOLAM PER 1 MG

## 2024-11-22 PROCEDURE — 74330 X-RAY BILE/PANC ENDOSCOPY: CPT

## 2024-11-22 RX ORDER — LIDOCAINE HYDROCHLORIDE 10 MG/ML
0.5 INJECTION, SOLUTION EPIDURAL; INFILTRATION; INTRACAUDAL; PERINEURAL ONCE AS NEEDED
Status: DISCONTINUED | OUTPATIENT
Start: 2024-11-22 | End: 2024-11-22 | Stop reason: HOSPADM

## 2024-11-22 RX ORDER — MIDAZOLAM HYDROCHLORIDE 1 MG/ML
INJECTION, SOLUTION INTRAMUSCULAR; INTRAVENOUS AS NEEDED
Status: DISCONTINUED | OUTPATIENT
Start: 2024-11-22 | End: 2024-11-22 | Stop reason: SURG

## 2024-11-22 RX ORDER — SODIUM CHLORIDE 0.9 % (FLUSH) 0.9 %
10 SYRINGE (ML) INJECTION AS NEEDED
Status: DISCONTINUED | OUTPATIENT
Start: 2024-11-22 | End: 2024-11-22 | Stop reason: HOSPADM

## 2024-11-22 RX ORDER — PROPOFOL 10 MG/ML
VIAL (ML) INTRAVENOUS CONTINUOUS PRN
Status: DISCONTINUED | OUTPATIENT
Start: 2024-11-22 | End: 2024-11-22 | Stop reason: SURG

## 2024-11-22 RX ORDER — LIDOCAINE HYDROCHLORIDE 10 MG/ML
INJECTION, SOLUTION EPIDURAL; INFILTRATION; INTRACAUDAL; PERINEURAL AS NEEDED
Status: DISCONTINUED | OUTPATIENT
Start: 2024-11-22 | End: 2024-11-22 | Stop reason: SURG

## 2024-11-22 RX ORDER — SODIUM CHLORIDE, SODIUM LACTATE, POTASSIUM CHLORIDE, CALCIUM CHLORIDE 600; 310; 30; 20 MG/100ML; MG/100ML; MG/100ML; MG/100ML
9 INJECTION, SOLUTION INTRAVENOUS CONTINUOUS
Status: DISCONTINUED | OUTPATIENT
Start: 2024-11-23 | End: 2024-11-22 | Stop reason: HOSPADM

## 2024-11-22 RX ORDER — SODIUM CHLORIDE 9 MG/ML
INJECTION, SOLUTION INTRAVENOUS CONTINUOUS PRN
Status: DISCONTINUED | OUTPATIENT
Start: 2024-11-22 | End: 2024-11-22 | Stop reason: SURG

## 2024-11-22 RX ORDER — ONDANSETRON 2 MG/ML
4 INJECTION INTRAMUSCULAR; INTRAVENOUS ONCE AS NEEDED
Status: DISCONTINUED | OUTPATIENT
Start: 2024-11-22 | End: 2024-11-22 | Stop reason: HOSPADM

## 2024-11-22 RX ORDER — SODIUM CHLORIDE, SODIUM LACTATE, POTASSIUM CHLORIDE, CALCIUM CHLORIDE 600; 310; 30; 20 MG/100ML; MG/100ML; MG/100ML; MG/100ML
1000 INJECTION, SOLUTION INTRAVENOUS CONTINUOUS
Status: DISCONTINUED | OUTPATIENT
Start: 2024-11-22 | End: 2024-11-22 | Stop reason: HOSPADM

## 2024-11-22 RX ORDER — IPRATROPIUM BROMIDE AND ALBUTEROL SULFATE 2.5; .5 MG/3ML; MG/3ML
3 SOLUTION RESPIRATORY (INHALATION) ONCE AS NEEDED
Status: DISCONTINUED | OUTPATIENT
Start: 2024-11-22 | End: 2024-11-22 | Stop reason: HOSPADM

## 2024-11-22 RX ORDER — INDOMETHACIN 100 MG
100 SUPPOSITORY, RECTAL RECTAL ONCE
Status: DISCONTINUED | OUTPATIENT
Start: 2024-11-22 | End: 2024-11-22 | Stop reason: HOSPADM

## 2024-11-22 RX ORDER — SODIUM CHLORIDE 0.9 % (FLUSH) 0.9 %
10 SYRINGE (ML) INJECTION EVERY 12 HOURS SCHEDULED
Status: DISCONTINUED | OUTPATIENT
Start: 2024-11-22 | End: 2024-11-22 | Stop reason: HOSPADM

## 2024-11-22 RX ORDER — ESMOLOL HYDROCHLORIDE 10 MG/ML
INJECTION INTRAVENOUS AS NEEDED
Status: DISCONTINUED | OUTPATIENT
Start: 2024-11-22 | End: 2024-11-22 | Stop reason: SURG

## 2024-11-22 RX ORDER — FAMOTIDINE 20 MG/1
20 TABLET, FILM COATED ORAL ONCE
Status: DISCONTINUED | OUTPATIENT
Start: 2024-11-22 | End: 2024-11-22 | Stop reason: HOSPADM

## 2024-11-22 RX ORDER — MIDAZOLAM HYDROCHLORIDE 1 MG/ML
1 INJECTION, SOLUTION INTRAMUSCULAR; INTRAVENOUS
Status: DISCONTINUED | OUTPATIENT
Start: 2024-11-22 | End: 2024-11-22 | Stop reason: HOSPADM

## 2024-11-22 RX ORDER — FAMOTIDINE 10 MG/ML
20 INJECTION, SOLUTION INTRAVENOUS ONCE
Status: COMPLETED | OUTPATIENT
Start: 2024-11-22 | End: 2024-11-22

## 2024-11-22 RX ADMIN — SODIUM CHLORIDE: 9 INJECTION, SOLUTION INTRAVENOUS at 06:57

## 2024-11-22 RX ADMIN — PROPOFOL INJECTABLE EMULSION 50 MG: 10 INJECTION, EMULSION INTRAVENOUS at 07:15

## 2024-11-22 RX ADMIN — FAMOTIDINE 20 MG: 10 INJECTION, SOLUTION INTRAVENOUS at 06:52

## 2024-11-22 RX ADMIN — ESMOLOL HYDROCHLORIDE 10 MG: 10 INJECTION, SOLUTION INTRAVENOUS at 07:19

## 2024-11-22 RX ADMIN — PROPOFOL INJECTABLE EMULSION 50 MG: 10 INJECTION, EMULSION INTRAVENOUS at 07:10

## 2024-11-22 RX ADMIN — LIDOCAINE HYDROCHLORIDE 50 MG: 10 INJECTION, SOLUTION EPIDURAL; INFILTRATION; INTRACAUDAL; PERINEURAL at 07:09

## 2024-11-22 RX ADMIN — ESMOLOL HYDROCHLORIDE 10 MG: 10 INJECTION, SOLUTION INTRAVENOUS at 07:17

## 2024-11-22 RX ADMIN — PROPOFOL INJECTABLE EMULSION 150 MCG/KG/MIN: 10 INJECTION, EMULSION INTRAVENOUS at 07:09

## 2024-11-22 RX ADMIN — PROPOFOL INJECTABLE EMULSION 50 MG: 10 INJECTION, EMULSION INTRAVENOUS at 07:14

## 2024-11-22 RX ADMIN — MIDAZOLAM HYDROCHLORIDE 2 MG: 1 INJECTION, SOLUTION INTRAMUSCULAR; INTRAVENOUS at 07:01

## 2024-11-22 NOTE — OP NOTE
ERCP summary  See ERCP report for details    Previously placed biliary stent was removed and occlusion cholangiogram was performed with the balloon revealing no stenosis, filling defects or any other abnormalities.  Fully inflated balloon was withdrawn easily through the sphincterotomy site.  No stones or sludge.    Impression  Status post ERCP with biliary sphincterotomy for ampullary stenosis with stent removal today    Recommendation  Monitor serum liver enzymes.  I would recommend checking these monthly over the next 6 months.  She can follow-up with her primary care for this and return to GI if elevated liver enzymes are noted

## 2024-11-22 NOTE — ADDENDUM NOTE
Addendum  created 11/22/24 1334 by Bharathi Betts MD    Review and Sign - Ready for Procedure, Review and Sign - Undo

## 2024-11-22 NOTE — ANESTHESIA POSTPROCEDURE EVALUATION
Patient: Dalila Pike    Procedure Summary       Date: 11/22/24 Room / Location: [facility] ENDOSCOPY 3 /  [facility]    Anesthesia Start: 0657 Anesthesia Stop: 0730    Procedure: ENDOSCOPIC RETROGRADE CHOLANGIOPANCREATOGRAPHY Diagnosis:       Encounter for removal of biliary stent      (Encounter for removal of biliary stent [Z46.89])    Surgeons: Spenser Hassan MD Provider: Bharathi Betts MD    Anesthesia Type: general ASA Status: 3            Anesthesia Type: general    Vitals  Vitals Value Taken Time   /81 11/22/24 0730   Temp 97 °F (36.1 °C) 11/22/24 0730   Pulse 85 11/22/24 0730   Resp 18 11/22/24 0730   SpO2 99 % 11/22/24 0730           Post Anesthesia Care and Evaluation    Patient location during evaluation: PACU  Patient participation: waiting for patient participation  Level of consciousness: lethargic  Pain management: adequate    Airway patency: patent  Anesthetic complications: No anesthetic complications  PONV Status: none  Cardiovascular status: hemodynamically stable and acceptable  Respiratory status: nonlabored ventilation, acceptable and unassisted (pom mask)  Hydration status: acceptable

## 2024-11-22 NOTE — INTERVAL H&P NOTE
Patient presents for ERCP for stent removal and reassessment of biliary stricture.  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 The patient was examined and there are no changes to the H&P.

## 2024-12-25 LAB
QT INTERVAL: 390 MS
QTC INTERVAL: 455 MS

## 2025-02-03 DIAGNOSIS — I63.9 CEREBRAL INFARCTION, UNSPECIFIED: ICD-10-CM

## 2025-02-03 NOTE — TELEPHONE ENCOUNTER
Provider:  Dr. Saini  Surgery/Procedure:  n/a  Surgery/Procedure Date:  n/a  Last visit:   11/12/2024  Next visit: 05/13/2025     Reason for call:  Refill request for Brilinta 60mg. Per Dr. Saini's office note on 11/12/2024, patient was suppose to stop taking Brilinta after a few weeks but patient spoke to another provider at [facility] and they told her that she should continue taking it. Patient is on her 2nd day without Brilinta and she has been short of breath. Please advise.

## 2025-02-05 RX ORDER — TICAGRELOR 60 MG/1
60 TABLET ORAL 2 TIMES DAILY
Qty: 60 TABLET | Refills: 1 | OUTPATIENT
Start: 2025-02-05

## 2025-02-21 ENCOUNTER — OFFICE VISIT (OUTPATIENT)
Dept: NEUROLOGY | Facility: CLINIC | Age: 55
End: 2025-02-21
Payer: MEDICAID

## 2025-02-21 VITALS
BODY MASS INDEX: 29.7 KG/M2 | DIASTOLIC BLOOD PRESSURE: 88 MMHG | TEMPERATURE: 97.5 F | SYSTOLIC BLOOD PRESSURE: 136 MMHG | OXYGEN SATURATION: 96 % | HEART RATE: 92 BPM | HEIGHT: 68 IN | WEIGHT: 196 LBS

## 2025-02-21 DIAGNOSIS — I69.30 SEQUELAE, POST-STROKE: Primary | ICD-10-CM

## 2025-02-21 DIAGNOSIS — Z87.898 HISTORY OF SEIZURES: ICD-10-CM

## 2025-02-21 DIAGNOSIS — I10 PRIMARY HYPERTENSION: ICD-10-CM

## 2025-02-21 DIAGNOSIS — R25.2 SPASTICITY: ICD-10-CM

## 2025-02-21 RX ORDER — LEVETIRACETAM 750 MG/1
TABLET ORAL
COMMUNITY
Start: 2025-02-20

## 2025-02-21 RX ORDER — LACOSAMIDE 200 MG/1
1 TABLET ORAL EVERY 12 HOURS SCHEDULED
COMMUNITY
Start: 2025-02-03

## 2025-02-21 RX ORDER — CLOBAZAM 10 MG/1
TABLET ORAL
COMMUNITY
Start: 2025-02-04

## 2025-02-21 RX ORDER — CYCLOBENZAPRINE HCL 5 MG
5 TABLET ORAL 2 TIMES DAILY PRN
Qty: 60 TABLET | Refills: 0 | Status: SHIPPED | OUTPATIENT
Start: 2025-02-21

## 2025-02-25 ENCOUNTER — OFFICE VISIT (OUTPATIENT)
Dept: FAMILY MEDICINE CLINIC | Facility: CLINIC | Age: 55
End: 2025-02-25
Payer: MEDICAID

## 2025-02-25 ENCOUNTER — LAB (OUTPATIENT)
Dept: FAMILY MEDICINE CLINIC | Facility: CLINIC | Age: 55
End: 2025-02-25
Payer: MEDICAID

## 2025-02-25 VITALS
TEMPERATURE: 97.7 F | HEART RATE: 86 BPM | OXYGEN SATURATION: 97 % | DIASTOLIC BLOOD PRESSURE: 84 MMHG | HEIGHT: 68 IN | RESPIRATION RATE: 16 BRPM | SYSTOLIC BLOOD PRESSURE: 122 MMHG | BODY MASS INDEX: 30.25 KG/M2 | WEIGHT: 199.6 LBS

## 2025-02-25 DIAGNOSIS — E78.5 DYSLIPIDEMIA: ICD-10-CM

## 2025-02-25 DIAGNOSIS — F32.1 CURRENT MODERATE EPISODE OF MAJOR DEPRESSIVE DISORDER, UNSPECIFIED WHETHER RECURRENT: ICD-10-CM

## 2025-02-25 DIAGNOSIS — K21.9 GASTROESOPHAGEAL REFLUX DISEASE WITHOUT ESOPHAGITIS: ICD-10-CM

## 2025-02-25 DIAGNOSIS — Z71.6 ENCOUNTER FOR SMOKING CESSATION COUNSELING: ICD-10-CM

## 2025-02-25 DIAGNOSIS — E78.5 DYSLIPIDEMIA: Chronic | ICD-10-CM

## 2025-02-25 DIAGNOSIS — I63.9 ACUTE CVA (CEREBROVASCULAR ACCIDENT): ICD-10-CM

## 2025-02-25 DIAGNOSIS — I10 PRIMARY HYPERTENSION: ICD-10-CM

## 2025-02-25 DIAGNOSIS — I63.9 ACUTE CVA (CEREBROVASCULAR ACCIDENT): Primary | ICD-10-CM

## 2025-02-25 LAB
ALBUMIN SERPL-MCNC: 3.9 G/DL (ref 3.5–5.2)
ALBUMIN/GLOB SERPL: 1.6 G/DL
ALP SERPL-CCNC: 99 U/L (ref 39–117)
ALT SERPL W P-5'-P-CCNC: 6 U/L (ref 1–33)
ANION GAP SERPL CALCULATED.3IONS-SCNC: 10 MMOL/L (ref 5–15)
AST SERPL-CCNC: 10 U/L (ref 1–32)
BILIRUB SERPL-MCNC: 0.2 MG/DL (ref 0–1.2)
BILIRUB UR QL STRIP: NEGATIVE
BUN SERPL-MCNC: 9 MG/DL (ref 6–20)
BUN/CREAT SERPL: 16.4 (ref 7–25)
CALCIUM SPEC-SCNC: 9.7 MG/DL (ref 8.6–10.5)
CHLORIDE SERPL-SCNC: 102 MMOL/L (ref 98–107)
CLARITY UR: CLEAR
CO2 SERPL-SCNC: 29 MMOL/L (ref 22–29)
COLOR UR: YELLOW
CREAT SERPL-MCNC: 0.55 MG/DL (ref 0.57–1)
CRP SERPL-MCNC: <0.3 MG/DL (ref 0–0.5)
DEPRECATED RDW RBC AUTO: 40.5 FL (ref 37–54)
EGFRCR SERPLBLD CKD-EPI 2021: 109.1 ML/MIN/1.73
ERYTHROCYTE [DISTWIDTH] IN BLOOD BY AUTOMATED COUNT: 12.4 % (ref 12.3–15.4)
GLOBULIN UR ELPH-MCNC: 2.5 GM/DL
GLUCOSE SERPL-MCNC: 96 MG/DL (ref 65–99)
GLUCOSE UR STRIP-MCNC: NEGATIVE MG/DL
HCT VFR BLD AUTO: 43.1 % (ref 34–46.6)
HGB BLD-MCNC: 14 G/DL (ref 12–15.9)
HGB UR QL STRIP.AUTO: ABNORMAL
HOLD SPECIMEN: NORMAL
KETONES UR QL STRIP: NEGATIVE
LEUKOCYTE ESTERASE UR QL STRIP.AUTO: NEGATIVE
MCH RBC QN AUTO: 29 PG (ref 26.6–33)
MCHC RBC AUTO-ENTMCNC: 32.5 G/DL (ref 31.5–35.7)
MCV RBC AUTO: 89.2 FL (ref 79–97)
NITRITE UR QL STRIP: NEGATIVE
PH UR STRIP.AUTO: 6 [PH] (ref 5–8)
PLATELET # BLD AUTO: 261 10*3/MM3 (ref 140–450)
PMV BLD AUTO: 10.5 FL (ref 6–12)
POTASSIUM SERPL-SCNC: 4.2 MMOL/L (ref 3.5–5.2)
PROT SERPL-MCNC: 6.4 G/DL (ref 6–8.5)
PROT UR QL STRIP: NEGATIVE
RBC # BLD AUTO: 4.83 10*6/MM3 (ref 3.77–5.28)
SODIUM SERPL-SCNC: 141 MMOL/L (ref 136–145)
SP GR UR STRIP: 1.01 (ref 1–1.03)
TSH SERPL DL<=0.05 MIU/L-ACNC: 0.24 UIU/ML (ref 0.27–4.2)
URATE SERPL-MCNC: 2.7 MG/DL (ref 2.4–5.7)
UROBILINOGEN UR QL STRIP: ABNORMAL
WBC NRBC COR # BLD AUTO: 5.53 10*3/MM3 (ref 3.4–10.8)

## 2025-02-25 PROCEDURE — 86140 C-REACTIVE PROTEIN: CPT | Performed by: PHYSICIAN ASSISTANT

## 2025-02-25 PROCEDURE — 82306 VITAMIN D 25 HYDROXY: CPT | Performed by: PHYSICIAN ASSISTANT

## 2025-02-25 PROCEDURE — 86803 HEPATITIS C AB TEST: CPT | Performed by: PHYSICIAN ASSISTANT

## 2025-02-25 PROCEDURE — 83036 HEMOGLOBIN GLYCOSYLATED A1C: CPT | Performed by: PHYSICIAN ASSISTANT

## 2025-02-25 PROCEDURE — 85027 COMPLETE CBC AUTOMATED: CPT | Performed by: PHYSICIAN ASSISTANT

## 2025-02-25 PROCEDURE — 84480 ASSAY TRIIODOTHYRONINE (T3): CPT | Performed by: PHYSICIAN ASSISTANT

## 2025-02-25 PROCEDURE — 36415 COLL VENOUS BLD VENIPUNCTURE: CPT | Performed by: FAMILY MEDICINE

## 2025-02-25 PROCEDURE — 80053 COMPREHEN METABOLIC PANEL: CPT | Performed by: PHYSICIAN ASSISTANT

## 2025-02-25 PROCEDURE — 81001 URINALYSIS AUTO W/SCOPE: CPT | Performed by: PHYSICIAN ASSISTANT

## 2025-02-25 PROCEDURE — 85007 BL SMEAR W/DIFF WBC COUNT: CPT | Performed by: PHYSICIAN ASSISTANT

## 2025-02-25 PROCEDURE — 1125F AMNT PAIN NOTED PAIN PRSNT: CPT | Performed by: PHYSICIAN ASSISTANT

## 2025-02-25 PROCEDURE — 3079F DIAST BP 80-89 MM HG: CPT | Performed by: PHYSICIAN ASSISTANT

## 2025-02-25 PROCEDURE — 84439 ASSAY OF FREE THYROXINE: CPT | Performed by: PHYSICIAN ASSISTANT

## 2025-02-25 PROCEDURE — 3074F SYST BP LT 130 MM HG: CPT | Performed by: PHYSICIAN ASSISTANT

## 2025-02-25 PROCEDURE — 82607 VITAMIN B-12: CPT | Performed by: PHYSICIAN ASSISTANT

## 2025-02-25 PROCEDURE — 84550 ASSAY OF BLOOD/URIC ACID: CPT | Performed by: PHYSICIAN ASSISTANT

## 2025-02-25 PROCEDURE — 3044F HG A1C LEVEL LT 7.0%: CPT | Performed by: PHYSICIAN ASSISTANT

## 2025-02-25 PROCEDURE — 84443 ASSAY THYROID STIM HORMONE: CPT | Performed by: PHYSICIAN ASSISTANT

## 2025-02-25 PROCEDURE — 99214 OFFICE O/P EST MOD 30 MIN: CPT | Performed by: PHYSICIAN ASSISTANT

## 2025-02-25 RX ORDER — PANTOPRAZOLE SODIUM 40 MG/1
40 TABLET, DELAYED RELEASE ORAL DAILY
Qty: 90 TABLET | Refills: 3 | Status: SHIPPED | OUTPATIENT
Start: 2025-02-25

## 2025-02-25 RX ORDER — ATORVASTATIN CALCIUM 40 MG/1
40 TABLET, FILM COATED ORAL DAILY
Qty: 90 TABLET | Refills: 2 | Status: SHIPPED | OUTPATIENT
Start: 2025-02-25

## 2025-02-25 RX ORDER — LISINOPRIL 20 MG/1
20 TABLET ORAL DAILY
Qty: 90 TABLET | Refills: 2 | Status: SHIPPED | OUTPATIENT
Start: 2025-02-25

## 2025-02-25 RX ORDER — FOLIC ACID 1 MG/1
1 TABLET ORAL DAILY
COMMUNITY

## 2025-02-25 NOTE — PROGRESS NOTES
New Patient Office Visit      Date: 2025  Patient Name: Dalila Pike  : 1970   MRN: [id number]     Chief Complaint:    Chief Complaint   Patient presents with    Establish Care    Seizures       History of Present Illness: Dalila Pike is a 54 y.o. female who is here today for    History of Present Illness  The patient presents for evaluation of seizures, stroke, smoking cessation, depression, and cholesterol management.    She is currently on antiepileptic medication due to the onset of seizures following a stroke. She has not experienced any further seizures since her transfer to the [facility]. She reports persistent headaches but no recent seizures. She had a consultation with neurosurgery 2 days ago, during which an ultrasound was recommended to evaluate her blood vessels. She has a stent in place and was previously on anticoagulation therapy, which was discontinued before . She is scheduled to see Dr. Sen for her seizure medications. Her regimen includes Keppra and Vimpat.    She is seeking a refill of her lisinopril prescription. She is also on Lipitor and reports no associated muscle pain.    She expresses a desire to quit smoking, having recently relapsed. She is currently smoking approximately 8 cigarettes per day, a reduction from her previous consumption of 1.5 to 2 packs per day. She has a scheduled appointment with Dr. Mcrae, a pulmonologist, and is awaiting the results of a sleep study conducted to assess the need for a CPAP machine. She reports snoring and difficulty breathing. She had previously used nicotine patches as a cessation aid.    She is on muscle relaxers for her hand and has been doing occupational therapy on her own. She is not allowed to drive yet. She is supposed to get Botox injections for her hand.    She reports experiencing feelings of depression and hopelessness daily, which are new symptoms for her. She also reports difficulty maintaining  sleep, fatigue, overeating, and anxiety. She does not endorse any suicidal ideation or self-harm.    SOCIAL HISTORY  The patient admits to smoking half a pack of cigarettes a day.    MEDICATIONS  Current: Lisinopril, Keppra, Vimpat, Lipitor       Subjective      Review of Systems:   Review of Systems   Constitutional: Negative.    HENT: Negative.     Respiratory: Negative.     Cardiovascular: Negative.      Past Medical History:   Past Medical History:   Diagnosis Date    Acute CVA (cerebrovascular accident) 08/21/2024    Alcohol use 08/21/2024    Alcoholism     Anemia     Cholelithiasis     Diabetes mellitus     Dyslipidemia 08/21/2024    Fatty liver     Hyperlipidemia     Hypertension     Obesity (BMI 30-39.9) 08/21/2024    PAD (peripheral artery disease) 08/21/2024    Tobacco use 08/21/2024       Past Surgical History:   Past Surgical History:   Procedure Laterality Date    ABDOMINAL SURGERY      ENDOSCOPY N/A 10/08/2024    Procedure: ESOPHAGOGASTRODUODENOSCOPY;  Surgeon: Elie Sanders MD;  Location:  [facility];  Service: Gastroenterology;  Laterality: N/A;    ERCP N/A 10/08/2024    Procedure: ENDOSCOPIC RETROGRADE CHOLANGIOPANCREATOGRAPHY WITH STENT PLACEMENT;  Surgeon: Elie Sanders MD;  Location:  [facility];  Service: Gastroenterology;  Laterality: N/A;  SPHINCTEROTOMY @ 1737, 9-12MM AND 12-15MM BALLOON SWEEP TO CBD    ERCP N/A 11/22/2024    Procedure: ENDOSCOPIC RETROGRADE CHOLANGIOPANCREATOGRAPHY;  Surgeon: Spenser Hassan MD;  Location:  [facility];  Service: Gastroenterology;  Laterality: N/A;    INTERVENTIONAL RADIOLOGY PROCEDURE Bilateral 08/21/2024    Procedure: Carotid Cervical Angiogram;  Surgeon: Jamaal Saini MD;  Location:  [facility];  Service: Interventional Radiology;  Laterality: Bilateral;       Family History:   Family History   Problem Relation Age of Onset    Alcohol abuse Father        Social History:   Social History  "    Socioeconomic History    Marital status:    Tobacco Use    Smoking status: Former     Average packs/day: 2.0 packs/day for 38.8 years (76.4 ttl pk-yrs)     Types: Cigarettes     Start date: 2024     Passive exposure: Current    Smokeless tobacco: Never    Tobacco comments:     4-5 a day   Vaping Use    Vaping status: Never Used   Substance and Sexual Activity    Alcohol use: Not Currently    Drug use: Not Currently    Sexual activity: Defer       Medications:     Current Outpatient Medications:     aspirin 81 MG EC tablet, Take 1 tablet by mouth Daily., Disp: , Rfl:     atorvastatin (LIPITOR) 40 MG tablet, Take 1 tablet by mouth Daily., Disp: , Rfl:     cloBAZam (ONFI) 10 MG tablet, TAKE 1/2 TABLET BY MOUTH 2 TIMES DAY, Disp: , Rfl:     cyclobenzaprine (FLEXERIL) 5 MG tablet, Take 1 tablet by mouth 2 (Two) Times a Day As Needed for Muscle Spasms., Disp: 60 tablet, Rfl: 0    FLUoxetine (PROzac) 20 MG capsule, Take 1 capsule by mouth Daily., Disp: 30 capsule, Rfl: 2    folic acid (FOLVITE) 1 MG tablet, Take 1 tablet by mouth Daily., Disp: , Rfl:     lacosamide (VIMPAT) 200 MG tablet, Take 1 tablet by mouth Every 12 (Twelve) Hours., Disp: , Rfl:     levETIRAcetam (KEPPRA) 750 MG tablet, , Disp: , Rfl:     lisinopril (PRINIVIL,ZESTRIL) 20 MG tablet, 1 tablet., Disp: , Rfl:     pantoprazole (PROTONIX) 40 MG EC tablet, Take 1 tablet by mouth Daily., Disp: 90 tablet, Rfl: 3    Allergies:   Allergies   Allergen Reactions    Erythromycin Anaphylaxis    Latex Rash    Toradol [Ketorolac Tromethamine] Rash    Contrast Dye (Echo Or Unknown Ct/Mr) Unknown (See Comments)     Hives on chest       Objective     Physical Exam:  Vital Signs:   Vitals:    02/25/25 1355   BP: 122/84   BP Location: Right arm   Patient Position: Sitting   Cuff Size: Large Adult   Pulse: 86   Resp: 16   Temp: 97.7 °F (36.5 °C)   TempSrc: Temporal   SpO2: 97%   Weight: 90.5 kg (199 lb 9.6 oz)   Height: 172.7 cm (67.99\")     Body mass index is " 30.36 kg/m².   Facility age limit for growth %kate is 20 years.          Physical Exam  Vitals and nursing note reviewed.   Constitutional:       General: She is not in acute distress.     Appearance: Normal appearance. She is not ill-appearing or toxic-appearing.   Eyes:      Extraocular Movements: Extraocular movements intact.      Pupils: Pupils are equal, round, and reactive to light.   Cardiovascular:      Rate and Rhythm: Normal rate and regular rhythm.      Heart sounds: No murmur heard.     No friction rub. No gallop.   Pulmonary:      Effort: Pulmonary effort is normal. No respiratory distress.      Breath sounds: Normal breath sounds. No wheezing or rales.   Neurological:      Mental Status: She is alert and oriented to person, place, and time.      Motor: Weakness present.      Coordination: Coordination abnormal.      Gait: Gait abnormal.     Assessment / Plan      Assessment/Plan:   1. Acute CVA (cerebrovascular accident)    - Comprehensive Metabolic Panel; Future  - Ambulatory Referral to Physical Therapy for Evaluation & Treatment    2. Primary hypertension  A prescription for lisinopril will be sent to [facility] in [place].    3. Dyslipidemia  She is on Lipitor for cholesterol management. The importance of continuing this medication to prevent plaque buildup and reduce the risk of stroke was discussed. A prescription for Lipitor will be sent to [facility] in [place]. Blood work will be done today to monitor liver function and ensure it is handling the medication well.  - C-reactive Protein; Future  - Hepatitis C Antibody; Future  - CBC With Manual Differential; Future  - TSH Rfx On Abnormal To Free T4; Future  - Hemoglobin A1c; Future  - Vitamin B12; Future  - Vitamin D,25-Hydroxy; Future  - Uric Acid; Future  - Urinalysis With Culture If Indicated - Urine, Clean Catch; Future    4. Current moderate episode of major depressive disorder, unspecified whether recurrent  She reports feeling down, depressed, or  hopeless every day for the past two weeks. A prescription for Celexa will be provided to help balance the chemicals in her brain. She is advised to spend time in natural light, especially early in the day, and to maintain a healthy diet.  - FLUoxetine (PROzac) 20 MG capsule; Take 1 capsule by mouth Daily.  Dispense: 30 capsule; Refill: 2    5. Encounter for smoking cessation counseling  A prescription for 14 mg nicotine patches will be provided, with instructions to remove the patch at bedtime and reapply it an hour before waking up. The potential side effects of nicotine patches, including vivid dreams and sleep disturbances, were discussed.    Assessment & Plan  1. Seizure disorder.  She is currently on Keppra and Vimpat for seizure management. She has not experienced any further seizures since her transfer to the [facility]. She will continue her current seizure medications as prescribed by her neurologist.    2. Hypertension.  A prescription for lisinopril will be sent to [facility] in [place].    3. Cholesterol management.  She is on Lipitor for cholesterol management. The importance of continuing this medication to prevent plaque buildup and reduce the risk of stroke was discussed. A prescription for Lipitor will be sent to [facility] in [place]. Blood work will be done today to monitor liver function and ensure it is handling the medication well.    4. Smoking cessation.  A prescription for 14 mg nicotine patches will be provided, with instructions to remove the patch at bedtime and reapply it an hour before waking up. The potential side effects of nicotine patches, including vivid dreams and sleep disturbances, were discussed.    5. Depression.  She reports feeling down, depressed, or hopeless every day for the past two weeks. A prescription for Celexa will be provided to help balance the chemicals in her brain. She is advised to spend time in natural light, especially early in the day, and to maintain a healthy diet.    6.  Spasticity.  She is on muscle relaxers for her hand and has been doing occupational therapy on her own. She is not allowed to drive yet. She is supposed to get Botox injections for her hand.    Follow-up  The patient will follow up in 2 weeks.    Follow Up:   Return in about 2 weeks (around 3/11/2025) for Recheck.    At [facility], we believe that sharing information builds trust and better relationships. You are receiving this note because you recently visited [facility]. It is possible you will see health information before a provider has talked with you about it. This kind of information can be easy to misunderstand. To help you fully understand what it means for your health, we urge you to discuss this note with your provider.    Patient or patient representative verbalized consent for the use of Ambient Listening during the visit with  Aylin Palma PA-C for chart documentation. 3/4/2025  10:13 EST     Aylin Palma PA-C   [facility] [place]

## 2025-02-26 ENCOUNTER — PATIENT ROUNDING (BHMG ONLY) (OUTPATIENT)
Dept: FAMILY MEDICINE CLINIC | Facility: CLINIC | Age: 55
End: 2025-02-26
Payer: MEDICAID

## 2025-02-26 ENCOUNTER — TELEPHONE (OUTPATIENT)
Dept: FAMILY MEDICINE CLINIC | Facility: CLINIC | Age: 55
End: 2025-02-26
Payer: MEDICAID

## 2025-02-26 DIAGNOSIS — I10 PRIMARY HYPERTENSION: Primary | ICD-10-CM

## 2025-02-26 LAB
25(OH)D3 SERPL-MCNC: 32.5 NG/ML (ref 30–100)
ANISOCYTOSIS BLD QL: ABNORMAL
BACTERIA UR QL AUTO: ABNORMAL /HPF
BASOPHILS # BLD MANUAL: 0 10*3/MM3 (ref 0–0.2)
BASOPHILS NFR BLD MANUAL: 0 % (ref 0–1.5)
EOSINOPHIL # BLD MANUAL: 0.12 10*3/MM3 (ref 0–0.4)
EOSINOPHIL NFR BLD MANUAL: 2.1 % (ref 0.3–6.2)
HBA1C MFR BLD: 5.6 % (ref 4.8–5.6)
HCV AB SER QL: NORMAL
HYALINE CASTS UR QL AUTO: ABNORMAL /LPF
LYMPHOCYTES # BLD MANUAL: 1.71 10*3/MM3 (ref 0.7–3.1)
LYMPHOCYTES NFR BLD MANUAL: 2.1 % (ref 5–12)
MONOCYTES # BLD: 0.12 10*3/MM3 (ref 0.1–0.9)
NEUTROPHILS # BLD AUTO: 3.59 10*3/MM3 (ref 1.7–7)
NEUTROPHILS NFR BLD MANUAL: 64.9 % (ref 42.7–76)
PLAT MORPH BLD: NORMAL
POIKILOCYTOSIS BLD QL SMEAR: ABNORMAL
RBC # UR STRIP: ABNORMAL /HPF
REF LAB TEST METHOD: ABNORMAL
SQUAMOUS #/AREA URNS HPF: ABNORMAL /HPF
T3 SERPL-MCNC: 150 NG/DL (ref 80–200)
T4 FREE SERPL-MCNC: 0.96 NG/DL (ref 0.92–1.68)
VARIANT LYMPHS NFR BLD MANUAL: 30.9 % (ref 19.6–45.3)
VIT B12 BLD-MCNC: 505 PG/ML (ref 211–946)
WBC # UR STRIP: ABNORMAL /HPF
WBC MORPH BLD: NORMAL

## 2025-02-26 NOTE — TELEPHONE ENCOUNTER
----- Message from Darinel Palma sent at 2/26/2025  8:36 AM EST -----  Patient is T3 level is normal.  We need to continue to monitor her thyroid function.

## 2025-02-26 NOTE — TELEPHONE ENCOUNTER
Name: Dalila Pike      Relationship: Self      Best Callback Number:       HUB PROVIDED THE RELAY MESSAGE FROM THE OFFICE      PATIENT: VOICED UNDERSTANDING AND HAS NO FURTHER QUESTIONS AT THIS TIME    ADDITIONAL INFORMATION:

## 2025-03-11 ENCOUNTER — OFFICE VISIT (OUTPATIENT)
Dept: FAMILY MEDICINE CLINIC | Facility: CLINIC | Age: 55
End: 2025-03-11
Payer: MEDICAID

## 2025-03-11 VITALS
OXYGEN SATURATION: 97 % | DIASTOLIC BLOOD PRESSURE: 80 MMHG | HEIGHT: 68 IN | BODY MASS INDEX: 30.65 KG/M2 | RESPIRATION RATE: 17 BRPM | WEIGHT: 202.2 LBS | TEMPERATURE: 98 F | SYSTOLIC BLOOD PRESSURE: 104 MMHG | HEART RATE: 79 BPM

## 2025-03-11 DIAGNOSIS — I63.9 ACUTE CVA (CEREBROVASCULAR ACCIDENT): Primary | ICD-10-CM

## 2025-03-11 DIAGNOSIS — R79.89 LOW VITAMIN D LEVEL: ICD-10-CM

## 2025-03-11 DIAGNOSIS — R31.21 ASYMPTOMATIC MICROSCOPIC HEMATURIA: ICD-10-CM

## 2025-03-11 DIAGNOSIS — R31.1 BENIGN ESSENTIAL MICROSCOPIC HEMATURIA: ICD-10-CM

## 2025-03-11 LAB
BILIRUB BLD-MCNC: NEGATIVE MG/DL
CLARITY, POC: CLEAR
COLOR UR: ABNORMAL
GLUCOSE UR STRIP-MCNC: NEGATIVE MG/DL
KETONES UR QL: NEGATIVE
LEUKOCYTE EST, POC: NEGATIVE
NITRITE UR-MCNC: NEGATIVE MG/ML
PH UR: 5.5 [PH] (ref 5–8)
PROT UR STRIP-MCNC: NEGATIVE MG/DL
RBC # UR STRIP: NEGATIVE /UL
SP GR UR: 1.03 (ref 1–1.03)
UROBILINOGEN UR QL: NORMAL

## 2025-03-11 RX ORDER — NICOTINE 4 MG/1
1 INHALANT RESPIRATORY (INHALATION) AS NEEDED
Qty: 1 EACH | Refills: 1 | Status: SHIPPED | OUTPATIENT
Start: 2025-03-11

## 2025-03-11 RX ORDER — ERGOCALCIFEROL 1.25 MG/1
50000 CAPSULE, LIQUID FILLED ORAL WEEKLY
Qty: 5 CAPSULE | Refills: 3 | Status: SHIPPED | OUTPATIENT
Start: 2025-03-11

## 2025-03-11 NOTE — PROGRESS NOTES
Follow Up Office Visit      Date: 2025   Patient Name: Dalila Pike  : 1970   MRN: [id number]     Chief Complaint:    Chief Complaint   Patient presents with    Follow-up     2 week flexeril makes her lose her balance.        History of Present Illness: Dalila Pike is a 54 y.o. female who is here today for    History of Present Illness  The patient presents for evaluation of dizziness, depression, smoking cessation, suspected kidney stones, and low vitamin D.    She reports experiencing intermittent dizziness, which she attributes to her seizure medication. She has a history of 22 seizures, which occurred prior to her initial consultation with me. She was hospitalized at [facility] for 21 to 22 days, during which she was placed in a drug-induced coma. These events transpired approximately 2 weeks post-stroke.    She has been prescribed medication for mood regulation but reports no discernible improvement in her condition. She continues to experience depressive symptoms.    She expresses a desire to increase the dosage of her nicotine patch from 14 mg to 21 mg, as she believes the current dosage is insufficient in curbing her cravings. She recalls that her physician had previously suggested that the nicotine patch may have been a contributing factor to her seizures. She has attempted to use the 14 mg patch but experienced adverse effects such as nausea and dizziness. Despite these challenges, she continues to smoke, consuming approximately 4 to 5 cigarettes daily.    She suspects the development of kidney stones, a condition she experiences annually. She reports the presence of blood in her urine, a symptom that typically precedes the onset of pain.    She is currently on a daily regimen of vitamin B12 supplements. She is requesting a prescription for vitamin D which was noted to be low in recent labs.    SOCIAL HISTORY  The patient admits to smoking about 4-5 cigarettes a  day.    MEDICATIONS  Current: seizure medicine, nicotine patch, vitamin B12   .    Subjective      Review of Systems:   Review of Systems   Constitutional: Negative.    HENT: Negative.     Respiratory: Negative.     Cardiovascular: Negative.      I have reviewed the patients family history, social history, past medical history, past surgical history and have updated it as appropriate.     Medications:     Current Outpatient Medications:     aspirin 81 MG EC tablet, Take 1 tablet by mouth Daily., Disp: , Rfl:     atorvastatin (LIPITOR) 40 MG tablet, Take 1 tablet by mouth Daily., Disp: 90 tablet, Rfl: 2    cloBAZam (ONFI) 10 MG tablet, TAKE 1/2 TABLET BY MOUTH 2 TIMES DAY, Disp: , Rfl:     cyclobenzaprine (FLEXERIL) 5 MG tablet, Take 1 tablet by mouth 2 (Two) Times a Day As Needed for Muscle Spasms., Disp: 60 tablet, Rfl: 0    FLUoxetine (PROzac) 20 MG capsule, Take 1 capsule by mouth Daily., Disp: 30 capsule, Rfl: 2    folic acid (FOLVITE) 1 MG tablet, Take 1 tablet by mouth Daily., Disp: , Rfl:     lacosamide (VIMPAT) 200 MG tablet, Take 1 tablet by mouth Every 12 (Twelve) Hours., Disp: , Rfl:     levETIRAcetam (KEPPRA) 750 MG tablet, , Disp: , Rfl:     lisinopril (PRINIVIL,ZESTRIL) 20 MG tablet, Take 1 tablet by mouth Daily., Disp: 90 tablet, Rfl: 2    nicotine (Nicotrol) 10 MG inhaler, Inhale 1 puff As Needed for Smoking Cessation., Disp: 1 each, Rfl: 1    pantoprazole (PROTONIX) 40 MG EC tablet, Take 1 tablet by mouth Daily., Disp: 90 tablet, Rfl: 3    vitamin D (ERGOCALCIFEROL) 1.25 MG (03455 UT) capsule capsule, Take 1 capsule by mouth 1 (One) Time Per Week., Disp: 5 capsule, Rfl: 3    Allergies:   Allergies   Allergen Reactions    Erythromycin Anaphylaxis    Latex Rash    Toradol [Ketorolac Tromethamine] Rash    Contrast Dye (Echo Or Unknown Ct/Mr) Unknown (See Comments)     Hives on chest       Objective     Physical Exam: Please see above  Vital Signs:   Vitals:    03/11/25 1337   BP: 104/80   BP Location:  "Right arm   Patient Position: Sitting   Cuff Size: Large Adult   Pulse: 79   Resp: 17   Temp: 98 °F (36.7 °C)   TempSrc: Temporal   SpO2: 97%   Weight: 91.7 kg (202 lb 3.2 oz)   Height: 172.7 cm (67.99\")   PainSc: 0-No pain     Body mass index is 30.75 kg/m².  Facility age limit for growth %kate is 20 years.          Physical Exam  Vitals and nursing note reviewed.   Constitutional:       General: She is not in acute distress.     Appearance: Normal appearance. She is not ill-appearing or toxic-appearing.   Eyes:      Extraocular Movements: Extraocular movements intact.      Pupils: Pupils are equal, round, and reactive to light.   Cardiovascular:      Rate and Rhythm: Normal rate and regular rhythm.      Heart sounds: No murmur heard.     No friction rub. No gallop.   Pulmonary:      Effort: Pulmonary effort is normal. No respiratory distress.      Breath sounds: Normal breath sounds. No wheezing or rales.   Neurological:      Mental Status: She is alert.     Procedures    Assessment / Plan      Assessment/Plan:   1. Acute CVA (cerebrovascular accident)  A prescription for a Nicotrol inhaler has been issued, with the goal of reducing her nicotine intake. Each cartridge contains the equivalent of 10 cigarettes, and she is advised to make each cartridge last more than 2 days. The prescription will be sent to the [facility] in [place].    2. Low vitamin D level  Her vitamin D level is slightly below the desired range at 32. She is advised to continue taking over-the-counter vitamin D supplements. A prescription for vitamin D 5000 international units once a week has been issued to help improve her levels. The prescription will be sent to the [facility] in [place].     - vitamin D (ERGOCALCIFEROL) 1.25 MG (00202 UT) capsule capsule; Take 1 capsule by mouth 1 (One) Time Per Week.  Dispense: 5 capsule; Refill: 3    3. Benign essential microscopic hematuria  No  She reports symptoms consistent with her previous kidney stone episodes, " including blood in her urine. A urine test will be conducted today to check for blood and signs of infection. If blood is detected, an ultrasound of her kidneys will be scheduled within the next week or two.w resolved    - POC Urinalysis Dipstick    4. Asymptomatic microscopic hematuria       Assessment & Plan  1. Dizziness.  The dizziness is likely due to her seizure medication, which can affect balance and cause a staggering sensation. She also has weakness on one side, which may contribute to her symptoms. She is advised to be cautious while moving around to prevent falls and injuries.    2. Depression.  The current mood medication needs more time to take effect, typically around 2 weeks. It is too early to increase the dosage as it may affect her seizure threshold. She is advised to continue the current medication regimen and monitor for any changes in her mood.    3. Smoking cessation.  A prescription for a Nicotrol inhaler has been issued, with the goal of reducing her nicotine intake. Each cartridge contains the equivalent of 10 cigarettes, and she is advised to make each cartridge last more than 2 days. The prescription will be sent to the [facility] in [place].    4. Suspected kidney stones.  She reports symptoms consistent with her previous kidney stone episodes, including blood in her urine. A urine test will be conducted today to check for blood and signs of infection. If blood is detected, an ultrasound of her kidneys will be scheduled within the next week or two.    5. Low vitamin D.  Her vitamin D level is slightly below the desired range at 32. She is advised to continue taking over-the-counter vitamin D supplements. A prescription for vitamin D 5000 international units once a week has been issued to help improve her levels. The prescription will be sent to the [facility] in [place].       Follow Up:   Return in about 3 months (around 6/11/2025) for Recheck.      At [facility], we believe that sharing  information builds trust and better relationships. You are receiving this note because you recently visited [facility]. It is possible you will see health information before a provider has talked with you about it. This kind of information can be easy to misunderstand. To help you fully understand what it means for your health, we urge you to discuss this note with your provider.    Aylin Palma PA-C  [facility]

## 2025-03-13 ENCOUNTER — TELEPHONE (OUTPATIENT)
Dept: FAMILY MEDICINE CLINIC | Facility: CLINIC | Age: 55
End: 2025-03-13
Payer: MEDICAID

## 2025-03-13 NOTE — TELEPHONE ENCOUNTER
PA for nicotrol 10mg inhaler was denied. Message from [employer]:    Information regarding your request  Obsolete product, please use current NDC.    I will research what is available and resubmit.

## 2025-03-13 NOTE — TELEPHONE ENCOUNTER
[facility] Waiting on response.

## 2025-03-24 DIAGNOSIS — R25.2 SPASTICITY: ICD-10-CM

## 2025-03-24 RX ORDER — CYCLOBENZAPRINE HCL 5 MG
5 TABLET ORAL 2 TIMES DAILY PRN
Qty: 60 TABLET | Refills: 0 | Status: SHIPPED | OUTPATIENT
Start: 2025-03-24

## 2025-03-24 NOTE — TELEPHONE ENCOUNTER
Caller: Abiel Mijares    Relationship: Emergency Contact,     Best call back number: [phone]     Requested Prescriptions:   Requested Prescriptions     Pending Prescriptions Disp Refills    cyclobenzaprine (FLEXERIL) 5 MG tablet 60 tablet 0     Sig: Take 1 tablet by mouth 2 (Two) Times a Day As Needed for Muscle Spasms.        Pharmacy where request should be sent: [facility] #[id number] - [place], [place] - [address] [facility] - [phone] [facility] [phone] FX     Last office visit with prescribing clinician: 3/11/2025   Last telemedicine visit with prescribing clinician: Visit date not found   Next office visit with prescribing clinician: 6/16/2025     Additional details provided by patient: PLEASE SEND REFILLS. TRYING TO GET THE MUSCLES TO RELAX SO THEY CAN DO THE BOTOX    Does the patient have less than a 3 day supply:  [x] Yes OUT SINCE SATURDAY, 03/22/25    Would you like a call back once the refill request has been completed: [] Yes [x] No    If the office needs to give you a call back, can they leave a voicemail: [x] Yes [] No    Alessandra Moya Rep   03/24/25 13:44 EDT

## 2025-04-02 ENCOUNTER — OFFICE VISIT (OUTPATIENT)
Dept: FAMILY MEDICINE CLINIC | Facility: CLINIC | Age: 55
End: 2025-04-02
Payer: MEDICAID

## 2025-04-02 ENCOUNTER — LAB (OUTPATIENT)
Dept: FAMILY MEDICINE CLINIC | Facility: CLINIC | Age: 55
End: 2025-04-02
Payer: MEDICAID

## 2025-04-02 ENCOUNTER — RESULTS FOLLOW-UP (OUTPATIENT)
Dept: FAMILY MEDICINE CLINIC | Facility: CLINIC | Age: 55
End: 2025-04-02

## 2025-04-02 VITALS
HEIGHT: 68 IN | TEMPERATURE: 98.1 F | SYSTOLIC BLOOD PRESSURE: 118 MMHG | HEART RATE: 80 BPM | DIASTOLIC BLOOD PRESSURE: 82 MMHG | BODY MASS INDEX: 30.34 KG/M2 | RESPIRATION RATE: 16 BRPM | WEIGHT: 200.2 LBS | OXYGEN SATURATION: 97 %

## 2025-04-02 DIAGNOSIS — R44.3 HALLUCINATIONS: ICD-10-CM

## 2025-04-02 DIAGNOSIS — R30.0 DYSURIA: Primary | ICD-10-CM

## 2025-04-02 DIAGNOSIS — J01.00 ACUTE MAXILLARY SINUSITIS, RECURRENCE NOT SPECIFIED: ICD-10-CM

## 2025-04-02 DIAGNOSIS — R30.0 DYSURIA: ICD-10-CM

## 2025-04-02 DIAGNOSIS — I10 PRIMARY HYPERTENSION: ICD-10-CM

## 2025-04-02 DIAGNOSIS — S42.295A OTHER CLOSED NONDISPLACED FRACTURE OF PROXIMAL END OF LEFT HUMERUS, INITIAL ENCOUNTER: ICD-10-CM

## 2025-04-02 LAB
ALBUMIN UR-MCNC: 3.4 MG/DL
BILIRUB BLD-MCNC: NEGATIVE MG/DL
CLARITY, POC: CLEAR
COLOR UR: ABNORMAL
CREAT UR-MCNC: 184.5 MG/DL
GLUCOSE UR STRIP-MCNC: NEGATIVE MG/DL
KETONES UR QL: NEGATIVE
LEUKOCYTE EST, POC: NEGATIVE
MICROALBUMIN/CREAT UR: 18.4 MG/G (ref 0–29)
NITRITE UR-MCNC: NEGATIVE MG/ML
PH UR: 5.5 [PH] (ref 5–8)
PROT UR STRIP-MCNC: ABNORMAL MG/DL
RBC # UR STRIP: ABNORMAL /UL
SP GR UR: 1.02 (ref 1–1.03)
UROBILINOGEN UR QL: NORMAL

## 2025-04-02 PROCEDURE — 87086 URINE CULTURE/COLONY COUNT: CPT | Performed by: NURSE PRACTITIONER

## 2025-04-03 LAB — BACTERIA SPEC AEROBE CULT: NORMAL

## 2025-04-03 RX ORDER — SULFAMETHOXAZOLE AND TRIMETHOPRIM 800; 160 MG/1; MG/1
1 TABLET ORAL 2 TIMES DAILY
Qty: 20 TABLET | Refills: 0 | Status: SHIPPED | OUTPATIENT
Start: 2025-04-03

## 2025-04-04 DIAGNOSIS — S42.295A OTHER CLOSED NONDISPLACED FRACTURE OF PROXIMAL END OF LEFT HUMERUS, INITIAL ENCOUNTER: Primary | ICD-10-CM

## 2025-04-24 DIAGNOSIS — R25.2 SPASTICITY: ICD-10-CM

## 2025-04-24 RX ORDER — CYCLOBENZAPRINE HCL 5 MG
5 TABLET ORAL 2 TIMES DAILY PRN
Qty: 60 TABLET | Refills: 0 | Status: SHIPPED | OUTPATIENT
Start: 2025-04-24

## 2025-05-07 DIAGNOSIS — F32.1 CURRENT MODERATE EPISODE OF MAJOR DEPRESSIVE DISORDER, UNSPECIFIED WHETHER RECURRENT: ICD-10-CM

## 2025-05-07 NOTE — TELEPHONE ENCOUNTER
Rx Refill Note  Requested Prescriptions     Pending Prescriptions Disp Refills    FLUoxetine (PROzac) 20 MG capsule [Pharmacy Med Name: FLUOXETINE HCL 20 MG CAPSULE] 30 capsule 2     Sig: TAKE 1 CAPSULE BY MOUTH EVERY DAY      Last office visit with prescribing clinician: 3/11/2025   Last telemedicine visit with prescribing clinician: Visit date not found   Next office visit with prescribing clinician: 6/16/2025                         Would you like a call back once the refill request has been completed: [] Yes [] No    If the office needs to give you a call back, can they leave a voicemail: [] Yes [] No    Breanne Benson MA  05/07/25, 12:40 EDT

## 2025-05-13 ENCOUNTER — HOSPITAL ENCOUNTER (OUTPATIENT)
Dept: CARDIOLOGY | Facility: HOSPITAL | Age: 55
Discharge: HOME | End: 2025-05-13
Admitting: STUDENT IN AN ORGANIZED HEALTH CARE EDUCATION/TRAINING PROGRAM
Payer: MEDICAID

## 2025-05-13 ENCOUNTER — OFFICE VISIT (OUTPATIENT)
Dept: NEUROSURGERY | Facility: CLINIC | Age: 55
End: 2025-05-13
Payer: MEDICAID

## 2025-05-13 VITALS — BODY MASS INDEX: 30.34 KG/M2 | HEIGHT: 68 IN | WEIGHT: 200.18 LBS

## 2025-05-13 VITALS
WEIGHT: 197 LBS | HEIGHT: 67 IN | DIASTOLIC BLOOD PRESSURE: 92 MMHG | BODY MASS INDEX: 30.92 KG/M2 | TEMPERATURE: 96.2 F | SYSTOLIC BLOOD PRESSURE: 138 MMHG

## 2025-05-13 DIAGNOSIS — I63.9 ACUTE CVA (CEREBROVASCULAR ACCIDENT): ICD-10-CM

## 2025-05-13 DIAGNOSIS — I65.23 BILATERAL CAROTID ARTERY STENOSIS: Primary | ICD-10-CM

## 2025-05-13 PROCEDURE — 93880 EXTRACRANIAL BILAT STUDY: CPT

## 2025-05-13 RX ORDER — HYDROCODONE BITARTRATE AND ACETAMINOPHEN 7.5; 325 MG/1; MG/1
1 TABLET ORAL
COMMUNITY
Start: 2025-05-07

## 2025-05-13 NOTE — PROGRESS NOTES
"NEUROSURGERY PROGRESS NOTE    Patient: Dalila Pike    : 1970    Primary Care Provider: Aylin Palma PA-C    Chief Complaint: Stroke    Subjective: This is a 54-year-old female who underwent right internal carotid artery stent and thrombectomy for tandem right ICA and M1 occlusions in August of last year.  She is here today for follow-up.  Overall, the patient seems to be slightly worse than her appointment 3 months ago.  She has developed issues with seizures which required her to be placed on antiepileptics by neurology.  She seems to be having some hallucinations secondary to these medications.  She denies any significant changes in her overall neurological exam, but she does feel like she is having more trouble walking.  Unfortunately, she did have a recent fall which resulted in a significant left shoulder injury which is preventing her from moving her left arm.    Objective    Vital Signs: Blood pressure 138/92, temperature 96.2 °F (35.7 °C), temperature source Infrared, height 170.2 cm (67\"), weight 89.4 kg (197 lb).    Physical Exam  Awake, alert and oriented x 3  Opens eyes spont  Pupils 3 mm rx bilat  Extraocular muscles intact bilaterally  Face symmetric bilaterally  Tongue midline  5/5 in the right upper and bilateral lower extremities.  The left lower extremity is diffusely 3 to 4-, but some of which is secondary to shoulder pain.    Current Medications:   Current Outpatient Medications:     aspirin 81 MG EC tablet, Take 1 tablet by mouth Daily., Disp: , Rfl:     atorvastatin (LIPITOR) 40 MG tablet, Take 1 tablet by mouth Daily., Disp: 90 tablet, Rfl: 2    cyclobenzaprine (FLEXERIL) 5 MG tablet, TAKE 1 TABLET BY MOUTH 2 TIMES A DAY AS NEEDED FOR MUSCLE SPASMS., Disp: 60 tablet, Rfl: 0    FLUoxetine (PROzac) 20 MG capsule, TAKE 1 CAPSULE BY MOUTH EVERY DAY, Disp: 90 capsule, Rfl: 1    folic acid (FOLVITE) 1 MG tablet, Take 1 tablet by mouth Daily., Disp: , Rfl:     " HYDROcodone-acetaminophen (NORCO) 7.5-325 MG per tablet, Take 1 tablet by mouth every 6 (six) to 8 (eight) hours as needed for Pain., Disp: , Rfl:     lacosamide (VIMPAT) 200 MG tablet, Take 1 tablet by mouth Every 12 (Twelve) Hours., Disp: , Rfl:     levETIRAcetam (KEPPRA) 750 MG tablet, , Disp: , Rfl:     lisinopril (PRINIVIL,ZESTRIL) 20 MG tablet, Take 1 tablet by mouth Daily., Disp: 90 tablet, Rfl: 2    pantoprazole (PROTONIX) 40 MG EC tablet, Take 1 tablet by mouth Daily., Disp: 90 tablet, Rfl: 3    vitamin D (ERGOCALCIFEROL) 1.25 MG (60631 UT) capsule capsule, Take 1 capsule by mouth 1 (One) Time Per Week., Disp: 5 capsule, Rfl: 3     Laboratory Results:                              Brief Urine Lab Results  (Last result in the past 365 days)        Color   Clarity   Blood   Leuk Est   Nitrite   Protein   CREAT   Urine HCG        04/02/25 1012             184.5               Microbiology Results (last 10 days)       ** No results found for the last 240 hours. **            Diagnostic Imaging: I reviewed and independently interpreted the new imaging.     Assessment/Plan:  This is a 54-year-old female who underwent right internal carotid artery stent and thrombectomy for acute right M1 and ICA occlusions in August of last year.  A TICI 3 reperfusion was achieved.  Clinically, the patient seems slightly worse than her appointment 3 months ago.  Unfortunately, she has developed seizures which has required her to be on antiepileptics and I think is likely giving her some side effects.  Additionally, she had a recent fall resulting in a significant shoulder injury.  With regards to the patient's right internal carotid artery stent, her new Doppler show complete patency without any evidence of stenosis or elevated velocities.  She will need to remain on a baby aspirin daily with regards to her stent.  I will defer to her neurologist for further management of her seizures, antiepileptic side effects and overall stroke  recovery.  We will plan for the patient to follow-up with me in 1 year with a new carotid Doppler.    Diagnoses and all orders for this visit:    1. Bilateral carotid artery stenosis (Primary)  -     US Carotid Bilateral; Future        Dalila Pike  reports that she has quit smoking. Her smoking use included cigarettes. She started smoking about 16 months ago. She has a 76.4 pack-year smoking history. She has been exposed to tobacco smoke. She has never used smokeless tobacco.       Jamaal Saini MD  05/13/25  13:06 EDT

## 2025-05-14 LAB
BH CV MID RIGHT ICA HIDDEN LRR: 1 CM
BH CV RIGHT CCA HIDDEN LRR: 1 CM/S
BH CV VAS CAROTID RIGHT DISTAL STENT EDV: 12.5 CM/S
BH CV VAS CAROTID RIGHT DISTAL STENT PSV: 36.2 CM/S
BH CV VAS CAROTID RIGHT DISTAL TO STENT NATIVE VESSEL E: 14.3 CM/S
BH CV VAS CAROTID RIGHT DISTAL TO STENT PSV: 46 CM/S
BH CV VAS CAROTID RIGHT MID STENT EDV: 16.6 CM/S
BH CV VAS CAROTID RIGHT MID STENT PSV: 40.5 CM/S
BH CV VAS CAROTID RIGHT PROXIMAL STENT EDV: 13.6 CM/S
BH CV VAS CAROTID RIGHT PROXIMAL STENT PSV: 40.3 CM/S
BH CV VAS CAROTID RIGHT STENT NATIVE VESSEL PROXIMAL EDV: 14.5 CM/S
BH CV VAS CAROTID RIGHT STENT NATIVE VESSEL PROXIMAL PS: 37.1 CM/S
BH CV VAS STUDY COMMENTS THYROID NODULE LT 1ST MEASURE: 0.73 CM
BH CV VAS STUDY COMMENTS THYROID NODULE LT 2ND MEASUR: 0.45 CM
BH CV XLRA MEAS LEFT DIST CCA EDV: 21.8 CM/SEC
BH CV XLRA MEAS LEFT DIST CCA PSV: 55.3 CM/SEC
BH CV XLRA MEAS LEFT DIST ICA EDV: 18 CM/SEC
BH CV XLRA MEAS LEFT DIST ICA PSV: 42.8 CM/SEC
BH CV XLRA MEAS LEFT ICA/CCA RATIO: 1.5
BH CV XLRA MEAS LEFT MID CCA EDV: 17.5 CM/SEC
BH CV XLRA MEAS LEFT MID CCA PSV: 48.9 CM/SEC
BH CV XLRA MEAS LEFT MID ICA EDV: 29.4 CM/SEC
BH CV XLRA MEAS LEFT MID ICA PSV: 82.7 CM/SEC
BH CV XLRA MEAS LEFT PROX CCA EDV: 18.2 CM/SEC
BH CV XLRA MEAS LEFT PROX CCA PSV: 53.9 CM/SEC
BH CV XLRA MEAS LEFT PROX ECA EDV: 13.6 CM/SEC
BH CV XLRA MEAS LEFT PROX ECA PSV: 63.2 CM/SEC
BH CV XLRA MEAS LEFT PROX ICA EDV: 22.1 CM/SEC
BH CV XLRA MEAS LEFT PROX ICA PSV: 56.4 CM/SEC
BH CV XLRA MEAS LEFT PROX SCLA PSV: 115 CM/SEC
BH CV XLRA MEAS LEFT VERTEBRAL A EDV: 6.5 CM/SEC
BH CV XLRA MEAS LEFT VERTEBRAL A PSV: 27.5 CM/SEC
BH CV XLRA MEAS RIGHT DIST CCA EDV: 14.5 CM/SEC
BH CV XLRA MEAS RIGHT DIST CCA PSV: 37.1 CM/SEC
BH CV XLRA MEAS RIGHT DIST ICA EDV: 14.3 CM/SEC
BH CV XLRA MEAS RIGHT DIST ICA PSV: 46 CM/SEC
BH CV XLRA MEAS RIGHT ICA/CCA RATIO: 0.93
BH CV XLRA MEAS RIGHT MID CCA EDV: 14.3 CM/SEC
BH CV XLRA MEAS RIGHT MID CCA PSV: 49.7 CM/SEC
BH CV XLRA MEAS RIGHT MID ICA EDV: 12.5 CM/SEC
BH CV XLRA MEAS RIGHT MID ICA PSV: 36.2 CM/SEC
BH CV XLRA MEAS RIGHT PROX CCA EDV: 7.4 CM/SEC
BH CV XLRA MEAS RIGHT PROX CCA PSV: 36 CM/SEC
BH CV XLRA MEAS RIGHT PROX ECA EDV: 18.7 CM/SEC
BH CV XLRA MEAS RIGHT PROX ECA PSV: 82.6 CM/SEC
BH CV XLRA MEAS RIGHT PROX ICA EDV: 16.6 CM/SEC
BH CV XLRA MEAS RIGHT PROX ICA PSV: 40.5 CM/SEC
BH CV XLRA MEAS RIGHT PROX SCLA PSV: 58.7 CM/SEC
BH CV XLRA MEAS RIGHT VERTEBRAL A EDV: 8.5 CM/SEC
BH CV XLRA MEAS RIGHT VERTEBRAL A PSV: 29.1 CM/SEC
BH CVPROX RIGHT ICA HIDDEN LRR: 1 CM
LEFT ARM BP: NORMAL MMHG
RIGHT ARM BP: NORMAL MMHG

## 2025-05-15 ENCOUNTER — OFFICE VISIT (OUTPATIENT)
Age: 55
End: 2025-05-15
Payer: MEDICAID

## 2025-05-15 VITALS
HEIGHT: 67 IN | SYSTOLIC BLOOD PRESSURE: 105 MMHG | TEMPERATURE: 97.1 F | DIASTOLIC BLOOD PRESSURE: 82 MMHG | BODY MASS INDEX: 30.85 KG/M2 | OXYGEN SATURATION: 97 % | HEART RATE: 77 BPM

## 2025-05-15 DIAGNOSIS — R53.83 FATIGUE, UNSPECIFIED TYPE: ICD-10-CM

## 2025-05-15 DIAGNOSIS — R53.1 LEFT-SIDED WEAKNESS: ICD-10-CM

## 2025-05-15 DIAGNOSIS — R56.9 GENERALIZED CONVULSIVE SEIZURES: Primary | ICD-10-CM

## 2025-05-15 DIAGNOSIS — R06.83 SNORING: ICD-10-CM

## 2025-05-15 DIAGNOSIS — I63.9 ACUTE CVA (CEREBROVASCULAR ACCIDENT): ICD-10-CM

## 2025-05-15 DIAGNOSIS — G47.19 EXCESSIVE DAYTIME SLEEPINESS: ICD-10-CM

## 2025-05-15 RX ORDER — LACOSAMIDE 200 MG/1
1 TABLET ORAL EVERY 12 HOURS SCHEDULED
Qty: 60 TABLET | Refills: 5 | Status: SHIPPED | OUTPATIENT
Start: 2025-05-15

## 2025-05-15 RX ORDER — CLOBAZAM 10 MG/1
5 TABLET ORAL
COMMUNITY
Start: 2025-05-14 | End: 2025-05-15 | Stop reason: SDUPTHER

## 2025-05-15 RX ORDER — MIDAZOLAM 5 MG/.1ML
5 SPRAY NASAL AS NEEDED
COMMUNITY

## 2025-05-15 RX ORDER — CLOBAZAM 10 MG/1
5 TABLET ORAL 2 TIMES DAILY
Qty: 30 TABLET | Refills: 5 | Status: SHIPPED | OUTPATIENT
Start: 2025-05-15

## 2025-05-15 RX ORDER — LEVETIRACETAM 750 MG/1
1500 TABLET ORAL 2 TIMES DAILY
Qty: 360 TABLET | Refills: 1 | Status: SHIPPED | OUTPATIENT
Start: 2025-05-15

## 2025-05-15 NOTE — PROGRESS NOTES
New Patient Office Visit      Patient Name: Dalila Pike  : 1970   MRN: [id number]     Referring Physician: Batool Pelletier APRN    Chief Complaint:    Chief Complaint   Patient presents with    Consult     NP, in office to establish care for seizures. Patients last seizure was .   Patient was put in the hospital for 14 day due to seizures at [facility].      Patient stated she had a stroke  and her seizures came back then.        History of Present Illness: Dalila Pike is a 54 y.o. female who is here today to establish care with Neurology for management of seizures.  She is accompanied by her  and daughter-in-law today.  She had a CVA in 2024 and was transferred from [facility] to [facility].  She had a seizure on 2024 and was flown from [facility] to [facility] and was inpatient for a couple of weeks.  Most recent seizure was described as being at her daughter-in-law's house and complaining of not feeling well, she went to the bathroom and fell off the toilet and was convulsing, she did not respond to verbal stimulation, had some mouth foaming, she isn't sure if she had urine incontinence or tongue bites during the seizure.  She had seizures as a child but had not had seizures for a long time until she had the CVA.  She has had no further seizures.  She is taking Clobazam 5mg BID, Keppra 1500mg BID, Nayzilam 5mg PRN, and Vimpat 200mg BID- reports good compliance, the medication does make her sleepy.  She did have a period where she was out of Keppra for a couple of days and out of Clobazam for 2 weeks and had no seizures.  She was having visual hallucinations and thinks it was due to the Prozac, but hasn't had that for a couple of weeks.  She does not drive- her license has been revoked.  She is taking ASA 81mg daily and Atorvastatin 40mg daily and has a follow up with the stroke clinic in August  2025.  She isn't sleeping well and has a sleep appointment next week in [place].    *Sleep questionnaire reviewed verbally- she is having trouble falling asleep and staying asleep, sleeping 5-6 hours per night, she snores when she sleeps on her back, she frequently wakes up with a dry mouth, she frequently wakes up with a headache, she has significant daytime sleepiness.    *She has a physical therapy appointment, in [place], next week.  She has had multiple courses of physical therapy, speech therapy and occupational therapy after her CVA.   *MRI brain without contrast, on 8/21/2024, showed-   IMPRESSION:  Impression:  1.Large acute infarct involving the majority of the right MCA territory.  2.No evidence of intracranial hemorrhage.  3.Mild chronic small vessel ischemic change.    Following taken from visit note with ROXANE Mon, neurology, on 2/21/2025:  Review of visit with Emilie BETTS: Dalila Pike is a 54 y.o. right handed female here for follow up after starting Topamx for chronic headache after acute ischemic stroke.   PMH: EtOH, tobacco abuse, hypertension, hyperlipidemia, type 2 diabetes, history of substance abuse, obesity, seizures (possible pseudoseizure on Topamax), PAD, angina, and R MCA CVA 8/21/2024 with left arm hemiparesis and chronic headache.    The patient arrives today with her .  She continues to struggle with almost daily headaches despite starting 25mg Topamax once daily.    Her headaches have not changed in character or severity.  She is having difficulty falling asleep and staying asleep as well.  She recently underwent bile duct stenting and will be following up with general surgery for stent removal.    The patient has a recent history of right MCA ischemic stroke with left arm hemiparesis, which is improving with daily exercise and PT.  She arrives today with her arm in a sling to decrease dependant edema, which is helping.  She no longer has right cervical  neck pain since wearing the sling.  The patient is following a Mediterranean diet and closely monitoring her stroke risk factors with her PCP and Cardiology.    Patient states, she has purposefully lost 57 pounds since her stroke in August.  The patient reports, she is independent with her ADLs.    Patient denies any other neurological symptoms, including: headache, vision changes, dysesthesias, loss of consciousness, seizure or new areas of motor weakness.      Clinic visit 2/21/2025: Patient has unfortunately been air lifted to  in December due to seizure activity since her last visit in our clinic. No new stroke was found. She had seizure activity requiring medication changes to Vimpat and Keppra. She reports she had tonic-clonic seizures as a child but had not had any recently. We will refer her to Dr. Perez for long term management of her seizures. No breakthrough activity since discharge from . Taking her medications without issues. Dr. Saini has stopped Brilinta since her last visit. She does have spasticity of her LUE and specifically her hand for which I have offered her Botox injections.     Subjective      Review of Systems:   Review of Systems   Constitutional:  Positive for fatigue.   Neurological:  Positive for seizures.   Psychiatric/Behavioral:  Positive for sleep disturbance.        Past Medical History:   Past Medical History:   Diagnosis Date    Acute CVA (cerebrovascular accident) 08/21/2024    Alcohol use 08/21/2024    Alcoholism     Anemia     Cholelithiasis     Diabetes mellitus     Dyslipidemia 08/21/2024    Fatty liver     Hyperlipidemia     Hypertension     Obesity (BMI 30-39.9) 08/21/2024    PAD (peripheral artery disease) 08/21/2024    Seizures     Tobacco use 08/21/2024       Past Surgical History:   Past Surgical History:   Procedure Laterality Date    ABDOMINAL SURGERY      ENDOSCOPY N/A 10/08/2024    Procedure: ESOPHAGOGASTRODUODENOSCOPY;  Surgeon: Elie Sanders MD;  Location:   [facility];  Service: Gastroenterology;  Laterality: N/A;    ERCP N/A 10/08/2024    Procedure: ENDOSCOPIC RETROGRADE CHOLANGIOPANCREATOGRAPHY WITH STENT PLACEMENT;  Surgeon: Elie Sanders MD;  Location:  [facility];  Service: Gastroenterology;  Laterality: N/A;  SPHINCTEROTOMY @ 1737, 9-12MM AND 12-15MM BALLOON SWEEP TO CBD    ERCP N/A 11/22/2024    Procedure: ENDOSCOPIC RETROGRADE CHOLANGIOPANCREATOGRAPHY;  Surgeon: Spenser Hassan MD;  Location:  [facility];  Service: Gastroenterology;  Laterality: N/A;    INTERVENTIONAL RADIOLOGY PROCEDURE Bilateral 08/21/2024    Procedure: Carotid Cervical Angiogram;  Surgeon: Jamaal Saini MD;  Location[facility] CATH INVASIVE LOCATION;  Service: Interventional Radiology;  Laterality: Bilateral;       Family History:   Family History   Problem Relation Age of Onset    Alcohol abuse Father        Social History:   Social History     Socioeconomic History    Marital status:    Tobacco Use    Smoking status: Former     Average packs/day: 2.0 packs/day for 38.8 years (76.4 ttl pk-yrs)     Types: Cigarettes     Start date: 2024     Passive exposure: Current    Smokeless tobacco: Never    Tobacco comments:     4-5 a day   Vaping Use    Vaping status: Never Used   Substance and Sexual Activity    Alcohol use: Not Currently    Drug use: Not Currently    Sexual activity: Defer       Medications:     Current Outpatient Medications:     aspirin 81 MG EC tablet, Take 1 tablet by mouth Daily., Disp: , Rfl:     atorvastatin (LIPITOR) 40 MG tablet, Take 1 tablet by mouth Daily., Disp: 90 tablet, Rfl: 2    cloBAZam (ONFI) 10 MG tablet, Take 0.5 tablets by mouth 2 (Two) Times a Day., Disp: 30 tablet, Rfl: 5    cyclobenzaprine (FLEXERIL) 5 MG tablet, TAKE 1 TABLET BY MOUTH 2 TIMES A DAY AS NEEDED FOR MUSCLE SPASMS., Disp: 60 tablet, Rfl: 0    folic acid (FOLVITE) 1 MG tablet, Take 1 tablet by mouth Daily., Disp: , Rfl:     HYDROcodone-acetaminophen  "(NORCO) 7.5-325 MG per tablet, Take 1 tablet by mouth every 6 (six) to 8 (eight) hours as needed for Pain., Disp: , Rfl:     lacosamide (VIMPAT) 200 MG tablet, Take 1 tablet by mouth Every 12 (Twelve) Hours., Disp: 60 tablet, Rfl: 5    levETIRAcetam (KEPPRA) 750 MG tablet, Take 2 tablets by mouth 2 (Two) Times a Day., Disp: 360 tablet, Rfl: 1    lisinopril (PRINIVIL,ZESTRIL) 20 MG tablet, Take 1 tablet by mouth Daily., Disp: 90 tablet, Rfl: 2    Midazolam (Nayzilam) 5 MG/0.1ML solution, Administer 0.1 mL into the nostril(s) as directed by provider As Needed (seizure)., Disp: , Rfl:     pantoprazole (PROTONIX) 40 MG EC tablet, Take 1 tablet by mouth Daily., Disp: 90 tablet, Rfl: 3    vitamin D (ERGOCALCIFEROL) 1.25 MG (72433 UT) capsule capsule, Take 1 capsule by mouth 1 (One) Time Per Week., Disp: 5 capsule, Rfl: 3    Allergies:   Allergies   Allergen Reactions    Erythromycin Anaphylaxis    Latex Rash    Toradol [Ketorolac Tromethamine] Rash    Contrast Dye (Echo Or Unknown Ct/Mr) Unknown (See Comments)     Hives on chest       Objective     Physical Exam:  Vital Signs:   Vitals:    05/15/25 0950   BP: 105/82   BP Location: Left arm   Patient Position: Sitting   Cuff Size: Adult   Pulse: 77   Temp: 97.1 °F (36.2 °C)   SpO2: 97%   Weight: Comment: unable to obtain   Height: 170.2 cm (67\")   PainSc: 8    PainLoc: Generalized     Body mass index is 30.85 kg/m².     Physical Exam  Vitals and nursing note reviewed.   Constitutional:       General: She is not in acute distress.     Appearance: Normal appearance. She is well-developed. She is not diaphoretic.   HENT:      Head: Normocephalic and atraumatic.   Eyes:      General: Lids are normal.      Extraocular Movements: Extraocular movements intact.      Conjunctiva/sclera: Conjunctivae normal.      Pupils: Pupils are equal, round, and reactive to light.   Pulmonary:      Effort: Pulmonary effort is normal. No respiratory distress.   Musculoskeletal:         General: " Normal range of motion.   Skin:     General: Skin is warm and dry.   Neurological:      Mental Status: She is alert and oriented to person, place, and time.   Psychiatric:         Mood and Affect: Mood normal.         Speech: Speech normal.         Behavior: Behavior normal.         Thought Content: Thought content normal.         Cognition and Memory: Cognition normal.         Judgment: Judgment normal.      Comments: Drowsy         Neurological Exam  Mental Status  Alert. Oriented to person, place, and time. Speech is normal. Language is fluent with no aphasia. Attention and concentration are normal. Fund of knowledge is appropriate for level of education.    Cranial Nerves  CN II: Visual acuity is normal. Visual fields full to confrontation.  CN III, IV, VI: Extraocular movements intact bilaterally. Normal lids and orbits bilaterally. Pupils equal round and reactive to light bilaterally.  CN V: Facial sensation is normal.  CN VII: Full and symmetric facial movement.  CN IX, X: Palate elevates symmetrically. Normal gag reflex.  CN XI:  Right: Sternocleidomastoid strength is normal. Trapezius strength is normal.  Left: Sternocleidomastoid strength is weak. Trapezius strength is weak.  CN XII: Tongue midline without atrophy or fasciculations.    Motor  Decreased muscle bulk throughout. Fasciculations present: Right hand. Decreased muscle tone.                                               Right                     Left   Biceps                                   4                          2   Triceps                                  4                          2   Quadriceps                           4                          2   Hamstring                             4                          2  When sitting in wheelchair it is noted she is leaning slightly to the right. .    Sensory  Light touch abnormality:   Light touch decreased to entire left side. .    Coordination  Right: Finger-to-nose abnormality:Left:  Finger-to-nose abnormality:    Gait    Patient stood up from the car seat with assistance and pivoted to sit in wheelchair. Ambulatory to clinic and from clinic via wheelchair. .      Assessment / Plan      Assessment/Plan:   Diagnoses and all orders for this visit:    1. Generalized convulsive seizures (Primary)  -     levETIRAcetam (KEPPRA) 750 MG tablet; Take 2 tablets by mouth 2 (Two) Times a Day.  Dispense: 360 tablet; Refill: 1  -     cloBAZam (ONFI) 10 MG tablet; Take 0.5 tablets by mouth 2 (Two) Times a Day.  Dispense: 30 tablet; Refill: 5  -     lacosamide (VIMPAT) 200 MG tablet; Take 1 tablet by mouth Every 12 (Twelve) Hours.  Dispense: 60 tablet; Refill: 5  -     Polysomnography 4 or More Parameters; Future  -     EEG; Future    2. Snoring  -     Polysomnography 4 or More Parameters; Future    3. Excessive daytime sleepiness  -     Polysomnography 4 or More Parameters; Future    4. Fatigue, unspecified type  -     Polysomnography 4 or More Parameters; Future    5. Acute CVA (cerebrovascular accident)  -     Polysomnography 4 or More Parameters; Future    6. Left-sided weakness    *She is agreeable to a PSG. I have advised patient she does not need to keep her appointment with sleep medicine in [place]. I will manage her for sleep.   *CSA signed and Antolin reviewed.   *Seizure precautions discussed and encouraged- 911 to be called for seizure activity lasting more than 5 minutes, no tub baths, no hot tubs, no driving.   *Patient education on Seizures, Stroke Prevention, and [place] provided today.   *May consider decreasing dose of an ASM at follow up visit, due to sedation/drowsiness. She did not have Clobazam for a couple of weeks and did not have any seizures during that time.   *Encouraged patient to keep her follow up appointment with stroke clinic and as scheduled.   *Order for EEG.     *I spent 60 minutes on this visit including but not limited to- prior documentation and diagnostic review, interview,  examination, , prescribing, documentation, providing patient education on seizures and seizure precautions, providing education on stroke prevention, providing anticipatory guidance.     Follow Up:   Return in about 2 months (around 7/15/2025) for Follow Up.    ROXANE Alcantar, FNP-C  [facility] Neurology and Sleep Medicine

## 2025-05-16 ENCOUNTER — PATIENT ROUNDING (BHMG ONLY) (OUTPATIENT)
Dept: NEUROLOGY | Facility: CLINIC | Age: 55
End: 2025-05-16
Payer: MEDICAID

## 2025-05-19 ENCOUNTER — TELEPHONE (OUTPATIENT)
Dept: FAMILY MEDICINE CLINIC | Facility: CLINIC | Age: 55
End: 2025-05-19

## 2025-05-19 ENCOUNTER — TELEPHONE (OUTPATIENT)
Dept: FAMILY MEDICINE CLINIC | Facility: CLINIC | Age: 55
End: 2025-05-19
Payer: MEDICAID

## 2025-05-19 NOTE — TELEPHONE ENCOUNTER
Spoke with Rudy at [facility] physical therapist has contacted Pcp office.   Requesting to have a referral to be made for:    Occupational Therapy   Speech Therapy   to be order for patient.       Left AFO with [facility]    Memo can be reached at [phone].

## 2025-05-19 NOTE — TELEPHONE ENCOUNTER
XIANG-  PT WITH [facility] CALLED PATIENT IS HAVING MULTIPLE FALLS AT HOME, PATIENT ATTEMPTED TO DRIVE RECENTLY AND IS NOW ON HOUSE ARREST DUE TO THAT EPISODE, THE  WAS WITH THE PATIENT AT HER EVALUATION TODAY AND SHARED THE PATIENT IS UNABLE TO TRANSPORT HERSELF AND REQUIRES ASSISTANCE WITH ALL ACTIVITIES. SHE IS RECOMMENDING  HOME HEALTH FOR THIS PATIENT [facility], WILL FAX EVAL TO OFFICE, PLEASE

## 2025-05-20 DIAGNOSIS — I63.9 ACUTE CVA (CEREBROVASCULAR ACCIDENT): Primary | ICD-10-CM

## 2025-05-27 ENCOUNTER — TELEPHONE (OUTPATIENT)
Dept: FAMILY MEDICINE CLINIC | Facility: CLINIC | Age: 55
End: 2025-05-27
Payer: MEDICAID

## 2025-05-27 NOTE — TELEPHONE ENCOUNTER
RECEIVED A CALL FROM [facility] , THE PATIENT RESIDENCE IS IN [place] AND THEY DO NOT SERVICE THAT AREA, SUGGESTED ATTEMPT [place].

## 2025-06-02 ENCOUNTER — TELEPHONE (OUTPATIENT)
Dept: FAMILY MEDICINE CLINIC | Facility: CLINIC | Age: 55
End: 2025-06-02
Payer: MEDICAID

## 2025-06-02 NOTE — TELEPHONE ENCOUNTER
HAVE BEEN UNABLE TO FIND A HOME HEALTH THAT PARTICIPATES IN PATIENT CURRENT COVERAGE, [facility] AND OT ARE AVAILABLE IN THE PATIENT'S LOCATION- [place],   DO YOU WANT TO ISSUE A REFERRAL TO PT?  AND OT?   TO ATTEMPT TO SCHEDULE THE PATIENT CLOSER TO HER HOME?  SPEECH IS NOT AVAILABLE  IN HER CURRENT LOCATION.

## 2025-06-05 ENCOUNTER — TELEPHONE (OUTPATIENT)
Dept: FAMILY MEDICINE CLINIC | Facility: CLINIC | Age: 55
End: 2025-06-05
Payer: MEDICAID

## 2025-06-09 ENCOUNTER — TELEPHONE (OUTPATIENT)
Dept: PULMONOLOGY | Facility: CLINIC | Age: 55
End: 2025-06-09
Payer: MEDICAID

## 2025-06-09 NOTE — TELEPHONE ENCOUNTER
Reached out  to patient regarding NO CALL/NO SHOW for their appointment today. Offered to reschedule patient, and advised of No Call No Show Policy.    PT IS SEEKING CARE THROUGH ANOTHER FACILITY. PT REQUESTED NO FURTHER CALLS UNLESS SHE REACHES OUT. STAFF MEMBER APOLOGIZED AND INSTRUCTED PT TO REACH OUT WITH ANY FURTHER CONCERNS. - MILTON 6/9 5/20/25 - WARNING 1*

## 2025-06-10 ENCOUNTER — HOSPITAL ENCOUNTER (OUTPATIENT)
Dept: SLEEP MEDICINE | Facility: HOSPITAL | Age: 55
Discharge: HOME | End: 2025-06-10
Admitting: NURSE PRACTITIONER
Payer: MEDICAID

## 2025-06-10 DIAGNOSIS — R56.9 GENERALIZED CONVULSIVE SEIZURES: ICD-10-CM

## 2025-06-10 PROCEDURE — 95816 EEG AWAKE AND DROWSY: CPT

## 2025-06-16 ENCOUNTER — LAB (OUTPATIENT)
Dept: FAMILY MEDICINE CLINIC | Facility: CLINIC | Age: 55
End: 2025-06-16
Payer: MEDICAID

## 2025-06-16 ENCOUNTER — OFFICE VISIT (OUTPATIENT)
Dept: FAMILY MEDICINE CLINIC | Facility: CLINIC | Age: 55
End: 2025-06-16
Payer: MEDICAID

## 2025-06-16 VITALS
RESPIRATION RATE: 15 BRPM | OXYGEN SATURATION: 98 % | HEART RATE: 83 BPM | TEMPERATURE: 97.8 F | DIASTOLIC BLOOD PRESSURE: 88 MMHG | SYSTOLIC BLOOD PRESSURE: 142 MMHG | WEIGHT: 189.8 LBS | BODY MASS INDEX: 29.79 KG/M2 | HEIGHT: 67 IN

## 2025-06-16 DIAGNOSIS — M25.512 LEFT SHOULDER PAIN, UNSPECIFIED CHRONICITY: ICD-10-CM

## 2025-06-16 DIAGNOSIS — Z72.0 TOBACCO USE: Chronic | ICD-10-CM

## 2025-06-16 DIAGNOSIS — I63.9 ACUTE CVA (CEREBROVASCULAR ACCIDENT): ICD-10-CM

## 2025-06-16 DIAGNOSIS — R30.0 DYSURIA: ICD-10-CM

## 2025-06-16 DIAGNOSIS — R25.2 SPASTICITY: ICD-10-CM

## 2025-06-16 DIAGNOSIS — R30.0 DYSURIA: Primary | ICD-10-CM

## 2025-06-16 DIAGNOSIS — R31.0 GROSS HEMATURIA: ICD-10-CM

## 2025-06-16 LAB
BILIRUB BLD-MCNC: ABNORMAL MG/DL
CLARITY, POC: ABNORMAL
COLOR UR: ABNORMAL
EXPIRATION DATE: ABNORMAL
GLUCOSE UR STRIP-MCNC: NEGATIVE MG/DL
KETONES UR QL: NEGATIVE
LEUKOCYTE EST, POC: ABNORMAL
Lab: ABNORMAL
NITRITE UR-MCNC: NEGATIVE MG/ML
PH UR: 6 [PH] (ref 5–8)
PROT UR STRIP-MCNC: ABNORMAL MG/DL
RBC # UR STRIP: ABNORMAL /UL
SP GR UR: 1.03 (ref 1–1.03)
UROBILINOGEN UR QL: NORMAL

## 2025-06-16 PROCEDURE — 3079F DIAST BP 80-89 MM HG: CPT | Performed by: PHYSICIAN ASSISTANT

## 2025-06-16 PROCEDURE — 3044F HG A1C LEVEL LT 7.0%: CPT | Performed by: PHYSICIAN ASSISTANT

## 2025-06-16 PROCEDURE — 99214 OFFICE O/P EST MOD 30 MIN: CPT | Performed by: PHYSICIAN ASSISTANT

## 2025-06-16 PROCEDURE — 1126F AMNT PAIN NOTED NONE PRSNT: CPT | Performed by: PHYSICIAN ASSISTANT

## 2025-06-16 PROCEDURE — 3077F SYST BP >= 140 MM HG: CPT | Performed by: PHYSICIAN ASSISTANT

## 2025-06-16 PROCEDURE — 87086 URINE CULTURE/COLONY COUNT: CPT | Performed by: PHYSICIAN ASSISTANT

## 2025-06-16 RX ORDER — CIPROFLOXACIN 500 MG/1
500 TABLET, FILM COATED ORAL 2 TIMES DAILY
Qty: 14 TABLET | Refills: 0 | Status: SHIPPED | OUTPATIENT
Start: 2025-06-16

## 2025-06-16 RX ORDER — CYCLOBENZAPRINE HCL 5 MG
5 TABLET ORAL 2 TIMES DAILY PRN
Qty: 60 TABLET | Refills: 0 | Status: SHIPPED | OUTPATIENT
Start: 2025-06-16

## 2025-06-16 NOTE — PROGRESS NOTES
Follow Up Office Visit      Date: 2025   Patient Name: Dalila Pike  : 1970   MRN: [id number]     Chief Complaint:    Chief Complaint   Patient presents with    Follow-up     3 months       History of Present Illness: Dalila Pike is a 54 y.o. female who is here today for    History of Present Illness  The patient presents for evaluation of seizures, depression, smoking cessation, shoulder pain, and urinary tract infection. She is accompanied by her .    She has been seizure-free since 2024. She is currently on Keppra 750 mg, lacosamide, and clobazam for seizure management, with no recent changes in her medication regimen. She is not allowed to drive and was informed that she must remain seizure-free for a year and attend classes before regaining her driving license.    She reports experiencing hallucinations, which she attributes to her fluoxetine medication, leading her to discontinue its use. Despite this, she continues to experience emotional distress. She recalls a previous treatment with Zoloft during her teenage years, which she found unhelpful.    She has expressed interest in smoking cessation but is unable to afford the necessary medication. She has previously tried nicotine patches but found them ineffective.    She has a history of shoulder fracture, which was managed by Dr. Potts. She experiences severe pain at night, limiting her sleep to 2 to 3 hours. The pain is so intense that it prevents her from finding a comfortable position. She has limited mobility in her arm due to paralysis. She has attempted to use a sling for support but found it uncomfortable due to pressure on her carotid artery, causing dizziness. She has been advised against the use of NSAIDs and has been prescribed aspirin instead. She has been using a muscle relaxant for spasms, which she finds beneficial.    She reports not receiving her prescribed medication for a urinary tract infection or bladder  infection. She has noticed blood in her urine, a symptom she typically associates with urinary tract infections.    SOCIAL HISTORY  She admits to smoking.   .    Subjective      Review of Systems:   Review of Systems   Constitutional:  Positive for fatigue.   HENT: Negative.     Respiratory: Negative.     Cardiovascular: Negative.    Genitourinary:  Positive for dysuria, hematuria, urgency and urinary incontinence.   Musculoskeletal:  Positive for arthralgias and back pain.       I have reviewed the patients family history, social history, past medical history, past surgical history and have updated it as appropriate.     Medications:     Current Outpatient Medications:     cyclobenzaprine (FLEXERIL) 5 MG tablet, Take 1 tablet by mouth 2 (Two) Times a Day As Needed for Muscle Spasms., Disp: 60 tablet, Rfl: 0    aspirin 81 MG EC tablet, Take 1 tablet by mouth Daily., Disp: , Rfl:     atorvastatin (LIPITOR) 40 MG tablet, Take 1 tablet by mouth Daily., Disp: 90 tablet, Rfl: 2    ciprofloxacin (Cipro) 500 MG tablet, Take 1 tablet by mouth 2 (Two) Times a Day., Disp: 14 tablet, Rfl: 0    cloBAZam (ONFI) 10 MG tablet, Take 0.5 tablets by mouth 2 (Two) Times a Day., Disp: 30 tablet, Rfl: 5    Diclofenac Sodium (VOLTAREN) 1 % gel gel, 2 grams QID prn pain upper extremity joints and 4 grams QID pain lower extremity joints, Disp: 100 g, Rfl: 5    folic acid (FOLVITE) 1 MG tablet, Take 1 tablet by mouth Daily., Disp: , Rfl:     HYDROcodone-acetaminophen (NORCO) 7.5-325 MG per tablet, Take 1 tablet by mouth every 6 (six) to 8 (eight) hours as needed for Pain., Disp: , Rfl:     lacosamide (VIMPAT) 200 MG tablet, Take 1 tablet by mouth Every 12 (Twelve) Hours., Disp: 60 tablet, Rfl: 5    levETIRAcetam (KEPPRA) 750 MG tablet, Take 2 tablets by mouth 2 (Two) Times a Day., Disp: 360 tablet, Rfl: 1    lisinopril (PRINIVIL,ZESTRIL) 20 MG tablet, Take 1 tablet by mouth Daily., Disp: 90 tablet, Rfl: 2    Midazolam (Nayzilam) 5 MG/0.1ML  "solution, Administer 0.1 mL into the nostril(s) as directed by provider As Needed (seizure)., Disp: , Rfl:     nicotine polacrilex (Nicotine Mini) 4 MG lozenge, Dissolve 1 lozenge in the mouth As Needed for Smoking Cessation. (Patient not taking: Reported on 6/24/2025), Disp: 30 lozenge, Rfl: 1    pantoprazole (PROTONIX) 40 MG EC tablet, Take 1 tablet by mouth Daily., Disp: 90 tablet, Rfl: 3    vitamin D (ERGOCALCIFEROL) 1.25 MG (81819 UT) capsule capsule, Take 1 capsule by mouth 1 (One) Time Per Week., Disp: 5 capsule, Rfl: 3    Allergies:   Allergies   Allergen Reactions    Erythromycin Anaphylaxis    Latex Rash    Toradol [Ketorolac Tromethamine] Rash    Contrast Dye (Echo Or Unknown Ct/Mr) Unknown (See Comments)     Hives on chest       Objective     Physical Exam: Please see above  Vital Signs:   Vitals:    06/16/25 1026   BP: 142/88   BP Location: Right arm   Patient Position: Sitting   Cuff Size: Adult   Pulse: 83   Resp: 15   Temp: 97.8 °F (36.6 °C)   TempSrc: Temporal   SpO2: 98%   Weight: 86.1 kg (189 lb 12.8 oz)   Height: 170.2 cm (67\")   PainSc: 0-No pain     Body mass index is 29.73 kg/m².  Facility age limit for growth %kate is 20 years.          Physical Exam  Vitals and nursing note reviewed.   Constitutional:       General: She is not in acute distress.     Appearance: Normal appearance. She is not ill-appearing or toxic-appearing.   Eyes:      Extraocular Movements: Extraocular movements intact.      Pupils: Pupils are equal, round, and reactive to light.   Cardiovascular:      Rate and Rhythm: Normal rate and regular rhythm.      Heart sounds: No murmur heard.     No friction rub. No gallop.   Pulmonary:      Effort: Pulmonary effort is normal. No respiratory distress.      Breath sounds: Normal breath sounds. No wheezing or rales.   Neurological:      General: No focal deficit present.      Mental Status: She is alert and oriented to person, place, and time.      Cranial Nerves: No cranial nerve " deficit.      Motor: No weakness.      Gait: Gait normal.   Psychiatric:         Mood and Affect: Mood normal.         Behavior: Behavior normal.         Thought Content: Thought content normal.         Judgment: Judgment normal.       Procedures    Assessment / Plan      Assessment/Plan:   1. Spasticity    - cyclobenzaprine (FLEXERIL) 5 MG tablet; Take 1 tablet by mouth 2 (Two) Times a Day As Needed for Muscle Spasms.  Dispense: 60 tablet; Refill: 0    2. Dysuria    - Urine test and culture will be ordered today to assess for any ongoing infection.  - If results indicate an infection, appropriate treatment will be initiated.  - Patient reported seeing blood in urine; further evaluation needed.  - POC Urinalysis Dipstick  - Urine Culture - Urine, Urine, Clean Catch; Future    3. Gross hematuria    - Urine test and culture will be ordered today to assess for any ongoing infection.  - If results indicate an infection, appropriate treatment will be initiated.  - Patient reported seeing blood in urine; further evaluation needed.  - ciprofloxacin (Cipro) 500 MG tablet; Take 1 tablet by mouth 2 (Two) Times a Day.  Dispense: 14 tablet; Refill: 0    4. Left shoulder pain, unspecified chronicity    - Advised to use a splint at night and consider sleeping in a hospital bed in a more upright position to alleviate pain.  - Prescription for a topical anti-inflammatory salve will be provided.  - Referred back to physical therapy in [place] for further management.  - Discussed potential use of an Ace bandage to keep the arm in a stable position during sleep.  - Diclofenac Sodium (VOLTAREN) 1 % gel gel; 2 grams QID prn pain upper extremity joints and 4 grams QID pain lower extremity joints  Dispense: 100 g; Refill: 5  - Ambulatory Referral to Physical Therapy for Evaluation & Treatment    5. Acute CVA (cerebrovascular accident)    - Continues to experience sadness and emotional instability, potentially related to brain injury from  stroke.  - Use of fluoxetine was discontinued due to side effects.  - Alternative medications such as Zoloft were discussed, but she reported negative past experiences.  - Potential benefits of hormone therapy discussed, contingent upon a normal mammogram result; will continue without any antidepressants for now.  - Ambulatory Referral to Physical Therapy for Evaluation & Treatment    6. To  - Advised against using nicotine patches due to ineffectiveness.  - Prescription for nicotine gum will be provided to aid in smoking cessation.  - Discussed the use of nicotine lozenges or gum as alternatives to smoking.[facility]       Assessment & Plan  1. Seizures.  - She has not had a seizure since 12/2024.  - Current medication regimen includes Keppra 750 mg, lacosamide, and clobazam.  - Scheduled to see the neurology nurse practitioner on 07/10/2025 for further evaluation and potential adjustment of seizure medications.  - Goal is to gradually reduce the dosage of these medications.    2. Depression.  - Continues to experience sadness and emotional instability, potentially related to brain injury from stroke.  - Use of fluoxetine was discontinued due to side effects.  - Alternative medications such as Zoloft were discussed, but she reported negative past experiences.  - Potential benefits of hormone therapy discussed, contingent upon a normal mammogram result; will continue without any antidepressants for now.    3. Smoking cessation.  - Advised against using nicotine patches due to ineffectiveness.  - Prescription for nicotine gum will be provided to aid in smoking cessation.  - Discussed the use of nicotine lozenges or gum as alternatives to smoking.    4. Shoulder pain.  - Advised to use a splint at night and consider sleeping in a hospital bed in a more upright position to alleviate pain.  - Prescription for a topical anti-inflammatory salve will be provided.  - Referred back to physical therapy in [place] for further  management.  - Discussed potential use of an Ace bandage to keep the arm in a stable position during sleep.    5. Urinary tract infection.  - Urine test and culture will be ordered today to assess for any ongoing infection.  - If results indicate an infection, appropriate treatment will be initiated.  - Patient reported seeing blood in urine; further evaluation needed.    6. Medication management.  - Flexeril prescription will be refilled today.  - Urine and urine culture will be ordered to check for infection.  - Prescription for nicotine gum will be sent to pharmacy.       Follow Up:   No follow-ups on file.      At [facility], we believe that sharing information builds trust and better relationships. You are receiving this note because you recently visited [facility]. It is possible you will see health information before a provider has talked with you about it. This kind of information can be easy to misunderstand. To help you fully understand what it means for your health, we urge you to discuss this note with your provider.    PARAG Campbell

## 2025-06-17 LAB — BACTERIA SPEC AEROBE CULT: NORMAL

## 2025-06-24 ENCOUNTER — OFFICE VISIT (OUTPATIENT)
Dept: FAMILY MEDICINE CLINIC | Facility: CLINIC | Age: 55
End: 2025-06-24
Payer: MEDICAID

## 2025-06-24 VITALS
OXYGEN SATURATION: 98 % | HEART RATE: 83 BPM | DIASTOLIC BLOOD PRESSURE: 82 MMHG | SYSTOLIC BLOOD PRESSURE: 124 MMHG | WEIGHT: 201 LBS | TEMPERATURE: 98 F | BODY MASS INDEX: 31.55 KG/M2 | HEIGHT: 67 IN

## 2025-06-24 DIAGNOSIS — G89.29 CHRONIC LEFT SHOULDER PAIN: ICD-10-CM

## 2025-06-24 DIAGNOSIS — M25.512 CHRONIC LEFT SHOULDER PAIN: ICD-10-CM

## 2025-06-24 DIAGNOSIS — Z72.0 TOBACCO USE: Chronic | ICD-10-CM

## 2025-06-24 DIAGNOSIS — Z87.898 HISTORY OF SEIZURES: ICD-10-CM

## 2025-06-24 DIAGNOSIS — R31.0 GROSS HEMATURIA: Primary | ICD-10-CM

## 2025-06-24 PROCEDURE — 3074F SYST BP LT 130 MM HG: CPT | Performed by: PHYSICIAN ASSISTANT

## 2025-06-24 PROCEDURE — 3044F HG A1C LEVEL LT 7.0%: CPT | Performed by: PHYSICIAN ASSISTANT

## 2025-06-24 PROCEDURE — 99214 OFFICE O/P EST MOD 30 MIN: CPT | Performed by: PHYSICIAN ASSISTANT

## 2025-06-24 PROCEDURE — 1126F AMNT PAIN NOTED NONE PRSNT: CPT | Performed by: PHYSICIAN ASSISTANT

## 2025-06-24 PROCEDURE — 3079F DIAST BP 80-89 MM HG: CPT | Performed by: PHYSICIAN ASSISTANT

## 2025-06-24 NOTE — PROGRESS NOTES
Follow Up Office Visit      Date: 2025   Patient Name: Dalila Pike  : 1970   MRN: [id number]     Chief Complaint:    Chief Complaint   Patient presents with    Follow-up     Urinary symptoms have gotten better       History of Present Illness: Dalila Pike is a 54 y.o. female who is here today for    History of Present Illness  The patient presents for evaluation of bladder issues, shoulder pain, seizure disorder, depression, and smoking cessation.    She reports an improvement in her bladder condition, with a cessation of the burning sensation and a decrease in urinary accidents. She has completed her course of antibiotics and notes only minimal blood in her urine, a significant improvement from her previous visit. She also reports satisfactory bladder emptying.    She has been experiencing shoulder discomfort, which she attempted to alleviate by changing her bed. This intervention provided some relief, but the discomfort persists. She has not yet consulted a therapist, although she received a call from them recently. She has an appointment scheduled with the therapist on 2025.    She has been seizure-free for nearly a year and wants to regain her driving license.    She expresses a desire to quit smoking and is currently working with Ngoc Brady to achieve this goal.    She reports experiencing excessive crying and emotional distress following a stroke. She was previously prescribed Zoloft during her childhood, but it was not effective for her. She is currently on medication for depression, but it has been causing her to hallucinate.    SOCIAL HISTORY  She admits to smoking.   .    Subjective      Review of Systems:   Review of Systems   Constitutional: Negative.    HENT: Negative.     Respiratory: Negative.     Cardiovascular: Negative.        I have reviewed the patients family history, social history, past medical history, past surgical history and have updated it as  appropriate.     Medications:     Current Outpatient Medications:     aspirin 81 MG EC tablet, Take 1 tablet by mouth Daily., Disp: , Rfl:     atorvastatin (LIPITOR) 40 MG tablet, Take 1 tablet by mouth Daily., Disp: 90 tablet, Rfl: 2    ciprofloxacin (Cipro) 500 MG tablet, Take 1 tablet by mouth 2 (Two) Times a Day., Disp: 14 tablet, Rfl: 0    cloBAZam (ONFI) 10 MG tablet, Take 0.5 tablets by mouth 2 (Two) Times a Day., Disp: 30 tablet, Rfl: 5    cyclobenzaprine (FLEXERIL) 5 MG tablet, Take 1 tablet by mouth 2 (Two) Times a Day As Needed for Muscle Spasms., Disp: 60 tablet, Rfl: 0    Diclofenac Sodium (VOLTAREN) 1 % gel gel, 2 grams QID prn pain upper extremity joints and 4 grams QID pain lower extremity joints, Disp: 100 g, Rfl: 5    folic acid (FOLVITE) 1 MG tablet, Take 1 tablet by mouth Daily., Disp: , Rfl:     HYDROcodone-acetaminophen (NORCO) 7.5-325 MG per tablet, Take 1 tablet by mouth every 6 (six) to 8 (eight) hours as needed for Pain., Disp: , Rfl:     lacosamide (VIMPAT) 200 MG tablet, Take 1 tablet by mouth Every 12 (Twelve) Hours., Disp: 60 tablet, Rfl: 5    levETIRAcetam (KEPPRA) 750 MG tablet, Take 2 tablets by mouth 2 (Two) Times a Day., Disp: 360 tablet, Rfl: 1    lisinopril (PRINIVIL,ZESTRIL) 20 MG tablet, Take 1 tablet by mouth Daily., Disp: 90 tablet, Rfl: 2    Midazolam (Nayzilam) 5 MG/0.1ML solution, Administer 0.1 mL into the nostril(s) as directed by provider As Needed (seizure)., Disp: , Rfl:     pantoprazole (PROTONIX) 40 MG EC tablet, Take 1 tablet by mouth Daily., Disp: 90 tablet, Rfl: 3    vitamin D (ERGOCALCIFEROL) 1.25 MG (76133 UT) capsule capsule, Take 1 capsule by mouth 1 (One) Time Per Week., Disp: 5 capsule, Rfl: 3    nicotine polacrilex (Nicotine Mini) 4 MG lozenge, Dissolve 1 lozenge in the mouth As Needed for Smoking Cessation. (Patient not taking: Reported on 6/24/2025), Disp: 30 lozenge, Rfl: 1    Allergies:   Allergies   Allergen Reactions    Erythromycin Anaphylaxis     "Latex Rash    Toradol [Ketorolac Tromethamine] Rash    Contrast Dye (Echo Or Unknown Ct/Mr) Unknown (See Comments)     Hives on chest       Objective     Physical Exam: Please see above  Vital Signs:   Vitals:    06/24/25 1042   BP: 124/82   Pulse: 83   Temp: 98 °F (36.7 °C)   SpO2: 98%   Weight: 91.2 kg (201 lb)   Height: 170.2 cm (67.01\")     Body mass index is 31.47 kg/m².  Facility age limit for growth %kate is 20 years.          Physical Exam  Vitals and nursing note reviewed.   Constitutional:       General: She is not in acute distress.     Appearance: Normal appearance. She is not ill-appearing or toxic-appearing.   Cardiovascular:      Rate and Rhythm: Normal rate and regular rhythm.      Heart sounds: No murmur heard.     No friction rub. No gallop.   Pulmonary:      Effort: Pulmonary effort is normal. No respiratory distress.      Breath sounds: Normal breath sounds. No wheezing or rales.   Neurological:      Mental Status: She is alert.       Procedures    Assessment / Plan      Assessment/Plan:   1. Gross hematuria    - Significant improvement noted with reduced burning sensations and fewer accidents.  - Completed the antibiotic course.  - A urine test will be ordered to ensure complete healing.  - Notification will be provided if urine test results are abnormal.  - Urinalysis With Culture If Indicated -; Future    2. Chronic left shoulder pain      - Upcoming appointment with a therapist on 07/03/2025 to address shoulder pain.  - Advised to discuss potential support options for shoulder while sleeping during the appointment.  3. History of seizures  - Discussed the potential impact of generalized depression medication on seizure threshold.  - Communication with Aylin will be initiated to discuss prescribing medication specifically for excessive crying post-stroke.story of seizures      4. Tobacco use  - Encouraged to continue efforts to quit smoking for overall health benefits.        Assessment & " Plan  1. Bladder issues.  - Significant improvement noted with reduced burning sensations and fewer accidents.  - Completed the antibiotic course.  - A urine test will be ordered to ensure complete healing.  - Notification will be provided if urine test results are abnormal.    2. Shoulder pain.  - Upcoming appointment with a therapist on 07/03/2025 to address shoulder pain.  - Advised to discuss potential support options for shoulder while sleeping during the appointment.    3. Seizure disorder.  - Seizure-free for almost a year .  - Discussed the potential impact of generalized depression medication on seizure threshold.  - Communication with Aylin will be initiated to discuss prescribing medication specifically for excessive crying post-stroke.    4. Depression.  - Reports excessive crying, possibly related to stroke.  - Discussed the potential impact of generalized depression medication on seizure threshold.  - Communication with Aylin will be initiated to discuss prescribing medication specifically for excessive crying post-stroke.    5. Smoking cessation.  - Encouraged to continue efforts to quit smoking for overall health benefits.    Follow-up  - Follow-up appointment scheduled in 3 months.       Follow Up:   Return in about 3 months (around 9/24/2025) for Recheck.      At [facility], we believe that sharing information builds trust and better relationships. You are receiving this note because you recently visited [facility]. It is possible you will see health information before a provider has talked with you about it. This kind of information can be easy to misunderstand. To help you fully understand what it means for your health, we urge you to discuss this note with your provider.    Aylin Palma PA-C  [facility]

## 2025-07-03 ENCOUNTER — HOSPITAL ENCOUNTER (OUTPATIENT)
Dept: PHYSICAL THERAPY | Facility: HOSPITAL | Age: 55
Setting detail: THERAPIES SERIES
Discharge: HOME | End: 2025-07-03
Payer: MEDICAID

## 2025-07-03 ENCOUNTER — TRANSCRIBE ORDERS (OUTPATIENT)
Dept: PHYSICAL THERAPY | Facility: HOSPITAL | Age: 55
End: 2025-07-03
Payer: MEDICAID

## 2025-07-03 DIAGNOSIS — Z86.73 HISTORY OF CVA (CEREBROVASCULAR ACCIDENT): Primary | ICD-10-CM

## 2025-07-03 PROCEDURE — 97163 PT EVAL HIGH COMPLEX 45 MIN: CPT

## 2025-07-03 PROCEDURE — 97530 THERAPEUTIC ACTIVITIES: CPT

## 2025-07-03 PROCEDURE — 97116 GAIT TRAINING THERAPY: CPT

## 2025-07-03 NOTE — THERAPY EVALUATION
Physical Therapy Initial Evaluation     [facility]          [address]     Patient: Dalila Pike   : 1970  MRN: [id number]   Diagnosis/ICD-10 Code:      ICD-10-CM ICD-9-CM   1. History of CVA (cerebrovascular accident)  Z86.73 V12.54      Referring practitioner: Aylin Palma PA-C  Today's Date: 7/3/2025      Subjective:     Subjective Questionnaire:     PLEITEZ/56  TU.89 sec  10 Meter: 16.14 sec    Eval Complexity: High    Subjective Evaluation    History of Present Illness  Date of onset: 2024  Mechanism of injury: Patient reports sustaining CVA on 2024. Seizures started after CVA, which have been controlled with medication. Has not had a seizure since late  when she started on Keppra along with 3 other medications. One fall a couple of months ago, when she rolled out of bed and fell on her L shoulder leading to proximal humeral fx. Shoulder was examined at an orthopedic facility in [place] where they confirmed fx, but no directions given as to treatment of fx and no subsequent f/u. Patient's CVA recovery was also complicated by tibial fx during her hospitalization preventing her ability to weight bear on L and slowing her recovery. Patient attempted to stop smoking for 38 days but resumed lately. Attempted nicotine patches but those caused more frequent seizures. Now takes a nicotine pill. Cognitive fog and attention issues improved some since her CVA, but has occasional bouts of attention difficulties. Requires assistance with all ADLs. Lives in [place], [place] and has to drive about 1.5 hrs to get here.      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   Patient Goals  Patient goals for therapy: decreased pain, improved balance and increased strength  Patient goal: Improve shoulder function and walk better.           Objective          Static Posture   General Observations  Asymmetrical weight bearing and shifted right.     Head  Forward.    Shoulders  Rounded.    Strength/Myotome Testing     Left Hip   Planes of Motion   Flexion: 3+  Extension: 3+ (Supine bridge)  Left hip abductors strength: Deferred due to Lt shoudler fx and difficulty with rolling.  Left hip adduction strength: Deferred due to Lt shoudler fx and difficulty with rolling.    Right Hip   Planes of Motion   Flexion: 4  Extension: 3+ (Supine bridge)  Right hip abductors strength: Deferred due to Lt shoudler fx and difficulty with rolling.  Right hip adduction strength: Deferred due to Lt shoudler fx and difficulty with rolling.    Left Knee   Flexion: 3  Extension: 3    Right Knee   Flexion: 4  Extension: 4    Left Ankle/Foot   Dorsiflexion: 3+  Plantar flexion: 4-    Right Ankle/Foot   Dorsiflexion: 4  Plantar flexion: 4    Ambulation     Ambulation: Level Surfaces   Ambulation without assistive device: CGA-Min A x1.    Additional Level Surfaces Ambulation Details  Right lateral lean during Rt stance with reduced Lt step length, mid foot strike at initial contact and reduced Lt knee flexion during swing. Reduced trunk rotation and Lt reciprocal arm swing         PT Neuro         Assessment & Plan       Assessment  Impairments: abnormal gait, impaired balance, impaired physical strength, lacks appropriate home exercise program, pain with function and weight-bearing intolerance  (Lt shoudler discomfort)  Functional limitations: carrying objects, lifting, walking, pulling, pushing, uncomfortable because of pain, moving in bed, stooping, reaching behind back, reaching overhead and unable to perform repetitive tasks (Doing ADL activities with Lt hand  )  Assessment details: Patient is a 54 YOF who presents to the clinic with complex medical history of chronic CVA (11 mo), controlled seizures, hx of smoking, Rt internal carotid thrombectomy with stent placement, and Lt proximal humeral fracture. Patient with Lt sided impairments including LE strength, flaccid Lt UE with significant pain upon attempts of overhead movement, Rt sided wt bearing and reduced dynamic and static balance. Patient has attempted to stop smoking but unsuccessful. Does not ambulate with any AD, and recommended to obtain either a single point or small base quad cane for ambulatory stability and safety. Suggested reaching out to the orthopedic clinic for additional Lt shoulder f/u to discuss clearance for therapy. Patient will reach out and schedule appointment. Discussed residual challenges with cognitive function and attention along with Lt UE function, and will request orders for OT and SLP. Patient requires skilled PT services to address impairments mentioned above to restore as much independence and safety with ADLs as possible.    Goals  Plan Goals: STG: 3 visits  1. Patient will demonstrate proper execution of all initial HEP.  2. Patient will score at least 37/56 on PLEITEZ to be able to participate in meal preparation at home.  3. Patient will perform TUG within 15 sec to reduce exertion and improve safety while ambulating within her mobile home.  4. Patient will perform 10 MWT within 13 sec to be able to safely and efficiently cross a street.    LT visits  1. Patient will demonstrate proper execution of all final HEP.  2. Patient will score at least 42/56 on PLEITEZ to reduce caregiver burden and promote independence  with all ADLs.  3. Patient will perform TUG within 12 sec to improve mobility with all ADLs.  4. Patient will perform 10 MWT within 11 sec to increase safety and efficiency with all community ambulation.    Plan  Therapy options: will be seen for skilled therapy services  Planned modality interventions: electrical stimulation/Russian stimulation  Planned therapy interventions: abdominal trunk stabilization, neuromuscular re-education, balance/weight-bearing training, postural training, orthotic fitting/training, flexibility, functional ROM exercises, manual therapy, strengthening, gait training, therapeutic activities, home exercise program and transfer training  Frequency: 1x week  Duration in visits: 8  Treatment plan discussed with: patient and family (significant other)  Plan details: Patient will be seen 1x/wk x 8 wks with treatment to include strengthening, stretching, manual therapy, neuromuscular re-education, balance, gait and endurance training. CPT codes to include: 85343, 63421, 70253, 90521 and 26656          DATE TREATMENT INITIATED: 7/3/2025      Initial Certification Certification Period: 7/3/2849kwfw8/30/2025  I certify that the therapy services are furnished while this patient is under my care.  The services outlined above are required by this patient, and will be reviewed every 90 days.     PHYSICIAN: Aylin Palma PA-C  NPI: [id number]                                         DATE:     Please sign and return via fax to [phone].   Thank you,   [facility].

## 2025-07-08 ENCOUNTER — HOSPITAL ENCOUNTER (OUTPATIENT)
Dept: PHYSICAL THERAPY | Facility: HOSPITAL | Age: 55
Setting detail: THERAPIES SERIES
Discharge: HOME | End: 2025-07-08
Payer: MEDICAID

## 2025-07-08 ENCOUNTER — HOSPITAL ENCOUNTER (OUTPATIENT)
Dept: OCCUPATIONAL THERAPY | Facility: HOSPITAL | Age: 55
Setting detail: THERAPIES SERIES
Discharge: HOME | End: 2025-07-08
Payer: MEDICAID

## 2025-07-08 DIAGNOSIS — Z86.73 HISTORY OF CVA (CEREBROVASCULAR ACCIDENT): Primary | ICD-10-CM

## 2025-07-08 DIAGNOSIS — Z86.73 HISTORY OF STROKE: Primary | ICD-10-CM

## 2025-07-08 PROCEDURE — 97110 THERAPEUTIC EXERCISES: CPT | Performed by: PHYSICAL THERAPIST

## 2025-07-08 PROCEDURE — 97530 THERAPEUTIC ACTIVITIES: CPT | Performed by: PHYSICAL THERAPIST

## 2025-07-08 PROCEDURE — 97530 THERAPEUTIC ACTIVITIES: CPT

## 2025-07-08 PROCEDURE — 97167 OT EVAL HIGH COMPLEX 60 MIN: CPT

## 2025-07-08 PROCEDURE — 97116 GAIT TRAINING THERAPY: CPT | Performed by: PHYSICAL THERAPIST

## 2025-07-08 NOTE — THERAPY TREATMENT NOTE
Physical Therapy Daily Note      Patient: Dalila Pike   : 1970  MRN: [id number]   Diagnosis/ICD-10 Code:      ICD-10-CM ICD-9-CM   1. History of CVA (cerebrovascular accident)  Z86.73 V12.54      Referring practitioner: Aylin Palma PA-C  Today's Date: 2025    Subjective:   Patient reports: she is having a lot of pain in her shoulder.   Pain: 8/10-  Treatment has included: therapeutic exercise, therapeutic activity, and gait training    Objective   See Exercise, Manual, and Modality Logs for complete treatment.    PT Neuro     Transfers  59492 - PT Therapeutic Activity Minutes: 10    Assessment & Plan       Assessment  Assessment details: Patient demonstrates good performance of stepping techniques after adding heel lift in R shoe. She was able to display a heel strike with normalized step length. Patient will continue with stepping HEP, along with performing STS using LLE more than R, as well as weight shifting to LLE in static stance.     Plan  Plan details: Patient to continue with PT services to improve gait, balance, strength, transfers and overall functional mobility.            Therapy Charges for Today       Code Description Service Date Service Provider Modifiers Qty    68562750433  PT THER PROC EA 15 MIN 2025 Mellisa Flaherty, PT GP 1    [id number] HC GAIT TRAINING EA 15 MIN 2025 Mellisa Flaherty, PT GP 1    [id number]  PT THERAPEUTIC ACT EA 15 MIN 2025 Mellisa Flaherty, PT GP 1            Mellisa Flaherty PT, DPT, MSCS, CSRS  [place] License #: [id number]  Physical Therapist
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
## 2025-07-09 ENCOUNTER — HOSPITAL ENCOUNTER (OUTPATIENT)
Dept: SLEEP MEDICINE | Facility: HOSPITAL | Age: 55
Discharge: HOME | End: 2025-07-09
Admitting: NURSE PRACTITIONER
Payer: MEDICAID

## 2025-07-09 ENCOUNTER — APPOINTMENT (OUTPATIENT)
Dept: PHYSICAL THERAPY | Facility: HOSPITAL | Age: 55
End: 2025-07-09
Payer: MEDICAID

## 2025-07-09 DIAGNOSIS — R06.83 SNORING: ICD-10-CM

## 2025-07-09 DIAGNOSIS — R56.9 GENERALIZED CONVULSIVE SEIZURES: ICD-10-CM

## 2025-07-09 DIAGNOSIS — R53.83 FATIGUE, UNSPECIFIED TYPE: ICD-10-CM

## 2025-07-09 DIAGNOSIS — G47.19 EXCESSIVE DAYTIME SLEEPINESS: ICD-10-CM

## 2025-07-09 DIAGNOSIS — I63.9 ACUTE CVA (CEREBROVASCULAR ACCIDENT): ICD-10-CM

## 2025-07-09 PROCEDURE — 95810 POLYSOM 6/> YRS 4/> PARAM: CPT

## 2025-07-10 ENCOUNTER — OFFICE VISIT (OUTPATIENT)
Age: 55
End: 2025-07-10
Payer: MEDICAID

## 2025-07-10 ENCOUNTER — TELEPHONE (OUTPATIENT)
Dept: FAMILY MEDICINE CLINIC | Facility: CLINIC | Age: 55
End: 2025-07-10
Payer: MEDICAID

## 2025-07-10 VITALS
HEIGHT: 67 IN | HEART RATE: 91 BPM | SYSTOLIC BLOOD PRESSURE: 147 MMHG | WEIGHT: 186.4 LBS | BODY MASS INDEX: 29.26 KG/M2 | OXYGEN SATURATION: 100 % | TEMPERATURE: 93.9 F | DIASTOLIC BLOOD PRESSURE: 105 MMHG

## 2025-07-10 DIAGNOSIS — R56.9 GENERALIZED CONVULSIVE SEIZURES: Primary | ICD-10-CM

## 2025-07-10 PROCEDURE — 3077F SYST BP >= 140 MM HG: CPT | Performed by: NURSE PRACTITIONER

## 2025-07-10 PROCEDURE — 1159F MED LIST DOCD IN RCRD: CPT | Performed by: NURSE PRACTITIONER

## 2025-07-10 PROCEDURE — 3080F DIAST BP >= 90 MM HG: CPT | Performed by: NURSE PRACTITIONER

## 2025-07-10 PROCEDURE — 1160F RVW MEDS BY RX/DR IN RCRD: CPT | Performed by: NURSE PRACTITIONER

## 2025-07-10 PROCEDURE — 99214 OFFICE O/P EST MOD 30 MIN: CPT | Performed by: NURSE PRACTITIONER

## 2025-07-10 NOTE — TELEPHONE ENCOUNTER
FMLA PAPERS ON YOUR DESK, PATIENT & SPOUSE WALKED IN, NEEDS ASAP, HE HAS 15 DAYS OR HE WILL LOSE HIS JOB  WILL CHECK BACK MONDAY

## 2025-07-10 NOTE — PROGRESS NOTES
Follow Up Office Visit      Patient Name: Dalila Pike  : 1970   MRN: [id number]     Chief Complaint:    Chief Complaint   Patient presents with    Seizures     Pt reports no SZ in the last 30 days       History of Present Illness: Dalila Pike is a 54 y.o. female who is here today to follow up with seizures and was last seen on 5/15/2025.  She is accompanied by her  today.  She has had no seizures since her last visit.  She is taking Keppra 1500mg BID, Vimpat 200mg BID, and Clobazam 5mg BID- reports good compliance and toleration.  She is still doing physical therapy once weekly.  Her therapist has some concerns about continuing therapy on her left upper extremity due to pain and abnormality in the left shoulder.  She does have an appointment with orthopedics on Monday to discuss her left shoulder pain.  She is doing much better, according to her .  She does not drive.   *EEG, on 6/10/2025, was normal.   *PSG was done, on 2025, but it is not resulted at this time.     Following taken from previous visit note:  Dalila Pike is a 54 y.o. female who is here today to establish care with Neurology for management of seizures.  She is accompanied by her  and daughter-in-law today.  She had a CVA in 2024 and was transferred from  to [facility].  She had a seizure on 2024 and was flown from [facility] to [facility] and was inpatient for a couple of weeks.  Most recent seizure was described as being at her daughter-in-law's house and complaining of not feeling well, she went to the bathroom and fell off the toilet and was convulsing, she did not respond to verbal stimulation, had some mouth foaming, she isn't sure if she had urine incontinence or tongue bites during the seizure.  She had seizures as a child but had not had seizures for a long time until she had the CVA.  She has had no further seizures.  She is taking  Clobazam 5mg BID, Keppra 1500mg BID, Nayzilam 5mg PRN, and Vimpat 200mg BID- reports good compliance, the medication does make her sleepy.  She did have a period where she was out of Keppra for a couple of days and out of Clobazam for 2 weeks and had no seizures.  She was having visual hallucinations and thinks it was due to the Prozac, but hasn't had that for a couple of weeks.  She does not drive- her license has been revoked.  She is taking ASA 81mg daily and Atorvastatin 40mg daily and has a follow up with the stroke clinic in August 2025.  She isn't sleeping well and has a sleep appointment next week in [place].    *Sleep questionnaire reviewed verbally- she is having trouble falling asleep and staying asleep, sleeping 5-6 hours per night, she snores when she sleeps on her back, she frequently wakes up with a dry mouth, she frequently wakes up with a headache, she has significant daytime sleepiness.    *She has a physical therapy appointment, in [place], next week.  She has had multiple courses of physical therapy, speech therapy and occupational therapy after her CVA.   *MRI brain without contrast, on 8/21/2024, showed-   IMPRESSION:  Impression:  1.Large acute infarct involving the majority of the right MCA territory.  2.No evidence of intracranial hemorrhage.  3.Mild chronic small vessel ischemic change.    Subjective      Review of Systems:   Review of Systems   Neurological:  Negative for seizures.       I have reviewed and the following portions of the patient's history were updated as appropriate: past family history, past medical history, past social history, past surgical history and problem list.    Medications:     Current Outpatient Medications:     aspirin 81 MG EC tablet, Take 1 tablet by mouth Daily., Disp: , Rfl:     atorvastatin (LIPITOR) 40 MG tablet, Take 1 tablet by mouth Daily., Disp: 90 tablet, Rfl: 2    ciprofloxacin (Cipro) 500 MG tablet, Take 1 tablet by mouth 2 (Two) Times a Day.,  Disp: 14 tablet, Rfl: 0    cloBAZam (ONFI) 10 MG tablet, Take 0.5 tablets by mouth 2 (Two) Times a Day., Disp: 30 tablet, Rfl: 5    cyclobenzaprine (FLEXERIL) 5 MG tablet, Take 1 tablet by mouth 2 (Two) Times a Day As Needed for Muscle Spasms., Disp: 60 tablet, Rfl: 0    Diclofenac Sodium (VOLTAREN) 1 % gel gel, 2 grams QID prn pain upper extremity joints and 4 grams QID pain lower extremity joints, Disp: 100 g, Rfl: 5    folic acid (FOLVITE) 1 MG tablet, Take 1 tablet by mouth Daily., Disp: , Rfl:     HYDROcodone-acetaminophen (NORCO) 7.5-325 MG per tablet, Take 1 tablet by mouth every 6 (six) to 8 (eight) hours as needed for Pain., Disp: , Rfl:     lacosamide (VIMPAT) 200 MG tablet, Take 1 tablet by mouth Every 12 (Twelve) Hours., Disp: 60 tablet, Rfl: 5    levETIRAcetam (KEPPRA) 750 MG tablet, Take 2 tablets by mouth 2 (Two) Times a Day., Disp: 360 tablet, Rfl: 1    lisinopril (PRINIVIL,ZESTRIL) 20 MG tablet, Take 1 tablet by mouth Daily., Disp: 90 tablet, Rfl: 2    Midazolam (Nayzilam) 5 MG/0.1ML solution, Administer 0.1 mL into the nostril(s) as directed by provider As Needed (seizure)., Disp: , Rfl:     pantoprazole (PROTONIX) 40 MG EC tablet, Take 1 tablet by mouth Daily., Disp: 90 tablet, Rfl: 3    vitamin D (ERGOCALCIFEROL) 1.25 MG (76737 UT) capsule capsule, Take 1 capsule by mouth 1 (One) Time Per Week., Disp: 5 capsule, Rfl: 3    nicotine polacrilex (Nicotine Mini) 4 MG lozenge, Dissolve 1 lozenge in the mouth As Needed for Smoking Cessation. (Patient not taking: Reported on 7/10/2025), Disp: 30 lozenge, Rfl: 1    Allergies:   Allergies   Allergen Reactions    Erythromycin Anaphylaxis    Latex Rash    Toradol [Ketorolac Tromethamine] Rash    Contrast Dye (Echo Or Unknown Ct/Mr) Unknown (See Comments)     Hives on chest       Objective     Physical Exam:  Vital Signs:   Vitals:    07/10/25 1000   BP: (!) 147/105  Comment: Did not take BP meds this morning   Pulse: 91   Temp: 93.9 °F (34.4 °C)   SpO2: 100%  "  Weight: 84.6 kg (186 lb 6.4 oz)   Height: 170.2 cm (67.01\")   PainSc: 0-No pain     Body mass index is 29.19 kg/m².    Physical Exam  Vitals and nursing note reviewed.   Constitutional:       General: She is not in acute distress.     Appearance: Normal appearance. She is well-developed. She is not diaphoretic.   HENT:      Head: Normocephalic and atraumatic.   Eyes:      Extraocular Movements: Extraocular movements intact.      Conjunctiva/sclera: Conjunctivae normal.   Pulmonary:      Effort: Pulmonary effort is normal. No respiratory distress.   Musculoskeletal:      Comments: Left shoulder/arm brace in place.    Skin:     General: Skin is warm and dry.   Neurological:      Mental Status: She is alert and oriented to person, place, and time.   Psychiatric:         Mood and Affect: Mood normal.         Behavior: Behavior normal.         Thought Content: Thought content normal.         Judgment: Judgment normal.         Neurological Exam  Mental Status  Alert. Oriented to person, place, and time.    Cranial Nerves  CN III, IV, VI: Extraocular movements intact bilaterally.        Assessment / Plan      Assessment/Plan:   Diagnoses and all orders for this visit:    1. Generalized convulsive seizures (Primary)    *I have advised patient to decrease Clobazam to 5mg QHS x1 week, then stop. Continue Keppra and Vimpat.   *Seizure precautions discussed and encouraged- Johnny PRN, 911 to be called for any seizure activity lasting more than 5 minutes or for cluster seizures, she does not drive. I have advised patient and her  there is always an increased risk of seizure when decreasing dose of anti-seizure medications and they verbalize understanding and want to proceed.        Follow Up:   Return in about 3 months (around 10/10/2025) for Follow Up.      Ngoc BETTS, FNP-C  [facility] Neurology and Sleep Medicine  "

## 2025-07-13 DIAGNOSIS — R25.2 SPASTICITY: ICD-10-CM

## 2025-07-13 RX ORDER — CYCLOBENZAPRINE HCL 5 MG
5 TABLET ORAL 2 TIMES DAILY PRN
Qty: 180 TABLET | Refills: 0 | Status: SHIPPED | OUTPATIENT
Start: 2025-07-13

## 2025-07-14 ENCOUNTER — HOSPITAL ENCOUNTER (OUTPATIENT)
Dept: OCCUPATIONAL THERAPY | Facility: HOSPITAL | Age: 55
Setting detail: THERAPIES SERIES
Discharge: HOME | End: 2025-07-14
Payer: MEDICAID

## 2025-07-14 ENCOUNTER — HOSPITAL ENCOUNTER (OUTPATIENT)
Dept: PHYSICAL THERAPY | Facility: HOSPITAL | Age: 55
Setting detail: THERAPIES SERIES
Discharge: HOME | End: 2025-07-14
Payer: MEDICAID

## 2025-07-14 ENCOUNTER — TELEPHONE (OUTPATIENT)
Dept: FAMILY MEDICINE CLINIC | Facility: CLINIC | Age: 55
End: 2025-07-14
Payer: MEDICAID

## 2025-07-14 DIAGNOSIS — Z86.73 HISTORY OF CVA (CEREBROVASCULAR ACCIDENT): Primary | ICD-10-CM

## 2025-07-14 DIAGNOSIS — Z86.73 HISTORY OF STROKE: Primary | ICD-10-CM

## 2025-07-14 PROCEDURE — 97530 THERAPEUTIC ACTIVITIES: CPT

## 2025-07-14 PROCEDURE — 97112 NEUROMUSCULAR REEDUCATION: CPT

## 2025-07-14 PROCEDURE — 97110 THERAPEUTIC EXERCISES: CPT

## 2025-07-14 NOTE — THERAPY TREATMENT NOTE
Occupational Therapy Daily Note      Patient: Dalila Pike   : 1970  MRN: [id number]   Diagnosis/ICD-10 Code:      ICD-10-CM ICD-9-CM   1. History of stroke  Z86.73 V12.54      Referring practitioner: Aylin Palma PA-C  Today's Date: 2025    Subjective:   Patient reports: Pt told to wear L clam shell brace for shldr. The brace seems to be causing more pain.  Pain: 7/10 lt shldr   Treatment has included: neuromuscular re-education and therapeutic activity    Objective   See Exercise, Manual, and Modality Logs for complete treatment.    PT Neuro          Assessment & Plan       Assessment  Assessment details: Pt is severely limited by pain originating on sup/lateral shldr that radiates to ant bicep and chest wall w/ very small PROM/AAROM. Therapy potential is guarded d/t pain. Pt's ortho, PCP and neurologist have been contacted for assistance with pain control. Pt and spouse notified. Pt also provided information for [facility] interventional medicine for consult. Pt participated in AAROM and supine PROM.  Pt demonstrates a continued need for therapy services to address significant LUE pain, LUE subluxation, limited LUE ROM and impaired muscle tone/firing. Deficits are limiting her participation in ADL/IADL and leisure activities. Will continue POC to progress toward established goals.       Plan  Plan details: Will continue OT intervention 1x/week to address deficits in order to progress toward goals.             Therapy Charges for Today       Code Description Service Date Service Provider Modifiers Qty    64877777717  OT NEUROMUSC RE EDUCATION EA 15 MIN 2025 Lakia Womack OT GO 3    [id number]  OT THERAPEUTIC ACT EA 15 MIN 2025 Lakia Womack OT GO 1            Lakia Long, OTD, OTR/L  [place] License #: [id number]  Occupational Therapist

## 2025-07-14 NOTE — TELEPHONE ENCOUNTER
CALLED TO LEAVE MESSAGE, NO ANSWER, NO VOICE MAIL, [facility] PAPERS READY TO BE PICKED UP, ORIGINAL WILL BE AVAILABLE FOR , WILL FAX  [phone]

## 2025-07-14 NOTE — THERAPY TREATMENT NOTE
Physical Therapy Daily Note      Patient: Dalila Pike   : 1970  MRN: [id number]   Diagnosis/ICD-10 Code:      ICD-10-CM ICD-9-CM   1. History of CVA (cerebrovascular accident)  Z86.73 V12.54      Referring practitioner: Aylin Palma PA-C  Today's Date: 2025    Subjective:   Patient reports: having more shoulder pain after adding shoulder brace. Has an appointment with orthopedics later today to examine shoulder. Said they will order more imaging to see what is going on with the shoulder.  Pain: 8/10 (Lt shoulder)  Treatment has included: therapeutic exercise, neuromuscular re-education, and therapeutic activity    Objective   See Exercise, Manual, and Modality Logs for complete treatment.    PT Neuro      25 1000   Subjective Pain   Pre-Treatment Pain Level 8   Post-Treatment Pain Level 8   Subjective Pain Comment Lt shoulder   Total Minutes   33464 - PT Therapeutic Exercise Minutes 8   36142 -  PT Neuromuscular Reeducation Minutes 20   21345 - PT Therapeutic Activity Minutes 12   Exercise 1   Exercise Name 1 Nu-Step L5   Time 1 8 min   Exercise 2   Exercise Name 2 Fwd step onto BOSU step with wt shift ot Lt   Reps 2 15   Additional Comments VC to keep knee unlocked   Exercise 3   Exercise Name 3 Fwd step up onto BOSU step (no riser) leading with Lt leg/ added fwd cone tap   Reps 3 15   Exercise 4   Exercise Name 4 STS with Rt UE push off Lt knee for increased wt shift to the Lt   Reps 4 6-8   Exercise 5   Exercise Name 5 Side step with cross body reach for cone on stool and cross body reach above head   Sets 5 2   Reps 5 6   Additional Comments VC for increased step length       Assessment & Plan       Assessment  Assessment details: Patient with improved Lt heel strike and more normalized step length today. Stated that Rt heel lift has made a difference in the ease of her walk. Today's session focused on progressing weight bearing tasks through Lt LE. Patient with slightly better STS  execution today compared to last session. Continued to require intermittent VC for increased Lt sided weight shift during various stepping activities. Patient is scheduled to see [facility] for severe Lt shoulder pain today. Will progress functional strengthening and dynamic balance as tolerated.    Plan  Plan details: Will progress functional strengthening and dynamic balance as tolerated. Check on the status of her Lt shoulder.          Therapy Charges for Today       Code Description Service Date Service Provider Modifiers Qty    46577242606  PT NEUROMUSC RE EDUCATION EA 15 MIN 7/14/2025 Olesya Dang, PT Student GP 1    [id number]  PT THER PROC EA 15 MIN 7/14/2025 Olesya Dang, PT Student GP 1    [id number]  PT THERAPEUTIC ACT EA 15 MIN 7/14/2025 Olesya Dang, PT Student GP 1

## 2025-07-17 ENCOUNTER — TELEPHONE (OUTPATIENT)
Dept: FAMILY MEDICINE CLINIC | Facility: CLINIC | Age: 55
End: 2025-07-17
Payer: MEDICAID

## 2025-07-17 NOTE — TELEPHONE ENCOUNTER
Caller: Jagruti Delunary    Relationship: Emergency Contact    Best call back number: [phone]    What form or medical record are you requesting:     NOTE THAT ABIEL DELUNA WAS WITH PATIENT ON 6/16/25    Who is requesting this form or medical record from you:     EMPLOYER    How would you like to receive the form or medical records (pick-up, mail, fax): FAX  If fax, what is the fax number:     [phone]  ATTN: PETE ALLISON    Timeframe paperwork needed:     Additional notes:     FAX TO WORK THAT ABIEL DELUNA WAS WITH PATIENT AT 6/16/25 APPOINTMENT

## 2025-07-17 NOTE — TELEPHONE ENCOUNTER
Spoke with patients  is needing a work note for the 6/16/2025 for bringing his wife to her appointment.

## 2025-07-24 ENCOUNTER — HOSPITAL ENCOUNTER (OUTPATIENT)
Dept: OCCUPATIONAL THERAPY | Facility: HOSPITAL | Age: 55
Setting detail: THERAPIES SERIES
Discharge: HOME | End: 2025-07-24
Payer: MEDICAID

## 2025-07-24 ENCOUNTER — APPOINTMENT (OUTPATIENT)
Dept: PHYSICAL THERAPY | Facility: HOSPITAL | Age: 55
End: 2025-07-24
Payer: MEDICAID

## 2025-07-24 ENCOUNTER — HOSPITAL ENCOUNTER (OUTPATIENT)
Dept: SPEECH THERAPY | Facility: HOSPITAL | Age: 55
Setting detail: THERAPIES SERIES
Discharge: HOME | End: 2025-07-24
Payer: MEDICAID

## 2025-07-24 DIAGNOSIS — Z86.73 HISTORY OF STROKE: Primary | ICD-10-CM

## 2025-07-24 DIAGNOSIS — R41.841 COGNITIVE COMMUNICATION DEFICIT: Primary | ICD-10-CM

## 2025-07-24 PROCEDURE — 97112 NEUROMUSCULAR REEDUCATION: CPT

## 2025-07-24 PROCEDURE — 96125 COGNITIVE TEST BY HC PRO: CPT | Performed by: SPEECH-LANGUAGE PATHOLOGIST

## 2025-07-24 PROCEDURE — 97110 THERAPEUTIC EXERCISES: CPT

## 2025-07-24 NOTE — THERAPY TREATMENT NOTE
Occupational Therapy Daily Note      Patient: Dalila Pike   : 1970  MRN: [id number]   Diagnosis/ICD-10 Code:      ICD-10-CM ICD-9-CM   1. History of stroke  Z86.73 V12.54      Referring practitioner: Aylin Palma PA-C  Today's Date: 2025    Subjective:   Patient reports: Able to obtain pain medication, took before therapy. Per pt, waiting MRI on L shldr to assess for potential torn rotator cuff.   Pain: 0  Treatment has included: therapeutic exercise, neuromuscular re-education, and therapeutic activity    Objective   See Exercise, Manual, and Modality Logs for complete treatment.    PT Neuro          Assessment & Plan       Assessment  Assessment details: Pt was able to tolerate therapy session w/ limited pain. Pt provided sling for LUE use only when ambulating. Educated pt and spouse on sling placement to avoid stent. Encouraged sling use only during WB positions to avoid further damage to L shldr and avoid elbow tightness. Pt participated in AAROM against resistance and stretching.  Pt demonstrates a continued need for therapy services to address significant LUE pain, LUE subluxation, limited LUE ROM and impaired muscle tone/firing. Deficits are limiting her participation in ADL/IADL and leisure activities. Will continue POC to progress toward established goals.       Plan  Plan details: Will continue OT intervention 1x/week to address deficits in order to progress toward goals.             Therapy Charges for Today       Code Description Service Date Service Provider Modifiers Qty    [id number]  OT NEUROMUSC RE EDUCATION EA 15 MIN 2025 Lakia Womack OT GO 2    [id number]  OT THER PROC EA 15 MIN 2025 Lakia Womack OT GO 1            Lakia Long, OTD, OTR/L  [place] License #: [id number]  Occupational Therapist

## 2025-07-24 NOTE — THERAPY EVALUATION
Outpatient Speech Language Pathology   Adult Speech Language Cognitive Initial Evaluation  [facility]     Patient Name: Dalila Pike  : 1970  MRN: [id number]  Today's Date: 2025        Visit Date: 2025   Patient Active Problem List   Diagnosis    Acute CVA (cerebrovascular accident)    Alcohol use    Tobacco use    Obesity (BMI 30-39.9)    HTN (hypertension)    Dyslipidemia    History of seizures    PAD (peripheral artery disease)    Oropharyngeal dysphagia    Acute UTI (urinary tract infection)    Hypertensive emergency    Single episode of loss of consciousness without head trauma    RUQ pain    Dilated cbd, acquired    Cholangitis    Debility    Encounter for removal of biliary stent        Past Medical History:   Diagnosis Date    Acute CVA (cerebrovascular accident) 2024    Alcohol use 2024    Alcoholism     Anemia     Cholelithiasis     Diabetes mellitus     Dyslipidemia 2024    Fatty liver     Hyperlipidemia     Hypertension     Obesity (BMI 30-39.9) 2024    PAD (peripheral artery disease) 2024    Seizures     Tobacco use 2024        Past Surgical History:   Procedure Laterality Date    ABDOMINAL SURGERY      ENDOSCOPY N/A 10/08/2024    Procedure: ESOPHAGOGASTRODUODENOSCOPY;  Surgeon: Elie Sanders MD;  Location: [facility] ENDOSCOPY;  Service: Gastroenterology;  Laterality: N/A;    ERCP N/A 10/08/2024    Procedure: ENDOSCOPIC RETROGRADE CHOLANGIOPANCREATOGRAPHY WITH STENT PLACEMENT;  Surgeon: Elie Sanders MD;  Location: [facility] ENDOSCOPY;  Service: Gastroenterology;  Laterality: N/A;  SPHINCTEROTOMY @ 1737, 9-12MM AND 12-15MM BALLOON SWEEP TO CBD    ERCP N/A 2024    Procedure: ENDOSCOPIC RETROGRADE CHOLANGIOPANCREATOGRAPHY;  Surgeon: Spenser Hassan MD;  Location: [facility] ENDOSCOPY;  Service: Gastroenterology;  Laterality: N/A;    INTERVENTIONAL RADIOLOGY PROCEDURE Bilateral 2024    Procedure: Carotid Cervical Angiogram;   Surgeon: Jamaal Saini MD;  Location: [facility] CATH INVASIVE LOCATION;  Service: Interventional Radiology;  Laterality: Bilateral;         Visit Dx:    ICD-10-CM ICD-9-CM   1. Cognitive communication deficit  R41.841 799.52            OP SLP Assessment/Plan - 07/24/25 1300          SLP Assessment    Functional Problems Speech Language- Adult/Cognition  -RB    Impact on Function: Adult Speech Language/Cognition Restrictions in personal and social life;Poor attention to task;Trouble learning or remembering new information;Difficulty participating in avocational activities  -RB    Clinical Impression: Speech Language-Adult/Congnition Moderate:;Cognitive Communication Impairment  -RB    Functional Problems Comment impacts daily tasks, communication, QOL  -RB    Clinical Impression Comments would benefit from skilled ST  -RB    Please refer to paper survey for additional self-reported information Yes  -RB    Please refer to items scanned into chart for additional diagnostic informaiton and handouts as provided by clinician Yes  -RB    SLP Diagnosis cog/comm deficit  -RB    Prognosis Good (comment)  -RB    Patient/caregiver participated in establishment of treatment plan and goals Yes  -RB    Patient would benefit from skilled therapy intervention Yes  -RB       SLP Plan    Frequency 1x/weekly  -RB    Duration 12 weeks  -RB    Planned CPT's? SLP INDIVIDUAL SPEECH THERAPY: 60422;SLP COG TEST (PER HOUR): 41434  -RB    Expected Duration of Therapy Session (SLP Eval) 45  -RB    Plan Comments initiate POC  -RB              User Key  (r) = Recorded By, (t) = Taken By, (c) = Cosigned By      Initials Name Provider Type    RB Luz Gomes MA,CCC-SLP Speech and Language Pathologist                     SLP SLC Evaluation - 07/24/25 1300          Communication Assessment/Intervention    Document Type evaluation  -RB    Total Evaluation Minutes, SLP 45  -RB    Subjective Information no complaints  -RB    Patient Observations  "alert;cooperative;agree to therapy  -RB    Patient/Family/Caregiver Comments/Observations  present  -RB    Patient Effort good  -RB    Symptoms Noted During/After Treatment none  -RB       General Information    Patient Profile Reviewed yes  -RB    Pertinent History Of Current Problem PMH: CVA, hx of seizures,HTN. Pt had a CVA last August and was improving until she had significant seizure activity in December last year. She reports short term memory, attention, word finding deficits. Her  who she lives with manages appointments and medications. She used to enjoy fishing and riding motorcycles. 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  -RB    Precautions/Limitations, Vision other (see comments)   \"not great\" -RB    Precautions/Limitations, Hearing WFL;for purposes of eval  -RB    Prior Level of Function-Communication WFL  -RB    Plans/Goals Discussed with patient;spouse/S.O.;agreed upon  -RB    Barriers to Rehab none identified  -RB    Patient's Goals for Discharge functional cognition  -RB    Family Goals for Discharge functional cognition  -RB    Standardized Assessment Used RBANS  -RB       Pain    Pretreatment Pain Rating 0/10 - no pain  -RB    Posttreatment Pain Rating 0/10 - no pain  -RB       Oral Musculature and Cranial Nerve Assessment    Oral Motor General Assessment WFL  -RB       Motor Speech Assessment/Intervention    Motor Speech Function WNL  -RB       Cognitive Assessment Intervention- SLP    Cognitive Function (Cognition) moderate impairment  -RB    Orientation Status (Cognition) WFL  -RB    Memory (Cognitive) moderate impairment;functional;immediate;short-term;delayed;auditory;visual;new learning  -RB    Attention (Cognitive) moderate impairment;sustained;selective;alternating;divided;attention to detail;quiet environment;distracting environment  -RB    Thought Organization (Cognitive) moderate impairment;concrete divergent  -RB    Reasoning (Cognitive) WFL;simple  " -RB    Problem Solving (Cognitive) WFL;simple  -RB    Executive Function (Cognition) moderate impairment;complex organization;home management activities;self-monitoring/correction;planning;WFL;judgement;deficit awareness;realistic goal setting  -RB    Pragmatics (Communication) WFL  -RB    Right Hemisphere Function WFL;pragmatics;moderate impairment;visuo-perceptual;visuo-spatial  -RB    Cognition, Comment deficits with recall, attention, visuospatial skills, thought organization, executive functioning. would benefit from skilled ST  -RB       Standardized Tests    Cognitive/Memory Tests RBANS: Repeatable Battery for the Assessment of Neuropsychological Status  -RB       RBANS- Repeatable Battery for the Assessment of Neuropsychological Status    Immediate Memory Index Score 61  -RB    Immediate Memory Qualitative Description extremely low  -RB    Visuospatial Index Score 72  -RB    Visuospatial Qualitative Description extremely low  -RB    Language Index Score 71  -RB    Language Qualitative Description extremely low  -RB    Attention Index Score 79  -RB    Attention Qualitative Description extremely low  -RB    Delayed Memory Index Score 60  -RB    Delayed Memory Qualitative Description extremely low  -RB    Total Index Score 343  -RB              User Key  (r) = Recorded By, (t) = Taken By, (c) = Cosigned By      Initials Name Provider Type    Luz Faith MA,CCC-SLP Speech and Language Pathologist                              ACTIVITY  ACCURACY ASSISTANCE NEEDED   LTGs:   Pt will be able to remember information needed to function on at avocational tasks 90% of the time no cues           Pt will implement compensatory strategies to maximize patient’s memory function so patient can continue to participate in daily activities 90% of the time no cues              STG:    Pt will utilize word finding strategies in conversation at 95% with no cues                       STG:       Pt’s memory skills will be  enhanced as reported by patient by utilizing internal memory strategies to recall up to 5 pieces of information after a 5 minute delay no cues                    STG:  Pt will demonstrate improved ability to recall and summarize information by listening/reading information and  answering questions/ summarzing 90% of the time no cues       STG:   PT will utilize metacognitive strategies at 90-95% no cues                                Pt will improve attention skills by sustaining focus to selective target/task when presented with competing stimuli or in a distracting environment in order to complete a task 90% no cues           Certification: 7/24/25-10/23/25         OP SLP Education       Row Name 07/24/25 1300       Education    Barriers to Learning No barriers identified  -RB    Education Provided Patient participated in establishing goals and treatment plan;Patient demonstrated recommended strategies;Patient requires further education on strategies, risks  -RB    Assessed Learning needs;Learning motivation;Learning readiness;Learning preferences  -RB    Learning Motivation Strong  -RB    Learning Method Explanation;Demonstration;Teach back;Written materials  -RB    Teaching Response Verbalized understanding;Demonstrated understanding;Reinforcement needed  -RB    Education Comments HEP:IM and EM strategies, communication strategies  -RB              User Key  (r) = Recorded By, (t) = Taken By, (c) = Cosigned By      Initials Name Effective Dates    RB Luz Gomes MA,CCC-SLP 03/28/25 -                              Time Calculation:        Therapy Charges for Today       Code Description Service Date Service Provider Modifiers Qty    [id number]  ST STD COG PERF TEST PER HOUR 7/24/2025 Luz Gomes MA,CCC-SLP GN 1          [facility] Speech Language Pathology   [address]  [place] [address]  Luz Gomes MA CCC-SLP [facility] license: [id number]        PHYSICIAN: Louie  PARAG Viera    NPI: [id number]         I certify that the therapy services are furnished while this patient is under my care.  The services outlined above are required by this patient, and will be reviewed every 90 days.                PHYSICIAN:                                         DATE:      Please sign and return via fax to [phone].   Thank you,   [facility].             Luz Gomes MA,CCC-SLP  7/24/2025

## 2025-07-29 ENCOUNTER — APPOINTMENT (OUTPATIENT)
Dept: OCCUPATIONAL THERAPY | Facility: HOSPITAL | Age: 55
End: 2025-07-29
Payer: MEDICAID

## 2025-07-29 ENCOUNTER — APPOINTMENT (OUTPATIENT)
Dept: SPEECH THERAPY | Facility: HOSPITAL | Age: 55
End: 2025-07-29
Payer: MEDICAID

## 2025-08-07 DIAGNOSIS — R79.89 LOW VITAMIN D LEVEL: ICD-10-CM

## 2025-08-07 RX ORDER — ERGOCALCIFEROL 1.25 MG/1
50000 CAPSULE, LIQUID FILLED ORAL WEEKLY
Qty: 12 CAPSULE | Refills: 3 | Status: SHIPPED | OUTPATIENT
Start: 2025-08-07

## 2025-08-20 ENCOUNTER — TELEPHONE (OUTPATIENT)
Dept: FAMILY MEDICINE CLINIC | Facility: CLINIC | Age: 55
End: 2025-08-20
Payer: MEDICAID

## 2025-08-20 DIAGNOSIS — M25.511 CHRONIC RIGHT SHOULDER PAIN: ICD-10-CM

## 2025-08-20 DIAGNOSIS — G89.29 CHRONIC RIGHT SHOULDER PAIN: Primary | ICD-10-CM

## 2025-08-20 DIAGNOSIS — I63.9 ACUTE CVA (CEREBROVASCULAR ACCIDENT): Primary | ICD-10-CM

## 2025-08-20 DIAGNOSIS — M25.511 CHRONIC RIGHT SHOULDER PAIN: Primary | ICD-10-CM

## 2025-08-20 DIAGNOSIS — G89.29 CHRONIC RIGHT SHOULDER PAIN: ICD-10-CM

## 2025-08-21 ENCOUNTER — HOSPITAL ENCOUNTER (OUTPATIENT)
Facility: HOSPITAL | Age: 55
Setting detail: THERAPIES SERIES
Discharge: HOME | End: 2025-08-21
Payer: MEDICAID

## 2025-08-21 ENCOUNTER — TELEPHONE (OUTPATIENT)
Dept: NEUROLOGY | Facility: CLINIC | Age: 55
End: 2025-08-21

## 2025-08-21 DIAGNOSIS — R41.841 COGNITIVE COMMUNICATION DEFICIT: Primary | ICD-10-CM

## 2025-08-21 DIAGNOSIS — I63.9 ACUTE CVA (CEREBROVASCULAR ACCIDENT): Primary | ICD-10-CM

## 2025-08-21 DIAGNOSIS — Z86.73 HISTORY OF CVA (CEREBROVASCULAR ACCIDENT): Primary | ICD-10-CM

## 2025-08-21 PROCEDURE — 97162 PT EVAL MOD COMPLEX 30 MIN: CPT | Performed by: PHYSICAL THERAPIST

## 2025-08-21 PROCEDURE — 92523 SPEECH SOUND LANG COMPREHEN: CPT | Performed by: SPEECH-LANGUAGE PATHOLOGIST

## 2025-08-21 PROCEDURE — 97167 OT EVAL HIGH COMPLEX 60 MIN: CPT

## 2025-08-21 PROCEDURE — 97530 THERAPEUTIC ACTIVITIES: CPT

## (undated) DEVICE — SINGLE USE DISTAL COVER MAJ-2315: Brand: SINGLE USE DISTAL COVER

## (undated) DEVICE — LUBE JELLY FOIL PACK 1.4 OZ: Brand: MEDLINE INDUSTRIES, INC.

## (undated) DEVICE — DEV LK WIREGUIDE FUSN OLYMP SCP

## (undated) DEVICE — LEX NEURO ANGIOGRAPHY: Brand: MEDLINE INDUSTRIES, INC.

## (undated) DEVICE — INTERMEDIATE CATHETER: Brand: AXS VECTA 71

## (undated) DEVICE — RETRIEVAL BALLOON CATHETER: Brand: EXTRACTOR™ PRO RX

## (undated) DEVICE — BOWL UTIL STRL 32OZ

## (undated) DEVICE — SAFELINER SUCTION CANISTER 1000CC: Brand: DEROYAL

## (undated) DEVICE — KT ORCA ORCAPOD DISP STRL

## (undated) DEVICE — CATH MIC PHENOM .021 .018IN 160CM

## (undated) DEVICE — ST ACC MICROPUNCTURE .018 TRANSLSS/SS/TP 5F/10CM 21G/7CM

## (undated) DEVICE — SOLIDIFIER LIQ PREMISORB 1500CC

## (undated) DEVICE — HYBRID CO2 TUBING/CAP SET FOR OLYMPUS® SCOPES & CO2 SOURCE: Brand: ERBE

## (undated) DEVICE — LUBE GEL ENDOGLIDE 1.1OZ

## (undated) DEVICE — SPHINCTEROTOME: Brand: DREAMTOME™ RX 44

## (undated) DEVICE — STPCK 3/WY HP M/RA W/OFF/HNDL 1050PSI STRL

## (undated) DEVICE — ST LINER SAFECAP GRN RED CP STRL

## (undated) DEVICE — SOL IRR H2O BO 1000ML STRL

## (undated) DEVICE — LIMB HOLDER, WRIST/ANKLE: Brand: DEROYAL

## (undated) DEVICE — FIRST STEP BEDSIDE ADD WATER KIT - RESEALABLE STAND-UP POUCH, ENDOSCOPIC CLEANING PAD - 1 POUCH: Brand: FIRST STEP BEDSIDE ADD WATER KIT - RESEALABLE STAND-UP POUCH, ENDOSCOPIC CLEANIN

## (undated) DEVICE — THE BITE BLOCK MAXI, LATEX FREE STRAP IS USED TO PROTECT THE ENDOSCOPE INSERTION TUBE FROM BEING BITTEN BY THE PATIENT.

## (undated) DEVICE — INTRO ACCSR BLNT TP

## (undated) DEVICE — STPCK LP 1WY RA 200PSI

## (undated) DEVICE — SINGLE-USE BIOPSY FORCEPS: Brand: RADIAL JAW 4

## (undated) DEVICE — CONTN GRAD MEAS TRIANG 32OZ BLK

## (undated) DEVICE — DEV INFL MONARCH 25W

## (undated) DEVICE — BAREWIRE WORKHORSE FILTER DELIVERY WIRE 315 CM: Brand: BAREWIRE

## (undated) DEVICE — TRIPLE LUMEN ERCP CANNULA: Brand: TANDEM XL

## (undated) DEVICE — SYR LUERLOK 50ML

## (undated) DEVICE — CVR PROB ULTRASND/TRANSD W/GEL 7X11IN STRL

## (undated) DEVICE — GW AMPLATZ SUPERSTIFF 3MM J/TP .038IN 180CM

## (undated) DEVICE — SYR LL TP 10ML STRL

## (undated) DEVICE — ROTATING HEMOSTATIC VALVE .096": Brand: RHV

## (undated) DEVICE — AIR/WATER CLEANING VALVES: Brand: AIR/WATER CLEANING VALVES

## (undated) DEVICE — DEV LK SNAPLOC GW OLYMPUS/COMPAT

## (undated) DEVICE — CANNULATING SPHINCTEROTOME: Brand: JAGTOME™ REVOLUTION RX

## (undated) DEVICE — INTRO FLEXOR TB SDARM .87 SHTL 6F90CM

## (undated) DEVICE — PAD,ARMBOARD,CONV,FOAM,2X8X20",12PR/CS: Brand: MEDLINE

## (undated) DEVICE — PINNACLE INTRODUCER SHEATH: Brand: PINNACLE

## (undated) DEVICE — RADIFOCUS GLIDEWIRE: Brand: GLIDEWIRE

## (undated) DEVICE — TUBING, SUCTION, 1/4" X 10', STRAIGHT: Brand: MEDLINE

## (undated) DEVICE — ANGIO-SEAL VIP VASCULAR CLOSURE DEVICE: Brand: ANGIO-SEAL

## (undated) DEVICE — SOL IRR H2O BTL 1000ML STRL

## (undated) DEVICE — EMBOSHIELD NAV6 EMBOLIC PROTECTION SYSTEM 7.2 MM X 190 CM: Brand: EMBOSHIELD  NAV6

## (undated) DEVICE — KT VLV HEMO MAP ACC PLS LG/BORE MTL/INTRO W/TORQ/DEV

## (undated) DEVICE — PROVUE RETRIEVER: Brand: TREVO NXT

## (undated) DEVICE — WIREGUIDED CYTOLOGY BRUSH: Brand: RX CYTOLOGY BRUSH

## (undated) DEVICE — THE DISPOSABLE RAPTOR GRASPING DEVICE IS USED TO GRASP TISSUE AND/OR RETRIEVE FOREIGN BODIES, EXCISED TISSUE AND STENTS DURING ENDOSCOPIC PROCEDURES.: Brand: RAPTOR

## (undated) DEVICE — GW JAG STR .025IN 450CM

## (undated) DEVICE — GUIDEWIRE WITH ICE™ HYDROPHILIC COATING: Brand: TRANSEND™ EX

## (undated) DEVICE — KT CATH INDIGO RX ASP 140CM

## (undated) DEVICE — GW JAG STR .035IN 450CM